# Patient Record
Sex: FEMALE | Race: WHITE | NOT HISPANIC OR LATINO | Employment: OTHER | URBAN - METROPOLITAN AREA
[De-identification: names, ages, dates, MRNs, and addresses within clinical notes are randomized per-mention and may not be internally consistent; named-entity substitution may affect disease eponyms.]

---

## 2017-01-03 ENCOUNTER — APPOINTMENT (OUTPATIENT)
Dept: ONCOLOGY | Facility: HOSPITAL | Age: 77
End: 2017-01-03

## 2017-01-17 ENCOUNTER — APPOINTMENT (OUTPATIENT)
Dept: ONCOLOGY | Facility: HOSPITAL | Age: 77
End: 2017-01-17

## 2017-01-20 ENCOUNTER — INFUSION (OUTPATIENT)
Dept: ONCOLOGY | Facility: HOSPITAL | Age: 77
End: 2017-01-20

## 2017-01-20 VITALS — BODY MASS INDEX: 21.69 KG/M2 | WEIGHT: 116.4 LBS | TEMPERATURE: 98.5 F

## 2017-01-20 DIAGNOSIS — D51.0 VITAMIN B12 DEFICIENCY ANEMIA DUE TO INTRINSIC FACTOR DEFICIENCY: Primary | ICD-10-CM

## 2017-01-20 PROCEDURE — 25010000002 CYANOCOBALAMIN PER 1000 MCG: Performed by: INTERNAL MEDICINE

## 2017-01-20 PROCEDURE — 96372 THER/PROPH/DIAG INJ SC/IM: CPT | Performed by: INTERNAL MEDICINE

## 2017-01-20 RX ORDER — CYANOCOBALAMIN 1000 UG/ML
1000 INJECTION, SOLUTION INTRAMUSCULAR; SUBCUTANEOUS ONCE
Status: COMPLETED | OUTPATIENT
Start: 2017-01-20 | End: 2017-01-20

## 2017-01-20 RX ORDER — CYANOCOBALAMIN 1000 UG/ML
1000 INJECTION, SOLUTION INTRAMUSCULAR; SUBCUTANEOUS ONCE
Status: CANCELLED | OUTPATIENT
Start: 2017-02-10

## 2017-01-20 RX ADMIN — CYANOCOBALAMIN 1000 MCG: 1000 INJECTION INTRAMUSCULAR; SUBCUTANEOUS at 11:23

## 2017-02-21 ENCOUNTER — INFUSION (OUTPATIENT)
Dept: ONCOLOGY | Facility: HOSPITAL | Age: 77
End: 2017-02-21

## 2017-02-21 VITALS
DIASTOLIC BLOOD PRESSURE: 100 MMHG | WEIGHT: 111.9 LBS | OXYGEN SATURATION: 98 % | HEART RATE: 62 BPM | TEMPERATURE: 98.2 F | SYSTOLIC BLOOD PRESSURE: 163 MMHG | BODY MASS INDEX: 20.86 KG/M2

## 2017-02-21 DIAGNOSIS — D51.0 VITAMIN B12 DEFICIENCY ANEMIA DUE TO INTRINSIC FACTOR DEFICIENCY: Primary | ICD-10-CM

## 2017-02-21 PROCEDURE — 25010000002 CYANOCOBALAMIN PER 1000 MCG: Performed by: INTERNAL MEDICINE

## 2017-02-21 PROCEDURE — 96372 THER/PROPH/DIAG INJ SC/IM: CPT | Performed by: INTERNAL MEDICINE

## 2017-02-21 RX ORDER — CYANOCOBALAMIN 1000 UG/ML
1000 INJECTION, SOLUTION INTRAMUSCULAR; SUBCUTANEOUS ONCE
Status: COMPLETED | OUTPATIENT
Start: 2017-02-21 | End: 2017-02-21

## 2017-02-21 RX ORDER — CYANOCOBALAMIN 1000 UG/ML
1000 INJECTION, SOLUTION INTRAMUSCULAR; SUBCUTANEOUS ONCE
Status: CANCELLED | OUTPATIENT
Start: 2017-03-17

## 2017-02-21 RX ADMIN — CYANOCOBALAMIN 1000 MCG: 1000 INJECTION, SOLUTION INTRAMUSCULAR; SUBCUTANEOUS at 11:48

## 2017-02-21 NOTE — PROGRESS NOTES
Pts BP elevated in office today  Pt states that Dr Forte follows her BP  Advised to try to record her BP's and report to PCP  Advised that her meds may need to be adjusted  Understanding noted  Pt denies any complaints

## 2017-02-24 ENCOUNTER — OFFICE VISIT (OUTPATIENT)
Dept: INTERNAL MEDICINE | Facility: CLINIC | Age: 77
End: 2017-02-24

## 2017-02-24 VITALS
BODY MASS INDEX: 21.26 KG/M2 | SYSTOLIC BLOOD PRESSURE: 112 MMHG | OXYGEN SATURATION: 97 % | HEIGHT: 61 IN | HEART RATE: 59 BPM | WEIGHT: 112.6 LBS | DIASTOLIC BLOOD PRESSURE: 78 MMHG

## 2017-02-24 DIAGNOSIS — R30.0 DYSURIA: Primary | ICD-10-CM

## 2017-02-24 DIAGNOSIS — K59.09 CHRONIC CONSTIPATION: ICD-10-CM

## 2017-02-24 DIAGNOSIS — R79.89 ABNORMAL THYROID BLOOD TEST: ICD-10-CM

## 2017-02-24 DIAGNOSIS — I71.60 THORACOABDOMINAL AORTIC ANEURYSM, WITHOUT RUPTURE (HCC): ICD-10-CM

## 2017-02-24 DIAGNOSIS — K55.1 MESENTERIC ARTERY STENOSIS (HCC): ICD-10-CM

## 2017-02-24 DIAGNOSIS — I10 ESSENTIAL HYPERTENSION: ICD-10-CM

## 2017-02-24 DIAGNOSIS — F17.200 TOBACCO DEPENDENCE SYNDROME: ICD-10-CM

## 2017-02-24 DIAGNOSIS — E78.00 HYPERCHOLESTEROLEMIA: ICD-10-CM

## 2017-02-24 DIAGNOSIS — G47.9 SLEEP DIFFICULTIES: ICD-10-CM

## 2017-02-24 DIAGNOSIS — R73.9 HYPERGLYCEMIA: ICD-10-CM

## 2017-02-24 DIAGNOSIS — R14.0 ABDOMINAL BLOATING: ICD-10-CM

## 2017-02-24 DIAGNOSIS — F41.9 ANXIETY: ICD-10-CM

## 2017-02-24 LAB
BILIRUB BLD-MCNC: NEGATIVE MG/DL
CLARITY, POC: CLEAR
COLOR UR: ABNORMAL
GLUCOSE UR STRIP-MCNC: NEGATIVE MG/DL
KETONES UR QL: NEGATIVE
LEUKOCYTE EST, POC: ABNORMAL
NITRITE UR-MCNC: NEGATIVE MG/ML
PH UR: 6.5 [PH] (ref 5–8)
PROT UR STRIP-MCNC: NEGATIVE MG/DL
RBC # UR STRIP: NEGATIVE /UL
SP GR UR: 1.01 (ref 1–1.03)
UROBILINOGEN UR QL: NORMAL

## 2017-02-24 PROCEDURE — 99214 OFFICE O/P EST MOD 30 MIN: CPT | Performed by: INTERNAL MEDICINE

## 2017-02-24 PROCEDURE — 81003 URINALYSIS AUTO W/O SCOPE: CPT | Performed by: INTERNAL MEDICINE

## 2017-02-24 PROCEDURE — 87086 URINE CULTURE/COLONY COUNT: CPT | Performed by: INTERNAL MEDICINE

## 2017-02-24 RX ORDER — QUETIAPINE FUMARATE 25 MG/1
50 TABLET, FILM COATED ORAL NIGHTLY
Qty: 30 TABLET | Refills: 6 | Status: SHIPPED | OUTPATIENT
Start: 2017-02-24 | End: 2017-06-19 | Stop reason: SDUPTHER

## 2017-02-24 RX ORDER — METHOCARBAMOL 750 MG/1
TABLET, FILM COATED ORAL AS NEEDED
Refills: 3 | COMMUNITY
Start: 2017-01-24 | End: 2018-03-12

## 2017-02-24 NOTE — PROGRESS NOTES
"Subjective     Kaylin Ojeda is a 77 y.o. female, who presents with a chief complaint of   Chief Complaint   Patient presents with   • Difficulty Urinating     Patient has only one kidney, she says that sometimes she has to urinate a lot, bloated, she doesnt think that her bladder is always empting when she goes.       HPI   The pt has been having issue with urination for a month or two.  She feels like her bladder doesn't empty.  She feels like her abd is bloated.   Sometimes she had a good stream and sometimes not.  She has a history of vascular issues and her left kidney is smaller than the right.  occ pain from the bloating but no sharp, stabbing pain.  Her urine looks \"strong\"  She is still smoking.  She doesn't have much energy.  She helps care for her great granddaughter on the weekends.   + constipation.  She takes ducolax every couple of days to have a BM.  She has miralax at home too but isnt using it. 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  She hasnt followed up with vascular and last imaging was august 2015.  She is on asa and plavix. She also has mesenteric artery stenosis.      Sleep problems - she is taking trazodone at night and low dose seroquel.    HTN - no ha/dizziness    HLD - on statin.  No cramps or myalgias.      The following portions of the patient's history were reviewed and updated as appropriate: allergies, current medications, past family history, past medical history, past social history, past surgical history and problem list.    Allergies: Aleve  [naproxen sodium]; Codeine; Ibuprofen; and Sulfa antibiotics    Review of Systems   Constitutional: Negative.    HENT: Negative.    Eyes: Negative.    Respiratory: Negative.    Cardiovascular: Negative.    Gastrointestinal: Positive for abdominal distention and constipation. Negative for abdominal pain.   Endocrine: Negative.    Genitourinary: Negative.    Musculoskeletal: Negative.    Skin: Negative.  "   Allergic/Immunologic: Negative.    Neurological: Negative.    Hematological: Negative.    Psychiatric/Behavioral: Negative.    All other systems reviewed and are negative.      Objective     Wt Readings from Last 3 Encounters:   02/24/17 112 lb 9.6 oz (51.1 kg)   02/21/17 111 lb 14.4 oz (50.8 kg)   01/20/17 116 lb 6.4 oz (52.8 kg)     Temp Readings from Last 3 Encounters:   02/21/17 98.2 °F (36.8 °C)   01/20/17 98.5 °F (36.9 °C) (Oral)   11/18/16 98.4 °F (36.9 °C) (Oral)     BP Readings from Last 3 Encounters:   02/24/17 112/78   02/21/17 163/100   11/23/16 117/70     Pulse Readings from Last 3 Encounters:   02/24/17 59   02/21/17 62   11/23/16 62     Body mass index is 21.28 kg/(m^2).  SpO2 Readings from Last 3 Encounters:   02/24/17 97%   02/21/17 98%   11/23/16 98%       Physical Exam   Constitutional: She is oriented to person, place, and time. She appears well-developed and well-nourished. No distress.   HENT:   Head: Normocephalic and atraumatic.   Right Ear: External ear normal.   Left Ear: External ear normal.   Nose: Nose normal.   Mouth/Throat: Oropharynx is clear and moist.   Eyes: Conjunctivae and EOM are normal. Pupils are equal, round, and reactive to light.   Neck: Normal range of motion. Neck supple.   Cardiovascular: Normal rate, regular rhythm, normal heart sounds and intact distal pulses.    Pulmonary/Chest: Effort normal and breath sounds normal. No respiratory distress. She has no wheezes.   Musculoskeletal: Normal range of motion.   Normal gait   Neurological: She is alert and oriented to person, place, and time.   Skin: Skin is warm and dry.   Psychiatric: She has a normal mood and affect. Her behavior is normal. Judgment and thought content normal.   Nursing note and vitals reviewed.      Results for orders placed or performed in visit on 11/23/16   Urine Culture   Result Value Ref Range    Urine Culture Final report     Result 1 Comment    Comprehensive Metabolic Panel   Result Value Ref  Range    Glucose 91 65 - 99 mg/dL    BUN 13 8 - 23 mg/dL    Creatinine 1.03 (H) 0.57 - 1.00 mg/dL    Sodium 136 136 - 145 mmol/L    Potassium 4.6 3.5 - 5.2 mmol/L    Chloride 98 98 - 107 mmol/L    CO2 26.7 22.0 - 29.0 mmol/L    Calcium 8.7 (L) 8.8 - 10.5 mg/dL    Total Protein 7.2 6.0 - 8.5 g/dL    Albumin 4.10 3.50 - 5.20 g/dL    ALT (SGPT) 11 5 - 33 U/L    AST (SGOT) 14 5 - 32 U/L    Alkaline Phosphatase 125 40 - 129 U/L    Total Bilirubin 0.4 0.2 - 1.2 mg/dL    eGFR Non African Amer 52 (L) >60 mL/min/1.73    Globulin 3.1 gm/dL    A/G Ratio 1.3 g/dL    BUN/Creatinine Ratio 12.6 7.0 - 25.0    Anion Gap 11.3 mmol/L   T4, Free   Result Value Ref Range    Free T4 0.93 0.93 - 1.70 ng/dL   TSH   Result Value Ref Range    TSH 5.540 (H) 0.270 - 4.200 mIU/mL   Hemoglobin A1c   Result Value Ref Range    Hemoglobin A1C 5.50 4.80 - 5.60 %   CBC Auto Differential   Result Value Ref Range    WBC 7.15 4.80 - 10.80 10*3/mm3    RBC 4.56 4.20 - 5.40 10*6/mm3    Hemoglobin 14.3 12.0 - 16.0 g/dL    Hematocrit 43.2 37.0 - 47.0 %    MCV 94.7 81.0 - 99.0 fL    MCH 31.4 (H) 27.0 - 31.0 pg    MCHC 33.1 31.0 - 37.0 g/dL    RDW 12.4 11.5 - 14.5 %    RDW-SD 43.7 37.0 - 54.0 fl    MPV 9.6 7.4 - 10.4 fL    Platelets 200 140 - 500 10*3/mm3    Neutrophil % 54.0 45.0 - 70.0 %    Lymphocyte % 35.8 20.0 - 45.0 %    Monocyte % 6.6 3.0 - 8.0 %    Eosinophil % 2.2 0.0 - 4.0 %    Basophil % 1.1 0.0 - 2.0 %    Immature Grans % 0.3 0.0 - 0.5 %    Neutrophils, Absolute 3.86 1.50 - 8.30 10*3/mm3    Lymphocytes, Absolute 2.56 0.60 - 4.80 10*3/mm3    Monocytes, Absolute 0.47 0.00 - 1.00 10*3/mm3    Eosinophils, Absolute 0.16 0.10 - 0.30 10*3/mm3    Basophils, Absolute 0.08 0.00 - 0.20 10*3/mm3    Immature Grans, Absolute 0.02 0.00 - 0.03 10*3/mm3    nRBC 0.0 0.0 - 0.0 /100 WBC   POC Urinalysis Dipstick, Automated   Result Value Ref Range    Color Yellow Yellow, Straw, Dark Yellow, Betty    Clarity, UA Clear Clear    Glucose, UA Negative Negative mg/dL     Bilirubin Negative Negative    Ketones, UA Negative Negative    Specific Gravity  1.020 1.005 - 1.030    Blood, UA Negative Negative    pH, Urine 5.5 5.0 - 8.0    Protein, POC Negative Negative mg/dL    Urobilinogen, UA Normal Normal    Leukocytes Trace (A) Negative    Nitrite, UA Negative Negative       Assessment/Plan   Kaylin was seen today for difficulty urinating.    Diagnoses and all orders for this visit:    Abdominal bloating  -     CT angiogram abdomen pelvis w wo contrast; Future    Essential hypertension  -     CT angiogram abdomen pelvis w wo contrast; Future  -     CT angiogram chest w wo contrast; Future  -     Comprehensive Metabolic Panel; Future  -     NMR LipoProfile; Future  -     CBC & Differential; Future    Hypercholesterolemia  -     CT angiogram abdomen pelvis w wo contrast; Future  -     CT angiogram chest w wo contrast; Future  -     Comprehensive Metabolic Panel; Future  -     NMR LipoProfile; Future  -     CBC & Differential; Future  -     Lipid Panel With LDL / HDL Ratio; Future    Mesenteric artery stenosis  -     CT angiogram abdomen pelvis w wo contrast; Future    Thoracoabdominal aortic aneurysm, without rupture  -     CT angiogram abdomen pelvis w wo contrast; Future  -     CT angiogram chest w wo contrast; Future    Anxiety  -     QUEtiapine (SEROquel) 25 MG tablet; Take 2 tablets by mouth Every Night.    Tobacco dependence syndrome  -     CT angiogram abdomen pelvis w wo contrast; Future  -     CT angiogram chest w wo contrast; Future    Abnormal thyroid blood test  -     T4, Free; Future  -     TSH; Future    Dysuria  -     Comprehensive Metabolic Panel; Future  -     POC Urinalysis Dipstick, Automated    Hyperglycemia  -     Comprehensive Metabolic Panel; Future  -     Hemoglobin A1c; Future    Sleep difficulties    Chronic constipation          Outpatient Medications Prior to Visit   Medication Sig Dispense Refill   • albuterol (PROVENTIL HFA;VENTOLIN HFA) 108 (90 BASE)  MCG/ACT inhaler Inhale 2 puffs every 4 (four) hours as needed for wheezing. 18 g 11   • atorvastatin (LIPITOR) 10 MG tablet Take 1 tablet by mouth daily. 90 tablet 3   • budesonide-formoterol (SYMBICORT) 160-4.5 MCG/ACT inhaler Inhale 2 puffs 2 (two) times a day. 1 inhaler 12   • clopidogrel (PLAVIX) 75 MG tablet TAKE ONE TABLET BY MOUTH ONCE DAILY 30 tablet 9   • clotrimazole-betamethasone (LOTRISONE) 1-0.05 % cream Apply  topically 2 (two) times a day. 45 g 0   • diazePAM (VALIUM) 5 MG tablet Take 1 tablet by mouth Every 12 (Twelve) Hours As Needed for anxiety. 30 tablet 1   • esomeprazole (NexIUM) 40 MG capsule Take  by mouth.     • gabapentin (NEURONTIN) 100 MG capsule TAKE ONE CAPSULE BY MOUTH THREE TIMES DAILY AS NEEDED 90 capsule 6   • hydrOXYzine (ATARAX) 25 MG tablet Take 1 tablet by mouth every 8 (eight) hours as needed for anxiety. 30 tablet 0   • metoprolol tartrate (LOPRESSOR) 100 MG tablet Take 1 tablet by mouth 2 (Two) Times a Day. 180 tablet 1   • mirtazapine (REMERON) 30 MG tablet Take 1 tablet by mouth Every Night. 30 tablet 6   • ondansetron (ZOFRAN) 8 MG tablet Take 1 tablet by mouth every 8 (eight) hours as needed for nausea or vomiting. 20 tablet 2   • oxyCODONE-acetaminophen (PERCOCET)  MG per tablet Take 1 tablet by mouth 4 (four) times a day.  0   • traZODone (DESYREL) 50 MG tablet Take 1 tablet by mouth Every Night. Take 1/2 to one whole tablet QHS. 30 tablet 6   • QUEtiapine (SEROquel) 25 MG tablet Take 1 tablet by mouth Every Night. 30 tablet 6   • clopidogrel (PLAVIX) 75 MG tablet Take  by mouth.       No facility-administered medications prior to visit.      New Medications Ordered This Visit   Medications   • QUEtiapine (SEROquel) 25 MG tablet     Sig: Take 2 tablets by mouth Every Night.     Dispense:  30 tablet     Refill:  6       Medications Discontinued During This Encounter   Medication Reason   • clopidogrel (PLAVIX) 75 MG tablet    • QUEtiapine (SEROquel) 25 MG tablet  Reorder         Return in about 3 months (around 5/24/2017) for Recheck, labs.

## 2017-02-26 LAB
ALBUMIN SERPL-MCNC: 4.2 G/DL (ref 3.5–5.2)
ALBUMIN/GLOB SERPL: 1.6 G/DL
ALP SERPL-CCNC: 118 U/L (ref 40–129)
ALT SERPL-CCNC: 6 U/L (ref 5–33)
AST SERPL-CCNC: 13 U/L (ref 5–32)
BACTERIA SPEC AEROBE CULT: NO GROWTH
BASOPHILS # BLD AUTO: 0.07 10*3/MM3 (ref 0–0.2)
BASOPHILS NFR BLD AUTO: 1 % (ref 0–2)
BILIRUB SERPL-MCNC: 0.4 MG/DL (ref 0.2–1.2)
BUN SERPL-MCNC: 10 MG/DL (ref 8–23)
BUN/CREAT SERPL: 10.1 (ref 7–25)
CALCIUM SERPL-MCNC: 9 MG/DL (ref 8.8–10.5)
CHLORIDE SERPL-SCNC: 98 MMOL/L (ref 98–107)
CHOLEST SERPL-MCNC: 140 MG/DL (ref 100–199)
CO2 SERPL-SCNC: 29.3 MMOL/L (ref 22–29)
CREAT SERPL-MCNC: 0.99 MG/DL (ref 0.57–1)
EOSINOPHIL # BLD AUTO: 0.09 10*3/MM3 (ref 0.1–0.3)
EOSINOPHIL NFR BLD AUTO: 1.3 % (ref 0–4)
ERYTHROCYTE [DISTWIDTH] IN BLOOD BY AUTOMATED COUNT: 13.3 % (ref 11.5–14.5)
GLOBULIN SER CALC-MCNC: 2.6 GM/DL
GLUCOSE SERPL-MCNC: 82 MG/DL (ref 65–99)
HBA1C MFR BLD: 5.6 % (ref 4.8–5.6)
HCT VFR BLD AUTO: 44.7 % (ref 37–47)
HDL SERPL-SCNC: 28 UMOL/L
HDLC SERPL-MCNC: 48 MG/DL
HGB BLD-MCNC: 14.6 G/DL (ref 12–16)
IMM GRANULOCYTES # BLD: 0.02 10*3/MM3 (ref 0–0.03)
IMM GRANULOCYTES NFR BLD: 0.3 % (ref 0–0.5)
LDL SERPL QN: 21 NM
LDL SERPL-SCNC: 849 NMOL/L
LDL SMALL SERPL-SCNC: 430 NMOL/L
LDLC SERPL CALC-MCNC: 67 MG/DL (ref 0–99)
LYMPHOCYTES # BLD AUTO: 2.68 10*3/MM3 (ref 0.6–4.8)
LYMPHOCYTES NFR BLD AUTO: 38.1 % (ref 20–45)
MCH RBC QN AUTO: 31.3 PG (ref 27–31)
MCHC RBC AUTO-ENTMCNC: 32.7 G/DL (ref 31–37)
MCV RBC AUTO: 95.7 FL (ref 81–99)
MONOCYTES # BLD AUTO: 0.38 10*3/MM3 (ref 0–1)
MONOCYTES NFR BLD AUTO: 5.4 % (ref 3–8)
NEUTROPHILS # BLD AUTO: 3.79 10*3/MM3 (ref 1.5–8.3)
NEUTROPHILS NFR BLD AUTO: 53.9 % (ref 45–70)
NRBC BLD AUTO-RTO: 0 /100 WBC (ref 0–0)
PLATELET # BLD AUTO: 214 10*3/MM3 (ref 140–500)
POTASSIUM SERPL-SCNC: 4.7 MMOL/L (ref 3.5–5.2)
PROT SERPL-MCNC: 6.8 G/DL (ref 6–8.5)
RBC # BLD AUTO: 4.67 10*6/MM3 (ref 4.2–5.4)
SODIUM SERPL-SCNC: 139 MMOL/L (ref 136–145)
T4 FREE SERPL-MCNC: 1.06 NG/DL (ref 0.93–1.7)
TRIGL SERPL-MCNC: 123 MG/DL (ref 0–149)
TSH SERPL DL<=0.005 MIU/L-ACNC: 3.69 MIU/ML (ref 0.27–4.2)
WBC # BLD AUTO: 7.03 10*3/MM3 (ref 4.8–10.8)

## 2017-03-03 ENCOUNTER — TELEPHONE (OUTPATIENT)
Dept: INTERNAL MEDICINE | Facility: CLINIC | Age: 77
End: 2017-03-03

## 2017-03-03 NOTE — TELEPHONE ENCOUNTER
Patient notified.   ----- Message from Donna Forte MD sent at 3/2/2017  8:20 AM EST -----  Call pt about labs. Labs ok  Cont same meds.

## 2017-03-09 ENCOUNTER — HOSPITAL ENCOUNTER (OUTPATIENT)
Dept: CT IMAGING | Facility: HOSPITAL | Age: 77
Discharge: HOME | End: 2017-03-09
Attending: INTERNAL MEDICINE

## 2017-03-16 ENCOUNTER — HOSPITAL ENCOUNTER (OUTPATIENT)
Dept: CT IMAGING | Facility: HOSPITAL | Age: 77
Discharge: HOME | End: 2017-03-16
Attending: INTERNAL MEDICINE | Admitting: INTERNAL MEDICINE

## 2017-03-16 DIAGNOSIS — E78.00 HYPERCHOLESTEROLEMIA: ICD-10-CM

## 2017-03-16 DIAGNOSIS — R14.0 ABDOMINAL BLOATING: ICD-10-CM

## 2017-03-16 DIAGNOSIS — K55.1 MESENTERIC ARTERY STENOSIS (HCC): ICD-10-CM

## 2017-03-16 DIAGNOSIS — F17.200 TOBACCO DEPENDENCE SYNDROME: ICD-10-CM

## 2017-03-16 DIAGNOSIS — I10 ESSENTIAL HYPERTENSION: ICD-10-CM

## 2017-03-16 DIAGNOSIS — I71.60 THORACOABDOMINAL AORTIC ANEURYSM, WITHOUT RUPTURE (HCC): ICD-10-CM

## 2017-03-16 PROCEDURE — 0 IOPAMIDOL PER 1 ML: Performed by: INTERNAL MEDICINE

## 2017-03-16 PROCEDURE — 71275 CT ANGIOGRAPHY CHEST: CPT

## 2017-03-16 PROCEDURE — 74174 CTA ABD&PLVS W/CONTRAST: CPT

## 2017-03-16 RX ADMIN — IOPAMIDOL 100 ML: 755 INJECTION, SOLUTION INTRAVENOUS at 13:45

## 2017-03-30 ENCOUNTER — TELEPHONE (OUTPATIENT)
Dept: INTERNAL MEDICINE | Facility: CLINIC | Age: 77
End: 2017-03-30

## 2017-03-30 NOTE — TELEPHONE ENCOUNTER
Left message with results.   ----- Message from Donna Forte MD sent at 3/25/2017  5:45 PM EDT -----  Call pt about imaging.  Chronic occlusions stable.  Small increase in AAA from 3.0-> 3.6cm.  Cont f/u with vascular surgery.  Encourage smoking cessation.

## 2017-04-04 ENCOUNTER — OFFICE VISIT (OUTPATIENT)
Dept: INTERNAL MEDICINE | Facility: CLINIC | Age: 77
End: 2017-04-04

## 2017-04-04 VITALS
TEMPERATURE: 98.1 F | DIASTOLIC BLOOD PRESSURE: 78 MMHG | OXYGEN SATURATION: 97 % | BODY MASS INDEX: 21.07 KG/M2 | HEART RATE: 76 BPM | WEIGHT: 111.6 LBS | SYSTOLIC BLOOD PRESSURE: 112 MMHG | HEIGHT: 61 IN

## 2017-04-04 DIAGNOSIS — R14.0 ABDOMINAL BLOATING: ICD-10-CM

## 2017-04-04 DIAGNOSIS — N89.8 VAGINAL IRRITATION: ICD-10-CM

## 2017-04-04 DIAGNOSIS — K59.03 DRUG-INDUCED CONSTIPATION: ICD-10-CM

## 2017-04-04 DIAGNOSIS — R30.0 DYSURIA: Primary | ICD-10-CM

## 2017-04-04 DIAGNOSIS — R10.9 LEFT SIDED ABDOMINAL PAIN OF UNKNOWN CAUSE: ICD-10-CM

## 2017-04-04 PROBLEM — I07.1 TRICUSPID VALVE INSUFFICIENCY: Status: ACTIVE | Noted: 2017-04-04

## 2017-04-04 PROBLEM — D51.0 PERNICIOUS ANEMIA: Status: ACTIVE | Noted: 2017-04-04

## 2017-04-04 PROBLEM — I34.0 MITRAL VALVE INSUFFICIENCY: Status: ACTIVE | Noted: 2017-04-04

## 2017-04-04 LAB
BILIRUB BLD-MCNC: NEGATIVE MG/DL
CLARITY, POC: CLEAR
COLOR UR: YELLOW
GLUCOSE UR STRIP-MCNC: NEGATIVE MG/DL
KETONES UR QL: NEGATIVE
LEUKOCYTE EST, POC: ABNORMAL
NITRITE UR-MCNC: NEGATIVE MG/ML
PH UR: 6 [PH] (ref 5–8)
PROT UR STRIP-MCNC: NEGATIVE MG/DL
RBC # UR STRIP: NEGATIVE /UL
SP GR UR: 1.02 (ref 1–1.03)
UROBILINOGEN UR QL: NORMAL

## 2017-04-04 PROCEDURE — 99214 OFFICE O/P EST MOD 30 MIN: CPT | Performed by: INTERNAL MEDICINE

## 2017-04-04 PROCEDURE — 81003 URINALYSIS AUTO W/O SCOPE: CPT | Performed by: INTERNAL MEDICINE

## 2017-04-04 NOTE — PROGRESS NOTES
Subjective     Kaylin Ojeda is a 77 y.o. female, who presents with a chief complaint of   Chief Complaint   Patient presents with   • Difficulty Urinating     x2 weeks, bloated, painful urinating, burning sensation        HPI Comments: Patient is here with 2 weeks of lower back pain (which is chronic and worsening), bloating and rectal pain per her. She feels that she may have irritable bowel. Over the weekend, she feels that it hurts when she urinates. She does not have regular BM's and it hurts when she goes to the bathroom. She has not noted blood in her stool or urine. No fever or chills.        The following portions of the patient's history were reviewed and updated as appropriate: allergies, current medications, past family history, past medical history, past social history, past surgical history and problem list.    Allergies: Aleve  [naproxen sodium]; Codeine; Ibuprofen; and Sulfa antibiotics    Current Outpatient Prescriptions:   •  albuterol (PROVENTIL HFA;VENTOLIN HFA) 108 (90 BASE) MCG/ACT inhaler, Inhale 2 puffs every 4 (four) hours as needed for wheezing., Disp: 18 g, Rfl: 11  •  atorvastatin (LIPITOR) 10 MG tablet, Take 1 tablet by mouth daily., Disp: 90 tablet, Rfl: 3  •  budesonide-formoterol (SYMBICORT) 160-4.5 MCG/ACT inhaler, Inhale 2 puffs 2 (two) times a day., Disp: 1 inhaler, Rfl: 12  •  clopidogrel (PLAVIX) 75 MG tablet, TAKE ONE TABLET BY MOUTH ONCE DAILY, Disp: 30 tablet, Rfl: 9  •  clotrimazole-betamethasone (LOTRISONE) 1-0.05 % cream, Apply  topically 2 (two) times a day., Disp: 45 g, Rfl: 0  •  diazePAM (VALIUM) 5 MG tablet, Take 1 tablet by mouth Every 12 (Twelve) Hours As Needed for anxiety., Disp: 30 tablet, Rfl: 1  •  esomeprazole (NexIUM) 40 MG capsule, Take  by mouth., Disp: , Rfl:   •  gabapentin (NEURONTIN) 100 MG capsule, TAKE ONE CAPSULE BY MOUTH THREE TIMES DAILY AS NEEDED, Disp: 90 capsule, Rfl: 6  •  hydrOXYzine (ATARAX) 25 MG tablet, Take 1 tablet by mouth every 8  "(eight) hours as needed for anxiety., Disp: 30 tablet, Rfl: 0  •  methocarbamol (ROBAXIN) 750 MG tablet, , Disp: , Rfl: 3  •  metoprolol tartrate (LOPRESSOR) 100 MG tablet, Take 1 tablet by mouth 2 (Two) Times a Day., Disp: 180 tablet, Rfl: 1  •  mirtazapine (REMERON) 30 MG tablet, Take 1 tablet by mouth Every Night., Disp: 30 tablet, Rfl: 6  •  ondansetron (ZOFRAN) 8 MG tablet, Take 1 tablet by mouth every 8 (eight) hours as needed for nausea or vomiting., Disp: 20 tablet, Rfl: 2  •  oxyCODONE-acetaminophen (PERCOCET)  MG per tablet, Take 1 tablet by mouth 4 (four) times a day., Disp: , Rfl: 0  •  QUEtiapine (SEROquel) 25 MG tablet, Take 2 tablets by mouth Every Night., Disp: 30 tablet, Rfl: 6  •  traZODone (DESYREL) 50 MG tablet, Take 1 tablet by mouth Every Night. Take 1/2 to one whole tablet QHS., Disp: 30 tablet, Rfl: 6  There are no discontinued medications.    Review of Systems   Constitutional: Negative for chills and fever.   Respiratory: Negative for cough and shortness of breath.    Cardiovascular: Negative for chest pain and palpitations.   Gastrointestinal: Positive for abdominal pain and constipation. Negative for diarrhea and nausea.   Genitourinary: Positive for difficulty urinating and dysuria.   Musculoskeletal: Positive for back pain.       Objective     /78 (BP Location: Left arm, Patient Position: Sitting, Cuff Size: Adult)  Pulse 76  Temp 98.1 °F (36.7 °C) (Oral)   Ht 61\" (154.9 cm)  Wt 111 lb 9.6 oz (50.6 kg)  SpO2 97%  BMI 21.09 kg/m2      Physical Exam   Constitutional: She is oriented to person, place, and time. She appears well-developed and well-nourished. No distress.   HENT:   Head: Normocephalic and atraumatic.   Right Ear: External ear normal.   Left Ear: External ear normal.   Mouth/Throat: Oropharynx is clear and moist. No oropharyngeal exudate.   Eyes: Conjunctivae are normal. Right eye exhibits no discharge. Left eye exhibits no discharge. No scleral icterus. "   Neck: Neck supple.   Cardiovascular: Normal rate, regular rhythm and normal heart sounds.  Exam reveals no gallop and no friction rub.    No murmur heard.  Pulmonary/Chest: Effort normal and breath sounds normal. No respiratory distress. She has no wheezes. She has no rales.   Abdominal: Soft. Bowel sounds are normal. She exhibits no distension and no mass. There is no hepatosplenomegaly, splenomegaly or hepatomegaly. There is tenderness in the left upper quadrant and left lower quadrant. There is no guarding.   Genitourinary: Rectum normal and vagina normal. Pelvic exam was performed with patient prone. There is no rash or tenderness on the right labia. There is no rash or tenderness on the left labia.   Lymphadenopathy:     She has no cervical adenopathy.   Neurological: She is alert and oriented to person, place, and time.   Skin: Skin is warm. No rash noted.   Psychiatric: She has a normal mood and affect. Her behavior is normal.   Nursing note and vitals reviewed.        Results for orders placed or performed in visit on 04/04/17   POCT urinalysis dipstick, automated   Result Value Ref Range    Color Yellow Yellow, Straw, Dark Yellow, Betty    Clarity, UA Clear Clear    Glucose, UA Negative Negative, 1000 mg/dL (3+) mg/dL    Bilirubin Negative Negative    Ketones, UA Negative Negative    Specific Gravity  1.025 1.005 - 1.030    Blood, UA Negative Negative    pH, Urine 6.0 5.0 - 8.0    Protein, POC Negative Negative mg/dL    Urobilinogen, UA Normal Normal    Leukocytes Trace (A) Negative    Nitrite, UA Negative Negative       Assessment/Plan   Kaylin was seen today for difficulty urinating.    Diagnoses and all orders for this visit:    Dysuria  -     POCT urinalysis dipstick, automated  -     Urine culture (clean catch)  -     Urine Culture    Abdominal bloating    Left sided abdominal pain of unknown cause  -     CBC & Differential  -     Comprehensive Metabolic Panel  -     Amylase  -     Lipase  -     XR  Abdomen KUB    Drug-induced constipation  -     XR Abdomen KUB    Vaginal irritation      This has been a chronic issue per Dr. Forte's notes. I rechecked her urine and still not abnormal with trace leucocyte esterase.  Will send for culture. Will also order repeat labs to rule out other causes as she is tender on exam. KUB to evaluate stool burden. She is having constipation and will tart Senna S 2 tabs at night due to her narcotic use. I reviewed her CT scan of her abdomen/angiogram and it is stable with an enlarging aneurysm. Will see Dr. Forte for follow up to discuss these results and plan going forward. I feel that this is related to her underlying vascular disease and constipation.     Rectal pain was actually vaginal pain and will follow up with Dr. Forte. No abnormalities on exam, but may benefit from topical estrogen to help with irritation and dryness. May need pelvic if continues.    Return in about 7 weeks (around 5/24/2017) for Recheck with Dr. Forte with labs before .    Remedios Vasquez MD  04/04/2017

## 2017-04-06 LAB
ALBUMIN SERPL-MCNC: 4.3 G/DL (ref 3.5–5.2)
ALBUMIN/GLOB SERPL: 1.5 G/DL
ALP SERPL-CCNC: 128 U/L (ref 40–129)
ALT SERPL-CCNC: 8 U/L (ref 5–33)
AMYLASE SERPL-CCNC: 60 U/L (ref 28–100)
AST SERPL-CCNC: 16 U/L (ref 5–32)
BACTERIA UR CULT: NORMAL
BACTERIA UR CULT: NORMAL
BASOPHILS # BLD AUTO: 0.08 10*3/MM3 (ref 0–0.2)
BASOPHILS NFR BLD AUTO: 1.2 % (ref 0–2)
BILIRUB SERPL-MCNC: 0.4 MG/DL (ref 0.2–1.2)
BUN SERPL-MCNC: 13 MG/DL (ref 8–23)
BUN/CREAT SERPL: 12.6 (ref 7–25)
CALCIUM SERPL-MCNC: 9.1 MG/DL (ref 8.8–10.5)
CHLORIDE SERPL-SCNC: 100 MMOL/L (ref 98–107)
CO2 SERPL-SCNC: 29.6 MMOL/L (ref 22–29)
CREAT SERPL-MCNC: 1.03 MG/DL (ref 0.57–1)
EOSINOPHIL # BLD AUTO: 0.13 10*3/MM3 (ref 0.1–0.3)
EOSINOPHIL NFR BLD AUTO: 2 % (ref 0–4)
ERYTHROCYTE [DISTWIDTH] IN BLOOD BY AUTOMATED COUNT: 13.2 % (ref 11.5–14.5)
GLOBULIN SER CALC-MCNC: 2.8 GM/DL
GLUCOSE SERPL-MCNC: 107 MG/DL (ref 65–99)
HCT VFR BLD AUTO: 44.6 % (ref 37–47)
HGB BLD-MCNC: 14.8 G/DL (ref 12–16)
IMM GRANULOCYTES # BLD: 0.02 10*3/MM3 (ref 0–0.03)
IMM GRANULOCYTES NFR BLD: 0.3 % (ref 0–0.5)
LIPASE SERPL-CCNC: 23 U/L (ref 13–60)
LYMPHOCYTES # BLD AUTO: 2.77 10*3/MM3 (ref 0.6–4.8)
LYMPHOCYTES NFR BLD AUTO: 42.4 % (ref 20–45)
MCH RBC QN AUTO: 31.5 PG (ref 27–31)
MCHC RBC AUTO-ENTMCNC: 33.2 G/DL (ref 31–37)
MCV RBC AUTO: 94.9 FL (ref 81–99)
MONOCYTES # BLD AUTO: 0.35 10*3/MM3 (ref 0–1)
MONOCYTES NFR BLD AUTO: 5.4 % (ref 3–8)
NEUTROPHILS # BLD AUTO: 3.18 10*3/MM3 (ref 1.5–8.3)
NEUTROPHILS NFR BLD AUTO: 48.7 % (ref 45–70)
NRBC BLD AUTO-RTO: 0 /100 WBC (ref 0–0)
PLATELET # BLD AUTO: 225 10*3/MM3 (ref 140–500)
POTASSIUM SERPL-SCNC: 4.8 MMOL/L (ref 3.5–5.2)
PROT SERPL-MCNC: 7.1 G/DL (ref 6–8.5)
RBC # BLD AUTO: 4.7 10*6/MM3 (ref 4.2–5.4)
SODIUM SERPL-SCNC: 141 MMOL/L (ref 136–145)
WBC # BLD AUTO: 6.53 10*3/MM3 (ref 4.8–10.8)

## 2017-04-06 NOTE — PROGRESS NOTES
Please call patient with these results and let her know that her labs are stable and urine culture was negative. Please ask her if she had the abdominal x-ray done as I asked her to do and if she has had a bowel movement? Is her pain and bloating any better?

## 2017-04-07 ENCOUNTER — TELEPHONE (OUTPATIENT)
Dept: INTERNAL MEDICINE | Facility: CLINIC | Age: 77
End: 2017-04-07

## 2017-04-07 ENCOUNTER — HOSPITAL ENCOUNTER (OUTPATIENT)
Dept: GENERAL RADIOLOGY | Facility: HOSPITAL | Age: 77
Discharge: HOME | End: 2017-04-07
Attending: INTERNAL MEDICINE | Admitting: INTERNAL MEDICINE

## 2017-04-07 PROCEDURE — 74000 HC ABDOMEN KUB: CPT

## 2017-04-07 NOTE — PROGRESS NOTES
Please call patient with these results and let her know that it is normal. I want her to continue to use the Senna S for daily normal BMs and follow up with Dr. Forte as scheduled.

## 2017-04-07 NOTE — TELEPHONE ENCOUNTER
Patient has been advised and voiced understanding. Patient states she had a BM this AM.     ----- Message from Remedios Vasquez MD sent at 4/7/2017  1:13 PM EDT -----  Please call patient with these results and let her know that it is normal. I want her to continue to use the Senna S for daily normal BMs and follow up with Dr. Forte as scheduled.

## 2017-04-11 ENCOUNTER — LAB (OUTPATIENT)
Dept: LAB | Facility: HOSPITAL | Age: 77
End: 2017-04-11

## 2017-04-11 ENCOUNTER — INFUSION (OUTPATIENT)
Dept: ONCOLOGY | Facility: HOSPITAL | Age: 77
End: 2017-04-11

## 2017-04-11 ENCOUNTER — OFFICE VISIT (OUTPATIENT)
Dept: ONCOLOGY | Facility: CLINIC | Age: 77
End: 2017-04-11

## 2017-04-11 VITALS
SYSTOLIC BLOOD PRESSURE: 172 MMHG | OXYGEN SATURATION: 98 % | DIASTOLIC BLOOD PRESSURE: 76 MMHG | HEART RATE: 58 BPM | WEIGHT: 112.9 LBS | HEIGHT: 61 IN | TEMPERATURE: 98.2 F | BODY MASS INDEX: 21.32 KG/M2 | RESPIRATION RATE: 16 BRPM

## 2017-04-11 DIAGNOSIS — D51.0 VITAMIN B12 DEFICIENCY ANEMIA DUE TO INTRINSIC FACTOR DEFICIENCY: Primary | ICD-10-CM

## 2017-04-11 DIAGNOSIS — D51.9 ANEMIA DUE TO VITAMIN B12 DEFICIENCY, UNSPECIFIED B12 DEFICIENCY TYPE: Primary | ICD-10-CM

## 2017-04-11 LAB
BASOPHILS # BLD AUTO: 0.07 10*3/MM3 (ref 0–0.2)
BASOPHILS NFR BLD AUTO: 1 % (ref 0–2)
DEPRECATED RDW RBC AUTO: 44.9 FL (ref 37–54)
EOSINOPHIL # BLD AUTO: 0.14 10*3/MM3 (ref 0.1–0.3)
EOSINOPHIL NFR BLD AUTO: 2 % (ref 0–4)
ERYTHROCYTE [DISTWIDTH] IN BLOOD BY AUTOMATED COUNT: 13.1 % (ref 11.5–14.5)
HCT VFR BLD AUTO: 44 % (ref 37–47)
HGB BLD-MCNC: 14.8 G/DL (ref 12–16)
IMM GRANULOCYTES # BLD: 0.04 10*3/MM3 (ref 0–0.03)
IMM GRANULOCYTES NFR BLD: 0.6 % (ref 0–0.5)
LYMPHOCYTES # BLD AUTO: 2.79 10*3/MM3 (ref 0.6–4.8)
LYMPHOCYTES NFR BLD AUTO: 40.4 % (ref 20–45)
MCH RBC QN AUTO: 31.4 PG (ref 27–31)
MCHC RBC AUTO-ENTMCNC: 33.6 G/DL (ref 31–37)
MCV RBC AUTO: 93.4 FL (ref 81–99)
MONOCYTES # BLD AUTO: 0.46 10*3/MM3 (ref 0–1)
MONOCYTES NFR BLD AUTO: 6.7 % (ref 3–8)
NEUTROPHILS # BLD AUTO: 3.41 10*3/MM3 (ref 1.5–8.3)
NEUTROPHILS NFR BLD AUTO: 49.3 % (ref 45–70)
NRBC BLD MANUAL-RTO: 0 /100 WBC (ref 0–0)
PLATELET # BLD AUTO: 191 10*3/MM3 (ref 140–500)
PMV BLD AUTO: 9.7 FL (ref 7.4–10.4)
RBC # BLD AUTO: 4.71 10*6/MM3 (ref 4.2–5.4)
WBC NRBC COR # BLD: 6.91 10*3/MM3 (ref 4.8–10.8)

## 2017-04-11 PROCEDURE — 36415 COLL VENOUS BLD VENIPUNCTURE: CPT | Performed by: INTERNAL MEDICINE

## 2017-04-11 PROCEDURE — 85025 COMPLETE CBC W/AUTO DIFF WBC: CPT

## 2017-04-11 PROCEDURE — 99213 OFFICE O/P EST LOW 20 MIN: CPT | Performed by: INTERNAL MEDICINE

## 2017-04-11 PROCEDURE — 25010000002 CYANOCOBALAMIN PER 1000 MCG: Performed by: INTERNAL MEDICINE

## 2017-04-11 PROCEDURE — 96372 THER/PROPH/DIAG INJ SC/IM: CPT | Performed by: INTERNAL MEDICINE

## 2017-04-11 RX ORDER — CYANOCOBALAMIN 1000 UG/ML
1000 INJECTION, SOLUTION INTRAMUSCULAR; SUBCUTANEOUS ONCE
Status: COMPLETED | OUTPATIENT
Start: 2017-04-11 | End: 2017-04-11

## 2017-04-11 RX ORDER — CYANOCOBALAMIN 1000 UG/ML
1000 INJECTION, SOLUTION INTRAMUSCULAR; SUBCUTANEOUS ONCE
Status: CANCELLED | OUTPATIENT
Start: 2017-04-18

## 2017-04-11 RX ADMIN — CYANOCOBALAMIN 1000 MCG: 1000 INJECTION, SOLUTION INTRAMUSCULAR; SUBCUTANEOUS at 13:45

## 2017-04-11 NOTE — PROGRESS NOTES
History:     Reason for follow up:   1. Pernicious anemia, on monthly B12 replacement  2. Iron deficiency anemia    HPI:   Kaylin Ojeda presents for follow-up of anemia.  She remains on B12 with monthly injections.  She is doing well.  She has gained some weight with Remeron.  She is fatigued and has arthralgias.  She also has chronic abdominal pain after eating.  She is continuing to follow with Dr. Forte.  She denies any fevers, chills, night sweats.  Past Medical  Past Medical History:   Diagnosis Date   • Abdominal pain, epigastric     Chronic, unclear cause, improved   • Anorexia    • Anxiety    • Arthritis    • CAD (coronary artery disease)     Cath 5/2015: nonobstructive LAD/RCA disease, 90% mid LCx s/p [id number] Xience   • Cellulitis of leg    • Cervical disc disease    • Chronic fatigue    • Colitis    • COPD (chronic obstructive pulmonary disease)    • Depression    • Erosive gastritis    • Fibromyalgia    • Gastritis    • Hypercholesterolemia 2/2/2016   • Hyperglycemia    • Hypertension     History of refractory hypertension which improved significantly   • Insomnia    • Leukocytes in urine    • Lower back pain    • Mediastinal lymphadenopathy    • Mesenteric artery stenosis    • Mononucleosis    • Occlusion and stenosis of carotid artery    • Onychomycosis    • Orbit fracture    • Peripheral arterial disease     Significant (carotid, subclavian, lower extremities), with claudication, medical therapy only   • Pernicious anemia    • Prediabetes    • Renal artery stenosis     unilateral, with renal atrophy (no intervention required)   • Renal calculi    • Sinus bradycardia     mild, asymptomatic   • TIA (transient ischemic attack)    • UTI (urinary tract infection)    • Valvular heart disease     5/2015: mild-mod AI, mod MR, mod-severe TR   • Vision problem    • Vitamin B 12 deficiency      Social History  Social History     Social History   • Marital status:      Spouse name: N/A   • Number of  "children: N/A   • Years of education: N/A     Occupational History   •  Retired     Social History Main Topics   • Smoking status: Current Every Day Smoker     Packs/day: 0.50     Types: Cigarettes   • Smokeless tobacco: Not on file   • Alcohol use Yes      Comment: OCCASIONAL   • Drug use: No   • Sexual activity: Not on file     Other Topics Concern   • Not on file     Social History Narrative     Family History   Family History   Problem Relation Age of Onset   • Asthma Mother    • Emphysema Mother    • Graves' disease Mother    • Heart disease Mother    • Hypertension Mother    • Emphysema Father    • Hypertension Father    • Heart disease Father    • Kidney disease Sister    • Lupus Daughter      Systemic erythematosus   • Arthritis Other    • Crohn's disease Other    Allergies   Allergies   Allergen Reactions   • Aleve  [Naproxen Sodium]    • Codeine    • Ibuprofen    • Sulfa Antibiotics        Medications: The current medication list was reviewed in the EMR.    Review of Systems  GENERAL: Gaining weight, still poor appetite; No fevers, chills, sweats.    SKIN: No nonhealing lesions. No rashes.  HEME/LYMPH: No easy bruising, bleeding. No swollen nodes.   RESPIRATORY: No cough, shortness of breath, hemoptysis or wheezing.   CVS: No chest pain, palpitations, orthopnea, dyspnea on exertion or PND.   GI: No melena or hematochezia. + abdominal pain. No nausea, vomiting, constipation, diarrhea  MUSCULOSKELETAL: + arthralgias  NEUROLOGICAL: No global weakness, loss of consciousness or seizures.   PSYCHIATRIC: No increased nervousness, mood changes or depression.        Objective:     Vitals:    04/11/17 1319   BP: 172/76   Pulse: 58   Resp: 16   Temp: 98.2 °F (36.8 °C)   SpO2: 98%   Weight: 112 lb 14.4 oz (51.2 kg)   Height: 61.02\" (155 cm)  Comment: new ht   PainSc: 0-No pain     Current Status 4/11/2017   ECOG score 0   GENERAL:  Well-developed, well-nourished female in no acute distress.   SKIN:  Warm, dry without " rashes, purpura or petechiae.  CHEST:  Lungs clear to percussion and auscultation. Good airflow.  CARDIAC:  Regular rate and rhythm without murmurs, rubs or gallops. Normal S1,S2. No edema  EXTREMITIES:  No clubbing, cyanosis or edema.  PSYCHIATRIC:  Normal affect and mood.      Labs and Imaging  Results for orders placed or performed in visit on 04/11/17   CBC Auto Differential   Result Value Ref Range    WBC 6.91 4.80 - 10.80 10*3/mm3    RBC 4.71 4.20 - 5.40 10*6/mm3    Hemoglobin 14.8 12.0 - 16.0 g/dL    Hematocrit 44.0 37.0 - 47.0 %    MCV 93.4 81.0 - 99.0 fL    MCH 31.4 (H) 27.0 - 31.0 pg    MCHC 33.6 31.0 - 37.0 g/dL    RDW 13.1 11.5 - 14.5 %    RDW-SD 44.9 37.0 - 54.0 fl    MPV 9.7 7.4 - 10.4 fL    Platelets 191 140 - 500 10*3/mm3    Neutrophil % 49.3 45.0 - 70.0 %    Lymphocyte % 40.4 20.0 - 45.0 %    Monocyte % 6.7 3.0 - 8.0 %    Eosinophil % 2.0 0.0 - 4.0 %    Basophil % 1.0 0.0 - 2.0 %    Immature Grans % 0.6 (H) 0.0 - 0.5 %    Neutrophils, Absolute 3.41 1.50 - 8.30 10*3/mm3    Lymphocytes, Absolute 2.79 0.60 - 4.80 10*3/mm3    Monocytes, Absolute 0.46 0.00 - 1.00 10*3/mm3    Eosinophils, Absolute 0.14 0.10 - 0.30 10*3/mm3    Basophils, Absolute 0.07 0.00 - 0.20 10*3/mm3    Immature Grans, Absolute 0.04 (H) 0.00 - 0.03 10*3/mm3    nRBC 0.0 0.0 - 0.0 /100 WBC         Assessment/Plan   Assessment/Plan:   1. Vitamin B12 deficiency anemia due to intrinsic factor deficiency   - B12 injections here monthly. Doing well.   2.  Other iron deficiency anemia   - Hgb much improved after Feraheme and continues to stay elevated.     Follow-up in 6 months for MD visit but monthly for B12 injections. I asked the patient to call for any questions, concerns, or new symptoms.

## 2017-05-04 ENCOUNTER — TELEPHONE (OUTPATIENT)
Dept: INTERNAL MEDICINE | Facility: CLINIC | Age: 77
End: 2017-05-04

## 2017-05-16 ENCOUNTER — APPOINTMENT (OUTPATIENT)
Dept: ONCOLOGY | Facility: HOSPITAL | Age: 77
End: 2017-05-16

## 2017-05-23 ENCOUNTER — APPOINTMENT (OUTPATIENT)
Dept: ONCOLOGY | Facility: HOSPITAL | Age: 77
End: 2017-05-23

## 2017-05-30 DIAGNOSIS — I10 ESSENTIAL HYPERTENSION: ICD-10-CM

## 2017-05-30 RX ORDER — METOPROLOL TARTRATE 100 MG/1
100 TABLET ORAL 2 TIMES DAILY
Qty: 180 TABLET | Refills: 1 | Status: SHIPPED | OUTPATIENT
Start: 2017-05-30 | End: 2017-11-29 | Stop reason: SDUPTHER

## 2017-05-31 ENCOUNTER — INFUSION (OUTPATIENT)
Dept: ONCOLOGY | Facility: HOSPITAL | Age: 77
End: 2017-05-31

## 2017-05-31 ENCOUNTER — OFFICE VISIT (OUTPATIENT)
Dept: INTERNAL MEDICINE | Facility: CLINIC | Age: 77
End: 2017-05-31

## 2017-05-31 VITALS — WEIGHT: 114.3 LBS | BODY MASS INDEX: 21.58 KG/M2 | TEMPERATURE: 98.7 F

## 2017-05-31 VITALS
SYSTOLIC BLOOD PRESSURE: 142 MMHG | OXYGEN SATURATION: 96 % | BODY MASS INDEX: 21.45 KG/M2 | WEIGHT: 113.6 LBS | RESPIRATION RATE: 16 BRPM | HEART RATE: 60 BPM | HEIGHT: 61 IN | DIASTOLIC BLOOD PRESSURE: 82 MMHG

## 2017-05-31 DIAGNOSIS — F41.8 MIXED ANXIETY DEPRESSIVE DISORDER: ICD-10-CM

## 2017-05-31 DIAGNOSIS — K55.1 MESENTERIC ARTERY STENOSIS (HCC): ICD-10-CM

## 2017-05-31 DIAGNOSIS — D51.0 VITAMIN B12 DEFICIENCY ANEMIA DUE TO INTRINSIC FACTOR DEFICIENCY: ICD-10-CM

## 2017-05-31 DIAGNOSIS — R79.89 ABNORMAL THYROID BLOOD TEST: ICD-10-CM

## 2017-05-31 DIAGNOSIS — J42 CHRONIC BRONCHITIS, UNSPECIFIED CHRONIC BRONCHITIS TYPE (HCC): ICD-10-CM

## 2017-05-31 DIAGNOSIS — R73.02 IMPAIRED GLUCOSE TOLERANCE: ICD-10-CM

## 2017-05-31 DIAGNOSIS — I25.10 CORONARY ARTERY DISEASE INVOLVING NATIVE CORONARY ARTERY OF NATIVE HEART WITHOUT ANGINA PECTORIS: ICD-10-CM

## 2017-05-31 DIAGNOSIS — D50.8 OTHER IRON DEFICIENCY ANEMIA: ICD-10-CM

## 2017-05-31 DIAGNOSIS — D51.9 ANEMIA DUE TO VITAMIN B12 DEFICIENCY, UNSPECIFIED B12 DEFICIENCY TYPE: ICD-10-CM

## 2017-05-31 DIAGNOSIS — D51.0 VITAMIN B12 DEFICIENCY ANEMIA DUE TO INTRINSIC FACTOR DEFICIENCY: Primary | ICD-10-CM

## 2017-05-31 DIAGNOSIS — I10 ESSENTIAL HYPERTENSION: ICD-10-CM

## 2017-05-31 DIAGNOSIS — E78.00 HYPERCHOLESTEROLEMIA: Primary | ICD-10-CM

## 2017-05-31 LAB
ALBUMIN SERPL-MCNC: 4.1 G/DL (ref 3.5–5.2)
ALBUMIN/GLOB SERPL: 1.5 G/DL
ALP SERPL-CCNC: 118 U/L (ref 40–129)
ALT SERPL-CCNC: 8 U/L (ref 5–33)
AST SERPL-CCNC: 19 U/L (ref 5–32)
BASOPHILS # BLD AUTO: 0.05 10*3/MM3 (ref 0–0.2)
BASOPHILS NFR BLD AUTO: 0.7 % (ref 0–2)
BILIRUB SERPL-MCNC: 0.5 MG/DL (ref 0.2–1.2)
BUN SERPL-MCNC: 12 MG/DL (ref 8–23)
BUN/CREAT SERPL: 13.3 (ref 7–25)
CALCIUM SERPL-MCNC: 8.6 MG/DL (ref 8.8–10.5)
CHLORIDE SERPL-SCNC: 97 MMOL/L (ref 98–107)
CHOLEST SERPL-MCNC: 129 MG/DL (ref 0–200)
CO2 SERPL-SCNC: 28.1 MMOL/L (ref 22–29)
CREAT SERPL-MCNC: 0.9 MG/DL (ref 0.57–1)
EOSINOPHIL # BLD AUTO: 0.12 10*3/MM3 (ref 0.1–0.3)
EOSINOPHIL NFR BLD AUTO: 1.8 % (ref 0–4)
ERYTHROCYTE [DISTWIDTH] IN BLOOD BY AUTOMATED COUNT: 12.9 % (ref 11.5–14.5)
GLOBULIN SER CALC-MCNC: 2.7 GM/DL
GLUCOSE SERPL-MCNC: 86 MG/DL (ref 65–99)
HBA1C MFR BLD: 5.6 % (ref 4.8–5.6)
HCT VFR BLD AUTO: 42.8 % (ref 37–47)
HDLC SERPL-MCNC: 47 MG/DL (ref 40–60)
HGB BLD-MCNC: 14.4 G/DL (ref 12–16)
IMM GRANULOCYTES # BLD: 0.03 10*3/MM3 (ref 0–0.03)
IMM GRANULOCYTES NFR BLD: 0.4 % (ref 0–0.5)
LDLC SERPL CALC-MCNC: 61 MG/DL (ref 0–100)
LDLC/HDLC SERPL: 1.3 {RATIO}
LYMPHOCYTES # BLD AUTO: 2.34 10*3/MM3 (ref 0.6–4.8)
LYMPHOCYTES NFR BLD AUTO: 34.4 % (ref 20–45)
MCH RBC QN AUTO: 30.9 PG (ref 27–31)
MCHC RBC AUTO-ENTMCNC: 33.6 G/DL (ref 31–37)
MCV RBC AUTO: 91.8 FL (ref 81–99)
MONOCYTES # BLD AUTO: 0.43 10*3/MM3 (ref 0–1)
MONOCYTES NFR BLD AUTO: 6.3 % (ref 3–8)
NEUTROPHILS # BLD AUTO: 3.83 10*3/MM3 (ref 1.5–8.3)
NEUTROPHILS NFR BLD AUTO: 56.4 % (ref 45–70)
NRBC BLD AUTO-RTO: 0 /100 WBC (ref 0–0)
PLATELET # BLD AUTO: 213 10*3/MM3 (ref 140–500)
POTASSIUM SERPL-SCNC: 4.8 MMOL/L (ref 3.5–5.2)
PROT SERPL-MCNC: 6.8 G/DL (ref 6–8.5)
RBC # BLD AUTO: 4.66 10*6/MM3 (ref 4.2–5.4)
SODIUM SERPL-SCNC: 137 MMOL/L (ref 136–145)
T4 FREE SERPL-MCNC: 0.93 NG/DL (ref 0.93–1.7)
TRIGL SERPL-MCNC: 104 MG/DL (ref 0–150)
TSH SERPL DL<=0.005 MIU/L-ACNC: 4.86 MIU/ML (ref 0.27–4.2)
VIT B12 SERPL-MCNC: >2000 PG/ML (ref 211–946)
VLDLC SERPL CALC-MCNC: 20.8 MG/DL (ref 7–27)
WBC # BLD AUTO: 6.8 10*3/MM3 (ref 4.8–10.8)

## 2017-05-31 PROCEDURE — 25010000002 CYANOCOBALAMIN PER 1000 MCG: Performed by: INTERNAL MEDICINE

## 2017-05-31 PROCEDURE — 99214 OFFICE O/P EST MOD 30 MIN: CPT | Performed by: INTERNAL MEDICINE

## 2017-05-31 PROCEDURE — 96372 THER/PROPH/DIAG INJ SC/IM: CPT | Performed by: NURSE PRACTITIONER

## 2017-05-31 RX ORDER — CYANOCOBALAMIN 1000 UG/ML
1000 INJECTION, SOLUTION INTRAMUSCULAR; SUBCUTANEOUS ONCE
Status: COMPLETED | OUTPATIENT
Start: 2017-05-31 | End: 2017-05-31

## 2017-05-31 RX ORDER — CYANOCOBALAMIN 1000 UG/ML
1000 INJECTION, SOLUTION INTRAMUSCULAR; SUBCUTANEOUS ONCE
Status: CANCELLED | OUTPATIENT
Start: 2017-05-31

## 2017-05-31 RX ORDER — OXYCODONE AND ACETAMINOPHEN 7.5; 325 MG/1; MG/1
1 TABLET ORAL
COMMUNITY
End: 2017-05-31 | Stop reason: DRUGHIGH

## 2017-05-31 RX ADMIN — CYANOCOBALAMIN 1000 MCG: 1000 INJECTION, SOLUTION INTRAMUSCULAR; SUBCUTANEOUS at 10:24

## 2017-06-05 ENCOUNTER — APPOINTMENT (OUTPATIENT)
Dept: GENERAL RADIOLOGY | Facility: HOSPITAL | Age: 77
End: 2017-06-05

## 2017-06-05 ENCOUNTER — HOSPITAL ENCOUNTER (OUTPATIENT)
Facility: HOSPITAL | Age: 77
Setting detail: OBSERVATION
Discharge: HOME | End: 2017-06-06
Attending: EMERGENCY MEDICINE | Admitting: INTERNAL MEDICINE

## 2017-06-05 DIAGNOSIS — I48.91 RAPID ATRIAL FIBRILLATION (HCC): Primary | ICD-10-CM

## 2017-06-05 LAB
ALBUMIN SERPL-MCNC: 4.3 G/DL (ref 3.5–5.2)
ALBUMIN/GLOB SERPL: 1.3 G/DL
ALP SERPL-CCNC: 136 U/L (ref 39–117)
ALT SERPL W P-5'-P-CCNC: 9 U/L (ref 1–33)
ANION GAP SERPL CALCULATED.3IONS-SCNC: 16.1 MMOL/L
AST SERPL-CCNC: 17 U/L (ref 1–32)
BASOPHILS # BLD AUTO: 0.03 10*3/MM3 (ref 0–0.2)
BASOPHILS NFR BLD AUTO: 0.4 % (ref 0–1.5)
BILIRUB SERPL-MCNC: 0.5 MG/DL (ref 0.1–1.2)
BUN BLD-MCNC: 10 MG/DL (ref 8–23)
BUN/CREAT SERPL: 11.4 (ref 7–25)
CALCIUM SPEC-SCNC: 9.3 MG/DL (ref 8.6–10.5)
CHLORIDE SERPL-SCNC: 96 MMOL/L (ref 98–107)
CK MB SERPL-CCNC: 2.84 NG/ML
CK SERPL-CCNC: 89 U/L (ref 20–180)
CO2 SERPL-SCNC: 22.9 MMOL/L (ref 22–29)
CREAT BLD-MCNC: 0.88 MG/DL (ref 0.57–1)
DEPRECATED RDW RBC AUTO: 45 FL (ref 37–54)
EOSINOPHIL # BLD AUTO: 0.05 10*3/MM3 (ref 0–0.7)
EOSINOPHIL NFR BLD AUTO: 0.6 % (ref 0.3–6.2)
ERYTHROCYTE [DISTWIDTH] IN BLOOD BY AUTOMATED COUNT: 13.2 % (ref 11.7–13)
GFR SERPL CREATININE-BSD FRML MDRD: 63 ML/MIN/1.73
GLOBULIN UR ELPH-MCNC: 3.4 GM/DL
GLUCOSE BLD-MCNC: 186 MG/DL (ref 65–99)
HCT VFR BLD AUTO: 44.9 % (ref 35.6–45.5)
HGB BLD-MCNC: 15.4 G/DL (ref 11.9–15.5)
IMM GRANULOCYTES # BLD: 0.02 10*3/MM3 (ref 0–0.03)
IMM GRANULOCYTES NFR BLD: 0.2 % (ref 0–0.5)
INR PPP: 0.93 (ref 0.9–1.1)
LYMPHOCYTES # BLD AUTO: 1.36 10*3/MM3 (ref 0.9–4.8)
LYMPHOCYTES NFR BLD AUTO: 16.3 % (ref 19.6–45.3)
MAGNESIUM SERPL-MCNC: 2.4 MG/DL (ref 1.6–2.4)
MCH RBC QN AUTO: 31.9 PG (ref 26.9–32)
MCHC RBC AUTO-ENTMCNC: 34.3 G/DL (ref 32.4–36.3)
MCV RBC AUTO: 93 FL (ref 80.5–98.2)
MONOCYTES # BLD AUTO: 0.54 10*3/MM3 (ref 0.2–1.2)
MONOCYTES NFR BLD AUTO: 6.5 % (ref 5–12)
NEUTROPHILS # BLD AUTO: 6.35 10*3/MM3 (ref 1.9–8.1)
NEUTROPHILS NFR BLD AUTO: 76 % (ref 42.7–76)
NT-PROBNP SERPL-MCNC: 899.8 PG/ML (ref 0–1800)
PLATELET # BLD AUTO: 184 10*3/MM3 (ref 140–500)
PMV BLD AUTO: 9.7 FL (ref 6–12)
POTASSIUM BLD-SCNC: 3.6 MMOL/L (ref 3.5–5.2)
PROT SERPL-MCNC: 7.7 G/DL (ref 6–8.5)
PROTHROMBIN TIME: 12.1 SECONDS (ref 11.7–14.2)
RBC # BLD AUTO: 4.83 10*6/MM3 (ref 3.9–5.2)
SODIUM BLD-SCNC: 135 MMOL/L (ref 136–145)
T4 FREE SERPL-MCNC: 1.08 NG/DL (ref 0.93–1.7)
TROPONIN T SERPL-MCNC: <0.01 NG/ML (ref 0–0.03)
TROPONIN T SERPL-MCNC: <0.01 NG/ML (ref 0–0.03)
TSH SERPL DL<=0.05 MIU/L-ACNC: 4.39 MIU/ML (ref 0.27–4.2)
WBC NRBC COR # BLD: 8.35 10*3/MM3 (ref 4.5–10.7)

## 2017-06-05 PROCEDURE — 82550 ASSAY OF CK (CPK): CPT | Performed by: EMERGENCY MEDICINE

## 2017-06-05 PROCEDURE — G0378 HOSPITAL OBSERVATION PER HR: HCPCS

## 2017-06-05 PROCEDURE — 71010 HC CHEST PA OR AP: CPT

## 2017-06-05 PROCEDURE — 93005 ELECTROCARDIOGRAM TRACING: CPT | Performed by: EMERGENCY MEDICINE

## 2017-06-05 PROCEDURE — 83735 ASSAY OF MAGNESIUM: CPT | Performed by: EMERGENCY MEDICINE

## 2017-06-05 PROCEDURE — 80053 COMPREHEN METABOLIC PANEL: CPT | Performed by: EMERGENCY MEDICINE

## 2017-06-05 PROCEDURE — 84439 ASSAY OF FREE THYROXINE: CPT | Performed by: EMERGENCY MEDICINE

## 2017-06-05 PROCEDURE — 25010000002 DIGOXIN PER 500 MCG: Performed by: EMERGENCY MEDICINE

## 2017-06-05 PROCEDURE — 99285 EMERGENCY DEPT VISIT HI MDM: CPT

## 2017-06-05 PROCEDURE — 84443 ASSAY THYROID STIM HORMONE: CPT | Performed by: EMERGENCY MEDICINE

## 2017-06-05 PROCEDURE — 93010 ELECTROCARDIOGRAM REPORT: CPT | Performed by: INTERNAL MEDICINE

## 2017-06-05 PROCEDURE — 84484 ASSAY OF TROPONIN QUANT: CPT | Performed by: INTERNAL MEDICINE

## 2017-06-05 PROCEDURE — 82553 CREATINE MB FRACTION: CPT | Performed by: EMERGENCY MEDICINE

## 2017-06-05 PROCEDURE — 83880 ASSAY OF NATRIURETIC PEPTIDE: CPT | Performed by: EMERGENCY MEDICINE

## 2017-06-05 PROCEDURE — 85025 COMPLETE CBC W/AUTO DIFF WBC: CPT | Performed by: EMERGENCY MEDICINE

## 2017-06-05 PROCEDURE — 85610 PROTHROMBIN TIME: CPT | Performed by: EMERGENCY MEDICINE

## 2017-06-05 PROCEDURE — 84484 ASSAY OF TROPONIN QUANT: CPT | Performed by: EMERGENCY MEDICINE

## 2017-06-05 PROCEDURE — 99219 PR INITIAL OBSERVATION CARE/DAY 50 MINUTES: CPT | Performed by: INTERNAL MEDICINE

## 2017-06-05 PROCEDURE — 25010000002 ENOXAPARIN PER 10 MG: Performed by: INTERNAL MEDICINE

## 2017-06-05 RX ORDER — POTASSIUM CHLORIDE 750 MG/1
40 CAPSULE, EXTENDED RELEASE ORAL ONCE
Status: COMPLETED | OUTPATIENT
Start: 2017-06-05 | End: 2017-06-05

## 2017-06-05 RX ORDER — ONDANSETRON HYDROCHLORIDE 8 MG/1
8 TABLET, FILM COATED ORAL EVERY 8 HOURS PRN
Status: DISCONTINUED | OUTPATIENT
Start: 2017-06-05 | End: 2017-06-06 | Stop reason: HOSPADM

## 2017-06-05 RX ORDER — METOPROLOL TARTRATE 100 MG/1
100 TABLET ORAL EVERY 12 HOURS SCHEDULED
Status: DISCONTINUED | OUTPATIENT
Start: 2017-06-05 | End: 2017-06-06 | Stop reason: HOSPADM

## 2017-06-05 RX ORDER — ATORVASTATIN CALCIUM 10 MG/1
10 TABLET, FILM COATED ORAL NIGHTLY
Status: DISCONTINUED | OUTPATIENT
Start: 2017-06-05 | End: 2017-06-06 | Stop reason: HOSPADM

## 2017-06-05 RX ORDER — PANTOPRAZOLE SODIUM 40 MG/1
40 TABLET, DELAYED RELEASE ORAL
Status: DISCONTINUED | OUTPATIENT
Start: 2017-06-06 | End: 2017-06-06 | Stop reason: HOSPADM

## 2017-06-05 RX ORDER — DIAZEPAM 5 MG/1
5 TABLET ORAL EVERY 12 HOURS PRN
Status: DISCONTINUED | OUTPATIENT
Start: 2017-06-05 | End: 2017-06-06 | Stop reason: HOSPADM

## 2017-06-05 RX ORDER — NITROGLYCERIN 0.4 MG/1
0.4 TABLET SUBLINGUAL
Status: DISCONTINUED | OUTPATIENT
Start: 2017-06-05 | End: 2017-06-06 | Stop reason: HOSPADM

## 2017-06-05 RX ORDER — DILTIAZEM HYDROCHLORIDE 5 MG/ML
10 INJECTION INTRAVENOUS ONCE
Status: COMPLETED | OUTPATIENT
Start: 2017-06-05 | End: 2017-06-05

## 2017-06-05 RX ORDER — QUETIAPINE FUMARATE 50 MG/1
50 TABLET, FILM COATED ORAL NIGHTLY
Status: DISCONTINUED | OUTPATIENT
Start: 2017-06-05 | End: 2017-06-06 | Stop reason: HOSPADM

## 2017-06-05 RX ORDER — OXYCODONE AND ACETAMINOPHEN 10; 325 MG/1; MG/1
1 TABLET ORAL 4 TIMES DAILY
Status: DISCONTINUED | OUTPATIENT
Start: 2017-06-05 | End: 2017-06-06 | Stop reason: HOSPADM

## 2017-06-05 RX ORDER — CLOPIDOGREL BISULFATE 75 MG/1
75 TABLET ORAL DAILY
Status: DISCONTINUED | OUTPATIENT
Start: 2017-06-05 | End: 2017-06-06

## 2017-06-05 RX ORDER — SODIUM CHLORIDE 0.9 % (FLUSH) 0.9 %
1-10 SYRINGE (ML) INJECTION AS NEEDED
Status: DISCONTINUED | OUTPATIENT
Start: 2017-06-05 | End: 2017-06-06 | Stop reason: HOSPADM

## 2017-06-05 RX ORDER — DIGOXIN 0.25 MG/ML
500 INJECTION INTRAMUSCULAR; INTRAVENOUS ONCE
Status: COMPLETED | OUTPATIENT
Start: 2017-06-05 | End: 2017-06-05

## 2017-06-05 RX ORDER — BISACODYL 5 MG/1
5 TABLET, DELAYED RELEASE ORAL DAILY PRN
Status: DISCONTINUED | OUTPATIENT
Start: 2017-06-05 | End: 2017-06-06 | Stop reason: HOSPADM

## 2017-06-05 RX ORDER — TRAZODONE HYDROCHLORIDE 50 MG/1
50 TABLET ORAL NIGHTLY
Status: DISCONTINUED | OUTPATIENT
Start: 2017-06-05 | End: 2017-06-06 | Stop reason: HOSPADM

## 2017-06-05 RX ADMIN — DIGOXIN 500 MCG: 0.25 INJECTION INTRAMUSCULAR; INTRAVENOUS at 14:56

## 2017-06-05 RX ADMIN — DILTIAZEM HYDROCHLORIDE 10 MG: 5 INJECTION, SOLUTION INTRAVENOUS at 14:46

## 2017-06-05 RX ADMIN — METOPROLOL TARTRATE 100 MG: 100 TABLET ORAL at 21:00

## 2017-06-05 RX ADMIN — DILTIAZEM HYDROCHLORIDE 10 MG/HR: 100 INJECTION, POWDER, LYOPHILIZED, FOR SOLUTION INTRAVENOUS at 14:03

## 2017-06-05 RX ADMIN — OXYCODONE HYDROCHLORIDE AND ACETAMINOPHEN 1 TABLET: 10; 325 TABLET ORAL at 21:00

## 2017-06-05 RX ADMIN — ENOXAPARIN SODIUM 50 MG: 60 INJECTION SUBCUTANEOUS at 15:49

## 2017-06-05 RX ADMIN — QUETIAPINE FUMARATE 50 MG: 50 TABLET, FILM COATED ORAL at 23:33

## 2017-06-05 RX ADMIN — TRAZODONE HYDROCHLORIDE 50 MG: 50 TABLET ORAL at 23:32

## 2017-06-05 RX ADMIN — POTASSIUM CHLORIDE 40 MEQ: 750 CAPSULE, EXTENDED RELEASE ORAL at 14:47

## 2017-06-05 RX ADMIN — ATORVASTATIN CALCIUM 10 MG: 10 TABLET, FILM COATED ORAL at 21:00

## 2017-06-05 RX ADMIN — CLOPIDOGREL 75 MG: 75 TABLET, FILM COATED ORAL at 21:02

## 2017-06-05 RX ADMIN — DIAZEPAM 5 MG: 5 TABLET ORAL at 23:36

## 2017-06-05 NOTE — PROGRESS NOTES
"Pharmacy Consult - [facility]    Kaylin Ojeda has been consulted for pharmacy to dose therapeutic enoxaparin for atrial fibrillation w/ RVR per Dr. Taylor's request.     Relevant clinical data and objective history reviewed:  77 y.o. female 62\" (157.5 cm) 110 lb 6.4 oz (50.1 kg)    Home Anticoagulation/Antiplatelet: None    Lab Results   Component Value Date    WBC 8.35 06/05/2017    HGB 15.4 06/05/2017    HCT 44.9 06/05/2017    MCV 93.0 06/05/2017     06/05/2017     Lab Results   Component Value Date    GLUCOSE 186 (H) 06/05/2017    CALCIUM 9.3 06/05/2017     (L) 06/05/2017    K 3.6 06/05/2017    CO2 22.9 06/05/2017    CL 96 (L) 06/05/2017    BUN 10 06/05/2017    CREATININE 0.88 06/05/2017    EGFRIFAFRI 74 05/31/2017    EGFRIFNONA 63 06/05/2017    BCR 11.4 06/05/2017    ANIONGAP 16.1 06/05/2017     Estimated Creatinine Clearance: 43.7 mL/min (by C-G formula based on Cr of 0.88).    Assessment/Plan    1. Physician started an enoxaparin dose of 50 mg (1 mg/kg) SC q12h. This dose is appropriate based on weight and renal function (CrCl >30 mL/min). Will continue current dose as ordered by physician.     2. Platelet count was 184 today. Platelets should be ordered at least every 3 days while on therapeutic enoxaparin per system policy.    3. Pharmacy will continue to follow the patient while on enoxaparin, and order any appropriate labs if necessary.    Thank you for allowing me to participate in your patient's care,  Donna Magana, Pharm.D., BCPS  "

## 2017-06-05 NOTE — H&P
Patient Name: Kaylin Ojeda  :1940  77 y.o.    Date of Admission: 2017  Encounter Provider: Jesusita Taylor MD  Date of Encounter Visit: 17  Place of Service: [facility]  Referring Provider: Mo Calero MD  Patient Care Team:  Donna Forte MD as PCP - General (Internal Medicine & Pediatrics)  Donna Forte MD as PCP - Family Medicine  Estelle Rendon MD as Consulting Physician (Hematology and Oncology)  Chris Jimenez MD as Referring Physician (Family Medicine)      Chief complaint: atrial fibrillation       History of Present Illness: Ms. Ojeda is a 77 year old female patient of Dr. Calero from the [facility] with a documented history of coronary artery disease s/p PCI to the LCx in 2015 (on Plavix), valvular heart disease, sinus bradycardia,  severe peripheral vascular disease, hypertension, hypercholesterolemia, TIA, gastritis and fibromyalgia. She was last seen in office in   2016 for follow up at which time she denied any worse shortness of breath from baseline and only reported one episode of chest pain.     This morning, she had the onset of palpitations.  She felt like her heart was racing.  She describes a pressure heaviness in her chest which was severe.  It was left-sided and did not radiate.  It was associated with shortness of breath.  Her symptoms would not go away and they called EMS.  There she was found to be in a rapid narrow complex tachycardia.  She was given adenosine twice and it was noted the patient was in atrial fibrillation she reports being shocked 2 times area when she presented to the emergency room she was still in rapid atrial fibrillation at 163 bpm.  In the ER she was given digoxin and placed on a diltiazem drip.  She eventually spontaneously converted.  She is asymptomatic at this time.     Her initial troponin is negative, ProBNP 899, TSH 4.39, K 3.6 (replaced) and CXR does not  show any acute pulmonary process.  Also has received Lovenox.         Echo in 2015:     Cardiac Cath in 2015:             Past Medical History:   Diagnosis Date   • Abdominal pain, epigastric     Chronic, unclear cause, improved   • Anorexia    • Anxiety    • Arthritis    • CAD (coronary artery disease)     Cath 5/2015: nonobstructive LAD/RCA disease, 90% mid LCx s/p [id number] Xience   • Cellulitis of leg    • Cervical disc disease    • Chronic fatigue    • Colitis    • COPD (chronic obstructive pulmonary disease)    • Depression    • Erosive gastritis    • Fibromyalgia    • Gastritis    • Hypercholesterolemia 2/2/2016   • Hyperglycemia    • Hypertension     History of refractory hypertension which improved significantly   • Insomnia    • Leukocytes in urine    • Lower back pain    • Mediastinal lymphadenopathy    • Mesenteric artery stenosis    • Mononucleosis    • Occlusion and stenosis of carotid artery    • Onychomycosis    • Orbit fracture    • Peripheral arterial disease     Significant (carotid, subclavian, lower extremities), with claudication, medical therapy only   • Pernicious anemia    • Prediabetes    • Renal artery stenosis     unilateral, with renal atrophy (no intervention required)   • Renal calculi    • Sinus bradycardia     mild, asymptomatic   • TIA (transient ischemic attack)    • UTI (urinary tract infection)    • Valvular heart disease     5/2015: mild-mod AI, mod MR, mod-severe TR   • Vision problem    • Vitamin B 12 deficiency        Past Surgical History:   Procedure Laterality Date   • APPENDECTOMY     • BREAST SURGERY     • CARDIAC CATHETERIZATION  05/2015    nonobs LAD/RCA disease, 90% mid LCx s/p [id number] Xience   • CERVICAL FUSION  1997   • CHOLECYSTECTOMY     • HYSTERECTOMY  1995   • KNEE SURGERY Right 2005   • KNEE SURGERY Left 1998         Prior to Admission medications    Medication Sig Start Date End Date Taking? Authorizing Provider   albuterol (PROVENTIL HFA;VENTOLIN HFA) 108  (90 BASE) MCG/ACT inhaler Inhale 2 puffs every 4 (four) hours as needed for wheezing. 5/20/16  Yes Donna Forte MD   atorvastatin (LIPITOR) 10 MG tablet Take 1 tablet by mouth daily. 8/1/16  Yes Donna Forte MD   clopidogrel (PLAVIX) 75 MG tablet TAKE ONE TABLET BY MOUTH ONCE DAILY 8/24/16  Yes Mo Calero MD   clotrimazole-betamethasone (LOTRISONE) 1-0.05 % cream Apply  topically 2 (two) times a day. 6/1/16  Yes Donna Forte MD   diazePAM (VALIUM) 5 MG tablet Take 1 tablet by mouth Every 12 (Twelve) Hours As Needed for anxiety. 12/5/16  Yes Donna Forte MD   esomeprazole (NexIUM) 40 MG capsule Take  by mouth. 6/10/14  Yes Historical Provider, MD   methocarbamol (ROBAXIN) 750 MG tablet  1/24/17  Yes Historical Provider, MD   metoprolol tartrate (LOPRESSOR) 100 MG tablet Take 1 tablet by mouth 2 (Two) Times a Day. 5/30/17  Yes Donna Forte MD   mirtazapine (REMERON) 30 MG tablet Take 1 tablet by mouth Every Night. 11/3/16  Yes Donna Forte MD   oxyCODONE-acetaminophen (PERCOCET)  MG per tablet Take 1 tablet by mouth 4 (four) times a day. 5/4/16  Yes Historical Provider, MD   QUEtiapine (SEROquel) 25 MG tablet Take 2 tablets by mouth Every Night. 2/24/17  Yes Donna Forte MD   traZODone (DESYREL) 50 MG tablet Take 1 tablet by mouth Every Night. Take 1/2 to one whole tablet QHS. 12/21/16  Yes Donna Forte MD   budesonide-formoterol (SYMBICORT) 160-4.5 MCG/ACT inhaler Inhale 2 puffs 2 (two) times a day. 6/1/16   Donna Forte MD   gabapentin (NEURONTIN) 100 MG capsule TAKE ONE CAPSULE BY MOUTH THREE TIMES DAILY AS NEEDED 8/4/16   Donna Forte MD   hydrOXYzine (ATARAX) 25 MG tablet Take 1 tablet by mouth every 8 (eight) hours as needed for anxiety. 5/2/16   Donna Forte MD   ondansetron (ZOFRAN) 8 MG tablet Take 1 tablet by mouth every 8 (eight) hours as needed for nausea or vomiting.  5/2/16   Donna Forte MD       Allergies   Allergen Reactions   • Aleve  [Naproxen Sodium]    • Codeine    • Ibuprofen    • Sulfa Antibiotics        Social History     Social History   • Marital status:      Spouse name: N/A   • Number of children: N/A   • Years of education: N/A     Occupational History   •  Retired     Social History Main Topics   • Smoking status: Current Every Day Smoker     Packs/day: 0.50     Types: Cigarettes   • Smokeless tobacco: None   • Alcohol use Yes      Comment: OCCASIONAL   • Drug use: No   • Sexual activity: Not Asked     Other Topics Concern   • None     Social History Narrative       Family History   Problem Relation Age of Onset   • Asthma Mother    • Emphysema Mother    • Graves' disease Mother    • Heart disease Mother    • Hypertension Mother    • Emphysema Father    • Hypertension Father    • Heart disease Father    • Kidney disease Sister    • Lupus Daughter      Systemic erythematosus   • Arthritis Other    • Crohn's disease Other        REVIEW OF SYSTEMS:   All other systems reviewed and negative.       Objective:     Vitals:    06/05/17 1623 06/05/17 1642 06/05/17 1702 06/05/17 1802   BP: 153/66 162/64 172/80 172/83   BP Location:       Patient Position:       Pulse:  57 67 72   Resp:       Temp:       TempSrc:       SpO2: 94% 92% 94% 94%   Weight:       Height:         Body mass index is 20.12 kg/(m^2).    Constitutional: She is oriented to person, place, and time. She appears well-developed. She does not appear ill.   HENT:   Head: Normocephalic and atraumatic. Head is without contusion.   Right Ear: Hearing normal. No drainage.   Left Ear: Hearing normal. No drainage.   Nose: No nasal deformity. No epistaxis.   Eyes: Lids are normal. Right eye exhibits no exudate. Left eye exhibits no exudate.  Neck: No JVD present. Carotid bruit is not present. No tracheal deviation present. No thyroid mass and no thyromegaly present.   Cardiovascular: Normal rate,  regular rhythm and normal heart sounds.    Pulses:       Posterior tibial pulses are 2+ on the right side, and 2+ on the left side.   Pulmonary/Chest: Effort normal and breath sounds normal.   Abdominal: Soft. Normal appearance and bowel sounds are normal. There is no tenderness.   Musculoskeletal: Normal range of motion.        Right shoulder: She exhibits no deformity.        Left shoulder: She exhibits no deformity.   Neurological: She is alert and oriented to person, place, and time. She has normal strength.   Skin: Skin is warm, dry and intact. No rash noted.   Psychiatric: She has a normal mood and affect. Her behavior is normal. Thought content normal.   Vitals reviewed      Lab Review:       Results from last 7 days  Lab Units 06/05/17  1353   SODIUM mmol/L 135*   POTASSIUM mmol/L 3.6   CHLORIDE mmol/L 96*   TOTAL CO2 mmol/L 22.9   BUN mg/dL 10   CREATININE mg/dL 0.88   CALCIUM mg/dL 9.3   BILIRUBIN mg/dL 0.5   ALK PHOS U/L 136*   ALT (SGPT) U/L 9   AST (SGOT) U/L 17   GLUCOSE mg/dL 186*       Results from last 7 days  Lab Units 06/05/17  1353   CK TOTAL U/L 89   TROPONIN T ng/mL <0.010       Results from last 7 days  Lab Units 06/05/17  1353   WBC 10*3/mm3 8.35   HEMOGLOBIN g/dL 15.4   HEMATOCRIT % 44.9   PLATELETS 10*3/mm3 184       Results from last 7 days  Lab Units 06/05/17  1353   INR  0.93       Results from last 7 days  Lab Units 06/05/17  1353   MAGNESIUM mg/dL 2.4       Results from last 7 days  Lab Units 05/31/17  1332   TRIGLYCERIDES mg/dL 104   HDL CHOL mg/dL 47   LDL CHOL mg/dL 61               Previous:     I personally viewed and interpreted the patient's EKG/Telemetry data.        Assessment and Plan:       1.  New onset of atrial fibrillation with rapid ventricular response.  She has now cardioverted.  She is not sure she took her metoprolol this morning.  I'm going to get her back on her usual metoprolol.  I am going to place her on Lovenox.  She does have an elevated CHADS-Vasc score of  5.  She does have a history of some valvular heart disease.  I'm going to recheck an echo and which anticoagulates chosen can be determined after the echo results.  2.  Coronary artery disease with a history of a stent about 2 years ago.  She is currently on Plavix and a statin.  3.  Chest pain.  I will repeat a troponin.  She may need a stress test.  4.  Valvular heart disease.  I will repeat an echo.  5.  Hypertension  6.  Peripheral arterial disease    Jesusita Taylor MD  06/05/17  6:29 PM

## 2017-06-05 NOTE — ED PROVIDER NOTES
CHIEF COMPLAINT  Chief Complaint: palpitations  History given by: patient, EMS  History limited by: N/A  Room Number: 28/28  PMD: Donna Forte MD  Cardiologist- Dr Calero    HPI:  HPI Comments: Pt has hx of CAD with coronary stent placement but denies hx of atrial fibrillation. She states that she has had generalized weakness, dyspnea, and intermittent left sided chest pain (described as tightness) that started about 2 days ago. Today, she developed rapid palpitations with worsening of her other sx. She denies dizziness, nausea, vomiting, sweating, cough, calf pain, abd pain, fevers, and chills. Per EMS, because they thought that pt's heart rhythm was SVT, they administered adenosine x2. Once her HR decreased after adenosine administration, they noted that pt's heart rhythm was atrial fibrillation. Consequently, they performed electrical cardioversion x2 without success. Currently, pt c/o minimal chest tightness. There are no other complaints at this time.     Patient is a 77 y.o. female presenting with palpitations.   History provided by:  Patient and EMS personnel  Palpitations   Palpitations quality:  Fast  Onset quality:  Gradual  Duration: started earlier today.  Timing:  Constant  Progression:  Worsening  Chronicity:  New  Relieved by:  Nothing  Worsened by:  Nothing  Ineffective treatments: adenosine, electrical cardioversion.  Associated symptoms: chest pain, shortness of breath and weakness (generalized weakness, no focal weakness)    Associated symptoms: no cough, no diaphoresis, no dizziness, no nausea, no numbness, no syncope and no vomiting          PAST MEDICAL HISTORY  Active Ambulatory Problems     Diagnosis Date Noted   • CAD (coronary artery disease)    • Valvular heart disease    • Essential hypertension    • Hypercholesterolemia 02/02/2016   • Epigastric pain 03/08/2016   • Anemia due to vitamin B12 deficiency 03/08/2016   • Loss of appetite 03/08/2016   • Anxiety 03/08/2016   • Chronic  obstructive pulmonary disease 03/08/2016   • 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   • Acute erosive gastritis 03/08/2016   • Fall in home 03/08/2016   • Insomnia 03/08/2016   • 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   • 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/08/2016   • Peripheral arterial occlusive disease 03/08/2016   • Impaired glucose tolerance 03/08/2016   • Difficulty sleeping 03/08/2016   • Tobacco dependence syndrome 03/08/2016   • Iron deficiency anemia 03/08/2016   • Vitamin B12 deficiency anemia due to intrinsic factor deficiency 05/03/2016   • Abnormal thyroid blood test 08/26/2016   • 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/24/2017   • Dysuria 02/24/2017   • Abdominal bloating 02/24/2017   • Hyperglycemia 02/24/2017   • Sleep difficulties 02/24/2017   • Chronic constipation 02/24/2017   • Mitral valve insufficiency 04/04/2017   • Pernicious anemia 04/04/2017   • Tricuspid valve insufficiency 04/04/2017     Resolved Ambulatory Problems     Diagnosis Date Noted   • No Resolved Ambulatory Problems     Past Medical History:   Diagnosis Date   • Abdominal pain, epigastric    • Anorexia    • Anxiety    • Arthritis    • CAD (coronary artery disease)    • Cellulitis of leg    • Cervical disc disease    • Chronic fatigue    • Colitis    • COPD (chronic obstructive pulmonary disease)    • Depression    • Erosive gastritis    • Fibromyalgia    • Gastritis    • Hypercholesterolemia 2/2/2016   • Hyperglycemia    • Hypertension    • Insomnia    • Leukocytes in urine    • Lower back pain    • Mediastinal lymphadenopathy    • Mesenteric artery stenosis    • Mononucleosis    • Occlusion and stenosis of carotid artery    • Onychomycosis    • Orbit fracture    • Peripheral arterial disease    • Pernicious anemia    • Prediabetes    • Renal artery stenosis    • Renal calculi    • Sinus bradycardia    • TIA (transient ischemic attack)    • UTI (urinary tract infection)    • Valvular heart disease    • Vision problem    •  Vitamin B 12 deficiency          PAST SURGICAL HISTORY  Past Surgical History:   Procedure Laterality Date   • APPENDECTOMY     • BREAST SURGERY     • CARDIAC CATHETERIZATION  05/2015    nonobs LAD/RCA disease, 90% mid LCx s/p [id number] Xience   • CERVICAL FUSION  1997   • CHOLECYSTECTOMY     • HYSTERECTOMY  1995   • KNEE SURGERY Right 2005   • KNEE SURGERY Left 1998         FAMILY HISTORY  Family History   Problem Relation Age of Onset   • Asthma Mother    • Emphysema Mother    • Graves' disease Mother    • Heart disease Mother    • Hypertension Mother    • Emphysema Father    • Hypertension Father    • Heart disease Father    • Kidney disease Sister    • Lupus Daughter      Systemic erythematosus   • Arthritis Other    • Crohn's disease Other          SOCIAL HISTORY  Social History     Social History   • Marital status:      Spouse name: N/A   • Number of children: N/A   • Years of education: N/A     Occupational History   •  Retired     Social History Main Topics   • Smoking status: Current Every Day Smoker     Packs/day: 0.50     Types: Cigarettes   • Smokeless tobacco: Not on file   • Alcohol use Yes      Comment: OCCASIONAL   • Drug use: No   • Sexual activity: Not on file     Other Topics Concern   • Not on file     Social History Narrative         ALLERGIES  Aleve  [naproxen sodium]; Codeine; Ibuprofen; and Sulfa antibiotics        REVIEW OF SYSTEMS  Review of Systems   Constitutional: Negative for chills, diaphoresis and fever.   HENT: Negative for congestion, rhinorrhea and sore throat.    Eyes: Negative for pain.   Respiratory: Positive for shortness of breath. Negative for cough.    Cardiovascular: Positive for chest pain and palpitations. Negative for syncope.   Gastrointestinal: Negative for abdominal pain, diarrhea, nausea and vomiting.   Endocrine: Negative.    Genitourinary: Negative for difficulty urinating.   Musculoskeletal: Negative for myalgias.   Skin: Negative.    Neurological:  Positive for weakness (generalized weakness, no focal weakness). Negative for dizziness, speech difficulty, numbness and headaches.   Psychiatric/Behavioral: Negative.    All other systems reviewed and are negative.           PHYSICAL EXAM  ED Triage Vitals   Temp Heart Rate Resp BP SpO2   06/05/17 1333 06/05/17 1333 06/05/17 1333 06/05/17 1333 06/05/17 1333   97.8 °F (36.6 °C) 163 19 139/76 99 % WNL      Temp src Heart Rate Source Patient Position BP Location FiO2 (%)   06/05/17 1333 06/05/17 1333 06/05/17 1333 06/05/17 1333 --   Tympanic Monitor Sitting Right arm        Physical Exam   Constitutional: She is oriented to person, place, and time. No distress.   HENT:   Head: Normocephalic and atraumatic.   Mouth/Throat: Mucous membranes are dry.   Eyes: EOM are normal. Pupils are equal, round, and reactive to light.   Neck: Normal range of motion. Neck supple.   Cardiovascular: Normal heart sounds and intact distal pulses.  An irregularly irregular rhythm present. Tachycardia present.  Exam reveals no gallop and no friction rub.    No murmur heard.  HR= 160s   Pulmonary/Chest: Effort normal and breath sounds normal. No respiratory distress. She has no decreased breath sounds. She has no wheezes. She has no rales.   Abdominal: Soft. Bowel sounds are normal. She exhibits no distension. There is no tenderness. There is no rebound and no guarding.   Musculoskeletal: She exhibits no edema (no pedal edema).   No calf tenderness   Neurological: She is alert and oriented to person, place, and time. She has normal motor skills and normal sensation.   Skin: Skin is warm and dry. No rash noted.   Psychiatric: Mood and affect normal.   Nursing note and vitals reviewed.          LAB RESULTS  Recent Results (from the past 24 hour(s))   Comprehensive Metabolic Panel    Collection Time: 06/05/17  1:53 PM   Result Value Ref Range    Glucose 186 (H) 65 - 99 mg/dL    BUN 10 8 - 23 mg/dL    Creatinine 0.88 0.57 - 1.00 mg/dL    Sodium  135 (L) 136 - 145 mmol/L    Potassium 3.6 3.5 - 5.2 mmol/L    Chloride 96 (L) 98 - 107 mmol/L    CO2 22.9 22.0 - 29.0 mmol/L    Calcium 9.3 8.6 - 10.5 mg/dL    Total Protein 7.7 6.0 - 8.5 g/dL    Albumin 4.30 3.50 - 5.20 g/dL    ALT (SGPT) 9 1 - 33 U/L    AST (SGOT) 17 1 - 32 U/L    Alkaline Phosphatase 136 (H) 39 - 117 U/L    Total Bilirubin 0.5 0.1 - 1.2 mg/dL    eGFR Non African Amer 63 >60 mL/min/1.73    Globulin 3.4 gm/dL    A/G Ratio 1.3 g/dL    BUN/Creatinine Ratio 11.4 7.0 - 25.0    Anion Gap 16.1 mmol/L   Protime-INR    Collection Time: 06/05/17  1:53 PM   Result Value Ref Range    Protime 12.1 11.7 - 14.2 Seconds    INR 0.93 0.90 - 1.10   CK    Collection Time: 06/05/17  1:53 PM   Result Value Ref Range    Creatine Kinase 89 20 - 180 U/L   CK-MB    Collection Time: 06/05/17  1:53 PM   Result Value Ref Range    CKMB 2.84 <=5.30 ng/mL   Troponin    Collection Time: 06/05/17  1:53 PM   Result Value Ref Range    Troponin T <0.010 0.000 - 0.030 ng/mL   BNP    Collection Time: 06/05/17  1:53 PM   Result Value Ref Range    proBNP 899.8 0.0 - 1800.0 pg/mL   Magnesium    Collection Time: 06/05/17  1:53 PM   Result Value Ref Range    Magnesium 2.4 1.6 - 2.4 mg/dL   T4, Free    Collection Time: 06/05/17  1:53 PM   Result Value Ref Range    Free T4 1.08 0.93 - 1.70 ng/dL   TSH    Collection Time: 06/05/17  1:53 PM   Result Value Ref Range    TSH 4.390 (H) 0.270 - 4.200 mIU/mL   CBC Auto Differential    Collection Time: 06/05/17  1:53 PM   Result Value Ref Range    WBC 8.35 4.50 - 10.70 10*3/mm3    RBC 4.83 3.90 - 5.20 10*6/mm3    Hemoglobin 15.4 11.9 - 15.5 g/dL    Hematocrit 44.9 35.6 - 45.5 %    MCV 93.0 80.5 - 98.2 fL    MCH 31.9 26.9 - 32.0 pg    MCHC 34.3 32.4 - 36.3 g/dL    RDW 13.2 (H) 11.7 - 13.0 %    RDW-SD 45.0 37.0 - 54.0 fl    MPV 9.7 6.0 - 12.0 fL    Platelets 184 140 - 500 10*3/mm3    Neutrophil % 76.0 42.7 - 76.0 %    Lymphocyte % 16.3 (L) 19.6 - 45.3 %    Monocyte % 6.5 5.0 - 12.0 %    Eosinophil % 0.6  0.3 - 6.2 %    Basophil % 0.4 0.0 - 1.5 %    Immature Grans % 0.2 0.0 - 0.5 %    Neutrophils, Absolute 6.35 1.90 - 8.10 10*3/mm3    Lymphocytes, Absolute 1.36 0.90 - 4.80 10*3/mm3    Monocytes, Absolute 0.54 0.20 - 1.20 10*3/mm3    Eosinophils, Absolute 0.05 0.00 - 0.70 10*3/mm3    Basophils, Absolute 0.03 0.00 - 0.20 10*3/mm3    Immature Grans, Absolute 0.02 0.00 - 0.03 10*3/mm3       Ordered the above labs and reviewed the results.        RADIOLOGY  XR Chest 1 View   Final Result   No evidence for acute pulmonary process. Follow-up as   clinical indications persist.       This report was finalized on 6/5/2017 2:16 PM by Dr. Tereso Sanchez MD.             CXR (independently viewed by me, interpreted by radiologist)- no acute process      Ordered the above noted radiological studies. Reviewed by me in PACS.       PROCEDURES  Critical Care  Performed by: ROD BRENNAN  Authorized by: ROD BRENNAN   Total critical care time: 35 minutes  Critical care time was exclusive of separately billable procedures and treating other patients.  Critical care was necessary to treat or prevent imminent or life-threatening deterioration of the following conditions: circulatory failure.  Critical care was time spent personally by me on the following activities: development of treatment plan with patient or surrogate, discussions with consultants, evaluation of patient's response to treatment, examination of patient, obtaining history from patient or surrogate, ordering and performing treatments and interventions, ordering and review of radiographic studies, ordering and review of laboratory studies, pulse oximetry, re-evaluation of patient's condition and review of old charts.          Pre hospital EMS EKG 1          Rhythm/Rate: atrial fibrillation, rate= 160s  ST and T waves: nonspecific ST-T changes    Interpreted Contemporaneously by me, independently viewed      Pre hospital EMS EKG 2 (after adenosine)          Rhythm/Rate: atrial fibrillation, rate= 60s  ST and T waves: nonspecific ST-T changes    Interpreted Contemporaneously by me, independently viewed      ED EKG    EKG time: 1400  Rhythm/Rate: atrial fibrillation, rate= 156  No Acute Ischemia  Non-Specific ST-T changes    changed compared to prior on 2015 (was in sinus rhythm at that time)    Interpreted Contemporaneously by me.  Independently viewed by me      PROGRESS AND CONSULTS  ED Course     1:51 PM- Ordered EKG, blood work, TSH, magnesium level, BNP, troponin, CK, PT with INR, blood work, and CXR for further evaluation. Ordered cardizem drip and bolus for rapid atrial fibrillation.     2:38 PM- Potassium level is 3.6. However, pt is in rapid atrial fibrillation. Ordered KCl.     2:42 PM- Family now at bedside. Rechecked pt. BP is 156/92. HR is in 140s, atrial fibrillation. Discussed with pt and family about all pertinent results including negative troponin and nothing acute indicated on CXR. Informed pt that she is in rapid atrial fibrillation. Discussed pt admission for further care and observation. Pt and family verbalize understanding and agree with plan. Pt is ready for admission.    2:43 PM- Pt denies hx of blood clots and GI bleeding.     2:44 PM- Even after 10mg cardizem bolus and cardizem drip (5mg/hr titrated up to 15mg/hr), pt remains in rapid atrial fibrillation. Ordered 0.5mg digoxin and repeat 10mg cardizem bolus. Sent call out to [facility]. Admission decision time= 1444    3:07 PM- Discussed case with Dr Calero (cardiologist)  Reviewed history, exam, results and treatments.  Discussed concerns and plan of care. Dr Calero accepts pt to be admitted to telemetry and would like for pt to be anticoagulated with lovenox (has been ordered).    3:17 PM- Rechecked pt. HR is in 110s. She remains in no acute distress.     5:00 PM- Per RN, pt converted out of rapid atrial fibrillation and in to sinus rhythm.       MEDICAL DECISION  MAKING  Results were reviewed/discussed with the patient and family.     MDM  Number of Diagnoses or Management Options  Rapid atrial fibrillation (new onset):      Amount and/or Complexity of Data Reviewed  Clinical lab tests: reviewed and ordered (Troponin <0.010, potassium= 3.6)  Tests in the radiology section of CPT®: reviewed and ordered (CXR- no acute process)  Tests in the medicine section of CPT®: ordered and reviewed (EKG)  Decide to obtain previous medical records or to obtain history from someone other than the patient: yes  Review and summarize past medical records: yes (Pt underwent stent placement in circumflex in 2015 by Dr Li. )  Discuss the patient with other providers: yes (Dr Calero (cardiologist) will admit. )  Independent visualization of images, tracings, or specimens: yes    Patient Progress  Patient progress: other (comment) (Condition= guarded)             DIAGNOSIS  Final diagnoses:   Rapid atrial fibrillation (new onset)         DISPOSITION  Admitted- telemetry    Discussed treatment plan and reason for admission with pt/family and admitting physician.  Pt/family voiced understanding of the plan for admission for further testing/treatment as needed.           Latest Documented Vital Signs:  As of 4:43 PM  BP- 157/91 HR- 120 Temp- 97.2 °F (36.2 °C) (Tympanic) O2 sat- 94%        --  Documentation assistance provided by katie Jules for Dr Hawkins.  Information recorded by the scribe was done at my direction and has been verified and validated by me.        Dinora Jules  06/05/17 2619       Justyn Hawkins MD  06/05/17 0951

## 2017-06-06 ENCOUNTER — APPOINTMENT (OUTPATIENT)
Dept: CARDIOLOGY | Facility: HOSPITAL | Age: 77
End: 2017-06-06
Attending: INTERNAL MEDICINE

## 2017-06-06 VITALS
WEIGHT: 111.7 LBS | DIASTOLIC BLOOD PRESSURE: 58 MMHG | OXYGEN SATURATION: 91 % | BODY MASS INDEX: 20.56 KG/M2 | SYSTOLIC BLOOD PRESSURE: 130 MMHG | RESPIRATION RATE: 16 BRPM | HEART RATE: 74 BPM | HEIGHT: 62 IN | TEMPERATURE: 98.4 F

## 2017-06-06 LAB
ANION GAP SERPL CALCULATED.3IONS-SCNC: 12.6 MMOL/L
BH CV ECHO MEAS - ACS: 1.5 CM
BH CV ECHO MEAS - AI DEC SLOPE: 264.5 CM/SEC^2
BH CV ECHO MEAS - AI MAX PG: 82.1 MMHG
BH CV ECHO MEAS - AI MAX VEL: 453 CM/SEC
BH CV ECHO MEAS - AI P1/2T: 501.6 MSEC
BH CV ECHO MEAS - AO MEAN PG (FULL): 2 MMHG
BH CV ECHO MEAS - AO MEAN PG: 5 MMHG
BH CV ECHO MEAS - AO ROOT AREA (BSA CORRECTED): 1.6
BH CV ECHO MEAS - AO ROOT AREA: 4.5 CM^2
BH CV ECHO MEAS - AO ROOT DIAM: 2.4 CM
BH CV ECHO MEAS - AO V2 MEAN: 101 CM/SEC
BH CV ECHO MEAS - AO V2 VTI: 34 CM
BH CV ECHO MEAS - AVA(I,A): 1.5 CM^2
BH CV ECHO MEAS - AVA(I,D): 1.5 CM^2
BH CV ECHO MEAS - BSA(HAYCOCK): 1.5 M^2
BH CV ECHO MEAS - BSA: 1.5 M^2
BH CV ECHO MEAS - BZI_BMI: 20.3 KILOGRAMS/M^2
BH CV ECHO MEAS - BZI_METRIC_HEIGHT: 157.5 CM
BH CV ECHO MEAS - BZI_METRIC_WEIGHT: 50.3 KG
BH CV ECHO MEAS - CONTRAST EF (2CH): 77.6 ML/M^2
BH CV ECHO MEAS - CONTRAST EF 4CH: 77.4 ML/M^2
BH CV ECHO MEAS - EDV(CUBED): 54.9 ML
BH CV ECHO MEAS - EDV(MOD-SP2): 85 ML
BH CV ECHO MEAS - EDV(MOD-SP4): 84 ML
BH CV ECHO MEAS - EDV(TEICH): 62 ML
BH CV ECHO MEAS - EF(CUBED): 85.4 %
BH CV ECHO MEAS - EF(MOD-SP2): 77.6 %
BH CV ECHO MEAS - EF(MOD-SP4): 77.4 %
BH CV ECHO MEAS - EF(TEICH): 79.5 %
BH CV ECHO MEAS - ESV(CUBED): 8 ML
BH CV ECHO MEAS - ESV(MOD-SP2): 19 ML
BH CV ECHO MEAS - ESV(MOD-SP4): 19 ML
BH CV ECHO MEAS - ESV(TEICH): 12.7 ML
BH CV ECHO MEAS - FS: 47.4 %
BH CV ECHO MEAS - IVS/LVPW: 1.2
BH CV ECHO MEAS - IVSD: 1.2 CM
BH CV ECHO MEAS - LAT PEAK E' VEL: 6 CM/SEC
BH CV ECHO MEAS - LV DIASTOLIC VOL/BSA (35-75): 56.4 ML/M^2
BH CV ECHO MEAS - LV MASS(C)D: 134.7 GRAMS
BH CV ECHO MEAS - LV MASS(C)DI: 90.5 GRAMS/M^2
BH CV ECHO MEAS - LV MEAN PG: 3 MMHG
BH CV ECHO MEAS - LV SYSTOLIC VOL/BSA (12-30): 12.8 ML/M^2
BH CV ECHO MEAS - LV V1 MEAN: 72 CM/SEC
BH CV ECHO MEAS - LV V1 VTI: 25.8 CM
BH CV ECHO MEAS - LVIDD: 3.8 CM
BH CV ECHO MEAS - LVIDS: 2 CM
BH CV ECHO MEAS - LVLD AP2: 7.3 CM
BH CV ECHO MEAS - LVLD AP4: 6.6 CM
BH CV ECHO MEAS - LVLS AP2: 4.9 CM
BH CV ECHO MEAS - LVLS AP4: 5 CM
BH CV ECHO MEAS - LVOT AREA (M): 2 CM^2
BH CV ECHO MEAS - LVOT AREA: 2 CM^2
BH CV ECHO MEAS - LVOT DIAM: 1.6 CM
BH CV ECHO MEAS - LVPWD: 1 CM
BH CV ECHO MEAS - MED PEAK E' VEL: 5 CM/SEC
BH CV ECHO MEAS - MR MAX PG: 97.6 MMHG
BH CV ECHO MEAS - MR MAX VEL: 494 CM/SEC
BH CV ECHO MEAS - MV A DUR: 0.07 SEC
BH CV ECHO MEAS - MV A MAX VEL: 103 CM/SEC
BH CV ECHO MEAS - MV DEC SLOPE: 419 CM/SEC^2
BH CV ECHO MEAS - MV DEC TIME: 0.25 SEC
BH CV ECHO MEAS - MV E MAX VEL: 94.3 CM/SEC
BH CV ECHO MEAS - MV E/A: 0.92
BH CV ECHO MEAS - MV MEAN PG: 2 MMHG
BH CV ECHO MEAS - MV P1/2T MAX VEL: 105 CM/SEC
BH CV ECHO MEAS - MV P1/2T: 73.4 MSEC
BH CV ECHO MEAS - MV V2 MEAN: 55.5 CM/SEC
BH CV ECHO MEAS - MV V2 VTI: 30.3 CM
BH CV ECHO MEAS - MVA P1/2T LCG: 2.1 CM^2
BH CV ECHO MEAS - MVA(P1/2T): 3 CM^2
BH CV ECHO MEAS - MVA(VTI): 1.7 CM^2
BH CV ECHO MEAS - PA ACC SLOPE: 18.4 CM/SEC^2
BH CV ECHO MEAS - PA ACC TIME: 0.07 SEC
BH CV ECHO MEAS - PA MAX PG (FULL): 1.1 MMHG
BH CV ECHO MEAS - PA MAX PG: 4.2 MMHG
BH CV ECHO MEAS - PA PR(ACCEL): 45.7 MMHG
BH CV ECHO MEAS - PA V2 MAX: 103 CM/SEC
BH CV ECHO MEAS - PULM DIAS VEL: 45.4 CM/SEC
BH CV ECHO MEAS - PULM S/D: 1.5
BH CV ECHO MEAS - PULM SYS VEL: 68.6 CM/SEC
BH CV ECHO MEAS - PVA(V,A): 0.97 CM^2
BH CV ECHO MEAS - PVA(V,D): 0.97 CM^2
BH CV ECHO MEAS - QP/QS: 0.35
BH CV ECHO MEAS - RAP SYSTOLE: 8 MMHG
BH CV ECHO MEAS - RV MAX PG: 3.1 MMHG
BH CV ECHO MEAS - RV MEAN PG: 1 MMHG
BH CV ECHO MEAS - RV V1 MAX: 88.7 CM/SEC
BH CV ECHO MEAS - RV V1 MEAN: 53.6 CM/SEC
BH CV ECHO MEAS - RV V1 VTI: 16 CM
BH CV ECHO MEAS - RVOT AREA: 1.1 CM^2
BH CV ECHO MEAS - RVOT DIAM: 1.2 CM
BH CV ECHO MEAS - RVSP: 49.2 MMHG
BH CV ECHO MEAS - SI(AO): 103.3 ML/M^2
BH CV ECHO MEAS - SI(CUBED): 31.5 ML/M^2
BH CV ECHO MEAS - SI(LVOT): 34.9 ML/M^2
BH CV ECHO MEAS - SI(MOD-SP2): 44.3 ML/M^2
BH CV ECHO MEAS - SI(MOD-SP4): 43.7 ML/M^2
BH CV ECHO MEAS - SI(TEICH): 33.1 ML/M^2
BH CV ECHO MEAS - SUP REN AO DIAM: 1.78 CM
BH CV ECHO MEAS - SV(AO): 153.8 ML
BH CV ECHO MEAS - SV(CUBED): 46.9 ML
BH CV ECHO MEAS - SV(LVOT): 51.9 ML
BH CV ECHO MEAS - SV(MOD-SP2): 66 ML
BH CV ECHO MEAS - SV(MOD-SP4): 65 ML
BH CV ECHO MEAS - SV(RVOT): 18.1 ML
BH CV ECHO MEAS - SV(TEICH): 49.2 ML
BH CV ECHO MEAS - TAPSE (>1.6): 1.9 CM2
BH CV ECHO MEAS - TR MAX VEL: 321 CM/SEC
BH CV XLRA - RV BASE: 3.1 CM
BH CV XLRA - TDI S': 14 CM/SEC
BUN BLD-MCNC: 13 MG/DL (ref 8–23)
BUN/CREAT SERPL: 13.3 (ref 7–25)
CALCIUM SPEC-SCNC: 8.8 MG/DL (ref 8.6–10.5)
CHLORIDE SERPL-SCNC: 100 MMOL/L (ref 98–107)
CHOLEST SERPL-MCNC: 116 MG/DL (ref 0–200)
CO2 SERPL-SCNC: 22.4 MMOL/L (ref 22–29)
CREAT BLD-MCNC: 0.98 MG/DL (ref 0.57–1)
DEPRECATED RDW RBC AUTO: 46.4 FL (ref 37–54)
E/E' RATIO: 18
ERYTHROCYTE [DISTWIDTH] IN BLOOD BY AUTOMATED COUNT: 13.4 % (ref 11.7–13)
GFR SERPL CREATININE-BSD FRML MDRD: 55 ML/MIN/1.73
GLUCOSE BLD-MCNC: 97 MG/DL (ref 65–99)
HCT VFR BLD AUTO: 40.1 % (ref 35.6–45.5)
HDLC SERPL-MCNC: 41 MG/DL (ref 40–60)
HGB BLD-MCNC: 13.3 G/DL (ref 11.9–15.5)
LDLC SERPL CALC-MCNC: 57 MG/DL (ref 0–100)
LDLC/HDLC SERPL: 1.38 {RATIO}
LEFT ATRIUM VOLUME INDEX: 26 ML/M2
LV EF 2D ECHO EST: 70 %
MCH RBC QN AUTO: 31.8 PG (ref 26.9–32)
MCHC RBC AUTO-ENTMCNC: 33.2 G/DL (ref 32.4–36.3)
MCV RBC AUTO: 95.9 FL (ref 80.5–98.2)
PLATELET # BLD AUTO: 188 10*3/MM3 (ref 140–500)
PMV BLD AUTO: 10.3 FL (ref 6–12)
POTASSIUM BLD-SCNC: 4.4 MMOL/L (ref 3.5–5.2)
RBC # BLD AUTO: 4.18 10*6/MM3 (ref 3.9–5.2)
SODIUM BLD-SCNC: 135 MMOL/L (ref 136–145)
TRIGL SERPL-MCNC: 92 MG/DL (ref 0–150)
VLDLC SERPL-MCNC: 18.4 MG/DL (ref 5–40)
WBC NRBC COR # BLD: 5.34 10*3/MM3 (ref 4.5–10.7)

## 2017-06-06 PROCEDURE — 99225 PR SBSQ OBSERVATION CARE/DAY 25 MINUTES: CPT | Performed by: INTERNAL MEDICINE

## 2017-06-06 PROCEDURE — G0378 HOSPITAL OBSERVATION PER HR: HCPCS

## 2017-06-06 PROCEDURE — 80048 BASIC METABOLIC PNL TOTAL CA: CPT | Performed by: INTERNAL MEDICINE

## 2017-06-06 PROCEDURE — 80061 LIPID PANEL: CPT | Performed by: INTERNAL MEDICINE

## 2017-06-06 PROCEDURE — 85027 COMPLETE CBC AUTOMATED: CPT | Performed by: INTERNAL MEDICINE

## 2017-06-06 PROCEDURE — 93306 TTE W/DOPPLER COMPLETE: CPT | Performed by: INTERNAL MEDICINE

## 2017-06-06 PROCEDURE — 93306 TTE W/DOPPLER COMPLETE: CPT

## 2017-06-06 RX ADMIN — APIXABAN 5 MG: 5 TABLET, FILM COATED ORAL at 13:48

## 2017-06-06 RX ADMIN — OXYCODONE HYDROCHLORIDE AND ACETAMINOPHEN 1 TABLET: 10; 325 TABLET ORAL at 11:52

## 2017-06-06 RX ADMIN — METOPROLOL TARTRATE 100 MG: 100 TABLET ORAL at 11:53

## 2017-06-06 RX ADMIN — CLOPIDOGREL 75 MG: 75 TABLET, FILM COATED ORAL at 11:53

## 2017-06-06 NOTE — PROGRESS NOTES
Discharge Planning Assessment  [facility]     Patient Name: Kaylin Ojeda  MRN: [id number]  Today's Date: 6/6/2017    Admit Date: 6/5/2017          Discharge Needs Assessment       06/06/17 1245    Living Environment    Lives With spouse    Living Arrangements house    Home Accessibility no concerns;stairs within home    Transportation Available family or friend will provide;car    Living Environment    Provides Primary Care For no one    Discharge Needs Assessment    Concerns To Be Addressed no discharge needs identified    Equipment Currently Used at Home walker, rolling    Discharge Disposition still a patient            Discharge Plan       06/06/17 1254    Case Management/Social Work Plan    Plan Home no needs identified    Additional Comments [facility] 6/05  Spoke with patient at bedside.  I introduced myself and explained CCP role.  Verified face sheet and confirmed that patient's pharmacy is [facility] on [address].  Pt is independent with ADL's.  She lives in an apartment on the second floor.  She uses a rolling walker when needed. She has no history of Home Health.  She has no history of rehab stays.  Plan is home with no needs.   CCP will follow for patient needs.        Discharge Placement     No information found        Expected Discharge Date and Time     Expected Discharge Date Expected Discharge Time    Jun 6, 2017               Demographic Summary     None            Functional Status       06/06/17 1245    Functional Status Current    Ambulation 0-->independent    Transferring 0-->independent    Toileting 0-->independent    Bathing 0-->independent    Dressing 0-->independent    Eating 0-->independent    Communication 0-->understands/communicates without difficulty    Swallowing (if score 2 or more for any item, consult Rehab Services) 0-->swallows foods/liquids without difficulty    Change in Functional Status Since Onset of Current Illness/Injury no    Functional Status Prior    Ambulation  0-->independent    Transferring 0-->independent    Toileting 0-->independent    Bathing 0-->independent    Dressing 0-->independent    Eating 0-->independent    Communication 0-->understands/communicates without difficulty    Swallowing 0-->swallows foods/liquids without difficulty            Psychosocial     None            Abuse/Neglect     None            Legal     None            Substance Abuse     None            Patient Forms     None          Mercedes Terrell, RN

## 2017-06-06 NOTE — PLAN OF CARE
Problem: Patient Care Overview (Adult)  Goal: Plan of Care Review  Outcome: Ongoing (interventions implemented as appropriate)    06/06/17 0433   Coping/Psychosocial Response Interventions   Plan Of Care Reviewed With patient   Patient Care Overview   Progress improving   Outcome Evaluation   Outcome Summary/Follow up Plan SR, no c/o's of chest pain or pressure, Safety maintained       Goal: Adult Individualization and Mutuality  Outcome: Ongoing (interventions implemented as appropriate)  Goal: Discharge Needs Assessment  Outcome: Ongoing (interventions implemented as appropriate)    Problem: Arrhythmia/Dysrhythmia (Symptomatic) (Adult)  Goal: Signs and Symptoms of Listed Potential Problems Will be Absent or Manageable (Arrhythmia/Dysrhythmia)  Outcome: Ongoing (interventions implemented as appropriate)    Problem: Fall Risk (Adult)  Goal: Identify Related Risk Factors and Signs and Symptoms  Outcome: Ongoing (interventions implemented as appropriate)  Goal: Absence of Falls  Outcome: Ongoing (interventions implemented as appropriate)

## 2017-06-06 NOTE — DISCHARGE SUMMARY
Patient Name: Kaylin Ojeda  :1940  77 y.o.    Date of Admit: 2017  Date of Discharge:  2017    Discharge Diagnosis:  Problem List Items Addressed This Visit        Cardiovascular and Mediastinum    Rapid atrial fibrillation - Primary          Hospital Course:     1. New onset of atrial fibrillation with rapid ventricular response. She spontaneously cardioverted. She thinks she may have missed her metoprolol yesterday. She has a CHADS2-Vasc score of 5. She has some mild valve disease but nothing to preclude the use of Eliquis.  I am going to start her on 5 mg twice a day.  She will have a free coupon for the first month to make sure that this will be an affordable medication for her  2. Coronary artery disease with a history of a stent about 2 years ago. She is currently on a statin.  3. Chest pain.  She ruled out for myocardial infarction. I do not think she needs a stress test at this time. This can be readdressed as an outpatient when she sees Dr. Calero if she has further episodes of chest pain.  4. Valvular heart disease.    5. Hypertension  6. Peripheral arterial disease    Echo 17  · Left ventricular wall thickness is consistent with mild eccentric hypertrophy.  · Left ventricular systolic function is normal. Estimated EF = 70%.  · Mild aortic valve regurgitation is present.  · Mild mitral valve regurgitation is present  · Moderate tricuspid valve regurgitation is present.  · Estimated right ventricular systolic pressure from tricuspid regurgitation is moderately elevated (45-55 mmHg).        Pertinent Test Results:     Results from last 7 days  Lab Units 17  0359 17  1353   SODIUM mmol/L 135* 135*   POTASSIUM mmol/L 4.4 3.6   CHLORIDE mmol/L 100 96*   TOTAL CO2 mmol/L 22.4 22.9   BUN mg/dL 13 10   CREATININE mg/dL 0.98 0.88   CALCIUM mg/dL 8.8 9.3   BILIRUBIN mg/dL  --  0.5   ALK PHOS U/L  --  136*   ALT (SGPT) U/L  --  9   AST (SGOT) U/L  --  17   GLUCOSE mg/dL 97 186*        Results from last 7 days  Lab Units 06/05/17  1850 06/05/17  1353   CK TOTAL U/L  --  89   TROPONIN T ng/mL <0.010 <0.010       Results from last 7 days  Lab Units 06/06/17  0359   WBC 10*3/mm3 5.34   HEMOGLOBIN g/dL 13.3   HEMATOCRIT % 40.1   PLATELETS 10*3/mm3 188       Results from last 7 days  Lab Units 06/05/17  1353   INR  0.93       Results from last 7 days  Lab Units 06/05/17  1353   MAGNESIUM mg/dL 2.4       Results from last 7 days  Lab Units 06/06/17  0359 05/31/17  1332   CHOLESTEROL mg/dL 116  --    TRIGLYCERIDES mg/dL 92 104   HDL CHOL mg/dL 41 47   LDL CHOL mg/dL  --  61       Condition on Discharge: stable    Discharge Medications   Kaylin Ojeda   Home Medication Instructions RITCHIE:[id number]    Printed on:06/06/17 1209   Medication Information                      albuterol (PROVENTIL HFA;VENTOLIN HFA) 108 (90 BASE) MCG/ACT inhaler  Inhale 2 puffs every 4 (four) hours as needed for wheezing.             apixaban (ELIQUIS) 5 MG tablet tablet  Take 1 tablet by mouth Every 12 (Twelve) Hours.             atorvastatin (LIPITOR) 10 MG tablet  Take 1 tablet by mouth daily.             budesonide-formoterol (SYMBICORT) 160-4.5 MCG/ACT inhaler  Inhale 2 puffs 2 (two) times a day.             clotrimazole-betamethasone (LOTRISONE) 1-0.05 % cream  Apply  topically 2 (two) times a day.             diazePAM (VALIUM) 5 MG tablet  Take 1 tablet by mouth Every 12 (Twelve) Hours As Needed for anxiety.             esomeprazole (NexIUM) 40 MG capsule  Take  by mouth.             gabapentin (NEURONTIN) 100 MG capsule  TAKE ONE CAPSULE BY MOUTH THREE TIMES DAILY AS NEEDED             hydrOXYzine (ATARAX) 25 MG tablet  Take 1 tablet by mouth every 8 (eight) hours as needed for anxiety.             methocarbamol (ROBAXIN) 750 MG tablet               metoprolol tartrate (LOPRESSOR) 100 MG tablet  Take 1 tablet by mouth 2 (Two) Times a Day.             mirtazapine (REMERON) 30 MG tablet  Take 1 tablet by mouth  Every Night.             ondansetron (ZOFRAN) 8 MG tablet  Take 1 tablet by mouth every 8 (eight) hours as needed for nausea or vomiting.             oxyCODONE-acetaminophen (PERCOCET)  MG per tablet  Take 1 tablet by mouth 4 (four) times a day.             QUEtiapine (SEROquel) 25 MG tablet  Take 2 tablets by mouth Every Night.             traZODone (DESYREL) 50 MG tablet  Take 1 tablet by mouth Every Night. Take 1/2 to one whole tablet QHS.                 Discharge Diet:   Diet Instructions     Diet: Cardiac; Thin Liquids, No Restrictions       Discharge Diet:  Cardiac   Fluid Consistency:  Thin Liquids, No Restrictions                 Activity at Discharge:   Activity Instructions     Activity as Tolerated                     Discharge disposition: home    Follow-up Appointments  Future Appointments  Date Time Provider Department Center   6/20/2017 1:20 PM CHAIR 4 [facility]   7/18/2017 1:20 PM CHAIR 4 [facility]   8/22/2017 1:20 PM CHAIR 4 [facility]   9/19/2017 1:20 PM CHAIR 4 [facility]   10/24/2017 12:30 PM LAB CHAIR 1 [facility]   10/24/2017 1:00 PM Estelle Rendon MD [facility]   10/24/2017 1:20 PM CHAIR 4 [facility]     Additional Instructions for the Follow-ups that You Need to Schedule     Discharge Follow-Up With Specified Provider    As directed    To:  Dr Calero in 1-2 weeks                  Jesusita Taylor MD, [facility]  06/06/17  12:06 PM

## 2017-06-06 NOTE — PROGRESS NOTES
"Patient Name: Kaylin Ojeda  :1940  77 y.o.      Patient Care Team:  Donna Forte MD as PCP - General (Internal Medicine & Pediatrics)  Donna Forte MD as PCP - Family Medicine  Estelle Rendon MD as Consulting Physician (Hematology and Oncology)  Chris Jimenez MD as Referring Physician (Family Medicine)    Interval History:   No further episodes of atrial fibrillation or chest pain    Subjective:  Following for atrial fibrillation and chest pain     Objective   Vital Signs  Temp:  [97.2 °F (36.2 °C)-98.4 °F (36.9 °C)] 98.4 °F (36.9 °C)  Heart Rate:  [] 57  Resp:  [16-19] 16  BP: (106-187)/() 130/58    Intake/Output Summary (Last 24 hours) at 17 0959  Last data filed at 17 0854   Gross per 24 hour   Intake                0 ml   Output              450 ml   Net             -450 ml     Flowsheet Rows         First Filed Value    Admission Height  62\" (157.5 cm) Documented at 2017 1333    Admission Weight  110 lb (49.9 kg) Documented at 2017 1333          Physical Exam:   General Appearance:    Alert, cooperative, in no acute distress   Lungs:     Clear to auscultation.  Normal respiratory effort and rate.      Heart:    Regular rhythm and normal rate, normal S1 and S2, no murmurs, gallops or rubs.     Chest Wall:    No abnormalities observed   Abdomen:     Soft, nontender, positive bowel sounds.     Extremities:   no cyanosis, clubbing or edema.  No marked joint deformities.  Adequate musculoskeletal strength.       Results Review:      Results from last 7 days  Lab Units 17  0359   SODIUM mmol/L 135*   POTASSIUM mmol/L 4.4   CHLORIDE mmol/L 100   TOTAL CO2 mmol/L 22.4   BUN mg/dL 13   CREATININE mg/dL 0.98   GLUCOSE mg/dL 97   CALCIUM mg/dL 8.8       Results from last 7 days  Lab Units 17  1850 17  1353   CK TOTAL U/L  --  89   TROPONIN T ng/mL <0.010 <0.010       Results from last 7 days  Lab Units 17  0359   WBC " 10*3/mm3 5.34   HEMOGLOBIN g/dL 13.3   HEMATOCRIT % 40.1   PLATELETS 10*3/mm3 188       Results from last 7 days  Lab Units 06/05/17  1353   INR  0.93       Results from last 7 days  Lab Units 06/06/17  0359   CHOLESTEROL mg/dL 116       Results from last 7 days  Lab Units 06/05/17  1353   MAGNESIUM mg/dL 2.4       Results from last 7 days  Lab Units 06/06/17  0359 05/31/17  1332   CHOLESTEROL mg/dL 116  --    TRIGLYCERIDES mg/dL 92 104   HDL CHOL mg/dL 41 47   LDL CHOL mg/dL  --  61         Medication Review:     atorvastatin 10 mg Oral Nightly   clopidogrel 75 mg Oral Daily   enoxaparin 1 mg/kg Subcutaneous Q12H   metoprolol tartrate 100 mg Oral Q12H   oxyCODONE-acetaminophen 1 tablet Oral 4x Daily   pantoprazole 40 mg Oral Q AM   QUEtiapine 50 mg Oral Nightly   traZODone 50 mg Oral Nightly          diltiaZEM 5-15 mg/hr Last Rate: Stopped (06/05/17 1635)   Pharmacy to Dose enoxaparin (LOVENOX)         Assessment/Plan     1. New onset of atrial fibrillation with rapid ventricular response. She spontaneously cardioverted.  She thinks she may have missed her metoprolol yesterday.  She has a CHADS2-Vasc score of 5.  I'm waiting for her echo results and we'll determine if she can be discharged on Eliquis or if I will need to use warfarin.  2. Coronary artery disease with a history of a stent about 2 years ago. She is currently on Plavix and a statin.  3. Chest pain.  She ruled out for myocardial infarction.  I do not think she needs a stress test at this time.  This can be readdressed as an outpatient when she sees Dr. Calero if she has further episodes of chest pain.  4. Valvular heart disease.  Waiting for a repeat echo  5. Hypertension  6. Peripheral arterial disease    Jesusita Taylor MD, [facility]  06/06/17  9:59 AM

## 2017-06-07 ENCOUNTER — TELEPHONE (OUTPATIENT)
Dept: INTERNAL MEDICINE | Facility: CLINIC | Age: 77
End: 2017-06-07

## 2017-06-07 NOTE — TELEPHONE ENCOUNTER
I contacted the patient around 48 hours after HOS d/c. Advised the patient of below and the patient confirmed/ scheduled a HOS f/u with Dr. Reyes this Friday 06/09/2017 at 8AM per Dr. Reyes.     ----- Message from Remedios Vasquez MD sent at 6/7/2017  9:46 AM EDT -----  Patient needs hospital follow up with someone in Dr. Forte's absence. Please schedule with me or Dr. Reyes Friday or early next week. Please make sure that we document that we contacted her so that we can bill for transitional visit.

## 2017-06-09 ENCOUNTER — OFFICE VISIT (OUTPATIENT)
Dept: INTERNAL MEDICINE | Facility: CLINIC | Age: 77
End: 2017-06-09

## 2017-06-09 VITALS
HEART RATE: 76 BPM | SYSTOLIC BLOOD PRESSURE: 118 MMHG | WEIGHT: 113.8 LBS | RESPIRATION RATE: 18 BRPM | DIASTOLIC BLOOD PRESSURE: 70 MMHG | OXYGEN SATURATION: 93 % | HEIGHT: 62 IN | BODY MASS INDEX: 20.94 KG/M2

## 2017-06-09 DIAGNOSIS — R35.0 FREQUENCY OF URINATION: ICD-10-CM

## 2017-06-09 DIAGNOSIS — F41.8 MIXED ANXIETY DEPRESSIVE DISORDER: ICD-10-CM

## 2017-06-09 DIAGNOSIS — F17.200 TOBACCO DEPENDENCE SYNDROME: Primary | ICD-10-CM

## 2017-06-09 DIAGNOSIS — K29.00 ACUTE EROSIVE GASTRITIS: ICD-10-CM

## 2017-06-09 DIAGNOSIS — G47.00 INSOMNIA, UNSPECIFIED TYPE: ICD-10-CM

## 2017-06-09 DIAGNOSIS — I48.91 RAPID ATRIAL FIBRILLATION (HCC): ICD-10-CM

## 2017-06-09 PROCEDURE — 99406 BEHAV CHNG SMOKING 3-10 MIN: CPT | Performed by: INTERNAL MEDICINE

## 2017-06-09 PROCEDURE — 99214 OFFICE O/P EST MOD 30 MIN: CPT | Performed by: INTERNAL MEDICINE

## 2017-06-09 PROCEDURE — 94010 BREATHING CAPACITY TEST: CPT | Performed by: INTERNAL MEDICINE

## 2017-06-09 PROCEDURE — 81003 URINALYSIS AUTO W/O SCOPE: CPT | Performed by: INTERNAL MEDICINE

## 2017-06-09 RX ORDER — ESOMEPRAZOLE MAGNESIUM 40 MG/1
40 CAPSULE, DELAYED RELEASE ORAL
Qty: 90 CAPSULE | Refills: 2 | Status: SHIPPED | OUTPATIENT
Start: 2017-06-09 | End: 2019-08-13

## 2017-06-09 NOTE — PROGRESS NOTES
"Subjective   Kaylin Ojeda is a 77 y.o. female.     History of Present Illness   76 yo female went to [facility] 6[facility] due to chest pain and dyspnea.  She was diagnosed with atrial fibrillation. Her symptoms were preceded by 2 weeks of fatigue.  She was defibrillated in an ambulance twice on way to [facility] but was only presyncopal.  She was treated by Hank Calero, now on eliquis.  Does think she missed some metoprolol prior to her symptoms.   She does have underlying CAD, HTN, HL with smoking history over 40 years.    She continues to smoke.  She is down to half a pack. She denies oral fixation, but gets bored and is not as busy so she smokes. She is a  and enjoys working in the yard. Not currently on symbicort or albuterol Does have them at home.      On seroquel for sleep and appetite which helped her get over 87 pounds  [place] pain monthly for her chronic narcotics. Getting facet injection.   The following portions of the patient's history were reviewed and updated as appropriate: allergies, current medications, past family history, past medical history, past social history, past surgical history and problem list.    Review of Systems   Constitutional: Positive for fatigue.   Respiratory: Positive for cough and shortness of breath.    Gastrointestinal: Negative for constipation, diarrhea and nausea.        ++heartburn   Genitourinary: Negative.    Musculoskeletal: Positive for back pain and gait problem.   Neurological: Positive for weakness.   Psychiatric/Behavioral: Positive for sleep disturbance.       Objective   Physical Exam   /70  Pulse 76  Resp 18  Ht 62\" (157.5 cm)  Wt 113 lb 12.8 oz (51.6 kg)  SpO2 93%  BMI 20.81 kg/m2    General Appearance:  Alert, cooperative, no distress, appears stated age, thin   Head:  Normocephalic, without obvious abnormality, atraumatic   Eyes:  PERRLA EOMI   Ears:  Normal TM's and external ear canals, both ears   Nose: Nares normal, septum midline,mucosa " normal, no drainage or sinus tenderness   Throat: Lips, mucosa, and tongue normal; teeth and gums normal   Neck: Supple, symmetrical, trachea midline, no adenopathy;  thyroid: not enlarged, symmetric, no tenderness/mass/nodules; no carotid bruit or JVD   Back:   Symmetric, no curvature, ROM normal, no CVA tenderness   Lungs:   Clear to auscultation bilaterally but distant and poor volumes   Breasts:  defer   Heart:  Regular rate and rhythm, S1 and S2 normal, no murmur, rub, or gallop   Abdomen:   Soft, non-tender, bowel sounds active all four quadrants,  no masses, no organomegaly   Pelvic: Deferred   Extremities: Extremities normal, atraumatic, no cyanosis or edema   Pulses: 2+ and symmetric   Skin: Skin color, texture, turgor normal, no rashes or lesions   Lymph nodes: Cervical, supraclavicular, and axillary nodes normal   Neurologic: Normal        Results from last 7 days  Lab Units 06/06/17  0359 05/31/17  1332   CHOLESTEROL mg/dL 116 --    TRIGLYCERIDES mg/dL 92 104   HDL CHOL mg/dL 41 47   LDL CHOL mg/dL --  61            Echo 6/6/17  · Left ventricular wall thickness is consistent with mild eccentric hypertrophy.  · Left ventricular systolic function is normal. Estimated EF = 70%.  · Mild aortic valve regurgitation is present.  · Mild mitral valve regurgitation is present  · Moderate tricuspid valve regurgitation is present.  · Estimated right ventricular systolic pressure from tricuspid regurgitation is moderately elevated (45-55 mmHg).          Results from last 7 days  Lab Units 06/06/17  0359 06/05/17  1353   SODIUM mmol/L 135* 135*   POTASSIUM mmol/L 4.4 3.6   CHLORIDE mmol/L 100 96*   TOTAL CO2 mmol/L 22.4 22.9   BUN mg/dL 13 10   CREATININE mg/dL 0.98 0.88   CALCIUM mg/dL 8.8 9.3   BILIRUBIN mg/dL --  0.5   ALK PHOS U/L --  136*   ALT (SGPT) U/L --  9   AST (SGOT) U/L --  17   GLUCOSE mg/dL 97 186*      Procedures  PFT FeV/FVC 34%  fEV1 - 34%  Significant reduced flow loop    Assessment/Plan   Kaylin  was seen today for atrial fibrillation.    Diagnoses and all orders for this visit:    Tobacco dependence syndrome    Mixed anxiety depressive disorder    Insomnia, unspecified type    Acute erosive gastritis  -     esomeprazole (nexIUM) 40 MG capsule; Take 1 capsule by mouth Every Morning Before Breakfast.    Rapid atrial fibrillation  -     Basic Metabolic Panel    Frequency of urination  -     POCT urinalysis dipstick, automated    Start symbicort 2 puff twice and rinse  Start 10 days before quit  Would overlap with her e cigarettes  Use albuterol for any cough

## 2017-06-09 NOTE — PATIENT INSTRUCTIONS
Start symbicort 2 puff twice and rinse  Start 10 days before quit  Would overlap with her e cigarettes  Use albuterol for any cough  nicoderm patches would be helpful      Assessment/Plan   Kaylin was seen today for atrial fibrillation.    Diagnoses and all orders for this visit:    Tobacco dependence syndrome    Mixed anxiety depressive disorder    Insomnia, unspecified type    Acute erosive gastritis  -     esomeprazole (nexIUM) 40 MG capsule; Take 1 capsule by mouth Every Morning Before Breakfast.    Rapid atrial fibrillation  -     Basic Metabolic Panel    Frequency of urination  -     POCT urinalysis dipstick, automated

## 2017-06-09 NOTE — PROGRESS NOTES
Continued Stay Note  [facility]     Patient Name: Kaylin Ojeda  MRN: [id number]  Today's Date: 6/9/2017    Admit Date: 6/5/2017          Discharge Plan       06/09/17 1618    Final Note    Final Note Home no needs              Discharge Codes       06/09/17 1618    Discharge Codes    Discharge Codes 01  Discharge to home        Expected Discharge Date and Time     Expected Discharge Date Expected Discharge Time    Jun 6, 2017             Mercedes Terrell RN

## 2017-06-13 ENCOUNTER — OFFICE VISIT (OUTPATIENT)
Dept: CARDIOLOGY | Facility: CLINIC | Age: 77
End: 2017-06-13

## 2017-06-13 VITALS
BODY MASS INDEX: 20.78 KG/M2 | DIASTOLIC BLOOD PRESSURE: 76 MMHG | HEART RATE: 54 BPM | SYSTOLIC BLOOD PRESSURE: 164 MMHG | HEIGHT: 62 IN | WEIGHT: 112.9 LBS

## 2017-06-13 DIAGNOSIS — I48.0 PAF (PAROXYSMAL ATRIAL FIBRILLATION) (HCC): Primary | ICD-10-CM

## 2017-06-13 DIAGNOSIS — I77.9 PERIPHERAL ARTERIAL OCCLUSIVE DISEASE (HCC): ICD-10-CM

## 2017-06-13 DIAGNOSIS — I38 VALVULAR HEART DISEASE: ICD-10-CM

## 2017-06-13 DIAGNOSIS — I10 ESSENTIAL HYPERTENSION: ICD-10-CM

## 2017-06-13 DIAGNOSIS — I25.119 CORONARY ARTERY DISEASE INVOLVING NATIVE CORONARY ARTERY OF NATIVE HEART WITH ANGINA PECTORIS (HCC): ICD-10-CM

## 2017-06-13 PROCEDURE — 93000 ELECTROCARDIOGRAM COMPLETE: CPT | Performed by: INTERNAL MEDICINE

## 2017-06-13 PROCEDURE — 99214 OFFICE O/P EST MOD 30 MIN: CPT | Performed by: INTERNAL MEDICINE

## 2017-06-13 RX ORDER — AMLODIPINE BESYLATE 5 MG/1
5 TABLET ORAL DAILY
Qty: 30 TABLET | Refills: 11 | Status: SHIPPED | OUTPATIENT
Start: 2017-06-13 | End: 2017-09-08

## 2017-06-13 RX ORDER — ASPIRIN 81 MG/1
81 TABLET ORAL DAILY
COMMUNITY
End: 2017-09-12

## 2017-06-13 NOTE — PROGRESS NOTES
Date of Office Visit: 2017  Encounter Provider: Mo Calero MD  Place of Service: [facility] CARDIOLOGY  Patient Name: Kaylin Ojeda  :1940    Chief Complaint   Patient presents with   • Atrial Fibrillation     HOSPITAL FOLLOW UP    :     HPI: Kaylin Ojeda is a 77 y.o. female who presents today to follow-up.     She is a chronically ill woman with history of refractory hypertension and severe peripheral vascular disease. In May 2015, she was found to have single-vessel coronary artery disease of the circumflex and had a stent placed. Over the next two years, I had to progressively cut back on her anti-hypertensives due to low blood pressure (presumably from weight loss).      In 2017 she developed anginal chest pressure and was found to have rapid atrial fibrillation.  She cardioverted on her own and her metoprolol dose was increased.  An echo showed normal LV systolic function.  She ruled out for ACS.  She was placed on apixaban.    She has not had palpitations or chest pain since discharge.  She has not had major bleeding.        Past Medical History:   Diagnosis Date   • Abdominal pain, epigastric     Chronic, unclear cause, improved   • Anorexia    • Anxiety    • Arthritis    • CAD (coronary artery disease)     Cath 2015: nonobstructive LAD/RCA disease, 90% mid LCx s/p [id number] Xience   • Cellulitis of leg    • Cervical disc disease    • Chronic fatigue    • Colitis    • COPD (chronic obstructive pulmonary disease)    • Depression    • Erosive gastritis    • Fibromyalgia    • Frequency of urination 2017   • Gastritis    • Hypercholesterolemia 2016   • Hyperglycemia    • Hypertension     History of refractory hypertension which improved significantly   • Insomnia    • Leukocytes in urine    • Lower back pain    • Mediastinal lymphadenopathy    • Mesenteric artery stenosis    • Mononucleosis    • Occlusion and stenosis of carotid artery    •  Onychomycosis    • Orbit fracture    • PAF (paroxysmal atrial fibrillation)    • Peripheral arterial disease     Significant (carotid, subclavian, lower extremities), with claudication, medical therapy only   • Pernicious anemia    • Prediabetes    • Renal artery stenosis     unilateral, with renal atrophy (no intervention required)   • Renal calculi    • Sinus bradycardia     mild, asymptomatic   • TIA (transient ischemic attack)    • UTI (urinary tract infection)    • Valvular heart disease     5/2015: mild-mod AI, mod MR, mod-severe TR.  6/2017: mild AI, mild MR, mod TR   • Vision problem    • Vitamin B 12 deficiency        Past Surgical History:   Procedure Laterality Date   • APPENDECTOMY     • BREAST SURGERY     • CARDIAC CATHETERIZATION  05/2015    nonobs LAD/RCA disease, 90% mid LCx s/p [id number] Xience   • CERVICAL FUSION  1997   • CHOLECYSTECTOMY     • HYSTERECTOMY  1995   • KNEE SURGERY Right 2005   • KNEE SURGERY Left 1998       Social History     Social History   • Marital status:      Spouse name: N/A   • Number of children: N/A   • Years of education: N/A     Occupational History   •  Retired     Social History Main Topics   • Smoking status: Current Every Day Smoker     Packs/day: 0.50     Types: Cigarettes, Electronic Cigarette   • Smokeless tobacco: Never Used   • Alcohol use No      Comment: OCCASIONAL/ TWICE A YEAR.    • Drug use: No   • Sexual activity: Not on file     Other Topics Concern   • Not on file     Social History Narrative       Family History   Problem Relation Age of Onset   • Asthma Mother    • Emphysema Mother    • Graves' disease Mother    • Heart disease Mother    • Hypertension Mother    • Emphysema Father    • Hypertension Father    • Heart disease Father    • Kidney disease Sister    • Lupus Daughter      Systemic erythematosus   • Arthritis Other    • Crohn's disease Other        Review of Systems   Constitution: Positive for malaise/fatigue.   Cardiovascular:  Positive for dyspnea on exertion.   All other systems reviewed and are negative.      Allergies   Allergen Reactions   • Aleve  [Naproxen Sodium]    • Codeine    • Ibuprofen    • Sulfa Antibiotics          Current Outpatient Prescriptions:   •  albuterol (PROVENTIL HFA;VENTOLIN HFA) 108 (90 BASE) MCG/ACT inhaler, Inhale 2 puffs every 4 (four) hours as needed for wheezing., Disp: 18 g, Rfl: 11  •  apixaban (ELIQUIS) 5 MG tablet tablet, Take 1 tablet by mouth Every 12 (Twelve) Hours., Disp: 60 tablet, Rfl: 11  •  aspirin 81 MG EC tablet, Take 81 mg by mouth Daily., Disp: , Rfl:   •  atorvastatin (LIPITOR) 10 MG tablet, Take 1 tablet by mouth daily., Disp: 90 tablet, Rfl: 3  •  budesonide-formoterol (SYMBICORT) 160-4.5 MCG/ACT inhaler, Inhale 2 puffs 2 (two) times a day., Disp: 1 inhaler, Rfl: 12  •  clotrimazole-betamethasone (LOTRISONE) 1-0.05 % cream, Apply  topically 2 (two) times a day., Disp: 45 g, Rfl: 0  •  diazePAM (VALIUM) 5 MG tablet, Take 1 tablet by mouth Every 12 (Twelve) Hours As Needed for anxiety., Disp: 30 tablet, Rfl: 1  •  esomeprazole (nexIUM) 40 MG capsule, Take 1 capsule by mouth Every Morning Before Breakfast., Disp: 90 capsule, Rfl: 2  •  gabapentin (NEURONTIN) 100 MG capsule, TAKE ONE CAPSULE BY MOUTH THREE TIMES DAILY AS NEEDED, Disp: 90 capsule, Rfl: 6  •  hydrOXYzine (ATARAX) 25 MG tablet, Take 1 tablet by mouth every 8 (eight) hours as needed for anxiety., Disp: 30 tablet, Rfl: 0  •  methocarbamol (ROBAXIN) 750 MG tablet, , Disp: , Rfl: 3  •  metoprolol tartrate (LOPRESSOR) 100 MG tablet, Take 1 tablet by mouth 2 (Two) Times a Day., Disp: 180 tablet, Rfl: 1  •  ondansetron (ZOFRAN) 8 MG tablet, Take 1 tablet by mouth every 8 (eight) hours as needed for nausea or vomiting., Disp: 20 tablet, Rfl: 2  •  oxyCODONE-acetaminophen (PERCOCET)  MG per tablet, Take 1 tablet by mouth 4 (four) times a day., Disp: , Rfl: 0  •  QUEtiapine (SEROquel) 25 MG tablet, Take 2 tablets by mouth Every  "Night., Disp: 30 tablet, Rfl: 6  •  traZODone (DESYREL) 50 MG tablet, Take 1 tablet by mouth Every Night. Take 1/2 to one whole tablet QHS., Disp: 30 tablet, Rfl: 6  •  amLODIPine (NORVASC) 5 MG tablet, Take 1 tablet by mouth Daily., Disp: 30 tablet, Rfl: 11     Objective:     Vitals:    06/13/17 1507   BP: 164/76   Pulse: 54   Weight: 112 lb 14.4 oz (51.2 kg)   Height: 62\" (157.5 cm)     Body mass index is 20.65 kg/(m^2).    Physical Exam   Constitutional: She is oriented to person, place, and time.   frail   HENT:   Head: Normocephalic.   Nose: Nose normal.   Mouth/Throat: Oropharynx is clear and moist.   Eyes: Conjunctivae and EOM are normal. Pupils are equal, round, and reactive to light.   Neck: Normal range of motion. No JVD present.   Cardiovascular: Normal rate, regular rhythm, normal heart sounds and intact distal pulses.    No murmur heard.  Pulmonary/Chest: Effort normal and breath sounds normal.   Abdominal: Soft. She exhibits no mass. There is no tenderness.   Musculoskeletal: Normal range of motion. She exhibits no edema.   Lymphadenopathy:     She has no cervical adenopathy.   Neurological: She is alert and oriented to person, place, and time. No cranial nerve deficit.   Skin: Skin is warm and dry. No rash noted.   Psychiatric: She has a normal mood and affect. Her behavior is normal. Judgment and thought content normal.   Vitals reviewed.        ECG 12 Lead  Date/Time: 6/13/2017 3:27 PM  Performed by: MONA FUNG  Authorized by: MONA FUNG   Comparison: compared with previous ECG   Similar to previous ECG  Rhythm: sinus rhythm  Conduction: conduction normal  ST Segments: ST segments normal  T Waves: T waves normal  QRS axis: normal  Other: no other findings  Clinical impression: normal ECG              Assessment:       Diagnosis Plan   1. PAF (paroxysmal atrial fibrillation)  ECG 12 Lead   2. Coronary artery disease involving native coronary artery of native heart with angina pectoris  ECG 12 " Lead   3. Essential hypertension  ECG 12 Lead   4. Peripheral arterial occlusive disease  ECG 12 Lead   5. Valvular heart disease  ECG 12 Lead          Plan:       1.  Atrial Fibrillation and Atrial Flutter  Assessment  • The patient has paroxysmal atrial fibrillation  • This is non-valvular in etiology  • The patient's CHADS2-VASc score is 5  • A [id number] score of 2 or more is considered a high risk for a thromboembolic event  • Apixaban prescribed    Plan  • Attempt to maintain sinus rhythm  • Continue apixaban for antithrombotic therapy, bleeding issues discussed  • Continue beta blocker for rhythm control  • Her metoprolol was increased and she's doing well with this.    2.  Coronary Artery Disease  Assessment  • The patient has no angina  • She had chest pain in the setting of very rapid atrial fibrillation but has not had any since.  I don't think she needs a stress at this time.  She was taken off clopidogrel as she's now anticoagulated.    Subjective - Objective  • There has been a previous stent procedure using THELMA 2015  • Current antiplatelet therapy includes aspirin 81 mg      3.  Her BP is now increasing again.  I am adding amlodipine 5 mg daily.  I am a little reticent to add an ACE or ARB given her history of NICK.    4.  She has mesenteric disease, renal artery stenosis, and PAD.  She's on aspirin and atorvastatin.  Her symptoms are stable.    5.  She has mild AI/mild MR/mod TR by echo from June 2017.  I will follow this.       Sincerely,       Mo Calero MD

## 2017-06-19 DIAGNOSIS — F41.9 ANXIETY: ICD-10-CM

## 2017-06-19 RX ORDER — QUETIAPINE FUMARATE 25 MG/1
TABLET, FILM COATED ORAL
Qty: 60 TABLET | Refills: 3 | Status: SHIPPED | OUTPATIENT
Start: 2017-06-19 | End: 2017-08-10

## 2017-06-20 ENCOUNTER — INFUSION (OUTPATIENT)
Dept: ONCOLOGY | Facility: HOSPITAL | Age: 77
End: 2017-06-20

## 2017-06-20 VITALS — WEIGHT: 112.6 LBS | TEMPERATURE: 98.1 F | BODY MASS INDEX: 20.59 KG/M2

## 2017-06-20 DIAGNOSIS — D50.8 OTHER IRON DEFICIENCY ANEMIA: ICD-10-CM

## 2017-06-20 DIAGNOSIS — D51.0 VITAMIN B12 DEFICIENCY ANEMIA DUE TO INTRINSIC FACTOR DEFICIENCY: Primary | ICD-10-CM

## 2017-06-20 PROCEDURE — 25010000002 CYANOCOBALAMIN PER 1000 MCG: Performed by: INTERNAL MEDICINE

## 2017-06-20 PROCEDURE — 96372 THER/PROPH/DIAG INJ SC/IM: CPT | Performed by: INTERNAL MEDICINE

## 2017-06-20 RX ORDER — CYANOCOBALAMIN 1000 UG/ML
1000 INJECTION, SOLUTION INTRAMUSCULAR; SUBCUTANEOUS ONCE
Status: CANCELLED | OUTPATIENT
Start: 2017-06-28

## 2017-06-20 RX ORDER — CYANOCOBALAMIN 1000 UG/ML
1000 INJECTION, SOLUTION INTRAMUSCULAR; SUBCUTANEOUS ONCE
Status: COMPLETED | OUTPATIENT
Start: 2017-06-20 | End: 2017-06-20

## 2017-06-20 RX ADMIN — CYANOCOBALAMIN 1000 MCG: 1000 INJECTION, SOLUTION INTRAMUSCULAR; SUBCUTANEOUS at 14:10

## 2017-07-18 ENCOUNTER — APPOINTMENT (OUTPATIENT)
Dept: ONCOLOGY | Facility: HOSPITAL | Age: 77
End: 2017-07-18

## 2017-07-26 DIAGNOSIS — E78.00 HYPERCHOLESTEROLEMIA: ICD-10-CM

## 2017-07-26 DIAGNOSIS — I65.23 OBSTRUCTION OF CAROTID ARTERY, BILATERAL: ICD-10-CM

## 2017-07-26 DIAGNOSIS — K55.1 MESENTERIC ARTERY STENOSIS (HCC): ICD-10-CM

## 2017-07-26 RX ORDER — ATORVASTATIN CALCIUM 10 MG/1
10 TABLET, FILM COATED ORAL DAILY
Qty: 90 TABLET | Refills: 3 | Status: SHIPPED | OUTPATIENT
Start: 2017-07-26 | End: 2018-07-30 | Stop reason: SDUPTHER

## 2017-07-27 DIAGNOSIS — K55.1 MESENTERIC ARTERY STENOSIS (HCC): ICD-10-CM

## 2017-07-27 DIAGNOSIS — R11.0 NAUSEA: ICD-10-CM

## 2017-07-27 RX ORDER — ONDANSETRON HYDROCHLORIDE 8 MG/1
8 TABLET, FILM COATED ORAL EVERY 8 HOURS PRN
Qty: 20 TABLET | Refills: 2 | Status: SHIPPED | OUTPATIENT
Start: 2017-07-27 | End: 2017-08-10 | Stop reason: SDUPTHER

## 2017-07-28 DIAGNOSIS — G89.29 CHRONIC BILATERAL LOW BACK PAIN WITH BILATERAL SCIATICA: ICD-10-CM

## 2017-07-28 DIAGNOSIS — G47.9 DIFFICULTY SLEEPING: ICD-10-CM

## 2017-07-28 DIAGNOSIS — M54.42 CHRONIC BILATERAL LOW BACK PAIN WITH BILATERAL SCIATICA: ICD-10-CM

## 2017-07-28 DIAGNOSIS — M54.41 CHRONIC BILATERAL LOW BACK PAIN WITH BILATERAL SCIATICA: ICD-10-CM

## 2017-07-28 DIAGNOSIS — M54.40 CHRONIC BILATERAL LOW BACK PAIN WITH SCIATICA, SCIATICA LATERALITY UNSPECIFIED: Primary | ICD-10-CM

## 2017-07-28 DIAGNOSIS — G89.29 CHRONIC BILATERAL LOW BACK PAIN WITH SCIATICA, SCIATICA LATERALITY UNSPECIFIED: Primary | ICD-10-CM

## 2017-07-28 RX ORDER — TRAZODONE HYDROCHLORIDE 50 MG/1
50 TABLET ORAL NIGHTLY
Qty: 30 TABLET | Refills: 6 | Status: SHIPPED | OUTPATIENT
Start: 2017-07-28 | End: 2017-07-28 | Stop reason: SDUPTHER

## 2017-07-28 RX ORDER — TRAZODONE HYDROCHLORIDE 50 MG/1
TABLET ORAL
Qty: 30 TABLET | Refills: 6 | Status: SHIPPED | OUTPATIENT
Start: 2017-07-28 | End: 2018-05-14 | Stop reason: SDUPTHER

## 2017-08-10 ENCOUNTER — OFFICE VISIT (OUTPATIENT)
Dept: INTERNAL MEDICINE | Facility: CLINIC | Age: 77
End: 2017-08-10

## 2017-08-10 VITALS
TEMPERATURE: 97.8 F | BODY MASS INDEX: 20.96 KG/M2 | HEART RATE: 81 BPM | DIASTOLIC BLOOD PRESSURE: 82 MMHG | RESPIRATION RATE: 16 BRPM | SYSTOLIC BLOOD PRESSURE: 120 MMHG | HEIGHT: 61 IN | OXYGEN SATURATION: 97 % | WEIGHT: 111 LBS

## 2017-08-10 DIAGNOSIS — F32.1 MODERATE SINGLE CURRENT EPISODE OF MAJOR DEPRESSIVE DISORDER (HCC): Primary | ICD-10-CM

## 2017-08-10 DIAGNOSIS — I48.0 PAF (PAROXYSMAL ATRIAL FIBRILLATION) (HCC): ICD-10-CM

## 2017-08-10 DIAGNOSIS — I25.119 CORONARY ARTERY DISEASE INVOLVING NATIVE CORONARY ARTERY OF NATIVE HEART WITH ANGINA PECTORIS (HCC): ICD-10-CM

## 2017-08-10 DIAGNOSIS — G89.29 CHRONIC LOW BACK PAIN WITHOUT SCIATICA, UNSPECIFIED BACK PAIN LATERALITY: ICD-10-CM

## 2017-08-10 DIAGNOSIS — K55.1 MESENTERIC ARTERY STENOSIS (HCC): ICD-10-CM

## 2017-08-10 DIAGNOSIS — R21 RASH: ICD-10-CM

## 2017-08-10 DIAGNOSIS — R11.0 NAUSEA: ICD-10-CM

## 2017-08-10 DIAGNOSIS — M54.50 CHRONIC LOW BACK PAIN WITHOUT SCIATICA, UNSPECIFIED BACK PAIN LATERALITY: ICD-10-CM

## 2017-08-10 PROCEDURE — 99214 OFFICE O/P EST MOD 30 MIN: CPT | Performed by: INTERNAL MEDICINE

## 2017-08-10 RX ORDER — TRAZODONE HYDROCHLORIDE 50 MG/1
50 TABLET ORAL NIGHTLY
COMMUNITY
End: 2017-09-08

## 2017-08-10 RX ORDER — ONDANSETRON HYDROCHLORIDE 8 MG/1
8 TABLET, FILM COATED ORAL EVERY 8 HOURS PRN
Qty: 20 TABLET | Refills: 2 | Status: SHIPPED | OUTPATIENT
Start: 2017-08-10 | End: 2019-01-31 | Stop reason: SDUPTHER

## 2017-08-10 RX ORDER — CLOTRIMAZOLE AND BETAMETHASONE DIPROPIONATE 10; .64 MG/G; MG/G
CREAM TOPICAL 2 TIMES DAILY
Qty: 45 G | Refills: 0 | Status: SHIPPED | OUTPATIENT
Start: 2017-08-10 | End: 2019-05-01

## 2017-08-10 RX ORDER — FLUOXETINE HYDROCHLORIDE 20 MG/1
20 CAPSULE ORAL DAILY
Qty: 30 CAPSULE | Refills: 0 | Status: SHIPPED | OUTPATIENT
Start: 2017-08-10 | End: 2017-09-08

## 2017-08-10 NOTE — PROGRESS NOTES
Subjective     Kaylin Ojeda is a 77 y.o. female, who presents with a chief complaint of   Chief Complaint   Patient presents with   • Atrial Fibrillation     s/p hospital d/c 6/6/17/on Eliqius   • Fatigue   • Fall   • Depression   • Panic Attack       HPI   The pt is here for follow up.  He was admitted to the hospital on 6/5 with new onset a-fib with RVR.  She got shocked in the ambulance.  She was started on eliquis and continued her metoprolol.  She says that since all of this has happened she feels badly.  Her bp went back up and she was restarted on amlodine.  She has severe PAD/mesenteric disease, and NICK.  She has mild AI/mild MR/mod TR by echo from June 2017 which is managed by dr. Calero.      The pt c/o fatigue and increased depression.  She doesn't even want to get dressed to go out during the day.  She has bee on wellbutrin, cymbalta, and prozac in the past.  She thinks she did the best on prozac.  She is taking low dose seroquel, trazodone, and valium at night to sleep.  Her family says within 15 minutes she is totally out.  She says sleep is her only relief from her depression.  She has had some falls at home.      The pt has severe back pain. She sees [facility] pain management.  She is on pecocet 3-4 times a day.  She had been having epidural injections but cant have the procedures now bc of her eliquis.     The following portions of the patient's history were reviewed and updated as appropriate: allergies, current medications, past family history, past medical history, past social history, past surgical history and problem list.    Allergies: Aleve  [naproxen sodium]; Codeine; Ibuprofen; and Sulfa antibiotics    Review of Systems   Constitutional: Positive for fatigue. Negative for unexpected weight change.   HENT: Negative.    Eyes: Negative.    Respiratory: Negative.    Cardiovascular: Negative.    Gastrointestinal: Negative.    Endocrine: Negative.    Genitourinary: Negative.     Musculoskeletal: Positive for back pain.   Skin: Negative.    Allergic/Immunologic: Negative.    Neurological: Negative.    Hematological: Negative.    Psychiatric/Behavioral: Positive for dysphoric mood. The patient is nervous/anxious.    All other systems reviewed and are negative.      Objective     Wt Readings from Last 3 Encounters:   08/10/17 111 lb (50.3 kg)   06/20/17 112 lb 9.6 oz (51.1 kg)   06/13/17 112 lb 14.4 oz (51.2 kg)     Temp Readings from Last 3 Encounters:   08/10/17 97.8 °F (36.6 °C) (Oral)   06/20/17 98.1 °F (36.7 °C)   06/06/17 98.4 °F (36.9 °C) (Oral)     BP Readings from Last 3 Encounters:   08/10/17 120/82   06/13/17 164/76   06/09/17 118/70     Pulse Readings from Last 3 Encounters:   08/10/17 81   06/13/17 54   06/09/17 76     Body mass index is 20.97 kg/(m^2).  SpO2 Readings from Last 3 Encounters:   08/10/17 97%   06/09/17 93%   06/06/17 91%       Physical Exam   Constitutional: She is oriented to person, place, and time. No distress.   Thin, frail   HENT:   Head: Normocephalic and atraumatic.   Right Ear: External ear normal.   Left Ear: External ear normal.   Nose: Nose normal.   Mouth/Throat: Oropharynx is clear and moist.   Eyes: Conjunctivae and EOM are normal. Pupils are equal, round, and reactive to light.   Neck: Normal range of motion. Neck supple.   Cardiovascular: Normal rate, regular rhythm, normal heart sounds and intact distal pulses.    Pulmonary/Chest: Effort normal and breath sounds normal. No respiratory distress. She has no wheezes.   Musculoskeletal: Normal range of motion.   Normal gait   Neurological: She is alert and oriented to person, place, and time.   Skin: Skin is warm and dry.   Psychiatric: She has a normal mood and affect. Her behavior is normal. Judgment and thought content normal.   Nursing note and vitals reviewed.    June labs reviewed.     Results for orders placed or performed in visit on 06/09/17   POCT urinalysis dipstick, automated   Result  Value Ref Range    Color Yellow Yellow, Straw, Dark Yellow, Betty    Clarity, UA Clear Clear    Glucose, UA Negative Negative, 1000 mg/dL (3+) mg/dL    Bilirubin Negative Negative    Ketones, UA Negative Negative    Specific Gravity  1.020 1.005 - 1.030    Blood, UA Negative Negative    pH, Urine 6.0 5.0 - 8.0    Protein, POC Negative Negative mg/dL    Urobilinogen, UA Normal Normal    Leukocytes Trace (A) Negative    Nitrite, UA Negative Negative       Assessment/Plan   Kaylin was seen today for atrial fibrillation, fatigue, fall, depression and panic attack.    Diagnoses and all orders for this visit:    Moderate single current episode of major depressive disorder  -     FLUoxetine (PROZAC) 20 MG capsule; Take 1 capsule by mouth Daily.    PAF (paroxysmal atrial fibrillation) - cont metoprolol and elequis    Coronary artery disease involving native coronary artery of native heart with angina pectoris    Chronic low back pain without sciatica, unspecified back pain laterality- avoid taking pain medication with valium.        Stop seroquel at night.  Then try to wean down on valium.      Outpatient Medications Prior to Visit   Medication Sig Dispense Refill   • albuterol (PROVENTIL HFA;VENTOLIN HFA) 108 (90 BASE) MCG/ACT inhaler Inhale 2 puffs every 4 (four) hours as needed for wheezing. 18 g 11   • amLODIPine (NORVASC) 5 MG tablet Take 1 tablet by mouth Daily. 30 tablet 11   • apixaban (ELIQUIS) 5 MG tablet tablet Take 1 tablet by mouth Every 12 (Twelve) Hours. 60 tablet 11   • atorvastatin (LIPITOR) 10 MG tablet Take 1 tablet by mouth Daily. 90 tablet 3   • diazePAM (VALIUM) 5 MG tablet Take 1 tablet by mouth Every 12 (Twelve) Hours As Needed for anxiety. 30 tablet 1   • esomeprazole (nexIUM) 40 MG capsule Take 1 capsule by mouth Every Morning Before Breakfast. 90 capsule 2   • gabapentin (NEURONTIN) 100 MG capsule TAKE ONE CAPSULE BY MOUTH THREE TIMES DAILY AS NEEDED 90 capsule 6   • hydrOXYzine (ATARAX) 25 MG  tablet Take 1 tablet by mouth every 8 (eight) hours as needed for anxiety. 30 tablet 0   • methocarbamol (ROBAXIN) 750 MG tablet   3   • metoprolol tartrate (LOPRESSOR) 100 MG tablet Take 1 tablet by mouth 2 (Two) Times a Day. 180 tablet 1   • oxyCODONE-acetaminophen (PERCOCET)  MG per tablet Take 1 tablet by mouth 4 (four) times a day.  0   • traZODone (DESYREL) 50 MG tablet TAKE   1/2 TO 1 TABLET PO Q HS 30 tablet 6   • clotrimazole-betamethasone (LOTRISONE) 1-0.05 % cream Apply  topically 2 (two) times a day. 45 g 0   • ondansetron (ZOFRAN) 8 MG tablet Take 1 tablet by mouth Every 8 (Eight) Hours As Needed for Nausea or Vomiting. 20 tablet 2   • QUEtiapine (SEROquel) 25 MG tablet TAKE 2 TABLETS BY MOUTH EVERY NIGHT 60 tablet 3   • aspirin 81 MG EC tablet Take 81 mg by mouth Daily.     • budesonide-formoterol (SYMBICORT) 160-4.5 MCG/ACT inhaler Inhale 2 puffs 2 (two) times a day. 1 inhaler 12     No facility-administered medications prior to visit.      New Medications Ordered This Visit   Medications   • FLUoxetine (PROZAC) 20 MG capsule     Sig: Take 1 capsule by mouth Daily.     Dispense:  30 capsule     Refill:  0   • clotrimazole-betamethasone (LOTRISONE) 1-0.05 % cream     Sig: Apply  topically 2 (Two) Times a Day.     Dispense:  45 g     Refill:  0   • ondansetron (ZOFRAN) 8 MG tablet     Sig: Take 1 tablet by mouth Every 8 (Eight) Hours As Needed for Nausea or Vomiting.     Dispense:  20 tablet     Refill:  2       Medications Discontinued During This Encounter   Medication Reason   • QUEtiapine (SEROquel) 25 MG tablet    • clotrimazole-betamethasone (LOTRISONE) 1-0.05 % cream Reorder   • ondansetron (ZOFRAN) 8 MG tablet Reorder         Return in about 4 weeks (around 9/7/2017) for Recheck.

## 2017-08-22 ENCOUNTER — INFUSION (OUTPATIENT)
Dept: ONCOLOGY | Facility: HOSPITAL | Age: 77
End: 2017-08-22

## 2017-08-22 VITALS — TEMPERATURE: 98.3 F

## 2017-08-22 DIAGNOSIS — D51.0 VITAMIN B12 DEFICIENCY ANEMIA DUE TO INTRINSIC FACTOR DEFICIENCY: Primary | ICD-10-CM

## 2017-08-22 DIAGNOSIS — D50.8 OTHER IRON DEFICIENCY ANEMIA: ICD-10-CM

## 2017-08-22 PROCEDURE — 96372 THER/PROPH/DIAG INJ SC/IM: CPT | Performed by: INTERNAL MEDICINE

## 2017-08-22 PROCEDURE — 25010000002 CYANOCOBALAMIN PER 1000 MCG: Performed by: INTERNAL MEDICINE

## 2017-08-22 RX ORDER — CYANOCOBALAMIN 1000 UG/ML
1000 INJECTION, SOLUTION INTRAMUSCULAR; SUBCUTANEOUS ONCE
Status: COMPLETED | OUTPATIENT
Start: 2017-08-22 | End: 2017-08-22

## 2017-08-22 RX ORDER — CYANOCOBALAMIN 1000 UG/ML
1000 INJECTION, SOLUTION INTRAMUSCULAR; SUBCUTANEOUS ONCE
Status: CANCELLED | OUTPATIENT
Start: 2017-08-22

## 2017-08-22 RX ADMIN — CYANOCOBALAMIN 1000 MCG: 1000 INJECTION, SOLUTION INTRAMUSCULAR; SUBCUTANEOUS at 13:43

## 2017-09-08 ENCOUNTER — OFFICE VISIT (OUTPATIENT)
Dept: INTERNAL MEDICINE | Facility: CLINIC | Age: 77
End: 2017-09-08

## 2017-09-08 VITALS
DIASTOLIC BLOOD PRESSURE: 78 MMHG | BODY MASS INDEX: 20.24 KG/M2 | WEIGHT: 107.2 LBS | OXYGEN SATURATION: 97 % | SYSTOLIC BLOOD PRESSURE: 108 MMHG | HEART RATE: 63 BPM | HEIGHT: 61 IN

## 2017-09-08 DIAGNOSIS — I25.119 CORONARY ARTERY DISEASE INVOLVING NATIVE CORONARY ARTERY OF NATIVE HEART WITH ANGINA PECTORIS (HCC): Primary | ICD-10-CM

## 2017-09-08 DIAGNOSIS — M85.88 OTHER SPECIFIED DISORDERS OF BONE DENSITY AND STRUCTURE, OTHER SITE: ICD-10-CM

## 2017-09-08 DIAGNOSIS — K55.1 MESENTERIC ARTERY STENOSIS (HCC): ICD-10-CM

## 2017-09-08 DIAGNOSIS — D51.0 VITAMIN B12 DEFICIENCY ANEMIA DUE TO INTRINSIC FACTOR DEFICIENCY: ICD-10-CM

## 2017-09-08 DIAGNOSIS — F41.8 MIXED ANXIETY DEPRESSIVE DISORDER: ICD-10-CM

## 2017-09-08 DIAGNOSIS — E56.9 VITAMIN DEFICIENCY: ICD-10-CM

## 2017-09-08 DIAGNOSIS — I48.0 PAF (PAROXYSMAL ATRIAL FIBRILLATION) (HCC): ICD-10-CM

## 2017-09-08 DIAGNOSIS — G47.9 SLEEP DIFFICULTIES: ICD-10-CM

## 2017-09-08 DIAGNOSIS — R79.89 ABNORMAL THYROID BLOOD TEST: ICD-10-CM

## 2017-09-08 DIAGNOSIS — E78.00 HYPERCHOLESTEROLEMIA: ICD-10-CM

## 2017-09-08 DIAGNOSIS — D50.8 OTHER IRON DEFICIENCY ANEMIA: ICD-10-CM

## 2017-09-08 DIAGNOSIS — R73.02 IMPAIRED GLUCOSE TOLERANCE: ICD-10-CM

## 2017-09-08 PROCEDURE — 99215 OFFICE O/P EST HI 40 MIN: CPT | Performed by: INTERNAL MEDICINE

## 2017-09-08 RX ORDER — QUETIAPINE FUMARATE 25 MG/1
TABLET, FILM COATED ORAL
Refills: 3 | COMMUNITY
Start: 2017-08-14 | End: 2017-09-12

## 2017-09-08 NOTE — PROGRESS NOTES
Subjective     Kaylin Ojeda is a 77 y.o. female, who presents with a chief complaint of   Chief Complaint   Patient presents with   • Medication Reaction     Was on Seraquil and Prozac at the same for almost a month.        HPIthe pt is here today  2 days ago the pt's  found her laying across the bed.  She told her  she wanted to die at home.  She had not overdosed but was confused.  She refused to go to the ER.  She thinks she got her meds mixed up again.      Last month we stopped her seroquel and started fluoxetine to help with depression.  She was actually taking both meds.  She says she is depressed bc she can't do what she wants to do.  She does take her valium bid.  She would actually like to go on her valium. She doesn't think  The trazodone helps her sleep any but the valium does help some.  She also takes melatonin for sleep. She does drink 2 starbucks mocha's daily.      She doesn't have much of an appetite.  She was on mirtazapine in the past.     Shingles pain- she used to be on gabapentin but is not on this now.    HTN/a-fib - she is on metoprolol bid.  She was on amlodipine but hasnt been taking it.  Her bp is on the low side now.    Chronic back pain- she follows with pain management and is on chronic percocet.    She is still smoking but says she has cut down some.     The following portions of the patient's history were reviewed and updated as appropriate: allergies, current medications, past family history, past medical history, past social history, past surgical history and problem list.    Allergies: Aleve  [naproxen sodium]; Codeine; Ibuprofen; and Sulfa antibiotics    Review of Systems   Constitutional: Negative.    HENT: Negative.    Eyes: Negative.    Respiratory: Negative.    Cardiovascular: Negative.    Gastrointestinal: Negative.    Endocrine: Negative.    Genitourinary: Negative.    Musculoskeletal: Negative.    Skin: Negative.    Allergic/Immunologic: Negative.     Neurological: Negative.    Hematological: Negative.    Psychiatric/Behavioral: Negative.    All other systems reviewed and are negative.      Objective     Wt Readings from Last 3 Encounters:   09/08/17 107 lb 3.2 oz (48.6 kg)   08/10/17 111 lb (50.3 kg)   06/20/17 112 lb 9.6 oz (51.1 kg)     Temp Readings from Last 3 Encounters:   08/22/17 98.3 °F (36.8 °C)   08/10/17 97.8 °F (36.6 °C) (Oral)   06/20/17 98.1 °F (36.7 °C)     BP Readings from Last 3 Encounters:   09/08/17 108/78   08/10/17 120/82   06/13/17 164/76     Pulse Readings from Last 3 Encounters:   09/08/17 63   08/10/17 81   06/13/17 54     Body mass index is 20.26 kg/(m^2).  SpO2 Readings from Last 3 Encounters:   09/08/17 97%   08/10/17 97%   06/09/17 93%       Physical Exam   Constitutional: She is oriented to person, place, and time. She appears well-developed and well-nourished. No distress.   HENT:   Head: Normocephalic and atraumatic.   Right Ear: External ear normal.   Left Ear: External ear normal.   Nose: Nose normal.   Mouth/Throat: Oropharynx is clear and moist.   Eyes: Conjunctivae and EOM are normal. Pupils are equal, round, and reactive to light.   Neck: Normal range of motion. Neck supple.   Cardiovascular: Normal rate, regular rhythm, normal heart sounds and intact distal pulses.    Pulmonary/Chest: Effort normal and breath sounds normal. No respiratory distress. She has no wheezes.   Musculoskeletal: Normal range of motion.   Normal gait   Neurological: She is alert and oriented to person, place, and time.   Skin: Skin is warm and dry.   Psychiatric: She has a normal mood and affect. Her behavior is normal. Judgment and thought content normal.   Nursing note and vitals reviewed.      Results for orders placed or performed in visit on 06/09/17   POCT urinalysis dipstick, automated   Result Value Ref Range    Color Yellow Yellow, Straw, Dark Yellow, Betty    Clarity, UA Clear Clear    Glucose, UA Negative Negative, 1000 mg/dL (3+) mg/dL     Bilirubin Negative Negative    Ketones, UA Negative Negative    Specific Gravity  1.020 1.005 - 1.030    Blood, UA Negative Negative    pH, Urine 6.0 5.0 - 8.0    Protein, POC Negative Negative mg/dL    Urobilinogen, UA Normal Normal    Leukocytes Trace (A) Negative    Nitrite, UA Negative Negative       Assessment/Plan   Kaylin was seen today for medication reaction.    Diagnoses and all orders for this visit:    Coronary artery disease involving native coronary artery of native heart with angina pectoris  -     NMR LipoProfile; Future  -     CBC & Differential; Future  -     Comprehensive Metabolic Panel; Future  -     T4, Free; Future  -     TSH; Future  -     Ambulatory Referral to Home Health    Hypercholesterolemia  -     NMR LipoProfile; Future  -     Ambulatory Referral to Home Health    Mesenteric artery stenosis  -     Ambulatory Referral to Home Health    PAF (paroxysmal atrial fibrillation)  -     NMR LipoProfile; Future  -     CBC & Differential; Future  -     Comprehensive Metabolic Panel; Future  -     T4, Free; Future  -     TSH; Future  -     Ambulatory Referral to Home Health    Impaired glucose tolerance  -     NMR LipoProfile; Future  -     CBC & Differential; Future  -     Comprehensive Metabolic Panel; Future  -     T4, Free; Future  -     TSH; Future  -     Hemoglobin A1c; Future  -     Ambulatory Referral to Home Health    Vitamin B12 deficiency anemia due to intrinsic factor deficiency  -     Ambulatory Referral to Home Health    Other iron deficiency anemia  -     Iron Profile; Future  -     Ambulatory Referral to Home Health    Sleep difficulties  -     Iron Profile; Future  -     Ambulatory Referral to Home Health    Mixed anxiety depressive disorder  -     Ambulatory Referral to Home Health    Vitamin deficiency  -     Vitamin D 25 Hydroxy; Future  -     Vitamin B12; Future  -     Ambulatory Referral to Home Health    Abnormal thyroid blood test  -     T4, Free; Future  -     TSH;  Future    Other specified disorders of bone density and structure, other site   -     Vitamin D 25 Hydroxy; Future      Greater than 40 min spent with patient and family with 50% spent in counseling on medications and issues above.      Outpatient Medications Prior to Visit   Medication Sig Dispense Refill   • apixaban (ELIQUIS) 5 MG tablet tablet Take 1 tablet by mouth Every 12 (Twelve) Hours. 60 tablet 11   • atorvastatin (LIPITOR) 10 MG tablet Take 1 tablet by mouth Daily. 90 tablet 3   • budesonide-formoterol (SYMBICORT) 160-4.5 MCG/ACT inhaler Inhale 2 puffs 2 (two) times a day. 1 inhaler 12   • diazePAM (VALIUM) 5 MG tablet Take 1 tablet by mouth Every 12 (Twelve) Hours As Needed for anxiety. 30 tablet 1   • esomeprazole (nexIUM) 40 MG capsule Take 1 capsule by mouth Every Morning Before Breakfast. 90 capsule 2   • metoprolol tartrate (LOPRESSOR) 100 MG tablet Take 1 tablet by mouth 2 (Two) Times a Day. 180 tablet 1   • oxyCODONE-acetaminophen (PERCOCET)  MG per tablet Take 1 tablet by mouth 4 (four) times a day.  0   • traZODone (DESYREL) 50 MG tablet TAKE   1/2 TO 1 TABLET PO Q HS 30 tablet 6   • albuterol (PROVENTIL HFA;VENTOLIN HFA) 108 (90 BASE) MCG/ACT inhaler Inhale 2 puffs every 4 (four) hours as needed for wheezing. 18 g 11   • aspirin 81 MG EC tablet Take 81 mg by mouth Daily.     • clotrimazole-betamethasone (LOTRISONE) 1-0.05 % cream Apply  topically 2 (Two) Times a Day. 45 g 0   • methocarbamol (ROBAXIN) 750 MG tablet   3   • ondansetron (ZOFRAN) 8 MG tablet Take 1 tablet by mouth Every 8 (Eight) Hours As Needed for Nausea or Vomiting. 20 tablet 2   • amLODIPine (NORVASC) 5 MG tablet Take 1 tablet by mouth Daily. 30 tablet 11   • FLUoxetine (PROZAC) 20 MG capsule Take 1 capsule by mouth Daily. 30 capsule 0   • gabapentin (NEURONTIN) 100 MG capsule TAKE ONE CAPSULE BY MOUTH THREE TIMES DAILY AS NEEDED 90 capsule 6   • hydrOXYzine (ATARAX) 25 MG tablet Take 1 tablet by mouth every 8 (eight)  hours as needed for anxiety. 30 tablet 0   • traZODone (DESYREL) 50 MG tablet Take 50 mg by mouth Every Night.       No facility-administered medications prior to visit.      No orders of the defined types were placed in this encounter.      Medications Discontinued During This Encounter   Medication Reason   • hydrOXYzine (ATARAX) 25 MG tablet Error   • traZODone (DESYREL) 50 MG tablet Error   • amLODIPine (NORVASC) 5 MG tablet    • gabapentin (NEURONTIN) 100 MG capsule    • FLUoxetine (PROZAC) 20 MG capsule          Return in about 2 weeks (around 9/22/2017) for Recheck.

## 2017-09-10 DIAGNOSIS — F32.1 MODERATE SINGLE CURRENT EPISODE OF MAJOR DEPRESSIVE DISORDER (HCC): ICD-10-CM

## 2017-09-11 RX ORDER — FLUOXETINE HYDROCHLORIDE 20 MG/1
CAPSULE ORAL
Qty: 30 CAPSULE | Refills: 0 | OUTPATIENT
Start: 2017-09-11

## 2017-09-12 ENCOUNTER — OFFICE VISIT (OUTPATIENT)
Dept: CARDIOLOGY | Facility: CLINIC | Age: 77
End: 2017-09-12

## 2017-09-12 VITALS
HEART RATE: 60 BPM | BODY MASS INDEX: 20.01 KG/M2 | WEIGHT: 106 LBS | HEIGHT: 61 IN | DIASTOLIC BLOOD PRESSURE: 76 MMHG | SYSTOLIC BLOOD PRESSURE: 120 MMHG

## 2017-09-12 DIAGNOSIS — I48.0 PAF (PAROXYSMAL ATRIAL FIBRILLATION) (HCC): Primary | ICD-10-CM

## 2017-09-12 DIAGNOSIS — I77.9 PERIPHERAL ARTERIAL OCCLUSIVE DISEASE (HCC): ICD-10-CM

## 2017-09-12 DIAGNOSIS — I25.10 CORONARY ARTERY DISEASE INVOLVING NATIVE CORONARY ARTERY OF NATIVE HEART WITHOUT ANGINA PECTORIS: ICD-10-CM

## 2017-09-12 DIAGNOSIS — I38 VALVULAR HEART DISEASE: ICD-10-CM

## 2017-09-12 DIAGNOSIS — I10 ESSENTIAL HYPERTENSION: ICD-10-CM

## 2017-09-12 PROCEDURE — 99213 OFFICE O/P EST LOW 20 MIN: CPT | Performed by: INTERNAL MEDICINE

## 2017-09-12 PROCEDURE — 93000 ELECTROCARDIOGRAM COMPLETE: CPT | Performed by: INTERNAL MEDICINE

## 2017-09-12 NOTE — PROGRESS NOTES
Date of Office Visit: 2017  Encounter Provider: Mo Calero MD  Place of Service: [facility] CARDIOLOGY  Patient Name: Kaylin Ojeda  :1940    Chief Complaint   Patient presents with   • Atrial Fibrillation     3 month follow up    :     HPI: Kaylin Ojeda is a 77 y.o. female who presents today to follow-up.     She is a chronically ill woman with history of refractory hypertension and severe peripheral vascular disease. In May 2015, she was found to have single-vessel coronary artery disease of the circumflex and had a stent placed. Over the next two years, I had to progressively cut back on her anti-hypertensives due to low blood pressure (presumably from weight loss).      In 2017 she developed anginal chest pressure and was found to have rapid atrial fibrillation.  She cardioverted on her own and her metoprolol dose was increased.  An echo showed normal LV systolic function.  She ruled out for ACS.  She was placed on apixaban.    She has not had palpitations or chest pain since discharge.  She has not had major bleeding.        Past Medical History:   Diagnosis Date   • Abdominal pain, epigastric     Chronic, unclear cause, improved   • Anorexia    • Anxiety    • Arthritis    • CAD (coronary artery disease)     Cath 2015: nonobstructive LAD/RCA disease, 90% mid LCx s/p [id number] Xience   • Cellulitis of leg    • Cervical disc disease    • Chronic fatigue    • Colitis    • COPD (chronic obstructive pulmonary disease)    • Depression    • Erosive gastritis    • Fibromyalgia    • Frequency of urination 2017   • Gastritis    • Hypercholesterolemia 2016   • Hyperglycemia    • Hypertension     History of refractory hypertension which improved significantly   • Insomnia    • Leukocytes in urine    • Lower back pain    • Mediastinal lymphadenopathy    • Mesenteric artery stenosis    • Mononucleosis    • Occlusion and stenosis of carotid artery    •  Onychomycosis    • Orbit fracture    • PAF (paroxysmal atrial fibrillation)    • Peripheral arterial disease     Significant (carotid, subclavian, lower extremities), with claudication, medical therapy only   • Pernicious anemia    • Prediabetes    • Renal artery stenosis     unilateral, with renal atrophy (no intervention required)   • Renal calculi    • Sinus bradycardia     mild, asymptomatic   • TIA (transient ischemic attack)    • UTI (urinary tract infection)    • Valvular heart disease     5/2015: mild-mod AI, mod MR, mod-severe TR.  6/2017: mild AI, mild MR, mod TR   • Vision problem    • Vitamin B 12 deficiency        Past Surgical History:   Procedure Laterality Date   • APPENDECTOMY     • BREAST SURGERY     • CARDIAC CATHETERIZATION  05/2015    nonobs LAD/RCA disease, 90% mid LCx s/p [id number] Xience   • CERVICAL FUSION  1997   • CHOLECYSTECTOMY     • HYSTERECTOMY  1995   • KNEE SURGERY Right 2005   • KNEE SURGERY Left 1998       Social History     Social History   • Marital status:      Spouse name: N/A   • Number of children: N/A   • Years of education: N/A     Occupational History   •  Retired     Social History Main Topics   • Smoking status: Current Every Day Smoker     Packs/day: 0.50     Types: Cigarettes, Electronic Cigarette   • Smokeless tobacco: Never Used      Comment: Pt inquired about nicotine patches.     • Alcohol use No      Comment: OCCASIONAL/ TWICE A YEAR.    • Drug use: No   • Sexual activity: Not on file     Other Topics Concern   • Not on file     Social History Narrative       Family History   Problem Relation Age of Onset   • Asthma Mother    • Emphysema Mother    • Graves' disease Mother    • Heart disease Mother    • Hypertension Mother    • Emphysema Father    • Hypertension Father    • Heart disease Father    • Kidney disease Sister    • Lupus Daughter      Systemic erythematosus   • Arthritis Other    • Crohn's disease Other        Review of Systems   Constitution:  "Positive for malaise/fatigue.   Psychiatric/Behavioral: Positive for depression.   All other systems reviewed and are negative.      Allergies   Allergen Reactions   • Aleve  [Naproxen Sodium]    • Codeine    • Ibuprofen    • Sulfa Antibiotics          Current Outpatient Prescriptions:   •  albuterol (PROVENTIL HFA;VENTOLIN HFA) 108 (90 BASE) MCG/ACT inhaler, Inhale 2 puffs every 4 (four) hours as needed for wheezing., Disp: 18 g, Rfl: 11  •  apixaban (ELIQUIS) 5 MG tablet tablet, Take 1 tablet by mouth Every 12 (Twelve) Hours., Disp: 60 tablet, Rfl: 11  •  atorvastatin (LIPITOR) 10 MG tablet, Take 1 tablet by mouth Daily., Disp: 90 tablet, Rfl: 3  •  budesonide-formoterol (SYMBICORT) 160-4.5 MCG/ACT inhaler, Inhale 2 puffs 2 (two) times a day., Disp: 1 inhaler, Rfl: 12  •  clotrimazole-betamethasone (LOTRISONE) 1-0.05 % cream, Apply  topically 2 (Two) Times a Day., Disp: 45 g, Rfl: 0  •  diazePAM (VALIUM) 5 MG tablet, Take 1 tablet by mouth Every 12 (Twelve) Hours As Needed for anxiety., Disp: 30 tablet, Rfl: 1  •  esomeprazole (nexIUM) 40 MG capsule, Take 1 capsule by mouth Every Morning Before Breakfast., Disp: 90 capsule, Rfl: 2  •  methocarbamol (ROBAXIN) 750 MG tablet, As Needed., Disp: , Rfl: 3  •  metoprolol tartrate (LOPRESSOR) 100 MG tablet, Take 1 tablet by mouth 2 (Two) Times a Day., Disp: 180 tablet, Rfl: 1  •  ondansetron (ZOFRAN) 8 MG tablet, Take 1 tablet by mouth Every 8 (Eight) Hours As Needed for Nausea or Vomiting., Disp: 20 tablet, Rfl: 2  •  oxyCODONE-acetaminophen (PERCOCET)  MG per tablet, Take 1 tablet by mouth 4 (four) times a day., Disp: , Rfl: 0  •  traZODone (DESYREL) 50 MG tablet, TAKE   1/2 TO 1 TABLET PO Q HS, Disp: 30 tablet, Rfl: 6     Objective:     Vitals:    09/12/17 1134   BP: 120/76   BP Location: Right arm   Pulse: 60   Weight: 106 lb (48.1 kg)   Height: 61\" (154.9 cm)     Body mass index is 20.03 kg/(m^2).    Physical Exam   Constitutional: She is oriented to person, " place, and time.   frail   HENT:   Head: Normocephalic.   Nose: Nose normal.   Mouth/Throat: Oropharynx is clear and moist.   Eyes: Conjunctivae and EOM are normal. Pupils are equal, round, and reactive to light.   Neck: Normal range of motion. No JVD present.   Cardiovascular: Normal rate, regular rhythm, normal heart sounds and intact distal pulses.    No murmur heard.  Pulses:       Dorsalis pedis pulses are 1+ on the right side, and 1+ on the left side.        Posterior tibial pulses are 1+ on the right side, and 1+ on the left side.   Pulmonary/Chest: Effort normal and breath sounds normal.   Abdominal: Soft. She exhibits no mass. There is no tenderness.   Musculoskeletal: Normal range of motion. She exhibits no edema.   Lymphadenopathy:     She has no cervical adenopathy.   Neurological: She is alert and oriented to person, place, and time. No cranial nerve deficit.   Skin: Skin is warm and dry. No rash noted.   Psychiatric: She has a normal mood and affect. Her behavior is normal. Judgment and thought content normal.   Vitals reviewed.        ECG 12 Lead  Date/Time: 9/12/2017 12:17 PM  Performed by: MONA FUNG  Authorized by: MONA FUNG   Comparison: compared with previous ECG   Similar to previous ECG  Rhythm: sinus rhythm  Conduction: conduction normal  ST Segments: ST segments normal  T Waves: T waves normal  QRS axis: normal  Other: no other findings  Clinical impression: normal ECG              Assessment:       Diagnosis Plan   1. PAF (paroxysmal atrial fibrillation)     2. Coronary artery disease involving native coronary artery of native heart without angina pectoris     3. Essential hypertension     4. Peripheral arterial occlusive disease     5. Valvular heart disease            Plan:       1.  Atrial Fibrillation and Atrial Flutter  Assessment  • The patient has paroxysmal atrial fibrillation  • This is non-valvular in etiology  • The patient's CHADS2-VASc score is 5  • A [id number] score  of 2 or more is considered a high risk for a thromboembolic event  • Apixaban prescribed    Plan  • Attempt to maintain sinus rhythm  • Continue apixaban for antithrombotic therapy, bleeding issues discussed  • Continue beta blocker for rhythm control  • Her metoprolol was increased and she's doing well with this.    2.  Coronary Artery Disease  Assessment  • The patient has no angina  • She had chest pain in the setting of very rapid atrial fibrillation but has not had any since.  I don't think she needs a stress at this time.  She was taken off clopidogrel as she's now anticoagulated.    Subjective - Objective  • There has been a previous stent procedure using THELMA 2015  • Current antiplatelet therapy includes aspirin 81 mg      3.  Her BP is stable.    4.  She has mesenteric disease, renal artery stenosis, carotid artery disease, and PAD.  She's on aspirin and atorvastatin.  Her symptoms are stable.    5.  She has mild AI/mild MR/mod TR by echo from June 2017.  I will follow this with a repeat echo in June 2018.      Sincerely,       Mo Calero MD

## 2017-09-13 ENCOUNTER — LAB REQUISITION (OUTPATIENT)
Dept: LAB | Facility: HOSPITAL | Age: 77
End: 2017-09-13

## 2017-09-13 ENCOUNTER — RESULTS ENCOUNTER (OUTPATIENT)
Dept: INTERNAL MEDICINE | Facility: CLINIC | Age: 77
End: 2017-09-13

## 2017-09-13 DIAGNOSIS — R73.02 IMPAIRED GLUCOSE TOLERANCE: ICD-10-CM

## 2017-09-13 DIAGNOSIS — E78.00 HYPERCHOLESTEROLEMIA: ICD-10-CM

## 2017-09-13 DIAGNOSIS — R79.89 ABNORMAL THYROID BLOOD TEST: ICD-10-CM

## 2017-09-13 DIAGNOSIS — E56.9 VITAMIN DEFICIENCY: ICD-10-CM

## 2017-09-13 DIAGNOSIS — G47.9 SLEEP DIFFICULTIES: ICD-10-CM

## 2017-09-13 DIAGNOSIS — D50.8 OTHER IRON DEFICIENCY ANEMIA: ICD-10-CM

## 2017-09-13 DIAGNOSIS — E07.9 DISORDER OF THYROID: ICD-10-CM

## 2017-09-13 DIAGNOSIS — M85.88 OTHER SPECIFIED DISORDERS OF BONE DENSITY AND STRUCTURE, OTHER SITE: ICD-10-CM

## 2017-09-13 DIAGNOSIS — I25.119 CORONARY ARTERY DISEASE INVOLVING NATIVE CORONARY ARTERY OF NATIVE HEART WITH ANGINA PECTORIS (HCC): ICD-10-CM

## 2017-09-13 DIAGNOSIS — I48.0 PAF (PAROXYSMAL ATRIAL FIBRILLATION) (HCC): ICD-10-CM

## 2017-09-13 LAB
25(OH)D3 SERPL-MCNC: 32.5 NG/ML
ALBUMIN SERPL-MCNC: 4 G/DL (ref 3.5–5.2)
ALBUMIN/GLOB SERPL: 1.2 G/DL
ALP SERPL-CCNC: 103 U/L (ref 40–129)
ALT SERPL W P-5'-P-CCNC: 7 U/L (ref 5–33)
ANION GAP SERPL CALCULATED.3IONS-SCNC: 14.4 MMOL/L
AST SERPL-CCNC: 15 U/L (ref 5–32)
BASOPHILS # BLD AUTO: 0.06 10*3/MM3 (ref 0–0.2)
BASOPHILS NFR BLD AUTO: 1 % (ref 0–2)
BILIRUB SERPL-MCNC: 0.4 MG/DL (ref 0.2–1.2)
BUN BLD-MCNC: 12 MG/DL (ref 8–23)
BUN/CREAT SERPL: 12.8 (ref 7–25)
CALCIUM SPEC-SCNC: 8.9 MG/DL (ref 8.8–10.5)
CHLORIDE SERPL-SCNC: 98 MMOL/L (ref 98–107)
CO2 SERPL-SCNC: 25.6 MMOL/L (ref 22–29)
CREAT BLD-MCNC: 0.94 MG/DL (ref 0.57–1)
DEPRECATED RDW RBC AUTO: 44.3 FL (ref 37–54)
EOSINOPHIL # BLD AUTO: 0.09 10*3/MM3 (ref 0.1–0.3)
EOSINOPHIL NFR BLD AUTO: 1.5 % (ref 0–4)
ERYTHROCYTE [DISTWIDTH] IN BLOOD BY AUTOMATED COUNT: 12.6 % (ref 11.5–14.5)
GFR SERPL CREATININE-BSD FRML MDRD: 58 ML/MIN/1.73
GLOBULIN UR ELPH-MCNC: 3.4 GM/DL
GLUCOSE BLD-MCNC: 113 MG/DL (ref 65–99)
HBA1C MFR BLD: 5.8 % (ref 4.8–5.6)
HCT VFR BLD AUTO: 45.2 % (ref 37–47)
HGB BLD-MCNC: 14.8 G/DL (ref 12–16)
IMM GRANULOCYTES # BLD: 0.02 10*3/MM3 (ref 0–0.03)
IMM GRANULOCYTES NFR BLD: 0.3 % (ref 0–0.5)
IRON 24H UR-MRATE: 88 MCG/DL (ref 37–145)
IRON SATN MFR SERPL: 31 %
LYMPHOCYTES # BLD AUTO: 2.31 10*3/MM3 (ref 0.6–4.8)
LYMPHOCYTES NFR BLD AUTO: 38.1 % (ref 20–45)
MCH RBC QN AUTO: 31.2 PG (ref 27–31)
MCHC RBC AUTO-ENTMCNC: 32.7 G/DL (ref 31–37)
MCV RBC AUTO: 95.2 FL (ref 81–99)
MONOCYTES # BLD AUTO: 0.28 10*3/MM3 (ref 0–1)
MONOCYTES NFR BLD AUTO: 4.6 % (ref 3–8)
NEUTROPHILS # BLD AUTO: 3.3 10*3/MM3 (ref 1.5–8.3)
NEUTROPHILS NFR BLD AUTO: 54.5 % (ref 45–70)
NRBC BLD MANUAL-RTO: 0 /100 WBC (ref 0–0)
PLATELET # BLD AUTO: 227 10*3/MM3 (ref 140–500)
PMV BLD AUTO: 10.7 FL (ref 7.4–10.4)
POTASSIUM BLD-SCNC: 4.6 MMOL/L (ref 3.5–5.2)
PROT SERPL-MCNC: 7.4 G/DL (ref 6–8.5)
RBC # BLD AUTO: 4.75 10*6/MM3 (ref 4.2–5.4)
SODIUM BLD-SCNC: 138 MMOL/L (ref 136–145)
T4 SERPL-MCNC: 7.54 MCG/DL (ref 4.5–11.7)
TIBC SERPL-MCNC: 282 MCG/DL (ref 261–478)
TSH SERPL DL<=0.05 MIU/L-ACNC: 3.21 MIU/ML (ref 0.27–4.2)
UIBC SERPL-MCNC: 194 MCG/DL (ref 112–346)
VIT B12 BLD-MCNC: 930 PG/ML (ref 211–946)
WBC NRBC COR # BLD: 6.06 10*3/MM3 (ref 4.8–10.8)

## 2017-09-13 PROCEDURE — 83036 HEMOGLOBIN GLYCOSYLATED A1C: CPT | Performed by: INTERNAL MEDICINE

## 2017-09-13 PROCEDURE — 83540 ASSAY OF IRON: CPT | Performed by: INTERNAL MEDICINE

## 2017-09-13 PROCEDURE — 85025 COMPLETE CBC W/AUTO DIFF WBC: CPT | Performed by: INTERNAL MEDICINE

## 2017-09-13 PROCEDURE — 82607 VITAMIN B-12: CPT | Performed by: INTERNAL MEDICINE

## 2017-09-13 PROCEDURE — 82306 VITAMIN D 25 HYDROXY: CPT | Performed by: INTERNAL MEDICINE

## 2017-09-13 PROCEDURE — 84443 ASSAY THYROID STIM HORMONE: CPT | Performed by: INTERNAL MEDICINE

## 2017-09-13 PROCEDURE — 83550 IRON BINDING TEST: CPT | Performed by: INTERNAL MEDICINE

## 2017-09-13 PROCEDURE — 80053 COMPREHEN METABOLIC PANEL: CPT | Performed by: INTERNAL MEDICINE

## 2017-09-13 PROCEDURE — 84436 ASSAY OF TOTAL THYROXINE: CPT | Performed by: INTERNAL MEDICINE

## 2017-09-14 ENCOUNTER — TELEPHONE (OUTPATIENT)
Dept: INTERNAL MEDICINE | Facility: CLINIC | Age: 77
End: 2017-09-14

## 2017-09-14 NOTE — TELEPHONE ENCOUNTER
Patient notified.   ----- Message from Donna Forte MD sent at 9/13/2017  4:41 PM EDT -----  Call pt about labs.  a1c high at 5.8 but otherwise labs ok.  Cont healthy diet.  Recheck 3 mo

## 2017-09-18 ENCOUNTER — TELEPHONE (OUTPATIENT)
Dept: INTERNAL MEDICINE | Facility: CLINIC | Age: 77
End: 2017-09-18

## 2017-09-18 NOTE — TELEPHONE ENCOUNTER
----- Message from Donna Forte MD sent at 9/15/2017  4:40 PM EDT -----  Call pt about labs.labs ok

## 2017-09-21 ENCOUNTER — TELEPHONE (OUTPATIENT)
Dept: INTERNAL MEDICINE | Facility: CLINIC | Age: 77
End: 2017-09-21

## 2017-09-21 NOTE — TELEPHONE ENCOUNTER
Patient called, both her and her  have been scheduled at same time. Patient understands that openings for both of them could not be scheduled until 10/30/17.   ----- Message from Shelby Mon sent at 2017 12:32 PM EDT -----  Regarding: appt set  jennifer    Pt came today several hrs early for her appt and stated she wasn't feeling well and wanted to reschedule.    After re-scheduling she said Dr. Forte mentioned wanting pt and her , Willie REY [id number] to be set up for an appointment where she could see them together.    Any suggestion as to when or how long a time she wanted and I'll make arrangements with patient.  She is currently scheduled by herself in October.

## 2017-10-18 DIAGNOSIS — F41.9 ANXIETY: ICD-10-CM

## 2017-10-18 RX ORDER — QUETIAPINE FUMARATE 25 MG/1
TABLET, FILM COATED ORAL
Qty: 60 TABLET | Refills: 0 | OUTPATIENT
Start: 2017-10-18

## 2017-10-24 ENCOUNTER — APPOINTMENT (OUTPATIENT)
Dept: ONCOLOGY | Facility: CLINIC | Age: 77
End: 2017-10-24

## 2017-10-24 ENCOUNTER — APPOINTMENT (OUTPATIENT)
Dept: ONCOLOGY | Facility: HOSPITAL | Age: 77
End: 2017-10-24

## 2017-10-24 ENCOUNTER — APPOINTMENT (OUTPATIENT)
Dept: LAB | Facility: HOSPITAL | Age: 77
End: 2017-10-24

## 2017-10-30 ENCOUNTER — OFFICE VISIT (OUTPATIENT)
Dept: INTERNAL MEDICINE | Facility: CLINIC | Age: 77
End: 2017-10-30

## 2017-10-30 VITALS
HEIGHT: 61 IN | DIASTOLIC BLOOD PRESSURE: 60 MMHG | OXYGEN SATURATION: 97 % | BODY MASS INDEX: 20.37 KG/M2 | WEIGHT: 107.9 LBS | HEART RATE: 75 BPM | SYSTOLIC BLOOD PRESSURE: 100 MMHG

## 2017-10-30 DIAGNOSIS — I48.0 PAF (PAROXYSMAL ATRIAL FIBRILLATION) (HCC): ICD-10-CM

## 2017-10-30 DIAGNOSIS — I25.10 CORONARY ARTERY DISEASE INVOLVING NATIVE CORONARY ARTERY OF NATIVE HEART WITHOUT ANGINA PECTORIS: ICD-10-CM

## 2017-10-30 DIAGNOSIS — E78.00 HYPERCHOLESTEROLEMIA: ICD-10-CM

## 2017-10-30 DIAGNOSIS — Z23 NEEDS FLU SHOT: Primary | ICD-10-CM

## 2017-10-30 DIAGNOSIS — F41.8 MIXED ANXIETY DEPRESSIVE DISORDER: ICD-10-CM

## 2017-10-30 DIAGNOSIS — I10 ESSENTIAL HYPERTENSION: ICD-10-CM

## 2017-10-30 DIAGNOSIS — D51.9 ANEMIA DUE TO VITAMIN B12 DEFICIENCY, UNSPECIFIED B12 DEFICIENCY TYPE: ICD-10-CM

## 2017-10-30 DIAGNOSIS — J42 CHRONIC BRONCHITIS, UNSPECIFIED CHRONIC BRONCHITIS TYPE (HCC): ICD-10-CM

## 2017-10-30 DIAGNOSIS — I77.9 PERIPHERAL ARTERIAL OCCLUSIVE DISEASE (HCC): ICD-10-CM

## 2017-10-30 PROCEDURE — 99214 OFFICE O/P EST MOD 30 MIN: CPT | Performed by: INTERNAL MEDICINE

## 2017-10-30 PROCEDURE — 90662 IIV NO PRSV INCREASED AG IM: CPT | Performed by: INTERNAL MEDICINE

## 2017-10-30 PROCEDURE — 90471 IMMUNIZATION ADMIN: CPT | Performed by: INTERNAL MEDICINE

## 2017-10-30 RX ORDER — AMLODIPINE BESYLATE 5 MG/1
5 TABLET ORAL DAILY
COMMUNITY
Start: 2017-09-15 | End: 2018-07-03

## 2017-11-14 ENCOUNTER — APPOINTMENT (OUTPATIENT)
Dept: LAB | Facility: HOSPITAL | Age: 77
End: 2017-11-14

## 2017-11-14 ENCOUNTER — APPOINTMENT (OUTPATIENT)
Dept: ONCOLOGY | Facility: HOSPITAL | Age: 77
End: 2017-11-14

## 2017-11-14 ENCOUNTER — APPOINTMENT (OUTPATIENT)
Dept: ONCOLOGY | Facility: CLINIC | Age: 77
End: 2017-11-14

## 2017-11-27 ENCOUNTER — OFFICE VISIT (OUTPATIENT)
Dept: INTERNAL MEDICINE | Facility: CLINIC | Age: 77
End: 2017-11-27

## 2017-11-27 VITALS
SYSTOLIC BLOOD PRESSURE: 114 MMHG | WEIGHT: 104.6 LBS | DIASTOLIC BLOOD PRESSURE: 70 MMHG | HEIGHT: 61 IN | BODY MASS INDEX: 19.75 KG/M2 | OXYGEN SATURATION: 98 % | HEART RATE: 65 BPM

## 2017-11-27 DIAGNOSIS — N39.0 URINARY TRACT INFECTION WITH HEMATURIA, SITE UNSPECIFIED: ICD-10-CM

## 2017-11-27 DIAGNOSIS — R31.9 URINARY TRACT INFECTION WITH HEMATURIA, SITE UNSPECIFIED: ICD-10-CM

## 2017-11-27 DIAGNOSIS — F43.21 GRIEF REACTION: ICD-10-CM

## 2017-11-27 DIAGNOSIS — R31.9 HEMATURIA, UNSPECIFIED TYPE: Primary | ICD-10-CM

## 2017-11-27 LAB
BILIRUB BLD-MCNC: ABNORMAL MG/DL
CLARITY, POC: CLEAR
COLOR UR: ABNORMAL
GLUCOSE UR STRIP-MCNC: NEGATIVE MG/DL
KETONES UR QL: ABNORMAL
LEUKOCYTE EST, POC: ABNORMAL
NITRITE UR-MCNC: POSITIVE MG/ML
PH UR: 5.5 [PH] (ref 5–8)
PROT UR STRIP-MCNC: ABNORMAL MG/DL
RBC # UR STRIP: ABNORMAL /UL
SP GR UR: 1.02 (ref 1–1.03)
UROBILINOGEN UR QL: ABNORMAL

## 2017-11-27 PROCEDURE — 99214 OFFICE O/P EST MOD 30 MIN: CPT | Performed by: INTERNAL MEDICINE

## 2017-11-27 PROCEDURE — 81003 URINALYSIS AUTO W/O SCOPE: CPT | Performed by: INTERNAL MEDICINE

## 2017-11-27 RX ORDER — CEPHALEXIN 500 MG/1
500 CAPSULE ORAL 2 TIMES DAILY
Qty: 14 CAPSULE | Refills: 0 | Status: SHIPPED | OUTPATIENT
Start: 2017-11-27 | End: 2018-01-03
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
## 2017-11-29 DIAGNOSIS — I10 ESSENTIAL HYPERTENSION: ICD-10-CM

## 2017-11-29 RX ORDER — METOPROLOL TARTRATE 100 MG/1
TABLET ORAL
Qty: 180 TABLET | Refills: 0 | Status: SHIPPED | OUTPATIENT
Start: 2017-11-29 | End: 2018-02-26 | Stop reason: SDUPTHER

## 2017-11-30 LAB
BACTERIA UR CULT: ABNORMAL
BACTERIA UR CULT: ABNORMAL
OTHER ANTIBIOTIC SUSC ISLT: ABNORMAL

## 2017-12-04 DIAGNOSIS — F41.8 MIXED ANXIETY DEPRESSIVE DISORDER: ICD-10-CM

## 2017-12-05 DIAGNOSIS — F41.8 MIXED ANXIETY DEPRESSIVE DISORDER: ICD-10-CM

## 2017-12-05 RX ORDER — DIAZEPAM 5 MG/1
TABLET ORAL
Qty: 60 TABLET | Refills: 2 | OUTPATIENT
Start: 2017-12-05 | End: 2018-04-16 | Stop reason: SDUPTHER

## 2017-12-05 NOTE — TELEPHONE ENCOUNTER
----- Message from Donna Forte MD sent at 12/4/2017  5:06 PM EST -----  Call pt about labs. Check on pt to see if she is improved from UTI    Pt given results.dg

## 2018-01-03 ENCOUNTER — OFFICE VISIT (OUTPATIENT)
Dept: INTERNAL MEDICINE | Facility: CLINIC | Age: 78
End: 2018-01-03

## 2018-01-03 VITALS
BODY MASS INDEX: 19.56 KG/M2 | HEIGHT: 61 IN | HEART RATE: 66 BPM | OXYGEN SATURATION: 94 % | DIASTOLIC BLOOD PRESSURE: 68 MMHG | SYSTOLIC BLOOD PRESSURE: 112 MMHG | WEIGHT: 103.6 LBS

## 2018-01-03 DIAGNOSIS — I38 VALVULAR HEART DISEASE: ICD-10-CM

## 2018-01-03 DIAGNOSIS — I77.9 PERIPHERAL ARTERIAL OCCLUSIVE DISEASE (HCC): ICD-10-CM

## 2018-01-03 DIAGNOSIS — D51.0 PERNICIOUS ANEMIA: ICD-10-CM

## 2018-01-03 DIAGNOSIS — D51.9 ANEMIA DUE TO VITAMIN B12 DEFICIENCY, UNSPECIFIED B12 DEFICIENCY TYPE: ICD-10-CM

## 2018-01-03 DIAGNOSIS — I25.10 CORONARY ARTERY DISEASE INVOLVING NATIVE CORONARY ARTERY OF NATIVE HEART WITHOUT ANGINA PECTORIS: Primary | ICD-10-CM

## 2018-01-03 DIAGNOSIS — F41.8 MIXED ANXIETY DEPRESSIVE DISORDER: ICD-10-CM

## 2018-01-03 DIAGNOSIS — I10 ESSENTIAL HYPERTENSION: ICD-10-CM

## 2018-01-03 DIAGNOSIS — D50.8 OTHER IRON DEFICIENCY ANEMIA: ICD-10-CM

## 2018-01-03 DIAGNOSIS — R73.02 IMPAIRED GLUCOSE TOLERANCE: ICD-10-CM

## 2018-01-03 PROCEDURE — 99214 OFFICE O/P EST MOD 30 MIN: CPT | Performed by: INTERNAL MEDICINE

## 2018-01-03 RX ORDER — CYANOCOBALAMIN 1000 UG/ML
1000 INJECTION, SOLUTION INTRAMUSCULAR; SUBCUTANEOUS
Status: DISCONTINUED | OUTPATIENT
Start: 2018-01-03 | End: 2019-08-13

## 2018-01-03 RX ORDER — SERTRALINE HYDROCHLORIDE 100 MG/1
100 TABLET, FILM COATED ORAL DAILY
Qty: 90 TABLET | Refills: 1 | Status: SHIPPED | OUTPATIENT
Start: 2018-01-03 | End: 2018-03-13 | Stop reason: ALTCHOICE

## 2018-01-03 NOTE — PROGRESS NOTES
Subjective     Kaylin Ojeda is a 77 y.o. female, who presents with a chief complaint of   Chief Complaint   Patient presents with   • Follow-up   • Hypertension       HPI   The pt is here for follow up.     Depression/grief - pt is still working to deal with the death of her .  She is tired all the time.  She doesn't sleep well.  She is on trazodone and takes melatonin.     She has pernicious anemia and is due for a b12 shot.  She required an iron infusion last year but hgb remained high after that.    HTN - well controlled.  No ha/dizziness    A-fib - on elequis and metoprolol.      The following portions of the patient's history were reviewed and updated as appropriate: allergies, current medications, past family history, past medical history, past social history, past surgical history and problem list.    Allergies: Aleve  [naproxen sodium]; Codeine; Ibuprofen; and Sulfa antibiotics    Review of Systems   Constitutional: Positive for fever.   HENT: Negative.    Eyes: Negative.    Respiratory: Negative.    Cardiovascular: Negative.    Gastrointestinal: Negative.    Endocrine: Negative.    Genitourinary: Negative.    Musculoskeletal: Negative.    Skin: Negative.    Allergic/Immunologic: Negative.    Neurological: Negative.    Hematological: Negative.    Psychiatric/Behavioral: Positive for dysphoric mood and sleep disturbance.   All other systems reviewed and are negative.      Objective     Wt Readings from Last 3 Encounters:   01/03/18 47 kg (103 lb 9.6 oz)   11/27/17 47.4 kg (104 lb 9.6 oz)   10/30/17 48.9 kg (107 lb 14.4 oz)     Temp Readings from Last 3 Encounters:   08/22/17 98.3 °F (36.8 °C)   08/10/17 97.8 °F (36.6 °C) (Oral)   06/20/17 98.1 °F (36.7 °C)     BP Readings from Last 3 Encounters:   01/03/18 112/68   11/27/17 114/70   10/30/17 100/60     Pulse Readings from Last 3 Encounters:   01/03/18 66   11/27/17 65   10/30/17 75     Body mass index is 19.59 kg/(m^2).  SpO2 Readings from Last 3  Encounters:   01/03/18 94%   11/27/17 98%   10/30/17 97%       Physical Exam   Constitutional: She is oriented to person, place, and time. She appears well-developed and well-nourished. No distress.   HENT:   Head: Normocephalic and atraumatic.   Right Ear: External ear normal.   Left Ear: External ear normal.   Nose: Nose normal.   Mouth/Throat: Oropharynx is clear and moist.   Eyes: Conjunctivae and EOM are normal. Pupils are equal, round, and reactive to light.   Neck: Normal range of motion. Neck supple.   Cardiovascular: Normal rate, regular rhythm and normal heart sounds.    Pulmonary/Chest: Effort normal and breath sounds normal. No respiratory distress. She has no wheezes.   Musculoskeletal: Normal range of motion.   Normal gait   Neurological: She is alert and oriented to person, place, and time.   Skin: Skin is warm and dry.   Psychiatric: She has a normal mood and affect. Her behavior is normal. Judgment and thought content normal.   Nursing note and vitals reviewed.      Results for orders placed or performed in visit on 11/27/17   Urine Culture - Urine, Urine, Clean Catch   Result Value Ref Range    Urine Culture Final report (A)     Result 1 Escherichia coli (A)     Susceptibility Testing Comment    POCT urinalysis dipstick, automated   Result Value Ref Range    Color Dark Yellow Yellow, Straw, Dark Yellow, Betty    Clarity, UA Clear Clear    Glucose, UA Negative Negative, 1000 mg/dL (3+) mg/dL    Bilirubin Moderate (2+) (A) Negative    Ketones, UA 15 mg/dL (A) Negative    Specific Gravity  1.025 1.005 - 1.030    Blood, UA Large (A) Negative    pH, Urine 5.5 5.0 - 8.0    Protein,  mg/dL (A) Negative mg/dL    Urobilinogen, UA 4 E.U./dL  (A) Normal    Leukocytes Small (1+) (A) Negative    Nitrite, UA Positive (A) Negative       Assessment/Plan   Kaylin was seen today for follow-up and hypertension.    Diagnoses and all orders for this visit:    Coronary artery disease involving native coronary  artery of native heart without angina pectoris    Valvular heart disease    Essential hypertension  -     Comprehensive Metabolic Panel; Future    Peripheral arterial occlusive disease    Anemia due to vitamin B12 deficiency, unspecified B12 deficiency type  -     CBC & Differential; Future  -     Vitamin B12; Future  -     cyanocobalamin injection 1,000 mcg; Inject 1 mL into the shoulder, thigh, or buttocks Every 28 (Twenty-Eight) Days.    Impaired glucose tolerance  -     Comprehensive Metabolic Panel; Future  -     Hemoglobin A1c; Future    Other iron deficiency anemia  -     CBC & Differential; Future  -     Iron Profile; Future    Pernicious anemia    Mixed anxiety depressive disorder  -     sertraline (ZOLOFT) 100 MG tablet; Take 1 tablet by mouth Daily.          Outpatient Medications Prior to Visit   Medication Sig Dispense Refill   • albuterol (PROVENTIL HFA;VENTOLIN HFA) 108 (90 BASE) MCG/ACT inhaler Inhale 2 puffs every 4 (four) hours as needed for wheezing. 18 g 11   • amLODIPine (NORVASC) 5 MG tablet      • apixaban (ELIQUIS) 5 MG tablet tablet Take 1 tablet by mouth Every 12 (Twelve) Hours. 60 tablet 11   • atorvastatin (LIPITOR) 10 MG tablet Take 1 tablet by mouth Daily. 90 tablet 3   • budesonide-formoterol (SYMBICORT) 160-4.5 MCG/ACT inhaler Inhale 2 puffs 2 (two) times a day. 1 inhaler 12   • clotrimazole-betamethasone (LOTRISONE) 1-0.05 % cream Apply  topically 2 (Two) Times a Day. 45 g 0   • diazePAM (VALIUM) 5 MG tablet TAKE 1 TABLET BY MOUTH EVERY 12 HOURS AS NEEDED 60 tablet 2   • esomeprazole (nexIUM) 40 MG capsule Take 1 capsule by mouth Every Morning Before Breakfast. 90 capsule 2   • methocarbamol (ROBAXIN) 750 MG tablet As Needed.  3   • metoprolol tartrate (LOPRESSOR) 100 MG tablet TAKE 1 TABLET BY MOUTH TWICE DAILY 180 tablet 0   • ondansetron (ZOFRAN) 8 MG tablet Take 1 tablet by mouth Every 8 (Eight) Hours As Needed for Nausea or Vomiting. 20 tablet 2   • oxyCODONE-acetaminophen  (PERCOCET)  MG per tablet Take 1 tablet by mouth 4 (four) times a day.  0   • traZODone (DESYREL) 50 MG tablet TAKE   1/2 TO 1 TABLET PO Q HS 30 tablet 6   • cephalexin (KEFLEX) 500 MG capsule Take 1 capsule by mouth 2 (Two) Times a Day. 14 capsule 0   • sertraline (ZOLOFT) 50 MG tablet Take 1 tablet by mouth Daily. 30 tablet 5     No facility-administered medications prior to visit.      New Medications Ordered This Visit   Medications   • cyanocobalamin injection 1,000 mcg   • sertraline (ZOLOFT) 100 MG tablet     Sig: Take 1 tablet by mouth Daily.     Dispense:  90 tablet     Refill:  1     [unfilled]  Medications Discontinued During This Encounter   Medication Reason   • cephalexin (KEFLEX) 500 MG capsule Therapy completed   • sertraline (ZOLOFT) 50 MG tablet Reorder         Return in about 3 months (around 4/3/2018) for Recheck, labs.

## 2018-01-08 ENCOUNTER — RESULTS ENCOUNTER (OUTPATIENT)
Dept: INTERNAL MEDICINE | Facility: CLINIC | Age: 78
End: 2018-01-08

## 2018-01-08 DIAGNOSIS — D50.8 OTHER IRON DEFICIENCY ANEMIA: ICD-10-CM

## 2018-01-08 DIAGNOSIS — D51.9 ANEMIA DUE TO VITAMIN B12 DEFICIENCY, UNSPECIFIED B12 DEFICIENCY TYPE: ICD-10-CM

## 2018-01-08 DIAGNOSIS — I10 ESSENTIAL HYPERTENSION: ICD-10-CM

## 2018-01-08 DIAGNOSIS — R73.02 IMPAIRED GLUCOSE TOLERANCE: ICD-10-CM

## 2018-02-13 DIAGNOSIS — G47.9 DIFFICULTY SLEEPING: ICD-10-CM

## 2018-02-13 RX ORDER — TRAZODONE HYDROCHLORIDE 50 MG/1
TABLET ORAL
Qty: 90 TABLET | Refills: 0 | Status: SHIPPED | OUTPATIENT
Start: 2018-02-13 | End: 2018-03-12

## 2018-02-23 ENCOUNTER — TRANSCRIBE ORDERS (OUTPATIENT)
Dept: ADMINISTRATIVE | Facility: HOSPITAL | Age: 78
End: 2018-02-23

## 2018-02-23 DIAGNOSIS — M51.36 DEGENERATION OF LUMBAR INTERVERTEBRAL DISC: Primary | ICD-10-CM

## 2018-02-23 DIAGNOSIS — M50.30 DEGENERATION OF CERVICAL INTERVERTEBRAL DISC: ICD-10-CM

## 2018-02-26 DIAGNOSIS — I10 ESSENTIAL HYPERTENSION: ICD-10-CM

## 2018-02-26 RX ORDER — METOPROLOL TARTRATE 100 MG/1
TABLET ORAL
Qty: 180 TABLET | Refills: 0 | Status: SHIPPED | OUTPATIENT
Start: 2018-02-26 | End: 2018-03-13 | Stop reason: SDUPTHER

## 2018-03-05 ENCOUNTER — APPOINTMENT (OUTPATIENT)
Dept: MRI IMAGING | Facility: HOSPITAL | Age: 78
End: 2018-03-05

## 2018-03-05 ENCOUNTER — HOSPITAL ENCOUNTER (OUTPATIENT)
Dept: MRI IMAGING | Facility: HOSPITAL | Age: 78
Discharge: HOME | End: 2018-03-05

## 2018-03-12 ENCOUNTER — HOSPITAL ENCOUNTER (EMERGENCY)
Facility: HOSPITAL | Age: 78
Discharge: HOME | End: 2018-03-12
Attending: EMERGENCY MEDICINE | Admitting: EMERGENCY MEDICINE

## 2018-03-12 VITALS
BODY MASS INDEX: 20.01 KG/M2 | HEIGHT: 61 IN | SYSTOLIC BLOOD PRESSURE: 164 MMHG | WEIGHT: 106 LBS | RESPIRATION RATE: 16 BRPM | OXYGEN SATURATION: 96 % | HEART RATE: 53 BPM | DIASTOLIC BLOOD PRESSURE: 73 MMHG | TEMPERATURE: 97.6 F

## 2018-03-12 DIAGNOSIS — R04.0 EPISTAXIS: Primary | ICD-10-CM

## 2018-03-12 PROCEDURE — 99283 EMERGENCY DEPT VISIT LOW MDM: CPT

## 2018-03-12 PROCEDURE — 99282 EMERGENCY DEPT VISIT SF MDM: CPT | Performed by: PHYSICIAN ASSISTANT

## 2018-03-12 RX ORDER — BACITRACIN ZINC 500 [USP'U]/G
OINTMENT TOPICAL ONCE
Status: DISCONTINUED | OUTPATIENT
Start: 2018-03-12 | End: 2018-03-12 | Stop reason: HOSPADM

## 2018-03-12 RX ORDER — OXYMETAZOLINE HYDROCHLORIDE 0.05 G/100ML
1 SPRAY NASAL ONCE
Status: COMPLETED | OUTPATIENT
Start: 2018-03-12 | End: 2018-03-12

## 2018-03-12 RX ADMIN — OXYMETAZOLINE HYDROCHLORIDE 1 SPRAY: 5 SPRAY NASAL at 15:53

## 2018-03-12 NOTE — ED PROVIDER NOTES
Subjective   History of Present Illness  History of Present Illness    Chief complaint: nose bleed    Location: L nare    Quality/Severity:  Bright red blood, severe    Timing/Duration: just pta    Modifying Factors: nothing makes worse or better    Associated Symptoms: denies dizziness.  Denies shortness of breath.  Denies chest pain.  Denies nausea or vomiting.  Denies headache.    Narrative: 79 y/o  female with a history of severe peripheral vascular disease, anemia and afib, on Eliquis, presents with nosebleed that started earlier today.  She is asymptomatic otherwise.  She had been taking one tablet daily, but the dose had been increased to twice a day.    Review of Systems  General: Denies fevers or chills.  Denies any weakness or fatigue.  Denies any weight loss or weight gain.  SKIN: Denies any rashes lesions or ulcers.  Denies color change.  ENT: Denies sore throat or rhinorrhea.  Denies ear pain.    EYES: Denies any blurred vision.  Denies any change in vision.  Denies any photophobia.  Denies any vision loss.  LUNGS: Denies any shortness of breath or wheezing.  Denies any cough.  Denies any hemoptysis.  CARDIAC: Denies any chest pain.  Denies palpitations.  Denies syncope.  Denies any edema  ABD: Denies any abdominal pain.  Denies any nausea or vomiting or diarrhea.  Denies any rectal bleeding.  Denies constipation  : Denies any dysuria, urgency, frequency or hematuria.  Denies discharge.  Denies flank pain.  NEURO: Denies any focal weakness.  Denies headache.  Denies seizures.  Denies changes in speech or difficulty walking.  ENDOCRINE: Denies polydipsia and polyuria  M/S: Denies arthralgias, back pain, myalgias or neck pain  HEME/LYMPH: Negative for adenopathy. Does not bruise/bleed easily.   PSYCH: Negative for suicidal ideas. Denies anxiety or depression  review was performed in addition to those in the above all other reviews are negative.      Past Medical History:   Diagnosis Date   • Abdominal  "pain, epigastric     Chronic, unclear cause, improved   • Anorexia    • Anxiety    • Arthritis    • CAD (coronary artery disease)     Cath 5/2015: nonobstructive LAD/RCA disease, 90% mid LCx s/p [id number] Xience   • Cellulitis of leg    • Cervical disc disease    • Chronic fatigue    • Colitis    • COPD (chronic obstructive pulmonary disease)    • Depression    • Erosive gastritis    • Fibromyalgia    • Frequency of urination 6/9/2017   • Gastritis    • Hypercholesterolemia 2/2/2016   • Hyperglycemia    • Hypertension     History of refractory hypertension which improved significantly   • Insomnia    • Leukocytes in urine    • Lower back pain    • Mediastinal lymphadenopathy    • Mesenteric artery stenosis    • Mononucleosis    • Occlusion and stenosis of carotid artery    • Onychomycosis    • Orbit fracture    • PAF (paroxysmal atrial fibrillation)    • Peripheral arterial disease     Significant (carotid, subclavian, lower extremities), with claudication, medical therapy only   • Pernicious anemia    • Prediabetes    • Renal artery stenosis     unilateral, with renal atrophy (no intervention required)   • Renal calculi    • Sinus bradycardia     mild, asymptomatic   • TIA (transient ischemic attack)    • UTI (urinary tract infection)    • Valvular heart disease     5/2015: mild-mod AI, mod MR, mod-severe TR.  6/2017: mild AI, mild MR, mod TR   • Vision problem    • Vitamin B 12 deficiency        Allergies   Allergen Reactions   • Aleve [Naproxen Sodium] Shortness Of Breath   • Codeine Other (See Comments)     hyperactivity   • Ibuprofen Unknown (See Comments)     unk   • Sulfa Antibiotics Unknown (See Comments)     \"I can't remember\"       Past Surgical History:   Procedure Laterality Date   • APPENDECTOMY     • BREAST SURGERY     • CARDIAC CATHETERIZATION  05/2015    nonobs LAD/RCA disease, 90% mid LCx s/p [id number] Xience   • CERVICAL FUSION  1997   • CHOLECYSTECTOMY     • CORONARY STENT PLACEMENT     • " HYSTERECTOMY  1995   • KNEE SURGERY Right 2005   • KNEE SURGERY Left 1998       Family History   Problem Relation Age of Onset   • Asthma Mother    • Emphysema Mother    • Graves' disease Mother    • Heart disease Mother    • Hypertension Mother    • Emphysema Father    • Hypertension Father    • Heart disease Father    • Kidney disease Sister    • Lupus Daughter      Systemic erythematosus   • Arthritis Other    • Crohn's disease Other        Social History     Social History   • Marital status:      Spouse name: Willie   • Number of children: 3   • Years of education: Some College     Occupational History   •  Retired     Social History Main Topics   • Smoking status: Current Every Day Smoker     Packs/day: 0.50     Types: Cigarettes, Electronic Cigarette   • Smokeless tobacco: Never Used      Comment: Pt inquired about nicotine patches.     • Alcohol use No      Comment: OCCASIONAL/ TWICE A YEAR.    • Drug use: No   • Sexual activity: Defer     Other Topics Concern   • Not on file     Current Facility-Administered Medications:   •  cyanocobalamin injection 1,000 mcg, 1,000 mcg, Intramuscular, Q28 Days, Donna Forte MD    Current Outpatient Prescriptions:   •  albuterol (PROVENTIL HFA;VENTOLIN HFA) 108 (90 BASE) MCG/ACT inhaler, Inhale 2 puffs every 4 (four) hours as needed for wheezing., Disp: 18 g, Rfl: 11  •  amLODIPine (NORVASC) 5 MG tablet, Take 5 mg by mouth Daily., Disp: , Rfl:   •  apixaban (ELIQUIS) 5 MG tablet tablet, Take 1 tablet by mouth Every 12 (Twelve) Hours., Disp: 60 tablet, Rfl: 11  •  atorvastatin (LIPITOR) 10 MG tablet, Take 1 tablet by mouth Daily., Disp: 90 tablet, Rfl: 3  •  diazePAM (VALIUM) 5 MG tablet, TAKE 1 TABLET BY MOUTH EVERY 12 HOURS AS NEEDED, Disp: 60 tablet, Rfl: 2  •  esomeprazole (nexIUM) 40 MG capsule, Take 1 capsule by mouth Every Morning Before Breakfast., Disp: 90 capsule, Rfl: 2  •  metoprolol tartrate (LOPRESSOR) 100 MG tablet, TAKE 1 TABLET BY MOUTH  TWICE DAILY, Disp: 180 tablet, Rfl: 0  •  oxyCODONE-acetaminophen (PERCOCET)  MG per tablet, Take 1 tablet by mouth Every 6 (Six) Hours As Needed for Moderate Pain ., Disp: , Rfl: 0  •  sertraline (ZOLOFT) 100 MG tablet, Take 1 tablet by mouth Daily., Disp: 90 tablet, Rfl: 1  •  traZODone (DESYREL) 50 MG tablet, TAKE   1/2 TO 1 TABLET PO Q HS, Disp: 30 tablet, Rfl: 6  •  budesonide-formoterol (SYMBICORT) 160-4.5 MCG/ACT inhaler, Inhale 2 puffs 2 (two) times a day., Disp: 1 inhaler, Rfl: 12  •  clotrimazole-betamethasone (LOTRISONE) 1-0.05 % cream, Apply  topically 2 (Two) Times a Day., Disp: 45 g, Rfl: 0  •  ondansetron (ZOFRAN) 8 MG tablet, Take 1 tablet by mouth Every 8 (Eight) Hours As Needed for Nausea or Vomiting., Disp: 20 tablet, Rfl: 2          Objective   Physical Exam  Vitals:    03/12/18 1543   BP: (!) 195/98   Pulse: 59   Resp: 16   Temp: 97.6 °F (36.4 °C)   SpO2: 97%     GENERAL: a/o x 4, NAD  SKIN: Warm pink and dry   HEENT:  PERRLA, EOM intact, conjunctiva normal, sclera clear, L nare with active bleeding  NECK: supple, no JVD  LUNGS: Clear to auscultation bilaterally without wheezes, rales or rhonchi.  No accessory muscle use and no nasal flaring.  CARDIAC:  Regular rate and rhythm, S1-S2.  No murmurs, rubs or gallops.  No peripheral edema.  Equal pulses bilaterally.  ABDOMEN: Soft, nontender, nondistended.  No guarding or rebound tenderness.  Normal bowel sounds.  MUSCULOSKELETAL: Moves all extremities well.  No deformity.  NEURO: Cranial nerves II through XII grossly intact.  No gross focal deficits.  Alert.  Normal speech and motor.  PSYCH: Normal mood and affect      Procedures         ED Course  ED Course    Pt has been counseled on elevated BP and instructed to f/u with PMD for repeat BP check.    Afrin given, nose clamp in place.     1625- no active bleeding.    Pt will not take Eliquis tonight.  She will f/u with PMD. If occurs again, she may need decreased dose. No SOA, no dizziness.   Pt feels ready to go.    Discussed pertinent labs and imaging findings with the patient/family.  Patient/Family voiced understanding of need to follow-up for recheck, further testing as needed.  Return to the emergency Department warnings were given.                  MDM  Number of Diagnoses or Management Options  Epistaxis: new and does not require workup     Amount and/or Complexity of Data Reviewed  Tests in the medicine section of CPT®: reviewed and ordered    Risk of Complications, Morbidity, and/or Mortality  Presenting problems: low  Diagnostic procedures: low  Management options: low    Patient Progress  Patient progress: improved      Final diagnoses:   Epistaxis     Dictated utilizing Dragon dictation         Manju Ambrose PA-C  03/12/18 0651

## 2018-03-13 ENCOUNTER — OFFICE VISIT (OUTPATIENT)
Dept: CARDIOLOGY | Facility: CLINIC | Age: 78
End: 2018-03-13

## 2018-03-13 VITALS
HEART RATE: 53 BPM | BODY MASS INDEX: 20.2 KG/M2 | WEIGHT: 107 LBS | DIASTOLIC BLOOD PRESSURE: 72 MMHG | HEIGHT: 61 IN | SYSTOLIC BLOOD PRESSURE: 132 MMHG

## 2018-03-13 DIAGNOSIS — I10 ESSENTIAL HYPERTENSION: ICD-10-CM

## 2018-03-13 DIAGNOSIS — I77.9 PERIPHERAL ARTERIAL OCCLUSIVE DISEASE (HCC): ICD-10-CM

## 2018-03-13 DIAGNOSIS — I38 VALVULAR HEART DISEASE: ICD-10-CM

## 2018-03-13 DIAGNOSIS — I25.10 CORONARY ARTERY DISEASE INVOLVING NATIVE CORONARY ARTERY OF NATIVE HEART WITHOUT ANGINA PECTORIS: ICD-10-CM

## 2018-03-13 DIAGNOSIS — I71.60 THORACOABDOMINAL AORTIC ANEURYSM (TAAA) WITHOUT RUPTURE (HCC): ICD-10-CM

## 2018-03-13 DIAGNOSIS — I48.0 PAF (PAROXYSMAL ATRIAL FIBRILLATION) (HCC): Primary | ICD-10-CM

## 2018-03-13 PROCEDURE — 99213 OFFICE O/P EST LOW 20 MIN: CPT | Performed by: INTERNAL MEDICINE

## 2018-03-13 PROCEDURE — 93000 ELECTROCARDIOGRAM COMPLETE: CPT | Performed by: INTERNAL MEDICINE

## 2018-03-13 RX ORDER — METOPROLOL TARTRATE 100 MG/1
100 TABLET ORAL 2 TIMES DAILY
Qty: 180 TABLET | Refills: 2 | Status: SHIPPED | OUTPATIENT
Start: 2018-03-13 | End: 2019-02-14 | Stop reason: SDUPTHER
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
## 2018-03-14 ENCOUNTER — HOSPITAL ENCOUNTER (OUTPATIENT)
Dept: MRI IMAGING | Facility: HOSPITAL | Age: 78
End: 2018-03-14

## 2018-03-19 ENCOUNTER — HOSPITAL ENCOUNTER (OUTPATIENT)
Dept: MRI IMAGING | Facility: HOSPITAL | Age: 78
Discharge: HOME | End: 2018-03-19
Admitting: PHYSICIAN ASSISTANT

## 2018-03-19 ENCOUNTER — HOSPITAL ENCOUNTER (OUTPATIENT)
Dept: MRI IMAGING | Facility: HOSPITAL | Age: 78
Discharge: HOME | End: 2018-03-19

## 2018-03-19 DIAGNOSIS — M51.36 DEGENERATION OF LUMBAR INTERVERTEBRAL DISC: ICD-10-CM

## 2018-03-19 DIAGNOSIS — M50.30 DEGENERATION OF CERVICAL INTERVERTEBRAL DISC: ICD-10-CM

## 2018-03-19 PROCEDURE — 72148 MRI LUMBAR SPINE W/O DYE: CPT

## 2018-03-19 PROCEDURE — 72141 MRI NECK SPINE W/O DYE: CPT

## 2018-04-16 DIAGNOSIS — F41.8 MIXED ANXIETY DEPRESSIVE DISORDER: ICD-10-CM

## 2018-04-17 DIAGNOSIS — F41.8 MIXED ANXIETY DEPRESSIVE DISORDER: ICD-10-CM

## 2018-04-17 RX ORDER — DIAZEPAM 5 MG/1
TABLET ORAL
Qty: 60 TABLET | Refills: 0 | OUTPATIENT
Start: 2018-04-17

## 2018-04-17 RX ORDER — DIAZEPAM 5 MG/1
TABLET ORAL
Qty: 60 TABLET | Refills: 2 | OUTPATIENT
Start: 2018-04-17 | End: 2018-07-03 | Stop reason: SDUPTHER

## 2018-05-01 ENCOUNTER — OFFICE VISIT (OUTPATIENT)
Dept: INTERNAL MEDICINE | Facility: CLINIC | Age: 78
End: 2018-05-01

## 2018-05-01 VITALS
DIASTOLIC BLOOD PRESSURE: 90 MMHG | HEIGHT: 61 IN | WEIGHT: 100.2 LBS | BODY MASS INDEX: 18.92 KG/M2 | OXYGEN SATURATION: 98 % | HEART RATE: 61 BPM | SYSTOLIC BLOOD PRESSURE: 144 MMHG

## 2018-05-01 DIAGNOSIS — I48.0 PAF (PAROXYSMAL ATRIAL FIBRILLATION) (HCC): ICD-10-CM

## 2018-05-01 DIAGNOSIS — F32.1 MODERATE SINGLE CURRENT EPISODE OF MAJOR DEPRESSIVE DISORDER (HCC): ICD-10-CM

## 2018-05-01 DIAGNOSIS — R79.89 LOW VITAMIN D LEVEL: ICD-10-CM

## 2018-05-01 DIAGNOSIS — R79.89 ABNORMAL THYROID BLOOD TEST: Primary | ICD-10-CM

## 2018-05-01 DIAGNOSIS — E78.00 HYPERCHOLESTEROLEMIA: ICD-10-CM

## 2018-05-01 DIAGNOSIS — I71.60 THORACOABDOMINAL AORTIC ANEURYSM (TAAA) WITHOUT RUPTURE (HCC): ICD-10-CM

## 2018-05-01 DIAGNOSIS — I77.9 PERIPHERAL ARTERIAL OCCLUSIVE DISEASE (HCC): ICD-10-CM

## 2018-05-01 DIAGNOSIS — D51.0 VITAMIN B12 DEFICIENCY ANEMIA DUE TO INTRINSIC FACTOR DEFICIENCY: ICD-10-CM

## 2018-05-01 DIAGNOSIS — I10 ESSENTIAL HYPERTENSION: ICD-10-CM

## 2018-05-01 DIAGNOSIS — D50.8 OTHER IRON DEFICIENCY ANEMIA: ICD-10-CM

## 2018-05-01 DIAGNOSIS — R73.03 PREDIABETES: ICD-10-CM

## 2018-05-01 PROCEDURE — 99215 OFFICE O/P EST HI 40 MIN: CPT | Performed by: INTERNAL MEDICINE

## 2018-05-01 RX ORDER — CYANOCOBALAMIN 1000 UG/ML
1000 INJECTION, SOLUTION INTRAMUSCULAR; SUBCUTANEOUS
Status: DISCONTINUED | OUTPATIENT
Start: 2018-05-01 | End: 2019-08-13

## 2018-05-01 RX ORDER — SERTRALINE HYDROCHLORIDE 100 MG/1
100 TABLET, FILM COATED ORAL DAILY
Refills: 1 | COMMUNITY
Start: 2018-04-11 | End: 2018-08-01 | Stop reason: SDUPTHER

## 2018-05-01 NOTE — PROGRESS NOTES
Kaylin Ojeda is a 78 y.o. female, who presents with a chief complaint of   Chief Complaint   Patient presents with   • Follow-up     Patient has not had labs recently.   • Hypertension       HPI   The pt is here for follow up.  She is worn out.  She has sold her house and it working on packing.  She has moved in with her son and daughter-in-law who live off [address].      She has lots of chronic pain and is on percocet per pain mgt.  She wants to know if cbd oil would help.  I have advised her to talk to her pain management dr before taking the med.     Depression - pt still struggling after the death of her .  He didn't leave a will so she is trying to work through all of the paperwork.  Her daughter lives in [place] and right after the  her granddaughter left [place] and took her great granddaughter October down there with her.  The pt misses October greatly.  She knows that she can't solve everyone's problems but struggles with this as well.  She is on zoloft.      she doesn't have much of an appetite.  she has lost 7 pounds.     HLD - on statin. No cramps or myalgias.     PAF - on eliquis, statin, and bb.  Rate controlled.    PAD - chronic vascular issues at baseline.    Pre-diabetes - last a1c 5.8.      AAA - last ct 1 year ago.  Due for repeat.    She has thick long toe nails that hurt her    Her vision has been worse.  She had glasses years ago. No recent exam.     Still smoking. Not ready to quit     The following portions of the patient's history were reviewed and updated as appropriate: allergies, current medications, past family history, past medical history, past social history, past surgical history and problem list.    Allergies: Aleve [naproxen sodium]; Codeine; Ibuprofen; and Sulfa antibiotics    Review of Systems   Constitutional: Negative.    HENT: Negative.    Eyes: Negative.    Respiratory: Negative.    Cardiovascular: Negative.    Gastrointestinal: Negative.     Endocrine: Negative.    Genitourinary: Negative.    Musculoskeletal: Negative.    Skin: Negative.    Allergic/Immunologic: Negative.    Neurological: Negative.    Hematological: Negative.    Psychiatric/Behavioral: Negative.    All other systems reviewed and are negative.            Wt Readings from Last 3 Encounters:   05/01/18 45.5 kg (100 lb 3.2 oz)   03/13/18 48.5 kg (107 lb)   03/12/18 48.1 kg (106 lb)     Temp Readings from Last 3 Encounters:   03/12/18 97.6 °F (36.4 °C) (Oral)   08/22/17 98.3 °F (36.8 °C)   08/10/17 97.8 °F (36.6 °C) (Oral)     BP Readings from Last 3 Encounters:   05/01/18 144/90   03/13/18 132/72   03/12/18 164/73     Pulse Readings from Last 3 Encounters:   05/01/18 61   03/13/18 53   03/12/18 53     Body mass index is 18.94 kg/m².      Physical Exam   Constitutional: She is oriented to person, place, and time. No distress.   Thin, frail, elderly female.   HENT:   Head: Normocephalic and atraumatic.   Right Ear: External ear normal.   Left Ear: External ear normal.   Nose: Nose normal.   Mouth/Throat: Oropharynx is clear and moist.   Eyes: Conjunctivae and EOM are normal. Pupils are equal, round, and reactive to light.   Neck: Normal range of motion. Neck supple.   Cardiovascular: Normal rate, regular rhythm and normal heart sounds.    Pulmonary/Chest: Effort normal and breath sounds normal. No respiratory distress. She has no wheezes.   Musculoskeletal: Normal range of motion.   Normal gait   Neurological: She is alert and oriented to person, place, and time.   Skin: Skin is warm and dry.   Psychiatric: She has a normal mood and affect. Her behavior is normal. Judgment and thought content normal.   Nursing note and vitals reviewed.      Results for orders placed or performed in visit on 11/27/17   Urine Culture - Urine, Urine, Clean Catch   Result Value Ref Range    Urine Culture Final report (A)     Result 1 Escherichia coli (A)     Susceptibility Testing Comment    POCT urinalysis  dipstick, automated   Result Value Ref Range    Color Dark Yellow Yellow, Straw, Dark Yellow, Betty    Clarity, UA Clear Clear    Glucose, UA Negative Negative, 1000 mg/dL (3+) mg/dL    Bilirubin Moderate (2+) (A) Negative    Ketones, UA 15 mg/dL (A) Negative    Specific Gravity  1.025 1.005 - 1.030    Blood, UA Large (A) Negative    pH, Urine 5.5 5.0 - 8.0    Protein,  mg/dL (A) Negative mg/dL    Urobilinogen, UA 4 E.U./dL  (A) Normal    Leukocytes Small (1+) (A) Negative    Nitrite, UA Positive (A) Negative           Kaylin was seen today for follow-up and hypertension.    Diagnoses and all orders for this visit:    Abnormal thyroid blood test  -     T4, Free  -     TSH    Prediabetes  -     Comprehensive Metabolic Panel  -     T4, Free  -     TSH  -     CBC & Differential  -     Hemoglobin A1c  -     NMR LipoProfile  -     Ambulatory Referral to Ophthalmology    Moderate single current episode of major depressive disorder    Essential hypertension  -     Comprehensive Metabolic Panel  -     T4, Free  -     TSH  -     CBC & Differential  -     Ambulatory Referral to Ophthalmology    Hypercholesterolemia  -     Comprehensive Metabolic Panel  -     NMR LipoProfile  -     Ambulatory Referral to Ophthalmology    Peripheral arterial occlusive disease  -     NMR LipoProfile  -     Ambulatory Referral to Ophthalmology    PAF (paroxysmal atrial fibrillation)  -     Comprehensive Metabolic Panel  -     T4, Free  -     TSH  -     CBC & Differential  -     Ambulatory Referral to Ophthalmology    Thoracoabdominal aortic aneurysm (TAAA) without rupture  -     CT angiogram chest w contrast; Future  -     CT angiogram abdomen w wo contrast; Future    Other iron deficiency anemia  -     CBC & Differential    Low vitamin D level  -     Vitamin D 25 Hydroxy    Vitamin B12 deficiency anemia due to intrinsic factor deficiency  -     Vitamin B12  -     cyanocobalamin injection 1,000 mcg; Inject 1 mL into the shoulder, thigh,  or buttocks Every 28 (Twenty-Eight) Days.      Encouraged tobacco cessation    42 min spent with patient with >50% spent in counseling about above issues.  Encouraged healthy diet and compliance with medical plan.  Encouraged counseling to help deal with loss of .     Outpatient Medications Prior to Visit   Medication Sig Dispense Refill   • albuterol (PROVENTIL HFA;VENTOLIN HFA) 108 (90 BASE) MCG/ACT inhaler Inhale 2 puffs every 4 (four) hours as needed for wheezing. 18 g 11   • amLODIPine (NORVASC) 5 MG tablet Take 5 mg by mouth Daily.     • apixaban (ELIQUIS) 5 MG tablet tablet Take 1 tablet by mouth Every 12 (Twelve) Hours. 60 tablet 11   • atorvastatin (LIPITOR) 10 MG tablet Take 1 tablet by mouth Daily. 90 tablet 3   • budesonide-formoterol (SYMBICORT) 160-4.5 MCG/ACT inhaler Inhale 2 puffs 2 (two) times a day. 1 inhaler 12   • clotrimazole-betamethasone (LOTRISONE) 1-0.05 % cream Apply  topically 2 (Two) Times a Day. 45 g 0   • diazePAM (VALIUM) 5 MG tablet TAKE 1 TABLET BY MOUTH TWICE DAILY AS NEEDED 60 tablet 2   • esomeprazole (nexIUM) 40 MG capsule Take 1 capsule by mouth Every Morning Before Breakfast. 90 capsule 2   • metoprolol tartrate (LOPRESSOR) 100 MG tablet Take 1 tablet by mouth 2 (Two) Times a Day. 180 tablet 2   • ondansetron (ZOFRAN) 8 MG tablet Take 1 tablet by mouth Every 8 (Eight) Hours As Needed for Nausea or Vomiting. 20 tablet 2   • oxyCODONE-acetaminophen (PERCOCET)  MG per tablet Take 1 tablet by mouth Every 6 (Six) Hours As Needed for Moderate Pain .  0   • traZODone (DESYREL) 50 MG tablet TAKE   1/2 TO 1 TABLET PO Q HS 30 tablet 6   • sertraline (ZOLOFT) 50 MG tablet Take 50 mg by mouth Daily.  5     Facility-Administered Medications Prior to Visit   Medication Dose Route Frequency Provider Last Rate Last Dose   • cyanocobalamin injection 1,000 mcg  1,000 mcg Intramuscular Q28 Days Donna Forte MD         New Medications Ordered This Visit   Medications   •  cyanocobalamin injection 1,000 mcg     [unfilled]  Medications Discontinued During This Encounter   Medication Reason   • sertraline (ZOLOFT) 50 MG tablet *Therapy completed         Return in about 3 months (around 8/1/2018) for Recheck, labs.

## 2018-05-03 LAB
25(OH)D3+25(OH)D2 SERPL-MCNC: 28 NG/ML
ALBUMIN SERPL-MCNC: 4.2 G/DL (ref 3.5–5.2)
ALBUMIN/GLOB SERPL: 1.5 G/DL
ALP SERPL-CCNC: 98 U/L (ref 40–129)
ALT SERPL-CCNC: 6 U/L (ref 5–33)
AST SERPL-CCNC: 13 U/L (ref 5–32)
BASOPHILS # BLD AUTO: 0.06 10*3/MM3 (ref 0–0.2)
BASOPHILS NFR BLD AUTO: 0.8 % (ref 0–2)
BILIRUB SERPL-MCNC: 0.3 MG/DL (ref 0.2–1.2)
BUN SERPL-MCNC: 14 MG/DL (ref 8–23)
BUN/CREAT SERPL: 16.1 (ref 7–25)
CALCIUM SERPL-MCNC: 9 MG/DL (ref 8.8–10.5)
CHLORIDE SERPL-SCNC: 101 MMOL/L (ref 98–107)
CHOLEST SERPL-MCNC: 122 MG/DL (ref 100–199)
CO2 SERPL-SCNC: 29.9 MMOL/L (ref 22–29)
CREAT SERPL-MCNC: 0.87 MG/DL (ref 0.57–1)
EOSINOPHIL # BLD AUTO: 0.08 10*3/MM3 (ref 0.1–0.3)
EOSINOPHIL NFR BLD AUTO: 1.1 % (ref 0–4)
ERYTHROCYTE [DISTWIDTH] IN BLOOD BY AUTOMATED COUNT: 13.2 % (ref 11.5–14.5)
GFR SERPLBLD CREATININE-BSD FMLA CKD-EPI: 63 ML/MIN/1.73
GFR SERPLBLD CREATININE-BSD FMLA CKD-EPI: 77 ML/MIN/1.73
GLOBULIN SER CALC-MCNC: 2.8 GM/DL
GLUCOSE SERPL-MCNC: 103 MG/DL (ref 65–99)
HBA1C MFR BLD: 5.3 % (ref 4.8–5.6)
HCT VFR BLD AUTO: 42.1 % (ref 37–47)
HDL SERPL-SCNC: 23.6 UMOL/L
HDLC SERPL-MCNC: 38 MG/DL
HGB BLD-MCNC: 13.8 G/DL (ref 12–16)
IMM GRANULOCYTES # BLD: 0.03 10*3/MM3 (ref 0–0.03)
IMM GRANULOCYTES NFR BLD: 0.4 % (ref 0–0.5)
LDL SERPL QN: 21 NM
LDL SERPL-SCNC: 894 NMOL/L
LDL SMALL SERPL-SCNC: 474 NMOL/L
LDLC SERPL CALC-MCNC: 54 MG/DL (ref 0–99)
LYMPHOCYTES # BLD AUTO: 2.3 10*3/MM3 (ref 0.6–4.8)
LYMPHOCYTES NFR BLD AUTO: 31.8 % (ref 20–45)
MCH RBC QN AUTO: 32.2 PG (ref 27–31)
MCHC RBC AUTO-ENTMCNC: 32.8 G/DL (ref 31–37)
MCV RBC AUTO: 98.1 FL (ref 81–99)
MONOCYTES # BLD AUTO: 0.51 10*3/MM3 (ref 0–1)
MONOCYTES NFR BLD AUTO: 7 % (ref 3–8)
NEUTROPHILS # BLD AUTO: 4.26 10*3/MM3 (ref 1.5–8.3)
NEUTROPHILS NFR BLD AUTO: 58.9 % (ref 45–70)
NRBC BLD AUTO-RTO: 0 /100 WBC (ref 0–0)
PLATELET # BLD AUTO: 232 10*3/MM3 (ref 140–500)
POTASSIUM SERPL-SCNC: 4.6 MMOL/L (ref 3.5–5.2)
PROT SERPL-MCNC: 7 G/DL (ref 6–8.5)
RBC # BLD AUTO: 4.29 10*6/MM3 (ref 4.2–5.4)
SODIUM SERPL-SCNC: 142 MMOL/L (ref 136–145)
T4 FREE SERPL-MCNC: 0.99 NG/DL (ref 0.93–1.7)
TRIGL SERPL-MCNC: 152 MG/DL (ref 0–149)
TSH SERPL DL<=0.005 MIU/L-ACNC: 5.49 MIU/ML (ref 0.27–4.2)
VIT B12 SERPL-MCNC: 406 PG/ML (ref 232–1245)
WBC # BLD AUTO: 7.24 10*3/MM3 (ref 4.8–10.8)

## 2018-05-07 ENCOUNTER — TELEPHONE (OUTPATIENT)
Dept: INTERNAL MEDICINE | Facility: CLINIC | Age: 78
End: 2018-05-07

## 2018-05-07 NOTE — TELEPHONE ENCOUNTER
NEITHER NUMBERS ARE VALID.    ----- Message from Donna Forte MD sent at 5/7/2018  1:25 PM EDT -----  Call pt about labs. Glucoses better.  Ov/labs in 6 mo

## 2018-05-14 DIAGNOSIS — G47.9 DIFFICULTY SLEEPING: ICD-10-CM

## 2018-05-14 RX ORDER — TRAZODONE HYDROCHLORIDE 50 MG/1
TABLET ORAL
Qty: 90 TABLET | Refills: 0 | Status: SHIPPED | OUTPATIENT
Start: 2018-05-14 | End: 2018-07-03 | Stop reason: SDUPTHER

## 2018-05-28 DIAGNOSIS — F41.8 MIXED ANXIETY DEPRESSIVE DISORDER: ICD-10-CM

## 2018-05-29 ENCOUNTER — HOSPITAL ENCOUNTER (OUTPATIENT)
Dept: CT IMAGING | Facility: HOSPITAL | Age: 78
Discharge: HOME | End: 2018-05-29
Attending: INTERNAL MEDICINE | Admitting: INTERNAL MEDICINE

## 2018-05-29 DIAGNOSIS — I71.60 THORACOABDOMINAL AORTIC ANEURYSM (TAAA) WITHOUT RUPTURE (HCC): ICD-10-CM

## 2018-05-29 PROCEDURE — 74175 CTA ABDOMEN W/CONTRAST: CPT

## 2018-05-29 PROCEDURE — 71275 CT ANGIOGRAPHY CHEST: CPT

## 2018-05-29 PROCEDURE — 0 IOPAMIDOL PER 1 ML: Performed by: INTERNAL MEDICINE

## 2018-05-29 RX ADMIN — IOPAMIDOL 100 ML: 755 INJECTION, SOLUTION INTRAVENOUS at 13:14

## 2018-05-30 ENCOUNTER — TELEPHONE (OUTPATIENT)
Dept: INTERNAL MEDICINE | Facility: CLINIC | Age: 78
End: 2018-05-30

## 2018-05-30 NOTE — TELEPHONE ENCOUNTER
Patient advised of results.   ----- Message from Donna Forte MD sent at 5/30/2018 11:20 AM EDT -----  Call pt about imaging.  Aneurysms and blockages all stable.

## 2018-06-13 RX ORDER — APIXABAN 5 MG/1
TABLET, FILM COATED ORAL
Qty: 60 TABLET | Refills: 0 | Status: SHIPPED | OUTPATIENT
Start: 2018-06-13 | End: 2018-07-03

## 2018-06-27 RX ORDER — APIXABAN 5 MG/1
TABLET, FILM COATED ORAL
Qty: 60 TABLET | Refills: 0 | Status: SHIPPED | OUTPATIENT
Start: 2018-06-27 | End: 2018-07-29 | Stop reason: SDUPTHER

## 2018-07-03 ENCOUNTER — OFFICE VISIT (OUTPATIENT)
Dept: INTERNAL MEDICINE | Facility: CLINIC | Age: 78
End: 2018-07-03

## 2018-07-03 VITALS
DIASTOLIC BLOOD PRESSURE: 60 MMHG | HEART RATE: 57 BPM | HEIGHT: 61 IN | BODY MASS INDEX: 18.92 KG/M2 | OXYGEN SATURATION: 98 % | SYSTOLIC BLOOD PRESSURE: 98 MMHG | WEIGHT: 100.2 LBS

## 2018-07-03 DIAGNOSIS — M54.50 CHRONIC LOW BACK PAIN WITHOUT SCIATICA, UNSPECIFIED BACK PAIN LATERALITY: Primary | ICD-10-CM

## 2018-07-03 DIAGNOSIS — G47.9 DIFFICULTY SLEEPING: ICD-10-CM

## 2018-07-03 DIAGNOSIS — D51.9 ANEMIA DUE TO VITAMIN B12 DEFICIENCY, UNSPECIFIED B12 DEFICIENCY TYPE: ICD-10-CM

## 2018-07-03 DIAGNOSIS — R63.0 LOSS OF APPETITE: ICD-10-CM

## 2018-07-03 DIAGNOSIS — G89.29 CHRONIC LOW BACK PAIN WITHOUT SCIATICA, UNSPECIFIED BACK PAIN LATERALITY: Primary | ICD-10-CM

## 2018-07-03 DIAGNOSIS — F41.8 MIXED ANXIETY DEPRESSIVE DISORDER: ICD-10-CM
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
## 2018-07-03 DIAGNOSIS — I10 ESSENTIAL HYPERTENSION: ICD-10-CM
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
## 2018-07-03 PROCEDURE — 99214 OFFICE O/P EST MOD 30 MIN: CPT | Performed by: INTERNAL MEDICINE

## 2018-07-03 PROCEDURE — 96372 THER/PROPH/DIAG INJ SC/IM: CPT | Performed by: INTERNAL MEDICINE

## 2018-07-03 RX ORDER — TRAZODONE HYDROCHLORIDE 50 MG/1
50 TABLET ORAL NIGHTLY
Qty: 90 TABLET | Refills: 0 | Status: SHIPPED | OUTPATIENT
Start: 2018-07-03 | End: 2018-09-19 | Stop reason: SDUPTHER

## 2018-07-03 RX ORDER — CYANOCOBALAMIN 1000 UG/ML
1000 INJECTION, SOLUTION INTRAMUSCULAR; SUBCUTANEOUS
Status: DISCONTINUED | OUTPATIENT
Start: 2018-07-03 | End: 2019-08-13

## 2018-07-03 RX ORDER — DIAZEPAM 5 MG/1
5 TABLET ORAL 2 TIMES DAILY PRN
Qty: 60 TABLET | Refills: 2 | Status: SHIPPED | OUTPATIENT
Start: 2018-07-03 | End: 2018-08-29 | Stop reason: SDUPTHER

## 2018-07-03 RX ADMIN — CYANOCOBALAMIN 1000 MCG: 1000 INJECTION, SOLUTION INTRAMUSCULAR; SUBCUTANEOUS at 12:52

## 2018-07-03 NOTE — PROGRESS NOTES
Kaylin Ojeda is a 78 y.o. female, who presents with a chief complaint of   Chief Complaint   Patient presents with   • Follow-up     Patient would like to discuss a medication.    • Med Refill       HPI   The pt is here for follow up..  The pt went to [place] to visit family and her pain management clinic was raided by the [facility] while she was gone.  She was going to [facility] pain management.  She ended up being able to get her prescription but was worried about what to do if the clinic closes for good.  She has multiple chronic medical issues and is not a good candidate for surgery.    Weight stable at 100lbs.  She is trying to keep her weight stable.      HTN - bp low today and pt says bp has been low at home too.  No ha/dizziness. She has fallen again at home and has a scrape on her right arm.    Stress/anxiety - she misses her  and granddaughter.  She has been trying to visit [place] monthly to see October.  Her zoloft is helping her anxiety.    The following portions of the patient's history were reviewed and updated as appropriate: allergies, current medications, past family history, past medical history, past social history, past surgical history and problem list.    Allergies: Aleve [naproxen sodium]; Codeine; Ibuprofen; and Sulfa antibiotics    Review of Systems   Constitutional: Negative.    HENT: Negative.    Eyes: Negative.    Respiratory: Negative.    Cardiovascular: Negative.    Gastrointestinal: Negative.    Endocrine: Negative.    Genitourinary: Negative.    Musculoskeletal: Positive for arthralgias and back pain.   Skin: Negative.    Allergic/Immunologic: Negative.    Neurological: Negative.    Hematological: Negative.    Psychiatric/Behavioral: The patient is nervous/anxious.    All other systems reviewed and are negative.            Wt Readings from Last 3 Encounters:   07/03/18 45.5 kg (100 lb 3.2 oz)   05/01/18 45.5 kg (100 lb 3.2 oz)   03/13/18 48.5 kg (107 lb)     Temp Readings  from Last 3 Encounters:   03/12/18 97.6 °F (36.4 °C) (Oral)   08/22/17 98.3 °F (36.8 °C)   08/10/17 97.8 °F (36.6 °C) (Oral)     BP Readings from Last 3 Encounters:   07/03/18 98/60   05/01/18 144/90   03/13/18 132/72     Pulse Readings from Last 3 Encounters:   07/03/18 57   05/01/18 61   03/13/18 53     Body mass index is 18.93 kg/m².  @LASTSAO2(3)@    Physical Exam   Constitutional: She is oriented to person, place, and time. No distress.   Frail, thin elderly female   HENT:   Head: Normocephalic and atraumatic.   Right Ear: External ear normal.   Left Ear: External ear normal.   Nose: Nose normal.   Mouth/Throat: Oropharynx is clear and moist.   Eyes: Conjunctivae and EOM are normal. Pupils are equal, round, and reactive to light.   Neck: Normal range of motion. Neck supple.   Cardiovascular: Normal rate, regular rhythm, normal heart sounds and intact distal pulses.    Pulmonary/Chest: Effort normal and breath sounds normal. No respiratory distress. She has no wheezes.   Musculoskeletal: Normal range of motion.   Normal gait   Neurological: She is alert and oriented to person, place, and time.   Skin: Skin is warm and dry.   Superficial scrapes/scabs on right arm   Psychiatric: She has a normal mood and affect. Her behavior is normal. Judgment and thought content normal.   Nursing note and vitals reviewed.      Results for orders placed or performed in visit on 05/01/18   Comprehensive Metabolic Panel   Result Value Ref Range    Glucose 103 (H) 65 - 99 mg/dL    BUN 14 8 - 23 mg/dL    Creatinine 0.87 0.57 - 1.00 mg/dL    eGFR Non African Am 63 >60 mL/min/1.73    eGFR African Am 77 >60 mL/min/1.73    BUN/Creatinine Ratio 16.1 7.0 - 25.0    Sodium 142 136 - 145 mmol/L    Potassium 4.6 3.5 - 5.2 mmol/L    Chloride 101 98 - 107 mmol/L    Total CO2 29.9 (H) 22.0 - 29.0 mmol/L    Calcium 9.0 8.8 - 10.5 mg/dL    Total Protein 7.0 6.0 - 8.5 g/dL    Albumin 4.20 3.50 - 5.20 g/dL    Globulin 2.8 gm/dL    A/G Ratio 1.5 g/dL     Total Bilirubin 0.3 0.2 - 1.2 mg/dL    Alkaline Phosphatase 98 40 - 129 U/L    AST (SGOT) 13 5 - 32 U/L    ALT (SGPT) 6 5 - 33 U/L   T4, Free   Result Value Ref Range    Free T4 0.99 0.93 - 1.70 ng/dL   TSH   Result Value Ref Range    TSH 5.490 (H) 0.270 - 4.200 mIU/mL   Hemoglobin A1c   Result Value Ref Range    Hemoglobin A1C 5.30 4.80 - 5.60 %   Vitamin B12   Result Value Ref Range    Vitamin B-12 406 232 - 1,245 pg/mL   Vitamin D 25 Hydroxy   Result Value Ref Range    25 Hydroxy, Vitamin D 28.0 ng/ml   NMR LipoProfile   Result Value Ref Range    LDL-P 894 <1,000 nmol/L    LDL-C 54 0 - 99 mg/dL    HDL-C 38 (L) >39 mg/dL    Triglycerides 152 (H) 0 - 149 mg/dL    Total Cholesterol 122 100 - 199 mg/dL    HDL-P (Total) 23.6 (L) >=30.5 umol/L    Small LDL-P 474 <=527 nmol/L    LDL Size 21.0 >20.5 nm   CBC & Differential   Result Value Ref Range    WBC 7.24 4.80 - 10.80 10*3/mm3    RBC 4.29 4.20 - 5.40 10*6/mm3    Hemoglobin 13.8 12.0 - 16.0 g/dL    Hematocrit 42.1 37.0 - 47.0 %    MCV 98.1 81.0 - 99.0 fL    MCH 32.2 (H) 27.0 - 31.0 pg    MCHC 32.8 31.0 - 37.0 g/dL    RDW 13.2 11.5 - 14.5 %    Platelets 232 140 - 500 10*3/mm3    Neutrophil Rel % 58.9 45.0 - 70.0 %    Lymphocyte Rel % 31.8 20.0 - 45.0 %    Monocyte Rel % 7.0 3.0 - 8.0 %    Eosinophil Rel % 1.1 0.0 - 4.0 %    Basophil Rel % 0.8 0.0 - 2.0 %    Neutrophils Absolute 4.26 1.50 - 8.30 10*3/mm3    Lymphocytes Absolute 2.30 0.60 - 4.80 10*3/mm3    Monocytes Absolute 0.51 0.00 - 1.00 10*3/mm3    Eosinophils Absolute 0.08 (L) 0.10 - 0.30 10*3/mm3    Basophils Absolute 0.06 0.00 - 0.20 10*3/mm3    Immature Granulocyte Rel % 0.4 0.0 - 0.5 %    Immature Grans Absolute 0.03 0.00 - 0.03 10*3/mm3    nRBC 0.0 0.0 - 0.0 /100 WBC           Kaylin was seen today for follow-up and med refill.    Diagnoses and all orders for this visit:    Chronic low back pain without sciatica, unspecified back pain laterality    Essential hypertension - bp low. Stop amlodipine.  Cont  metoprolol    Loss of appetite    Fall in home, sequela    Mixed anxiety depressive disorder  -     diazePAM (VALIUM) 5 MG tablet; Take 1 tablet by mouth 2 (Two) Times a Day As Needed for Anxiety.    Difficulty sleeping  -     traZODone (DESYREL) 50 MG tablet; Take 1 tablet by mouth Every Night.    stop amlodipine.  Cont other current meds. Pt to follow up with me if further issues with her pain management group.        Outpatient Medications Prior to Visit   Medication Sig Dispense Refill   • albuterol (PROVENTIL HFA;VENTOLIN HFA) 108 (90 BASE) MCG/ACT inhaler Inhale 2 puffs every 4 (four) hours as needed for wheezing. 18 g 11   • atorvastatin (LIPITOR) 10 MG tablet Take 1 tablet by mouth Daily. 90 tablet 3   • budesonide-formoterol (SYMBICORT) 160-4.5 MCG/ACT inhaler Inhale 2 puffs 2 (two) times a day. 1 inhaler 12   • clotrimazole-betamethasone (LOTRISONE) 1-0.05 % cream Apply  topically 2 (Two) Times a Day. 45 g 0   • ELIQUIS 5 MG tablet tablet TAKE 1 TABLET BY MOUTH EVERY 12 HOURS 60 tablet 0   • esomeprazole (nexIUM) 40 MG capsule Take 1 capsule by mouth Every Morning Before Breakfast. 90 capsule 2   • metoprolol tartrate (LOPRESSOR) 100 MG tablet Take 1 tablet by mouth 2 (Two) Times a Day. 180 tablet 2   • ondansetron (ZOFRAN) 8 MG tablet Take 1 tablet by mouth Every 8 (Eight) Hours As Needed for Nausea or Vomiting. 20 tablet 2   • oxyCODONE-acetaminophen (PERCOCET)  MG per tablet Take 1 tablet by mouth Every 6 (Six) Hours As Needed for Moderate Pain .  0   • sertraline (ZOLOFT) 100 MG tablet Take 100 mg by mouth Daily.  1   • amLODIPine (NORVASC) 5 MG tablet Take 5 mg by mouth Daily.     • diazePAM (VALIUM) 5 MG tablet TAKE 1 TABLET BY MOUTH TWICE DAILY AS NEEDED 60 tablet 2   • traZODone (DESYREL) 50 MG tablet TAKE 1/2 TO 1 TABLET BY MOUTH EVERY NIGHT AT BEDTIME 90 tablet 0   • ELIQUIS 5 MG tablet tablet TAKE 1 TABLET BY MOUTH EVERY 12 HOURS 60 tablet 0   • sertraline (ZOLOFT) 50 MG tablet TAKE 1  TABLET BY MOUTH DAILY 30 tablet 5     Facility-Administered Medications Prior to Visit   Medication Dose Route Frequency Provider Last Rate Last Dose   • cyanocobalamin injection 1,000 mcg  1,000 mcg Intramuscular Q28 Days Donna Forte MD       • cyanocobalamin injection 1,000 mcg  1,000 mcg Intramuscular Q28 Days Donna Forte MD         New Medications Ordered This Visit   Medications   • traZODone (DESYREL) 50 MG tablet     Sig: Take 1 tablet by mouth Every Night.     Dispense:  90 tablet     Refill:  0   • diazePAM (VALIUM) 5 MG tablet     Sig: Take 1 tablet by mouth 2 (Two) Times a Day As Needed for Anxiety.     Dispense:  60 tablet     Refill:  2     [unfilled]  Medications Discontinued During This Encounter   Medication Reason   • ELIQUIS 5 MG tablet tablet *Therapy completed   • sertraline (ZOLOFT) 50 MG tablet *Therapy completed   • traZODone (DESYREL) 50 MG tablet Reorder   • diazePAM (VALIUM) 5 MG tablet Reorder   • amLODIPine (NORVASC) 5 MG tablet          Return in about 4 weeks (around 7/31/2018) for Recheck.

## 2018-07-12 DIAGNOSIS — F41.8 MIXED ANXIETY DEPRESSIVE DISORDER: ICD-10-CM

## 2018-07-12 RX ORDER — SERTRALINE HYDROCHLORIDE 100 MG/1
100 TABLET, FILM COATED ORAL DAILY
Qty: 90 TABLET | Refills: 1 | Status: SHIPPED | OUTPATIENT
Start: 2018-07-12 | End: 2018-09-25

## 2018-07-30 DIAGNOSIS — K55.1 MESENTERIC ARTERY STENOSIS (HCC): ICD-10-CM

## 2018-07-30 DIAGNOSIS — E78.00 HYPERCHOLESTEROLEMIA: ICD-10-CM

## 2018-07-30 DIAGNOSIS — I65.23 OBSTRUCTION OF CAROTID ARTERY, BILATERAL: ICD-10-CM

## 2018-07-30 RX ORDER — ATORVASTATIN CALCIUM 10 MG/1
10 TABLET, FILM COATED ORAL DAILY
Qty: 90 TABLET | Refills: 3 | Status: SHIPPED | OUTPATIENT
Start: 2018-07-30 | End: 2019-08-25 | Stop reason: SDUPTHER

## 2018-07-30 RX ORDER — APIXABAN 5 MG/1
TABLET, FILM COATED ORAL
Qty: 180 TABLET | Refills: 0 | Status: SHIPPED | OUTPATIENT
Start: 2018-07-30 | End: 2018-08-29 | Stop reason: SDUPTHER

## 2018-08-01 ENCOUNTER — OFFICE VISIT (OUTPATIENT)
Dept: INTERNAL MEDICINE | Facility: CLINIC | Age: 78
End: 2018-08-01

## 2018-08-01 VITALS
HEART RATE: 55 BPM | OXYGEN SATURATION: 96 % | DIASTOLIC BLOOD PRESSURE: 52 MMHG | HEIGHT: 61 IN | SYSTOLIC BLOOD PRESSURE: 96 MMHG | WEIGHT: 102.2 LBS | BODY MASS INDEX: 19.3 KG/M2

## 2018-08-01 DIAGNOSIS — R73.02 IMPAIRED GLUCOSE TOLERANCE: ICD-10-CM

## 2018-08-01 DIAGNOSIS — E78.00 HYPERCHOLESTEROLEMIA: ICD-10-CM

## 2018-08-01 DIAGNOSIS — I25.10 CORONARY ARTERY DISEASE INVOLVING NATIVE CORONARY ARTERY OF NATIVE HEART WITHOUT ANGINA PECTORIS: Primary | ICD-10-CM

## 2018-08-01 DIAGNOSIS — G89.29 CHRONIC LOW BACK PAIN WITHOUT SCIATICA, UNSPECIFIED BACK PAIN LATERALITY: ICD-10-CM

## 2018-08-01 DIAGNOSIS — I10 ESSENTIAL HYPERTENSION: ICD-10-CM

## 2018-08-01 DIAGNOSIS — M54.50 CHRONIC LOW BACK PAIN WITHOUT SCIATICA, UNSPECIFIED BACK PAIN LATERALITY: ICD-10-CM

## 2018-08-01 DIAGNOSIS — J42 CHRONIC BRONCHITIS, UNSPECIFIED CHRONIC BRONCHITIS TYPE (HCC): ICD-10-CM

## 2018-08-01 PROCEDURE — 93000 ELECTROCARDIOGRAM COMPLETE: CPT | Performed by: INTERNAL MEDICINE

## 2018-08-01 PROCEDURE — 99214 OFFICE O/P EST MOD 30 MIN: CPT | Performed by: INTERNAL MEDICINE

## 2018-08-01 RX ORDER — OXYCODONE AND ACETAMINOPHEN 10; 325 MG/1; MG/1
1 TABLET ORAL EVERY 6 HOURS PRN
Qty: 60 TABLET | Refills: 0 | Status: SHIPPED | OUTPATIENT
Start: 2018-08-01 | End: 2018-08-29 | Stop reason: SDUPTHER

## 2018-08-01 RX ORDER — METOPROLOL TARTRATE 50 MG/1
50 TABLET, FILM COATED ORAL EVERY 12 HOURS SCHEDULED
Qty: 60 TABLET
Start: 2018-08-01 | End: 2019-05-01

## 2018-08-01 NOTE — PROGRESS NOTES
"      Kaylin Ojeda is a 78 y.o. female, who presents with a chief complaint of   Chief Complaint   Patient presents with   • Follow-up     She would like a referral to the opthalmologist.    • Hypertension     Had labs done recently   • Dizziness     X2wks, she gets really dizzy when she goes to stand up after sitting for a little while. She has some chest pain at times but she says it feels like she has heart burn. She has been doing a lot more than usual lately.       HPI   The pt is here for follow up.      She is worried about her pain medication.  She was going to [facility] pain management and has had trouble getting in.  She has an appt on the 14th.  She worried that she is going to go into withdrawal bc she will run out of meds before then.      The pt says she has been feeling bad.  She says sometimes it is hard to get out of bed.  She gets dizzy often.  She is only metoprolol at this time.  HR in 50's.    She denies chest pain but some \"twinges\" occasionally. bp recently has been low.  She hasnt checked bp at home bc she moved and cant find it.     Weight up a couple of pounds.  She is living with her son and daughter-in-law.  She is eating regularly.     The following portions of the patient's history were reviewed and updated as appropriate: allergies, current medications, past family history, past medical history, past social history, past surgical history and problem list.    Allergies: Aleve [naproxen sodium]; Codeine; Ibuprofen; and Sulfa antibiotics    Review of Systems   Constitutional: Negative.    HENT: Negative.    Eyes: Negative.    Respiratory: Negative.    Cardiovascular: Negative.    Gastrointestinal: Negative.    Endocrine: Negative.    Genitourinary: Negative.    Musculoskeletal: Positive for arthralgias and back pain.   Skin: Negative.    Allergic/Immunologic: Negative.    Neurological: Negative.    Hematological: Negative.    Psychiatric/Behavioral: Negative.    All other systems " reviewed and are negative.            Wt Readings from Last 3 Encounters:   08/01/18 46.4 kg (102 lb 3.2 oz)   07/03/18 45.5 kg (100 lb 3.2 oz)   05/01/18 45.5 kg (100 lb 3.2 oz)     Temp Readings from Last 3 Encounters:   03/12/18 97.6 °F (36.4 °C) (Oral)   08/22/17 98.3 °F (36.8 °C)   08/10/17 97.8 °F (36.6 °C) (Oral)     BP Readings from Last 3 Encounters:   08/01/18 96/52   07/03/18 98/60   05/01/18 144/90     Pulse Readings from Last 3 Encounters:   08/01/18 55   07/03/18 57   05/01/18 61     Body mass index is 19.31 kg/m².    @LASTSAO2(3)@    Physical Exam   Constitutional: She is oriented to person, place, and time.   Thin, frail elderly female   HENT:   Head: Normocephalic and atraumatic.   Right Ear: External ear normal.   Left Ear: External ear normal.   Nose: Nose normal.   Mouth/Throat: Oropharynx is clear and moist.   Eyes: Pupils are equal, round, and reactive to light. Conjunctivae and EOM are normal.   Neck: Normal range of motion. Neck supple.   Cardiovascular: Normal rate, regular rhythm, normal heart sounds and intact distal pulses.    Pulmonary/Chest: Effort normal and breath sounds normal. No respiratory distress. She has no wheezes.   Musculoskeletal: Normal range of motion.   Normal gait   Neurological: She is alert and oriented to person, place, and time.   Skin: Skin is warm and dry.   Psychiatric: She has a normal mood and affect. Her behavior is normal. Judgment and thought content normal.   Nursing note and vitals reviewed.      Results for orders placed or performed in visit on 05/01/18   Comprehensive Metabolic Panel   Result Value Ref Range    Glucose 103 (H) 65 - 99 mg/dL    BUN 14 8 - 23 mg/dL    Creatinine 0.87 0.57 - 1.00 mg/dL    eGFR Non African Am 63 >60 mL/min/1.73    eGFR African Am 77 >60 mL/min/1.73    BUN/Creatinine Ratio 16.1 7.0 - 25.0    Sodium 142 136 - 145 mmol/L    Potassium 4.6 3.5 - 5.2 mmol/L    Chloride 101 98 - 107 mmol/L    Total CO2 29.9 (H) 22.0 - 29.0 mmol/L     Calcium 9.0 8.8 - 10.5 mg/dL    Total Protein 7.0 6.0 - 8.5 g/dL    Albumin 4.20 3.50 - 5.20 g/dL    Globulin 2.8 gm/dL    A/G Ratio 1.5 g/dL    Total Bilirubin 0.3 0.2 - 1.2 mg/dL    Alkaline Phosphatase 98 40 - 129 U/L    AST (SGOT) 13 5 - 32 U/L    ALT (SGPT) 6 5 - 33 U/L   T4, Free   Result Value Ref Range    Free T4 0.99 0.93 - 1.70 ng/dL   TSH   Result Value Ref Range    TSH 5.490 (H) 0.270 - 4.200 mIU/mL   Hemoglobin A1c   Result Value Ref Range    Hemoglobin A1C 5.30 4.80 - 5.60 %   Vitamin B12   Result Value Ref Range    Vitamin B-12 406 232 - 1,245 pg/mL   Vitamin D 25 Hydroxy   Result Value Ref Range    25 Hydroxy, Vitamin D 28.0 ng/ml   NMR LipoProfile   Result Value Ref Range    LDL-P 894 <1,000 nmol/L    LDL-C 54 0 - 99 mg/dL    HDL-C 38 (L) >39 mg/dL    Triglycerides 152 (H) 0 - 149 mg/dL    Total Cholesterol 122 100 - 199 mg/dL    HDL-P (Total) 23.6 (L) >=30.5 umol/L    Small LDL-P 474 <=527 nmol/L    LDL Size 21.0 >20.5 nm   CBC & Differential   Result Value Ref Range    WBC 7.24 4.80 - 10.80 10*3/mm3    RBC 4.29 4.20 - 5.40 10*6/mm3    Hemoglobin 13.8 12.0 - 16.0 g/dL    Hematocrit 42.1 37.0 - 47.0 %    MCV 98.1 81.0 - 99.0 fL    MCH 32.2 (H) 27.0 - 31.0 pg    MCHC 32.8 31.0 - 37.0 g/dL    RDW 13.2 11.5 - 14.5 %    Platelets 232 140 - 500 10*3/mm3    Neutrophil Rel % 58.9 45.0 - 70.0 %    Lymphocyte Rel % 31.8 20.0 - 45.0 %    Monocyte Rel % 7.0 3.0 - 8.0 %    Eosinophil Rel % 1.1 0.0 - 4.0 %    Basophil Rel % 0.8 0.0 - 2.0 %    Neutrophils Absolute 4.26 1.50 - 8.30 10*3/mm3    Lymphocytes Absolute 2.30 0.60 - 4.80 10*3/mm3    Monocytes Absolute 0.51 0.00 - 1.00 10*3/mm3    Eosinophils Absolute 0.08 (L) 0.10 - 0.30 10*3/mm3    Basophils Absolute 0.06 0.00 - 0.20 10*3/mm3    Immature Granulocyte Rel % 0.4 0.0 - 0.5 %    Immature Grans Absolute 0.03 0.00 - 0.03 10*3/mm3    nRBC 0.0 0.0 - 0.0 /100 WBC       ECG 12 Lead  Date/Time: 8/1/2018 2:04 PM  Performed by: LARRY HINTON  by: LARRY HINTON   Comparison: compared with previous ECG   Similar to previous ECG  Rhythm: sinus rhythm  Rate: bradycardic  BPM: 53  Conduction: conduction normal  ST Segments: ST segments normal  T Waves: T waves normal  QRS axis: normal  Clinical impression: normal ECG              Kaylin was seen today for follow-up, hypertension and dizziness.    Diagnoses and all orders for this visit:    Coronary artery disease involving native coronary artery of native heart without angina pectoris    Essential hypertension - bp low and pt dizzy.  Hr in 50's.  Decrease metoprolol from 100mg bid to 50mg bid.   -     metoprolol tartrate (LOPRESSOR) 50 MG tablet; Take 1 tablet by mouth Every 12 (Twelve) Hours.    Hypercholesterolemia    Chronic bronchitis, unspecified chronic bronchitis type (CMS/HCC)    Impaired glucose tolerance    Chronic low back pain without sciatica, unspecified back pain laterality  -     Ambulatory Referral to Pain Management  -     oxyCODONE-acetaminophen (PERCOCET)  MG per tablet; Take 1 tablet by mouth Every 6 (Six) Hours As Needed for Moderate Pain .          Outpatient Medications Prior to Visit   Medication Sig Dispense Refill   • albuterol (PROVENTIL HFA;VENTOLIN HFA) 108 (90 BASE) MCG/ACT inhaler Inhale 2 puffs every 4 (four) hours as needed for wheezing. 18 g 11   • atorvastatin (LIPITOR) 10 MG tablet TAKE 1 TABLET BY MOUTH DAILY 90 tablet 3   • budesonide-formoterol (SYMBICORT) 160-4.5 MCG/ACT inhaler Inhale 2 puffs 2 (two) times a day. 1 inhaler 12   • clotrimazole-betamethasone (LOTRISONE) 1-0.05 % cream Apply  topically 2 (Two) Times a Day. 45 g 0   • diazePAM (VALIUM) 5 MG tablet Take 1 tablet by mouth 2 (Two) Times a Day As Needed for Anxiety. 60 tablet 2   • ELIQUIS 5 MG tablet tablet TAKE 1 TABLET BY MOUTH EVERY 12 HOURS 180 tablet 0   • esomeprazole (nexIUM) 40 MG capsule Take 1 capsule by mouth Every Morning Before Breakfast. 90 capsule 2   • metoprolol tartrate  (LOPRESSOR) 100 MG tablet Take 1 tablet by mouth 2 (Two) Times a Day. 180 tablet 2   • ondansetron (ZOFRAN) 8 MG tablet Take 1 tablet by mouth Every 8 (Eight) Hours As Needed for Nausea or Vomiting. 20 tablet 2   • sertraline (ZOLOFT) 100 MG tablet TAKE 1 TABLET BY MOUTH DAILY 90 tablet 1   • traZODone (DESYREL) 50 MG tablet Take 1 tablet by mouth Every Night. 90 tablet 0   • oxyCODONE-acetaminophen (PERCOCET)  MG per tablet Take 1 tablet by mouth Every 6 (Six) Hours As Needed for Moderate Pain .  0   • sertraline (ZOLOFT) 100 MG tablet Take 100 mg by mouth Daily.  1     Facility-Administered Medications Prior to Visit   Medication Dose Route Frequency Provider Last Rate Last Dose   • cyanocobalamin injection 1,000 mcg  1,000 mcg Intramuscular Q28 Days Donna Forte MD       • cyanocobalamin injection 1,000 mcg  1,000 mcg Intramuscular Q28 Days Donna Forte MD       • cyanocobalamin injection 1,000 mcg  1,000 mcg Intramuscular Q28 Days Donna Forte MD   1,000 mcg at 07/03/18 1252     New Medications Ordered This Visit   Medications   • oxyCODONE-acetaminophen (PERCOCET)  MG per tablet     Sig: Take 1 tablet by mouth Every 6 (Six) Hours As Needed for Moderate Pain .     Dispense:  60 tablet     Refill:  0     Pt was being seen at [facility].  Giving 2 week supply of meds until she has appt.  Not a chronic rx.   • metoprolol tartrate (LOPRESSOR) 50 MG tablet     Sig: Take 1 tablet by mouth Every 12 (Twelve) Hours.     Dispense:  60 tablet     [unfilled]  Medications Discontinued During This Encounter   Medication Reason   • sertraline (ZOLOFT) 100 MG tablet Duplicate order   • sertraline (ZOLOFT) 50 MG tablet Dose adjustment   • oxyCODONE-acetaminophen (PERCOCET)  MG per tablet Reorder         Return in about 4 weeks (around 8/29/2018) for Recheck.

## 2018-08-07 RX ORDER — APIXABAN 5 MG/1
TABLET, FILM COATED ORAL
Qty: 60 TABLET | Refills: 3 | Status: SHIPPED | OUTPATIENT
Start: 2018-08-07 | End: 2018-10-25 | Stop reason: SDUPTHER

## 2018-08-29 ENCOUNTER — OFFICE VISIT (OUTPATIENT)
Dept: INTERNAL MEDICINE | Facility: CLINIC | Age: 78
End: 2018-08-29

## 2018-08-29 VITALS
BODY MASS INDEX: 19.48 KG/M2 | HEIGHT: 61 IN | WEIGHT: 103.2 LBS | DIASTOLIC BLOOD PRESSURE: 84 MMHG | OXYGEN SATURATION: 98 % | HEART RATE: 54 BPM | SYSTOLIC BLOOD PRESSURE: 134 MMHG
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
## 2018-08-29 DIAGNOSIS — E03.9 HYPOTHYROIDISM, UNSPECIFIED TYPE: ICD-10-CM

## 2018-08-29 DIAGNOSIS — Z23 NEEDS FLU SHOT: Primary | ICD-10-CM

## 2018-08-29 DIAGNOSIS — G89.29 CHRONIC LOW BACK PAIN WITHOUT SCIATICA, UNSPECIFIED BACK PAIN LATERALITY: ICD-10-CM

## 2018-08-29 DIAGNOSIS — R73.02 IMPAIRED GLUCOSE TOLERANCE: ICD-10-CM

## 2018-08-29 DIAGNOSIS — I48.0 PAF (PAROXYSMAL ATRIAL FIBRILLATION) (HCC): ICD-10-CM

## 2018-08-29 DIAGNOSIS — D51.9 ANEMIA DUE TO VITAMIN B12 DEFICIENCY, UNSPECIFIED B12 DEFICIENCY TYPE: ICD-10-CM

## 2018-08-29 DIAGNOSIS — M54.50 CHRONIC LOW BACK PAIN WITHOUT SCIATICA, UNSPECIFIED BACK PAIN LATERALITY: ICD-10-CM

## 2018-08-29 DIAGNOSIS — I25.10 CORONARY ARTERY DISEASE INVOLVING NATIVE CORONARY ARTERY OF NATIVE HEART WITHOUT ANGINA PECTORIS: ICD-10-CM

## 2018-08-29 DIAGNOSIS — K55.1 MESENTERIC ARTERY STENOSIS (HCC): ICD-10-CM

## 2018-08-29 DIAGNOSIS — F41.8 MIXED ANXIETY DEPRESSIVE DISORDER: ICD-10-CM
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
## 2018-08-29 DIAGNOSIS — E78.00 HYPERCHOLESTEROLEMIA: ICD-10-CM

## 2018-08-29 PROCEDURE — G0008 ADMIN INFLUENZA VIRUS VAC: HCPCS | Performed by: INTERNAL MEDICINE

## 2018-08-29 PROCEDURE — 99214 OFFICE O/P EST MOD 30 MIN: CPT | Performed by: INTERNAL MEDICINE

## 2018-08-29 PROCEDURE — 90662 IIV NO PRSV INCREASED AG IM: CPT | Performed by: INTERNAL MEDICINE

## 2018-08-29 RX ORDER — OXYCODONE AND ACETAMINOPHEN 10; 325 MG/1; MG/1
1 TABLET ORAL EVERY 12 HOURS PRN
Qty: 60 TABLET | Refills: 0 | Status: SHIPPED | OUTPATIENT
Start: 2018-08-29 | End: 2018-09-25 | Stop reason: SDUPTHER

## 2018-08-29 RX ORDER — CYANOCOBALAMIN 1000 UG/ML
1000 INJECTION, SOLUTION INTRAMUSCULAR; SUBCUTANEOUS
Status: DISCONTINUED | OUTPATIENT
Start: 2018-08-29 | End: 2020-10-28 | Stop reason: HOSPADM

## 2018-08-29 RX ORDER — DIAZEPAM 2 MG/1
2 TABLET ORAL 2 TIMES DAILY PRN
Qty: 60 TABLET | Refills: 0 | Status: SHIPPED | OUTPATIENT
Start: 2018-08-29 | End: 2018-09-19 | Stop reason: SDUPTHER

## 2018-08-29 NOTE — PROGRESS NOTES
Kaylin Ojeda is a 78 y.o. female, who presents with a chief complaint of   Chief Complaint   Patient presents with   • Follow-up     4 wk follow up. Patient has had some falls recently, she says she has had some dizziness, she says she has had this since the last time she was here. She has fallen twice in the last two weeks. Once she fell in the driveway on to her R arm and yesterday she fell and hit her lower chest, she is having some pain, she has not been seen in the ED.        HPI   The pt is here for follow up.  Since her last appt she has had several falls.  She has scabs on her left arm and bruises on left chest.    Chronic pain - she was seeing [facility] pain management.  She has been on valium and percocet.  I am worried about pt taking so many pain pills in combination with benzos. She has been taking 3-4 percocet daily.  She says she can try to take 3 a day but will try to wean to 2 a day.      Anxiety - pt seems ok  With trying to wean dose of valium today.  She often takes the med at night.  She has been taking trazodone, valium and her pain meds at the same time.      Hypothyroidism - no hair/skin changes.  Last tsh elevated.     Hx hyperglycemia    A-fib - on eliquis.  Discussed fall risk with anticoagulation with patient today.      The following portions of the patient's history were reviewed and updated as appropriate: allergies, current medications, past family history, past medical history, past social history, past surgical history and problem list.    Allergies: Aleve [naproxen sodium]; Codeine; Ibuprofen; and Sulfa antibiotics    Review of Systems   Constitutional: Negative.    HENT: Negative.    Eyes: Negative.    Respiratory: Negative.    Cardiovascular: Negative.    Gastrointestinal: Negative.    Endocrine: Negative.    Genitourinary: Negative.    Musculoskeletal: Negative.    Skin: Negative.    Allergic/Immunologic: Negative.    Neurological: Negative.    Hematological:  Negative.    Psychiatric/Behavioral: Negative.    All other systems reviewed and are negative.            Wt Readings from Last 3 Encounters:   08/29/18 46.8 kg (103 lb 3.2 oz)   08/01/18 46.4 kg (102 lb 3.2 oz)   07/03/18 45.5 kg (100 lb 3.2 oz)     Temp Readings from Last 3 Encounters:   03/12/18 97.6 °F (36.4 °C) (Oral)   08/22/17 98.3 °F (36.8 °C)   08/10/17 97.8 °F (36.6 °C) (Oral)     BP Readings from Last 3 Encounters:   08/29/18 134/84   08/01/18 96/52   07/03/18 98/60     Pulse Readings from Last 3 Encounters:   08/29/18 54   08/01/18 55   07/03/18 57     Body mass index is 19.5 kg/m².  @LASTSAO2(3)@    Physical Exam   Constitutional: She is oriented to person, place, and time. She appears well-developed and well-nourished. No distress.   HENT:   Head: Normocephalic and atraumatic.   Right Ear: External ear normal.   Left Ear: External ear normal.   Nose: Nose normal.   Mouth/Throat: Oropharynx is clear and moist.   Eyes: Pupils are equal, round, and reactive to light. Conjunctivae and EOM are normal.   Neck: Normal range of motion. Neck supple.   Cardiovascular: Normal rate, regular rhythm, normal heart sounds and intact distal pulses.    Pulmonary/Chest: Effort normal and breath sounds normal. No respiratory distress. She has no wheezes.   Mild ttp of left chest/ribs.   Musculoskeletal: Normal range of motion.   Normal gait   Neurological: She is alert and oriented to person, place, and time.   Skin: Skin is warm and dry.   Scabs on right arm   Psychiatric: She has a normal mood and affect. Her behavior is normal. Judgment and thought content normal.   Nursing note and vitals reviewed.      Results for orders placed or performed in visit on 05/01/18   Comprehensive Metabolic Panel   Result Value Ref Range    Glucose 103 (H) 65 - 99 mg/dL    BUN 14 8 - 23 mg/dL    Creatinine 0.87 0.57 - 1.00 mg/dL    eGFR Non African Am 63 >60 mL/min/1.73    eGFR African Am 77 >60 mL/min/1.73    BUN/Creatinine Ratio 16.1  7.0 - 25.0    Sodium 142 136 - 145 mmol/L    Potassium 4.6 3.5 - 5.2 mmol/L    Chloride 101 98 - 107 mmol/L    Total CO2 29.9 (H) 22.0 - 29.0 mmol/L    Calcium 9.0 8.8 - 10.5 mg/dL    Total Protein 7.0 6.0 - 8.5 g/dL    Albumin 4.20 3.50 - 5.20 g/dL    Globulin 2.8 gm/dL    A/G Ratio 1.5 g/dL    Total Bilirubin 0.3 0.2 - 1.2 mg/dL    Alkaline Phosphatase 98 40 - 129 U/L    AST (SGOT) 13 5 - 32 U/L    ALT (SGPT) 6 5 - 33 U/L   T4, Free   Result Value Ref Range    Free T4 0.99 0.93 - 1.70 ng/dL   TSH   Result Value Ref Range    TSH 5.490 (H) 0.270 - 4.200 mIU/mL   Hemoglobin A1c   Result Value Ref Range    Hemoglobin A1C 5.30 4.80 - 5.60 %   Vitamin B12   Result Value Ref Range    Vitamin B-12 406 232 - 1,245 pg/mL   Vitamin D 25 Hydroxy   Result Value Ref Range    25 Hydroxy, Vitamin D 28.0 ng/ml   NMR LipoProfile   Result Value Ref Range    LDL-P 894 <1,000 nmol/L    LDL-C 54 0 - 99 mg/dL    HDL-C 38 (L) >39 mg/dL    Triglycerides 152 (H) 0 - 149 mg/dL    Total Cholesterol 122 100 - 199 mg/dL    HDL-P (Total) 23.6 (L) >=30.5 umol/L    Small LDL-P 474 <=527 nmol/L    LDL Size 21.0 >20.5 nm   CBC & Differential   Result Value Ref Range    WBC 7.24 4.80 - 10.80 10*3/mm3    RBC 4.29 4.20 - 5.40 10*6/mm3    Hemoglobin 13.8 12.0 - 16.0 g/dL    Hematocrit 42.1 37.0 - 47.0 %    MCV 98.1 81.0 - 99.0 fL    MCH 32.2 (H) 27.0 - 31.0 pg    MCHC 32.8 31.0 - 37.0 g/dL    RDW 13.2 11.5 - 14.5 %    Platelets 232 140 - 500 10*3/mm3    Neutrophil Rel % 58.9 45.0 - 70.0 %    Lymphocyte Rel % 31.8 20.0 - 45.0 %    Monocyte Rel % 7.0 3.0 - 8.0 %    Eosinophil Rel % 1.1 0.0 - 4.0 %    Basophil Rel % 0.8 0.0 - 2.0 %    Neutrophils Absolute 4.26 1.50 - 8.30 10*3/mm3    Lymphocytes Absolute 2.30 0.60 - 4.80 10*3/mm3    Monocytes Absolute 0.51 0.00 - 1.00 10*3/mm3    Eosinophils Absolute 0.08 (L) 0.10 - 0.30 10*3/mm3    Basophils Absolute 0.06 0.00 - 0.20 10*3/mm3    Immature Granulocyte Rel % 0.4 0.0 - 0.5 %    Immature Grans Absolute 0.03  0.00 - 0.03 10*3/mm3    nRBC 0.0 0.0 - 0.0 /100 WBC           Kaylin was seen today for follow-up.    Diagnoses and all orders for this visit:    Fall in home, initial encounter  -     diazePAM (VALIUM) 2 MG tablet; Take 1 tablet by mouth 2 (Two) Times a Day As Needed for Anxiety.    Mixed anxiety depressive disorder  -     diazePAM (VALIUM) 2 MG tablet; Take 1 tablet by mouth 2 (Two) Times a Day As Needed for Anxiety.    Chronic low back pain without sciatica, unspecified back pain laterality  -     Ambulatory Referral to Pain Management  -     oxyCODONE-acetaminophen (PERCOCET)  MG per tablet; Take 1 tablet by mouth Every 12 (Twelve) Hours As Needed for Severe Pain .    Impaired glucose tolerance  -     Hemoglobin A1c    Anemia due to vitamin B12 deficiency, unspecified B12 deficiency type  -     cyanocobalamin injection 1,000 mcg; Inject 1 mL into the appropriate muscle as directed by prescriber Every 28 (Twenty-Eight) Days.  -     Comprehensive Metabolic Panel  -     CBC & Differential    Coronary artery disease involving native coronary artery of native heart without angina pectoris    Hypercholesterolemia  -     Comprehensive Metabolic Panel  -     Lipid Panel With LDL / HDL Ratio    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HCC)    PAF (paroxysmal atrial fibrillation) (CMS/HCC)    Hypothyroidism, unspecified type  -     T4, Free  -     TSH      Cutting doses of percocet and valium.  Discussed importance of limiting doses of meds that make her more likely to fall.     Outpatient Medications Prior to Visit   Medication Sig Dispense Refill   • albuterol (PROVENTIL HFA;VENTOLIN HFA) 108 (90 BASE) MCG/ACT inhaler Inhale 2 puffs every 4 (four) hours as needed for wheezing. 18 g 11   • atorvastatin (LIPITOR) 10 MG tablet TAKE 1 TABLET BY MOUTH DAILY 90 tablet 3   • budesonide-formoterol (SYMBICORT) 160-4.5 MCG/ACT inhaler Inhale 2 puffs 2 (two) times a day. 1 inhaler 12   • ELIQUIS 5 MG tablet tablet TAKE 1 TABLET BY  MOUTH EVERY 12 HOURS 60 tablet 3   • esomeprazole (nexIUM) 40 MG capsule Take 1 capsule by mouth Every Morning Before Breakfast. 90 capsule 2   • traZODone (DESYREL) 50 MG tablet Take 1 tablet by mouth Every Night. 90 tablet 0   • diazePAM (VALIUM) 5 MG tablet Take 1 tablet by mouth 2 (Two) Times a Day As Needed for Anxiety. 60 tablet 2   • oxyCODONE-acetaminophen (PERCOCET)  MG per tablet Take 1 tablet by mouth Every 6 (Six) Hours As Needed for Moderate Pain . 60 tablet 0   • clotrimazole-betamethasone (LOTRISONE) 1-0.05 % cream Apply  topically 2 (Two) Times a Day. 45 g 0   • metoprolol tartrate (LOPRESSOR) 100 MG tablet Take 1 tablet by mouth 2 (Two) Times a Day. 180 tablet 2   • metoprolol tartrate (LOPRESSOR) 50 MG tablet Take 1 tablet by mouth Every 12 (Twelve) Hours. 60 tablet    • ondansetron (ZOFRAN) 8 MG tablet Take 1 tablet by mouth Every 8 (Eight) Hours As Needed for Nausea or Vomiting. 20 tablet 2   • sertraline (ZOLOFT) 100 MG tablet TAKE 1 TABLET BY MOUTH DAILY 90 tablet 1   • ELIQUIS 5 MG tablet tablet TAKE 1 TABLET BY MOUTH EVERY 12 HOURS 180 tablet 0     Facility-Administered Medications Prior to Visit   Medication Dose Route Frequency Provider Last Rate Last Dose   • cyanocobalamin injection 1,000 mcg  1,000 mcg Intramuscular Q28 Days Donna Forte MD       • cyanocobalamin injection 1,000 mcg  1,000 mcg Intramuscular Q28 Days Donna Forte MD       • cyanocobalamin injection 1,000 mcg  1,000 mcg Intramuscular Q28 Days Donna Forte MD   1,000 mcg at 07/03/18 1252     New Medications Ordered This Visit   Medications   • diazePAM (VALIUM) 2 MG tablet     Sig: Take 1 tablet by mouth 2 (Two) Times a Day As Needed for Anxiety.     Dispense:  60 tablet     Refill:  0   • oxyCODONE-acetaminophen (PERCOCET)  MG per tablet     Sig: Take 1 tablet by mouth Every 12 (Twelve) Hours As Needed for Severe Pain .     Dispense:  60 tablet     Refill:  0     .   •  cyanocobalamin injection 1,000 mcg     [unfilled]  Medications Discontinued During This Encounter   Medication Reason   • ELIQUIS 5 MG tablet tablet Duplicate order   • diazePAM (VALIUM) 5 MG tablet Reorder   • oxyCODONE-acetaminophen (PERCOCET)  MG per tablet Reorder         Return in about 1 month (around 9/29/2018) for Recheck.

## 2018-08-30 ENCOUNTER — APPOINTMENT (OUTPATIENT)
Dept: LAB | Facility: HOSPITAL | Age: 78
End: 2018-08-30
Attending: INTERNAL MEDICINE

## 2018-08-30 ENCOUNTER — HOSPITAL ENCOUNTER (OUTPATIENT)
Dept: GENERAL RADIOLOGY | Facility: HOSPITAL | Age: 78
Discharge: HOME | End: 2018-08-30
Attending: INTERNAL MEDICINE | Admitting: INTERNAL MEDICINE

## 2018-08-30 DIAGNOSIS — R07.81 RIB PAIN ON LEFT SIDE: Primary | ICD-10-CM
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
## 2018-08-30 LAB
ALBUMIN SERPL-MCNC: 3.9 G/DL (ref 3.5–5.2)
ALBUMIN/GLOB SERPL: 1.3 G/DL
ALP SERPL-CCNC: 101 U/L (ref 40–129)
ALT SERPL W P-5'-P-CCNC: 11 U/L (ref 5–33)
ANION GAP SERPL CALCULATED.3IONS-SCNC: 12.4 MMOL/L
AST SERPL-CCNC: 18 U/L (ref 5–32)
BASOPHILS # BLD AUTO: 0.04 10*3/MM3 (ref 0–0.2)
BASOPHILS NFR BLD AUTO: 0.8 % (ref 0–2)
BILIRUB SERPL-MCNC: 0.5 MG/DL (ref 0.2–1.2)
BUN BLD-MCNC: 11 MG/DL (ref 8–23)
BUN/CREAT SERPL: 13.9 (ref 7–25)
CALCIUM SPEC-SCNC: 8.7 MG/DL (ref 8.8–10.5)
CHLORIDE SERPL-SCNC: 96 MMOL/L (ref 98–107)
CHOLEST SERPL-MCNC: 130 MG/DL (ref 0–200)
CO2 SERPL-SCNC: 28.6 MMOL/L (ref 22–29)
CREAT BLD-MCNC: 0.79 MG/DL (ref 0.57–1)
DEPRECATED RDW RBC AUTO: 47.5 FL (ref 37–54)
EOSINOPHIL # BLD AUTO: 0.07 10*3/MM3 (ref 0.1–0.3)
EOSINOPHIL NFR BLD AUTO: 1.4 % (ref 0–4)
ERYTHROCYTE [DISTWIDTH] IN BLOOD BY AUTOMATED COUNT: 13.7 % (ref 11.5–14.5)
GFR SERPL CREATININE-BSD FRML MDRD: 71 ML/MIN/1.73
GLOBULIN UR ELPH-MCNC: 3.1 GM/DL
GLUCOSE BLD-MCNC: 97 MG/DL (ref 65–99)
HBA1C MFR BLD: 5.6 % (ref 4.8–5.6)
HCT VFR BLD AUTO: 38.8 % (ref 37–47)
HDLC SERPL-MCNC: 50 MG/DL (ref 40–60)
HGB BLD-MCNC: 12.8 G/DL (ref 12–16)
IMM GRANULOCYTES # BLD: 0.01 10*3/MM3 (ref 0–0.03)
IMM GRANULOCYTES NFR BLD: 0.2 % (ref 0–0.5)
LDLC SERPL CALC-MCNC: 60 MG/DL (ref 0–100)
LDLC/HDLC SERPL: 1.2 {RATIO}
LYMPHOCYTES # BLD AUTO: 1.48 10*3/MM3 (ref 0.6–4.8)
LYMPHOCYTES NFR BLD AUTO: 29.7 % (ref 20–45)
MCH RBC QN AUTO: 31.1 PG (ref 27–31)
MCHC RBC AUTO-ENTMCNC: 33 G/DL (ref 31–37)
MCV RBC AUTO: 94.4 FL (ref 81–99)
MONOCYTES # BLD AUTO: 0.38 10*3/MM3 (ref 0–1)
MONOCYTES NFR BLD AUTO: 7.6 % (ref 3–8)
NEUTROPHILS # BLD AUTO: 3 10*3/MM3 (ref 1.5–8.3)
NEUTROPHILS NFR BLD AUTO: 60.3 % (ref 45–70)
NRBC BLD MANUAL-RTO: 0 /100 WBC (ref 0–0)
PLATELET # BLD AUTO: 189 10*3/MM3 (ref 140–500)
PMV BLD AUTO: 9.4 FL (ref 7.4–10.4)
POTASSIUM BLD-SCNC: 4.4 MMOL/L (ref 3.5–5.2)
PROT SERPL-MCNC: 7 G/DL (ref 6–8.5)
RBC # BLD AUTO: 4.11 10*6/MM3 (ref 4.2–5.4)
SODIUM BLD-SCNC: 137 MMOL/L (ref 136–145)
T4 FREE SERPL-MCNC: 0.85 NG/DL (ref 0.93–1.7)
TRIGL SERPL-MCNC: 100 MG/DL (ref 0–150)
TSH SERPL DL<=0.05 MIU/L-ACNC: 6.1 MIU/ML (ref 0.27–4.2)
VLDLC SERPL-MCNC: 20 MG/DL (ref 7–27)
WBC NRBC COR # BLD: 4.98 10*3/MM3 (ref 4.8–10.8)

## 2018-08-30 PROCEDURE — 85025 COMPLETE CBC W/AUTO DIFF WBC: CPT | Performed by: INTERNAL MEDICINE

## 2018-08-30 PROCEDURE — 83036 HEMOGLOBIN GLYCOSYLATED A1C: CPT | Performed by: INTERNAL MEDICINE

## 2018-08-30 PROCEDURE — 84443 ASSAY THYROID STIM HORMONE: CPT | Performed by: INTERNAL MEDICINE

## 2018-08-30 PROCEDURE — 80053 COMPREHEN METABOLIC PANEL: CPT | Performed by: INTERNAL MEDICINE

## 2018-08-30 PROCEDURE — 80061 LIPID PANEL: CPT | Performed by: INTERNAL MEDICINE

## 2018-08-30 PROCEDURE — 71046 X-RAY EXAM CHEST 2 VIEWS: CPT

## 2018-08-30 PROCEDURE — 84439 ASSAY OF FREE THYROXINE: CPT | Performed by: INTERNAL MEDICINE

## 2018-08-30 PROCEDURE — 36415 COLL VENOUS BLD VENIPUNCTURE: CPT | Performed by: INTERNAL MEDICINE

## 2018-09-04 ENCOUNTER — TELEPHONE (OUTPATIENT)
Dept: INTERNAL MEDICINE | Facility: CLINIC | Age: 78
End: 2018-09-04

## 2018-09-04 RX ORDER — LEVOTHYROXINE SODIUM 0.05 MG/1
50 TABLET ORAL DAILY
Qty: 30 TABLET | Refills: 1 | Status: SHIPPED | OUTPATIENT
Start: 2018-09-04 | End: 2018-09-04 | Stop reason: SDUPTHER

## 2018-09-04 NOTE — TELEPHONE ENCOUNTER
Daughter in law notified.   ----- Message from Donna Forte MD sent at 8/31/2018  8:33 AM EDT -----  no fracture on xr

## 2018-09-04 NOTE — TELEPHONE ENCOUNTER
Patients daughter in law notified, meds sent to pharmacy, appt for repeat labs made.   ----- Message from Donna Forte MD sent at 8/31/2018  8:31 AM EDT -----  Call pt about labs. Thyroid levels low.  Add synthroid 50mcg po daily.  Recheck labs in 4 weeks.

## 2018-09-05 RX ORDER — LEVOTHYROXINE SODIUM 0.05 MG/1
50 TABLET ORAL DAILY
Qty: 90 TABLET | Refills: 1 | Status: SHIPPED | OUTPATIENT
Start: 2018-09-05 | End: 2019-04-17 | Stop reason: SDUPTHER
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
## 2018-09-19 DIAGNOSIS — G47.9 DIFFICULTY SLEEPING: ICD-10-CM

## 2018-09-19 DIAGNOSIS — F41.8 MIXED ANXIETY DEPRESSIVE DISORDER: ICD-10-CM

## 2018-09-19 RX ORDER — TRAZODONE HYDROCHLORIDE 50 MG/1
50 TABLET ORAL NIGHTLY
Qty: 90 TABLET | Refills: 0 | Status: SHIPPED | OUTPATIENT
Start: 2018-09-19 | End: 2018-12-11 | Stop reason: SDUPTHER

## 2018-09-19 RX ORDER — DIAZEPAM 2 MG/1
1 TABLET ORAL 2 TIMES DAILY PRN
Qty: 60 TABLET | Refills: 0 | Status: SHIPPED | OUTPATIENT
Start: 2018-09-19 | End: 2018-11-14 | Stop reason: SDUPTHER

## 2018-09-25 ENCOUNTER — OFFICE VISIT (OUTPATIENT)
Dept: INTERNAL MEDICINE | Facility: CLINIC | Age: 78
End: 2018-09-25

## 2018-09-25 VITALS
SYSTOLIC BLOOD PRESSURE: 100 MMHG | BODY MASS INDEX: 19.48 KG/M2 | WEIGHT: 103.2 LBS | HEART RATE: 56 BPM | DIASTOLIC BLOOD PRESSURE: 76 MMHG | HEIGHT: 61 IN

## 2018-09-25 DIAGNOSIS — I77.9 PERIPHERAL ARTERIAL OCCLUSIVE DISEASE (HCC): ICD-10-CM

## 2018-09-25 DIAGNOSIS — I48.0 PAF (PAROXYSMAL ATRIAL FIBRILLATION) (HCC): ICD-10-CM

## 2018-09-25 DIAGNOSIS — G89.29 CHRONIC LOW BACK PAIN WITHOUT SCIATICA, UNSPECIFIED BACK PAIN LATERALITY: ICD-10-CM

## 2018-09-25 DIAGNOSIS — F41.8 MIXED ANXIETY DEPRESSIVE DISORDER: ICD-10-CM

## 2018-09-25 DIAGNOSIS — F41.8 MIXED ANXIETY DEPRESSIVE DISORDER: Primary | ICD-10-CM

## 2018-09-25 DIAGNOSIS — I25.10 CORONARY ARTERY DISEASE INVOLVING NATIVE CORONARY ARTERY OF NATIVE HEART WITHOUT ANGINA PECTORIS: ICD-10-CM

## 2018-09-25 DIAGNOSIS — M54.50 CHRONIC LOW BACK PAIN WITHOUT SCIATICA, UNSPECIFIED BACK PAIN LATERALITY: ICD-10-CM

## 2018-09-25 PROCEDURE — 99214 OFFICE O/P EST MOD 30 MIN: CPT | Performed by: INTERNAL MEDICINE

## 2018-09-25 RX ORDER — OXYCODONE AND ACETAMINOPHEN 10; 325 MG/1; MG/1
1 TABLET ORAL EVERY 12 HOURS PRN
Qty: 30 TABLET | Refills: 0 | Status: SHIPPED | OUTPATIENT
Start: 2018-09-25 | End: 2019-03-19 | Stop reason: SDUPTHER

## 2018-09-25 RX ORDER — DULOXETIN HYDROCHLORIDE 30 MG/1
30 CAPSULE, DELAYED RELEASE ORAL DAILY
Qty: 90 CAPSULE | Refills: 0 | Status: SHIPPED | OUTPATIENT
Start: 2018-09-25 | End: 2018-10-17 | Stop reason: SDUPTHER

## 2018-09-25 RX ORDER — DULOXETIN HYDROCHLORIDE 30 MG/1
30 CAPSULE, DELAYED RELEASE ORAL DAILY
Qty: 30 CAPSULE | Refills: 0 | Status: SHIPPED | OUTPATIENT
Start: 2018-09-25 | End: 2018-09-25 | Stop reason: SDUPTHER

## 2018-09-25 NOTE — PROGRESS NOTES
Kaylin Ojeda is a 78 y.o. female, who presents with a chief complaint of   Chief Complaint   Patient presents with   • Med Management   • Depression     She would like to discuss her Zoloft, she does not feel like it is helping her. She states that she doesnt feel like it has really done much for her at all, but she also has had a lot of things going on in her life. The biggest thing being the loss of her .        HPI   Pt here bc of issues with pain meds.  She can't see pain management until next month.  She had epidural injections in the past but she didn't think they helped.  She is now on eliquis for a-fib.  She is anxious about her pain meds bc she doesn't want to run out of her percocet.  She was taking 4 percocet a day but is now taking 2 a day to try to stretch the pills out.      She is struggling with depression.  She will be mad one moment and then sad the next.  She was put on zoloft but didn't fee like it helped.   She is struggling with sleep.      She is going to go to [place] some.  She says she is going to avoid her granddaughter/daughter's drama in [place] but [place] She is waiting for her 's headstone to be placed.     The following portions of the patient's history were reviewed and updated as appropriate: allergies, current medications, past family history, past medical history, past social history, past surgical history and problem list.    Allergies: Aleve [naproxen sodium]; Codeine; Ibuprofen; and Sulfa antibiotics    Review of Systems   Constitutional: Negative.    HENT: Negative.    Eyes: Negative.    Respiratory: Negative.    Cardiovascular: Negative.    Gastrointestinal: Negative.    Endocrine: Negative.    Genitourinary: Negative.    Musculoskeletal: Positive for back pain.   Skin: Negative.    Allergic/Immunologic: Negative.    Neurological: Negative.    Hematological: Negative.    Psychiatric/Behavioral: Positive for dysphoric mood and  sleep disturbance. Negative for self-injury and suicidal ideas. The patient is nervous/anxious.    All other systems reviewed and are negative.            Wt Readings from Last 3 Encounters:   09/25/18 46.8 kg (103 lb 3.2 oz)   08/29/18 46.8 kg (103 lb 3.2 oz)   08/01/18 46.4 kg (102 lb 3.2 oz)     Temp Readings from Last 3 Encounters:   03/12/18 97.6 °F (36.4 °C) (Oral)   08/22/17 98.3 °F (36.8 °C)   08/10/17 97.8 °F (36.6 °C) (Oral)     BP Readings from Last 3 Encounters:   09/25/18 100/76   08/29/18 134/84   08/01/18 96/52     Pulse Readings from Last 3 Encounters:   09/25/18 56   08/29/18 54   08/01/18 55     Body mass index is 19.5 kg/m².  @LASTSAO2(3)@    Physical Exam   Constitutional: She is oriented to person, place, and time. She appears well-developed and well-nourished. No distress.   HENT:   Head: Normocephalic and atraumatic.   Right Ear: External ear normal.   Left Ear: External ear normal.   Nose: Nose normal.   Mouth/Throat: Oropharynx is clear and moist.   Eyes: Pupils are equal, round, and reactive to light. Conjunctivae and EOM are normal.   Neck: Normal range of motion. Neck supple.   Cardiovascular: Normal rate, regular rhythm, normal heart sounds and intact distal pulses.    Pulmonary/Chest: Effort normal and breath sounds normal. No respiratory distress. She has no wheezes.   Musculoskeletal: Normal range of motion.   Normal gait   Neurological: She is alert and oriented to person, place, and time.   Skin: Skin is warm and dry.   Psychiatric: She has a normal mood and affect. Her behavior is normal. Judgment and thought content normal.   Nursing note and vitals reviewed.      Results for orders placed or performed in visit on 08/29/18   Comprehensive Metabolic Panel   Result Value Ref Range    Glucose 97 65 - 99 mg/dL    BUN 11 8 - 23 mg/dL    Creatinine 0.79 0.57 - 1.00 mg/dL    Sodium 137 136 - 145 mmol/L    Potassium 4.4 3.5 - 5.2 mmol/L    Chloride 96 (L) 98 - 107 mmol/L    CO2 28.6 22.0  - 29.0 mmol/L    Calcium 8.7 (L) 8.8 - 10.5 mg/dL    Total Protein 7.0 6.0 - 8.5 g/dL    Albumin 3.90 3.50 - 5.20 g/dL    ALT (SGPT) 11 5 - 33 U/L    AST (SGOT) 18 5 - 32 U/L    Alkaline Phosphatase 101 40 - 129 U/L    Total Bilirubin 0.5 0.2 - 1.2 mg/dL    eGFR Non African Amer 71 >60 mL/min/1.73    Globulin 3.1 gm/dL    A/G Ratio 1.3 g/dL    BUN/Creatinine Ratio 13.9 7.0 - 25.0    Anion Gap 12.4 mmol/L   T4, Free   Result Value Ref Range    Free T4 0.85 (L) 0.93 - 1.70 ng/dL   TSH   Result Value Ref Range    TSH 6.100 (H) 0.270 - 4.200 mIU/mL   Lipid Panel With LDL / HDL Ratio   Result Value Ref Range    Total Cholesterol 130 0 - 200 mg/dL    Triglycerides 100 0 - 150 mg/dL    HDL Cholesterol 50 40 - 60 mg/dL    LDL Cholesterol  60 0 - 100 mg/dL    VLDL Cholesterol 20 7 - 27 mg/dL    LDL/HDL Ratio 1.20    Hemoglobin A1c   Result Value Ref Range    Hemoglobin A1C 5.60 4.80 - 5.60 %   CBC Auto Differential   Result Value Ref Range    WBC 4.98 4.80 - 10.80 10*3/mm3    RBC 4.11 (L) 4.20 - 5.40 10*6/mm3    Hemoglobin 12.8 12.0 - 16.0 g/dL    Hematocrit 38.8 37.0 - 47.0 %    MCV 94.4 81.0 - 99.0 fL    MCH 31.1 (H) 27.0 - 31.0 pg    MCHC 33.0 31.0 - 37.0 g/dL    RDW 13.7 11.5 - 14.5 %    RDW-SD 47.5 37.0 - 54.0 fl    MPV 9.4 7.4 - 10.4 fL    Platelets 189 140 - 500 10*3/mm3    Neutrophil % 60.3 45.0 - 70.0 %    Lymphocyte % 29.7 20.0 - 45.0 %    Monocyte % 7.6 3.0 - 8.0 %    Eosinophil % 1.4 0.0 - 4.0 %    Basophil % 0.8 0.0 - 2.0 %    Immature Grans % 0.2 0.0 - 0.5 %    Neutrophils, Absolute 3.00 1.50 - 8.30 10*3/mm3    Lymphocytes, Absolute 1.48 0.60 - 4.80 10*3/mm3    Monocytes, Absolute 0.38 0.00 - 1.00 10*3/mm3    Eosinophils, Absolute 0.07 (L) 0.10 - 0.30 10*3/mm3    Basophils, Absolute 0.04 0.00 - 0.20 10*3/mm3    Immature Grans, Absolute 0.01 0.00 - 0.03 10*3/mm3    nRBC 0.0 0.0 - 0.0 /100 WBC           Kaylin was seen today for med management and depression.    Diagnoses and all orders for this visit:    Mixed  anxiety depressive disorder  -     DULoxetine (CYMBALTA) 30 MG capsule; Take 1 capsule by mouth Daily.    PAF (paroxysmal atrial fibrillation) (CMS/HCC)    Coronary artery disease involving native coronary artery of native heart without angina pectoris    Peripheral arterial occlusive disease (CMS/HCC)    Chronic low back pain without sciatica, unspecified back pain laterality  -     DULoxetine (CYMBALTA) 30 MG capsule; Take 1 capsule by mouth Daily.  -     oxyCODONE-acetaminophen (PERCOCET)  MG per tablet; Take 1 tablet by mouth Every 12 (Twelve) Hours As Needed for Severe Pain .      Pt has already had her flu shot    Outpatient Medications Prior to Visit   Medication Sig Dispense Refill   • albuterol (PROVENTIL HFA;VENTOLIN HFA) 108 (90 BASE) MCG/ACT inhaler Inhale 2 puffs every 4 (four) hours as needed for wheezing. 18 g 11   • atorvastatin (LIPITOR) 10 MG tablet TAKE 1 TABLET BY MOUTH DAILY 90 tablet 3   • budesonide-formoterol (SYMBICORT) 160-4.5 MCG/ACT inhaler Inhale 2 puffs 2 (two) times a day. 1 inhaler 12   • clotrimazole-betamethasone (LOTRISONE) 1-0.05 % cream Apply  topically 2 (Two) Times a Day. 45 g 0   • diazePAM (VALIUM) 2 MG tablet Take 0.5 tablets by mouth 2 (Two) Times a Day As Needed for Anxiety. 60 tablet 0   • ELIQUIS 5 MG tablet tablet TAKE 1 TABLET BY MOUTH EVERY 12 HOURS 60 tablet 3   • esomeprazole (nexIUM) 40 MG capsule Take 1 capsule by mouth Every Morning Before Breakfast. 90 capsule 2   • levothyroxine (SYNTHROID, LEVOTHROID) 50 MCG tablet TAKE 1 TABLET BY MOUTH DAILY 90 tablet 1   • metoprolol tartrate (LOPRESSOR) 100 MG tablet Take 1 tablet by mouth 2 (Two) Times a Day. 180 tablet 2   • metoprolol tartrate (LOPRESSOR) 50 MG tablet Take 1 tablet by mouth Every 12 (Twelve) Hours. 60 tablet    • ondansetron (ZOFRAN) 8 MG tablet Take 1 tablet by mouth Every 8 (Eight) Hours As Needed for Nausea or Vomiting. 20 tablet 2   • traZODone (DESYREL) 50 MG tablet TAKE 1 TABLET BY MOUTH  EVERY NIGHT 90 tablet 0   • oxyCODONE-acetaminophen (PERCOCET)  MG per tablet Take 1 tablet by mouth Every 12 (Twelve) Hours As Needed for Severe Pain . 60 tablet 0   • sertraline (ZOLOFT) 100 MG tablet TAKE 1 TABLET BY MOUTH DAILY 90 tablet 1   • sertraline (ZOLOFT) 50 MG tablet Take 50 mg by mouth Daily.  5     Facility-Administered Medications Prior to Visit   Medication Dose Route Frequency Provider Last Rate Last Dose   • cyanocobalamin injection 1,000 mcg  1,000 mcg Intramuscular Q28 Days Donna Forte MD       • cyanocobalamin injection 1,000 mcg  1,000 mcg Intramuscular Q28 Days Donna Forte MD       • cyanocobalamin injection 1,000 mcg  1,000 mcg Intramuscular Q28 Days Donna Forte MD   1,000 mcg at 07/03/18 1252   • cyanocobalamin injection 1,000 mcg  1,000 mcg Intramuscular Q28 Days Donna Forte MD         New Medications Ordered This Visit   Medications   • DULoxetine (CYMBALTA) 30 MG capsule     Sig: Take 1 capsule by mouth Daily.     Dispense:  30 capsule     Refill:  0   • oxyCODONE-acetaminophen (PERCOCET)  MG per tablet     Sig: Take 1 tablet by mouth Every 12 (Twelve) Hours As Needed for Severe Pain .     Dispense:  30 tablet     Refill:  0     .     [unfilled]  Medications Discontinued During This Encounter   Medication Reason   • sertraline (ZOLOFT) 50 MG tablet Dose adjustment   • sertraline (ZOLOFT) 100 MG tablet    • oxyCODONE-acetaminophen (PERCOCET)  MG per tablet Reorder         Return in about 4 weeks (around 10/23/2018) for Recheck.

## 2018-09-28 DIAGNOSIS — R79.89 ABNORMAL THYROID BLOOD TEST: Primary | ICD-10-CM

## 2018-10-02 LAB
T4 FREE SERPL-MCNC: 1 NG/DL (ref 0.93–1.7)
TSH SERPL DL<=0.005 MIU/L-ACNC: 3.23 MIU/ML (ref 0.27–4.2)

## 2018-10-03 ENCOUNTER — RESULTS ENCOUNTER (OUTPATIENT)
Dept: INTERNAL MEDICINE | Facility: CLINIC | Age: 78
End: 2018-10-03

## 2018-10-03 DIAGNOSIS — R79.89 ABNORMAL THYROID BLOOD TEST: ICD-10-CM

## 2018-10-05 ENCOUNTER — TELEPHONE (OUTPATIENT)
Dept: INTERNAL MEDICINE | Facility: CLINIC | Age: 78
End: 2018-10-05

## 2018-10-05 NOTE — TELEPHONE ENCOUNTER
Patient has been advised and voiced understanding.     ----- Message from Donna Forte MD sent at 10/2/2018  5:08 PM EDT -----  Call pt about labs. Thyroid labs now normal

## 2018-10-17 ENCOUNTER — OFFICE VISIT (OUTPATIENT)
Dept: INTERNAL MEDICINE | Facility: CLINIC | Age: 78
End: 2018-10-17

## 2018-10-17 VITALS
OXYGEN SATURATION: 97 % | HEIGHT: 61 IN | DIASTOLIC BLOOD PRESSURE: 88 MMHG | WEIGHT: 102.2 LBS | HEART RATE: 66 BPM | BODY MASS INDEX: 19.3 KG/M2 | SYSTOLIC BLOOD PRESSURE: 126 MMHG

## 2018-10-17 DIAGNOSIS — R73.03 PREDIABETES: ICD-10-CM

## 2018-10-17 DIAGNOSIS — I25.10 CORONARY ARTERY DISEASE INVOLVING NATIVE CORONARY ARTERY OF NATIVE HEART WITHOUT ANGINA PECTORIS: ICD-10-CM

## 2018-10-17 DIAGNOSIS — R82.81 PYURIA: Primary | ICD-10-CM

## 2018-10-17 DIAGNOSIS — G89.29 CHRONIC LOW BACK PAIN WITHOUT SCIATICA, UNSPECIFIED BACK PAIN LATERALITY: ICD-10-CM

## 2018-10-17 DIAGNOSIS — E78.00 HYPERCHOLESTEROLEMIA: ICD-10-CM

## 2018-10-17 DIAGNOSIS — I10 ESSENTIAL HYPERTENSION: ICD-10-CM

## 2018-10-17 DIAGNOSIS — G47.9 DIFFICULTY SLEEPING: ICD-10-CM

## 2018-10-17 DIAGNOSIS — F41.8 MIXED ANXIETY DEPRESSIVE DISORDER: ICD-10-CM

## 2018-10-17 DIAGNOSIS — M54.50 CHRONIC LOW BACK PAIN WITHOUT SCIATICA, UNSPECIFIED BACK PAIN LATERALITY: ICD-10-CM

## 2018-10-17 DIAGNOSIS — H25.9 AGE-RELATED CATARACT OF BOTH EYES, UNSPECIFIED AGE-RELATED CATARACT TYPE: ICD-10-CM

## 2018-10-17 DIAGNOSIS — F17.200 TOBACCO DEPENDENCE SYNDROME: ICD-10-CM

## 2018-10-17 DIAGNOSIS — J30.1 SEASONAL ALLERGIC RHINITIS DUE TO POLLEN: ICD-10-CM

## 2018-10-17 LAB
BILIRUB BLD-MCNC: NEGATIVE MG/DL
CLARITY, POC: ABNORMAL
COLOR UR: YELLOW
GLUCOSE UR STRIP-MCNC: NEGATIVE MG/DL
KETONES UR QL: NEGATIVE
LEUKOCYTE EST, POC: ABNORMAL
NITRITE UR-MCNC: POSITIVE MG/ML
PH UR: 6 [PH] (ref 5–8)
PROT UR STRIP-MCNC: NEGATIVE MG/DL
RBC # UR STRIP: NEGATIVE /UL
SP GR UR: 1.01 (ref 1–1.03)
UROBILINOGEN UR QL: NORMAL

## 2018-10-17 PROCEDURE — 81003 URINALYSIS AUTO W/O SCOPE: CPT | Performed by: INTERNAL MEDICINE

## 2018-10-17 PROCEDURE — 99214 OFFICE O/P EST MOD 30 MIN: CPT | Performed by: INTERNAL MEDICINE

## 2018-10-17 RX ORDER — DULOXETIN HYDROCHLORIDE 30 MG/1
60 CAPSULE, DELAYED RELEASE ORAL DAILY
Qty: 90 CAPSULE | Refills: 0
Start: 2018-10-17 | End: 2018-11-19 | Stop reason: SDUPTHER

## 2018-10-17 RX ORDER — FLUTICASONE PROPIONATE 50 MCG
2 SPRAY, SUSPENSION (ML) NASAL DAILY
Qty: 1 BOTTLE | Refills: 2 | Status: SHIPPED | OUTPATIENT
Start: 2018-10-17 | End: 2019-01-12 | Stop reason: SDUPTHER

## 2018-10-17 RX ORDER — CEPHALEXIN 500 MG/1
500 CAPSULE ORAL 3 TIMES DAILY
Qty: 21 CAPSULE | Refills: 0 | Status: SHIPPED | OUTPATIENT
Start: 2018-10-17 | End: 2018-11-14

## 2018-10-17 NOTE — PATIENT INSTRUCTIONS
For more information:    Quit Now [place]  1-800-QUIT-NOW  https://[place].quitlogix.org/en-[place]/  Steps to Quit Smoking  Smoking tobacco can be harmful to your health and can affect almost every organ in your body. Smoking puts you, and those around you, at risk for developing many serious chronic diseases. Quitting smoking is difficult, but it is one of the best things that you can do for your health. It is never too late to quit.  What are the benefits of quitting smoking?  When you quit smoking, you lower your risk of developing serious diseases and conditions, such as:  · Lung cancer or lung disease, such as COPD.  · Heart disease.  · Stroke.  · Heart attack.  · Infertility.  · Osteoporosis and bone fractures.  Additionally, symptoms such as coughing, wheezing, and shortness of breath may get better when you quit. You may also find that you get sick less often because your body is stronger at fighting off colds and infections. If you are pregnant, quitting smoking can help to reduce your chances of having a baby of low birth weight.  How do I get ready to quit?  When you decide to quit smoking, create a plan to make sure that you are successful. Before you quit:  · Pick a date to quit. Set a date within the next two weeks to give you time to prepare.  · Write down the reasons why you are quitting. Keep this list in places where you will see it often, such as on your bathroom mirror or in your car or wallet.  · Identify the people, places, things, and activities that make you want to smoke (triggers) and avoid them. Make sure to take these actions:  ¨ Throw away all cigarettes at home, at work, and in your car.  ¨ Throw away smoking accessories, such as ashtrays and lighters.  ¨ Clean your car and make sure to empty the ashtray.  ¨ Clean your home, including curtains and carpets.  · Tell your family, friends, and coworkers that you are quitting. Support from your loved ones can make quitting easier.  · Talk with  your health care provider about your options for quitting smoking.  · Find out what treatment options are covered by your health insurance.  What strategies can I use to quit smoking?  Talk with your healthcare provider about different strategies to quit smoking. Some strategies include:  · Quitting smoking altogether instead of gradually lessening how much you smoke over a period of time. Research shows that quitting “cold turkey” is more successful than gradually quitting.  · Attending in-person counseling to help you build problem-solving skills. You are more likely to have success in quitting if you attend several counseling sessions. Even short sessions of 10 minutes can be effective.  · Finding resources and support systems that can help you to quit smoking and remain smoke-free after you quit. These resources are most helpful when you use them often. They can include:  ¨ Online chats with a counselor.  ¨ Telephone quitlines.  ¨ Printed self-help materials.  ¨ Support groups or group counseling.  ¨ Text messaging programs.  ¨ Mobile phone applications.  · Taking medicines to help you quit smoking. (If you are pregnant or breastfeeding, talk with your health care provider first.) Some medicines contain nicotine and some do not. Both types of medicines help with cravings, but the medicines that include nicotine help to relieve withdrawal symptoms. Your health care provider may recommend:  ¨ Nicotine patches, gum, or lozenges.  ¨ Nicotine inhalers or sprays.  ¨ Non-nicotine medicine that is taken by mouth.  Talk with your health care provider about combining strategies, such as taking medicines while you are also receiving in-person counseling. Using these two strategies together makes you more likely to succeed in quitting than if you used either strategy on its own.  If you are pregnant or breastfeeding, talk with your health care provider about finding counseling or other support strategies to quit smoking. Do  not take medicine to help you quit smoking unless told to do so by your health care provider.  What things can I do to make it easier to quit?  Quitting smoking might feel overwhelming at first, but there is a lot that you can do to make it easier. Take these important actions:  · Reach out to your family and friends and ask that they support and encourage you during this time. Call telephone quitlines, reach out to support groups, or work with a counselor for support.  · Ask people who smoke to avoid smoking around you.  · Avoid places that trigger you to smoke, such as bars, parties, or smoke-break areas at work.  · Spend time around people who do not smoke.  · Lessen stress in your life, because stress can be a smoking trigger for some people. To lessen stress, try:  ¨ Exercising regularly.  ¨ Deep-breathing exercises.  ¨ Yoga.  ¨ Meditating.  ¨ Performing a body scan. This involves closing your eyes, scanning your body from head to toe, and noticing which parts of your body are particularly tense. Purposefully relax the muscles in those areas.  · Download or purchase mobile phone or tablet apps (applications) that can help you stick to your quit plan by providing reminders, tips, and encouragement. There are many free apps, such as QuitGuide from the CDC (Centers for Disease Control and Prevention). You can find other support for quitting smoking (smoking cessation) through smokefree.gov and other websites.  How will I feel when I quit smoking?  Within the first 24 hours of quitting smoking, you may start to feel some withdrawal symptoms. These symptoms are usually most noticeable 2-3 days after quitting, but they usually do not last beyond 2-3 weeks. Changes or symptoms that you might experience include:  · Mood swings.  · Restlessness, anxiety, or irritation.  · Difficulty concentrating.  · Dizziness.  · Strong cravings for sugary foods in addition to nicotine.  · Mild weight  gain.  · Constipation.  · Nausea.  · Coughing or a sore throat.  · Changes in how your medicines work in your body.  · A depressed mood.  · Difficulty sleeping (insomnia).  After the first 2-3 weeks of quitting, you may start to notice more positive results, such as:  · Improved sense of smell and taste.  · Decreased coughing and sore throat.  · Slower heart rate.  · Lower blood pressure.  · Clearer skin.  · The ability to breathe more easily.  · Fewer sick days.  Quitting smoking is very challenging for most people. Do not get discouraged if you are not successful the first time. Some people need to make many attempts to quit before they achieve long-term success. Do your best to stick to your quit plan, and talk with your health care provider if you have any questions or concerns.  This information is not intended to replace advice given to you by your health care provider. Make sure you discuss any questions you have with your health care provider.  Document Released: 12/12/2002 Document Revised: 08/15/2017 Document Reviewed: 05/03/2016  [employer] Interactive Patient Education © 2017 [employer]  Steps to Quit Smoking  Smoking tobacco can be bad for your health. It can also affect almost every organ in your body. Smoking puts you and people around you at risk for many serious long-lasting (chronic) diseases. Quitting smoking is hard, but it is one of the best things that you can do for your health. It is never too late to quit.  What are the benefits of quitting smoking?  When you quit smoking, you lower your risk for getting serious diseases and conditions. They can include:  · Lung cancer or lung disease.  · Heart disease.  · Stroke.  · Heart attack.  · Not being able to have children (infertility).  · Weak bones (osteoporosis) and broken bones (fractures).    If you have coughing, wheezing, and shortness of breath, those symptoms may get better when you quit. You may also get sick less often. If you are  pregnant, quitting smoking can help to lower your chances of having a baby of low birth weight.  What can I do to help me quit smoking?  Talk with your doctor about what can help you quit smoking. Some things you can do (strategies) include:  · Quitting smoking totally, instead of slowly cutting back how much you smoke over a period of time.  · Going to in-person counseling. You are more likely to quit if you go to many counseling sessions.  · Using resources and support systems, such as:  ? Online chats with a counselor.  ? Phone quitlines.  ? Printed self-help materials.  ? Support groups or group counseling.  ? Text messaging programs.  ? Mobile phone apps or applications.  · Taking medicines. Some of these medicines may have nicotine in them. If you are pregnant or breastfeeding, do not take any medicines to quit smoking unless your doctor says it is okay. Talk with your doctor about counseling or other things that can help you.    Talk with your doctor about using more than one strategy at the same time, such as taking medicines while you are also going to in-person counseling. This can help make quitting easier.  What things can I do to make it easier to quit?  Quitting smoking might feel very hard at first, but there is a lot that you can do to make it easier. Take these steps:  · Talk to your family and friends. Ask them to support and encourage you.  · Call phone quitlines, reach out to support groups, or work with a counselor.  · Ask people who smoke to not smoke around you.  · Avoid places that make you want (trigger) to smoke, such as:  ? Bars.  ? Parties.  ? Smoke-break areas at work.  · Spend time with people who do not smoke.  · Lower the stress in your life. Stress can make you want to smoke. Try these things to help your stress:  ? Getting regular exercise.  ? Deep-breathing exercises.  ? Yoga.  ? Meditating.  ? Doing a body scan. To do this, close your eyes, focus on one area of your body at a time  from head to toe, and notice which parts of your body are tense. Try to relax the muscles in those areas.  · Download or buy apps on your mobile phone or tablet that can help you stick to your quit plan. There are many free apps, such as QuitGuide from the CDC (Centers for Disease Control and Prevention). You can find more support from smokefree.gov and other websites.    This information is not intended to replace advice given to you by your health care provider. Make sure you discuss any questions you have with your health care provider.  Document Released: 10/14/2010 Document Revised: 08/15/2017 Document Reviewed: 05/03/2016  [employer] Interactive Patient Education © 2018 [employer]

## 2018-10-17 NOTE — PROGRESS NOTES
Kaylin Ojeda is a 78 y.o. female, who presents with a chief complaint of   Chief Complaint   Patient presents with   • Follow-up     Had labs done recently, she is about to have cataract surgery done in both eyes.    • Nasal Congestion     congestion & cough, X3wks ago. Unsure if she has had a fever, no vomiting or diarrhea.    • Urinary Incontinence     off/on [id number]. She cannot hold her urine   • Nicotine Dependence     she would like to discuss getting a medication to help her stop smoking.        HPI   Pt here bc of issues with pain meds.  She can't see pain management until next month.  She had epidural injections in the past but she didn't think they helped.  She is now on eliquis for a-fib which makes injections much harder to coordinate.  She is anxious about her pain meds bc she doesn't want to run out of her percocet.  She was taking 4 percocet a day but is now taking 2 a day to try to stretch the pills out.  [facility] her as a patient and her next appt is Nov 15th.     She is struggling with depression.  She will be mad one moment and then sad the next.  She was put on zoloft but didn't feel like it helped.  At her last OV she was switched to cymbalta.   She can't tell that the med has helped much.  She is struggling with sleep even with trazodone and melatonin.       She is still smoking.  She has known vascular disease and we have talked extensively about smoking cessation in the past.  She has known for a long time she needed to quit but is finally ready to consider a med to help her quit.  She hasnt tried anything to quit smoking.      She is having issues with urinary incontinence x 1 month.  She has been up and down lots at night.  No fever.  No n/v/d.     She has been having more congestion lately.  She doesn't know if it is bc she is living in the basement or bc of weather changes.     She is going to go to [place] some.  She says she is going to  avoid her granddaughter/daughter's drama in [place] but wants to see [place]. She will leave on Thursday to go visit.       The following portions of the patient's history were reviewed and updated as appropriate: allergies, current medications, past family history, past medical history, past social history, past surgical history and problem list.    Allergies: Aleve [naproxen sodium]; Codeine; Ibuprofen; and Sulfa antibiotics    Review of Systems   Constitutional: Negative.    HENT: Negative.    Eyes: Negative.    Respiratory: Negative.    Cardiovascular: Negative.    Gastrointestinal: Negative.    Endocrine: Negative.    Genitourinary: Negative.    Musculoskeletal: Negative.    Skin: Negative.    Allergic/Immunologic: Negative.    Neurological: Negative.    Hematological: Negative.    Psychiatric/Behavioral: Negative.    All other systems reviewed and are negative.            Wt Readings from Last 3 Encounters:   10/17/18 46.4 kg (102 lb 3.2 oz)   09/25/18 46.8 kg (103 lb 3.2 oz)   08/29/18 46.8 kg (103 lb 3.2 oz)     Temp Readings from Last 3 Encounters:   03/12/18 97.6 °F (36.4 °C) (Oral)   08/22/17 98.3 °F (36.8 °C)   08/10/17 97.8 °F (36.6 °C) (Oral)     BP Readings from Last 3 Encounters:   10/17/18 126/88   09/25/18 100/76   08/29/18 134/84     Pulse Readings from Last 3 Encounters:   10/17/18 66   09/25/18 56   08/29/18 54     Body mass index is 19.31 kg/m².  @LASTSAO2(3)@    Physical Exam   Constitutional: She is oriented to person, place, and time. She appears well-developed and well-nourished. No distress.   HENT:   Head: Normocephalic and atraumatic.   Right Ear: External ear normal.   Left Ear: External ear normal.   Nose: Nose normal.   Mouth/Throat: Oropharynx is clear and moist.   Eyes: Pupils are equal, round, and reactive to light. Conjunctivae and EOM are normal.   Neck: Normal range of motion. Neck supple.   Cardiovascular: Normal rate, regular rhythm, normal heart sounds and intact  distal pulses.    Pulmonary/Chest: Effort normal and breath sounds normal. No respiratory distress. She has no wheezes.   Musculoskeletal: Normal range of motion.   Normal gait   Neurological: She is alert and oriented to person, place, and time.   Skin: Skin is warm and dry.   Psychiatric: She has a normal mood and affect. Her behavior is normal. Judgment and thought content normal.   Nursing note and vitals reviewed.      Results for orders placed or performed in visit on 10/03/18   TSH   Result Value Ref Range    TSH 3.230 0.270 - 4.200 mIU/mL   T4, free   Result Value Ref Range    Free T4 1.00 0.93 - 1.70 ng/dL           Kaylin was seen today for follow-up, nasal congestion, urinary incontinence and nicotine dependence.    Diagnoses and all orders for this visit:    Difficulty sleeping    Mixed anxiety depressive disorder  -     DULoxetine (CYMBALTA) 30 MG capsule; Take 2 capsules by mouth Daily.    Chronic low back pain without sciatica, unspecified back pain laterality  -     DULoxetine (CYMBALTA) 30 MG capsule; Take 2 capsules by mouth Daily.    Tobacco dependence syndrome  -     varenicline (CHANTIX STARTING MONTH PAK) 0.5 MG X 11 & 1 MG X 42 tablet; Take 0.5 mg one daily on days 1-3 and and 0.5 mg twice daily on days 4-7.Then 1 mg twice daily for a total of 12 weeks.    Prediabetes    Coronary artery disease involving native coronary artery of native heart without angina pectoris    Essential hypertension    Hypercholesterolemia    Seasonal allergic rhinitis due to pollen  -     fluticasone (FLONASE) 50 MCG/ACT nasal spray; 2 sprays into the nostril(s) as directed by provider Daily for 30 days. Administer 2 sprays in each nostril for each dose.    Age-related cataract of both eyes, unspecified age-related cataract type  -     Ambulatory Referral to Ophthalmology    Pyuria  -     cephalexin (KEFLEX) 500 MG capsule; Take 1 capsule by mouth 3 (Three) Times a Day.  -     Urine Culture - Urine, Urine, Clean  Catch      Quit now [place] and tobacco cessation handouts given to patient    Outpatient Medications Prior to Visit   Medication Sig Dispense Refill   • albuterol (PROVENTIL HFA;VENTOLIN HFA) 108 (90 BASE) MCG/ACT inhaler Inhale 2 puffs every 4 (four) hours as needed for wheezing. 18 g 11   • atorvastatin (LIPITOR) 10 MG tablet TAKE 1 TABLET BY MOUTH DAILY 90 tablet 3   • budesonide-formoterol (SYMBICORT) 160-4.5 MCG/ACT inhaler Inhale 2 puffs 2 (two) times a day. 1 inhaler 12   • clotrimazole-betamethasone (LOTRISONE) 1-0.05 % cream Apply  topically 2 (Two) Times a Day. 45 g 0   • diazePAM (VALIUM) 2 MG tablet Take 0.5 tablets by mouth 2 (Two) Times a Day As Needed for Anxiety. 60 tablet 0   • ELIQUIS 5 MG tablet tablet TAKE 1 TABLET BY MOUTH EVERY 12 HOURS 60 tablet 3   • esomeprazole (nexIUM) 40 MG capsule Take 1 capsule by mouth Every Morning Before Breakfast. 90 capsule 2   • levothyroxine (SYNTHROID, LEVOTHROID) 50 MCG tablet TAKE 1 TABLET BY MOUTH DAILY 90 tablet 1   • metoprolol tartrate (LOPRESSOR) 100 MG tablet Take 1 tablet by mouth 2 (Two) Times a Day. 180 tablet 2   • metoprolol tartrate (LOPRESSOR) 50 MG tablet Take 1 tablet by mouth Every 12 (Twelve) Hours. 60 tablet    • ondansetron (ZOFRAN) 8 MG tablet Take 1 tablet by mouth Every 8 (Eight) Hours As Needed for Nausea or Vomiting. 20 tablet 2   • oxyCODONE-acetaminophen (PERCOCET)  MG per tablet Take 1 tablet by mouth Every 12 (Twelve) Hours As Needed for Severe Pain . 30 tablet 0   • traZODone (DESYREL) 50 MG tablet TAKE 1 TABLET BY MOUTH EVERY NIGHT 90 tablet 0   • DULoxetine (CYMBALTA) 30 MG capsule TAKE 1 CAPSULE BY MOUTH DAILY 90 capsule 0     Facility-Administered Medications Prior to Visit   Medication Dose Route Frequency Provider Last Rate Last Dose   • cyanocobalamin injection 1,000 mcg  1,000 mcg Intramuscular Q28 Days Donna Forte MD       • cyanocobalamin injection 1,000 mcg  1,000 mcg Intramuscular Q28 Days Jann  Donna Fan MD       • cyanocobalamin injection 1,000 mcg  1,000 mcg Intramuscular Q28 Days Donna Forte MD   1,000 mcg at 18 1252   • cyanocobalamin injection 1,000 mcg  1,000 mcg Intramuscular Q28 Days Donna Forte MD         New Medications Ordered This Visit   Medications   • DULoxetine (CYMBALTA) 30 MG capsule     Sig: Take 2 capsules by mouth Daily.     Dispense:  90 capsule     Refill:  0     **Patient requests 90 days supply**   • varenicline (CHANTIX STARTING MONTH ) 0.5 MG X 11 & 1 MG X 42 tablet     Sig: Take 0.5 mg one daily on days 1-3 and and 0.5 mg twice daily on days 4-7.Then 1 mg twice daily for a total of 12 weeks.     Dispense:  53 tablet     Refill:  0   • fluticasone (FLONASE) 50 MCG/ACT nasal spray     Si sprays into the nostril(s) as directed by provider Daily for 30 days. Administer 2 sprays in each nostril for each dose.     Dispense:  1 bottle     Refill:  2   • cephalexin (KEFLEX) 500 MG capsule     Sig: Take 1 capsule by mouth 3 (Three) Times a Day.     Dispense:  21 capsule     Refill:  0     [unfilled]  Medications Discontinued During This Encounter   Medication Reason   • sertraline (ZOLOFT) 50 MG tablet    • DULoxetine (CYMBALTA) 30 MG capsule Reorder         Return in about 1 month (around 2018) for Recheck.

## 2018-10-22 LAB
BACTERIA UR CULT: ABNORMAL
BACTERIA UR CULT: ABNORMAL
OTHER ANTIBIOTIC SUSC ISLT: ABNORMAL

## 2018-10-25 ENCOUNTER — TELEPHONE (OUTPATIENT)
Dept: INTERNAL MEDICINE | Facility: CLINIC | Age: 78
End: 2018-10-25

## 2018-10-25 RX ORDER — APIXABAN 5 MG/1
TABLET, FILM COATED ORAL
Qty: 180 TABLET | Refills: 0 | Status: SHIPPED | OUTPATIENT
Start: 2018-10-25 | End: 2018-12-28 | Stop reason: SDUPTHER

## 2018-10-25 NOTE — TELEPHONE ENCOUNTER
Left voicemail for patient to call back for results.  ----- Message from Remedios Vasquez MD sent at 10/25/2018  1:00 PM EDT -----  Keflex will cover UTI. Call if not getting better or other concerns.

## 2018-11-14 ENCOUNTER — OFFICE VISIT (OUTPATIENT)
Dept: INTERNAL MEDICINE | Facility: CLINIC | Age: 78
End: 2018-11-14

## 2018-11-14 VITALS
BODY MASS INDEX: 19.07 KG/M2 | HEIGHT: 61 IN | OXYGEN SATURATION: 94 % | WEIGHT: 101 LBS | HEART RATE: 54 BPM | SYSTOLIC BLOOD PRESSURE: 124 MMHG | DIASTOLIC BLOOD PRESSURE: 82 MMHG

## 2018-11-14 DIAGNOSIS — G89.29 CHRONIC LOW BACK PAIN WITHOUT SCIATICA, UNSPECIFIED BACK PAIN LATERALITY: ICD-10-CM

## 2018-11-14 DIAGNOSIS — M54.50 CHRONIC LOW BACK PAIN WITHOUT SCIATICA, UNSPECIFIED BACK PAIN LATERALITY: ICD-10-CM

## 2018-11-14 DIAGNOSIS — F41.8 MIXED ANXIETY DEPRESSIVE DISORDER: ICD-10-CM

## 2018-11-14 DIAGNOSIS — Z23 NEED FOR VACCINATION: ICD-10-CM

## 2018-11-14 DIAGNOSIS — R82.998 LEUKOCYTES IN URINE: ICD-10-CM

## 2018-11-14 DIAGNOSIS — N39.41 URGE INCONTINENCE: Primary | ICD-10-CM

## 2018-11-14 DIAGNOSIS — G47.00 INSOMNIA, UNSPECIFIED TYPE: ICD-10-CM

## 2018-11-14 LAB
BILIRUB BLD-MCNC: ABNORMAL MG/DL
CLARITY, POC: CLEAR
COLOR UR: YELLOW
GLUCOSE UR STRIP-MCNC: ABNORMAL MG/DL
KETONES UR QL: ABNORMAL
LEUKOCYTE EST, POC: ABNORMAL
NITRITE UR-MCNC: NEGATIVE MG/ML
PH UR: 6 [PH] (ref 5–8)
PROT UR STRIP-MCNC: ABNORMAL MG/DL
RBC # UR STRIP: NEGATIVE /UL
SP GR UR: 1.01 (ref 1–1.03)
UROBILINOGEN UR QL: ABNORMAL

## 2018-11-14 PROCEDURE — 90670 PCV13 VACCINE IM: CPT | Performed by: INTERNAL MEDICINE

## 2018-11-14 PROCEDURE — 99214 OFFICE O/P EST MOD 30 MIN: CPT | Performed by: INTERNAL MEDICINE

## 2018-11-14 PROCEDURE — G0009 ADMIN PNEUMOCOCCAL VACCINE: HCPCS | Performed by: INTERNAL MEDICINE

## 2018-11-14 PROCEDURE — 81003 URINALYSIS AUTO W/O SCOPE: CPT | Performed by: INTERNAL MEDICINE

## 2018-11-14 PROCEDURE — 96372 THER/PROPH/DIAG INJ SC/IM: CPT | Performed by: INTERNAL MEDICINE

## 2018-11-14 RX ORDER — OXYBUTYNIN CHLORIDE 5 MG/1
5 TABLET ORAL 2 TIMES DAILY
Qty: 60 TABLET | Refills: 0 | Status: SHIPPED | OUTPATIENT
Start: 2018-11-14 | End: 2018-12-20 | Stop reason: SDUPTHER

## 2018-11-14 RX ORDER — DIAZEPAM 2 MG/1
1 TABLET ORAL 2 TIMES DAILY PRN
Qty: 60 TABLET | Refills: 2 | Status: SHIPPED | OUTPATIENT
Start: 2018-11-14 | End: 2018-12-11

## 2018-11-14 RX ORDER — OXYCODONE AND ACETAMINOPHEN 7.5; 325 MG/1; MG/1
1 TABLET ORAL
COMMUNITY
End: 2018-11-14

## 2018-11-14 RX ADMIN — CYANOCOBALAMIN 1000 MCG: 1000 INJECTION, SOLUTION INTRAMUSCULAR; SUBCUTANEOUS at 13:06

## 2018-11-14 NOTE — PROGRESS NOTES
Kaylin Ojeda is a 78 y.o. female, who presents with a chief complaint of   Chief Complaint   Patient presents with   • Follow-up     1 month follow up   • Hypertension       HPI   Pt here for follow up  She has been out of her pain meds since the 9th.  Her f/u appt with pain management is tomorrow.  I encouraged her to to try to stay off narcotics if possible.  She says she has difficulty moving around or sleeping without the narcotics.      Depression/anxiety -  she thinks she is doing some better with the higher dose of cymbalta.  She is struggling with sleep even with trazodone and melatonin.       She is still smoking.  she has a chantix rx but didn't try it yet.      She is having issues with urinary incontinence x 2 months.  She has been up and down lots at night.  No fever.  No n/v/d. She had a UTI treated last time and sx some better.  She still has urge incontinence issues.       She was recently in [place] x 3 weeks.  She says it went well.  She decided to rent an apartment so that she can visit more and be in a good place.        The following portions of the patient's history were reviewed and updated as appropriate: allergies, current medications, past family history, past medical history, past social history, past surgical history and problem list.    Allergies: Aleve [naproxen sodium]; Codeine; Ibuprofen; and Sulfa antibiotics    Review of Systems   Constitutional: Negative.    HENT: Negative.    Eyes: Negative.    Respiratory: Negative.    Cardiovascular: Negative.    Gastrointestinal: Negative.    Endocrine: Negative.    Genitourinary: Negative.    Musculoskeletal: Positive for back pain.   Skin: Negative.    Allergic/Immunologic: Negative.    Neurological: Negative.    Hematological: Negative.    Psychiatric/Behavioral: Negative.    All other systems reviewed and are negative.            Wt Readings from Last 3 Encounters:   11/14/18 45.8 kg (101 lb)   10/17/18 46.4 kg (102 lb 3.2 oz)    09/25/18 46.8 kg (103 lb 3.2 oz)     Temp Readings from Last 3 Encounters:   03/12/18 97.6 °F (36.4 °C) (Oral)   08/22/17 98.3 °F (36.8 °C)   08/10/17 97.8 °F (36.6 °C) (Oral)     BP Readings from Last 3 Encounters:   11/14/18 124/82   10/17/18 126/88   09/25/18 100/76     Pulse Readings from Last 3 Encounters:   11/14/18 54   10/17/18 66   09/25/18 56     Body mass index is 19.08 kg/m².  @LASTSAO2(3)@    Physical Exam   Constitutional: She is oriented to person, place, and time. She appears well-developed and well-nourished. No distress.   HENT:   Head: Normocephalic and atraumatic.   Right Ear: External ear normal.   Left Ear: External ear normal.   Nose: Nose normal.   Mouth/Throat: Oropharynx is clear and moist.   Eyes: Conjunctivae and EOM are normal. Pupils are equal, round, and reactive to light.   Neck: Normal range of motion. Neck supple.   Cardiovascular: Normal rate, regular rhythm, normal heart sounds and intact distal pulses.   Pulmonary/Chest: Effort normal and breath sounds normal. No respiratory distress. She has no wheezes.   Musculoskeletal: Normal range of motion.   Normal gait   Neurological: She is alert and oriented to person, place, and time.   Skin: Skin is warm and dry.   Psychiatric: She has a normal mood and affect. Her behavior is normal. Judgment and thought content normal.   Nursing note and vitals reviewed.      Results for orders placed or performed in visit on 10/17/18   Urine Culture - Urine, Urine, Clean Catch   Result Value Ref Range    Urine Culture Final report (A)     Result 1 Klebsiella pneumoniae (A)     Susceptibility Testing Comment    POCT urinalysis dipstick, automated   Result Value Ref Range    Color Yellow Yellow, Straw, Dark Yellow, Betty    Clarity, UA Cloudy (A) Clear    Specific Gravity  1.015 1.005 - 1.030    pH, Urine 6.0 5.0 - 8.0    Leukocytes Small (1+) (A) Negative    Nitrite, UA Positive (A) Negative    Protein, POC Negative Negative mg/dL    Glucose, UA  Negative Negative, 1000 mg/dL (3+) mg/dL    Ketones, UA Negative Negative    Urobilinogen, UA Normal Normal    Bilirubin Negative Negative    Blood, UA Negative Negative           Kaylin was seen today for follow-up and hypertension.    Diagnoses and all orders for this visit:    Urge incontinence  -     oxybutynin (DITROPAN) 5 MG tablet; Take 1 tablet by mouth 2 (Two) Times a Day.  -     POC Urinalysis Dipstick, Automated    Mixed anxiety depressive disorder  -     diazePAM (VALIUM) 2 MG tablet; Take 0.5 tablets by mouth 2 (Two) Times a Day As Needed for Anxiety.    Chronic low back pain without sciatica, unspecified back pain laterality    Insomnia, unspecified type    Need for vaccination  -     Pneumococcal Conjugate Vaccine 13-Valent All          Outpatient Medications Prior to Visit   Medication Sig Dispense Refill   • albuterol (PROVENTIL HFA;VENTOLIN HFA) 108 (90 BASE) MCG/ACT inhaler Inhale 2 puffs every 4 (four) hours as needed for wheezing. 18 g 11   • atorvastatin (LIPITOR) 10 MG tablet TAKE 1 TABLET BY MOUTH DAILY 90 tablet 3   • budesonide-formoterol (SYMBICORT) 160-4.5 MCG/ACT inhaler Inhale 2 puffs 2 (two) times a day. 1 inhaler 12   • clotrimazole-betamethasone (LOTRISONE) 1-0.05 % cream Apply  topically 2 (Two) Times a Day. 45 g 0   • DULoxetine (CYMBALTA) 30 MG capsule Take 2 capsules by mouth Daily. 90 capsule 0   • ELIQUIS 5 MG tablet tablet TAKE 1 TABLET BY MOUTH EVERY 12 HOURS 180 tablet 0   • esomeprazole (nexIUM) 40 MG capsule Take 1 capsule by mouth Every Morning Before Breakfast. 90 capsule 2   • fluticasone (FLONASE) 50 MCG/ACT nasal spray 2 sprays into the nostril(s) as directed by provider Daily for 30 days. Administer 2 sprays in each nostril for each dose. 1 bottle 2   • levothyroxine (SYNTHROID, LEVOTHROID) 50 MCG tablet TAKE 1 TABLET BY MOUTH DAILY 90 tablet 1   • metoprolol tartrate (LOPRESSOR) 100 MG tablet Take 1 tablet by mouth 2 (Two) Times a Day. 180 tablet 2   • metoprolol  tartrate (LOPRESSOR) 50 MG tablet Take 1 tablet by mouth Every 12 (Twelve) Hours. 60 tablet    • ondansetron (ZOFRAN) 8 MG tablet Take 1 tablet by mouth Every 8 (Eight) Hours As Needed for Nausea or Vomiting. 20 tablet 2   • oxyCODONE-acetaminophen (PERCOCET)  MG per tablet Take 1 tablet by mouth Every 12 (Twelve) Hours As Needed for Severe Pain . 30 tablet 0   • sertraline (ZOLOFT) 50 MG tablet      • traZODone (DESYREL) 50 MG tablet TAKE 1 TABLET BY MOUTH EVERY NIGHT 90 tablet 0   • varenicline (CHANTIX STARTING MONTH PAK) 0.5 MG X 11 & 1 MG X 42 tablet Take 0.5 mg one daily on days 1-3 and and 0.5 mg twice daily on days 4-7.Then 1 mg twice daily for a total of 12 weeks. 53 tablet 0   • cephalexin (KEFLEX) 500 MG capsule Take 1 capsule by mouth 3 (Three) Times a Day. 21 capsule 0   • diazePAM (VALIUM) 2 MG tablet Take 0.5 tablets by mouth 2 (Two) Times a Day As Needed for Anxiety. 60 tablet 0   • oxyCODONE-acetaminophen (PERCOCET) 7.5-325 MG per tablet Take 1 tablet by mouth.       Facility-Administered Medications Prior to Visit   Medication Dose Route Frequency Provider Last Rate Last Dose   • cyanocobalamin injection 1,000 mcg  1,000 mcg Intramuscular Q28 Days Donna Forte MD       • cyanocobalamin injection 1,000 mcg  1,000 mcg Intramuscular Q28 Days Donna Forte MD       • cyanocobalamin injection 1,000 mcg  1,000 mcg Intramuscular Q28 Days Donna Forte MD   1,000 mcg at 07/03/18 1252   • cyanocobalamin injection 1,000 mcg  1,000 mcg Intramuscular Q28 Days Donna Forte MD         New Medications Ordered This Visit   Medications   • oxybutynin (DITROPAN) 5 MG tablet     Sig: Take 1 tablet by mouth 2 (Two) Times a Day.     Dispense:  60 tablet     Refill:  0   • diazePAM (VALIUM) 2 MG tablet     Sig: Take 0.5 tablets by mouth 2 (Two) Times a Day As Needed for Anxiety.     Dispense:  60 tablet     Refill:  2     [unfilled]  Medications Discontinued  During This Encounter   Medication Reason   • cephalexin (KEFLEX) 500 MG capsule *Therapy completed   • oxyCODONE-acetaminophen (PERCOCET) 7.5-325 MG per tablet *Therapy completed   • diazePAM (VALIUM) 2 MG tablet Reorder         Return in about 1 month (around 12/14/2018) for Recheck.

## 2018-11-16 ENCOUNTER — TELEPHONE (OUTPATIENT)
Dept: CARDIOLOGY | Facility: CLINIC | Age: 78
End: 2018-11-16

## 2018-11-16 NOTE — TELEPHONE ENCOUNTER
Received a PA request for pt's Eliquis.  I called to discuss with pt she reports that she has enough pills to carry her until Mon. She will contact her ins company to see if she is in the donut hole, because the insurance just all of a sudden is not paying for the medication.   I informed her that she should also ask about cheaper alternatives.  Pt will call back on mon.  If she is in the donut hole we can give her samples to cover her until the beginning of the year.

## 2018-11-19 DIAGNOSIS — G89.29 CHRONIC LOW BACK PAIN WITHOUT SCIATICA, UNSPECIFIED BACK PAIN LATERALITY: ICD-10-CM

## 2018-11-19 DIAGNOSIS — M54.50 CHRONIC LOW BACK PAIN WITHOUT SCIATICA, UNSPECIFIED BACK PAIN LATERALITY: ICD-10-CM

## 2018-11-19 DIAGNOSIS — F41.8 MIXED ANXIETY DEPRESSIVE DISORDER: ICD-10-CM

## 2018-11-19 LAB
BACTERIA UR CULT: ABNORMAL
BACTERIA UR CULT: ABNORMAL
OTHER ANTIBIOTIC SUSC ISLT: ABNORMAL

## 2018-11-19 RX ORDER — DULOXETIN HYDROCHLORIDE 30 MG/1
30 CAPSULE, DELAYED RELEASE ORAL DAILY
Qty: 90 CAPSULE | Refills: 0 | Status: SHIPPED | OUTPATIENT
Start: 2018-11-19 | End: 2018-11-26 | Stop reason: SDUPTHER

## 2018-11-19 NOTE — TELEPHONE ENCOUNTER
Pt will be coming to  samples in [place] today.      Note: Pt did contact her ins and is in the donut hole.  Pt will be sampled until her appt with Dr. Calero on 12/11.  She will discuss about switching to Xarelto and if it will be cheaper.  She is also going to research her insurance options during open enrollment.

## 2018-11-21 ENCOUNTER — TELEPHONE (OUTPATIENT)
Dept: INTERNAL MEDICINE | Facility: CLINIC | Age: 78
End: 2018-11-21

## 2018-11-21 RX ORDER — CEPHALEXIN 500 MG/1
500 CAPSULE ORAL 2 TIMES DAILY
Qty: 14 CAPSULE | Refills: 0 | Status: SHIPPED | OUTPATIENT
Start: 2018-11-21 | End: 2018-12-11

## 2018-11-21 NOTE — TELEPHONE ENCOUNTER
----- Message from Donna Forte MD sent at 11/21/2018 12:09 PM EST -----  Call pt about labs.  Treat with keflex 500mg bid x 7 days.    Pt given results and meds sent to pharmacy dg

## 2018-11-26 DIAGNOSIS — G89.29 CHRONIC LOW BACK PAIN WITHOUT SCIATICA, UNSPECIFIED BACK PAIN LATERALITY: ICD-10-CM

## 2018-11-26 DIAGNOSIS — M54.50 CHRONIC LOW BACK PAIN WITHOUT SCIATICA, UNSPECIFIED BACK PAIN LATERALITY: ICD-10-CM

## 2018-11-26 DIAGNOSIS — F41.8 MIXED ANXIETY DEPRESSIVE DISORDER: ICD-10-CM

## 2018-11-26 RX ORDER — DULOXETIN HYDROCHLORIDE 30 MG/1
30 CAPSULE, DELAYED RELEASE ORAL DAILY
Qty: 90 CAPSULE | Refills: 0 | Status: SHIPPED | OUTPATIENT
Start: 2018-11-26 | End: 2019-02-11

## 2018-11-26 RX ORDER — DULOXETIN HYDROCHLORIDE 60 MG/1
60 CAPSULE, DELAYED RELEASE ORAL DAILY
Qty: 90 CAPSULE | Refills: 1 | Status: SHIPPED | OUTPATIENT
Start: 2018-11-26 | End: 2019-02-11 | Stop reason: SDUPTHER

## 2018-12-11 ENCOUNTER — OFFICE VISIT (OUTPATIENT)
Dept: CARDIOLOGY | Facility: CLINIC | Age: 78
End: 2018-12-11

## 2018-12-11 ENCOUNTER — OFFICE VISIT (OUTPATIENT)
Dept: INTERNAL MEDICINE | Facility: CLINIC | Age: 78
End: 2018-12-11

## 2018-12-11 ENCOUNTER — TRANSCRIBE ORDERS (OUTPATIENT)
Dept: ADMINISTRATIVE | Facility: HOSPITAL | Age: 78
End: 2018-12-11

## 2018-12-11 VITALS
BODY MASS INDEX: 19.26 KG/M2 | HEIGHT: 61 IN | HEART RATE: 70 BPM | RESPIRATION RATE: 16 BRPM | TEMPERATURE: 97.8 F | WEIGHT: 102 LBS | DIASTOLIC BLOOD PRESSURE: 78 MMHG | SYSTOLIC BLOOD PRESSURE: 128 MMHG | OXYGEN SATURATION: 98 %

## 2018-12-11 VITALS
BODY MASS INDEX: 19.63 KG/M2 | DIASTOLIC BLOOD PRESSURE: 80 MMHG | SYSTOLIC BLOOD PRESSURE: 128 MMHG | WEIGHT: 104 LBS | HEART RATE: 64 BPM | HEIGHT: 61 IN

## 2018-12-11 DIAGNOSIS — R82.998 LEUKOCYTES IN URINE: ICD-10-CM

## 2018-12-11 DIAGNOSIS — I25.10 CORONARY ARTERY DISEASE INVOLVING NATIVE CORONARY ARTERY OF NATIVE HEART WITHOUT ANGINA PECTORIS: ICD-10-CM

## 2018-12-11 DIAGNOSIS — I48.0 PAF (PAROXYSMAL ATRIAL FIBRILLATION) (HCC): Primary | ICD-10-CM

## 2018-12-11 DIAGNOSIS — N39.41 URGE INCONTINENCE: ICD-10-CM

## 2018-12-11 DIAGNOSIS — Z12.31 ENCOUNTER FOR SCREENING MAMMOGRAM FOR BREAST CANCER: ICD-10-CM

## 2018-12-11 DIAGNOSIS — Z00.00 MEDICARE ANNUAL WELLNESS VISIT, INITIAL: Primary | ICD-10-CM

## 2018-12-11 DIAGNOSIS — G47.9 DIFFICULTY SLEEPING: ICD-10-CM

## 2018-12-11 DIAGNOSIS — F17.200 TOBACCO DEPENDENCE SYNDROME: ICD-10-CM

## 2018-12-11 DIAGNOSIS — I10 ESSENTIAL HYPERTENSION: ICD-10-CM

## 2018-12-11 DIAGNOSIS — H25.9 AGE-RELATED CATARACT OF BOTH EYES, UNSPECIFIED AGE-RELATED CATARACT TYPE: ICD-10-CM

## 2018-12-11 DIAGNOSIS — I48.0 PAF (PAROXYSMAL ATRIAL FIBRILLATION) (HCC): ICD-10-CM

## 2018-12-11 DIAGNOSIS — E03.9 ACQUIRED HYPOTHYROIDISM: ICD-10-CM

## 2018-12-11 DIAGNOSIS — I71.60 THORACOABDOMINAL AORTIC ANEURYSM (TAAA) WITHOUT RUPTURE (HCC): ICD-10-CM

## 2018-12-11 DIAGNOSIS — G89.29 CHRONIC LOW BACK PAIN WITHOUT SCIATICA, UNSPECIFIED BACK PAIN LATERALITY: ICD-10-CM

## 2018-12-11 DIAGNOSIS — I38 VALVULAR HEART DISEASE: ICD-10-CM

## 2018-12-11 DIAGNOSIS — I77.9 PERIPHERAL ARTERIAL OCCLUSIVE DISEASE (HCC): ICD-10-CM

## 2018-12-11 DIAGNOSIS — E78.00 HYPERCHOLESTEROLEMIA: ICD-10-CM

## 2018-12-11 DIAGNOSIS — F41.8 MIXED ANXIETY DEPRESSIVE DISORDER: ICD-10-CM

## 2018-12-11 DIAGNOSIS — M54.50 CHRONIC LOW BACK PAIN WITHOUT SCIATICA, UNSPECIFIED BACK PAIN LATERALITY: ICD-10-CM

## 2018-12-11 DIAGNOSIS — R73.03 PREDIABETES: ICD-10-CM

## 2018-12-11 DIAGNOSIS — Z78.0 POST-MENOPAUSAL: ICD-10-CM

## 2018-12-11 LAB
ALBUMIN SERPL-MCNC: 4.5 G/DL (ref 3.5–5.2)
ALBUMIN/GLOB SERPL: 1.4 G/DL
ALP SERPL-CCNC: 113 U/L (ref 40–129)
ALT SERPL-CCNC: 9 U/L (ref 5–33)
AST SERPL-CCNC: 18 U/L (ref 5–32)
BASOPHILS # BLD AUTO: 0.08 10*3/MM3 (ref 0–0.2)
BASOPHILS NFR BLD AUTO: 1.1 % (ref 0–2)
BILIRUB BLD-MCNC: NEGATIVE MG/DL
BILIRUB SERPL-MCNC: 0.4 MG/DL (ref 0.2–1.2)
BUN SERPL-MCNC: 17 MG/DL (ref 8–23)
BUN/CREAT SERPL: 22.7 (ref 7–25)
CALCIUM SERPL-MCNC: 9 MG/DL (ref 8.8–10.5)
CHLORIDE SERPL-SCNC: 100 MMOL/L (ref 98–107)
CLARITY, POC: ABNORMAL
CO2 SERPL-SCNC: 30.3 MMOL/L (ref 22–29)
COLOR UR: ABNORMAL
CREAT SERPL-MCNC: 0.75 MG/DL (ref 0.57–1)
EOSINOPHIL # BLD AUTO: 0.11 10*3/MM3 (ref 0.1–0.3)
EOSINOPHIL NFR BLD AUTO: 1.5 % (ref 0–4)
ERYTHROCYTE [DISTWIDTH] IN BLOOD BY AUTOMATED COUNT: 13.3 % (ref 11.5–14.5)
GLOBULIN SER CALC-MCNC: 3.2 GM/DL
GLUCOSE SERPL-MCNC: 85 MG/DL (ref 65–99)
GLUCOSE UR STRIP-MCNC: NEGATIVE MG/DL
HBA1C MFR BLD: 5.9 % (ref 4.8–5.6)
HCT VFR BLD AUTO: 43.2 % (ref 37–47)
HGB BLD-MCNC: 13.8 G/DL (ref 12–16)
IMM GRANULOCYTES # BLD: 0.02 10*3/MM3 (ref 0–0.03)
IMM GRANULOCYTES NFR BLD: 0.3 % (ref 0–0.5)
KETONES UR QL: NEGATIVE
LEUKOCYTE EST, POC: ABNORMAL
LYMPHOCYTES # BLD AUTO: 2.39 10*3/MM3 (ref 0.6–4.8)
LYMPHOCYTES NFR BLD AUTO: 32.1 % (ref 20–45)
MCH RBC QN AUTO: 30.3 PG (ref 27–31)
MCHC RBC AUTO-ENTMCNC: 31.9 G/DL (ref 31–37)
MCV RBC AUTO: 94.9 FL (ref 81–99)
MONOCYTES # BLD AUTO: 0.47 10*3/MM3 (ref 0–1)
MONOCYTES NFR BLD AUTO: 6.3 % (ref 3–8)
NEUTROPHILS # BLD AUTO: 4.38 10*3/MM3 (ref 1.5–8.3)
NEUTROPHILS NFR BLD AUTO: 58.7 % (ref 45–70)
NITRITE UR-MCNC: NEGATIVE MG/ML
NRBC BLD AUTO-RTO: 0 /100 WBC (ref 0–0)
PH UR: 7 [PH] (ref 5–8)
PLATELET # BLD AUTO: 218 10*3/MM3 (ref 140–500)
POTASSIUM SERPL-SCNC: 5.3 MMOL/L (ref 3.5–5.2)
PROT SERPL-MCNC: 7.7 G/DL (ref 6–8.5)
PROT UR STRIP-MCNC: NEGATIVE MG/DL
RBC # BLD AUTO: 4.55 10*6/MM3 (ref 4.2–5.4)
RBC # UR STRIP: NEGATIVE /UL
SODIUM SERPL-SCNC: 142 MMOL/L (ref 136–145)
SP GR UR: 1.01 (ref 1–1.03)
T4 FREE SERPL-MCNC: 1.12 NG/DL (ref 0.93–1.7)
UROBILINOGEN UR QL: NORMAL
WBC # BLD AUTO: 7.45 10*3/MM3 (ref 4.8–10.8)

## 2018-12-11 PROCEDURE — 99213 OFFICE O/P EST LOW 20 MIN: CPT | Performed by: INTERNAL MEDICINE

## 2018-12-11 PROCEDURE — 93000 ELECTROCARDIOGRAM COMPLETE: CPT | Performed by: INTERNAL MEDICINE

## 2018-12-11 PROCEDURE — 96160 PT-FOCUSED HLTH RISK ASSMT: CPT | Performed by: INTERNAL MEDICINE

## 2018-12-11 PROCEDURE — 81003 URINALYSIS AUTO W/O SCOPE: CPT | Performed by: INTERNAL MEDICINE

## 2018-12-11 PROCEDURE — 99397 PER PM REEVAL EST PAT 65+ YR: CPT | Performed by: INTERNAL MEDICINE

## 2018-12-11 PROCEDURE — 99214 OFFICE O/P EST MOD 30 MIN: CPT | Performed by: INTERNAL MEDICINE

## 2018-12-11 PROCEDURE — G0439 PPPS, SUBSEQ VISIT: HCPCS | Performed by: INTERNAL MEDICINE

## 2018-12-11 RX ORDER — DIAZEPAM 2 MG/1
1 TABLET ORAL 2 TIMES DAILY PRN
Qty: 30 TABLET | Refills: 2
Start: 2018-12-11 | End: 2019-05-06

## 2018-12-11 RX ORDER — TRAZODONE HYDROCHLORIDE 50 MG/1
50 TABLET ORAL NIGHTLY
Qty: 90 TABLET | Refills: 1 | Status: SHIPPED | OUTPATIENT
Start: 2018-12-11 | End: 2019-03-13

## 2018-12-11 NOTE — PROGRESS NOTES
"Date of Office Visit: 2018  Encounter Provider: Mo Calero MD  Place of Service: [facility] CARDIOLOGY  Patient Name: Kaylin Ojeda  :1940    Chief Complaint   Patient presents with   • Atrial Fibrillation     9 MONTH FOLLOW UP    :     HPI: Kaylin Ojeda is a 78 y.o. female who presents today to follow-up.     She is a chronically ill woman with history of refractory hypertension and severe peripheral vascular disease. In May 2015, she was found to have single-vessel coronary artery disease of the circumflex and had a stent placed. Over the next two years, I had to progressively cut back on her anti-hypertensives due to low blood pressure (presumably from weight loss).      In 2017 she developed anginal chest pressure and was found to have rapid atrial fibrillation.  She cardioverted on her own and her metoprolol dose was increased.  An echo showed normal LV systolic function, moderate TR, and mild AI/MR.  She ruled out for ACS.  She was placed on apixaban.    She has not had palpitations or chest pain.  She had to stop aspirin; she has had nosebleeds.    She has known PAD, and a small AAA.  A CTA in 2017 showed that it was 3.6cm.  In May 2018, it was 3.8cm.  There was known celiac disease (\"moderate\"), known left renal artery occlusion, and known left subclavian artery occlusion.    She is generally doing okay.  She has fatigue but no new concerning cardiac symptoms.    Past Medical History:   Diagnosis Date   • Abdominal pain, epigastric     Chronic, unclear cause, improved   • Anorexia    • Anxiety    • Arthritis    • CAD (coronary artery disease)     Cath 2015: nonobstructive LAD/RCA disease, 90% mid LCx s/p [id number] Xience   • Cellulitis of leg    • Cervical disc disease    • Chronic fatigue    • Colitis    • COPD (chronic obstructive pulmonary disease) (CMS/[facility])    • Depression    • Erosive gastritis    • Fibromyalgia    • Frequency of " urination 6/9/2017   • Gastritis    • Hypercholesterolemia 2/2/2016   • Hyperglycemia    • Hypertension     History of refractory hypertension which improved significantly   • Insomnia    • Leukocytes in urine    • Lower back pain    • Mediastinal lymphadenopathy    • 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HCC)    • Mononucleosis    • Occlusion and stenosis of carotid artery    • Onychomycosis    • Orbit fracture (CMS/HCC)    • PAF (paroxysmal atrial fibrillation) (CMS/HCC)    • Peripheral arterial disease (CMS/HCC)     Significant (carotid, subclavian, lower extremities), with claudication, medical therapy only   • Pernicious anemia    • Prediabetes    • Renal artery stenosis (CMS/HCC)     unilateral, with renal atrophy (no intervention required)   • Renal calculi    • Sinus bradycardia     mild, asymptomatic   • TIA (transient ischemic attack)    • UTI (urinary tract infection)    • Valvular heart disease     5/2015: mild-mod AI, mod MR, mod-severe TR.  6/2017: mild AI, mild MR, mod TR   • Vision problem    • Vitamin B 12 deficiency        Past Surgical History:   Procedure Laterality Date   • APPENDECTOMY     • BREAST SURGERY     • CARDIAC CATHETERIZATION  05/2015    nonobs LAD/RCA disease, 90% mid LCx s/p [id number] Xience   • CERVICAL FUSION  1997   • CHOLECYSTECTOMY     • CORONARY STENT PLACEMENT     • HYSTERECTOMY  1995   • KNEE SURGERY Right 2005   • KNEE SURGERY Left 1998       Social History     Socioeconomic History   • Marital status:      Spouse name: Willie   • Number of children: 3   • Years of education: Some College   • Highest education level: Not on file   Social Needs   • Financial resource strain: Not on file   • Food insecurity - worry: Not on file   • Food insecurity - inability: Not on file   • Transportation needs - medical: Not on file   • Transportation needs - non-medical: Not on file   Occupational History     Employer: RETIRED   Tobacco Use   • Smoking status: Current Every Day Smoker     " Packs/day: 0.50     Types: Cigarettes, Electronic Cigarette   • Smokeless tobacco: Never Used   • Tobacco comment: Pt inquired about nicotine patches.  2 cups of coffee in the morning.    Substance and Sexual Activity   • Alcohol use: No     Comment: OCCASIONAL/ TWICE A YEAR.    • Drug use: No   • Sexual activity: Defer   Other Topics Concern   • Not on file   Social History Narrative   • Not on file       Family History   Problem Relation Age of Onset   • Asthma Mother    • Emphysema Mother    • Graves' disease Mother    • Heart disease Mother    • Hypertension Mother    • Emphysema Father    • Hypertension Father    • Heart disease Father    • Kidney disease Sister    • Lupus Daughter         Systemic erythematosus   • Arthritis Other    • Crohn's disease Other        Review of Systems   Constitution: Positive for malaise/fatigue.   Cardiovascular: Negative for chest pain, leg swelling and palpitations.   Respiratory: Negative for shortness of breath.    Neurological: Negative for dizziness and light-headedness.   Psychiatric/Behavioral: Positive for depression.   All other systems reviewed and are negative.      Allergies   Allergen Reactions   • Aleve [Naproxen Sodium] Shortness Of Breath   • Codeine Other (See Comments)     hyperactivity   • Ibuprofen Unknown (See Comments)     unk   • Sulfa Antibiotics Unknown (See Comments)     \"I can't remember\"         Current Outpatient Medications:   •  albuterol (PROVENTIL HFA;VENTOLIN HFA) 108 (90 BASE) MCG/ACT inhaler, Inhale 2 puffs every 4 (four) hours as needed for wheezing., Disp: 18 g, Rfl: 11  •  atorvastatin (LIPITOR) 10 MG tablet, TAKE 1 TABLET BY MOUTH DAILY, Disp: 90 tablet, Rfl: 3  •  budesonide-formoterol (SYMBICORT) 160-4.5 MCG/ACT inhaler, Inhale 2 puffs 2 (two) times a day., Disp: 1 inhaler, Rfl: 12  •  clotrimazole-betamethasone (LOTRISONE) 1-0.05 % cream, Apply  topically 2 (Two) Times a Day., Disp: 45 g, Rfl: 0  •  DULoxetine (CYMBALTA) 30 MG " capsule, Take 1 capsule by mouth Daily., Disp: 90 capsule, Rfl: 0  •  DULoxetine (CYMBALTA) 60 MG capsule, Take 1 capsule by mouth Daily., Disp: 90 capsule, Rfl: 1  •  ELIQUIS 5 MG tablet tablet, TAKE 1 TABLET BY MOUTH EVERY 12 HOURS, Disp: 180 tablet, Rfl: 0  •  esomeprazole (nexIUM) 40 MG capsule, Take 1 capsule by mouth Every Morning Before Breakfast., Disp: 90 capsule, Rfl: 2  •  levothyroxine (SYNTHROID, LEVOTHROID) 50 MCG tablet, TAKE 1 TABLET BY MOUTH DAILY, Disp: 90 tablet, Rfl: 1  •  metoprolol tartrate (LOPRESSOR) 100 MG tablet, Take 1 tablet by mouth 2 (Two) Times a Day., Disp: 180 tablet, Rfl: 2  •  metoprolol tartrate (LOPRESSOR) 50 MG tablet, Take 1 tablet by mouth Every 12 (Twelve) Hours., Disp: 60 tablet, Rfl:   •  ondansetron (ZOFRAN) 8 MG tablet, Take 1 tablet by mouth Every 8 (Eight) Hours As Needed for Nausea or Vomiting., Disp: 20 tablet, Rfl: 2  •  oxybutynin (DITROPAN) 5 MG tablet, Take 1 tablet by mouth 2 (Two) Times a Day., Disp: 60 tablet, Rfl: 0  •  oxyCODONE-acetaminophen (PERCOCET)  MG per tablet, Take 1 tablet by mouth Every 12 (Twelve) Hours As Needed for Severe Pain ., Disp: 30 tablet, Rfl: 0  •  diazePAM (VALIUM) 2 MG tablet, Take 0.5 tablets by mouth 2 (Two) Times a Day As Needed for Anxiety., Disp: 30 tablet, Rfl: 2  •  traZODone (DESYREL) 50 MG tablet, Take 1 tablet by mouth Every Night., Disp: 90 tablet, Rfl: 1  •  varenicline (CHANTIX STARTING MONTH PAK) 0.5 MG X 11 & 1 MG X 42 tablet, Take 0.5 mg one daily on days 1-3 and and 0.5 mg twice daily on days 4-7.Then 1 mg twice daily for a total of 12 weeks., Disp: 53 tablet, Rfl: 0    Current Facility-Administered Medications:   •  cyanocobalamin injection 1,000 mcg, 1,000 mcg, Intramuscular, Q28 Days, Donna Forte MD  •  cyanocobalamin injection 1,000 mcg, 1,000 mcg, Intramuscular, Q28 Days, Donna Forte MD  •  cyanocobalamin injection 1,000 mcg, 1,000 mcg, Intramuscular, Q28 Days, Donna Forte  "MD Meng, 1,000 mcg at 07/03/18 1252  •  cyanocobalamin injection 1,000 mcg, 1,000 mcg, Intramuscular, Q28 Days, Jann Donna MD Meng, 1,000 mcg at 11/14/18 1306     Objective:     Vitals:    12/11/18 1111 12/11/18 1130   BP: 146/84 128/80   BP Location: Left arm    Pulse: 64    Weight: 47.2 kg (104 lb)    Height: 154.9 cm (61\")      Body mass index is 19.65 kg/m².    Physical Exam   Constitutional: She is oriented to person, place, and time.   frail   HENT:   Head: Normocephalic.   Nose: Nose normal.   Mouth/Throat: Oropharynx is clear and moist.   Eyes: Conjunctivae and EOM are normal. Pupils are equal, round, and reactive to light.   Neck: Normal range of motion. No JVD present.   Cardiovascular: Normal rate, regular rhythm and intact distal pulses.  Occasional extrasystoles are present.   Murmur heard.   Systolic murmur is present with a grade of 2/6.   Diastolic murmur is present with a grade of 2/6 at the upper left sternal border.  Pulses:       Dorsalis pedis pulses are 1+ on the right side, and 1+ on the left side.        Posterior tibial pulses are 1+ on the right side, and 1+ on the left side.   Pulmonary/Chest: Effort normal and breath sounds normal.   Abdominal: Soft. She exhibits no mass. There is no tenderness.   Musculoskeletal: Normal range of motion. She exhibits no edema.   Lymphadenopathy:     She has no cervical adenopathy.   Neurological: She is alert and oriented to person, place, and time. No cranial nerve deficit.   Skin: Skin is warm and dry. No rash noted.   Psychiatric: She has a normal mood and affect. Her behavior is normal. Judgment and thought content normal.   Vitals reviewed.        ECG 12 Lead  Date/Time: 12/11/2018 12:34 PM  Performed by: Mo Calero MD  Authorized by: Mo Calero MD   Comparison: compared with previous ECG   Similar to previous ECG  Rhythm: sinus rhythm  Ectopy: atrial premature contractions  Conduction: conduction normal  ST Segments: ST " segments normal  T Waves: T waves normal  Other findings: LAE  Clinical impression: abnormal ECG              Assessment:       Diagnosis Plan   1. PAF (paroxysmal atrial fibrillation) (CMS/HCC)     2. Coronary artery disease involving native coronary artery of native heart without angina pectoris     3. Essential hypertension     4. Peripheral arterial occlusive disease (CMS/HCC)     5. Thoracoabdominal aortic aneurysm (TAAA) without rupture (CMS/HCC)     6. Valvular heart disease  Adult Transthoracic Echo Complete W/ Cont if Necessary Per Protocol          Plan:       1.  Atrial Fibrillation and Atrial Flutter  Assessment  • The patient has paroxysmal atrial fibrillation  • This is non-valvular in etiology  • The patient's CHADS2-VASc score is 5  • A [id number] score of 2 or more is considered a high risk for a thromboembolic event  • Apixaban prescribed    Plan  • Attempt to maintain sinus rhythm  • Continue apixaban for antithrombotic therapy, bleeding issues discussed  • Continue beta blocker for rhythm control    2.  Coronary Artery Disease  Assessment  • The patient has no angina  • She is no longer on aspirin or clopidogrel as she's anticoagulated and had bad epistaxis.      Subjective - Objective  • There has been a previous stent procedure using THELMA 2015        3.  Her BP is stable, especially for her frail size.    4/5.  She has mesenteric disease, renal artery stenosis, carotid artery disease, a small AAA (3.8cm in May 2018), and PAD.  She's on atorvastatin.  Her symptoms are stable.    6.  She had mild AI/mild MR/mod TR by echo from June 2017.  The aortic valve was calcified.  She has a diastolic murmur that is quite pronounced so I'm going to repeat the echo.     Sincerely,       Mo Calero MD

## 2018-12-11 NOTE — PATIENT INSTRUCTIONS
Medicare Wellness  Personal Prevention Plan of Service     Date of Office Visit:  2018  Encounter Provider:  Donna Forte MD  Place of Service:  [facility] INTERNAL MED AND PEDS  Patient Name: Kaylin Ojeda  :  1940    As part of the Medicare Wellness portion of your visit today, we are providing you with this personalized preventive plan of services (PPPS). This plan is based upon recommendations of the [place] Preventive Services Task Force (USPSTF) and the Advisory Committee on Immunization Practices (ACIP).    This lists the preventive care services that should be considered, and provides dates of when you are due. Items listed as completed are up-to-date and do not require any further intervention.    Health Maintenance   Topic Date Due   • HEPATITIS A VACCINE ADULT (1 of 2) 1960   • TDAP/TD VACCINES (1 - Tdap) 1961   • ZOSTER VACCINE (1 of 2) 1992   • COLOGUARD  2016   • MAMMOGRAM  2016   • MEDICARE ANNUAL WELLNESS  2018   • DXA SCAN  2018   • LIPID PANEL  2019   • PNEUMOCOCCAL VACCINES (65+ LOW/MEDIUM RISK) (2 of 2 - PPSV23) 2019   • INFLUENZA VACCINE  Completed       Orders Placed This Encounter   Procedures   • Urine Culture - Urine, Urine, Clean Catch   • Mammo Screening Bilateral With CAD     Standing Status:   Future     Standing Expiration Date:   2019     Order Specific Question:   Reason for Exam:     Answer:   screening   • DEXA Bone Density Axial     Standing Status:   Future     Standing Expiration Date:   2019     Order Specific Question:   Reason for Exam:     Answer:   post menopausal   • Comprehensive Metabolic Panel   • T4, Free   • Hemoglobin A1c   • POCT urinalysis dipstick, automated   • CBC & Differential     Order Specific Question:   Manual Differential     Answer:   No       Return in about 3 months (around 3/11/2019) for Recheck, labs.

## 2018-12-11 NOTE — PROGRESS NOTES
QUICK REFERENCE INFORMATION:  The ABCs of the Annual Wellness Visit    Initial Medicare Wellness Visit    HEALTH RISK ASSESSMENT    1940    Recent Hospitalizations:  No hospitalization(s) within the last year..        Current Medical Providers:  Patient Care Team:  Donna Forte MD as PCP - General (Internal Medicine & Pediatrics)  Donna Forte MD as PCP - Family Medicine  Estelle Rendon MD as Consulting Physician (Hematology and Oncology)  Chris Jimenez MD as Referring Physician (Family Medicine)        Smoking Status:  Social History     Tobacco Use   Smoking Status Current Every Day Smoker   • Packs/day: 0.50   • Types: Cigarettes, Electronic Cigarette   Smokeless Tobacco Never Used   Tobacco Comment    Pt inquired about nicotine patches.  2 cups of coffee in the morning.        Alcohol Consumption:  Social History     Substance and Sexual Activity   Alcohol Use No    Comment: OCCASIONAL/ TWICE A YEAR.        Depression Screen:   PHQ-2/PHQ-9 Depression Screening 12/11/2018   Little interest or pleasure in doing things 2   Feeling down, depressed, or hopeless 1   Trouble falling or staying asleep, or sleeping too much 1   Feeling tired or having little energy 3   Poor appetite or overeating 2   Feeling bad about yourself - or that you are a failure or have let yourself or your family down 0   Trouble concentrating on things, such as reading the newspaper or watching television 1   Moving or speaking so slowly that other people could have noticed. Or the opposite - being so fidgety or restless that you have been moving around a lot more than usual 0   Thoughts that you would be better off dead, or of hurting yourself in some way 0   Total Score 10   If you checked off any problems, how difficult have these problems made it for you to do your work, take care of things at home, or get along with other people? Somewhat difficult       Health Habits and Functional and Cognitive  Screening:  Functional & Cognitive Status 12/11/2018   Do you have difficulty preparing food and eating? Yes   Do you have difficulty bathing yourself, getting dressed or grooming yourself? No   Do you have difficulty using the toilet? No   Do you have difficulty moving around from place to place? No   Do you have trouble with steps or getting out of a bed or a chair? Yes   In the past year have you fallen or experienced a near fall? Yes   Current Diet Unhealthy Diet   Dental Exam Up to date   Eye Exam Up to date   Exercise (times per week) 0 times per week   Current Exercise Activities Include None   Do you need help using the phone?  No   Are you deaf or do you have serious difficulty hearing?  No   Do you need help with transportation? No   Do you need help shopping? No   Do you need help preparing meals?  Yes   Do you need help with housework?  Yes   Do you need help with laundry? No   Do you need help taking your medications? Yes   Do you need help managing money? No   Do you ever drive or ride in a car without wearing a seat belt? No   Have you felt unusual stress, anger or loneliness in the last month? Yes   Who do you live with? Child   If you need help, do you have trouble finding someone available to you? No   Have you been bothered in the last four weeks by sexual problems? No   Do you have difficulty concentrating, remembering or making decisions? No           Does the patient have evidence of cognitive impairment? No    Asiprin use counseling: Does not need ASA (and currently is not on it)      Recent Lab Results:    Visual Acuity:  No exam data present    Age-appropriate Screening Schedule:  Refer to the list below for future screening recommendations based on patient's age, sex and/or medical conditions. Orders for these recommended tests are listed in the plan section. The patient has been provided with a written plan.    Health Maintenance   Topic Date Due   • TDAP/TD VACCINES (1 - Tdap) 02/16/1961    • ZOSTER VACCINE (1 of 2) 02/16/1992   • MAMMOGRAM  08/26/2016   • DXA SCAN  12/11/2018   • LIPID PANEL  08/30/2019   • PNEUMOCOCCAL VACCINES (65+ LOW/MEDIUM RISK) (2 of 2 - PPSV23) 11/14/2019   • INFLUENZA VACCINE  Completed        Subjective   History of Present Illness    Kaylin Ojeda is a 78 y.o. female who presents for an Annual Wellness Visit.    The following portions of the patient's history were reviewed and updated as appropriate: allergies, current medications, past family history, past medical history, past social history, past surgical history and problem list.    Outpatient Medications Prior to Visit   Medication Sig Dispense Refill   • albuterol (PROVENTIL HFA;VENTOLIN HFA) 108 (90 BASE) MCG/ACT inhaler Inhale 2 puffs every 4 (four) hours as needed for wheezing. 18 g 11   • atorvastatin (LIPITOR) 10 MG tablet TAKE 1 TABLET BY MOUTH DAILY 90 tablet 3   • budesonide-formoterol (SYMBICORT) 160-4.5 MCG/ACT inhaler Inhale 2 puffs 2 (two) times a day. 1 inhaler 12   • clotrimazole-betamethasone (LOTRISONE) 1-0.05 % cream Apply  topically 2 (Two) Times a Day. 45 g 0   • DULoxetine (CYMBALTA) 30 MG capsule Take 1 capsule by mouth Daily. 90 capsule 0   • DULoxetine (CYMBALTA) 60 MG capsule Take 1 capsule by mouth Daily. 90 capsule 1   • ELIQUIS 5 MG tablet tablet TAKE 1 TABLET BY MOUTH EVERY 12 HOURS 180 tablet 0   • esomeprazole (nexIUM) 40 MG capsule Take 1 capsule by mouth Every Morning Before Breakfast. 90 capsule 2   • levothyroxine (SYNTHROID, LEVOTHROID) 50 MCG tablet TAKE 1 TABLET BY MOUTH DAILY 90 tablet 1   • metoprolol tartrate (LOPRESSOR) 100 MG tablet Take 1 tablet by mouth 2 (Two) Times a Day. 180 tablet 2   • metoprolol tartrate (LOPRESSOR) 50 MG tablet Take 1 tablet by mouth Every 12 (Twelve) Hours. 60 tablet    • ondansetron (ZOFRAN) 8 MG tablet Take 1 tablet by mouth Every 8 (Eight) Hours As Needed for Nausea or Vomiting. 20 tablet 2   • oxybutynin (DITROPAN) 5 MG tablet Take 1 tablet  by mouth 2 (Two) Times a Day. 60 tablet 0   • oxyCODONE-acetaminophen (PERCOCET)  MG per tablet Take 1 tablet by mouth Every 12 (Twelve) Hours As Needed for Severe Pain . 30 tablet 0   • cephalexin (KEFLEX) 500 MG capsule Take 1 capsule by mouth 2 (Two) Times a Day. 14 capsule 0   • diazePAM (VALIUM) 2 MG tablet Take 0.5 tablets by mouth 2 (Two) Times a Day As Needed for Anxiety. 60 tablet 2   • sertraline (ZOLOFT) 50 MG tablet      • traZODone (DESYREL) 50 MG tablet TAKE 1 TABLET BY MOUTH EVERY NIGHT 90 tablet 0   • varenicline (CHANTIX STARTING MONTH PAK) 0.5 MG X 11 & 1 MG X 42 tablet Take 0.5 mg one daily on days 1-3 and and 0.5 mg twice daily on days 4-7.Then 1 mg twice daily for a total of 12 weeks. 53 tablet 0     Facility-Administered Medications Prior to Visit   Medication Dose Route Frequency Provider Last Rate Last Dose   • cyanocobalamin injection 1,000 mcg  1,000 mcg Intramuscular Q28 Days Donna Forte MD       • cyanocobalamin injection 1,000 mcg  1,000 mcg Intramuscular Q28 Days Donna Forte MD       • cyanocobalamin injection 1,000 mcg  1,000 mcg Intramuscular Q28 Days Donna Forte MD   1,000 mcg at 07/03/18 1252   • cyanocobalamin injection 1,000 mcg  1,000 mcg Intramuscular Q28 Days Donna Forte MD   1,000 mcg at 11/14/18 1306       Patient Active Problem List   Diagnosis   • CAD (coronary artery disease)   • Valvular heart disease   • Essential hypertension   • Hypercholesterolemia   • Epigastric pain   • Anemia due to vitamin B12 deficiency   • Loss of appetite   • Anxiety   • Chronic obstructive pulmonary disease (CMS/HCC)   • Mixed anxiety depressive disorder   • Acute erosive gastritis   • Fall in home   • Insomnia   • 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HCC)   • Obstruction of carotid artery   • Peripheral arterial occlusive disease (CMS/HCC)   • Impaired glucose tolerance   • Difficulty sleeping   • Tobacco dependence syndrome   • Iron  deficiency anemia   • Vitamin B12 deficiency anemia due to intrinsic factor deficiency   • Acquired hypothyroidism   • Thoracoabdominal aortic aneurysm (CMS/HCC)   • Dysuria   • Abdominal bloating   • Prediabetes   • Sleep difficulties   • Chronic constipation   • Pernicious anemia   • Frequency of urination   • PAF (paroxysmal atrial fibrillation) (CMS/HCC)   • Moderate single current episode of major depressive disorder (CMS/[facility])   • Chronic low back pain without sciatica   • Urge incontinence       Advance Care Planning:  has an advance directive - a copy HAS NOT been provided. Have asked the patient to send this to us to add to record.    Identification of Risk Factors:  Risk factors include: increased fall risk, chronic pain, inadequate social support, incontinence and polypharmacy.    Review of Systems   Constitutional: Positive for fatigue.   HENT: Negative.    Eyes: Negative.    Respiratory: Negative.    Cardiovascular: Negative.    Gastrointestinal: Negative.    Endocrine: Negative.    Genitourinary: Negative.    Musculoskeletal: Positive for back pain.   Skin: Negative.    Allergic/Immunologic: Negative.    Neurological: Negative.    Hematological: Negative.    Psychiatric/Behavioral: Negative.    All other systems reviewed and are negative.      Compared to one year ago, the patient feels her physical health is the same.  Compared to one year ago, the patient feels her mental health is the same.    Objective     Physical Exam   Constitutional: She is oriented to person, place, and time. No distress.   Thin elderly female, slightly frail appearing   HENT:   Head: Normocephalic and atraumatic.   Right Ear: External ear normal.   Left Ear: External ear normal.   Nose: Nose normal.   Mouth/Throat: Oropharynx is clear and moist.   Eyes: Conjunctivae and EOM are normal. Pupils are equal, round, and reactive to light. Right eye exhibits no discharge. Left eye exhibits no discharge. No scleral icterus.   Neck:  "Normal range of motion. Neck supple. No JVD present. No tracheal deviation present. No thyromegaly present.   Cardiovascular: Normal rate and regular rhythm.   Murmur heard.  Pulmonary/Chest: Effort normal and breath sounds normal. No respiratory distress. She has no wheezes.   Abdominal: Soft. She exhibits no distension and no mass. There is no tenderness. There is no rebound and no guarding.   Musculoskeletal: Normal range of motion. She exhibits no edema or tenderness.   Lymphadenopathy:     She has no cervical adenopathy.   Neurological: She is alert and oriented to person, place, and time. She has normal reflexes. No cranial nerve deficit. She exhibits normal muscle tone.   Skin: Skin is warm and dry. No rash noted. No pallor.   Psychiatric: She has a normal mood and affect. Her behavior is normal. Judgment and thought content normal.   Nursing note and vitals reviewed.      Vitals:    12/11/18 1149   BP: 128/78   BP Location: Right arm   Patient Position: Sitting   Cuff Size: Adult   Pulse: 70   Resp: 16   Temp: 97.8 °F (36.6 °C)   TempSrc: Oral   SpO2: 98%   Weight: 46.3 kg (102 lb)   Height: 154.9 cm (61\")       Patient's Body mass index is 19.27 kg/m². BMI is below normal parameters. Recommendations include: treating the underlying disease process.      Assessment/Plan   Patient Self-Management and Personalized Health Advice  The patient has been provided with information about: diet, exercise, tobacco cessation and fall prevention and preventive services including:   · Bone densitometry screening, Exercise counseling provided, Nutrition counseling provided, Screening mammography, referral placed.    Visit Diagnoses:    ICD-10-CM ICD-9-CM   1. Medicare annual wellness visit, initial Z00.00 V70.0   2. Urge incontinence N39.41 788.31   3. Leukocytes in urine R82.998 791.7   4. Difficulty sleeping G47.9 780.50   5. Mixed anxiety depressive disorder F41.8 300.4   6. Chronic low back pain without sciatica, " unspecified back pain laterality M54.5 724.2    G89.29 338.29   7. Prediabetes R73.03 790.29   8. Tobacco dependence syndrome F17.200 305.1   9. Age-related cataract of both eyes, unspecified age-related cataract type H25.9 366.10   10. PAF (paroxysmal atrial fibrillation) (CMS/HCC) I48.0 427.31   11. Acquired hypothyroidism E03.9 244.9   12. Hypercholesterolemia E78.00 272.0   13. Essential hypertension I10 401.9   14. Valvular heart disease I38 424.90   15. Coronary artery disease involving native coronary artery of native heart without angina pectoris I25.10 414.01   16. Encounter for screening mammogram for breast cancer Z12.31 V76.12   17. Post-menopausal Z78.0 V49.81       Orders Placed This Encounter   Procedures   • Urine Culture - Urine, Urine, Clean Catch   • Mammo Screening Bilateral With CAD     Standing Status:   Future     Standing Expiration Date:   12/12/2019     Order Specific Question:   Reason for Exam:     Answer:   screening   • DEXA Bone Density Axial     Standing Status:   Future     Standing Expiration Date:   12/12/2019     Order Specific Question:   Reason for Exam:     Answer:   post menopausal   • Comprehensive Metabolic Panel   • T4, Free   • Hemoglobin A1c   • POCT urinalysis dipstick, automated   • CBC & Differential     Order Specific Question:   Manual Differential     Answer:   No       Outpatient Encounter Medications as of 12/11/2018   Medication Sig Dispense Refill   • albuterol (PROVENTIL HFA;VENTOLIN HFA) 108 (90 BASE) MCG/ACT inhaler Inhale 2 puffs every 4 (four) hours as needed for wheezing. 18 g 11   • atorvastatin (LIPITOR) 10 MG tablet TAKE 1 TABLET BY MOUTH DAILY 90 tablet 3   • budesonide-formoterol (SYMBICORT) 160-4.5 MCG/ACT inhaler Inhale 2 puffs 2 (two) times a day. 1 inhaler 12   • clotrimazole-betamethasone (LOTRISONE) 1-0.05 % cream Apply  topically 2 (Two) Times a Day. 45 g 0   • diazePAM (VALIUM) 2 MG tablet Take 0.5 tablets by mouth 2 (Two) Times a Day As  Needed for Anxiety. 30 tablet 2   • DULoxetine (CYMBALTA) 30 MG capsule Take 1 capsule by mouth Daily. 90 capsule 0   • DULoxetine (CYMBALTA) 60 MG capsule Take 1 capsule by mouth Daily. 90 capsule 1   • ELIQUIS 5 MG tablet tablet TAKE 1 TABLET BY MOUTH EVERY 12 HOURS 180 tablet 0   • esomeprazole (nexIUM) 40 MG capsule Take 1 capsule by mouth Every Morning Before Breakfast. 90 capsule 2   • levothyroxine (SYNTHROID, LEVOTHROID) 50 MCG tablet TAKE 1 TABLET BY MOUTH DAILY 90 tablet 1   • metoprolol tartrate (LOPRESSOR) 100 MG tablet Take 1 tablet by mouth 2 (Two) Times a Day. 180 tablet 2   • metoprolol tartrate (LOPRESSOR) 50 MG tablet Take 1 tablet by mouth Every 12 (Twelve) Hours. 60 tablet    • ondansetron (ZOFRAN) 8 MG tablet Take 1 tablet by mouth Every 8 (Eight) Hours As Needed for Nausea or Vomiting. 20 tablet 2   • oxybutynin (DITROPAN) 5 MG tablet Take 1 tablet by mouth 2 (Two) Times a Day. 60 tablet 0   • oxyCODONE-acetaminophen (PERCOCET)  MG per tablet Take 1 tablet by mouth Every 12 (Twelve) Hours As Needed for Severe Pain . 30 tablet 0   • traZODone (DESYREL) 50 MG tablet Take 1 tablet by mouth Every Night. 90 tablet 1   • [DISCONTINUED] cephalexin (KEFLEX) 500 MG capsule Take 1 capsule by mouth 2 (Two) Times a Day. 14 capsule 0   • [DISCONTINUED] diazePAM (VALIUM) 2 MG tablet Take 0.5 tablets by mouth 2 (Two) Times a Day As Needed for Anxiety. 60 tablet 2   • [DISCONTINUED] sertraline (ZOLOFT) 50 MG tablet      • [DISCONTINUED] traZODone (DESYREL) 50 MG tablet TAKE 1 TABLET BY MOUTH EVERY NIGHT 90 tablet 0   • varenicline (CHANTIX STARTING MONTH PAK) 0.5 MG X 11 & 1 MG X 42 tablet Take 0.5 mg one daily on days 1-3 and and 0.5 mg twice daily on days 4-7.Then 1 mg twice daily for a total of 12 weeks. 53 tablet 0     Facility-Administered Encounter Medications as of 12/11/2018   Medication Dose Route Frequency Provider Last Rate Last Dose   • cyanocobalamin injection 1,000 mcg  1,000 mcg  Intramuscular Q28 Days Donna Forte MD       • cyanocobalamin injection 1,000 mcg  1,000 mcg Intramuscular Q28 Days Donna Forte MD       • cyanocobalamin injection 1,000 mcg  1,000 mcg Intramuscular Q28 Days Donna Forte MD   1,000 mcg at 07/03/18 1252   • cyanocobalamin injection 1,000 mcg  1,000 mcg Intramuscular Q28 Days Donna Forte MD   1,000 mcg at 11/14/18 1306       Reviewed use of high risk medication in the elderly: yes  Reviewed for potential of harmful drug interactions in the elderly: yes    Follow Up:  Return in about 3 months (around 3/11/2019) for Recheck, labs.     An After Visit Summary and PPPS with all of these plans were given to the patient.

## 2018-12-11 NOTE — PROGRESS NOTES
Kaylin Ojeda is a 78 y.o. female, who presents with a chief complaint of   Chief Complaint   Patient presents with   • Follow-up     1 month f/u    • Urinary Incontinence       HPI   The pt is here for follow up.     She is getting cataract surgery for both eyes coming up and she has more cardiac testing coming up.  She says once all of this is done she is going to go back to [place] to visit.     Depression/anxiety -  she thinks she is doing some better with the higher dose of cymbalta.  this will be her first rocio without her .  She is sad she sold her home.  Her son is trying to help care for her. She is struggling with sleep even with trazodone and melatonin.  she takes a low dose valium nightly to help with sleep as well.  The patient asked about adding ambien and told her we could not add this medication because it was not safe with her already on norco and valium.  We discussed weaning her valium dose     She is still smoking.  she has a chantix rx but didn't try it yet.      She is having issues with urinary incontinence - she started oxybutinin and she thinks it helps.  She says her urine is always dark.  No recent dysuria.      The pt has chronic back pain and sees [facility]. She is on pecocet 10/325.  She says she takes 2-3 per day but her prescription is for 4 a day.  I have talked with her multiple times about trying to cut back on narcotic use.  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  There is a family history of substance abuse.     She saw dr. Calero this am.  Pt has a-fib and is scheduled for echo next week.      The following portions of the patient's history were reviewed and updated as appropriate: allergies, current medications, past family history, past medical history, past social history, past surgical history and problem list.    Allergies: Aleve [naproxen sodium]; Codeine;  Ibuprofen; and Sulfa antibiotics    Review of Systems   Constitutional: Negative.    HENT: Negative.    Eyes: Negative.    Respiratory: Negative.    Cardiovascular: Negative.    Gastrointestinal: Negative.    Endocrine: Negative.    Genitourinary: Negative.    Musculoskeletal: Negative.    Skin: Negative.    Allergic/Immunologic: Negative.    Neurological: Negative.    Hematological: Negative.    Psychiatric/Behavioral: Positive for sleep disturbance.   All other systems reviewed and are negative.            Wt Readings from Last 3 Encounters:   12/11/18 46.3 kg (102 lb)   12/11/18 47.2 kg (104 lb)   11/14/18 45.8 kg (101 lb)     Temp Readings from Last 3 Encounters:   12/11/18 97.8 °F (36.6 °C) (Oral)   03/12/18 97.6 °F (36.4 °C) (Oral)   08/22/17 98.3 °F (36.8 °C)     BP Readings from Last 3 Encounters:   12/11/18 128/78   12/11/18 128/80   11/14/18 124/82     Pulse Readings from Last 3 Encounters:   12/11/18 70   12/11/18 64   11/14/18 54     Body mass index is 19.27 kg/m².  @LASTSAO2(3)@    Physical Exam   Constitutional: She is oriented to person, place, and time. She appears well-developed and well-nourished. No distress.   HENT:   Head: Normocephalic and atraumatic.   Right Ear: External ear normal.   Left Ear: External ear normal.   Nose: Nose normal.   Mouth/Throat: Oropharynx is clear and moist.   Eyes: Conjunctivae and EOM are normal. Pupils are equal, round, and reactive to light.   Neck: Normal range of motion. Neck supple.   Cardiovascular: Normal rate, regular rhythm, normal heart sounds and intact distal pulses.   Pulmonary/Chest: Effort normal and breath sounds normal. No respiratory distress. She has no wheezes.   Musculoskeletal: Normal range of motion.   Normal gait   Neurological: She is alert and oriented to person, place, and time.   Skin: Skin is warm and dry.   Psychiatric: She has a normal mood and affect. Her behavior is normal. Judgment and thought content normal.   Nursing note and  vitals reviewed.      Results for orders placed or performed in visit on 12/11/18   POCT urinalysis dipstick, automated   Result Value Ref Range    Color Dark Yellow Yellow, Straw, Dark Yellow, Betty    Clarity, UA Cloudy (A) Clear    Specific Gravity  1.015 1.005 - 1.030    pH, Urine 7.0 5.0 - 8.0    Leukocytes Small (1+) (A) Negative    Nitrite, UA Negative Negative    Protein, POC Negative Negative mg/dL    Glucose, UA Negative Negative, 1000 mg/dL (3+) mg/dL    Ketones, UA Negative Negative    Urobilinogen, UA Normal Normal    Bilirubin Negative Negative    Blood, UA Negative Negative           Kaylin was seen today for follow-up and urinary incontinence.    Diagnoses and all orders for this visit:    Medicare annual wellness visit, initial    Urge incontinence  -     POCT urinalysis dipstick, automated  -     Urine Culture - Urine, Urine, Clean Catch    Leukocytes in urine  -     Urine Culture - Urine, Urine, Clean Catch    Difficulty sleeping  -     traZODone (DESYREL) 50 MG tablet; Take 1 tablet by mouth Every Night.    Mixed anxiety depressive disorder  -     diazePAM (VALIUM) 2 MG tablet; Take 0.5 tablets by mouth 2 (Two) Times a Day As Needed for Anxiety.    Chronic low back pain without sciatica, unspecified back pain laterality    Prediabetes  -     Comprehensive Metabolic Panel  -     CBC & Differential  -     T4, Free  -     Hemoglobin A1c    Tobacco dependence syndrome    Age-related cataract of both eyes, unspecified age-related cataract type    PAF (paroxysmal atrial fibrillation) (CMS/[facility])  -     Comprehensive Metabolic Panel  -     CBC & Differential  -     T4, Free    Acquired hypothyroidism  -     CBC & Differential  -     T4, Free    Hypercholesterolemia    Essential hypertension  -     Comprehensive Metabolic Panel    Valvular heart disease    Coronary artery disease involving native coronary artery of native heart without angina pectoris    Encounter for screening mammogram for breast  cancer  -     Mammo Screening Bilateral With CAD; Future    Post-menopausal  -     DEXA Bone Density Axial; Future      Work on slowly weaning valium.  Encouraged lower doses of narcotics but this is prescribed by pain management and not me.  No ambien.  Cont other chronic meds. Ov/labs in 3 mo    Outpatient Medications Prior to Visit   Medication Sig Dispense Refill   • albuterol (PROVENTIL HFA;VENTOLIN HFA) 108 (90 BASE) MCG/ACT inhaler Inhale 2 puffs every 4 (four) hours as needed for wheezing. 18 g 11   • atorvastatin (LIPITOR) 10 MG tablet TAKE 1 TABLET BY MOUTH DAILY 90 tablet 3   • budesonide-formoterol (SYMBICORT) 160-4.5 MCG/ACT inhaler Inhale 2 puffs 2 (two) times a day. 1 inhaler 12   • clotrimazole-betamethasone (LOTRISONE) 1-0.05 % cream Apply  topically 2 (Two) Times a Day. 45 g 0   • DULoxetine (CYMBALTA) 30 MG capsule Take 1 capsule by mouth Daily. 90 capsule 0   • DULoxetine (CYMBALTA) 60 MG capsule Take 1 capsule by mouth Daily. 90 capsule 1   • ELIQUIS 5 MG tablet tablet TAKE 1 TABLET BY MOUTH EVERY 12 HOURS 180 tablet 0   • esomeprazole (nexIUM) 40 MG capsule Take 1 capsule by mouth Every Morning Before Breakfast. 90 capsule 2   • levothyroxine (SYNTHROID, LEVOTHROID) 50 MCG tablet TAKE 1 TABLET BY MOUTH DAILY 90 tablet 1   • metoprolol tartrate (LOPRESSOR) 100 MG tablet Take 1 tablet by mouth 2 (Two) Times a Day. 180 tablet 2   • metoprolol tartrate (LOPRESSOR) 50 MG tablet Take 1 tablet by mouth Every 12 (Twelve) Hours. 60 tablet    • ondansetron (ZOFRAN) 8 MG tablet Take 1 tablet by mouth Every 8 (Eight) Hours As Needed for Nausea or Vomiting. 20 tablet 2   • oxybutynin (DITROPAN) 5 MG tablet Take 1 tablet by mouth 2 (Two) Times a Day. 60 tablet 0   • oxyCODONE-acetaminophen (PERCOCET)  MG per tablet Take 1 tablet by mouth Every 12 (Twelve) Hours As Needed for Severe Pain . 30 tablet 0   • cephalexin (KEFLEX) 500 MG capsule Take 1 capsule by mouth 2 (Two) Times a Day. 14 capsule 0   •  diazePAM (VALIUM) 2 MG tablet Take 0.5 tablets by mouth 2 (Two) Times a Day As Needed for Anxiety. 60 tablet 2   • sertraline (ZOLOFT) 50 MG tablet      • traZODone (DESYREL) 50 MG tablet TAKE 1 TABLET BY MOUTH EVERY NIGHT 90 tablet 0   • varenicline (CHANTIX STARTING MONTH PAK) 0.5 MG X 11 & 1 MG X 42 tablet Take 0.5 mg one daily on days 1-3 and and 0.5 mg twice daily on days 4-7.Then 1 mg twice daily for a total of 12 weeks. 53 tablet 0     Facility-Administered Medications Prior to Visit   Medication Dose Route Frequency Provider Last Rate Last Dose   • cyanocobalamin injection 1,000 mcg  1,000 mcg Intramuscular Q28 Days Donna Forte MD       • cyanocobalamin injection 1,000 mcg  1,000 mcg Intramuscular Q28 Days Donna Forte MD       • cyanocobalamin injection 1,000 mcg  1,000 mcg Intramuscular Q28 Days Donna Forte MD   1,000 mcg at 07/03/18 1252   • cyanocobalamin injection 1,000 mcg  1,000 mcg Intramuscular Q28 Days Donna Forte MD   1,000 mcg at 11/14/18 1306     New Medications Ordered This Visit   Medications   • traZODone (DESYREL) 50 MG tablet     Sig: Take 1 tablet by mouth Every Night.     Dispense:  90 tablet     Refill:  1   • diazePAM (VALIUM) 2 MG tablet     Sig: Take 0.5 tablets by mouth 2 (Two) Times a Day As Needed for Anxiety.     Dispense:  30 tablet     Refill:  2     [unfilled]  Medications Discontinued During This Encounter   Medication Reason   • cephalexin (KEFLEX) 500 MG capsule *Therapy completed   • sertraline (ZOLOFT) 50 MG tablet    • traZODone (DESYREL) 50 MG tablet Reorder   • diazePAM (VALIUM) 2 MG tablet          Return in about 3 months (around 3/11/2019) for Recheck, labs.

## 2018-12-13 LAB
BACTERIA UR CULT: NORMAL
BACTERIA UR CULT: NORMAL

## 2018-12-18 ENCOUNTER — TELEPHONE (OUTPATIENT)
Dept: INTERNAL MEDICINE | Facility: CLINIC | Age: 78
End: 2018-12-18

## 2018-12-18 NOTE — TELEPHONE ENCOUNTER
Patient advised of results.     ----- Message from Donna Forte MD sent at 12/14/2018 12:28 PM EST -----  Call pt about labs.  Urine cx neg.  a1c up.  Watch carbs/sugars in diet.  Recheck in 3 mo.

## 2018-12-20 DIAGNOSIS — N39.41 URGE INCONTINENCE: ICD-10-CM

## 2018-12-20 RX ORDER — OXYBUTYNIN CHLORIDE 5 MG/1
TABLET ORAL
Qty: 60 TABLET | Refills: 0 | Status: SHIPPED | OUTPATIENT
Start: 2018-12-20 | End: 2019-01-24 | Stop reason: SDUPTHER

## 2018-12-22 DIAGNOSIS — F41.8 MIXED ANXIETY DEPRESSIVE DISORDER: ICD-10-CM

## 2018-12-23 DIAGNOSIS — G47.9 DIFFICULTY SLEEPING: ICD-10-CM

## 2018-12-26 RX ORDER — TRAZODONE HYDROCHLORIDE 50 MG/1
50 TABLET ORAL NIGHTLY
Qty: 90 TABLET | Refills: 0 | Status: SHIPPED | OUTPATIENT
Start: 2018-12-26 | End: 2019-08-13

## 2019-01-02 ENCOUNTER — APPOINTMENT (OUTPATIENT)
Dept: MAMMOGRAPHY | Facility: HOSPITAL | Age: 79
End: 2019-01-02
Attending: INTERNAL MEDICINE

## 2019-01-11 ENCOUNTER — APPOINTMENT (OUTPATIENT)
Dept: BONE DENSITY | Facility: HOSPITAL | Age: 79
End: 2019-01-11
Attending: INTERNAL MEDICINE

## 2019-01-11 ENCOUNTER — HOSPITAL ENCOUNTER (OUTPATIENT)
Dept: MAMMOGRAPHY | Facility: HOSPITAL | Age: 79
Discharge: HOME | End: 2019-01-11
Attending: INTERNAL MEDICINE | Admitting: INTERNAL MEDICINE

## 2019-01-11 DIAGNOSIS — Z78.0 POST-MENOPAUSAL: ICD-10-CM

## 2019-01-11 DIAGNOSIS — Z12.31 ENCOUNTER FOR SCREENING MAMMOGRAM FOR BREAST CANCER: ICD-10-CM

## 2019-01-11 PROCEDURE — 77067 SCR MAMMO BI INCL CAD: CPT

## 2019-01-11 PROCEDURE — 77063 BREAST TOMOSYNTHESIS BI: CPT

## 2019-01-11 PROCEDURE — 77080 DXA BONE DENSITY AXIAL: CPT

## 2019-01-12 DIAGNOSIS — J30.1 SEASONAL ALLERGIC RHINITIS DUE TO POLLEN: ICD-10-CM

## 2019-01-14 ENCOUNTER — TELEPHONE (OUTPATIENT)
Dept: INTERNAL MEDICINE | Facility: CLINIC | Age: 79
End: 2019-01-14

## 2019-01-14 RX ORDER — FLUTICASONE PROPIONATE 50 MCG
SPRAY, SUSPENSION (ML) NASAL
Qty: 16 ML | Refills: 6 | Status: SHIPPED | OUTPATIENT
Start: 2019-01-14

## 2019-01-14 RX ORDER — ALENDRONATE SODIUM 70 MG/1
70 TABLET ORAL
Qty: 48 TABLET | Refills: 0 | Status: SHIPPED | OUTPATIENT
Start: 2019-01-14 | End: 2020-11-09

## 2019-01-14 NOTE — TELEPHONE ENCOUNTER
Patient advised, agreed to take fosamax.     ----- Message from Donna Forte MD sent at 1/14/2019 11:31 AM EST -----  dexa shows osteoporosis.  rec fosamax weekly if pt hasnt had recent dental work.  If any recent dental work hold off on meds and take ca/vit d daily

## 2019-01-18 DIAGNOSIS — F41.8 MIXED ANXIETY DEPRESSIVE DISORDER: ICD-10-CM

## 2019-01-21 RX ORDER — DIAZEPAM 5 MG/1
TABLET ORAL
Qty: 60 TABLET | Refills: 0 | OUTPATIENT
Start: 2019-01-21

## 2019-01-24 DIAGNOSIS — N39.41 URGE INCONTINENCE: ICD-10-CM

## 2019-01-24 RX ORDER — OXYBUTYNIN CHLORIDE 5 MG/1
TABLET ORAL
Qty: 180 TABLET | Refills: 1 | Status: SHIPPED | OUTPATIENT
Start: 2019-01-24 | End: 2019-02-11 | Stop reason: SDUPTHER

## 2019-01-28 NOTE — DISCHARGE INSTRUCTIONS
Return to the emergency department with worsening symptoms, uncontrolled pain, inability to tolerate oral liquids, fever greater than 101°F not controlled by Tylenol or as needed with emergent concerns.     Subjective   Patient ID: Jacky is a 38 year old male.    Chief Complaint   Patient presents with   • Follow-up     [id number] passed kidney stone.    • Diabetes     follow up      Patient comes in for evaluation of diabetes mellitus type 2, bronchial asthma, hyperlipidemia, allergic rhinitis etc. he has had A1c drawn few days ago which is 7%.  He has gained 3 pounds of weight since his last visit.  He states that he had low back pain on the right side 2 weeks ago for which she used topical heat he also had increased frequency of urination and burning but he did not have discharge or hematuria or fever.  Following week also he had similar pain right low back and associated with urinary symptoms and then he saw a stone that he passed but he was unable to collect it.  He denies hematuria, fever, chills, discharge from urethra.  He was advised to strain the urine in the future so that as and when he can catch his stone we will be able to analyze it and hopefully prevent stone formation.  Eating lots of fluid i.e. water would be a prudent first step.        No past medical history on file.  There is no problem list on file for this patient.    No past surgical history on file.  No family history on file.  Social History     Tobacco Use   • Smoking status: Former Smoker   • Smokeless tobacco: Never Used   Substance Use Topics   • Alcohol use: Yes     Frequency: Never     Comment: occasional    • Drug use: No     Current Outpatient Medications   Medication Sig Dispense Refill   • fluticasone (FLONASE) 50 MCG/ACT nasal spray Spray 2 sprays in each nostril daily. 16 g 6   • pantoprazole (PROTONIX) 40 MG tablet TAKE 1 TABLET BY MOUTH DAILY 30 tablet 0   • insulin glargine (BASAGLAR KWIKPEN) 100 UNIT/ML pen-injector INJECT 40 UNIT DAILY     • fluticasone-vilanterol (BREO ELLIPTA) 200-25 MCG/INH inhaler INHALE 1 PUFF BY MOUTH DAILY     • levalbuterol (XOPENEX HFA) 45 MCG/ACT inhaler INHALE 1 TO 2 PUFFS EVERY 4 TO 6 HOURS AS  NEEDED AND AS DIRECTED.     • metformin (GLUCOPHAGE) 1000 MG tablet TAKE 1 TABLET TWICE DAILY.     • montelukast (SINGULAIR) 10 MG tablet TAKE 1 TABLET DAILY AS DIRECTED.     • HUMALOG KWIKPEN 100 UNIT/ML pen-injector ADMINISTER 10 UNITS UNDER THE SKIN BEFORE MEALS 15 mL 0   • minocycline (DYNACIN) 100 MG tablet Take 1 tablet by mouth daily. 90 tablet 1   • ONETOUCH DELICA LANCETS FINE Misc Test 3 times daily. 100 each 11   • losartan (COZAAR) 50 MG tablet Take 1 tablet by mouth daily. 90 tablet 1   • BD PEN NEEDLE FIORDALIZA U/F 32G X 4 MM Misc USE FOUR TIMES DAILY AS DIRECTED 200 each 2   • blood glucose (IGLUCOSE TEST STRIPS) test strip Test blood sugar *3** times daily as directed. Diagnosis: *e11.9**. Meter: *One touch verio** 100 each 11     No current facility-administered medications for this visit.      ALLERGIES:  Allergies no known allergies  No results found for this visit on 01/28/19.        Review of Systems   Constitutional: Negative.    HENT: Negative.    Eyes: Negative.    Respiratory: Negative.    Cardiovascular: Negative.    Gastrointestinal: Negative.    Endocrine: Negative.    Genitourinary: Negative.    Musculoskeletal: Negative.    Skin: Negative.    Allergic/Immunologic: Negative.    Neurological: Negative.    Hematological: Negative.    Psychiatric/Behavioral: Negative.        Objective   Physical Exam   Constitutional: He is oriented to person, place, and time. He appears well-developed and well-nourished.   HENT:   Head: Normocephalic and atraumatic.   Right Ear: External ear normal.   Left Ear: External ear normal.   Eyes: Conjunctivae and EOM are normal. Pupils are equal, round, and reactive to light.   Neck: Normal range of motion. Neck supple.   Cardiovascular: Normal rate, regular rhythm, normal heart sounds and intact distal pulses.   Pulmonary/Chest: Effort normal and breath sounds normal.   Abdominal: Soft. Bowel sounds are normal.   Genitourinary: Rectum normal, prostate normal and  penis normal.   Musculoskeletal: Normal range of motion.   Neurological: He is alert and oriented to person, place, and time. He has normal reflexes.   Skin: Skin is warm and dry.   Psychiatric: He has a normal mood and affect. His behavior is normal. Judgment and thought content normal.   Nursing note and vitals reviewed.        Assessment   Problem List Items Addressed This Visit     None      Visit Diagnoses     Non-seasonal allergic rhinitis due to other allergic trigger    -  Primary    Relevant Medications    fluticasone (FLONASE) 50 MCG/ACT nasal spray    Essential hypertension        Relevant Medications    losartan (COZAAR) 50 MG tablet            Return in about 12 weeks (around 4/22/2019).

## 2019-01-31 DIAGNOSIS — R11.0 NAUSEA: ICD-10-CM

## 2019-01-31 DIAGNOSIS — K55.1 MESENTERIC ARTERY STENOSIS (HCC): ICD-10-CM

## 2019-01-31 RX ORDER — ONDANSETRON HYDROCHLORIDE 8 MG/1
TABLET, FILM COATED ORAL
Qty: 20 TABLET | Refills: 0 | Status: SHIPPED | OUTPATIENT
Start: 2019-01-31 | End: 2019-11-06 | Stop reason: SDUPTHER

## 2019-02-11 ENCOUNTER — OFFICE VISIT (OUTPATIENT)
Dept: INTERNAL MEDICINE | Facility: CLINIC | Age: 79
End: 2019-02-11

## 2019-02-11 VITALS
BODY MASS INDEX: 19.07 KG/M2 | SYSTOLIC BLOOD PRESSURE: 160 MMHG | OXYGEN SATURATION: 95 % | DIASTOLIC BLOOD PRESSURE: 80 MMHG | TEMPERATURE: 97.8 F | RESPIRATION RATE: 16 BRPM | HEIGHT: 61 IN | WEIGHT: 101 LBS | HEART RATE: 65 BPM

## 2019-02-11 DIAGNOSIS — I48.0 PAF (PAROXYSMAL ATRIAL FIBRILLATION) (HCC): ICD-10-CM

## 2019-02-11 DIAGNOSIS — R73.03 PREDIABETES: Primary | ICD-10-CM

## 2019-02-11 DIAGNOSIS — R32 URINARY INCONTINENCE, UNSPECIFIED TYPE: ICD-10-CM

## 2019-02-11 DIAGNOSIS — N39.41 URGE INCONTINENCE: ICD-10-CM

## 2019-02-11 DIAGNOSIS — F41.8 MIXED ANXIETY DEPRESSIVE DISORDER: ICD-10-CM

## 2019-02-11 DIAGNOSIS — I25.10 CORONARY ARTERY DISEASE INVOLVING NATIVE CORONARY ARTERY OF NATIVE HEART WITHOUT ANGINA PECTORIS: ICD-10-CM

## 2019-02-11 DIAGNOSIS — F17.200 TOBACCO DEPENDENCE SYNDROME: ICD-10-CM

## 2019-02-11 DIAGNOSIS — R82.998 LEUKOCYTES IN URINE: ICD-10-CM

## 2019-02-11 DIAGNOSIS — G89.4 CHRONIC PAIN SYNDROME: ICD-10-CM

## 2019-02-11 DIAGNOSIS — E78.00 HYPERCHOLESTEROLEMIA: ICD-10-CM

## 2019-02-11 DIAGNOSIS — I10 ESSENTIAL HYPERTENSION: ICD-10-CM

## 2019-02-11 DIAGNOSIS — I38 VALVULAR HEART DISEASE: ICD-10-CM

## 2019-02-11 DIAGNOSIS — F41.9 ANXIETY: ICD-10-CM

## 2019-02-11 LAB
BILIRUB BLD-MCNC: NEGATIVE MG/DL
CLARITY, POC: ABNORMAL
COLOR UR: YELLOW
GLUCOSE UR STRIP-MCNC: NEGATIVE MG/DL
HBA1C MFR BLD: 5.9 %
KETONES UR QL: ABNORMAL
LEUKOCYTE EST, POC: ABNORMAL
NITRITE UR-MCNC: NEGATIVE MG/ML
PH UR: 7 [PH] (ref 5–8)
PROT UR STRIP-MCNC: NEGATIVE MG/DL
RBC # UR STRIP: NEGATIVE /UL
SP GR UR: 1.01 (ref 1–1.03)
UROBILINOGEN UR QL: ABNORMAL

## 2019-02-11 PROCEDURE — 83036 HEMOGLOBIN GLYCOSYLATED A1C: CPT | Performed by: INTERNAL MEDICINE

## 2019-02-11 PROCEDURE — 99214 OFFICE O/P EST MOD 30 MIN: CPT | Performed by: INTERNAL MEDICINE

## 2019-02-11 PROCEDURE — 81003 URINALYSIS AUTO W/O SCOPE: CPT | Performed by: INTERNAL MEDICINE

## 2019-02-11 RX ORDER — DULOXETIN HYDROCHLORIDE 60 MG/1
60 CAPSULE, DELAYED RELEASE ORAL DAILY
Qty: 90 CAPSULE | Refills: 1 | Status: SHIPPED | OUTPATIENT
Start: 2019-02-11 | End: 2019-08-13 | Stop reason: SDUPTHER

## 2019-02-11 RX ORDER — OXYBUTYNIN CHLORIDE 5 MG/1
5 TABLET ORAL 2 TIMES DAILY
Qty: 180 TABLET | Refills: 1 | Status: SHIPPED | OUTPATIENT
Start: 2019-02-11 | End: 2019-08-13 | Stop reason: SDUPTHER

## 2019-02-11 NOTE — PROGRESS NOTES
Kaylin Ojeda is a 78 y.o. female, who presents with a chief complaint of   Chief Complaint   Patient presents with   • Prediabetes     3 mos f/u   • Urinary Incontinence     better on med   • Anxiety     meds not controlling       HPI   The pt is here for follow up.      She has had cataract surgery for both eyes and says that she still isnt seeing very well.      She heads back to [place] this weekend.  She is helping take care of her daughter who is 52 and on disability for autoimmune problems, her granddaughter, and great granddaughter.  She is trying to find another place for her daughter to live.  I discussed with her that she doesn't have to take care of everyone.        Depression/anxiety -  she thinks she is doing some better with the higher dose of cymbalta.  Her son is trying to help care for her. Night time and early in the morning are the worst times for her as she deals with grief from her 's death.  She is struggling with sleep even with trazodone and melatonin.  she takes a low dose valium nightly to help with sleep as well.  We discussed weaning her valium dose     She is still smoking.  she has a chantix rx but didn't try it yet.      She is having issues with urinary incontinence - she started oxybutinin and she thinks it helps.  She says her urine is always dark.  No recent dysuria.       The pt has chronic back pain and sees [facility].  She says [facility] is closing soon. She is on pecocet 10/325.  She says she takes 2-3 per day but her prescription is for 4 a day.  She tried to cut down more but says the rainy weather has made her back hurt more. I have talked with her multiple times about trying to cut back on narcotic use.  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  There is a family history of substance abuse.       Pt follows with with dr. Calero for a-fib, CAD s/p stent and refractory  hypertension.  She also has severe PVD. She is on BB, statin, and eliquis.    Pre-diabetes - a1c today 5.9.      The following portions of the patient's history were reviewed and updated as appropriate: allergies, current medications, past family history, past medical history, past social history, past surgical history and problem list.    Allergies: Aleve [naproxen sodium]; Codeine; Ibuprofen; and Sulfa antibiotics    Review of Systems   Constitutional: Negative.    HENT: Negative.    Eyes: Negative.    Respiratory: Negative.    Cardiovascular: Negative.    Gastrointestinal: Negative.    Endocrine: Negative.    Genitourinary: Negative.    Musculoskeletal: Positive for arthralgias and back pain.   Skin: Negative.    Allergic/Immunologic: Negative.    Neurological: Negative.    Hematological: Negative.    Psychiatric/Behavioral: Negative.    All other systems reviewed and are negative.            Wt Readings from Last 3 Encounters:   02/11/19 45.8 kg (101 lb)   12/11/18 46.3 kg (102 lb)   12/11/18 47.2 kg (104 lb)     Temp Readings from Last 3 Encounters:   02/11/19 97.8 °F (36.6 °C) (Oral)   12/11/18 97.8 °F (36.6 °C) (Oral)   03/12/18 97.6 °F (36.4 °C) (Oral)     BP Readings from Last 3 Encounters:   02/11/19 160/80   12/11/18 128/78   12/11/18 128/80     Pulse Readings from Last 3 Encounters:   02/11/19 65   12/11/18 70   12/11/18 64     Body mass index is 19.08 kg/m².  @LASTSAO2(3)@    Physical Exam   Constitutional: She is oriented to person, place, and time. She appears well-developed and well-nourished. No distress.   HENT:   Head: Normocephalic and atraumatic.   Right Ear: External ear normal.   Left Ear: External ear normal.   Nose: Nose normal.   Mouth/Throat: Oropharynx is clear and moist.   Eyes: Conjunctivae and EOM are normal. Pupils are equal, round, and reactive to light.   Neck: Normal range of motion. Neck supple.   Cardiovascular: Normal rate, regular rhythm, normal heart sounds and intact distal  pulses.   Pulmonary/Chest: Effort normal and breath sounds normal. No respiratory distress. She has no wheezes.   Musculoskeletal: Normal range of motion.   Normal gait   Neurological: She is alert and oriented to person, place, and time.   Skin: Skin is warm and dry.   Psychiatric: She has a normal mood and affect. Her behavior is normal. Judgment and thought content normal.   Nursing note and vitals reviewed.      Results for orders placed or performed in visit on 02/11/19   POCT urinalysis dipstick, automated   Result Value Ref Range    Color Yellow Yellow, Straw, Dark Yellow, Betty    Clarity, UA Cloudy (A) Clear    Specific Gravity  1.015 1.005 - 1.030    pH, Urine 7.0 5.0 - 8.0    Leukocytes Trace (A) Negative    Nitrite, UA Negative Negative    Protein, POC Negative Negative mg/dL    Glucose, UA Negative Negative, 1000 mg/dL (3+) mg/dL    Ketones, UA Trace (A) Negative    Urobilinogen, UA 1 E.U./dL  (A) Normal    Bilirubin Negative Negative    Blood, UA Negative Negative   POC Glycosylated Hemoglobin (Hb A1C)   Result Value Ref Range    Hemoglobin A1C 5.9 %           Kaylin was seen today for prediabetes, urinary incontinence and anxiety.    Diagnoses and all orders for this visit:    Prediabetes  -     POCT urinalysis dipstick, automated  -     POC Glycosylated Hemoglobin (Hb A1C)    Urinary incontinence, unspecified type  -     POCT urinalysis dipstick, automated    Leukocytes in urine  -     Urine Culture - Urine, Urine, Clean Catch; Future  -     Urine Culture - Urine, Urine, Clean Catch    Mixed anxiety depressive disorder    Urge incontinence  -     oxybutynin (DITROPAN) 5 MG tablet; Take 1 tablet by mouth 2 (Two) Times a Day.    Tobacco dependence syndrome    PAF (paroxysmal atrial fibrillation) (CMS/formerly [facility])    Hypercholesterolemia    Essential hypertension    Valvular heart disease    Coronary artery disease involving native coronary artery of native heart without angina pectoris    Anxiety  -      DULoxetine (CYMBALTA) 60 MG capsule; Take 1 capsule by mouth Daily.    Chronic pain syndrome  -     DULoxetine (CYMBALTA) 60 MG capsule; Take 1 capsule by mouth Daily.      Cont current meds.  Will treat urine if cx positive.  meds refilled today.     Discussed weaning pain meds.  Pt sees [facility].  She has an appt on 2/13.  She says they are closing in march.  I will help her with weaning meds but will not continue to fill high dose pain meds.     Outpatient Medications Prior to Visit   Medication Sig Dispense Refill   • albuterol (PROVENTIL HFA;VENTOLIN HFA) 108 (90 BASE) MCG/ACT inhaler Inhale 2 puffs every 4 (four) hours as needed for wheezing. 18 g 11   • alendronate (FOSAMAX) 70 MG tablet Take 1 tablet by mouth Every 7 (Seven) Days. 48 tablet 0   • apixaban (ELIQUIS) 5 MG tablet tablet Take 1 tablet by mouth Every 12 (Twelve) Hours. 180 tablet 1   • atorvastatin (LIPITOR) 10 MG tablet TAKE 1 TABLET BY MOUTH DAILY 90 tablet 3   • budesonide-formoterol (SYMBICORT) 160-4.5 MCG/ACT inhaler Inhale 2 puffs 2 (two) times a day. 1 inhaler 12   • clotrimazole-betamethasone (LOTRISONE) 1-0.05 % cream Apply  topically 2 (Two) Times a Day. 45 g 0   • diazePAM (VALIUM) 2 MG tablet Take 0.5 tablets by mouth 2 (Two) Times a Day As Needed for Anxiety. 30 tablet 2   • esomeprazole (nexIUM) 40 MG capsule Take 1 capsule by mouth Every Morning Before Breakfast. 90 capsule 2   • fluticasone (FLONASE) 50 MCG/ACT nasal spray SPRAY TWICE IN EACH NOSTRIL EVERY DAY FOR 30 DAYS. 16 mL 6   • levothyroxine (SYNTHROID, LEVOTHROID) 50 MCG tablet TAKE 1 TABLET BY MOUTH DAILY 90 tablet 1   • metoprolol tartrate (LOPRESSOR) 100 MG tablet Take 1 tablet by mouth 2 (Two) Times a Day. 180 tablet 2   • metoprolol tartrate (LOPRESSOR) 50 MG tablet Take 1 tablet by mouth Every 12 (Twelve) Hours. 60 tablet    • ondansetron (ZOFRAN) 8 MG tablet TAKE 1 TABLET BY MOUTH EVERY 8 HOURS AS NEEDED FOR NAUSEA OR VOMITING 20 tablet 0   •  oxyCODONE-acetaminophen (PERCOCET)  MG per tablet Take 1 tablet by mouth Every 12 (Twelve) Hours As Needed for Severe Pain . 30 tablet 0   • traZODone (DESYREL) 50 MG tablet Take 1 tablet by mouth Every Night. 90 tablet 1   • traZODone (DESYREL) 50 MG tablet TAKE 1 TABLET BY MOUTH EVERY NIGHT 90 tablet 0   • varenicline (CHANTIX STARTING MONTH AUSTIN) 0.5 MG X 11 & 1 MG X 42 tablet Take 0.5 mg one daily on days 1-3 and and 0.5 mg twice daily on days 4-7.Then 1 mg twice daily for a total of 12 weeks. 53 tablet 0   • DULoxetine (CYMBALTA) 30 MG capsule Take 1 capsule by mouth Daily. 90 capsule 0   • DULoxetine (CYMBALTA) 60 MG capsule Take 1 capsule by mouth Daily. 90 capsule 1   • oxybutynin (DITROPAN) 5 MG tablet TAKE 1 TABLET BY MOUTH TWICE DAILY 180 tablet 1     Facility-Administered Medications Prior to Visit   Medication Dose Route Frequency Provider Last Rate Last Dose   • cyanocobalamin injection 1,000 mcg  1,000 mcg Intramuscular Q28 Days Donna Forte MD       • cyanocobalamin injection 1,000 mcg  1,000 mcg Intramuscular Q28 Days Donna Forte MD       • cyanocobalamin injection 1,000 mcg  1,000 mcg Intramuscular Q28 Days Donna Forte MD   1,000 mcg at 07/03/18 1252   • cyanocobalamin injection 1,000 mcg  1,000 mcg Intramuscular Q28 Days Donna Forte MD   1,000 mcg at 11/14/18 1306     New Medications Ordered This Visit   Medications   • oxybutynin (DITROPAN) 5 MG tablet     Sig: Take 1 tablet by mouth 2 (Two) Times a Day.     Dispense:  180 tablet     Refill:  1   • DULoxetine (CYMBALTA) 60 MG capsule     Sig: Take 1 capsule by mouth Daily.     Dispense:  90 capsule     Refill:  1     [unfilled]  Medications Discontinued During This Encounter   Medication Reason   • DULoxetine (CYMBALTA) 30 MG capsule    • oxybutynin (DITROPAN) 5 MG tablet Reorder   • DULoxetine (CYMBALTA) 60 MG capsule Reorder         Return in about 1 month (around 3/11/2019) for  Recheck, labs.

## 2019-02-13 LAB
BACTERIA UR CULT: NO GROWTH
BACTERIA UR CULT: NORMAL

## 2019-02-14 ENCOUNTER — TELEPHONE (OUTPATIENT)
Dept: INTERNAL MEDICINE | Facility: CLINIC | Age: 79
End: 2019-02-14

## 2019-02-14 RX ORDER — METOPROLOL TARTRATE 100 MG/1
100 TABLET ORAL 2 TIMES DAILY
Qty: 180 TABLET | Refills: 1 | Status: SHIPPED | OUTPATIENT
Start: 2019-02-14 | End: 2020-03-17 | Stop reason: SDUPTHER

## 2019-02-14 NOTE — TELEPHONE ENCOUNTER
Patient advised.     ----- Message from Donna Forte MD sent at 2/13/2019  5:08 PM EST -----  Call pt about labs. - urine cx neg

## 2019-03-13 ENCOUNTER — OFFICE VISIT (OUTPATIENT)
Dept: INTERNAL MEDICINE | Facility: CLINIC | Age: 79
End: 2019-03-13

## 2019-03-13 VITALS
WEIGHT: 97.4 LBS | RESPIRATION RATE: 16 BRPM | OXYGEN SATURATION: 96 % | BODY MASS INDEX: 18.39 KG/M2 | HEIGHT: 61 IN | HEART RATE: 69 BPM | SYSTOLIC BLOOD PRESSURE: 120 MMHG | DIASTOLIC BLOOD PRESSURE: 80 MMHG

## 2019-03-13 DIAGNOSIS — M54.50 LUMBAR BACK PAIN: Primary | ICD-10-CM
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
## 2019-03-13 DIAGNOSIS — F11.20 CHRONIC NARCOTIC DEPENDENCE (HCC): ICD-10-CM

## 2019-03-13 PROCEDURE — 99214 OFFICE O/P EST MOD 30 MIN: CPT | Performed by: INTERNAL MEDICINE
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
## 2019-03-15 ENCOUNTER — HOSPITAL ENCOUNTER (OUTPATIENT)
Dept: GENERAL RADIOLOGY | Facility: HOSPITAL | Age: 79
Discharge: HOME | End: 2019-03-15
Admitting: INTERNAL MEDICINE
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
## 2019-03-15 DIAGNOSIS — M54.50 LUMBAR BACK PAIN: ICD-10-CM

## 2019-03-15 PROCEDURE — 72100 X-RAY EXAM L-S SPINE 2/3 VWS: CPT

## 2019-03-15 RX ORDER — DIAZEPAM 5 MG/1
TABLET ORAL
Qty: 60 TABLET | Refills: 0 | Status: SHIPPED | OUTPATIENT
Start: 2019-03-15 | End: 2019-04-30 | Stop reason: SDUPTHER

## 2019-03-19 DIAGNOSIS — M54.50 CHRONIC LOW BACK PAIN WITHOUT SCIATICA, UNSPECIFIED BACK PAIN LATERALITY: ICD-10-CM

## 2019-03-19 DIAGNOSIS — G89.29 CHRONIC LOW BACK PAIN WITHOUT SCIATICA, UNSPECIFIED BACK PAIN LATERALITY: ICD-10-CM

## 2019-03-19 RX ORDER — OXYCODONE AND ACETAMINOPHEN 10; 325 MG/1; MG/1
1 TABLET ORAL EVERY 12 HOURS PRN
Qty: 30 TABLET | Refills: 0 | Status: SHIPPED | OUTPATIENT
Start: 2019-03-19 | End: 2019-04-05 | Stop reason: SDUPTHER

## 2019-03-20 ENCOUNTER — TELEPHONE (OUTPATIENT)
Dept: INTERNAL MEDICINE | Facility: CLINIC | Age: 79
End: 2019-03-20

## 2019-03-20 NOTE — TELEPHONE ENCOUNTER
Sent yesterday  Pt  Aware only   Bid  dosing       ----- Message from Alison Donaldson MA sent at 3/19/2019  1:09 PM EDT -----  Pt  Called  Needs refill on oxycodone    10/325mg  Take one po qid.   Stated she made appt w/pain management.   Needs filled until appt w/pain management      [phone].

## 2019-03-29 ENCOUNTER — TELEPHONE (OUTPATIENT)
Dept: INTERNAL MEDICINE | Facility: CLINIC | Age: 79
End: 2019-03-29

## 2019-03-29 DIAGNOSIS — M54.50 CHRONIC LOW BACK PAIN WITHOUT SCIATICA, UNSPECIFIED BACK PAIN LATERALITY: Primary | ICD-10-CM

## 2019-03-29 DIAGNOSIS — G89.29 CHRONIC LOW BACK PAIN WITHOUT SCIATICA, UNSPECIFIED BACK PAIN LATERALITY: Primary | ICD-10-CM

## 2019-03-29 DIAGNOSIS — M41.9 SCOLIOSIS OF LUMBAR SPINE, UNSPECIFIED SCOLIOSIS TYPE: ICD-10-CM
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
## 2019-04-04 ENCOUNTER — APPOINTMENT (OUTPATIENT)
Dept: PHYSICAL THERAPY | Facility: HOSPITAL | Age: 79
End: 2019-04-04

## 2019-04-05 ENCOUNTER — TELEPHONE (OUTPATIENT)
Dept: INTERNAL MEDICINE | Facility: CLINIC | Age: 79
End: 2019-04-05

## 2019-04-05 DIAGNOSIS — G89.29 CHRONIC LOW BACK PAIN WITHOUT SCIATICA, UNSPECIFIED BACK PAIN LATERALITY: ICD-10-CM

## 2019-04-05 DIAGNOSIS — M54.50 CHRONIC LOW BACK PAIN WITHOUT SCIATICA, UNSPECIFIED BACK PAIN LATERALITY: ICD-10-CM

## 2019-04-05 RX ORDER — OXYCODONE AND ACETAMINOPHEN 10; 325 MG/1; MG/1
1 TABLET ORAL EVERY 12 HOURS PRN
Qty: 30 TABLET | Refills: 0 | Status: SHIPPED | OUTPATIENT
Start: 2019-04-05 | End: 2019-04-09

## 2019-04-05 NOTE — TELEPHONE ENCOUNTER
SENT TO PHARMACY  LEFT MESSAGE FOR  PT        ----- Message from Alison Donaldson MA sent at 4/4/2019  3:47 PM EDT -----  Regarding: FW: PERCOCET REQUEST  LEFT MESSAGE TO COME IN TO DRUG SCREENING AND NARC AGREEMENT  ----- Message -----  From: Remedios Vasquez MD  Sent: 4/4/2019   2:46 PM  To: Alison Donaldson MA  Subject: RE: PERCOCET REQUEST                             I will fill after she has UDS   ----- Message -----  From: Alison Donaldson MA  Sent: 4/4/2019   2:44 PM  To: Reemdios Vasquez MD  Subject: FW: PERCOCET REQUEST                              DR HINTON'S PT.    LOV   3/13/19   NEXT   4/24/19   [facility]  NEEDS NARC    [phone]  ----- Message -----  From: Shelby Mon  Sent: 4/4/2019   1:51 PM  To: Alison Donaldson MA  Subject: PERCOCET REQUEST                                 jennifer Ricks from [facility] pharmacy.    Patient contacted them requesting refill on percocet.  They cannot send electronic requests on C2(s)  She told pt she would contact our office.

## 2019-04-08 ENCOUNTER — HOSPITAL ENCOUNTER (EMERGENCY)
Facility: HOSPITAL | Age: 79
Discharge: HOME | End: 2019-04-08
Attending: EMERGENCY MEDICINE | Admitting: EMERGENCY MEDICINE

## 2019-04-08 VITALS
BODY MASS INDEX: 21.94 KG/M2 | DIASTOLIC BLOOD PRESSURE: 88 MMHG | RESPIRATION RATE: 16 BRPM | SYSTOLIC BLOOD PRESSURE: 177 MMHG | TEMPERATURE: 98.2 F | HEART RATE: 67 BPM | OXYGEN SATURATION: 97 % | HEIGHT: 61 IN | WEIGHT: 116.2 LBS

## 2019-04-08 DIAGNOSIS — M54.50 CHRONIC BILATERAL LOW BACK PAIN WITHOUT SCIATICA: Primary | ICD-10-CM

## 2019-04-08 DIAGNOSIS — G89.29 CHRONIC BILATERAL LOW BACK PAIN WITHOUT SCIATICA: Primary | ICD-10-CM

## 2019-04-08 PROCEDURE — 99283 EMERGENCY DEPT VISIT LOW MDM: CPT

## 2019-04-08 PROCEDURE — 99282 EMERGENCY DEPT VISIT SF MDM: CPT | Performed by: EMERGENCY MEDICINE
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
## 2019-04-08 NOTE — DISCHARGE INSTRUCTIONS
May continue your usual medications as directed.  Apply heating pad to the affected area as needed.  Follow-up with your primary care doctor for further evaluation and testing as needed.  Please return to the emergency room for any worsening pain, fevers, weakness, numbness, bowel or bladder incontinence or any other concerns.

## 2019-04-09 ENCOUNTER — OFFICE VISIT (OUTPATIENT)
Dept: INTERNAL MEDICINE | Facility: CLINIC | Age: 79
End: 2019-04-09

## 2019-04-09 VITALS
OXYGEN SATURATION: 98 % | BODY MASS INDEX: 19.07 KG/M2 | DIASTOLIC BLOOD PRESSURE: 78 MMHG | RESPIRATION RATE: 16 BRPM | HEIGHT: 61 IN | HEART RATE: 71 BPM | SYSTOLIC BLOOD PRESSURE: 164 MMHG | WEIGHT: 101 LBS

## 2019-04-09 DIAGNOSIS — M54.50 CHRONIC LOW BACK PAIN WITHOUT SCIATICA, UNSPECIFIED BACK PAIN LATERALITY: Primary | ICD-10-CM

## 2019-04-09 DIAGNOSIS — M54.50 LUMBAR BACK PAIN: ICD-10-CM

## 2019-04-09 DIAGNOSIS — I25.119 CORONARY ARTERY DISEASE INVOLVING NATIVE CORONARY ARTERY OF NATIVE HEART WITH ANGINA PECTORIS (HCC): ICD-10-CM

## 2019-04-09 DIAGNOSIS — G89.29 CHRONIC LOW BACK PAIN WITHOUT SCIATICA, UNSPECIFIED BACK PAIN LATERALITY: Primary | ICD-10-CM

## 2019-04-09 PROCEDURE — 99214 OFFICE O/P EST MOD 30 MIN: CPT | Performed by: INTERNAL MEDICINE

## 2019-04-09 RX ORDER — OXYCODONE AND ACETAMINOPHEN 7.5; 325 MG/1; MG/1
1 TABLET ORAL EVERY 6 HOURS PRN
Qty: 90 TABLET | Refills: 0 | Status: SHIPPED | OUTPATIENT
Start: 2019-04-09 | End: 2019-05-06 | Stop reason: SDUPTHER

## 2019-04-09 NOTE — PROGRESS NOTES
Kaylin Ojeda is a 79 y.o. female, who presents with a chief complaint of   Chief Complaint   Patient presents with   • Back Pain     chronic; ER follow up       HPI   Pt here bc of worsening back pain.  She has known osteoporosis and chronic back pain.  She denies any new injury but then says she has been stumbling around a lot more.  Her legs are weak when she first gets up in the morning and then she does a little better.  Last MRI ws 3/19/18 and last x-rays were done last month.  She has been trying to cut down on her pain pills (percocet 10/325) but 2 pills a day is not working.  She has moved her pain management and physical therapy appointments up to may 1st.      She has had 2 chest pain episodes over the past 5-6 months.  She says this isnt getting more frequent or more severe.  She says she used to have nitro and requests a refill.  She hasn't had this med is over 2 years.  I advised her that she needs to check in with dr. Calero is she is needing nitro.  Doing lots of housework or getting really upset makes her sx worse.      Today is her 's birthday and she has had a particularly bad week.  She has to go to [place] this week bc her daughter is in the hospital with more seizures.  Her granddaughter moved back to [place] after getting into trouble in [place].  The pt is happy that her great-granddaughter October is back in town where she can check on her.      The following portions of the patient's history were reviewed and updated as appropriate: allergies, current medications, past family history, past medical history, past social history, past surgical history and problem list.    Allergies: Aleve [naproxen sodium]; Codeine; Ibuprofen; and Sulfa antibiotics    Review of Systems   Constitutional: Negative.    HENT: Negative.    Eyes: Negative.    Respiratory: Negative.    Cardiovascular: Negative.    Gastrointestinal: Negative.    Endocrine: Negative.    Genitourinary: Negative.     Musculoskeletal: Positive for back pain.   Skin: Negative.    Allergic/Immunologic: Negative.    Neurological: Negative.    Hematological: Negative.    Psychiatric/Behavioral: Negative.    All other systems reviewed and are negative.            Wt Readings from Last 3 Encounters:   04/09/19 45.8 kg (101 lb)   04/08/19 52.7 kg (116 lb 3.2 oz)   03/13/19 44.2 kg (97 lb 6.4 oz)     Temp Readings from Last 3 Encounters:   04/08/19 98.2 °F (36.8 °C)   02/11/19 97.8 °F (36.6 °C) (Oral)   12/11/18 97.8 °F (36.6 °C) (Oral)     BP Readings from Last 3 Encounters:   04/09/19 164/78   04/08/19 177/88   03/13/19 120/80     Pulse Readings from Last 3 Encounters:   04/09/19 71   04/08/19 67   03/13/19 69     Body mass index is 19.08 kg/m².  @LASTSAO2(3)@    Physical Exam   Constitutional: She is oriented to person, place, and time. She appears well-developed and well-nourished. No distress.   HENT:   Head: Normocephalic and atraumatic.   Right Ear: External ear normal.   Left Ear: External ear normal.   Nose: Nose normal.   Mouth/Throat: Oropharynx is clear and moist.   Eyes: Conjunctivae and EOM are normal. Pupils are equal, round, and reactive to light.   Neck: Normal range of motion. Neck supple.   Cardiovascular: Normal rate, regular rhythm, normal heart sounds and intact distal pulses.   Pulmonary/Chest: Effort normal and breath sounds normal. No respiratory distress. She has no wheezes.   Musculoskeletal:   Slow gait  ttp across low back on both sides and tender on lumbar spine.    Neurological: She is alert and oriented to person, place, and time.   Skin: Skin is warm and dry.   Psychiatric: She has a normal mood and affect. Her behavior is normal. Judgment and thought content normal.   Nursing note and vitals reviewed.      Results for orders placed or performed in visit on 02/11/19   Urine Culture - Urine, Urine, Clean Catch   Result Value Ref Range    Urine Culture Final report     Result 1 No growth    POCT  urinalysis dipstick, automated   Result Value Ref Range    Color Yellow Yellow, Straw, Dark Yellow, Betty    Clarity, UA Cloudy (A) Clear    Specific Gravity  1.015 1.005 - 1.030    pH, Urine 7.0 5.0 - 8.0    Leukocytes Trace (A) Negative    Nitrite, UA Negative Negative    Protein, POC Negative Negative mg/dL    Glucose, UA Negative Negative, 1000 mg/dL (3+) mg/dL    Ketones, UA Trace (A) Negative    Urobilinogen, UA 1 E.U./dL  (A) Normal    Bilirubin Negative Negative    Blood, UA Negative Negative   POC Glycosylated Hemoglobin (Hb A1C)   Result Value Ref Range    Hemoglobin A1C 5.9 %           Kaylin was seen today for back pain.    Diagnoses and all orders for this visit:    Chronic low back pain without sciatica, unspecified back pain laterality  -     oxyCODONE-acetaminophen (PERCOCET) 7.5-325 MG per tablet; Take 1 tablet by mouth Every 6 (Six) Hours As Needed for Severe Pain .  -     XR Spine Lumbar 2 or 3 View; Future    Lumbar back pain  -     oxyCODONE-acetaminophen (PERCOCET) 7.5-325 MG per tablet; Take 1 tablet by mouth Every 6 (Six) Hours As Needed for Severe Pain .  -     XR Spine Lumbar 2 or 3 View; Future    Coronary artery disease involving native coronary artery of native heart with angina pectoris (CMS/[facility])      Needs f/u with Dr. Calero.     Outpatient Medications Prior to Visit   Medication Sig Dispense Refill   • albuterol (PROVENTIL HFA;VENTOLIN HFA) 108 (90 BASE) MCG/ACT inhaler Inhale 2 puffs every 4 (four) hours as needed for wheezing. 18 g 11   • alendronate (FOSAMAX) 70 MG tablet Take 1 tablet by mouth Every 7 (Seven) Days. 48 tablet 0   • apixaban (ELIQUIS) 5 MG tablet tablet Take 1 tablet by mouth Every 12 (Twelve) Hours. 180 tablet 1   • atorvastatin (LIPITOR) 10 MG tablet TAKE 1 TABLET BY MOUTH DAILY 90 tablet 3   • budesonide-formoterol (SYMBICORT) 160-4.5 MCG/ACT inhaler Inhale 2 puffs 2 (two) times a day. 1 inhaler 12   • clotrimazole-betamethasone (LOTRISONE) 1-0.05 % cream Apply   topically 2 (Two) Times a Day. 45 g 0   • diazePAM (VALIUM) 2 MG tablet Take 0.5 tablets by mouth 2 (Two) Times a Day As Needed for Anxiety. 30 tablet 2   • diazePAM (VALIUM) 5 MG tablet TAKE 1 TABLET BY MOUTH TWICE DAILY AS NEEDED 60 tablet 0   • DULoxetine (CYMBALTA) 60 MG capsule Take 1 capsule by mouth Daily. 90 capsule 1   • esomeprazole (nexIUM) 40 MG capsule Take 1 capsule by mouth Every Morning Before Breakfast. 90 capsule 2   • fluticasone (FLONASE) 50 MCG/ACT nasal spray SPRAY TWICE IN EACH NOSTRIL EVERY DAY FOR 30 DAYS. 16 mL 6   • levothyroxine (SYNTHROID, LEVOTHROID) 50 MCG tablet TAKE 1 TABLET BY MOUTH DAILY 90 tablet 1   • metoprolol tartrate (LOPRESSOR) 100 MG tablet Take 1 tablet by mouth 2 (Two) Times a Day. 180 tablet 1   • metoprolol tartrate (LOPRESSOR) 50 MG tablet Take 1 tablet by mouth Every 12 (Twelve) Hours. 60 tablet    • ondansetron (ZOFRAN) 8 MG tablet TAKE 1 TABLET BY MOUTH EVERY 8 HOURS AS NEEDED FOR NAUSEA OR VOMITING 20 tablet 0   • oxybutynin (DITROPAN) 5 MG tablet Take 1 tablet by mouth 2 (Two) Times a Day. 180 tablet 1   • traZODone (DESYREL) 50 MG tablet TAKE 1 TABLET BY MOUTH EVERY NIGHT 90 tablet 0   • varenicline (CHANTIX STARTING MONTH PAK) 0.5 MG X 11 & 1 MG X 42 tablet Take 0.5 mg one daily on days 1-3 and and 0.5 mg twice daily on days 4-7.Then 1 mg twice daily for a total of 12 weeks. 53 tablet 0   • oxyCODONE-acetaminophen (PERCOCET)  MG per tablet Take 1 tablet by mouth Every 12 (Twelve) Hours As Needed for Severe Pain . 30 tablet 0     Facility-Administered Medications Prior to Visit   Medication Dose Route Frequency Provider Last Rate Last Dose   • cyanocobalamin injection 1,000 mcg  1,000 mcg Intramuscular Q28 Days Donna Forte MD       • cyanocobalamin injection 1,000 mcg  1,000 mcg Intramuscular Q28 Days Donna Forte MD       • cyanocobalamin injection 1,000 mcg  1,000 mcg Intramuscular Q28 Days Donna Forte MD   1,000  mcg at 07/03/18 1252   • cyanocobalamin injection 1,000 mcg  1,000 mcg Intramuscular Q28 Days Donna Forte MD   1,000 mcg at 11/14/18 1306     New Medications Ordered This Visit   Medications   • oxyCODONE-acetaminophen (PERCOCET) 7.5-325 MG per tablet     Sig: Take 1 tablet by mouth Every 6 (Six) Hours As Needed for Severe Pain .     Dispense:  90 tablet     Refill:  0     [unfilled]  Medications Discontinued During This Encounter   Medication Reason   • oxyCODONE-acetaminophen (PERCOCET)  MG per tablet          Return in about 4 weeks (around 5/7/2019) for Recheck.

## 2019-04-17 RX ORDER — LEVOTHYROXINE SODIUM 0.05 MG/1
50 TABLET ORAL DAILY
Qty: 90 TABLET | Refills: 1 | Status: SHIPPED | OUTPATIENT
Start: 2019-04-17 | End: 2019-08-13 | Stop reason: SDUPTHER

## 2019-04-29 ENCOUNTER — HOSPITAL ENCOUNTER (OUTPATIENT)
Dept: GENERAL RADIOLOGY | Facility: HOSPITAL | Age: 79
Discharge: HOME | End: 2019-04-29
Admitting: INTERNAL MEDICINE

## 2019-04-29 DIAGNOSIS — M54.50 CHRONIC LOW BACK PAIN WITHOUT SCIATICA, UNSPECIFIED BACK PAIN LATERALITY: ICD-10-CM

## 2019-04-29 DIAGNOSIS — G89.29 CHRONIC LOW BACK PAIN WITHOUT SCIATICA, UNSPECIFIED BACK PAIN LATERALITY: ICD-10-CM

## 2019-04-29 DIAGNOSIS — M54.50 LUMBAR BACK PAIN: ICD-10-CM

## 2019-04-29 PROCEDURE — 72100 X-RAY EXAM L-S SPINE 2/3 VWS: CPT

## 2019-04-30 ENCOUNTER — APPOINTMENT (OUTPATIENT)
Dept: GENERAL RADIOLOGY | Facility: HOSPITAL | Age: 79
End: 2019-04-30

## 2019-04-30 RX ORDER — DIAZEPAM 5 MG/1
TABLET ORAL
Qty: 60 TABLET | Refills: 2 | Status: SHIPPED | OUTPATIENT
Start: 2019-04-30 | End: 2019-05-01

## 2019-05-01 ENCOUNTER — OFFICE VISIT (OUTPATIENT)
Dept: PAIN MEDICINE | Facility: CLINIC | Age: 79
End: 2019-05-01

## 2019-05-01 VITALS
SYSTOLIC BLOOD PRESSURE: 120 MMHG | BODY MASS INDEX: 19.02 KG/M2 | HEART RATE: 63 BPM | HEIGHT: 61 IN | OXYGEN SATURATION: 98 % | WEIGHT: 100.75 LBS | TEMPERATURE: 98.2 F | DIASTOLIC BLOOD PRESSURE: 63 MMHG | RESPIRATION RATE: 18 BRPM

## 2019-05-01 DIAGNOSIS — G89.29 CHRONIC LOW BACK PAIN WITHOUT SCIATICA, UNSPECIFIED BACK PAIN LATERALITY: ICD-10-CM

## 2019-05-01 DIAGNOSIS — G89.29 OTHER CHRONIC PAIN: Primary | ICD-10-CM

## 2019-05-01 DIAGNOSIS — M54.50 CHRONIC LOW BACK PAIN WITHOUT SCIATICA, UNSPECIFIED BACK PAIN LATERALITY: ICD-10-CM

## 2019-05-01 LAB
POC AMPHETAMINES: NEGATIVE
POC BARBITURATES: NEGATIVE
POC BENZODIAZEPHINES: POSITIVE
POC COCAINE: NEGATIVE
POC METHADONE: NEGATIVE
POC METHAMPHETAMINE SCREEN URINE: NEGATIVE
POC OPIATES: NEGATIVE
POC OXYCODONE: POSITIVE
POC PHENCYCLIDINE: NEGATIVE
POC PROPOXYPHENE: NEGATIVE
POC THC: NEGATIVE
POC TRICYCLIC ANTIDEPRESSANTS: NEGATIVE

## 2019-05-01 PROCEDURE — 99202 OFFICE O/P NEW SF 15 MIN: CPT | Performed by: NURSE PRACTITIONER

## 2019-05-01 PROCEDURE — 80305 DRUG TEST PRSMV DIR OPT OBS: CPT | Performed by: NURSE PRACTITIONER
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
## 2019-05-06 ENCOUNTER — OFFICE VISIT (OUTPATIENT)
Dept: INTERNAL MEDICINE | Facility: CLINIC | Age: 79
End: 2019-05-06

## 2019-05-06 ENCOUNTER — RESULTS ENCOUNTER (OUTPATIENT)
Dept: PAIN MEDICINE | Facility: CLINIC | Age: 79
End: 2019-05-06

## 2019-05-06 VITALS
OXYGEN SATURATION: 97 % | DIASTOLIC BLOOD PRESSURE: 88 MMHG | RESPIRATION RATE: 16 BRPM | BODY MASS INDEX: 18.69 KG/M2 | HEART RATE: 62 BPM | SYSTOLIC BLOOD PRESSURE: 160 MMHG | WEIGHT: 99 LBS | HEIGHT: 61 IN

## 2019-05-06 DIAGNOSIS — M54.50 CHRONIC LOW BACK PAIN WITHOUT SCIATICA, UNSPECIFIED BACK PAIN LATERALITY: Primary | ICD-10-CM

## 2019-05-06 DIAGNOSIS — M54.50 CHRONIC LOW BACK PAIN WITHOUT SCIATICA, UNSPECIFIED BACK PAIN LATERALITY: ICD-10-CM

## 2019-05-06 DIAGNOSIS — F32.A ANXIETY AND DEPRESSION: ICD-10-CM

## 2019-05-06 DIAGNOSIS — G89.29 CHRONIC LOW BACK PAIN WITHOUT SCIATICA, UNSPECIFIED BACK PAIN LATERALITY: Primary | ICD-10-CM

## 2019-05-06 DIAGNOSIS — F41.9 ANXIETY AND DEPRESSION: ICD-10-CM

## 2019-05-06 DIAGNOSIS — G89.29 OTHER CHRONIC PAIN: ICD-10-CM

## 2019-05-06 DIAGNOSIS — M54.50 LUMBAR BACK PAIN: ICD-10-CM

## 2019-05-06 DIAGNOSIS — G89.29 CHRONIC LOW BACK PAIN WITHOUT SCIATICA, UNSPECIFIED BACK PAIN LATERALITY: ICD-10-CM

## 2019-05-06 PROCEDURE — 99214 OFFICE O/P EST MOD 30 MIN: CPT | Performed by: INTERNAL MEDICINE

## 2019-05-06 RX ORDER — OXYCODONE AND ACETAMINOPHEN 7.5; 325 MG/1; MG/1
1 TABLET ORAL EVERY 6 HOURS PRN
Qty: 80 TABLET | Refills: 0 | Status: SHIPPED | OUTPATIENT
Start: 2019-05-06 | End: 2019-06-03 | Stop reason: SDUPTHER

## 2019-05-06 RX ORDER — CLONAZEPAM 0.5 MG/1
0.5 TABLET ORAL DAILY PRN
Qty: 30 TABLET | Refills: 0 | Status: SHIPPED | OUTPATIENT
Start: 2019-05-06 | End: 2019-08-13 | Stop reason: SDDI
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
## 2019-05-07 ENCOUNTER — OFFICE VISIT (OUTPATIENT)
Dept: CARDIOLOGY | Facility: CLINIC | Age: 79
End: 2019-05-07

## 2019-05-07 VITALS
WEIGHT: 101.3 LBS | BODY MASS INDEX: 19.13 KG/M2 | SYSTOLIC BLOOD PRESSURE: 134 MMHG | HEIGHT: 61 IN | DIASTOLIC BLOOD PRESSURE: 72 MMHG | HEART RATE: 56 BPM

## 2019-05-07 DIAGNOSIS — I65.23 OBSTRUCTION OF CAROTID ARTERY ON BOTH SIDES: ICD-10-CM

## 2019-05-07 DIAGNOSIS — I71.60 THORACOABDOMINAL AORTIC ANEURYSM (TAAA) WITHOUT RUPTURE (HCC): ICD-10-CM

## 2019-05-07 DIAGNOSIS — K55.1 MESENTERIC ARTERY STENOSIS (HCC): ICD-10-CM

## 2019-05-07 DIAGNOSIS — I77.9 PERIPHERAL ARTERIAL OCCLUSIVE DISEASE (HCC): ICD-10-CM

## 2019-05-07 DIAGNOSIS — F32.A ANXIETY AND DEPRESSION: ICD-10-CM

## 2019-05-07 DIAGNOSIS — I38 VALVULAR HEART DISEASE: ICD-10-CM

## 2019-05-07 DIAGNOSIS — I10 ESSENTIAL HYPERTENSION: ICD-10-CM

## 2019-05-07 DIAGNOSIS — F41.9 ANXIETY AND DEPRESSION: ICD-10-CM

## 2019-05-07 DIAGNOSIS — I25.119 CORONARY ARTERY DISEASE INVOLVING NATIVE CORONARY ARTERY OF NATIVE HEART WITH ANGINA PECTORIS (HCC): ICD-10-CM

## 2019-05-07 DIAGNOSIS — I48.0 PAF (PAROXYSMAL ATRIAL FIBRILLATION) (HCC): Primary | ICD-10-CM

## 2019-05-07 PROCEDURE — 99214 OFFICE O/P EST MOD 30 MIN: CPT | Performed by: INTERNAL MEDICINE

## 2019-05-07 PROCEDURE — 93000 ELECTROCARDIOGRAM COMPLETE: CPT | Performed by: INTERNAL MEDICINE

## 2019-05-07 RX ORDER — CLONAZEPAM 0.5 MG/1
0.5 TABLET ORAL DAILY PRN
Qty: 30 TABLET | Refills: 2 | OUTPATIENT
Start: 2019-05-07
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
## 2019-05-10 DIAGNOSIS — F41.8 MIXED ANXIETY DEPRESSIVE DISORDER: ICD-10-CM

## 2019-05-14 ENCOUNTER — APPOINTMENT (OUTPATIENT)
Dept: PHYSICAL THERAPY | Facility: HOSPITAL | Age: 79
End: 2019-05-14

## 2019-05-16 ENCOUNTER — HOSPITAL ENCOUNTER (OUTPATIENT)
Dept: CARDIOLOGY | Facility: HOSPITAL | Age: 79
Discharge: HOME | End: 2019-05-16
Admitting: INTERNAL MEDICINE

## 2019-05-16 VITALS — WEIGHT: 101 LBS | HEIGHT: 61 IN | BODY MASS INDEX: 19.07 KG/M2

## 2019-05-16 DIAGNOSIS — I38 VALVULAR HEART DISEASE: ICD-10-CM

## 2019-05-16 LAB
BH CV ECHO MEAS - ACS: 1.7 CM
BH CV ECHO MEAS - AI DEC SLOPE: 332 CM/SEC^2
BH CV ECHO MEAS - AI MAX PG: 91.4 MMHG
BH CV ECHO MEAS - AI MAX VEL: 478 CM/SEC
BH CV ECHO MEAS - AI P1/2T: 421.7 MSEC
BH CV ECHO MEAS - AO MAX PG (FULL): 3.7 MMHG
BH CV ECHO MEAS - AO MAX PG: 9.4 MMHG
BH CV ECHO MEAS - AO MEAN PG (FULL): 1 MMHG
BH CV ECHO MEAS - AO MEAN PG: 4 MMHG
BH CV ECHO MEAS - AO ROOT AREA (BSA CORRECTED): 1.8
BH CV ECHO MEAS - AO ROOT AREA: 5.3 CM^2
BH CV ECHO MEAS - AO ROOT DIAM: 2.6 CM
BH CV ECHO MEAS - AO V2 MAX: 153 CM/SEC
BH CV ECHO MEAS - AO V2 MEAN: 95.7 CM/SEC
BH CV ECHO MEAS - AO V2 VTI: 36.3 CM
BH CV ECHO MEAS - AVA(I,A): 2.3 CM^2
BH CV ECHO MEAS - AVA(I,D): 2.3 CM^2
BH CV ECHO MEAS - AVA(V,A): 2.2 CM^2
BH CV ECHO MEAS - AVA(V,D): 2.2 CM^2
BH CV ECHO MEAS - BSA(HAYCOCK): 1.4 M^2
BH CV ECHO MEAS - BSA: 1.4 M^2
BH CV ECHO MEAS - BZI_BMI: 19.1 KILOGRAMS/M^2
BH CV ECHO MEAS - BZI_METRIC_HEIGHT: 154.9 CM
BH CV ECHO MEAS - BZI_METRIC_WEIGHT: 45.8 KG
BH CV ECHO MEAS - EDV(CUBED): 40 ML
BH CV ECHO MEAS - EDV(MOD-SP2): 34 ML
BH CV ECHO MEAS - EDV(MOD-SP4): 29.1 ML
BH CV ECHO MEAS - EDV(TEICH): 48.1 ML
BH CV ECHO MEAS - EF(CUBED): 76.2 %
BH CV ECHO MEAS - EF(MOD-BP): 66 %
BH CV ECHO MEAS - EF(MOD-SP2): 52.9 %
BH CV ECHO MEAS - EF(MOD-SP4): 75.7 %
BH CV ECHO MEAS - EF(TEICH): 69.3 %
BH CV ECHO MEAS - ESV(CUBED): 9.5 ML
BH CV ECHO MEAS - ESV(MOD-SP2): 16 ML
BH CV ECHO MEAS - ESV(MOD-SP4): 7.1 ML
BH CV ECHO MEAS - ESV(TEICH): 14.8 ML
BH CV ECHO MEAS - FS: 38 %
BH CV ECHO MEAS - IVS/LVPW: 0.92
BH CV ECHO MEAS - IVSD: 1 CM
BH CV ECHO MEAS - LA DIMENSION: 3.3 CM
BH CV ECHO MEAS - LA/AO: 1.3
BH CV ECHO MEAS - LAT PEAK E' VEL: 5 CM/SEC
BH CV ECHO MEAS - LV DIASTOLIC VOL/BSA (35-75): 20.6 ML/M^2
BH CV ECHO MEAS - LV MASS(C)D: 109.6 GRAMS
BH CV ECHO MEAS - LV MASS(C)DI: 77.5 GRAMS/M^2
BH CV ECHO MEAS - LV MAX PG: 5.7 MMHG
BH CV ECHO MEAS - LV MEAN PG: 3 MMHG
BH CV ECHO MEAS - LV SYSTOLIC VOL/BSA (12-30): 5 ML/M^2
BH CV ECHO MEAS - LV V1 MAX: 119 CM/SEC
BH CV ECHO MEAS - LV V1 MEAN: 77.8 CM/SEC
BH CV ECHO MEAS - LV V1 VTI: 29.6 CM
BH CV ECHO MEAS - LVIDD: 3.4 CM
BH CV ECHO MEAS - LVIDS: 2.1 CM
BH CV ECHO MEAS - LVLD AP2: 6 CM
BH CV ECHO MEAS - LVLD AP4: 5.2 CM
BH CV ECHO MEAS - LVLS AP2: 4.8 CM
BH CV ECHO MEAS - LVLS AP4: 4.2 CM
BH CV ECHO MEAS - LVOT AREA (M): 2.8 CM^2
BH CV ECHO MEAS - LVOT AREA: 2.8 CM^2
BH CV ECHO MEAS - LVOT DIAM: 1.9 CM
BH CV ECHO MEAS - LVPWD: 1.1 CM
BH CV ECHO MEAS - MED PEAK E' VEL: 5 CM/SEC
BH CV ECHO MEAS - MR MAX PG: 94.1 MMHG
BH CV ECHO MEAS - MR MAX VEL: 485 CM/SEC
BH CV ECHO MEAS - MR MEAN PG: 57 MMHG
BH CV ECHO MEAS - MR MEAN VEL: 349 CM/SEC
BH CV ECHO MEAS - MR VTI: 141 CM
BH CV ECHO MEAS - MV A DUR: 0.11 SEC
BH CV ECHO MEAS - MV A MAX VEL: 72.8 CM/SEC
BH CV ECHO MEAS - MV DEC SLOPE: 1055 CM/SEC^2
BH CV ECHO MEAS - MV DEC TIME: 0.19 SEC
BH CV ECHO MEAS - MV E MAX VEL: 80.5 CM/SEC
BH CV ECHO MEAS - MV E/A: 1.1
BH CV ECHO MEAS - MV MAX PG: 11 MMHG
BH CV ECHO MEAS - MV MEAN PG: 3 MMHG
BH CV ECHO MEAS - MV P1/2T MAX VEL: 169 CM/SEC
BH CV ECHO MEAS - MV P1/2T: 46.9 MSEC
BH CV ECHO MEAS - MV V2 MAX: 166 CM/SEC
BH CV ECHO MEAS - MV V2 MEAN: 83.8 CM/SEC
BH CV ECHO MEAS - MV V2 VTI: 49.4 CM
BH CV ECHO MEAS - MVA P1/2T LCG: 1.3 CM^2
BH CV ECHO MEAS - MVA(P1/2T): 4.7 CM^2
BH CV ECHO MEAS - MVA(VTI): 1.7 CM^2
BH CV ECHO MEAS - PA ACC TIME: 0.09 SEC
BH CV ECHO MEAS - PA MAX PG (FULL): 1.7 MMHG
BH CV ECHO MEAS - PA MAX PG: 2.7 MMHG
BH CV ECHO MEAS - PA PR(ACCEL): 37.6 MMHG
BH CV ECHO MEAS - PA V2 MAX: 81.4 CM/SEC
BH CV ECHO MEAS - PULM A REVS DUR: 0.14 SEC
BH CV ECHO MEAS - PULM A REVS VEL: 25.7 CM/SEC
BH CV ECHO MEAS - PULM DIAS VEL: 73.7 CM/SEC
BH CV ECHO MEAS - PULM S/D: 1
BH CV ECHO MEAS - PULM SYS VEL: 74.7 CM/SEC
BH CV ECHO MEAS - PVA(V,A): 1.5 CM^2
BH CV ECHO MEAS - PVA(V,D): 1.5 CM^2
BH CV ECHO MEAS - QP/QS: 0.35
BH CV ECHO MEAS - RAP SYSTOLE: 3 MMHG
BH CV ECHO MEAS - RV MAX PG: 0.92 MMHG
BH CV ECHO MEAS - RV MEAN PG: 1 MMHG
BH CV ECHO MEAS - RV V1 MAX: 48 CM/SEC
BH CV ECHO MEAS - RV V1 MEAN: 32.7 CM/SEC
BH CV ECHO MEAS - RV V1 VTI: 11.6 CM
BH CV ECHO MEAS - RVOT AREA: 2.5 CM^2
BH CV ECHO MEAS - RVOT DIAM: 1.8 CM
BH CV ECHO MEAS - RVSP: 34 MMHG
BH CV ECHO MEAS - SI(AO): 136.4 ML/M^2
BH CV ECHO MEAS - SI(CUBED): 21.6 ML/M^2
BH CV ECHO MEAS - SI(LVOT): 59.4 ML/M^2
BH CV ECHO MEAS - SI(MOD-SP2): 12.7 ML/M^2
BH CV ECHO MEAS - SI(MOD-SP4): 15.6 ML/M^2
BH CV ECHO MEAS - SI(TEICH): 23.6 ML/M^2
BH CV ECHO MEAS - SV(AO): 192.7 ML
BH CV ECHO MEAS - SV(CUBED): 30.5 ML
BH CV ECHO MEAS - SV(LVOT): 83.9 ML
BH CV ECHO MEAS - SV(MOD-SP2): 18 ML
BH CV ECHO MEAS - SV(MOD-SP4): 22 ML
BH CV ECHO MEAS - SV(RVOT): 29.5 ML
BH CV ECHO MEAS - SV(TEICH): 33.4 ML
BH CV ECHO MEAS - TAPSE (>1.6): 2.9 CM2
BH CV ECHO MEAS - TR MAX VEL: 284 CM/SEC
BH CV ECHO MEASUREMENTS AVERAGE E/E' RATIO: 16.1
BH CV VAS BP RIGHT ARM: NORMAL MMHG
BH CV XLRA - TDI S': 13 CM/SEC
LEFT ATRIUM VOLUME INDEX: 36 ML/M2
MAXIMAL PREDICTED HEART RATE: 143 BPM
STRESS TARGET HR: 122 BPM

## 2019-05-16 PROCEDURE — 93306 TTE W/DOPPLER COMPLETE: CPT | Performed by: INTERNAL MEDICINE

## 2019-05-16 PROCEDURE — 93306 TTE W/DOPPLER COMPLETE: CPT

## 2019-05-24 ENCOUNTER — TELEPHONE (OUTPATIENT)
Dept: CARDIOLOGY | Facility: CLINIC | Age: 79
End: 2019-05-24

## 2019-05-24 NOTE — TELEPHONE ENCOUNTER
----- Message from Mo Calero MD sent at 5/23/2019  3:45 PM EDT -----  I have tried calling every day this week.  The phone either goes straight to  or rings and goes to .  I've left messages.  She hasn't called back.    I won't be back in office until 5/28.  My preference is that she's set up with an appt with  in the next two weeks.    JDK

## 2019-05-29 ENCOUNTER — HOSPITAL ENCOUNTER (OUTPATIENT)
Dept: PHYSICAL THERAPY | Facility: HOSPITAL | Age: 79
Setting detail: THERAPIES SERIES
Discharge: HOME | End: 2019-05-29

## 2019-05-29 DIAGNOSIS — G89.29 CHRONIC LOW BACK PAIN WITHOUT SCIATICA, UNSPECIFIED BACK PAIN LATERALITY: Primary | ICD-10-CM

## 2019-05-29 DIAGNOSIS — M54.50 CHRONIC LOW BACK PAIN WITHOUT SCIATICA, UNSPECIFIED BACK PAIN LATERALITY: Primary | ICD-10-CM

## 2019-05-29 PROCEDURE — 97161 PT EVAL LOW COMPLEX 20 MIN: CPT
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
## 2019-06-03 ENCOUNTER — OFFICE VISIT (OUTPATIENT)
Dept: INTERNAL MEDICINE | Facility: CLINIC | Age: 79
End: 2019-06-03

## 2019-06-03 VITALS
HEART RATE: 81 BPM | WEIGHT: 102.8 LBS | DIASTOLIC BLOOD PRESSURE: 48 MMHG | BODY MASS INDEX: 19.41 KG/M2 | HEIGHT: 61 IN | SYSTOLIC BLOOD PRESSURE: 118 MMHG | RESPIRATION RATE: 16 BRPM | TEMPERATURE: 97.8 F | OXYGEN SATURATION: 96 %

## 2019-06-03 DIAGNOSIS — I10 ESSENTIAL HYPERTENSION: ICD-10-CM

## 2019-06-03 DIAGNOSIS — M54.50 LUMBAR BACK PAIN: ICD-10-CM

## 2019-06-03 DIAGNOSIS — I25.119 CORONARY ARTERY DISEASE INVOLVING NATIVE CORONARY ARTERY OF NATIVE HEART WITH ANGINA PECTORIS (HCC): ICD-10-CM

## 2019-06-03 DIAGNOSIS — F41.9 ANXIETY AND DEPRESSION: ICD-10-CM

## 2019-06-03 DIAGNOSIS — I38 VALVULAR HEART DISEASE: ICD-10-CM

## 2019-06-03 DIAGNOSIS — F32.A ANXIETY AND DEPRESSION: ICD-10-CM

## 2019-06-03 DIAGNOSIS — R73.02 IMPAIRED GLUCOSE TOLERANCE: Primary | ICD-10-CM

## 2019-06-03 DIAGNOSIS — I77.9 PERIPHERAL ARTERIAL OCCLUSIVE DISEASE (HCC): ICD-10-CM

## 2019-06-03 DIAGNOSIS — E03.9 ACQUIRED HYPOTHYROIDISM: ICD-10-CM

## 2019-06-03 DIAGNOSIS — G89.29 CHRONIC LOW BACK PAIN WITHOUT SCIATICA, UNSPECIFIED BACK PAIN LATERALITY: ICD-10-CM

## 2019-06-03 DIAGNOSIS — E78.00 HYPERCHOLESTEROLEMIA: ICD-10-CM

## 2019-06-03 DIAGNOSIS — M54.50 CHRONIC LOW BACK PAIN WITHOUT SCIATICA, UNSPECIFIED BACK PAIN LATERALITY: ICD-10-CM

## 2019-06-03 PROCEDURE — 99214 OFFICE O/P EST MOD 30 MIN: CPT | Performed by: INTERNAL MEDICINE

## 2019-06-03 RX ORDER — OXYCODONE AND ACETAMINOPHEN 7.5; 325 MG/1; MG/1
1 TABLET ORAL EVERY 6 HOURS PRN
Qty: 75 TABLET | Refills: 0 | Status: SHIPPED | OUTPATIENT
Start: 2019-06-03 | End: 2019-06-20 | Stop reason: SDUPTHER
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
## 2019-06-06 ENCOUNTER — OFFICE VISIT (OUTPATIENT)
Dept: CARDIOLOGY | Facility: CLINIC | Age: 79
End: 2019-06-06

## 2019-06-06 VITALS
HEIGHT: 61 IN | WEIGHT: 103.3 LBS | DIASTOLIC BLOOD PRESSURE: 84 MMHG | BODY MASS INDEX: 19.5 KG/M2 | HEART RATE: 66 BPM | SYSTOLIC BLOOD PRESSURE: 132 MMHG

## 2019-06-06 DIAGNOSIS — I65.23 OBSTRUCTION OF CAROTID ARTERY ON BOTH SIDES: ICD-10-CM

## 2019-06-06 DIAGNOSIS — I38 VALVULAR HEART DISEASE: ICD-10-CM

## 2019-06-06 DIAGNOSIS — I77.9 PERIPHERAL ARTERIAL OCCLUSIVE DISEASE (HCC): ICD-10-CM

## 2019-06-06 DIAGNOSIS — K55.1 MESENTERIC ARTERY STENOSIS (HCC): ICD-10-CM

## 2019-06-06 DIAGNOSIS — I48.0 PAF (PAROXYSMAL ATRIAL FIBRILLATION) (HCC): ICD-10-CM

## 2019-06-06 DIAGNOSIS — I71.60 THORACOABDOMINAL AORTIC ANEURYSM (TAAA) WITHOUT RUPTURE (HCC): ICD-10-CM

## 2019-06-06 DIAGNOSIS — I25.119 CORONARY ARTERY DISEASE INVOLVING NATIVE CORONARY ARTERY OF NATIVE HEART WITH ANGINA PECTORIS (HCC): Primary | ICD-10-CM

## 2019-06-06 DIAGNOSIS — I10 ESSENTIAL HYPERTENSION: ICD-10-CM

## 2019-06-06 DIAGNOSIS — E78.00 HYPERCHOLESTEROLEMIA: ICD-10-CM

## 2019-06-06 PROCEDURE — 99214 OFFICE O/P EST MOD 30 MIN: CPT | Performed by: NURSE PRACTITIONER

## 2019-06-06 RX ORDER — DIAZEPAM 5 MG/1
5 TABLET ORAL 2 TIMES DAILY PRN
COMMUNITY
End: 2019-08-13
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
## 2019-06-10 ENCOUNTER — TELEPHONE (OUTPATIENT)
Dept: INTERNAL MEDICINE | Facility: CLINIC | Age: 79
End: 2019-06-10

## 2019-06-10 ENCOUNTER — APPOINTMENT (OUTPATIENT)
Dept: LAB | Facility: HOSPITAL | Age: 79
End: 2019-06-10

## 2019-06-10 ENCOUNTER — APPOINTMENT (OUTPATIENT)
Dept: GENERAL RADIOLOGY | Facility: HOSPITAL | Age: 79
End: 2019-06-10

## 2019-06-10 ENCOUNTER — HOSPITAL ENCOUNTER (EMERGENCY)
Facility: HOSPITAL | Age: 79
Discharge: HOME | End: 2019-06-10
Attending: EMERGENCY MEDICINE | Admitting: EMERGENCY MEDICINE

## 2019-06-10 VITALS
RESPIRATION RATE: 16 BRPM | HEIGHT: 60 IN | WEIGHT: 100 LBS | DIASTOLIC BLOOD PRESSURE: 60 MMHG | BODY MASS INDEX: 19.63 KG/M2 | OXYGEN SATURATION: 97 % | TEMPERATURE: 98.5 F | SYSTOLIC BLOOD PRESSURE: 179 MMHG | HEART RATE: 78 BPM

## 2019-06-10 DIAGNOSIS — G89.4 CHRONIC PAIN SYNDROME: ICD-10-CM

## 2019-06-10 DIAGNOSIS — I48.0 PAF (PAROXYSMAL ATRIAL FIBRILLATION) (HCC): ICD-10-CM

## 2019-06-10 DIAGNOSIS — S22.31XA CLOSED FRACTURE OF ONE RIB OF RIGHT SIDE, INITIAL ENCOUNTER: Primary | ICD-10-CM

## 2019-06-10 LAB
ALBUMIN SERPL-MCNC: 4.2 G/DL (ref 3.5–5.2)
ALBUMIN/GLOB SERPL: 1.4 G/DL
ALP SERPL-CCNC: 111 U/L (ref 39–117)
ALT SERPL W P-5'-P-CCNC: 6 U/L (ref 1–33)
ANION GAP SERPL CALCULATED.3IONS-SCNC: 12.1 MMOL/L
AST SERPL-CCNC: 13 U/L (ref 1–32)
BASOPHILS # BLD AUTO: 0.06 10*3/MM3 (ref 0–0.2)
BASOPHILS NFR BLD AUTO: 0.8 % (ref 0–1.5)
BILIRUB SERPL-MCNC: 0.4 MG/DL (ref 0.2–1.2)
BUN BLD-MCNC: 13 MG/DL (ref 8–23)
BUN/CREAT SERPL: 14.8 (ref 7–25)
CALCIUM SPEC-SCNC: 8.6 MG/DL (ref 8.6–10.5)
CHLORIDE SERPL-SCNC: 96 MMOL/L (ref 98–107)
CHOLEST SERPL-MCNC: 133 MG/DL (ref 0–200)
CO2 SERPL-SCNC: 26.9 MMOL/L (ref 22–29)
CREAT BLD-MCNC: 0.88 MG/DL (ref 0.57–1)
DEPRECATED RDW RBC AUTO: 50.5 FL (ref 37–54)
EOSINOPHIL # BLD AUTO: 0.09 10*3/MM3 (ref 0–0.4)
EOSINOPHIL NFR BLD AUTO: 1.2 % (ref 0.3–6.2)
ERYTHROCYTE [DISTWIDTH] IN BLOOD BY AUTOMATED COUNT: 14.8 % (ref 12.3–15.4)
GFR SERPL CREATININE-BSD FRML MDRD: 62 ML/MIN/1.73
GLOBULIN UR ELPH-MCNC: 3.1 GM/DL
GLUCOSE BLD-MCNC: 93 MG/DL (ref 65–99)
HBA1C MFR BLD: 5.9 % (ref 4.8–5.6)
HCT VFR BLD AUTO: 41.2 % (ref 34–46.6)
HDLC SERPL-MCNC: 49 MG/DL (ref 40–60)
HGB BLD-MCNC: 12.6 G/DL (ref 12–15.9)
IMM GRANULOCYTES # BLD AUTO: 0.03 10*3/MM3 (ref 0–0.05)
IMM GRANULOCYTES NFR BLD AUTO: 0.4 % (ref 0–0.5)
LDLC SERPL CALC-MCNC: 69 MG/DL (ref 0–100)
LDLC/HDLC SERPL: 1.4 {RATIO}
LYMPHOCYTES # BLD AUTO: 2.09 10*3/MM3 (ref 0.7–3.1)
LYMPHOCYTES NFR BLD AUTO: 27.5 % (ref 19.6–45.3)
MCH RBC QN AUTO: 28.2 PG (ref 26.6–33)
MCHC RBC AUTO-ENTMCNC: 30.6 G/DL (ref 31.5–35.7)
MCV RBC AUTO: 92.2 FL (ref 79–97)
MONOCYTES # BLD AUTO: 0.64 10*3/MM3 (ref 0.1–0.9)
MONOCYTES NFR BLD AUTO: 8.4 % (ref 5–12)
NEUTROPHILS # BLD AUTO: 4.68 10*3/MM3 (ref 1.7–7)
NEUTROPHILS NFR BLD AUTO: 61.7 % (ref 42.7–76)
NRBC BLD AUTO-RTO: 0 /100 WBC (ref 0–0.2)
PLATELET # BLD AUTO: 242 10*3/MM3 (ref 140–450)
PMV BLD AUTO: 10.6 FL (ref 6–12)
POTASSIUM BLD-SCNC: 4.6 MMOL/L (ref 3.5–5.2)
PROT SERPL-MCNC: 7.3 G/DL (ref 6–8.5)
RBC # BLD AUTO: 4.47 10*6/MM3 (ref 3.77–5.28)
SODIUM BLD-SCNC: 135 MMOL/L (ref 136–145)
T4 FREE SERPL-MCNC: 0.94 NG/DL (ref 0.93–1.7)
TRIGL SERPL-MCNC: 76 MG/DL (ref 0–150)
TSH SERPL DL<=0.05 MIU/L-ACNC: 4.33 MIU/ML (ref 0.27–4.2)
VLDLC SERPL-MCNC: 15.2 MG/DL (ref 5–40)
WBC NRBC COR # BLD: 7.59 10*3/MM3 (ref 3.4–10.8)

## 2019-06-10 PROCEDURE — 36415 COLL VENOUS BLD VENIPUNCTURE: CPT | Performed by: INTERNAL MEDICINE

## 2019-06-10 PROCEDURE — 94799 UNLISTED PULMONARY SVC/PX: CPT

## 2019-06-10 PROCEDURE — 85025 COMPLETE CBC W/AUTO DIFF WBC: CPT | Performed by: INTERNAL MEDICINE

## 2019-06-10 PROCEDURE — 83036 HEMOGLOBIN GLYCOSYLATED A1C: CPT | Performed by: INTERNAL MEDICINE

## 2019-06-10 PROCEDURE — 80053 COMPREHEN METABOLIC PANEL: CPT | Performed by: INTERNAL MEDICINE

## 2019-06-10 PROCEDURE — 71101 X-RAY EXAM UNILAT RIBS/CHEST: CPT

## 2019-06-10 PROCEDURE — 99282 EMERGENCY DEPT VISIT SF MDM: CPT | Performed by: EMERGENCY MEDICINE

## 2019-06-10 PROCEDURE — 84443 ASSAY THYROID STIM HORMONE: CPT | Performed by: INTERNAL MEDICINE

## 2019-06-10 PROCEDURE — 84439 ASSAY OF FREE THYROXINE: CPT | Performed by: INTERNAL MEDICINE

## 2019-06-10 PROCEDURE — 80061 LIPID PANEL: CPT | Performed by: INTERNAL MEDICINE

## 2019-06-10 PROCEDURE — 99283 EMERGENCY DEPT VISIT LOW MDM: CPT
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
## 2019-06-11 ENCOUNTER — TRANSCRIBE ORDERS (OUTPATIENT)
Dept: ADMINISTRATIVE | Facility: HOSPITAL | Age: 79
End: 2019-06-11

## 2019-06-11 DIAGNOSIS — I71.20 THORACIC AORTIC ANEURYSM WITHOUT RUPTURE (HCC): Primary | ICD-10-CM

## 2019-06-20 ENCOUNTER — TELEPHONE (OUTPATIENT)
Dept: INTERNAL MEDICINE | Facility: CLINIC | Age: 79
End: 2019-06-20

## 2019-06-20 DIAGNOSIS — G89.29 CHRONIC LOW BACK PAIN WITHOUT SCIATICA, UNSPECIFIED BACK PAIN LATERALITY: ICD-10-CM

## 2019-06-20 DIAGNOSIS — M54.50 CHRONIC LOW BACK PAIN WITHOUT SCIATICA, UNSPECIFIED BACK PAIN LATERALITY: ICD-10-CM

## 2019-06-20 DIAGNOSIS — M54.50 LUMBAR BACK PAIN: ICD-10-CM

## 2019-06-20 RX ORDER — OXYCODONE AND ACETAMINOPHEN 7.5; 325 MG/1; MG/1
1 TABLET ORAL EVERY 6 HOURS PRN
Qty: 75 TABLET | Refills: 0 | Status: SHIPPED | OUTPATIENT
Start: 2019-06-20 | End: 2019-07-22 | Stop reason: SDUPTHER

## 2019-06-20 NOTE — TELEPHONE ENCOUNTER
Left message for pt  To  at pharmacy       ----- Message from Alison Donaldson MA sent at 6/20/2019 12:22 PM EDT -----  Pt  Called needs refill on oxycodone   7.5/325#75  Take one po eery 6 hours prn.   lov   6/3/19  Needs narc and ryann

## 2019-07-08 ENCOUNTER — TELEPHONE (OUTPATIENT)
Dept: CARDIOLOGY | Facility: CLINIC | Age: 79
End: 2019-07-08

## 2019-07-15 ENCOUNTER — OFFICE VISIT (OUTPATIENT)
Dept: INTERNAL MEDICINE | Facility: CLINIC | Age: 79
End: 2019-07-15

## 2019-07-15 VITALS
DIASTOLIC BLOOD PRESSURE: 64 MMHG | BODY MASS INDEX: 20.03 KG/M2 | HEART RATE: 64 BPM | WEIGHT: 102 LBS | OXYGEN SATURATION: 95 % | HEIGHT: 60 IN | RESPIRATION RATE: 14 BRPM | SYSTOLIC BLOOD PRESSURE: 158 MMHG

## 2019-07-15 DIAGNOSIS — F41.8 MIXED ANXIETY DEPRESSIVE DISORDER: ICD-10-CM

## 2019-07-15 DIAGNOSIS — I10 ESSENTIAL HYPERTENSION: ICD-10-CM

## 2019-07-15 DIAGNOSIS — I48.0 PAF (PAROXYSMAL ATRIAL FIBRILLATION) (HCC): ICD-10-CM

## 2019-07-15 DIAGNOSIS — E78.00 HYPERCHOLESTEROLEMIA: ICD-10-CM

## 2019-07-15 DIAGNOSIS — I77.9 PERIPHERAL ARTERIAL OCCLUSIVE DISEASE (HCC): ICD-10-CM

## 2019-07-15 DIAGNOSIS — F17.200 TOBACCO DEPENDENCE SYNDROME: ICD-10-CM

## 2019-07-15 DIAGNOSIS — J42 CHRONIC BRONCHITIS, UNSPECIFIED CHRONIC BRONCHITIS TYPE (HCC): ICD-10-CM

## 2019-07-15 DIAGNOSIS — K55.1 MESENTERIC ARTERY STENOSIS (HCC): ICD-10-CM

## 2019-07-15 DIAGNOSIS — I25.119 CORONARY ARTERY DISEASE INVOLVING NATIVE CORONARY ARTERY OF NATIVE HEART WITH ANGINA PECTORIS (HCC): Primary | ICD-10-CM

## 2019-07-15 DIAGNOSIS — E03.9 ACQUIRED HYPOTHYROIDISM: ICD-10-CM

## 2019-07-15 PROCEDURE — 99214 OFFICE O/P EST MOD 30 MIN: CPT | Performed by: INTERNAL MEDICINE

## 2019-07-15 RX ORDER — AMLODIPINE BESYLATE 5 MG/1
5 TABLET ORAL DAILY
Qty: 30 TABLET | Refills: 0 | Status: SHIPPED | OUTPATIENT
Start: 2019-07-15 | End: 2019-07-15 | Stop reason: SDUPTHER

## 2019-07-15 RX ORDER — AMLODIPINE BESYLATE 5 MG/1
5 TABLET ORAL DAILY
Qty: 90 TABLET | Refills: 1 | Status: SHIPPED | OUTPATIENT
Start: 2019-07-15 | End: 2019-08-13
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
## 2019-07-22 DIAGNOSIS — M54.50 LUMBAR BACK PAIN: ICD-10-CM

## 2019-07-22 DIAGNOSIS — G89.29 CHRONIC LOW BACK PAIN WITHOUT SCIATICA, UNSPECIFIED BACK PAIN LATERALITY: ICD-10-CM

## 2019-07-22 DIAGNOSIS — M54.50 CHRONIC LOW BACK PAIN WITHOUT SCIATICA, UNSPECIFIED BACK PAIN LATERALITY: ICD-10-CM

## 2019-07-22 RX ORDER — OXYCODONE AND ACETAMINOPHEN 7.5; 325 MG/1; MG/1
1 TABLET ORAL EVERY 6 HOURS PRN
Qty: 75 TABLET | Refills: 0 | Status: SHIPPED | OUTPATIENT
Start: 2019-07-22 | End: 2019-08-16 | Stop reason: SDUPTHER

## 2019-08-13 ENCOUNTER — OFFICE VISIT (OUTPATIENT)
Dept: INTERNAL MEDICINE | Facility: CLINIC | Age: 79
End: 2019-08-13

## 2019-08-13 VITALS
SYSTOLIC BLOOD PRESSURE: 138 MMHG | OXYGEN SATURATION: 98 % | BODY MASS INDEX: 20.42 KG/M2 | WEIGHT: 104 LBS | HEIGHT: 60 IN | DIASTOLIC BLOOD PRESSURE: 88 MMHG | HEART RATE: 59 BPM | RESPIRATION RATE: 18 BRPM

## 2019-08-13 DIAGNOSIS — F41.9 ANXIETY: ICD-10-CM

## 2019-08-13 DIAGNOSIS — F32.A ANXIETY AND DEPRESSION: Primary | ICD-10-CM

## 2019-08-13 DIAGNOSIS — N39.41 URGE INCONTINENCE: ICD-10-CM

## 2019-08-13 DIAGNOSIS — J42 CHRONIC BRONCHITIS, UNSPECIFIED CHRONIC BRONCHITIS TYPE (HCC): ICD-10-CM

## 2019-08-13 DIAGNOSIS — G89.4 CHRONIC PAIN SYNDROME: ICD-10-CM

## 2019-08-13 DIAGNOSIS — F41.9 ANXIETY AND DEPRESSION: Primary | ICD-10-CM

## 2019-08-13 PROCEDURE — 99215 OFFICE O/P EST HI 40 MIN: CPT | Performed by: INTERNAL MEDICINE

## 2019-08-13 RX ORDER — OMEPRAZOLE 20 MG/1
20 CAPSULE, DELAYED RELEASE ORAL DAILY
Qty: 90 CAPSULE | Refills: 1 | Status: SHIPPED | OUTPATIENT
Start: 2019-08-13 | End: 2020-02-25

## 2019-08-13 RX ORDER — CLONAZEPAM 0.5 MG/1
0.5 TABLET ORAL NIGHTLY PRN
Qty: 30 TABLET | Refills: 0 | Status: SHIPPED | OUTPATIENT
Start: 2019-08-13 | End: 2019-09-18 | Stop reason: SDUPTHER

## 2019-08-13 RX ORDER — BUDESONIDE AND FORMOTEROL FUMARATE DIHYDRATE 160; 4.5 UG/1; UG/1
2 AEROSOL RESPIRATORY (INHALATION)
Qty: 1 INHALER | Refills: 12 | Status: SHIPPED | OUTPATIENT
Start: 2019-08-13 | End: 2022-01-01 | Stop reason: HOSPADM

## 2019-08-13 RX ORDER — OXYBUTYNIN CHLORIDE 5 MG/1
5 TABLET ORAL 2 TIMES DAILY
Qty: 180 TABLET | Refills: 1 | Status: SHIPPED | OUTPATIENT
Start: 2019-08-13 | End: 2020-02-25

## 2019-08-13 RX ORDER — LEVOTHYROXINE SODIUM 0.05 MG/1
50 TABLET ORAL DAILY
Qty: 90 TABLET | Refills: 1 | Status: SHIPPED | OUTPATIENT
Start: 2019-08-13 | End: 2020-01-23

## 2019-08-13 RX ORDER — DULOXETIN HYDROCHLORIDE 60 MG/1
60 CAPSULE, DELAYED RELEASE ORAL DAILY
Qty: 90 CAPSULE | Refills: 1 | Status: SHIPPED | OUTPATIENT
Start: 2019-08-13 | End: 2020-04-01

## 2019-08-13 NOTE — PROGRESS NOTES
Kaylin Ojeda is a 79 y.o. female, who presents with a chief complaint of   Chief Complaint   Patient presents with   • Weight Loss   • Back Pain       HPI   Pt here for follow up with her daughter-in-law.       She has been going to see a therapist and she does feel like this has been helping some.  she has an appointment later in the month.  The pt is struggling with her memory and keeping things organized.     Her daughter had severe jaundice and just got out of the hospital again.  She doesn't know if her daughter is sick from her lupus, hep c, or other illegal meds she has taken.  She is trying to set some limits by not sending money but she still does at times.  They have been discussing how the patient ends up taking on the problems of everyone around her.       Pt can go to sleep but is up multiple times during the night.       Insulin resistance - a1c 5.9. She is actually under weight.  She does not eat a consistently healthy diet     Hypothyroidism - tsh up a little but ft4 ok.  She does feel tired often.  No hair/skin changes.      Cad - she denies chest pain.  No le edema     Copd - breathing ok.  No recent wheezing.  She is still smoking and not ready to quit.  she has been prescribed symbicort but isnt using it.     Sleep difficulty - takes diazepam with melatonin and still up often.       HLD - on statin.  No cramps or myalgias.       PAF - on eliquis     HTN - pt currently taking 1/2 of metoprolol dose. She isnt sure if amlodipine causing itching or not.  No ha/dizziness.         The following portions of the patient's history were reviewed and updated as appropriate: allergies, current medications, past family history, past medical history, past social history, past surgical history and problem list.    Allergies: Aleve [naproxen sodium]; Codeine; Ibuprofen; and Sulfa antibiotics    Review of Systems          Wt Readings from Last 3 Encounters:   08/13/19 47.2 kg (104 lb)   07/15/19 46.3  kg (102 lb)   06/10/19 45.4 kg (100 lb)     Temp Readings from Last 3 Encounters:   06/10/19 98.5 °F (36.9 °C) (Oral)   06/03/19 97.8 °F (36.6 °C)   05/01/19 98.2 °F (36.8 °C)     BP Readings from Last 3 Encounters:   08/13/19 138/88   07/15/19 158/64   06/10/19 179/60     Pulse Readings from Last 3 Encounters:   08/13/19 59   07/15/19 64   06/10/19 78     Body mass index is 20.31 kg/m².  @LASTSAO2(3)@    Physical Exam   Constitutional: She is oriented to person, place, and time. She appears well-developed and well-nourished. No distress.   HENT:   Head: Normocephalic and atraumatic.   Right Ear: External ear normal.   Left Ear: External ear normal.   Nose: Nose normal.   Mouth/Throat: Oropharynx is clear and moist.   Eyes: Conjunctivae and EOM are normal. Pupils are equal, round, and reactive to light.   Neck: Normal range of motion. Neck supple.   Cardiovascular: Normal rate, regular rhythm, normal heart sounds and intact distal pulses.   Pulmonary/Chest: Effort normal and breath sounds normal. No respiratory distress. She has no wheezes.   Musculoskeletal: Normal range of motion.   Normal gait   Neurological: She is alert and oriented to person, place, and time.   Skin: Skin is warm and dry.   Psychiatric: She has a normal mood and affect. Her behavior is normal. Judgment and thought content normal.   Nursing note and vitals reviewed.      Results for orders placed or performed in visit on 06/03/19   Comprehensive Metabolic Panel   Result Value Ref Range    Glucose 93 65 - 99 mg/dL    BUN 13 8 - 23 mg/dL    Creatinine 0.88 0.57 - 1.00 mg/dL    Sodium 135 (L) 136 - 145 mmol/L    Potassium 4.6 3.5 - 5.2 mmol/L    Chloride 96 (L) 98 - 107 mmol/L    CO2 26.9 22.0 - 29.0 mmol/L    Calcium 8.6 8.6 - 10.5 mg/dL    Total Protein 7.3 6.0 - 8.5 g/dL    Albumin 4.20 3.50 - 5.20 g/dL    ALT (SGPT) 6 1 - 33 U/L    AST (SGOT) 13 1 - 32 U/L    Alkaline Phosphatase 111 39 - 117 U/L    Total Bilirubin 0.4 0.2 - 1.2 mg/dL    eGFR  Non  Amer 62 >60 mL/min/1.73    Globulin 3.1 gm/dL    A/G Ratio 1.4 g/dL    BUN/Creatinine Ratio 14.8 7.0 - 25.0    Anion Gap 12.1 mmol/L   T4, Free   Result Value Ref Range    Free T4 0.94 0.93 - 1.70 ng/dL   TSH   Result Value Ref Range    TSH 4.330 (H) 0.270 - 4.200 mIU/mL   Lipid Panel With LDL / HDL Ratio   Result Value Ref Range    Total Cholesterol 133 0 - 200 mg/dL    Triglycerides 76 0 - 150 mg/dL    HDL Cholesterol 49 40 - 60 mg/dL    LDL Cholesterol  69 0 - 100 mg/dL    VLDL Cholesterol 15.2 5 - 40 mg/dL    LDL/HDL Ratio 1.40    Hemoglobin A1c   Result Value Ref Range    Hemoglobin A1C 5.90 (H) 4.80 - 5.60 %   CBC Auto Differential   Result Value Ref Range    WBC 7.59 3.40 - 10.80 10*3/mm3    RBC 4.47 3.77 - 5.28 10*6/mm3    Hemoglobin 12.6 12.0 - 15.9 g/dL    Hematocrit 41.2 34.0 - 46.6 %    MCV 92.2 79.0 - 97.0 fL    MCH 28.2 26.6 - 33.0 pg    MCHC 30.6 (L) 31.5 - 35.7 g/dL    RDW 14.8 12.3 - 15.4 %    RDW-SD 50.5 37.0 - 54.0 fl    MPV 10.6 6.0 - 12.0 fL    Platelets 242 140 - 450 10*3/mm3    Neutrophil % 61.7 42.7 - 76.0 %    Lymphocyte % 27.5 19.6 - 45.3 %    Monocyte % 8.4 5.0 - 12.0 %    Eosinophil % 1.2 0.3 - 6.2 %    Basophil % 0.8 0.0 - 1.5 %    Immature Grans % 0.4 0.0 - 0.5 %    Neutrophils, Absolute 4.68 1.70 - 7.00 10*3/mm3    Lymphocytes, Absolute 2.09 0.70 - 3.10 10*3/mm3    Monocytes, Absolute 0.64 0.10 - 0.90 10*3/mm3    Eosinophils, Absolute 0.09 0.00 - 0.40 10*3/mm3    Basophils, Absolute 0.06 0.00 - 0.20 10*3/mm3    Immature Grans, Absolute 0.03 0.00 - 0.05 10*3/mm3    nRBC 0.0 0.0 - 0.2 /100 WBC           Kaylin was seen today for weight loss and back pain.    Diagnoses and all orders for this visit:    Anxiety and depression  -     clonazePAM (KlonoPIN) 0.5 MG tablet; Take 1 tablet by mouth At Night As Needed for Anxiety.    Urge incontinence  -     oxybutynin (DITROPAN) 5 MG tablet; Take 1 tablet by mouth 2 (Two) Times a Day.    Chronic bronchitis, unspecified chronic  bronchitis type (CMS/HCC)  -     budesonide-formoterol (SYMBICORT) 160-4.5 MCG/ACT inhaler; Inhale 2 puffs 2 (Two) Times a Day.    Anxiety  -     DULoxetine (CYMBALTA) 60 MG capsule; Take 1 capsule by mouth Daily.    Chronic pain syndrome  -     DULoxetine (CYMBALTA) 60 MG capsule; Take 1 capsule by mouth Daily.    Other orders  -     omeprazole (PRILOSEC) 20 MG capsule; Take 1 capsule by mouth Daily.  -     levothyroxine (SYNTHROID, LEVOTHROID) 50 MCG tablet; Take 1 tablet by mouth Daily.      Med list reviewed with pt's daughter in law.      Will hold amlodipine and go up to metoprolol 100mg in am and 50mg in pm.    45 min spent in patient care with >50% spent in counseling about depression, anxiety, self care and med compliance.     Outpatient Medications Prior to Visit   Medication Sig Dispense Refill   • albuterol (PROVENTIL HFA;VENTOLIN HFA) 108 (90 BASE) MCG/ACT inhaler Inhale 2 puffs every 4 (four) hours as needed for wheezing. 18 g 11   • alendronate (FOSAMAX) 70 MG tablet Take 1 tablet by mouth Every 7 (Seven) Days. 48 tablet 0   • apixaban (ELIQUIS) 5 MG tablet tablet Take 1 tablet by mouth Every 12 (Twelve) Hours. 180 tablet 1   • atorvastatin (LIPITOR) 10 MG tablet TAKE 1 TABLET BY MOUTH DAILY 90 tablet 3   • fluticasone (FLONASE) 50 MCG/ACT nasal spray SPRAY TWICE IN EACH NOSTRIL EVERY DAY FOR 30 DAYS. 16 mL 6   • metoprolol tartrate (LOPRESSOR) 100 MG tablet Take 1 tablet by mouth 2 (Two) Times a Day. 180 tablet 1   • ondansetron (ZOFRAN) 8 MG tablet TAKE 1 TABLET BY MOUTH EVERY 8 HOURS AS NEEDED FOR NAUSEA OR VOMITING 20 tablet 0   • oxyCODONE-acetaminophen (PERCOCET) 7.5-325 MG per tablet Take 1 tablet by mouth Every 6 (Six) Hours As Needed for Severe Pain . 75 tablet 0   • budesonide-formoterol (SYMBICORT) 160-4.5 MCG/ACT inhaler Inhale 2 puffs 2 (two) times a day. 1 inhaler 12   • diazePAM (VALIUM) 5 MG tablet Take 5 mg by mouth 2 (Two) Times a Day As Needed for Anxiety.     • DULoxetine  (CYMBALTA) 60 MG capsule Take 1 capsule by mouth Daily. 90 capsule 1   • levothyroxine (SYNTHROID, LEVOTHROID) 50 MCG tablet TAKE 1 TABLET BY MOUTH DAILY 90 tablet 1   • oxybutynin (DITROPAN) 5 MG tablet Take 1 tablet by mouth 2 (Two) Times a Day. 180 tablet 1   • traZODone (DESYREL) 50 MG tablet TAKE 1 TABLET BY MOUTH EVERY NIGHT 90 tablet 0   • amLODIPine (NORVASC) 5 MG tablet TAKE 1 TABLET BY MOUTH DAILY 90 tablet 1   • clonazePAM (KlonoPIN) 0.5 MG tablet Take 1 tablet by mouth Daily As Needed for Anxiety or Seizures. 30 tablet 0   • esomeprazole (nexIUM) 40 MG capsule Take 1 capsule by mouth Every Morning Before Breakfast. 90 capsule 2   • sertraline (ZOLOFT) 50 MG tablet TAKE 1 TABLET BY MOUTH DAILY 90 tablet 1     Facility-Administered Medications Prior to Visit   Medication Dose Route Frequency Provider Last Rate Last Dose   • cyanocobalamin injection 1,000 mcg  1,000 mcg Intramuscular Q28 Days Donna Forte MD   1,000 mcg at 11/14/18 1306   • cyanocobalamin injection 1,000 mcg  1,000 mcg Intramuscular Q28 Days Donna Forte MD       • cyanocobalamin injection 1,000 mcg  1,000 mcg Intramuscular Q28 Days Donna Forte MD       • cyanocobalamin injection 1,000 mcg  1,000 mcg Intramuscular Q28 Days Donna Forte MD   1,000 mcg at 07/03/18 1252     New Medications Ordered This Visit   Medications   • oxybutynin (DITROPAN) 5 MG tablet     Sig: Take 1 tablet by mouth 2 (Two) Times a Day.     Dispense:  180 tablet     Refill:  1   • clonazePAM (KlonoPIN) 0.5 MG tablet     Sig: Take 1 tablet by mouth At Night As Needed for Anxiety.     Dispense:  30 tablet     Refill:  0   • budesonide-formoterol (SYMBICORT) 160-4.5 MCG/ACT inhaler     Sig: Inhale 2 puffs 2 (Two) Times a Day.     Dispense:  1 inhaler     Refill:  12   • omeprazole (PRILOSEC) 20 MG capsule     Sig: Take 1 capsule by mouth Daily.     Dispense:  90 capsule     Refill:  1   • DULoxetine (CYMBALTA) 60 MG  capsule     Sig: Take 1 capsule by mouth Daily.     Dispense:  90 capsule     Refill:  1   • levothyroxine (SYNTHROID, LEVOTHROID) 50 MCG tablet     Sig: Take 1 tablet by mouth Daily.     Dispense:  90 tablet     Refill:  1     [unfilled]  Medications Discontinued During This Encounter   Medication Reason   • cyanocobalamin injection 1,000 mcg *Therapy completed   • cyanocobalamin injection 1,000 mcg *Therapy completed   • cyanocobalamin injection 1,000 mcg *Therapy completed   • esomeprazole (nexIUM) 40 MG capsule *Therapy completed   • sertraline (ZOLOFT) 50 MG tablet *Therapy completed   • amLODIPine (NORVASC) 5 MG tablet    • diazePAM (VALIUM) 5 MG tablet    • clonazePAM (KlonoPIN) 0.5 MG tablet Non-compliance   • traZODone (DESYREL) 50 MG tablet    • oxybutynin (DITROPAN) 5 MG tablet Reorder   • budesonide-formoterol (SYMBICORT) 160-4.5 MCG/ACT inhaler Reorder   • DULoxetine (CYMBALTA) 60 MG capsule Reorder   • levothyroxine (SYNTHROID, LEVOTHROID) 50 MCG tablet Reorder         Return in about 4 weeks (around 9/10/2019) for Recheck.

## 2019-08-15 LAB
APPEARANCE UR: CLEAR
BACTERIA #/AREA URNS HPF: NORMAL /HPF
BACTERIA UR CULT: NORMAL
BACTERIA UR CULT: NORMAL
BILIRUB UR QL STRIP: NEGATIVE
COLOR UR: YELLOW
EPI CELLS #/AREA URNS HPF: NORMAL /HPF
GLUCOSE UR QL: NEGATIVE
HGB UR QL STRIP: NEGATIVE
KETONES UR QL STRIP: NEGATIVE
LEUKOCYTE ESTERASE UR QL STRIP: ABNORMAL
MICRO URNS: ABNORMAL
NITRITE UR QL STRIP: NEGATIVE
PH UR STRIP: 7 [PH] (ref 5–7.5)
PROT UR QL STRIP: NEGATIVE
RBC #/AREA URNS HPF: NORMAL /HPF
SP GR UR: 1.01 (ref 1–1.03)
URINALYSIS REFLEX: ABNORMAL
UROBILINOGEN UR STRIP-MCNC: 0.2 MG/DL (ref 0.2–1)
WBC #/AREA URNS HPF: NORMAL /HPF

## 2019-08-16 DIAGNOSIS — M54.50 CHRONIC LOW BACK PAIN WITHOUT SCIATICA, UNSPECIFIED BACK PAIN LATERALITY: ICD-10-CM

## 2019-08-16 DIAGNOSIS — M54.50 LUMBAR BACK PAIN: ICD-10-CM

## 2019-08-16 DIAGNOSIS — G89.29 CHRONIC LOW BACK PAIN WITHOUT SCIATICA, UNSPECIFIED BACK PAIN LATERALITY: ICD-10-CM

## 2019-08-16 RX ORDER — OXYCODONE AND ACETAMINOPHEN 7.5; 325 MG/1; MG/1
1 TABLET ORAL EVERY 8 HOURS PRN
Qty: 75 TABLET | Refills: 0 | Status: SHIPPED | OUTPATIENT
Start: 2019-08-16 | End: 2019-09-23 | Stop reason: SDUPTHER

## 2019-08-16 NOTE — TELEPHONE ENCOUNTER
Patient requesting rf of percocet, per dr. Forte I am changing the sig to tid #75. Patient is to wean off.

## 2019-08-25 DIAGNOSIS — I65.23 OBSTRUCTION OF CAROTID ARTERY, BILATERAL: ICD-10-CM

## 2019-08-25 DIAGNOSIS — E78.00 HYPERCHOLESTEROLEMIA: ICD-10-CM

## 2019-08-25 DIAGNOSIS — K55.1 MESENTERIC ARTERY STENOSIS (HCC): ICD-10-CM

## 2019-08-26 RX ORDER — ATORVASTATIN CALCIUM 10 MG/1
10 TABLET, FILM COATED ORAL DAILY
Qty: 90 TABLET | Refills: 0 | Status: SHIPPED | OUTPATIENT
Start: 2019-08-26 | End: 2020-04-21

## 2019-08-30 DIAGNOSIS — G47.9 DIFFICULTY SLEEPING: ICD-10-CM

## 2019-09-03 RX ORDER — TRAZODONE HYDROCHLORIDE 50 MG/1
50 TABLET ORAL NIGHTLY
Qty: 90 TABLET | Refills: 0 | OUTPATIENT
Start: 2019-09-03

## 2019-09-10 ENCOUNTER — OFFICE VISIT (OUTPATIENT)
Dept: INTERNAL MEDICINE | Facility: CLINIC | Age: 79
End: 2019-09-10

## 2019-09-10 VITALS
BODY MASS INDEX: 19.91 KG/M2 | RESPIRATION RATE: 18 BRPM | HEIGHT: 60 IN | WEIGHT: 101.4 LBS | DIASTOLIC BLOOD PRESSURE: 58 MMHG | SYSTOLIC BLOOD PRESSURE: 164 MMHG | OXYGEN SATURATION: 96 % | HEART RATE: 61 BPM

## 2019-09-10 DIAGNOSIS — F41.8 MIXED ANXIETY DEPRESSIVE DISORDER: ICD-10-CM

## 2019-09-10 DIAGNOSIS — J42 CHRONIC BRONCHITIS, UNSPECIFIED CHRONIC BRONCHITIS TYPE (HCC): ICD-10-CM

## 2019-09-10 DIAGNOSIS — R73.03 PREDIABETES: ICD-10-CM

## 2019-09-10 DIAGNOSIS — I48.0 PAF (PAROXYSMAL ATRIAL FIBRILLATION) (HCC): ICD-10-CM

## 2019-09-10 DIAGNOSIS — I38 VALVULAR HEART DISEASE: ICD-10-CM

## 2019-09-10 DIAGNOSIS — G47.9 SLEEP DIFFICULTIES: ICD-10-CM

## 2019-09-10 DIAGNOSIS — I10 ESSENTIAL HYPERTENSION: ICD-10-CM

## 2019-09-10 DIAGNOSIS — M54.50 CHRONIC LOW BACK PAIN WITHOUT SCIATICA, UNSPECIFIED BACK PAIN LATERALITY: ICD-10-CM

## 2019-09-10 DIAGNOSIS — I77.9 PERIPHERAL ARTERIAL OCCLUSIVE DISEASE (HCC): ICD-10-CM

## 2019-09-10 DIAGNOSIS — G89.29 CHRONIC LOW BACK PAIN WITHOUT SCIATICA, UNSPECIFIED BACK PAIN LATERALITY: ICD-10-CM

## 2019-09-10 DIAGNOSIS — E78.00 HYPERCHOLESTEROLEMIA: ICD-10-CM

## 2019-09-10 DIAGNOSIS — I25.119 CORONARY ARTERY DISEASE INVOLVING NATIVE CORONARY ARTERY OF NATIVE HEART WITH ANGINA PECTORIS (HCC): Primary | ICD-10-CM

## 2019-09-10 LAB
ALBUMIN SERPL-MCNC: 4.4 G/DL (ref 3.5–5.2)
ALBUMIN/GLOB SERPL: 1.6 G/DL
ALP SERPL-CCNC: 102 U/L (ref 39–117)
ALT SERPL-CCNC: 8 U/L (ref 1–33)
AST SERPL-CCNC: 16 U/L (ref 1–32)
BASOPHILS # BLD AUTO: 0.06 10*3/MM3 (ref 0–0.2)
BASOPHILS NFR BLD AUTO: 0.9 % (ref 0–1.5)
BILIRUB SERPL-MCNC: 0.4 MG/DL (ref 0.2–1.2)
BUN SERPL-MCNC: 16 MG/DL (ref 8–23)
BUN/CREAT SERPL: 16.3 (ref 7–25)
CALCIUM SERPL-MCNC: 9.1 MG/DL (ref 8.6–10.5)
CHLORIDE SERPL-SCNC: 98 MMOL/L (ref 98–107)
CHOLEST SERPL-MCNC: 108 MG/DL (ref 0–200)
CO2 SERPL-SCNC: 28.3 MMOL/L (ref 22–29)
CREAT SERPL-MCNC: 0.98 MG/DL (ref 0.57–1)
EOSINOPHIL # BLD AUTO: 0.16 10*3/MM3 (ref 0–0.4)
EOSINOPHIL NFR BLD AUTO: 2.3 % (ref 0.3–6.2)
ERYTHROCYTE [DISTWIDTH] IN BLOOD BY AUTOMATED COUNT: 16.3 % (ref 12.3–15.4)
GLOBULIN SER CALC-MCNC: 2.7 GM/DL
GLUCOSE SERPL-MCNC: 90 MG/DL (ref 65–99)
HBA1C MFR BLD: 5.8 % (ref 4.8–5.6)
HCT VFR BLD AUTO: 40.1 % (ref 34–46.6)
HDLC SERPL-MCNC: 37 MG/DL (ref 40–60)
HGB BLD-MCNC: 11.9 G/DL (ref 12–15.9)
IMM GRANULOCYTES # BLD AUTO: 0.03 10*3/MM3 (ref 0–0.05)
IMM GRANULOCYTES NFR BLD AUTO: 0.4 % (ref 0–0.5)
LDLC SERPL CALC-MCNC: 51 MG/DL (ref 0–100)
LDLC/HDLC SERPL: 1.37 {RATIO}
LYMPHOCYTES # BLD AUTO: 2.26 10*3/MM3 (ref 0.7–3.1)
LYMPHOCYTES NFR BLD AUTO: 32.7 % (ref 19.6–45.3)
MCH RBC QN AUTO: 26.6 PG (ref 26.6–33)
MCHC RBC AUTO-ENTMCNC: 29.7 G/DL (ref 31.5–35.7)
MCV RBC AUTO: 89.7 FL (ref 79–97)
MONOCYTES # BLD AUTO: 0.51 10*3/MM3 (ref 0.1–0.9)
MONOCYTES NFR BLD AUTO: 7.4 % (ref 5–12)
NEUTROPHILS # BLD AUTO: 3.9 10*3/MM3 (ref 1.7–7)
NEUTROPHILS NFR BLD AUTO: 56.3 % (ref 42.7–76)
NRBC BLD AUTO-RTO: 0 /100 WBC (ref 0–0.2)
PLATELET # BLD AUTO: 257 10*3/MM3 (ref 140–450)
POTASSIUM SERPL-SCNC: 5.3 MMOL/L (ref 3.5–5.2)
PROT SERPL-MCNC: 7.1 G/DL (ref 6–8.5)
RBC # BLD AUTO: 4.47 10*6/MM3 (ref 3.77–5.28)
SODIUM SERPL-SCNC: 140 MMOL/L (ref 136–145)
T4 FREE SERPL-MCNC: 1.28 NG/DL (ref 0.93–1.7)
TRIGL SERPL-MCNC: 102 MG/DL (ref 0–150)
TSH SERPL DL<=0.005 MIU/L-ACNC: 2.76 UIU/ML (ref 0.27–4.2)
VLDLC SERPL CALC-MCNC: 20.4 MG/DL
WBC # BLD AUTO: 6.92 10*3/MM3 (ref 3.4–10.8)

## 2019-09-10 PROCEDURE — 90471 IMMUNIZATION ADMIN: CPT | Performed by: INTERNAL MEDICINE

## 2019-09-10 PROCEDURE — 99214 OFFICE O/P EST MOD 30 MIN: CPT | Performed by: INTERNAL MEDICINE

## 2019-09-10 PROCEDURE — 90662 IIV NO PRSV INCREASED AG IM: CPT | Performed by: INTERNAL MEDICINE

## 2019-09-10 RX ORDER — LISINOPRIL 20 MG/1
20 TABLET ORAL DAILY
Qty: 90 TABLET | Refills: 0 | Status: SHIPPED | OUTPATIENT
Start: 2019-09-10 | End: 2019-10-16 | Stop reason: SINTOL

## 2019-09-10 RX ORDER — ALBUTEROL SULFATE 90 UG/1
2 AEROSOL, METERED RESPIRATORY (INHALATION) EVERY 4 HOURS PRN
Qty: 18 G | Refills: 11 | Status: SHIPPED | OUTPATIENT
Start: 2019-09-10 | End: 2022-01-01 | Stop reason: SDUPTHER

## 2019-09-10 RX ORDER — TRAZODONE HYDROCHLORIDE 50 MG/1
50 TABLET ORAL NIGHTLY
Status: ON HOLD | COMMUNITY
End: 2020-10-28

## 2019-09-10 RX ORDER — LISINOPRIL 20 MG/1
20 TABLET ORAL DAILY
Qty: 30 TABLET | Refills: 0 | Status: SHIPPED | OUTPATIENT
Start: 2019-09-10 | End: 2019-09-10 | Stop reason: SDUPTHER

## 2019-09-10 NOTE — PROGRESS NOTES
Kaylin Ojeda is a 79 y.o. female, who presents with a chief complaint of   Chief Complaint   Patient presents with   • Atrial Fibrillation   • Nicotine Dependence       HPI   Pt here for follow up.  She says she hasn't felt well the past few weeks    Chronic back pain - pt says her back pain is getting worse.  She is running out of pills before the end of the month.   She would like more pain pills but I have advised her that she needs to see pain management if she needs more narcotics.  I have also encouraged her to consider non-narcotic treatments.  She is also on cymbalta. She has had some constipation recently.  She hasn't taken any meds for this but is willing to get some miralax.     PAF - on eliquis.  Rate controlled with BB.      GERD - on omeprazole    HTN - on metoprolol 100mg bid and she says she is taking both doses every day.  bp sometimes up in am but once she takes her med sbp's 120-140 range.      PAD - on statin, bb, eliquis    OAB - oxybutynin helping with sx.     Copd - still smoking.  She is supposed to be on symbicort daily but hasnt been taking med.    Hx elevated tsh    Sleep issues - taking trazodone at night    Depression / anxiety - takes clonazepam at night and cymbalta daily. Sees counselor monthly and more frequent visits have been recommended. She is still dealing with lots of stress with her family.  She just stopped paying her daughter's rent of $1500/mo.  She is still visiting her great-granddaughter on weekends.      The following portions of the patient's history were reviewed and updated as appropriate: allergies, current medications, past family history, past medical history, past social history, past surgical history and problem list.    Allergies: Aleve [naproxen sodium]; Codeine; Ibuprofen; and Sulfa antibiotics    Review of Systems   Constitutional: Positive for fatigue.   HENT: Negative.    Eyes: Negative.    Respiratory: Negative.    Cardiovascular: Negative.     Gastrointestinal: Negative.    Endocrine: Negative.    Genitourinary: Negative.    Musculoskeletal: Positive for back pain.   Skin: Negative.    Allergic/Immunologic: Negative.    Neurological: Negative.    Hematological: Negative.    Psychiatric/Behavioral: Negative.    All other systems reviewed and are negative.            Wt Readings from Last 3 Encounters:   09/10/19 46 kg (101 lb 6.4 oz)   08/13/19 47.2 kg (104 lb)   07/15/19 46.3 kg (102 lb)     Temp Readings from Last 3 Encounters:   06/10/19 98.5 °F (36.9 °C) (Oral)   06/03/19 97.8 °F (36.6 °C)   05/01/19 98.2 °F (36.8 °C)     BP Readings from Last 3 Encounters:   09/10/19 164/58   08/13/19 138/88   07/15/19 158/64     Pulse Readings from Last 3 Encounters:   09/10/19 61   08/13/19 59   07/15/19 64     Body mass index is 19.8 kg/m².  @LASTSAO2(3)@    Physical Exam   Constitutional: She is oriented to person, place, and time. No distress.   Frail, thin, chronically ill appearing elderly female   HENT:   Head: Normocephalic and atraumatic.   Right Ear: External ear normal.   Left Ear: External ear normal.   Nose: Nose normal.   Mouth/Throat: Oropharynx is clear and moist.   Eyes: Conjunctivae and EOM are normal. Pupils are equal, round, and reactive to light.   Neck: Normal range of motion. Neck supple.   Cardiovascular: Normal rate, regular rhythm and intact distal pulses.   Murmur heard.  3/6 murmur at rusb   Pulmonary/Chest: Effort normal and breath sounds normal. No respiratory distress. She has no wheezes.   Musculoskeletal: Normal range of motion.   Normal gait   Neurological: She is alert and oriented to person, place, and time.   Skin: Skin is warm and dry.   Psychiatric: She has a normal mood and affect. Her behavior is normal. Judgment and thought content normal.   Nursing note and vitals reviewed.      Results for orders placed or performed in visit on 08/13/19   Urine culture, Comprehensive - ,   Result Value Ref Range    Urine Culture Final  report     Result 1 Comment    Urinalysis With Culture If Indicated - Urine, Clean Catch   Result Value Ref Range    Specific Gravity, UA 1.009 1.005 - 1.030    pH, UA 7.0 5.0 - 7.5    Color, UA Yellow Yellow    Appearance, UA Clear Clear    Leukocytes, UA Trace (A) Negative    Protein Negative Negative/Trace    Glucose, UA Negative Negative    Ketones Negative Negative    Blood, UA Negative Negative    Bilirubin, UA Negative Negative    Urobilinogen, UA 0.2 0.2 - 1.0 mg/dL    Nitrite, UA Negative Negative    Microscopic Examination See below:     Urinalysis Reflex Comment    Microscopic Examination -   Result Value Ref Range    WBC, UA 0-5 0 - 5 /hpf    RBC, UA None seen 0 - 2 /hpf    Epithelial Cells (non renal) None seen 0 - 10 /hpf    Bacteria, UA None seen None seen/Few /hpf           Kaylin was seen today for atrial fibrillation and nicotine dependence.    Diagnoses and all orders for this visit:    Coronary artery disease involving native coronary artery of native heart with angina pectoris (CMS/[facility])  -     Lipid Panel With LDL / HDL Ratio    Chronic bronchitis, unspecified chronic bronchitis type (CMS/[facility])  -     albuterol sulfate  (90 Base) MCG/ACT inhaler; Inhale 2 puffs Every 4 (Four) Hours As Needed for Wheezing.    Essential hypertension  -     Comprehensive Metabolic Panel  -     CBC & Differential  -     T4, Free  -     TSH  -     Lipid Panel With LDL / HDL Ratio  -     lisinopril (PRINIVIL,ZESTRIL) 20 MG tablet; Take 1 tablet by mouth Daily.    Peripheral arterial occlusive disease (CMS/[facility])  -     Comprehensive Metabolic Panel  -     Lipid Panel With LDL / HDL Ratio    PAF (paroxysmal atrial fibrillation) (CMS/[facility])  -     Comprehensive Metabolic Panel  -     CBC & Differential  -     T4, Free  -     TSH  -     Lipid Panel With LDL / HDL Ratio    Prediabetes  -     Comprehensive Metabolic Panel  -     CBC & Differential  -     T4, Free  -     TSH  -     Lipid Panel With LDL / HDL Ratio  -      Hemoglobin A1c    Sleep difficulties    Mixed anxiety depressive disorder    Hypercholesterolemia  -     Comprehensive Metabolic Panel  -     Lipid Panel With LDL / HDL Ratio    Chronic low back pain without sciatica, unspecified back pain laterality  -     Ambulatory Referral to Pain Management    Valvular heart disease      Cont current meds.  Flu shot today.  Pneumovax due after 11/15/19    Outpatient Medications Prior to Visit   Medication Sig Dispense Refill   • alendronate (FOSAMAX) 70 MG tablet Take 1 tablet by mouth Every 7 (Seven) Days. 48 tablet 0   • apixaban (ELIQUIS) 5 MG tablet tablet Take 1 tablet by mouth Every 12 (Twelve) Hours. 180 tablet 1   • atorvastatin (LIPITOR) 10 MG tablet TAKE 1 TABLET BY MOUTH DAILY 90 tablet 0   • budesonide-formoterol (SYMBICORT) 160-4.5 MCG/ACT inhaler Inhale 2 puffs 2 (Two) Times a Day. 1 inhaler 12   • clonazePAM (KlonoPIN) 0.5 MG tablet Take 1 tablet by mouth At Night As Needed for Anxiety. 30 tablet 0   • DULoxetine (CYMBALTA) 60 MG capsule Take 1 capsule by mouth Daily. 90 capsule 1   • fluticasone (FLONASE) 50 MCG/ACT nasal spray SPRAY TWICE IN EACH NOSTRIL EVERY DAY FOR 30 DAYS. 16 mL 6   • levothyroxine (SYNTHROID, LEVOTHROID) 50 MCG tablet Take 1 tablet by mouth Daily. 90 tablet 1   • metoprolol tartrate (LOPRESSOR) 100 MG tablet Take 1 tablet by mouth 2 (Two) Times a Day. 180 tablet 1   • omeprazole (PRILOSEC) 20 MG capsule Take 1 capsule by mouth Daily. 90 capsule 1   • ondansetron (ZOFRAN) 8 MG tablet TAKE 1 TABLET BY MOUTH EVERY 8 HOURS AS NEEDED FOR NAUSEA OR VOMITING 20 tablet 0   • oxybutynin (DITROPAN) 5 MG tablet Take 1 tablet by mouth 2 (Two) Times a Day. 180 tablet 1   • oxyCODONE-acetaminophen (PERCOCET) 7.5-325 MG per tablet Take 1 tablet by mouth Every 8 (Eight) Hours As Needed for Severe Pain . 75 tablet 0   • traZODone (DESYREL) 50 MG tablet Take 50 mg by mouth Every Night.     • albuterol (PROVENTIL HFA;VENTOLIN HFA) 108 (90 BASE) MCG/ACT  inhaler Inhale 2 puffs every 4 (four) hours as needed for wheezing. 18 g 11     Facility-Administered Medications Prior to Visit   Medication Dose Route Frequency Provider Last Rate Last Dose   • cyanocobalamin injection 1,000 mcg  1,000 mcg Intramuscular Q28 Days Donna Forte MD   1,000 mcg at 11/14/18 1306     New Medications Ordered This Visit   Medications   • albuterol sulfate  (90 Base) MCG/ACT inhaler     Sig: Inhale 2 puffs Every 4 (Four) Hours As Needed for Wheezing.     Dispense:  18 g     Refill:  11   • lisinopril (PRINIVIL,ZESTRIL) 20 MG tablet     Sig: Take 1 tablet by mouth Daily.     Dispense:  30 tablet     Refill:  0     [unfilled]  Medications Discontinued During This Encounter   Medication Reason   • albuterol (PROVENTIL HFA;VENTOLIN HFA) 108 (90 BASE) MCG/ACT inhaler Reorder         Return in about 1 month (around 10/10/2019) for Recheck.

## 2019-09-18 DIAGNOSIS — F32.A ANXIETY AND DEPRESSION: ICD-10-CM

## 2019-09-18 DIAGNOSIS — F41.9 ANXIETY AND DEPRESSION: ICD-10-CM

## 2019-09-18 RX ORDER — CLONAZEPAM 0.5 MG/1
0.5 TABLET ORAL NIGHTLY PRN
Qty: 30 TABLET | Refills: 0 | Status: SHIPPED | OUTPATIENT
Start: 2019-09-18 | End: 2019-10-23 | Stop reason: SDUPTHER

## 2019-09-23 DIAGNOSIS — M54.50 LUMBAR BACK PAIN: ICD-10-CM

## 2019-09-23 DIAGNOSIS — M54.50 CHRONIC LOW BACK PAIN WITHOUT SCIATICA, UNSPECIFIED BACK PAIN LATERALITY: ICD-10-CM

## 2019-09-23 DIAGNOSIS — G89.29 CHRONIC LOW BACK PAIN WITHOUT SCIATICA, UNSPECIFIED BACK PAIN LATERALITY: ICD-10-CM

## 2019-09-23 RX ORDER — OXYCODONE AND ACETAMINOPHEN 7.5; 325 MG/1; MG/1
1 TABLET ORAL EVERY 8 HOURS PRN
Qty: 75 TABLET | Refills: 0 | Status: SHIPPED | OUTPATIENT
Start: 2019-09-23 | End: 2019-10-25 | Stop reason: SDUPTHER

## 2019-10-08 ENCOUNTER — OFFICE VISIT (OUTPATIENT)
Dept: INTERNAL MEDICINE | Facility: CLINIC | Age: 79
End: 2019-10-08

## 2019-10-08 VITALS
SYSTOLIC BLOOD PRESSURE: 134 MMHG | OXYGEN SATURATION: 97 % | BODY MASS INDEX: 19.24 KG/M2 | HEIGHT: 60 IN | WEIGHT: 98 LBS | DIASTOLIC BLOOD PRESSURE: 72 MMHG | HEART RATE: 58 BPM | RESPIRATION RATE: 14 BRPM

## 2019-10-08 DIAGNOSIS — F41.8 MIXED ANXIETY DEPRESSIVE DISORDER: ICD-10-CM

## 2019-10-08 DIAGNOSIS — G89.29 CHRONIC LOW BACK PAIN WITHOUT SCIATICA, UNSPECIFIED BACK PAIN LATERALITY: ICD-10-CM

## 2019-10-08 DIAGNOSIS — I10 ESSENTIAL HYPERTENSION: ICD-10-CM

## 2019-10-08 DIAGNOSIS — J42 CHRONIC BRONCHITIS, UNSPECIFIED CHRONIC BRONCHITIS TYPE (HCC): ICD-10-CM

## 2019-10-08 DIAGNOSIS — I48.0 PAF (PAROXYSMAL ATRIAL FIBRILLATION) (HCC): ICD-10-CM

## 2019-10-08 DIAGNOSIS — I77.9 PERIPHERAL ARTERIAL OCCLUSIVE DISEASE (HCC): ICD-10-CM

## 2019-10-08 DIAGNOSIS — M54.50 CHRONIC LOW BACK PAIN WITHOUT SCIATICA, UNSPECIFIED BACK PAIN LATERALITY: ICD-10-CM

## 2019-10-08 DIAGNOSIS — F32.A ANXIETY AND DEPRESSION: ICD-10-CM

## 2019-10-08 DIAGNOSIS — F41.9 ANXIETY AND DEPRESSION: ICD-10-CM

## 2019-10-08 DIAGNOSIS — I25.119 CORONARY ARTERY DISEASE INVOLVING NATIVE CORONARY ARTERY OF NATIVE HEART WITH ANGINA PECTORIS (HCC): ICD-10-CM

## 2019-10-08 DIAGNOSIS — M54.50 LUMBAR BACK PAIN: Primary | ICD-10-CM

## 2019-10-08 PROCEDURE — 99214 OFFICE O/P EST MOD 30 MIN: CPT | Performed by: INTERNAL MEDICINE

## 2019-10-08 NOTE — PROGRESS NOTES
Kaylin Ojeda is a 79 y.o. female, who presents with a chief complaint of   Chief Complaint   Patient presents with   • Hypertension       HPI   Pt here for follow up    Pt has chronic back pain.  She is on norco and we have slowly been weaning the dose.  Today she is open to going back to physical therapy.      She has been going to see a counselr and she does feel like this has been helping some.  she hasnt been lately bc each appt is $40     Cachexia - pt has lost more weight since last OV  101 -> 98lbs.  She says she she just gets full easily. She knows she needs to eat more small frequent meals but doesn't usually do this.     Her daughter had severe jaundice and just got out of the hospital again.  She doesn't know if her daughter is sick from her lupus, hep c, or other illegal meds she has taken.  the pt says that she is going to have to go back to [place] and stay a month or so to get her situated.  We discussed that with the pt's health issues it is not good for her to be traveling like this.       Insulin resistance - a1c 5.h8. She is actually under weight.  She does not eat a consistently healthy diet     Hypothyroidism - tsh up a little but ft4 ok.  She does feel tired often.  No hair/skin changes.      Cad - she denies chest pain.  No le edema     Copd - breathing ok.  No recent wheezing.  She is still smoking and not ready to quit.  she has been prescribed symbicort but isnt using it.     Sleep difficulty - takes diazepam with melatonin and still up often.       HLD - on statin.  No cramps or myalgias.       PAF - on eliquis. Rate controlled.      HTN - pt was taking 50mg metoprolol bid and dose was increased to 100mg bid.  She had a low bp and she stopped the med all together.  The pt brought her pill box but not pill bottles.      No ha/dizziness.   The following portions of the patient's history were reviewed and updated as appropriate: allergies, current medications, past family history,  past medical history, past social history, past surgical history and problem list.    Allergies: Aleve [naproxen sodium]; Codeine; Ibuprofen; and Sulfa antibiotics    Review of Systems   Constitutional: Negative.    HENT: Negative.    Eyes: Negative.    Respiratory: Negative.    Cardiovascular: Negative.    Gastrointestinal: Negative.    Endocrine: Negative.    Genitourinary: Negative.    Musculoskeletal: Negative.    Skin: Negative.    Allergic/Immunologic: Negative.    Neurological: Negative.    Hematological: Negative.    Psychiatric/Behavioral: Negative.    All other systems reviewed and are negative.            Wt Readings from Last 3 Encounters:   10/08/19 44.5 kg (98 lb)   09/10/19 46 kg (101 lb 6.4 oz)   08/13/19 47.2 kg (104 lb)     Temp Readings from Last 3 Encounters:   06/10/19 98.5 °F (36.9 °C) (Oral)   06/03/19 97.8 °F (36.6 °C)   05/01/19 98.2 °F (36.8 °C)     BP Readings from Last 3 Encounters:   10/08/19 134/72   09/10/19 164/58   08/13/19 138/88     Pulse Readings from Last 3 Encounters:   10/08/19 58   09/10/19 61   08/13/19 59     Body mass index is 19.14 kg/m².  @LASTSAO2(3)@    Physical Exam   Constitutional: She is oriented to person, place, and time. No distress.   Thin elderly female   HENT:   Head: Normocephalic and atraumatic.   Right Ear: External ear normal.   Left Ear: External ear normal.   Nose: Nose normal.   Mouth/Throat: Oropharynx is clear and moist.   Eyes: Conjunctivae and EOM are normal. Pupils are equal, round, and reactive to light.   Neck: Normal range of motion. Neck supple.   Cardiovascular: Normal rate, regular rhythm, normal heart sounds and intact distal pulses.   Pulmonary/Chest: Effort normal and breath sounds normal. No respiratory distress. She has no wheezes.   Musculoskeletal: Normal range of motion.   Normal gait   Neurological: She is alert and oriented to person, place, and time.   Skin: Skin is warm and dry.   Psychiatric: She has a normal mood and affect. Her  behavior is normal. Judgment and thought content normal.   Nursing note and vitals reviewed.      Results for orders placed or performed in visit on 09/10/19   Comprehensive Metabolic Panel   Result Value Ref Range    Glucose 90 65 - 99 mg/dL    BUN 16 8 - 23 mg/dL    Creatinine 0.98 0.57 - 1.00 mg/dL    eGFR Non African Am 55 (L) >60 mL/min/1.73    eGFR African Am 67 >60 mL/min/1.73    BUN/Creatinine Ratio 16.3 7.0 - 25.0    Sodium 140 136 - 145 mmol/L    Potassium 5.3 (H) 3.5 - 5.2 mmol/L    Chloride 98 98 - 107 mmol/L    Total CO2 28.3 22.0 - 29.0 mmol/L    Calcium 9.1 8.6 - 10.5 mg/dL    Total Protein 7.1 6.0 - 8.5 g/dL    Albumin 4.40 3.50 - 5.20 g/dL    Globulin 2.7 gm/dL    A/G Ratio 1.6 g/dL    Total Bilirubin 0.4 0.2 - 1.2 mg/dL    Alkaline Phosphatase 102 39 - 117 U/L    AST (SGOT) 16 1 - 32 U/L    ALT (SGPT) 8 1 - 33 U/L   T4, Free   Result Value Ref Range    Free T4 1.28 0.93 - 1.70 ng/dL   TSH   Result Value Ref Range    TSH 2.760 0.270 - 4.200 uIU/mL   Lipid Panel With LDL / HDL Ratio   Result Value Ref Range    Total Cholesterol 108 0 - 200 mg/dL    Triglycerides 102 0 - 150 mg/dL    HDL Cholesterol 37 (L) 40 - 60 mg/dL    VLDL Cholesterol 20.4 mg/dL    LDL Cholesterol  51 0 - 100 mg/dL    LDL/HDL Ratio 1.37    Hemoglobin A1c   Result Value Ref Range    Hemoglobin A1C 5.80 (H) 4.80 - 5.60 %   CBC & Differential   Result Value Ref Range    WBC 6.92 3.40 - 10.80 10*3/mm3    RBC 4.47 3.77 - 5.28 10*6/mm3    Hemoglobin 11.9 (L) 12.0 - 15.9 g/dL    Hematocrit 40.1 34.0 - 46.6 %    MCV 89.7 79.0 - 97.0 fL    MCH 26.6 26.6 - 33.0 pg    MCHC 29.7 (L) 31.5 - 35.7 g/dL    RDW 16.3 (H) 12.3 - 15.4 %    Platelets 257 140 - 450 10*3/mm3    Neutrophil Rel % 56.3 42.7 - 76.0 %    Lymphocyte Rel % 32.7 19.6 - 45.3 %    Monocyte Rel % 7.4 5.0 - 12.0 %    Eosinophil Rel % 2.3 0.3 - 6.2 %    Basophil Rel % 0.9 0.0 - 1.5 %    Neutrophils Absolute 3.90 1.70 - 7.00 10*3/mm3    Lymphocytes Absolute 2.26 0.70 - 3.10 10*3/mm3     Monocytes Absolute 0.51 0.10 - 0.90 10*3/mm3    Eosinophils Absolute 0.16 0.00 - 0.40 10*3/mm3    Basophils Absolute 0.06 0.00 - 0.20 10*3/mm3    Immature Granulocyte Rel % 0.4 0.0 - 0.5 %    Immature Grans Absolute 0.03 0.00 - 0.05 10*3/mm3    nRBC 0.0 0.0 - 0.2 /100 WBC           Kaylin was seen today for hypertension.    Diagnoses and all orders for this visit:    Lumbar back pain  -     Ambulatory Referral to Physical Therapy Evaluate and treat    Chronic low back pain without sciatica, unspecified back pain laterality  -     Ambulatory Referral to Physical Therapy Evaluate and treat    Essential hypertension - restart metoprolol 50mg bid.      Anxiety and depression    Mixed anxiety depressive disorder    PAF (paroxysmal atrial fibrillation) (CMS/HCC)    Peripheral arterial occlusive disease (CMS/HCC)    Chronic bronchitis, unspecified chronic bronchitis type (CMS/HCC)    Coronary artery disease involving native coronary artery of native heart with angina pectoris (CMS/HCC)    Encouraged tobacco cessation    Encouraged small, frequent meals.      Med adjustments as above.  Cont other chronic meds.  Ov in 1 mo for recheck.     Outpatient Medications Prior to Visit   Medication Sig Dispense Refill   • albuterol sulfate  (90 Base) MCG/ACT inhaler Inhale 2 puffs Every 4 (Four) Hours As Needed for Wheezing. 18 g 11   • alendronate (FOSAMAX) 70 MG tablet Take 1 tablet by mouth Every 7 (Seven) Days. 48 tablet 0   • apixaban (ELIQUIS) 5 MG tablet tablet Take 1 tablet by mouth Every 12 (Twelve) Hours. 180 tablet 1   • atorvastatin (LIPITOR) 10 MG tablet TAKE 1 TABLET BY MOUTH DAILY 90 tablet 0   • budesonide-formoterol (SYMBICORT) 160-4.5 MCG/ACT inhaler Inhale 2 puffs 2 (Two) Times a Day. 1 inhaler 12   • clonazePAM (KlonoPIN) 0.5 MG tablet TAKE 1 TABLET BY MOUTH AT NIGHT AS NEEDED FOR ANXIETY 30 tablet 0   • DULoxetine (CYMBALTA) 60 MG capsule Take 1 capsule by mouth Daily. 90 capsule 1   • fluticasone  (FLONASE) 50 MCG/ACT nasal spray SPRAY TWICE IN EACH NOSTRIL EVERY DAY FOR 30 DAYS. 16 mL 6   • levothyroxine (SYNTHROID, LEVOTHROID) 50 MCG tablet Take 1 tablet by mouth Daily. 90 tablet 1   • lisinopril (PRINIVIL,ZESTRIL) 20 MG tablet TAKE 1 TABLET BY MOUTH DAILY 90 tablet 0   • metoprolol tartrate (LOPRESSOR) 100 MG tablet Take 1 tablet by mouth 2 (Two) Times a Day. 180 tablet 1   • omeprazole (PRILOSEC) 20 MG capsule Take 1 capsule by mouth Daily. 90 capsule 1   • ondansetron (ZOFRAN) 8 MG tablet TAKE 1 TABLET BY MOUTH EVERY 8 HOURS AS NEEDED FOR NAUSEA OR VOMITING 20 tablet 0   • oxybutynin (DITROPAN) 5 MG tablet Take 1 tablet by mouth 2 (Two) Times a Day. 180 tablet 1   • oxyCODONE-acetaminophen (PERCOCET) 7.5-325 MG per tablet Take 1 tablet by mouth Every 8 (Eight) Hours As Needed for Severe Pain . 75 tablet 0   • traZODone (DESYREL) 50 MG tablet Take 50 mg by mouth Every Night.       Facility-Administered Medications Prior to Visit   Medication Dose Route Frequency Provider Last Rate Last Dose   • cyanocobalamin injection 1,000 mcg  1,000 mcg Intramuscular Q28 Days Donna Forte MD   1,000 mcg at 11/14/18 1306     No orders of the defined types were placed in this encounter.    [unfilled]  There are no discontinued medications.      Return in about 1 month (around 11/8/2019) for Recheck.

## 2019-10-15 ENCOUNTER — TELEPHONE (OUTPATIENT)
Dept: INTERNAL MEDICINE | Facility: CLINIC | Age: 79
End: 2019-10-15

## 2019-10-15 ENCOUNTER — APPOINTMENT (OUTPATIENT)
Dept: PHYSICAL THERAPY | Facility: HOSPITAL | Age: 79
End: 2019-10-15

## 2019-10-15 NOTE — TELEPHONE ENCOUNTER
----- Message from Lucille Fish sent at 10/14/2019 10:13 AM EDT -----  Contact: patient  jennifer pt    Patient called to say that the lisinopril is causing her to cough, (dry hacky). She is asking if dr rodriguez might want to change her back to amlodipine? Or something else?    george xie    Please call her

## 2019-10-16 RX ORDER — AMLODIPINE BESYLATE 10 MG/1
10 TABLET ORAL DAILY
Qty: 30 TABLET | Refills: 2 | Status: SHIPPED | OUTPATIENT
Start: 2019-10-16 | End: 2019-12-04 | Stop reason: SINTOL

## 2019-10-23 ENCOUNTER — TELEPHONE (OUTPATIENT)
Dept: CASE MANAGEMENT | Facility: OTHER | Age: 79
End: 2019-10-23

## 2019-10-23 DIAGNOSIS — F41.9 ANXIETY AND DEPRESSION: ICD-10-CM

## 2019-10-23 DIAGNOSIS — F32.A ANXIETY AND DEPRESSION: ICD-10-CM

## 2019-10-23 RX ORDER — CLONAZEPAM 0.5 MG/1
0.5 TABLET ORAL NIGHTLY PRN
Qty: 30 TABLET | Refills: 0 | Status: SHIPPED | OUTPATIENT
Start: 2019-10-23 | End: 2019-11-17 | Stop reason: SDUPTHER

## 2019-10-23 NOTE — TELEPHONE ENCOUNTER
Called pt to schedule subsequent Medicare annual wellness visit. No answer, left voicemail message requesting call back to RN-CC at [phone].

## 2019-10-25 DIAGNOSIS — M54.50 LUMBAR BACK PAIN: ICD-10-CM

## 2019-10-25 DIAGNOSIS — G89.29 CHRONIC LOW BACK PAIN WITHOUT SCIATICA, UNSPECIFIED BACK PAIN LATERALITY: ICD-10-CM

## 2019-10-25 DIAGNOSIS — M54.50 CHRONIC LOW BACK PAIN WITHOUT SCIATICA, UNSPECIFIED BACK PAIN LATERALITY: ICD-10-CM

## 2019-10-25 RX ORDER — OXYCODONE AND ACETAMINOPHEN 7.5; 325 MG/1; MG/1
1 TABLET ORAL EVERY 8 HOURS PRN
Qty: 75 TABLET | Refills: 0 | Status: SHIPPED | OUTPATIENT
Start: 2019-10-25 | End: 2019-11-25 | Stop reason: SDUPTHER

## 2019-10-28 ENCOUNTER — HOSPITAL ENCOUNTER (OUTPATIENT)
Dept: PHYSICAL THERAPY | Facility: HOSPITAL | Age: 79
Setting detail: THERAPIES SERIES
Discharge: HOME | End: 2019-10-28

## 2019-10-28 DIAGNOSIS — M54.50 CHRONIC LOW BACK PAIN WITHOUT SCIATICA, UNSPECIFIED BACK PAIN LATERALITY: Primary | ICD-10-CM

## 2019-10-28 DIAGNOSIS — G89.29 CHRONIC LOW BACK PAIN WITHOUT SCIATICA, UNSPECIFIED BACK PAIN LATERALITY: Primary | ICD-10-CM

## 2019-10-28 PROCEDURE — 97161 PT EVAL LOW COMPLEX 20 MIN: CPT

## 2019-10-28 PROCEDURE — 97110 THERAPEUTIC EXERCISES: CPT
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
## 2019-11-05 ENCOUNTER — APPOINTMENT (OUTPATIENT)
Dept: PHYSICAL THERAPY | Facility: HOSPITAL | Age: 79
End: 2019-11-05

## 2019-11-06 ENCOUNTER — APPOINTMENT (OUTPATIENT)
Dept: PHYSICAL THERAPY | Facility: HOSPITAL | Age: 79
End: 2019-11-06

## 2019-11-06 ENCOUNTER — OFFICE VISIT (OUTPATIENT)
Dept: INTERNAL MEDICINE | Facility: CLINIC | Age: 79
End: 2019-11-06

## 2019-11-06 VITALS
HEIGHT: 60 IN | WEIGHT: 97 LBS | HEART RATE: 54 BPM | BODY MASS INDEX: 19.04 KG/M2 | OXYGEN SATURATION: 96 % | SYSTOLIC BLOOD PRESSURE: 122 MMHG | RESPIRATION RATE: 18 BRPM | DIASTOLIC BLOOD PRESSURE: 66 MMHG

## 2019-11-06 DIAGNOSIS — K55.1 MESENTERIC ARTERY STENOSIS (HCC): ICD-10-CM

## 2019-11-06 DIAGNOSIS — F41.8 MIXED ANXIETY DEPRESSIVE DISORDER: ICD-10-CM

## 2019-11-06 DIAGNOSIS — R11.0 NAUSEA: Primary | ICD-10-CM

## 2019-11-06 DIAGNOSIS — M54.50 CHRONIC LOW BACK PAIN WITHOUT SCIATICA, UNSPECIFIED BACK PAIN LATERALITY: ICD-10-CM

## 2019-11-06 DIAGNOSIS — G89.29 CHRONIC LOW BACK PAIN WITHOUT SCIATICA, UNSPECIFIED BACK PAIN LATERALITY: ICD-10-CM

## 2019-11-06 PROCEDURE — 99214 OFFICE O/P EST MOD 30 MIN: CPT | Performed by: INTERNAL MEDICINE

## 2019-11-06 RX ORDER — ONDANSETRON HYDROCHLORIDE 8 MG/1
8 TABLET, FILM COATED ORAL EVERY 8 HOURS PRN
Qty: 20 TABLET | Refills: 1 | Status: SHIPPED | OUTPATIENT
Start: 2019-11-06 | End: 2020-11-16 | Stop reason: SDUPTHER

## 2019-11-06 NOTE — PROGRESS NOTES
Kaylin Ojeda is a 79 y.o. female, who presents with a chief complaint of   Chief Complaint   Patient presents with   • Pain       HPI   Pt here for follow up    She was throwing up yesterday.  She hasnt had issues with n/v in a long time.  She denies fever or diarrhea.  She has known mesenteric artery stenosis that vascular did not feel like intervention would help.  She says she feels better today.  She thinks she may have just gotten a little stomach bug.  Weight slowly going down.  She denies abd pain.    Pt has chronic back pain.  She is on norco and we have slowly been weaning the dose.  She was doing PT and she thinks this is helping some.         Anxiety/depression.  She has been going to see a counselor and she does feel like this has been helping.  Cost is her limiting factor.  She spends all of her money on family and doesn't always take care of herself.  Pt was helping her great granddaughter October yesterday.  She was doing laundry for her bc she has stool accidents on a regular basis.  She continues to worry about the child and her well-being.  CPS has been involved.      The following portions of the patient's history were reviewed and updated as appropriate: allergies, current medications, past family history, past medical history, past social history, past surgical history and problem list.    Allergies: Aleve [naproxen sodium]; Codeine; Ibuprofen; and Sulfa antibiotics    Review of Systems   Constitutional: Negative.    HENT: Negative.    Eyes: Negative.    Respiratory: Negative.    Cardiovascular: Negative.    Gastrointestinal: Positive for nausea.   Endocrine: Negative.    Genitourinary: Negative.    Musculoskeletal: Positive for back pain.   Skin: Negative.    Allergic/Immunologic: Negative.    Neurological: Negative.    Hematological: Negative.    Psychiatric/Behavioral: Negative.    All other systems reviewed and are negative.            Wt Readings from Last 3 Encounters:    11/06/19 44 kg (97 lb)   10/08/19 44.5 kg (98 lb)   09/10/19 46 kg (101 lb 6.4 oz)     Temp Readings from Last 3 Encounters:   06/10/19 98.5 °F (36.9 °C) (Oral)   06/03/19 97.8 °F (36.6 °C)   05/01/19 98.2 °F (36.8 °C)     BP Readings from Last 3 Encounters:   11/06/19 122/66   10/08/19 134/72   09/10/19 164/58     Pulse Readings from Last 3 Encounters:   11/06/19 54   10/08/19 58   09/10/19 61     Body mass index is 18.94 kg/m².  @LASTSAO2(3)@    Physical Exam   Constitutional: She is oriented to person, place, and time. No distress.   Thin, chronically ill appearing.    HENT:   Head: Normocephalic and atraumatic.   Right Ear: External ear normal.   Left Ear: External ear normal.   Nose: Nose normal.   Mouth/Throat: Oropharynx is clear and moist.   Eyes: Conjunctivae and EOM are normal. Pupils are equal, round, and reactive to light.   Neck: Normal range of motion. Neck supple.   Cardiovascular: Normal rate, regular rhythm, normal heart sounds and intact distal pulses.   Pulmonary/Chest: Effort normal and breath sounds normal. No respiratory distress. She has no wheezes.   Musculoskeletal: Normal range of motion.   Normal gait   Neurological: She is alert and oriented to person, place, and time.   Skin: Skin is warm and dry.   Psychiatric: She has a normal mood and affect. Her behavior is normal. Judgment and thought content normal.   Nursing note and vitals reviewed.      Results for orders placed or performed in visit on 09/10/19   Comprehensive Metabolic Panel   Result Value Ref Range    Glucose 90 65 - 99 mg/dL    BUN 16 8 - 23 mg/dL    Creatinine 0.98 0.57 - 1.00 mg/dL    eGFR Non African Am 55 (L) >60 mL/min/1.73    eGFR African Am 67 >60 mL/min/1.73    BUN/Creatinine Ratio 16.3 7.0 - 25.0    Sodium 140 136 - 145 mmol/L    Potassium 5.3 (H) 3.5 - 5.2 mmol/L    Chloride 98 98 - 107 mmol/L    Total CO2 28.3 22.0 - 29.0 mmol/L    Calcium 9.1 8.6 - 10.5 mg/dL    Total Protein 7.1 6.0 - 8.5 g/dL    Albumin 4.40  3.50 - 5.20 g/dL    Globulin 2.7 gm/dL    A/G Ratio 1.6 g/dL    Total Bilirubin 0.4 0.2 - 1.2 mg/dL    Alkaline Phosphatase 102 39 - 117 U/L    AST (SGOT) 16 1 - 32 U/L    ALT (SGPT) 8 1 - 33 U/L   T4, Free   Result Value Ref Range    Free T4 1.28 0.93 - 1.70 ng/dL   TSH   Result Value Ref Range    TSH 2.760 0.270 - 4.200 uIU/mL   Lipid Panel With LDL / HDL Ratio   Result Value Ref Range    Total Cholesterol 108 0 - 200 mg/dL    Triglycerides 102 0 - 150 mg/dL    HDL Cholesterol 37 (L) 40 - 60 mg/dL    VLDL Cholesterol 20.4 mg/dL    LDL Cholesterol  51 0 - 100 mg/dL    LDL/HDL Ratio 1.37    Hemoglobin A1c   Result Value Ref Range    Hemoglobin A1C 5.80 (H) 4.80 - 5.60 %   CBC & Differential   Result Value Ref Range    WBC 6.92 3.40 - 10.80 10*3/mm3    RBC 4.47 3.77 - 5.28 10*6/mm3    Hemoglobin 11.9 (L) 12.0 - 15.9 g/dL    Hematocrit 40.1 34.0 - 46.6 %    MCV 89.7 79.0 - 97.0 fL    MCH 26.6 26.6 - 33.0 pg    MCHC 29.7 (L) 31.5 - 35.7 g/dL    RDW 16.3 (H) 12.3 - 15.4 %    Platelets 257 140 - 450 10*3/mm3    Neutrophil Rel % 56.3 42.7 - 76.0 %    Lymphocyte Rel % 32.7 19.6 - 45.3 %    Monocyte Rel % 7.4 5.0 - 12.0 %    Eosinophil Rel % 2.3 0.3 - 6.2 %    Basophil Rel % 0.9 0.0 - 1.5 %    Neutrophils Absolute 3.90 1.70 - 7.00 10*3/mm3    Lymphocytes Absolute 2.26 0.70 - 3.10 10*3/mm3    Monocytes Absolute 0.51 0.10 - 0.90 10*3/mm3    Eosinophils Absolute 0.16 0.00 - 0.40 10*3/mm3    Basophils Absolute 0.06 0.00 - 0.20 10*3/mm3    Immature Granulocyte Rel % 0.4 0.0 - 0.5 %    Immature Grans Absolute 0.03 0.00 - 0.05 10*3/mm3    nRBC 0.0 0.0 - 0.2 /100 WBC           Kaylin was seen today for pain.    Diagnoses and all orders for this visit:    Nausea  -     ondansetron (ZOFRAN) 8 MG tablet; Take 1 tablet by mouth Every 8 (Eight) Hours As Needed for Nausea or Vomiting.    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HCC) - Encouraged pt to eat a healthy diet.    -     ondansetron (ZOFRAN) 8 MG tablet; Take 1 tablet by mouth Every 8  (Eight) Hours As Needed for Nausea or Vomiting.    Chronic low back pain without sciatica, unspecified back pain laterality - Cont physical therapy.  Will try to wean narcotics as tolerated.    Mixed anxiety depressive disorder - Encouraged her to go back to see counselor.    Outpatient Medications Prior to Visit   Medication Sig Dispense Refill   • albuterol sulfate  (90 Base) MCG/ACT inhaler Inhale 2 puffs Every 4 (Four) Hours As Needed for Wheezing. 18 g 11   • alendronate (FOSAMAX) 70 MG tablet Take 1 tablet by mouth Every 7 (Seven) Days. 48 tablet 0   • amLODIPine (NORVASC) 10 MG tablet Take 1 tablet by mouth Daily. 30 tablet 2   • apixaban (ELIQUIS) 5 MG tablet tablet Take 1 tablet by mouth Every 12 (Twelve) Hours. 180 tablet 1   • atorvastatin (LIPITOR) 10 MG tablet TAKE 1 TABLET BY MOUTH DAILY 90 tablet 0   • budesonide-formoterol (SYMBICORT) 160-4.5 MCG/ACT inhaler Inhale 2 puffs 2 (Two) Times a Day. 1 inhaler 12   • clonazePAM (KlonoPIN) 0.5 MG tablet TAKE 1 TABLET BY MOUTH AT NIGHT AS NEEDED FOR ANXIETY 30 tablet 0   • DULoxetine (CYMBALTA) 60 MG capsule Take 1 capsule by mouth Daily. 90 capsule 1   • fluticasone (FLONASE) 50 MCG/ACT nasal spray SPRAY TWICE IN EACH NOSTRIL EVERY DAY FOR 30 DAYS. 16 mL 6   • levothyroxine (SYNTHROID, LEVOTHROID) 50 MCG tablet Take 1 tablet by mouth Daily. 90 tablet 1   • metoprolol tartrate (LOPRESSOR) 100 MG tablet Take 1 tablet by mouth 2 (Two) Times a Day. 180 tablet 1   • omeprazole (PRILOSEC) 20 MG capsule Take 1 capsule by mouth Daily. 90 capsule 1   • oxybutynin (DITROPAN) 5 MG tablet Take 1 tablet by mouth 2 (Two) Times a Day. 180 tablet 1   • oxyCODONE-acetaminophen (PERCOCET) 7.5-325 MG per tablet Take 1 tablet by mouth Every 8 (Eight) Hours As Needed for Severe Pain . 75 tablet 0   • traZODone (DESYREL) 50 MG tablet Take 50 mg by mouth Every Night.     • ondansetron (ZOFRAN) 8 MG tablet TAKE 1 TABLET BY MOUTH EVERY 8 HOURS AS NEEDED FOR NAUSEA OR VOMITING  20 tablet 0     Facility-Administered Medications Prior to Visit   Medication Dose Route Frequency Provider Last Rate Last Dose   • cyanocobalamin injection 1,000 mcg  1,000 mcg Intramuscular Q28 Days Donna Forte MD   1,000 mcg at 11/14/18 1306     New Medications Ordered This Visit   Medications   • ondansetron (ZOFRAN) 8 MG tablet     Sig: Take 1 tablet by mouth Every 8 (Eight) Hours As Needed for Nausea or Vomiting.     Dispense:  20 tablet     Refill:  1     [unfilled]  Medications Discontinued During This Encounter   Medication Reason   • ondansetron (ZOFRAN) 8 MG tablet Reorder         Return in about 1 month (around 12/6/2019) for Recheck.

## 2019-11-15 ENCOUNTER — HOSPITAL ENCOUNTER (OUTPATIENT)
Dept: PHYSICAL THERAPY | Facility: HOSPITAL | Age: 79
Setting detail: THERAPIES SERIES
Discharge: HOME | End: 2019-11-15

## 2019-11-15 DIAGNOSIS — M54.50 CHRONIC LOW BACK PAIN WITHOUT SCIATICA, UNSPECIFIED BACK PAIN LATERALITY: Primary | ICD-10-CM

## 2019-11-15 DIAGNOSIS — G89.29 CHRONIC LOW BACK PAIN WITHOUT SCIATICA, UNSPECIFIED BACK PAIN LATERALITY: Primary | ICD-10-CM

## 2019-11-15 PROCEDURE — 97110 THERAPEUTIC EXERCISES: CPT

## 2019-11-15 NOTE — THERAPY TREATMENT NOTE
Outpatient Physical Therapy Ortho Treatment Note  [facility]     Patient Name: Kaylin Ojeda  : 1940  MRN: [id number]  Today's Date: 11/15/2019      Visit Date: 11/15/2019    Visit Dx:    ICD-10-CM ICD-9-CM   1. Chronic low back pain without sciatica, unspecified back pain laterality M54.5 724.2    G89.29 338.29       Patient Active Problem List   Diagnosis   • CAD (coronary artery disease)   • Valvular heart disease   • Essential hypertension   • Hypercholesterolemia   • Epigastric pain   • Anemia due to vitamin B12 deficiency   • Loss of appetite   • Anxiety   • Chronic obstructive pulmonary disease (CMS/HCC)   • Mixed anxiety depressive disorder   • Acute erosive gastritis   • Fall in home   • Insomnia   • 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[facility])   • Obstruction of carotid artery   • Peripheral arterial occlusive disease (CMS/[facility])   • Impaired glucose tolerance   • Difficulty sleeping   • Tobacco dependence syndrome   • Iron deficiency anemia   • Vitamin B12 deficiency anemia due to intrinsic factor deficiency   • Acquired hypothyroidism   • Thoracoabdominal aortic aneurysm (CMS/[facility])   • Dysuria   • Abdominal bloating   • Prediabetes   • Sleep difficulties   • Chronic constipation   • Pernicious anemia   • Frequency of urination   • PAF (paroxysmal atrial fibrillation) (CMS/[facility])   • Moderate single current episode of major depressive disorder (CMS/[facility])   • Chronic low back pain without sciatica   • Urge incontinence        Past Medical History:   Diagnosis Date   • Abdominal pain, epigastric     Chronic, unclear cause, improved   • Anorexia    • Anxiety    • Arthritis    • CAD (coronary artery disease)     Cath 2015: nonobstructive LAD/RCA disease, 90% mid LCx s/p [id number] Xience   • Cellulitis of leg    • Cervical disc disease    • Chronic fatigue    • Colitis    • COPD (chronic obstructive pulmonary disease) (CMS/[facility])    • Depression    • Erosive gastritis    • Fibromyalgia    • Frequency of  urination 6/9/2017   • Gastritis    • Hypercholesterolemia 2/2/2016   • Hyperglycemia    • Hypertension     History of refractory hypertension which improved significantly   • Insomnia    • Leukocytes in urine    • Lower back pain    • Mediastinal lymphadenopathy    • 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HCC)    • Mononucleosis    • Occlusion and stenosis of carotid artery    • Onychomycosis    • Orbit fracture    • PAF (paroxysmal atrial fibrillation) (CMS/HCC)    • Peripheral arterial disease (CMS/HCC)     Significant (carotid, subclavian, lower extremities), with claudication, medical therapy only   • Pernicious anemia    • Prediabetes    • Renal artery stenosis (CMS/HCC)     unilateral, with renal atrophy (no intervention required)   • Renal calculi    • Sinus bradycardia     mild, asymptomatic   • TIA (transient ischemic attack)    • UTI (urinary tract infection)    • Valvular heart disease     5/2015: mild-mod AI, mod MR, mod-severe TR.  6/2017: mild AI, mild MR, mod TR   • Vision problem    • Vitamin B 12 deficiency         Past Surgical History:   Procedure Laterality Date   • APPENDECTOMY     • BREAST BIOPSY Left     20+ yrs ago   • BREAST SURGERY     • CARDIAC CATHETERIZATION  05/2015    nonobs LAD/RCA disease, 90% mid LCx s/p [id number] Xience   • CERVICAL FUSION  1997   • CHOLECYSTECTOMY     • CORONARY STENT PLACEMENT     • HYSTERECTOMY  1995   • KNEE SURGERY Right 2005   • KNEE SURGERY Left 1998                       PT Assessment/Plan     Row Name 11/15/19 1000          PT Assessment    Assessment Comments  Encouraged patient to seek MD about her recent memory issues. She tolerated her treatment exercises well today and without complainst of increased low back pain or discomfort.  -AS        PT Plan    PT Plan Comments  Continue with current treatment plan.  -AS       User Key  (r) = Recorded By, (t) = Taken By, (c) = Cosigned By    Initials Name Provider Type    AS Josh Donaldson, PT Physical  Therapist          Modalities     Row Name 11/15/19 1000             Moist Heat    MH Applied  Yes  -AS      Location  Lumbar - Sitting  -AS      Rx Minutes  10 mins  -AS      MH Prior to Rx  Yes  -AS        User Key  (r) = Recorded By, (t) = Taken By, (c) = Cosigned By    Initials Name Provider Type    AS Josh Donaldson, PT Physical Therapist        OP Exercises     Row Name 11/15/19 1000             Subjective Comments    Subjective Comments  Patient states she feels her back is feeling better. She reports she is having some trouble with her memory recently and states she is going to her MD about it soon.  -AS         Exercise 1    Exercise Name 1  Mega. HS Stretch  -AS      Reps 1  10  -AS      Time 1  10 sec hold each  -AS         Exercise 2    Exercise Name 2  Mega. Piriformis Stretch  -AS      Reps 2  10  -AS      Time 2  10 sec hold each  -AS         Exercise 3    Exercise Name 3  DKTC  -AS      Reps 3  10  -AS      Time 3  10 sec hold each  -AS         Exercise 4    Exercise Name 4  PPT  -AS      Reps 4  25  -AS      Time 4  5 sec hold each  -AS         Exercise 5    Exercise Name 5  Supine Clams  -AS      Reps 5  25  -AS      Additional Comments  Black  -AS         Exercise 6    Exercise Name 6  Bridge vs Band  -AS      Reps 6  25  -AS      Additional Comments  Black  -AS         Exercise 7    Exercise Name 7  Lumbar Rotations  -AS      Reps 7  25 each  -AS        User Key  (r) = Recorded By, (t) = Taken By, (c) = Cosigned By    Initials Name Provider Type    AS Josh Donaldson, PT Physical Therapist                                          Time Calculation:   Start Time: 1029  Stop Time: 1132  Time Calculation (min): 63 min  Therapy Charges for Today     Code Description Service Date Service Provider Modifiers Qty    [id number]  PT THER PROC EA 15 MIN 11/15/2019 Josh Donaldson, PT GP 2                    Jsoh Donaldson, PT  11/15/2019

## 2019-11-17 DIAGNOSIS — F32.A ANXIETY AND DEPRESSION: ICD-10-CM

## 2019-11-17 DIAGNOSIS — F41.9 ANXIETY AND DEPRESSION: ICD-10-CM

## 2019-11-18 RX ORDER — CLONAZEPAM 0.5 MG/1
0.5 TABLET ORAL NIGHTLY PRN
Qty: 30 TABLET | Refills: 0 | Status: SHIPPED | OUTPATIENT
Start: 2019-11-18 | End: 2019-12-26

## 2019-11-25 DIAGNOSIS — M54.50 LUMBAR BACK PAIN: ICD-10-CM

## 2019-11-25 DIAGNOSIS — M54.50 CHRONIC LOW BACK PAIN WITHOUT SCIATICA, UNSPECIFIED BACK PAIN LATERALITY: ICD-10-CM

## 2019-11-25 DIAGNOSIS — G89.29 CHRONIC LOW BACK PAIN WITHOUT SCIATICA, UNSPECIFIED BACK PAIN LATERALITY: ICD-10-CM

## 2019-11-25 RX ORDER — OXYCODONE AND ACETAMINOPHEN 7.5; 325 MG/1; MG/1
1 TABLET ORAL EVERY 8 HOURS PRN
Qty: 75 TABLET | Refills: 0 | Status: SHIPPED | OUTPATIENT
Start: 2019-11-25 | End: 2019-12-23 | Stop reason: SDUPTHER

## 2019-12-04 ENCOUNTER — RESULTS ENCOUNTER (OUTPATIENT)
Dept: INTERNAL MEDICINE | Facility: CLINIC | Age: 79
End: 2019-12-04

## 2019-12-04 ENCOUNTER — OFFICE VISIT (OUTPATIENT)
Dept: INTERNAL MEDICINE | Facility: CLINIC | Age: 79
End: 2019-12-04

## 2019-12-04 VITALS
HEART RATE: 64 BPM | BODY MASS INDEX: 19.83 KG/M2 | OXYGEN SATURATION: 93 % | SYSTOLIC BLOOD PRESSURE: 148 MMHG | HEIGHT: 60 IN | RESPIRATION RATE: 16 BRPM | WEIGHT: 101 LBS | DIASTOLIC BLOOD PRESSURE: 60 MMHG

## 2019-12-04 DIAGNOSIS — Z12.31 BREAST CANCER SCREENING BY MAMMOGRAM: ICD-10-CM

## 2019-12-04 DIAGNOSIS — F41.8 MIXED ANXIETY DEPRESSIVE DISORDER: ICD-10-CM

## 2019-12-04 DIAGNOSIS — Z00.00 MEDICARE ANNUAL WELLNESS VISIT, SUBSEQUENT: ICD-10-CM

## 2019-12-04 DIAGNOSIS — Z23 NEED FOR VACCINATION: ICD-10-CM

## 2019-12-04 DIAGNOSIS — Z00.00 MEDICARE ANNUAL WELLNESS VISIT, SUBSEQUENT: Primary | ICD-10-CM

## 2019-12-04 DIAGNOSIS — M54.50 CHRONIC LOW BACK PAIN WITHOUT SCIATICA, UNSPECIFIED BACK PAIN LATERALITY: ICD-10-CM

## 2019-12-04 DIAGNOSIS — R73.03 PREDIABETES: ICD-10-CM

## 2019-12-04 DIAGNOSIS — I10 ESSENTIAL HYPERTENSION: ICD-10-CM

## 2019-12-04 DIAGNOSIS — I77.9 PERIPHERAL ARTERIAL OCCLUSIVE DISEASE (HCC): ICD-10-CM

## 2019-12-04 DIAGNOSIS — I25.119 CORONARY ARTERY DISEASE INVOLVING NATIVE CORONARY ARTERY OF NATIVE HEART WITH ANGINA PECTORIS (HCC): ICD-10-CM

## 2019-12-04 DIAGNOSIS — F32.A ANXIETY AND DEPRESSION: ICD-10-CM

## 2019-12-04 DIAGNOSIS — F41.9 ANXIETY AND DEPRESSION: ICD-10-CM

## 2019-12-04 DIAGNOSIS — G89.29 CHRONIC LOW BACK PAIN WITHOUT SCIATICA, UNSPECIFIED BACK PAIN LATERALITY: ICD-10-CM

## 2019-12-04 DIAGNOSIS — Z12.11 COLON CANCER SCREENING: ICD-10-CM

## 2019-12-04 DIAGNOSIS — J42 CHRONIC BRONCHITIS, UNSPECIFIED CHRONIC BRONCHITIS TYPE (HCC): ICD-10-CM

## 2019-12-04 DIAGNOSIS — I48.0 PAF (PAROXYSMAL ATRIAL FIBRILLATION) (HCC): ICD-10-CM

## 2019-12-04 PROCEDURE — G0009 ADMIN PNEUMOCOCCAL VACCINE: HCPCS | Performed by: INTERNAL MEDICINE

## 2019-12-04 PROCEDURE — 99214 OFFICE O/P EST MOD 30 MIN: CPT | Performed by: INTERNAL MEDICINE

## 2019-12-04 PROCEDURE — 96160 PT-FOCUSED HLTH RISK ASSMT: CPT | Performed by: INTERNAL MEDICINE

## 2019-12-04 PROCEDURE — G0439 PPPS, SUBSEQ VISIT: HCPCS | Performed by: INTERNAL MEDICINE

## 2019-12-04 PROCEDURE — 90732 PPSV23 VACC 2 YRS+ SUBQ/IM: CPT | Performed by: INTERNAL MEDICINE

## 2019-12-04 PROCEDURE — 99397 PER PM REEVAL EST PAT 65+ YR: CPT | Performed by: INTERNAL MEDICINE

## 2019-12-04 NOTE — PROGRESS NOTES
The ABCs of the Annual Wellness Visit  Subsequent Medicare Wellness Visit    Chief Complaint   Patient presents with   • Pain   • Hypothyroidism       Subjective   History of Present Illness:  Kaylin Ojeda is a 79 y.o. female who presents for a Subsequent Medicare Wellness Visit.    HEALTH RISK ASSESSMENT    Recent Hospitalizations:  No hospitalization(s) within the last year.    Current Medical Providers:  Patient Care Team:  Donna Forte MD as PCP - General (Internal Medicine & Pediatrics)  Donna Forte MD as PCP - Family Medicine  Estelle Rendon MD as Consulting Physician (Hematology and Oncology)  Chris Jimenez MD as Referring Physician (Family Medicine)    Smoking Status:  Social History     Tobacco Use   Smoking Status Current Every Day Smoker   • Packs/day: 0.50   • Types: Cigarettes, Electronic Cigarette   Smokeless Tobacco Never Used       Alcohol Consumption:  Social History     Substance and Sexual Activity   Alcohol Use Yes   • Frequency: Monthly or less    Comment: OCCASIONAL/ TWICE A YEAR.        Depression Screen:   PHQ-2/PHQ-9 Depression Screening 12/4/2019   Little interest or pleasure in doing things 1   Feeling down, depressed, or hopeless 1   Trouble falling or staying asleep, or sleeping too much 1   Feeling tired or having little energy 1   Poor appetite or overeating 1   Feeling bad about yourself - or that you are a failure or have let yourself or your family down 0   Trouble concentrating on things, such as reading the newspaper or watching television 1   Moving or speaking so slowly that other people could have noticed. Or the opposite - being so fidgety or restless that you have been moving around a lot more than usual 0   Thoughts that you would be better off dead, or of hurting yourself in some way 0   Total Score 6   If you checked off any problems, how difficult have these problems made it for you to do your work, take care of things at home, or get  along with other people? Somewhat difficult       Fall Risk Screen:  CANDICE Fall Risk Assessment was completed, and patient is at LOW risk for falls.Assessment completed on:12/4/2019    Health Habits and Functional and Cognitive Screening:  Functional & Cognitive Status 12/4/2019   Do you have difficulty preparing food and eating? No   Do you have difficulty bathing yourself, getting dressed or grooming yourself? No   Do you have difficulty using the toilet? No   Do you have difficulty moving around from place to place? No   Do you have trouble with steps or getting out of a bed or a chair? No   Current Diet Unhealthy Diet   Dental Exam Up to date   Eye Exam Up to date   Exercise (times per week) 0 times per week   Current Exercise Activities Include No Regular Exercise   Do you need help using the phone?  No   Are you deaf or do you have serious difficulty hearing?  No   Do you need help with transportation? No   Do you need help shopping? No   Do you need help preparing meals?  No   Do you need help with housework?  No   Do you need help with laundry? No   Do you need help taking your medications? No   Do you need help managing money? No   Do you ever drive or ride in a car without wearing a seat belt? No   Have you felt unusual stress, anger or loneliness in the last month? Yes   Who do you live with? Child   If you need help, do you have trouble finding someone available to you? No   Have you been bothered in the last four weeks by sexual problems? No   Do you have difficulty concentrating, remembering or making decisions? No         Does the patient have evidence of cognitive impairment? No    Asprin use counseling:on eliquis    Age-appropriate Screening Schedule:  Refer to the list below for future screening recommendations based on patient's age, sex and/or medical conditions. Orders for these recommended tests are listed in the plan section. The patient has been provided with a written plan.    Health  Maintenance   Topic Date Due   • PNEUMOCOCCAL VACCINES (65+ LOW/MEDIUM RISK) (2 of 2 - PPSV23) 11/14/2019   • ZOSTER VACCINE (1 of 2) 08/21/2020 (Originally [birth year]16/1990)   • TDAP/TD VACCINES (1 - Tdap) 08/19/2022 (Originally 2/16/1959)   • LIPID PANEL  09/10/2020   • MAMMOGRAM  01/11/2021   • DXA SCAN  01/11/2021   • INFLUENZA VACCINE  Completed          The following portions of the patient's history were reviewed and updated as appropriate: allergies, current medications, past family history, past medical history, past social history, past surgical history and problem list.    Outpatient Medications Prior to Visit   Medication Sig Dispense Refill   • albuterol sulfate  (90 Base) MCG/ACT inhaler Inhale 2 puffs Every 4 (Four) Hours As Needed for Wheezing. 18 g 11   • alendronate (FOSAMAX) 70 MG tablet Take 1 tablet by mouth Every 7 (Seven) Days. 48 tablet 0   • apixaban (ELIQUIS) 5 MG tablet tablet Take 1 tablet by mouth Every 12 (Twelve) Hours. 180 tablet 1   • atorvastatin (LIPITOR) 10 MG tablet TAKE 1 TABLET BY MOUTH DAILY 90 tablet 0   • budesonide-formoterol (SYMBICORT) 160-4.5 MCG/ACT inhaler Inhale 2 puffs 2 (Two) Times a Day. 1 inhaler 12   • clonazePAM (KlonoPIN) 0.5 MG tablet TAKE 1 TABLET BY MOUTH AT NIGHT AS NEEDED FOR ANXIETY 30 tablet 0   • DULoxetine (CYMBALTA) 60 MG capsule Take 1 capsule by mouth Daily. 90 capsule 1   • fluticasone (FLONASE) 50 MCG/ACT nasal spray SPRAY TWICE IN EACH NOSTRIL EVERY DAY FOR 30 DAYS. 16 mL 6   • levothyroxine (SYNTHROID, LEVOTHROID) 50 MCG tablet Take 1 tablet by mouth Daily. 90 tablet 1   • metoprolol tartrate (LOPRESSOR) 100 MG tablet Take 1 tablet by mouth 2 (Two) Times a Day. 180 tablet 1   • omeprazole (PRILOSEC) 20 MG capsule Take 1 capsule by mouth Daily. 90 capsule 1   • ondansetron (ZOFRAN) 8 MG tablet Take 1 tablet by mouth Every 8 (Eight) Hours As Needed for Nausea or Vomiting. 20 tablet 1   • oxybutynin (DITROPAN) 5 MG tablet Take 1 tablet by mouth 2  (Two) Times a Day. 180 tablet 1   • oxyCODONE-acetaminophen (PERCOCET) 7.5-325 MG per tablet Take 1 tablet by mouth Every 8 (Eight) Hours As Needed for Severe Pain . 75 tablet 0   • traZODone (DESYREL) 50 MG tablet Take 50 mg by mouth Every Night.     • amLODIPine (NORVASC) 10 MG tablet Take 1 tablet by mouth Daily. 30 tablet 2     Facility-Administered Medications Prior to Visit   Medication Dose Route Frequency Provider Last Rate Last Dose   • cyanocobalamin injection 1,000 mcg  1,000 mcg Intramuscular Q28 Days Donna Forte MD   1,000 mcg at 11/14/18 1306       Patient Active Problem List   Diagnosis   • CAD (coronary artery disease)   • Valvular heart disease   • Essential hypertension   • Hypercholesterolemia   • Epigastric pain   • Anemia due to vitamin B12 deficiency   • Loss of appetite   • Anxiety   • Chronic obstructive pulmonary disease (CMS/HCC)   • Mixed anxiety depressive disorder   • Acute erosive gastritis   • Fall in home   • Insomnia   • 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HCC)   • Obstruction of carotid artery   • Peripheral arterial occlusive disease (CMS/HCC)   • Impaired glucose tolerance   • Difficulty sleeping   • Tobacco dependence syndrome   • Iron deficiency anemia   • Vitamin B12 deficiency anemia due to intrinsic factor deficiency   • Acquired hypothyroidism   • Thoracoabdominal aortic aneurysm (CMS/HCC)   • Dysuria   • Abdominal bloating   • Prediabetes   • Sleep difficulties   • Chronic constipation   • Pernicious anemia   • Frequency of urination   • PAF (paroxysmal atrial fibrillation) (CMS/HCC)   • Moderate single current episode of major depressive disorder (CMS/HCC)   • Chronic low back pain without sciatica   • Urge incontinence       Advanced Care Planning:  Patient has an advance directive - a copy has not been provided. Have asked the patient to send this to us to add to record    Review of Systems   Constitutional: Negative.    HENT: Negative.    Eyes: Negative.   "  Respiratory: Negative.    Cardiovascular: Negative.    Gastrointestinal: Negative.    Endocrine: Negative.    Genitourinary: Negative.    Musculoskeletal: Negative.    Skin: Negative.    Allergic/Immunologic: Negative.    Neurological: Negative.    Hematological: Negative.    Psychiatric/Behavioral: Negative.    All other systems reviewed and are negative.      Compared to one year ago, the patient feels her physical health is the same.  Compared to one year ago, the patient feels her mental health is the same.    Reviewed chart for potential of high risk medication in the elderly: yes  Reviewed chart for potential of harmful drug interactions in the elderly:yes    Objective         Vitals:    12/04/19 1109   BP: 148/60   Pulse: 64   Resp: 16   SpO2: 93%   Weight: 45.8 kg (101 lb)   Height: 152.4 cm (60\")       Body mass index is 19.73 kg/m².  Discussed the patient's BMI with her. The BMI is below average; no BMI management plan is appropriate.    Physical Exam   Constitutional: She is oriented to person, place, and time. No distress.   Thin, frail elderly female   HENT:   Head: Normocephalic and atraumatic.   Right Ear: External ear normal.   Left Ear: External ear normal.   Nose: Nose normal.   Mouth/Throat: Oropharynx is clear and moist.   Eyes: Conjunctivae and EOM are normal. Pupils are equal, round, and reactive to light.   Neck: Normal range of motion. Neck supple.   Cardiovascular: Normal rate, regular rhythm, normal heart sounds and intact distal pulses.   Pulmonary/Chest: Effort normal and breath sounds normal. No respiratory distress. She has no wheezes.   Musculoskeletal: Normal range of motion.   Normal gait   Neurological: She is alert and oriented to person, place, and time.   Skin: Skin is warm and dry.   Psychiatric: She has a normal mood and affect. Her behavior is normal. Judgment and thought content normal.   Nursing note and vitals reviewed.      Lab Results   Component Value Date    GLU 90 " 09/10/2019    CHLPL 108 09/10/2019    TRIG 102 09/10/2019    HDL 37 (L) 09/10/2019    LDL 51 09/10/2019    VLDL 20.4 09/10/2019    HGBA1C 5.80 (H) 09/10/2019        Assessment/Plan   Medicare Risks and Personalized Health Plan  CMS Preventative Services Quick Reference  Advance Directive Discussion  Breast Cancer/Mammogram Screening  Cardiovascular risk  Chronic Pain   Colon Cancer Screening  Immunizations Discussed/Encouraged (specific immunizations; Pneumococcal 23 )  Inadequate Social Support, Isolation, Loneliness, Lack of Transportation, Financial Difficulties, or Caregiver Stress   Osteoprorosis Risk  Polypharmacy    The above risks/problems have been discussed with the patient.  Pertinent information has been shared with the patient in the After Visit Summary.  Follow up plans and orders are seen below in the Assessment/Plan Section.    Diagnoses and all orders for this visit:    1. Medicare annual wellness visit, subsequent (Primary)  -     Pneumococcal Polysaccharide Vaccine 23-Valent Greater Than or Equal To 1yo Subcutaneous / IM  -     Cologuard - Stool, Per Rectum; Future  -     Mammo Screening Bilateral With CAD; Future    2. Chronic low back pain without sciatica, unspecified back pain laterality  -     Comprehensive Metabolic Panel    3. Anxiety and depression  -     Comprehensive Metabolic Panel  -     CBC & Differential  -     T4, Free  -     TSH    4. Mixed anxiety depressive disorder    5. Essential hypertension    6. PAF (paroxysmal atrial fibrillation) (CMS/[facility])  -     Comprehensive Metabolic Panel  -     CBC & Differential  -     T4, Free  -     TSH  -     Lipid Panel With LDL / HDL Ratio    7. Peripheral arterial occlusive disease (CMS/[facility])    8. Chronic bronchitis, unspecified chronic bronchitis type (CMS/[facility])    9. Coronary artery disease involving native coronary artery of native heart with angina pectoris (CMS/[facility])  -     Comprehensive Metabolic Panel  -     CBC & Differential  -     T4,  Free  -     TSH  -     Lipid Panel With LDL / HDL Ratio    10. Prediabetes  -     Comprehensive Metabolic Panel  -     CBC & Differential  -     T4, Free  -     TSH  -     Lipid Panel With LDL / HDL Ratio  -     Hemoglobin A1c    11. Need for vaccination  -     Pneumococcal Polysaccharide Vaccine 23-Valent Greater Than or Equal To 3yo Subcutaneous / IM    12. Breast cancer screening by mammogram  -     Mammo Screening Bilateral With CAD; Future    13. Colon cancer screening  -     Cologuard - Stool, Per Rectum; Future      Follow Up:  Return in about 6 weeks (around 1/15/2020) for Recheck.     An After Visit Summary and PPPS were given to the patient.

## 2019-12-04 NOTE — PATIENT INSTRUCTIONS
Medicare Wellness  Personal Prevention Plan of Service     Date of Office Visit:  2019  Encounter Provider:  Donna Forte MD  Place of Service:  [facility]  Patient Name: Kaylin Ojeda  :  1940    As part of the Medicare Wellness portion of your visit today, we are providing you with this personalized preventive plan of services (PPPS). This plan is based upon recommendations of the [place] Preventive Services Task Force (USPSTF) and the Advisory Committee on Immunization Practices (ACIP).    This lists the preventive care services that should be considered, and provides dates of when you are due. Items listed as completed are up-to-date and do not require any further intervention.    Health Maintenance   Topic Date Due   • COLOGUARD  2016   • PNEUMOCOCCAL VACCINES (65+ LOW/MEDIUM RISK) (2 of 2 - PPSV23) 2019   • ZOSTER VACCINE (1 of 2) 2020 (Originally 1992)   • TDAP/TD VACCINES (1 - Tdap) 2022 (Originally 1961)   • MEDICARE ANNUAL WELLNESS  2019   • LIPID PANEL  09/10/2020   • MAMMOGRAM  2021   • DXA SCAN  2021   • INFLUENZA VACCINE  Completed       Orders Placed This Encounter   Procedures   • Mammo Screening Bilateral With CAD     Standing Status:   Future     Standing Expiration Date:   2020     Order Specific Question:   Reason for Exam:     Answer:   screening   • Pneumococcal Polysaccharide Vaccine 23-Valent Greater Than or Equal To 3yo Subcutaneous / IM   • Comprehensive Metabolic Panel   • T4, Free   • TSH   • Lipid Panel With LDL / HDL Ratio   • Hemoglobin A1c   • Cologuard - Stool, Per Rectum     Standing Status:   Future     Standing Expiration Date:   2020   • CBC & Differential     Order Specific Question:   Manual Differential     Answer:   No       Return in about 6 weeks (around 1/15/2020) for Recheck.

## 2019-12-05 LAB
ALBUMIN SERPL-MCNC: 4.3 G/DL (ref 3.5–5.2)
ALBUMIN/GLOB SERPL: 1.7 G/DL
ALP SERPL-CCNC: 86 U/L (ref 39–117)
ALT SERPL-CCNC: 10 U/L (ref 1–33)
AST SERPL-CCNC: 18 U/L (ref 1–32)
BASOPHILS # BLD AUTO: 0.06 10*3/MM3 (ref 0–0.2)
BASOPHILS NFR BLD AUTO: 0.8 % (ref 0–1.5)
BILIRUB SERPL-MCNC: 0.4 MG/DL (ref 0.2–1.2)
BUN SERPL-MCNC: 18 MG/DL (ref 8–23)
BUN/CREAT SERPL: 21.7 (ref 7–25)
CALCIUM SERPL-MCNC: 8.8 MG/DL (ref 8.6–10.5)
CHLORIDE SERPL-SCNC: 101 MMOL/L (ref 98–107)
CHOLEST SERPL-MCNC: 123 MG/DL (ref 0–200)
CO2 SERPL-SCNC: 28.4 MMOL/L (ref 22–29)
CREAT SERPL-MCNC: 0.83 MG/DL (ref 0.57–1)
EOSINOPHIL # BLD AUTO: 0.11 10*3/MM3 (ref 0–0.4)
EOSINOPHIL NFR BLD AUTO: 1.4 % (ref 0.3–6.2)
ERYTHROCYTE [DISTWIDTH] IN BLOOD BY AUTOMATED COUNT: 15.9 % (ref 12.3–15.4)
GLOBULIN SER CALC-MCNC: 2.5 GM/DL
GLUCOSE SERPL-MCNC: 75 MG/DL (ref 65–99)
HBA1C MFR BLD: 6.3 % (ref 4.8–5.6)
HCT VFR BLD AUTO: 36.4 % (ref 34–46.6)
HDLC SERPL-MCNC: 47 MG/DL (ref 40–60)
HGB BLD-MCNC: 12.3 G/DL (ref 12–15.9)
IMM GRANULOCYTES # BLD AUTO: 0.04 10*3/MM3 (ref 0–0.05)
IMM GRANULOCYTES NFR BLD AUTO: 0.5 % (ref 0–0.5)
LDLC SERPL CALC-MCNC: 41 MG/DL (ref 0–100)
LDLC/HDLC SERPL: 0.88 {RATIO}
LYMPHOCYTES # BLD AUTO: 2.4 10*3/MM3 (ref 0.7–3.1)
LYMPHOCYTES NFR BLD AUTO: 30.4 % (ref 19.6–45.3)
MCH RBC QN AUTO: 30.1 PG (ref 26.6–33)
MCHC RBC AUTO-ENTMCNC: 33.8 G/DL (ref 31.5–35.7)
MCV RBC AUTO: 89.2 FL (ref 79–97)
MONOCYTES # BLD AUTO: 0.52 10*3/MM3 (ref 0.1–0.9)
MONOCYTES NFR BLD AUTO: 6.6 % (ref 5–12)
NEUTROPHILS # BLD AUTO: 4.77 10*3/MM3 (ref 1.7–7)
NEUTROPHILS NFR BLD AUTO: 60.3 % (ref 42.7–76)
NRBC BLD AUTO-RTO: 0 /100 WBC (ref 0–0.2)
PLATELET # BLD AUTO: 248 10*3/MM3 (ref 140–450)
POTASSIUM SERPL-SCNC: 4.5 MMOL/L (ref 3.5–5.2)
PROT SERPL-MCNC: 6.8 G/DL (ref 6–8.5)
RBC # BLD AUTO: 4.08 10*6/MM3 (ref 3.77–5.28)
SODIUM SERPL-SCNC: 141 MMOL/L (ref 136–145)
T4 FREE SERPL-MCNC: 1.06 NG/DL (ref 0.93–1.7)
TRIGL SERPL-MCNC: 174 MG/DL (ref 0–150)
TSH SERPL DL<=0.005 MIU/L-ACNC: 1.58 UIU/ML (ref 0.27–4.2)
VLDLC SERPL CALC-MCNC: 34.8 MG/DL
WBC # BLD AUTO: 7.9 10*3/MM3 (ref 3.4–10.8)

## 2019-12-12 ENCOUNTER — TELEPHONE (OUTPATIENT)
Dept: INTERNAL MEDICINE | Facility: CLINIC | Age: 79
End: 2019-12-12

## 2019-12-12 NOTE — TELEPHONE ENCOUNTER
----- Message from Donna Forte MD sent at 12/11/2019  4:14 PM EST -----  Call pt about labs.  a1c 6.3.  Cont to work on healthy diet.  Recheck labs in 3 mo

## 2019-12-21 ENCOUNTER — HOSPITAL ENCOUNTER (EMERGENCY)
Facility: HOSPITAL | Age: 79
Discharge: HOME | End: 2019-12-21
Attending: EMERGENCY MEDICINE | Admitting: EMERGENCY MEDICINE

## 2019-12-21 ENCOUNTER — APPOINTMENT (OUTPATIENT)
Dept: GENERAL RADIOLOGY | Facility: HOSPITAL | Age: 79
End: 2019-12-21

## 2019-12-21 VITALS
DIASTOLIC BLOOD PRESSURE: 61 MMHG | WEIGHT: 99.21 LBS | BODY MASS INDEX: 19.48 KG/M2 | HEIGHT: 60 IN | SYSTOLIC BLOOD PRESSURE: 140 MMHG | TEMPERATURE: 98 F | OXYGEN SATURATION: 99 % | HEART RATE: 60 BPM | RESPIRATION RATE: 18 BRPM

## 2019-12-21 DIAGNOSIS — S70.12XA CONTUSION OF LEFT THIGH, INITIAL ENCOUNTER: Primary | ICD-10-CM

## 2019-12-21 PROCEDURE — 99282 EMERGENCY DEPT VISIT SF MDM: CPT | Performed by: EMERGENCY MEDICINE

## 2019-12-21 PROCEDURE — 99282 EMERGENCY DEPT VISIT SF MDM: CPT

## 2019-12-21 PROCEDURE — 73552 X-RAY EXAM OF FEMUR 2/>: CPT
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
## 2019-12-21 NOTE — DISCHARGE INSTRUCTIONS
Take your oxycodone or Tylenol as needed as directed for pain.  Take the oxycodone and Tylenol together.  Follow-up with Dr. Forte in 1 week.  Return to the emergency department there is increased pain, numbness, tingling, weakness, change in color or temperature, worse in any way at all.

## 2019-12-23 ENCOUNTER — TELEPHONE (OUTPATIENT)
Dept: INTERNAL MEDICINE | Facility: CLINIC | Age: 79
End: 2019-12-23

## 2019-12-23 DIAGNOSIS — G89.29 CHRONIC LOW BACK PAIN WITHOUT SCIATICA, UNSPECIFIED BACK PAIN LATERALITY: ICD-10-CM

## 2019-12-23 DIAGNOSIS — M54.50 CHRONIC LOW BACK PAIN WITHOUT SCIATICA, UNSPECIFIED BACK PAIN LATERALITY: ICD-10-CM

## 2019-12-23 DIAGNOSIS — M54.50 LUMBAR BACK PAIN: ICD-10-CM

## 2019-12-23 RX ORDER — OXYCODONE AND ACETAMINOPHEN 7.5; 325 MG/1; MG/1
1 TABLET ORAL EVERY 8 HOURS PRN
Qty: 75 TABLET | Refills: 0 | Status: SHIPPED | OUTPATIENT
Start: 2019-12-23 | End: 2020-01-24 | Stop reason: SDUPTHER

## 2019-12-23 NOTE — TELEPHONE ENCOUNTER
Patient asking for scripts to be sent in  oxyCODONE-acetaminophen (PERCOCET) 7.5-325 MG per tablet and CLonazePAM (KlonoPIN) 0.5 MG tablet

## 2019-12-23 NOTE — TELEPHONE ENCOUNTER
Set up for Dr. RODRIGUEZ to sign when here on Thursday (no MD in office until then).  Unable to leave msg on either patient phone on file

## 2019-12-26 DIAGNOSIS — F32.A ANXIETY AND DEPRESSION: ICD-10-CM

## 2019-12-26 DIAGNOSIS — I10 ESSENTIAL HYPERTENSION: ICD-10-CM

## 2019-12-26 DIAGNOSIS — F41.9 ANXIETY AND DEPRESSION: ICD-10-CM

## 2019-12-27 ENCOUNTER — HOSPITAL ENCOUNTER (EMERGENCY)
Facility: HOSPITAL | Age: 79
Discharge: HOME | End: 2019-12-27
Attending: EMERGENCY MEDICINE | Admitting: EMERGENCY MEDICINE

## 2019-12-27 ENCOUNTER — APPOINTMENT (OUTPATIENT)
Dept: ULTRASOUND IMAGING | Facility: HOSPITAL | Age: 79
End: 2019-12-27

## 2019-12-27 ENCOUNTER — HOSPITAL ENCOUNTER (OUTPATIENT)
Dept: GENERAL RADIOLOGY | Facility: HOSPITAL | Age: 79
Discharge: HOME | End: 2019-12-27

## 2019-12-27 ENCOUNTER — OFFICE VISIT (OUTPATIENT)
Dept: INTERNAL MEDICINE | Facility: CLINIC | Age: 79
End: 2019-12-27

## 2019-12-27 ENCOUNTER — APPOINTMENT (OUTPATIENT)
Dept: CT IMAGING | Facility: HOSPITAL | Age: 79
End: 2019-12-27

## 2019-12-27 ENCOUNTER — LAB (OUTPATIENT)
Dept: LAB | Facility: HOSPITAL | Age: 79
End: 2019-12-27

## 2019-12-27 ENCOUNTER — HOSPITAL ENCOUNTER (OUTPATIENT)
Dept: GENERAL RADIOLOGY | Facility: HOSPITAL | Age: 79
Discharge: HOME | End: 2019-12-27
Admitting: INTERNAL MEDICINE

## 2019-12-27 VITALS
BODY MASS INDEX: 18.31 KG/M2 | DIASTOLIC BLOOD PRESSURE: 79 MMHG | HEART RATE: 80 BPM | TEMPERATURE: 97.8 F | HEIGHT: 61 IN | WEIGHT: 97 LBS | OXYGEN SATURATION: 95 % | SYSTOLIC BLOOD PRESSURE: 180 MMHG | RESPIRATION RATE: 16 BRPM

## 2019-12-27 VITALS
RESPIRATION RATE: 16 BRPM | OXYGEN SATURATION: 96 % | WEIGHT: 97 LBS | BODY MASS INDEX: 19.04 KG/M2 | HEART RATE: 86 BPM | SYSTOLIC BLOOD PRESSURE: 126 MMHG | DIASTOLIC BLOOD PRESSURE: 70 MMHG | HEIGHT: 60 IN

## 2019-12-27 DIAGNOSIS — R06.02 SHORTNESS OF BREATH: ICD-10-CM
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
## 2019-12-27 DIAGNOSIS — I48.0 PAF (PAROXYSMAL ATRIAL FIBRILLATION) (HCC): ICD-10-CM

## 2019-12-27 DIAGNOSIS — R06.00 DYSPNEA, UNSPECIFIED TYPE: ICD-10-CM

## 2019-12-27 DIAGNOSIS — M54.50 CHRONIC BILATERAL LOW BACK PAIN WITHOUT SCIATICA: ICD-10-CM

## 2019-12-27 DIAGNOSIS — M79.604 BILATERAL LEG PAIN: Primary | ICD-10-CM

## 2019-12-27 DIAGNOSIS — G89.29 CHRONIC BILATERAL LOW BACK PAIN WITHOUT SCIATICA: ICD-10-CM

## 2019-12-27 DIAGNOSIS — M54.6 ACUTE MIDLINE THORACIC BACK PAIN: ICD-10-CM

## 2019-12-27 DIAGNOSIS — M54.9 BILATERAL BACK PAIN, UNSPECIFIED BACK LOCATION, UNSPECIFIED CHRONICITY: ICD-10-CM

## 2019-12-27 DIAGNOSIS — M79.605 BILATERAL LEG PAIN: Primary | ICD-10-CM

## 2019-12-27 DIAGNOSIS — I71.60 THORACOABDOMINAL AORTIC ANEURYSM (TAAA) WITHOUT RUPTURE (HCC): ICD-10-CM
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
## 2019-12-27 LAB
ALBUMIN SERPL-MCNC: 4.6 G/DL (ref 3.5–5.2)
ALBUMIN/GLOB SERPL: 1.7 G/DL
ALP SERPL-CCNC: 100 U/L (ref 39–117)
ALT SERPL W P-5'-P-CCNC: 14 U/L (ref 1–33)
ANION GAP SERPL CALCULATED.3IONS-SCNC: 15 MMOL/L (ref 5–15)
AST SERPL-CCNC: 23 U/L (ref 1–32)
BASOPHILS # BLD AUTO: 0.11 10*3/MM3 (ref 0–0.2)
BASOPHILS NFR BLD AUTO: 1.3 % (ref 0–1.5)
BILIRUB SERPL-MCNC: 0.4 MG/DL (ref 0.2–1.2)
BUN BLD-MCNC: 31 MG/DL (ref 8–23)
BUN/CREAT SERPL: 25.4 (ref 7–25)
CALCIUM SPEC-SCNC: 8.9 MG/DL (ref 8.6–10.5)
CHLORIDE SERPL-SCNC: 100 MMOL/L (ref 98–107)
CO2 SERPL-SCNC: 25 MMOL/L (ref 22–29)
CREAT BLD-MCNC: 1.22 MG/DL (ref 0.57–1)
D DIMER PPP FEU-MCNC: 1.35 MCGFEU/ML (ref 0–0.46)
DEPRECATED RDW RBC AUTO: 49 FL (ref 37–54)
EOSINOPHIL # BLD AUTO: 0.07 10*3/MM3 (ref 0–0.4)
EOSINOPHIL NFR BLD AUTO: 0.8 % (ref 0.3–6.2)
ERYTHROCYTE [DISTWIDTH] IN BLOOD BY AUTOMATED COUNT: 15.1 % (ref 12.3–15.4)
GFR SERPL CREATININE-BSD FRML MDRD: 43 ML/MIN/1.73
GLOBULIN UR ELPH-MCNC: 2.7 GM/DL
GLUCOSE BLD-MCNC: 109 MG/DL (ref 65–99)
HCT VFR BLD AUTO: 34.1 % (ref 34–46.6)
HGB BLD-MCNC: 11.5 G/DL (ref 12–15.9)
IMM GRANULOCYTES # BLD AUTO: 0.04 10*3/MM3 (ref 0–0.05)
IMM GRANULOCYTES NFR BLD AUTO: 0.5 % (ref 0–0.5)
LYMPHOCYTES # BLD AUTO: 3.23 10*3/MM3 (ref 0.7–3.1)
LYMPHOCYTES NFR BLD AUTO: 38 % (ref 19.6–45.3)
MCH RBC QN AUTO: 30.4 PG (ref 26.6–33)
MCHC RBC AUTO-ENTMCNC: 33.7 G/DL (ref 31.5–35.7)
MCV RBC AUTO: 90.2 FL (ref 79–97)
MONOCYTES # BLD AUTO: 0.63 10*3/MM3 (ref 0.1–0.9)
MONOCYTES NFR BLD AUTO: 7.4 % (ref 5–12)
NEUTROPHILS # BLD AUTO: 4.42 10*3/MM3 (ref 1.7–7)
NEUTROPHILS NFR BLD AUTO: 52 % (ref 42.7–76)
NRBC BLD AUTO-RTO: 0 /100 WBC (ref 0–0.2)
NT-PROBNP SERPL-MCNC: 923.4 PG/ML (ref 5–1800)
PLATELET # BLD AUTO: 319 10*3/MM3 (ref 140–450)
PMV BLD AUTO: 9.9 FL (ref 6–12)
POTASSIUM BLD-SCNC: 4.4 MMOL/L (ref 3.5–5.2)
PROT SERPL-MCNC: 7.3 G/DL (ref 6–8.5)
RBC # BLD AUTO: 3.78 10*6/MM3 (ref 3.77–5.28)
SODIUM BLD-SCNC: 140 MMOL/L (ref 136–145)
TROPONIN T SERPL-MCNC: <0.01 NG/ML (ref 0–0.03)
WBC NRBC COR # BLD: 8.5 10*3/MM3 (ref 3.4–10.8)

## 2019-12-27 PROCEDURE — 71046 X-RAY EXAM CHEST 2 VIEWS: CPT

## 2019-12-27 PROCEDURE — 71275 CT ANGIOGRAPHY CHEST: CPT

## 2019-12-27 PROCEDURE — 99284 EMERGENCY DEPT VISIT MOD MDM: CPT | Performed by: EMERGENCY MEDICINE

## 2019-12-27 PROCEDURE — 85025 COMPLETE CBC W/AUTO DIFF WBC: CPT

## 2019-12-27 PROCEDURE — 72100 X-RAY EXAM L-S SPINE 2/3 VWS: CPT

## 2019-12-27 PROCEDURE — 93970 EXTREMITY STUDY: CPT

## 2019-12-27 PROCEDURE — 99283 EMERGENCY DEPT VISIT LOW MDM: CPT

## 2019-12-27 PROCEDURE — 83880 ASSAY OF NATRIURETIC PEPTIDE: CPT | Performed by: EMERGENCY MEDICINE

## 2019-12-27 PROCEDURE — 93005 ELECTROCARDIOGRAM TRACING: CPT | Performed by: EMERGENCY MEDICINE

## 2019-12-27 PROCEDURE — 36415 COLL VENOUS BLD VENIPUNCTURE: CPT

## 2019-12-27 PROCEDURE — 99215 OFFICE O/P EST HI 40 MIN: CPT | Performed by: INTERNAL MEDICINE

## 2019-12-27 PROCEDURE — 72070 X-RAY EXAM THORAC SPINE 2VWS: CPT

## 2019-12-27 PROCEDURE — 85379 FIBRIN DEGRADATION QUANT: CPT

## 2019-12-27 PROCEDURE — 0 IOPAMIDOL PER 1 ML: Performed by: EMERGENCY MEDICINE

## 2019-12-27 PROCEDURE — 84484 ASSAY OF TROPONIN QUANT: CPT | Performed by: EMERGENCY MEDICINE

## 2019-12-27 PROCEDURE — 80053 COMPREHEN METABOLIC PANEL: CPT

## 2019-12-27 RX ORDER — CLONAZEPAM 0.5 MG/1
0.5 TABLET ORAL NIGHTLY PRN
Qty: 30 TABLET | Refills: 0 | Status: SHIPPED | OUTPATIENT
Start: 2019-12-27 | End: 2020-01-23 | Stop reason: SDUPTHER

## 2019-12-27 RX ORDER — LISINOPRIL 20 MG/1
20 TABLET ORAL DAILY
Qty: 90 TABLET | Refills: 0 | Status: SHIPPED | OUTPATIENT
Start: 2019-12-27 | End: 2020-04-01

## 2019-12-27 RX ORDER — SODIUM CHLORIDE 0.9 % (FLUSH) 0.9 %
10 SYRINGE (ML) INJECTION AS NEEDED
Status: DISCONTINUED | OUTPATIENT
Start: 2019-12-27 | End: 2019-12-27 | Stop reason: HOSPADM

## 2019-12-27 RX ADMIN — IOPAMIDOL 50 ML: 755 INJECTION, SOLUTION INTRAVENOUS at 20:20

## 2019-12-27 RX ADMIN — IOPAMIDOL 100 ML: 755 INJECTION, SOLUTION INTRAVENOUS at 20:20
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
## 2020-01-23 DIAGNOSIS — F32.A ANXIETY AND DEPRESSION: ICD-10-CM

## 2020-01-23 DIAGNOSIS — M54.50 LUMBAR BACK PAIN: ICD-10-CM

## 2020-01-23 DIAGNOSIS — F41.9 ANXIETY AND DEPRESSION: ICD-10-CM

## 2020-01-23 DIAGNOSIS — G89.29 CHRONIC LOW BACK PAIN WITHOUT SCIATICA, UNSPECIFIED BACK PAIN LATERALITY: ICD-10-CM

## 2020-01-23 DIAGNOSIS — M54.50 CHRONIC LOW BACK PAIN WITHOUT SCIATICA, UNSPECIFIED BACK PAIN LATERALITY: ICD-10-CM

## 2020-01-23 RX ORDER — OXYCODONE AND ACETAMINOPHEN 7.5; 325 MG/1; MG/1
1 TABLET ORAL EVERY 8 HOURS PRN
Qty: 75 TABLET | Refills: 0 | Status: CANCELLED | OUTPATIENT
Start: 2020-01-23

## 2020-01-23 RX ORDER — LEVOTHYROXINE SODIUM 0.05 MG/1
50 TABLET ORAL DAILY
Qty: 90 TABLET | Refills: 1 | Status: SHIPPED | OUTPATIENT
Start: 2020-01-23 | End: 2020-08-05 | Stop reason: SDUPTHER

## 2020-01-24 DIAGNOSIS — F32.A ANXIETY AND DEPRESSION: ICD-10-CM

## 2020-01-24 DIAGNOSIS — M54.50 CHRONIC LOW BACK PAIN WITHOUT SCIATICA, UNSPECIFIED BACK PAIN LATERALITY: ICD-10-CM

## 2020-01-24 DIAGNOSIS — F41.9 ANXIETY AND DEPRESSION: ICD-10-CM

## 2020-01-24 DIAGNOSIS — G89.29 CHRONIC LOW BACK PAIN WITHOUT SCIATICA, UNSPECIFIED BACK PAIN LATERALITY: ICD-10-CM

## 2020-01-24 DIAGNOSIS — M54.50 LUMBAR BACK PAIN: ICD-10-CM

## 2020-01-24 RX ORDER — CLONAZEPAM 0.5 MG/1
0.5 TABLET ORAL NIGHTLY PRN
Qty: 30 TABLET | Refills: 0 | Status: SHIPPED | OUTPATIENT
Start: 2020-01-24 | End: 2020-02-20 | Stop reason: SDUPTHER

## 2020-01-24 RX ORDER — OXYCODONE AND ACETAMINOPHEN 7.5; 325 MG/1; MG/1
1 TABLET ORAL EVERY 8 HOURS PRN
Qty: 75 TABLET | Refills: 0 | Status: SHIPPED | OUTPATIENT
Start: 2020-01-24 | End: 2020-02-20 | Stop reason: SDUPTHER

## 2020-01-24 RX ORDER — CLONAZEPAM 0.5 MG/1
0.5 TABLET ORAL NIGHTLY PRN
Qty: 30 TABLET | OUTPATIENT
Start: 2020-01-24

## 2020-01-24 NOTE — TELEPHONE ENCOUNTER
PATIENT CALLED A SECOND TIME ABOUT HER REFILL OF HYDROCODONE. SHE IS NOW COMPLETELY OUT OF THE MED AND HOPES DR. HINTON WILL CALL IT IN TODAY.

## 2020-01-28 ENCOUNTER — OFFICE VISIT (OUTPATIENT)
Dept: INTERNAL MEDICINE | Facility: CLINIC | Age: 80
End: 2020-01-28

## 2020-01-28 VITALS
DIASTOLIC BLOOD PRESSURE: 80 MMHG | HEIGHT: 61 IN | OXYGEN SATURATION: 97 % | BODY MASS INDEX: 19.52 KG/M2 | TEMPERATURE: 97.9 F | WEIGHT: 103.4 LBS | RESPIRATION RATE: 16 BRPM | HEART RATE: 90 BPM | SYSTOLIC BLOOD PRESSURE: 130 MMHG

## 2020-01-28 DIAGNOSIS — M54.50 LUMBAR BACK PAIN: ICD-10-CM

## 2020-01-28 DIAGNOSIS — M54.50 CHRONIC LOW BACK PAIN WITHOUT SCIATICA, UNSPECIFIED BACK PAIN LATERALITY: ICD-10-CM
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
## 2020-01-28 DIAGNOSIS — M54.6 ACUTE MIDLINE THORACIC BACK PAIN: ICD-10-CM

## 2020-01-28 DIAGNOSIS — I10 ESSENTIAL HYPERTENSION: ICD-10-CM

## 2020-01-28 DIAGNOSIS — G89.29 CHRONIC LOW BACK PAIN WITHOUT SCIATICA, UNSPECIFIED BACK PAIN LATERALITY: ICD-10-CM

## 2020-01-28 PROCEDURE — 99214 OFFICE O/P EST MOD 30 MIN: CPT | Performed by: INTERNAL MEDICINE

## 2020-01-28 RX ORDER — AMLODIPINE BESYLATE 10 MG/1
10 TABLET ORAL DAILY
Qty: 30 TABLET | Refills: 2 | Status: CANCELLED | OUTPATIENT
Start: 2020-01-28
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
## 2020-01-29 RX ORDER — AMLODIPINE BESYLATE 10 MG/1
10 TABLET ORAL DAILY
Qty: 30 TABLET | Refills: 2 | Status: SHIPPED | OUTPATIENT
Start: 2020-01-29 | End: 2021-03-02

## 2020-02-20 DIAGNOSIS — F41.9 ANXIETY AND DEPRESSION: ICD-10-CM

## 2020-02-20 DIAGNOSIS — F32.A ANXIETY AND DEPRESSION: ICD-10-CM

## 2020-02-20 DIAGNOSIS — G89.29 CHRONIC LOW BACK PAIN WITHOUT SCIATICA, UNSPECIFIED BACK PAIN LATERALITY: ICD-10-CM

## 2020-02-20 DIAGNOSIS — M54.50 LUMBAR BACK PAIN: ICD-10-CM

## 2020-02-20 DIAGNOSIS — M54.50 CHRONIC LOW BACK PAIN WITHOUT SCIATICA, UNSPECIFIED BACK PAIN LATERALITY: ICD-10-CM

## 2020-02-21 RX ORDER — CLONAZEPAM 0.5 MG/1
0.5 TABLET ORAL NIGHTLY PRN
Qty: 30 TABLET | Refills: 0 | Status: SHIPPED | OUTPATIENT
Start: 2020-02-21 | End: 2020-03-20 | Stop reason: SDUPTHER

## 2020-02-21 RX ORDER — OXYCODONE AND ACETAMINOPHEN 7.5; 325 MG/1; MG/1
1 TABLET ORAL EVERY 8 HOURS PRN
Qty: 75 TABLET | Refills: 0 | Status: SHIPPED | OUTPATIENT
Start: 2020-02-21 | End: 2020-03-20 | Stop reason: SDUPTHER

## 2020-02-24 DIAGNOSIS — N39.41 URGE INCONTINENCE: ICD-10-CM

## 2020-02-25 RX ORDER — OXYBUTYNIN CHLORIDE 5 MG/1
TABLET ORAL
Qty: 180 TABLET | Refills: 1 | Status: SHIPPED | OUTPATIENT
Start: 2020-02-25 | End: 2020-08-14 | Stop reason: SDUPTHER

## 2020-02-25 RX ORDER — OMEPRAZOLE 20 MG/1
20 CAPSULE, DELAYED RELEASE ORAL DAILY
Qty: 90 CAPSULE | Refills: 1 | Status: SHIPPED | OUTPATIENT
Start: 2020-02-25 | End: 2020-09-02

## 2020-03-10 ENCOUNTER — OFFICE VISIT (OUTPATIENT)
Dept: CARDIOLOGY | Facility: CLINIC | Age: 80
End: 2020-03-10

## 2020-03-10 VITALS
DIASTOLIC BLOOD PRESSURE: 70 MMHG | WEIGHT: 102.1 LBS | BODY MASS INDEX: 19.28 KG/M2 | HEART RATE: 82 BPM | SYSTOLIC BLOOD PRESSURE: 138 MMHG | HEIGHT: 61 IN

## 2020-03-10 DIAGNOSIS — I71.60 THORACOABDOMINAL AORTIC ANEURYSM (TAAA) WITHOUT RUPTURE (HCC): ICD-10-CM

## 2020-03-10 DIAGNOSIS — I77.9 PERIPHERAL ARTERIAL OCCLUSIVE DISEASE (HCC): ICD-10-CM

## 2020-03-10 DIAGNOSIS — I10 ESSENTIAL HYPERTENSION: ICD-10-CM

## 2020-03-10 DIAGNOSIS — I25.119 CORONARY ARTERY DISEASE INVOLVING NATIVE CORONARY ARTERY OF NATIVE HEART WITH ANGINA PECTORIS (HCC): ICD-10-CM

## 2020-03-10 DIAGNOSIS — I65.23 OBSTRUCTION OF CAROTID ARTERY ON BOTH SIDES: ICD-10-CM

## 2020-03-10 DIAGNOSIS — K55.1 MESENTERIC ARTERY STENOSIS (HCC): ICD-10-CM

## 2020-03-10 DIAGNOSIS — I38 VALVULAR HEART DISEASE: ICD-10-CM

## 2020-03-10 DIAGNOSIS — I48.0 PAF (PAROXYSMAL ATRIAL FIBRILLATION) (HCC): Primary | ICD-10-CM

## 2020-03-10 PROBLEM — R30.0 DYSURIA: Status: RESOLVED | Noted: 2017-02-24 | Resolved: 2020-03-10

## 2020-03-10 PROCEDURE — 99213 OFFICE O/P EST LOW 20 MIN: CPT | Performed by: INTERNAL MEDICINE

## 2020-03-10 PROCEDURE — 93000 ELECTROCARDIOGRAM COMPLETE: CPT | Performed by: INTERNAL MEDICINE

## 2020-03-10 NOTE — PROGRESS NOTES
"Date of Office Visit: 03/10/2020  Encounter Provider: Mo Calero MD  Place of Service: [facility]  Patient Name: Kaylin Ojeda  :1940    Chief Complaint   Patient presents with   • Atrial Fibrillation   :     HPI: Kaylin Ojeda is a 80 y.o. female who presents today to follow up.     She is a chronically ill woman with previous history of refractory hypertension and severe peripheral vascular disease. In May 2015, she was found to have single-vessel coronary artery disease of the circumflex and had a stent placed. Over the next two years, I had to progressively cut back on her anti-hypertensives due to low blood pressure (presumably from weight loss).      In 2017 she developed anginal chest pressure and was found to have rapid atrial fibrillation.  She cardioverted on her own and her metoprolol dose was increased.  An echo showed normal LV systolic function, moderate TR, and mild AI/MR.  She ruled out for ACS.  She was placed on apixaban.    Once she was on apixaban, she had to stop aspirin as it caused nosebleeds.    I saw her in May 2019 for palpitations and chest discomfort.  She was under a tremendous amount of stress and was in a very unfortunately living situation.  I ordered an echo, which showed normal LVSF, EF 60%, mild-moderate AI, and severe MR/TR.  I did not feel she was a candidate for further evaluation due to her comorbidities.    She has known PAD, and a small AAA.  A CTA in 2017 showed that it was 3.6cm.  In May 2018, it was 3.8cm.  There was known celiac disease (\"moderate\"), known left renal artery occlusion, and known left subclavian artery occlusion.  She has chronic claudication.  I have referred her to vascular surgery several times, but she declines to follow up or proceed with recommended testing.    She was in the ED in 2019 for leg pain and dyspnea.  A CTA was negative for PE but did show advanced emphysema. "     She continues to have chronic dyspnea but it's relatively mild.  She notes palpitations at time but not very often.  She denies chest pain, pedal edema, lightheadedness, or syncope.  She remains extremely depressed and anxious.  She continues to smoke heavily.      Past Medical History:   Diagnosis Date   • Abdominal pain, epigastric     Chronic, unclear cause, improved   • Anorexia    • Anxiety    • Arthritis    • CAD (coronary artery disease)     Cath 5/2015: nonobstructive LAD/RCA disease, 90% mid LCx s/p [id number] Xience   • Cellulitis of leg    • Cervical disc disease    • Chronic fatigue    • Colitis    • COPD (chronic obstructive pulmonary disease) (CMS/[facility])    • Depression    • Erosive gastritis    • Fibromyalgia    • Frequency of urination 6/9/2017   • Gastritis    • Hypercholesterolemia 2/2/2016   • Hyperglycemia    • Hypertension     History of refractory hypertension which improved significantly   • Insomnia    • Leukocytes in urine    • Lower back pain    • Mediastinal lymphadenopathy    • 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[facility])    • Mononucleosis    • Occlusion and stenosis of carotid artery    • Onychomycosis    • Orbit fracture    • PAF (paroxysmal atrial fibrillation) (CMS/[facility])    • Peripheral arterial disease (CMS/[facility])     Significant (carotid, subclavian, lower extremities), with claudication, medical therapy only   • Pernicious anemia    • Prediabetes    • Renal artery stenosis (CMS/HCC)     unilateral, with renal atrophy (no intervention required)   • Renal calculi    • Sinus bradycardia     mild, asymptomatic   • TIA (transient ischemic attack)    • UTI (urinary tract infection)    • Valvular heart disease     5/2015: mild-mod AI, mod MR, mod-severe TR.  6/2017: mild AI, mild MR, mod TR   • Vision problem    • Vitamin B 12 deficiency        Past Surgical History:   Procedure Laterality Date   • APPENDECTOMY     • BREAST BIOPSY Left     20+ yrs ago   • BREAST SURGERY     • CARDIAC  CATHETERIZATION  05/2015    nonobs LAD/RCA disease, 90% mid LCx s/p [id number] Xience   • CERVICAL FUSION  1997   • CHOLECYSTECTOMY     • CORONARY STENT PLACEMENT     • HYSTERECTOMY  1995   • KNEE SURGERY Right 2005   • KNEE SURGERY Left 1998       Social History     Socioeconomic History   • Marital status:      Spouse name: Willie   • Number of children: 3   • Years of education: Some College   • Highest education level: Not on file   Occupational History     Employer: RETIRED   Tobacco Use   • Smoking status: Current Every Day Smoker     Packs/day: 0.50     Types: Cigarettes, Electronic Cigarette   • Smokeless tobacco: Never Used   Substance and Sexual Activity   • Alcohol use: Yes     Frequency: Monthly or less     Comment: OCCASIONAL/ TWICE A YEAR.    • Drug use: No   • Sexual activity: Defer       Family History   Problem Relation Age of Onset   • Asthma Mother    • Emphysema Mother    • Graves' disease Mother    • Heart disease Mother    • Hypertension Mother    • Emphysema Father    • Hypertension Father    • Heart disease Father    • Kidney disease Sister    • Lupus Daughter         Systemic erythematosus   • Arthritis Other    • Crohn's disease Other    • Breast cancer Niece    • Breast cancer Maternal Cousin    • Breast cancer Maternal Cousin        Review of Systems   Constitution: Positive for malaise/fatigue.   Cardiovascular: Positive for chest pain, claudication, dyspnea on exertion and palpitations. Negative for leg swelling.   Respiratory: Negative for shortness of breath.    Musculoskeletal: Positive for arthritis and back pain.   Neurological: Positive for focal weakness. Negative for dizziness and light-headedness.   Psychiatric/Behavioral: Positive for depression. The patient is nervous/anxious.    All other systems reviewed and are negative.      Allergies   Allergen Reactions   • Aleve [Naproxen Sodium] Shortness Of Breath   • Codeine Other (See Comments)     hyperactivity   • Ibuprofen  "Unknown (See Comments)     unk   • Sulfa Antibiotics Unknown (See Comments)     \"I can't remember\"         Current Outpatient Medications:   •  albuterol sulfate  (90 Base) MCG/ACT inhaler, Inhale 2 puffs Every 4 (Four) Hours As Needed for Wheezing., Disp: 18 g, Rfl: 11  •  alendronate (FOSAMAX) 70 MG tablet, Take 1 tablet by mouth Every 7 (Seven) Days., Disp: 48 tablet, Rfl: 0  •  amLODIPine (NORVASC) 10 MG tablet, TAKE 1 TABLET BY MOUTH DAILY, Disp: 30 tablet, Rfl: 2  •  apixaban (ELIQUIS) 5 MG tablet tablet, Take 1 tablet by mouth Every 12 (Twelve) Hours., Disp: 180 tablet, Rfl: 1  •  atorvastatin (LIPITOR) 10 MG tablet, TAKE 1 TABLET BY MOUTH DAILY, Disp: 90 tablet, Rfl: 0  •  budesonide-formoterol (SYMBICORT) 160-4.5 MCG/ACT inhaler, Inhale 2 puffs 2 (Two) Times a Day., Disp: 1 inhaler, Rfl: 12  •  clonazePAM (KlonoPIN) 0.5 MG tablet, Take 1 tablet by mouth At Night As Needed for Anxiety., Disp: 30 tablet, Rfl: 0  •  DULoxetine (CYMBALTA) 60 MG capsule, Take 1 capsule by mouth Daily., Disp: 90 capsule, Rfl: 1  •  fluticasone (FLONASE) 50 MCG/ACT nasal spray, SPRAY TWICE IN EACH NOSTRIL EVERY DAY FOR 30 DAYS., Disp: 16 mL, Rfl: 6  •  levothyroxine (SYNTHROID, LEVOTHROID) 50 MCG tablet, TAKE 1 TABLET BY MOUTH DAILY, Disp: 90 tablet, Rfl: 1  •  lisinopril (PRINIVIL,ZESTRIL) 20 MG tablet, TAKE 1 TABLET BY MOUTH DAILY, Disp: 90 tablet, Rfl: 0  •  metoprolol tartrate (LOPRESSOR) 100 MG tablet, Take 1 tablet by mouth 2 (Two) Times a Day., Disp: 180 tablet, Rfl: 1  •  omeprazole (priLOSEC) 20 MG capsule, TAKE 1 CAPSULE BY MOUTH DAILY, Disp: 90 capsule, Rfl: 1  •  ondansetron (ZOFRAN) 8 MG tablet, Take 1 tablet by mouth Every 8 (Eight) Hours As Needed for Nausea or Vomiting., Disp: 20 tablet, Rfl: 1  •  oxybutynin (DITROPAN) 5 MG tablet, TAKE 1 TABLET BY MOUTH TWICE DAILY, Disp: 180 tablet, Rfl: 1  •  oxyCODONE-acetaminophen (PERCOCET) 7.5-325 MG per tablet, Take 1 tablet by mouth Every 8 (Eight) Hours As Needed " "for Severe Pain ., Disp: 75 tablet, Rfl: 0  •  traZODone (DESYREL) 50 MG tablet, Take 50 mg by mouth Every Night., Disp: , Rfl:     Current Facility-Administered Medications:   •  cyanocobalamin injection 1,000 mcg, 1,000 mcg, Intramuscular, Q28 Days, Donna Forte MD, 1,000 mcg at 11/14/18 1306     Objective:     Vitals:    03/10/20 1440   BP: 138/70   BP Location: Right arm   Pulse: 82   Weight: 46.3 kg (102 lb 1.6 oz)   Height: 154.9 cm (61\")     Body mass index is 19.29 kg/m².    Physical Exam   Constitutional: She is oriented to person, place, and time.   frail   HENT:   Head: Normocephalic.   Nose: Nose normal.   Mouth/Throat: Oropharynx is clear and moist.   Eyes: Pupils are equal, round, and reactive to light. Conjunctivae and EOM are normal.   Neck: Normal range of motion. No JVD present.   Cardiovascular: Normal rate and regular rhythm.  No extrasystoles are present. Exam reveals decreased pulses.   Murmur heard.   Systolic murmur is present with a grade of 2/6.   Diastolic murmur is present with a grade of 2/6 at the upper left sternal border.  Pulses:       Dorsalis pedis pulses are 1+ on the right side, and 1+ on the left side.        Posterior tibial pulses are 1+ on the right side, and 1+ on the left side.   Pulmonary/Chest: Effort normal. She has decreased breath sounds.   Diminished breath sounds   Abdominal: Soft. There is no tenderness.   Musculoskeletal: Normal range of motion. She exhibits no edema.   Neurological: She is alert and oriented to person, place, and time. No cranial nerve deficit.   Skin: Skin is warm and dry. No rash noted.   Psychiatric: She has a normal mood and affect. Her behavior is normal. Judgment and thought content normal.   Vitals reviewed.        ECG 12 Lead  Date/Time: 3/10/2020 2:46 PM  Performed by: Mo Calero MD  Authorized by: Mo Calero MD   Comparison: compared with previous ECG   Similar to previous ECG  Rhythm: sinus rhythm  Conduction: " conduction normal  ST Segments: ST segments normal  T Waves: T waves normal  QRS axis: normal  Other findings: left ventricular hypertrophy    Clinical impression: non-specific ECG              Assessment:       Diagnosis Plan   1. PAF (paroxysmal atrial fibrillation) (CMS/[facility])     2. Coronary artery disease involving native coronary artery of native heart with angina pectoris (CMS/HCC)     3. Essential hypertension     4. Valvular heart disease     5. Peripheral arterial occlusive disease (CMS/HCC)     6. Thoracoabdominal aortic aneurysm (TAAA) without rupture (CMS/[facility])     7. 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  Atrial Fibrillation and Atrial Flutter  Assessment  • The patient has paroxysmal atrial fibrillation  • This is non-valvular in etiology  • The patient's CHADS2-VASc score is 5  • A [id number] score of 2 or more is considered a high risk for a thromboembolic event  • Apixaban prescribed    Plan  • Attempt to maintain sinus rhythm  • Continue apixaban for antithrombotic therapy, bleeding issues discussed  • Continue beta blocker for rhythm control    2.  Coronary Artery Disease  Assessment  • The patient has no angina  • She is no longer on aspirin or clopidogrel as she's anticoagulated and had bad epistaxis.  She denies angina.    Subjective - Objective  • There has been a previous stent procedure using THELMA 2015        3.  Her BP is stable, especially for her frail size.    4.  An echo in 2019 showed mild-moderate AI, severe MR, and severe TR.  Unfortunately, she is not a candidate for intervention due to her frailty and comorbidities.  I will repeat an echo before our next visit.     5/6/7/8.  She has mesenteric disease, renal artery stenosis, carotid artery disease, a small AAA (3.8cm in May 2018), and PAD.  She's on atorvastatin.  Unfortunately she still smokes. She did see Dr Zhang last year but didn't follow up with  testing or visits.      Sincerely,       Mo Calero MD

## 2020-03-17 RX ORDER — METOPROLOL TARTRATE 100 MG/1
100 TABLET ORAL 2 TIMES DAILY
Qty: 180 TABLET | Refills: 1 | Status: SHIPPED | OUTPATIENT
Start: 2020-03-17 | End: 2021-03-02

## 2020-03-17 NOTE — TELEPHONE ENCOUNTER
I can call PT with results if ok with MD  
PATIENT MISSED HER APPOINTMENT THIS MORNING. SHE WOULD PREFER TO BE CALLED, SO SHE DOESN'T HAVE TO COME IN.  
PT advised as per message below PT voiced understanding and needed a refill on metoprolol  
Pleas be advised  
Pt doesn't have any new lab results.  Check on pt. See if pt needs refills.  I will see pt for any acute issue but will hold off on rountine f/u appt due to changes with covid-19 procedures.     
medical induction 41weeks  ftnsvd

## 2020-03-18 ENCOUNTER — TELEPHONE (OUTPATIENT)
Dept: INTERNAL MEDICINE | Facility: CLINIC | Age: 80
End: 2020-03-18

## 2020-03-18 NOTE — TELEPHONE ENCOUNTER
Patient thinks there might be something else going on with her back. The pain pills aren't helping at all. She is asking to come in, or do UTI visit. She thinks that the pain might be from a UTI? She said the pain she is having now is waist high on left side? She is more concerned because she only has the one kidney.    Does dr rodriguez want to see her or just a nurse visit.  Please call her [phone]

## 2020-03-20 ENCOUNTER — OFFICE VISIT (OUTPATIENT)
Dept: INTERNAL MEDICINE | Facility: CLINIC | Age: 80
End: 2020-03-20

## 2020-03-20 VITALS
WEIGHT: 102 LBS | RESPIRATION RATE: 16 BRPM | SYSTOLIC BLOOD PRESSURE: 128 MMHG | BODY MASS INDEX: 19.27 KG/M2 | HEART RATE: 89 BPM | DIASTOLIC BLOOD PRESSURE: 88 MMHG | OXYGEN SATURATION: 96 % | TEMPERATURE: 97.3 F

## 2020-03-20 DIAGNOSIS — M54.50 CHRONIC LOW BACK PAIN WITHOUT SCIATICA, UNSPECIFIED BACK PAIN LATERALITY: ICD-10-CM

## 2020-03-20 DIAGNOSIS — F41.9 ANXIETY AND DEPRESSION: ICD-10-CM

## 2020-03-20 DIAGNOSIS — T40.2X5A THERAPEUTIC OPIOID-INDUCED CONSTIPATION (OIC): Primary | ICD-10-CM

## 2020-03-20 DIAGNOSIS — G89.29 CHRONIC LOW BACK PAIN WITHOUT SCIATICA, UNSPECIFIED BACK PAIN LATERALITY: ICD-10-CM

## 2020-03-20 DIAGNOSIS — M54.50 LUMBAR BACK PAIN: ICD-10-CM

## 2020-03-20 DIAGNOSIS — I10 ESSENTIAL HYPERTENSION: ICD-10-CM

## 2020-03-20 DIAGNOSIS — F32.A ANXIETY AND DEPRESSION: ICD-10-CM

## 2020-03-20 DIAGNOSIS — I48.0 PAF (PAROXYSMAL ATRIAL FIBRILLATION) (HCC): ICD-10-CM

## 2020-03-20 DIAGNOSIS — K59.03 THERAPEUTIC OPIOID-INDUCED CONSTIPATION (OIC): Primary | ICD-10-CM

## 2020-03-20 PROCEDURE — 99214 OFFICE O/P EST MOD 30 MIN: CPT | Performed by: INTERNAL MEDICINE

## 2020-03-20 RX ORDER — AMOXICILLIN 250 MG
2 CAPSULE ORAL DAILY
Qty: 60 TABLET | Refills: 5 | Status: SHIPPED | OUTPATIENT
Start: 2020-03-20 | End: 2020-11-16

## 2020-03-20 RX ORDER — OXYCODONE AND ACETAMINOPHEN 7.5; 325 MG/1; MG/1
1 TABLET ORAL EVERY 8 HOURS PRN
Qty: 75 TABLET | Refills: 0 | Status: SHIPPED | OUTPATIENT
Start: 2020-03-20 | End: 2020-04-17 | Stop reason: SDUPTHER

## 2020-03-20 RX ORDER — CLONAZEPAM 0.5 MG/1
0.5 TABLET ORAL NIGHTLY PRN
Qty: 30 TABLET | Refills: 2 | Status: SHIPPED | OUTPATIENT
Start: 2020-03-20 | End: 2020-04-17 | Stop reason: SDUPTHER

## 2020-03-20 NOTE — PROGRESS NOTES
Kaylin Ojeda is a 80 y.o. female, who presents with a chief complaint of   Chief Complaint   Patient presents with   • Back Pain   • Constipation       HPI   Pt here for follow up.  Since her last appt her daughter has .  Her granddaughter and her family are living in her apartment.  She is doing the best she can to manage things.    HTN - well controlled at this time.  No ha/dizzines    Chronic back pain - Her back pain has recently flared.  She was down to 1 norco a day but is taking more now.  She also has some constipation.      PAF/CAD - pt recently had f/u with dr. Calero.  She has been doing well. No chest pain.  She is supposed to have f/u echo before her next cards appt.    Prediabetes - due for labs in .  She is trying to eat a healthy diet     Hypothyroidism - no hair/skin changes.     hld - on statin.  No cramps or myalgias.     The following portions of the patient's history were reviewed and updated as appropriate: allergies, current medications, past family history, past medical history, past social history, past surgical history and problem list.    Allergies: Aleve [naproxen sodium]; Codeine; Ibuprofen; and Sulfa antibiotics    Review of Systems   Constitutional: Negative.    HENT: Negative.    Eyes: Negative.    Respiratory: Negative.    Cardiovascular: Negative.    Gastrointestinal: Negative.    Endocrine: Negative.    Genitourinary: Negative.    Musculoskeletal: Positive for back pain.   Skin: Negative.    Allergic/Immunologic: Negative.    Neurological: Negative.    Hematological: Negative.    Psychiatric/Behavioral: Negative.    All other systems reviewed and are negative.            Wt Readings from Last 3 Encounters:   20 46.3 kg (102 lb)   03/10/20 46.3 kg (102 lb 1.6 oz)   20 46.9 kg (103 lb 6.4 oz)     Temp Readings from Last 3 Encounters:   20 97.3 °F (36.3 °C) (Oral)   20 97.9 °F (36.6 °C) (Oral)   19 97.8 °F (36.6 °C) (Oral)     BP Readings  from Last 3 Encounters:   03/20/20 128/88   03/10/20 138/70   01/28/20 130/80     Pulse Readings from Last 3 Encounters:   03/20/20 89   03/10/20 82   01/28/20 90     Body mass index is 19.27 kg/m².  @LASTSAO2(3)@    Physical Exam   Constitutional: She is oriented to person, place, and time. She appears well-developed and well-nourished. No distress.   HENT:   Head: Normocephalic and atraumatic.   Right Ear: External ear normal.   Left Ear: External ear normal.   Nose: Nose normal.   Mouth/Throat: Oropharynx is clear and moist.   Eyes: Pupils are equal, round, and reactive to light. Conjunctivae and EOM are normal.   Neck: Normal range of motion. Neck supple.   Cardiovascular: Normal rate, regular rhythm, normal heart sounds and intact distal pulses.   Pulmonary/Chest: Effort normal and breath sounds normal. No respiratory distress. She has no wheezes.   Musculoskeletal: She exhibits no edema.   Normal gait   Neurological: She is alert and oriented to person, place, and time.   Skin: Skin is warm and dry.   Psychiatric: She has a normal mood and affect. Her behavior is normal. Judgment and thought content normal.   Nursing note and vitals reviewed.      Results for orders placed or performed during the hospital encounter of 12/27/19   Troponin   Result Value Ref Range    Troponin T <0.010 0.000-<0.030 ng/mL   BNP   Result Value Ref Range    proBNP 923.4 5.0-1,800.0 pg/mL           Kaylin was seen today for back pain and constipation.    Diagnoses and all orders for this visit:    Therapeutic opioid-induced constipation (OIC)  -     sennosides-docusate (senna-docusate sodium) 8.6-50 MG per tablet; Take 2 tablets by mouth Daily.    Chronic low back pain without sciatica, unspecified back pain laterality  -     oxyCODONE-acetaminophen (Percocet) 7.5-325 MG per tablet; Take 1 tablet by mouth Every 8 (Eight) Hours As Needed for Severe Pain .    Anxiety and depression - recent exacerbation of sx since pt's daughter    Con duloxetine and refill clonazepam.    -     clonazePAM (KlonoPIN) 0.5 MG tablet; Take 1 tablet by mouth At Night As Needed for Anxiety.    Essential hypertension    PAF (paroxysmal atrial fibrillation) (CMS/HCC)    Lumbar back pain  -     oxyCODONE-acetaminophen (Percocet) 7.5-325 MG per tablet; Take 1 tablet by mouth Every 8 (Eight) Hours As Needed for Severe Pain .      Cont chronic meds.  Ov/labs in 3 mo.      Outpatient Medications Prior to Visit   Medication Sig Dispense Refill   • albuterol sulfate  (90 Base) MCG/ACT inhaler Inhale 2 puffs Every 4 (Four) Hours As Needed for Wheezing. 18 g 11   • alendronate (FOSAMAX) 70 MG tablet Take 1 tablet by mouth Every 7 (Seven) Days. 48 tablet 0   • amLODIPine (NORVASC) 10 MG tablet TAKE 1 TABLET BY MOUTH DAILY 30 tablet 2   • apixaban (ELIQUIS) 5 MG tablet tablet Take 1 tablet by mouth Every 12 (Twelve) Hours. 180 tablet 1   • atorvastatin (LIPITOR) 10 MG tablet TAKE 1 TABLET BY MOUTH DAILY 90 tablet 0   • budesonide-formoterol (SYMBICORT) 160-4.5 MCG/ACT inhaler Inhale 2 puffs 2 (Two) Times a Day. 1 inhaler 12   • DULoxetine (CYMBALTA) 60 MG capsule Take 1 capsule by mouth Daily. 90 capsule 1   • fluticasone (FLONASE) 50 MCG/ACT nasal spray SPRAY TWICE IN EACH NOSTRIL EVERY DAY FOR 30 DAYS. 16 mL 6   • levothyroxine (SYNTHROID, LEVOTHROID) 50 MCG tablet TAKE 1 TABLET BY MOUTH DAILY 90 tablet 1   • lisinopril (PRINIVIL,ZESTRIL) 20 MG tablet TAKE 1 TABLET BY MOUTH DAILY 90 tablet 0   • metoprolol tartrate (LOPRESSOR) 100 MG tablet Take 1 tablet by mouth 2 (Two) Times a Day. 180 tablet 1   • omeprazole (priLOSEC) 20 MG capsule TAKE 1 CAPSULE BY MOUTH DAILY 90 capsule 1   • ondansetron (ZOFRAN) 8 MG tablet Take 1 tablet by mouth Every 8 (Eight) Hours As Needed for Nausea or Vomiting. 20 tablet 1   • oxybutynin (DITROPAN) 5 MG tablet TAKE 1 TABLET BY MOUTH TWICE DAILY 180 tablet 1   • traZODone (DESYREL) 50 MG tablet Take 50 mg by mouth Every Night.     •  clonazePAM (KlonoPIN) 0.5 MG tablet Take 1 tablet by mouth At Night As Needed for Anxiety. 30 tablet 0   • oxyCODONE-acetaminophen (PERCOCET) 7.5-325 MG per tablet Take 1 tablet by mouth Every 8 (Eight) Hours As Needed for Severe Pain . 75 tablet 0     Facility-Administered Medications Prior to Visit   Medication Dose Route Frequency Provider Last Rate Last Dose   • cyanocobalamin injection 1,000 mcg  1,000 mcg Intramuscular Q28 Days Donna Forte MD   1,000 mcg at 11/14/18 1306     New Medications Ordered This Visit   Medications   • sennosides-docusate (senna-docusate sodium) 8.6-50 MG per tablet     Sig: Take 2 tablets by mouth Daily.     Dispense:  60 tablet     Refill:  5   • oxyCODONE-acetaminophen (Percocet) 7.5-325 MG per tablet     Sig: Take 1 tablet by mouth Every 8 (Eight) Hours As Needed for Severe Pain .     Dispense:  75 tablet     Refill:  0   • clonazePAM (KlonoPIN) 0.5 MG tablet     Sig: Take 1 tablet by mouth At Night As Needed for Anxiety.     Dispense:  30 tablet     Refill:  2     [unfilled]  Medications Discontinued During This Encounter   Medication Reason   • oxyCODONE-acetaminophen (PERCOCET) 7.5-325 MG per tablet Reorder   • clonazePAM (KlonoPIN) 0.5 MG tablet Reorder         Return in about 3 months (around 6/20/2020) for Recheck, labs.

## 2020-04-01 DIAGNOSIS — I10 ESSENTIAL HYPERTENSION: ICD-10-CM

## 2020-04-01 DIAGNOSIS — F41.9 ANXIETY: ICD-10-CM

## 2020-04-01 DIAGNOSIS — G89.4 CHRONIC PAIN SYNDROME: ICD-10-CM

## 2020-04-01 RX ORDER — LISINOPRIL 20 MG/1
20 TABLET ORAL DAILY
Qty: 90 TABLET | Refills: 0 | Status: SHIPPED | OUTPATIENT
Start: 2020-04-01 | End: 2020-07-06

## 2020-04-01 RX ORDER — DULOXETIN HYDROCHLORIDE 60 MG/1
60 CAPSULE, DELAYED RELEASE ORAL DAILY
Qty: 90 CAPSULE | Refills: 1 | Status: SHIPPED | OUTPATIENT
Start: 2020-04-01 | End: 2020-11-16

## 2020-04-17 DIAGNOSIS — F32.A ANXIETY AND DEPRESSION: ICD-10-CM

## 2020-04-17 DIAGNOSIS — M54.50 LUMBAR BACK PAIN: ICD-10-CM

## 2020-04-17 DIAGNOSIS — G89.29 CHRONIC LOW BACK PAIN WITHOUT SCIATICA, UNSPECIFIED BACK PAIN LATERALITY: ICD-10-CM

## 2020-04-17 DIAGNOSIS — F41.9 ANXIETY AND DEPRESSION: ICD-10-CM

## 2020-04-17 DIAGNOSIS — M54.50 CHRONIC LOW BACK PAIN WITHOUT SCIATICA, UNSPECIFIED BACK PAIN LATERALITY: ICD-10-CM

## 2020-04-20 ENCOUNTER — TELEPHONE (OUTPATIENT)
Dept: INTERNAL MEDICINE | Facility: CLINIC | Age: 80
End: 2020-04-20

## 2020-04-20 RX ORDER — OXYCODONE AND ACETAMINOPHEN 7.5; 325 MG/1; MG/1
1 TABLET ORAL EVERY 8 HOURS PRN
Qty: 75 TABLET | Refills: 0 | Status: SHIPPED | OUTPATIENT
Start: 2020-04-20 | End: 2020-05-18 | Stop reason: SDUPTHER

## 2020-04-20 RX ORDER — CLONAZEPAM 0.5 MG/1
0.5 TABLET ORAL NIGHTLY PRN
Qty: 30 TABLET | Refills: 2 | Status: SHIPPED | OUTPATIENT
Start: 2020-04-20 | End: 2020-05-18 | Stop reason: SDUPTHER

## 2020-04-21 DIAGNOSIS — K55.1 MESENTERIC ARTERY STENOSIS (HCC): ICD-10-CM

## 2020-04-21 DIAGNOSIS — E78.00 HYPERCHOLESTEROLEMIA: ICD-10-CM

## 2020-04-21 DIAGNOSIS — I65.23 OBSTRUCTION OF CAROTID ARTERY, BILATERAL: ICD-10-CM

## 2020-04-21 RX ORDER — ATORVASTATIN CALCIUM 10 MG/1
10 TABLET, FILM COATED ORAL DAILY
Qty: 90 TABLET | Refills: 3 | Status: SHIPPED | OUTPATIENT
Start: 2020-04-21 | End: 2021-03-02

## 2020-05-18 DIAGNOSIS — M54.50 LUMBAR BACK PAIN: ICD-10-CM

## 2020-05-18 DIAGNOSIS — F32.A ANXIETY AND DEPRESSION: ICD-10-CM

## 2020-05-18 DIAGNOSIS — G89.29 CHRONIC LOW BACK PAIN WITHOUT SCIATICA, UNSPECIFIED BACK PAIN LATERALITY: ICD-10-CM

## 2020-05-18 DIAGNOSIS — M54.50 CHRONIC LOW BACK PAIN WITHOUT SCIATICA, UNSPECIFIED BACK PAIN LATERALITY: ICD-10-CM

## 2020-05-18 DIAGNOSIS — F41.9 ANXIETY AND DEPRESSION: ICD-10-CM

## 2020-05-18 NOTE — TELEPHONE ENCOUNTER
PATIENT CALLING IN FOR REFILL    clonazePAM (KlonoPIN) 0.5 MG tablet    oxyCODONE-acetaminophen (Percocet) 7.5-325 MG per tablet    PATIENT HAS 2 PILL LEFT OF THE OXY.    PATIENT ALSO NEEDS MORE OF THE BLOOD THINNER SHE NORMALLY GETS SAMPLES OF ELOQUIST.    [id number] [place] [address] [facility] [address] [facility] [address] - [phone]  - [phone]     PATIENT ALSO WANTS TO KNOW WHEN SHE NEEDS TO COME IN FOR THE APPOINTMENT SHE CANCELLED DUE TO COVID19.    PATIENT CONTACT NUMBER -[phone]

## 2020-05-19 RX ORDER — OXYCODONE AND ACETAMINOPHEN 7.5; 325 MG/1; MG/1
1 TABLET ORAL EVERY 8 HOURS PRN
Qty: 75 TABLET | Refills: 0 | Status: SHIPPED | OUTPATIENT
Start: 2020-05-19 | End: 2020-06-18 | Stop reason: SDUPTHER

## 2020-05-19 RX ORDER — CLONAZEPAM 0.5 MG/1
0.5 TABLET ORAL NIGHTLY PRN
Qty: 30 TABLET | Refills: 2 | Status: SHIPPED | OUTPATIENT
Start: 2020-05-19 | End: 2020-06-18 | Stop reason: SDUPTHER

## 2020-06-13 ENCOUNTER — HOSPITAL ENCOUNTER (EMERGENCY)
Facility: HOSPITAL | Age: 80
Discharge: HOME | End: 2020-06-13
Attending: EMERGENCY MEDICINE | Admitting: EMERGENCY MEDICINE

## 2020-06-13 ENCOUNTER — APPOINTMENT (OUTPATIENT)
Dept: GENERAL RADIOLOGY | Facility: HOSPITAL | Age: 80
End: 2020-06-13

## 2020-06-13 VITALS
RESPIRATION RATE: 16 BRPM | BODY MASS INDEX: 19.83 KG/M2 | WEIGHT: 105 LBS | HEART RATE: 56 BPM | SYSTOLIC BLOOD PRESSURE: 126 MMHG | HEIGHT: 61 IN | TEMPERATURE: 97.6 F | OXYGEN SATURATION: 94 % | DIASTOLIC BLOOD PRESSURE: 77 MMHG

## 2020-06-13 DIAGNOSIS — J06.9 VIRAL UPPER RESPIRATORY TRACT INFECTION WITH COUGH: ICD-10-CM

## 2020-06-13 DIAGNOSIS — J43.9 PULMONARY EMPHYSEMA, UNSPECIFIED EMPHYSEMA TYPE (HCC): Primary | ICD-10-CM

## 2020-06-13 LAB
FLUAV AG NPH QL: NEGATIVE
FLUBV AG NPH QL IA: NEGATIVE

## 2020-06-13 PROCEDURE — 71045 X-RAY EXAM CHEST 1 VIEW: CPT

## 2020-06-13 PROCEDURE — 99283 EMERGENCY DEPT VISIT LOW MDM: CPT

## 2020-06-13 PROCEDURE — 99284 EMERGENCY DEPT VISIT MOD MDM: CPT | Performed by: EMERGENCY MEDICINE

## 2020-06-13 PROCEDURE — U0003 INFECTIOUS AGENT DETECTION BY NUCLEIC ACID (DNA OR RNA); SEVERE ACUTE RESPIRATORY SYNDROME CORONAVIRUS 2 (SARS-COV-2) (CORONAVIRUS DISEASE [COVID-19]), AMPLIFIED PROBE TECHNIQUE, MAKING USE OF HIGH THROUGHPUT TECHNOLOGIES AS DESCRIBED BY CMS-2020-01-R: HCPCS | Performed by: EMERGENCY MEDICINE

## 2020-06-13 PROCEDURE — 87804 INFLUENZA ASSAY W/OPTIC: CPT | Performed by: EMERGENCY MEDICINE

## 2020-06-13 NOTE — ED PROVIDER NOTES
Subjective     History provided by:  Patient    History of Present Illness    · Chief complaint: Upper respiratory tract symptoms    · Location: Upper respiratory tract.    · Quality/Severity: Sore throat, cough productive of green sputum, slight runny nose.  Mild shortness of breath.    · Timing/Onset: Started 2 weeks ago.  Cough worse at night.    · Modifying Factors: Patient unaware of aggravating or relieving factors.    · Associated symptoms: Patient denies any fever or change in taste.  Denies diarrhea.    · Narrative: The patient is a 80-year-old white female complaining of a 2-week history of upper respiratory tract symptoms as mentioned above.  She has a history of COPD secondary to smoking and quit smoking 2 weeks ago when symptoms started.  She is unaware of any new exposures to COVID.    Review of Systems   Constitutional: Negative for activity change, appetite change, chills, diaphoresis, fatigue and fever.   HENT: Positive for congestion, rhinorrhea and sore throat. Negative for dental problem, ear pain, hearing loss, mouth sores, postnasal drip, sinus pressure and voice change.    Eyes: Negative for photophobia, pain, discharge, redness and visual disturbance.   Respiratory: Positive for cough and shortness of breath. Negative for chest tightness, wheezing and stridor.    Cardiovascular: Negative for chest pain, palpitations and leg swelling.   Gastrointestinal: Negative for abdominal pain, diarrhea, nausea and vomiting.   Genitourinary: Negative for difficulty urinating, dysuria, flank pain, frequency, hematuria and urgency.   Musculoskeletal: Negative for arthralgias, back pain, gait problem, joint swelling, myalgias, neck pain and neck stiffness.   Skin: Negative for color change and rash.   Neurological: Negative for dizziness, tremors, seizures, syncope, facial asymmetry, speech difficulty, weakness, light-headedness, numbness and headaches.   Hematological: Negative for adenopathy.  "  Psychiatric/Behavioral: Negative.  Negative for confusion and decreased concentration. The patient is not nervous/anxious.      Past Medical History:   Diagnosis Date   • Abdominal pain, epigastric     Chronic, unclear cause, improved   • Anorexia    • Anxiety    • Arthritis    • CAD (coronary artery disease)     Cath 5/2015: nonobstructive LAD/RCA disease, 90% mid LCx s/p [id number] Xience   • Cellulitis of leg    • Cervical disc disease    • Chronic fatigue    • Colitis    • COPD (chronic obstructive pulmonary disease) (CMS/[facility])    • Depression    • Erosive gastritis    • Fibromyalgia    • Frequency of urination 6/9/2017   • Gastritis    • Hypercholesterolemia 2/2/2016   • Hyperglycemia    • Hypertension     History of refractory hypertension which improved significantly   • Insomnia    • Leukocytes in urine    • Lower back pain    • Mediastinal lymphadenopathy    • 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[facility])    • Mononucleosis    • Occlusion and stenosis of carotid artery    • Onychomycosis    • Orbit fracture (CMS/[facility])    • PAF (paroxysmal atrial fibrillation) (CMS/[facility])    • Peripheral arterial disease (CMS/[facility])     Significant (carotid, subclavian, lower extremities), with claudication, medical therapy only   • Pernicious anemia    • Prediabetes    • Renal artery stenosis (CMS/[facility])     unilateral, with renal atrophy (no intervention required)   • Renal calculi    • Sinus bradycardia     mild, asymptomatic   • TIA (transient ischemic attack)    • UTI (urinary tract infection)    • Valvular heart disease     5/2015: mild-mod AI, mod MR, mod-severe TR.  6/2017: mild AI, mild MR, mod TR   • Vision problem    • Vitamin B 12 deficiency      /77   Pulse 56   Temp 97.6 °F (36.4 °C) (Temporal)   Resp 16   Ht 154.9 cm (61\")   Wt 47.6 kg (105 lb)   SpO2 94%   BMI 19.84 kg/m²     Past Medical History:   Diagnosis Date   • Abdominal pain, epigastric     Chronic, unclear cause, improved   • Anorexia    • Anxiety    • " "Arthritis    • CAD (coronary artery disease)     Cath 5/2015: nonobstructive LAD/RCA disease, 90% mid LCx s/p 2.20x74kr Xience   • Cellulitis of leg    • Cervical disc disease    • Chronic fatigue    • Colitis    • COPD (chronic obstructive pulmonary disease) (CMS/[facility])    • Depression    • Erosive gastritis    • Fibromyalgia    • Frequency of urination 6/9/2017   • Gastritis    • Hypercholesterolemia 2/2/2016   • Hyperglycemia    • Hypertension     History of refractory hypertension which improved significantly   • Insomnia    • Leukocytes in urine    • Lower back pain    • Mediastinal lymphadenopathy    • 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[facility])    • Mononucleosis    • Occlusion and stenosis of carotid artery    • Onychomycosis    • Orbit fracture (CMS/[facility])    • PAF (paroxysmal atrial fibrillation) (CMS/[facility])    • Peripheral arterial disease (CMS/[facility])     Significant (carotid, subclavian, lower extremities), with claudication, medical therapy only   • Pernicious anemia    • Prediabetes    • Renal artery stenosis (CMS/[facility])     unilateral, with renal atrophy (no intervention required)   • Renal calculi    • Sinus bradycardia     mild, asymptomatic   • TIA (transient ischemic attack)    • UTI (urinary tract infection)    • Valvular heart disease     5/2015: mild-mod AI, mod MR, mod-severe TR.  6/2017: mild AI, mild MR, mod TR   • Vision problem    • Vitamin B 12 deficiency        Allergies   Allergen Reactions   • Aleve [Naproxen Sodium] Shortness Of Breath   • Codeine Other (See Comments)     hyperactivity   • Ibuprofen Unknown (See Comments)     unk   • Sulfa Antibiotics Unknown (See Comments)     \"I can't remember\"       Past Surgical History:   Procedure Laterality Date   • APPENDECTOMY     • BREAST BIOPSY Left     20+ yrs ago   • BREAST SURGERY     • CARDIAC CATHETERIZATION  05/2015    nonobs LAD/RCA disease, 90% mid LCx s/p 2.90k45ws Xience   • CERVICAL FUSION  1997   • CHOLECYSTECTOMY     • CORONARY STENT PLACEMENT  "    • HYSTERECTOMY  1995   • KNEE SURGERY Right 2005   • KNEE SURGERY Left 1998       Family History   Problem Relation Age of Onset   • Asthma Mother    • Emphysema Mother    • Graves' disease Mother    • Heart disease Mother    • Hypertension Mother    • Emphysema Father    • Hypertension Father    • Heart disease Father    • Kidney disease Sister    • Lupus Daughter         Systemic erythematosus   • Arthritis Other    • Crohn's disease Other    • Breast cancer Niece    • Breast cancer Maternal Cousin    • Breast cancer Maternal Cousin        Social History     Socioeconomic History   • Marital status:      Spouse name: Willie   • Number of children: 3   • Years of education: Some College   • Highest education level: Not on file   Occupational History     Employer: RETIRED   Tobacco Use   • Smoking status: Former Smoker     Packs/day: 0.50     Types: Cigarettes, Electronic Cigarette   • Smokeless tobacco: Never Used   • Tobacco comment: quit x2 weeks ago   Substance and Sexual Activity   • Alcohol use: Yes     Frequency: Monthly or less     Comment: OCCASIONAL/ TWICE A YEAR.    • Drug use: No   • Sexual activity: Defer           Objective   Physical Exam   Constitutional: She is oriented to person, place, and time. She appears well-developed and well-nourished. No distress.   The patient has a thin build and appears older than her stated age.  She does not appear acutely ill or in any distress.  Review of her vital signs: She is afebrile with a temperature 97.6, respirations normal 18 with a normal oxygen saturation of 97% on room air, heart rate normal 56, blood pressure normal 122/70.   HENT:   Head: Normocephalic and atraumatic.   Nose: Nose normal.   Mouth/Throat: Oropharynx is clear and moist. No oropharyngeal exudate.   Eyes: Pupils are equal, round, and reactive to light. EOM are normal. Right eye exhibits no discharge. Left eye exhibits no discharge. No scleral icterus.   Neck: Normal range of  motion. Neck supple. No JVD present. No thyromegaly present.   Cardiovascular: Normal rate, regular rhythm and normal heart sounds.   No murmur heard.  Pulmonary/Chest: Effort normal and breath sounds normal. No stridor. No respiratory distress. She has no wheezes. She has no rales. She exhibits no tenderness.   Distant breath sounds consistent with COPD.   Abdominal: Soft. Bowel sounds are normal. She exhibits no distension. There is no tenderness.   Musculoskeletal: Normal range of motion. She exhibits no edema, tenderness or deformity.   Lymphadenopathy:     She has no cervical adenopathy.   Neurological: She is alert and oriented to person, place, and time. No cranial nerve deficit. Coordination normal.   No focal motor sensory deficit   Skin: Skin is warm and dry. Capillary refill takes less than 2 seconds. No rash noted. She is not diaphoretic.   Psychiatric: She has a normal mood and affect. Her behavior is normal. Judgment and thought content normal.   Nursing note and vitals reviewed.      Procedures           ED Course  ED Course as of Jun 13 1832   Sat Jun 13, 2020 1811 The patient's chest x-ray was interpreted by me and the radiologist as background COPD, but no acute cardiopulmonary findings.  Her influenza screen is negative.  COVID-19 test is pending via [facility].  The patient does not appear acutely ill and has not coughed at all while in the ER and does not have a fever.  My gut feeling is she has COPD and an underlying viral URI, but I doubt she has COVID-19.  In any event she will be instructed to self quarantine until her COVID-19 results are back.    [TP]      ED Course User Index  [TP] Butch Lee MD                                           MDM  Number of Diagnoses or Management Options  Pulmonary emphysema, unspecified emphysema type (CMS/HCC): new and requires workup  Viral upper respiratory tract infection with cough: new and requires workup     Amount and/or Complexity of Data  Reviewed  Clinical lab tests: ordered and reviewed  Tests in the radiology section of CPT®: ordered and reviewed  Independent visualization of images, tracings, or specimens: yes    Risk of Complications, Morbidity, and/or Mortality  Presenting problems: moderate  Diagnostic procedures: high  Management options: moderate  General comments: My differential diagnosis for cough includes but is not limited to:  Upper respiratory infection, bronchitis, pneumonia, COPD exacerbation, cough variant asthma, cardiac asthma, coronary artery disease, congestive heart failure, bacterial, viral or lung infections, lung cancer, aspiration pneumonitis, aspiration of foreign body      Patient Progress  Patient progress: stable      Final diagnoses:   Pulmonary emphysema, unspecified emphysema type (CMS/[facility])   Viral upper respiratory tract infection with cough           Labs Reviewed   INFLUENZA ANTIGEN, RAPID - Normal   COVID-19,[facility],NP/OP SWAB IN TRANSPORT MEDIA OR ESWAB 72 HR TAT    Narrative:     The following orders were created for panel order COVID-19,[facility],NP/OP Swab in Transport Media or ESwab 72 HR TAT - Swab, Nasopharynx.  Procedure                               Abnormality         Status                     ---------                               -----------         ------                     COVID-19,[facility] ROUTINE...[[id number]]                      In process                   Please view results for these tests on the individual orders.   COVID-19,([facility] ROUTINE) NP/OP SWAB IN TRANSPORT MEDIA OR ESWAB 72 HR TAT     XR Chest 1 View   ED Interpretation   No acute cardiopulmonary findings.      Signer Name: Benoit Mondragon MD    Signed: 6/13/2020 6:08 PM    Workstation Name: [email or web address]     [facility]      Final Result   No acute cardiopulmonary findings.      Signer Name: Benoit Mondragon MD    Signed: 6/13/2020 6:08 PM    Workstation Name: [facility]     [facility]              Medication List      No changes were made to your prescriptions during this visit.            Butch Lee MD  06/13/20 [id number]

## 2020-06-15 ENCOUNTER — EPISODE CHANGES (OUTPATIENT)
Dept: CASE MANAGEMENT | Facility: OTHER | Age: 80
End: 2020-06-15

## 2020-06-15 ENCOUNTER — PATIENT OUTREACH (OUTPATIENT)
Dept: CASE MANAGEMENT | Facility: OTHER | Age: 80
End: 2020-06-15

## 2020-06-15 NOTE — OUTREACH NOTE
ED Potential Covid Discharge Follow-up  Talked with patient. Discussed 6/13/20 ED visit regarding pulmonary emphysema and URI.  Patient awaiting COVID 19 test results. Patient compliant with ED recommendations and under quarantine. She states she will make PCP appointment following results of COVID test.   Patient reports symptoms of: Cough especially at night.Episodes of SOB; difficulty with fatigue;appetite and sleeping which she has had in the past.  No difficulty with rever of chest pain.   Patient reports no barriers in obtaining : food; medication and has transportation.   Patient lives with family(daughter) ; independent with ADL's; compliant with medications and monitoring blood pressure (WNL's).   Reviewed with patient: ED recommendations; quarantine education; education regarding being free of fever for 72 hours without use of Tylenol; hydration;  affordability of food; medications; transportation; 24/7 Nurse Triage Line; COVID 19 information telephone line; ACM contact information;  My Chart; and Telehealth appointment availability.Patient verbalized understanding. She states to appreciate phone call and requests call back in a couple of days.  No further questions or concerns voiced at this time.       Ree Dowling RN  Ambulatory     6/15/2020, 16:49

## 2020-06-16 LAB — SARS-COV-2 RNA RESP QL NAA+PROBE: NOT DETECTED

## 2020-06-17 ENCOUNTER — EPISODE CHANGES (OUTPATIENT)
Dept: CASE MANAGEMENT | Facility: OTHER | Age: 80
End: 2020-06-17

## 2020-06-17 ENCOUNTER — TELEPHONE (OUTPATIENT)
Dept: INTERNAL MEDICINE | Facility: CLINIC | Age: 80
End: 2020-06-17

## 2020-06-17 DIAGNOSIS — G89.29 CHRONIC LOW BACK PAIN WITHOUT SCIATICA, UNSPECIFIED BACK PAIN LATERALITY: ICD-10-CM

## 2020-06-17 DIAGNOSIS — M54.50 LUMBAR BACK PAIN: ICD-10-CM

## 2020-06-17 DIAGNOSIS — M54.50 CHRONIC LOW BACK PAIN WITHOUT SCIATICA, UNSPECIFIED BACK PAIN LATERALITY: ICD-10-CM

## 2020-06-17 DIAGNOSIS — F41.9 ANXIETY AND DEPRESSION: ICD-10-CM

## 2020-06-17 DIAGNOSIS — F32.A ANXIETY AND DEPRESSION: ICD-10-CM

## 2020-06-17 RX ORDER — CLONAZEPAM 0.5 MG/1
0.5 TABLET ORAL NIGHTLY PRN
Qty: 30 TABLET | Refills: 2 | Status: CANCELLED | OUTPATIENT
Start: 2020-06-17

## 2020-06-17 RX ORDER — OXYCODONE AND ACETAMINOPHEN 7.5; 325 MG/1; MG/1
1 TABLET ORAL EVERY 8 HOURS PRN
Qty: 75 TABLET | Refills: 0 | Status: CANCELLED | OUTPATIENT
Start: 2020-06-17

## 2020-06-17 NOTE — TELEPHONE ENCOUNTER
Patient calling and states she was at the hospital on Sunday for upper respiratory symptoms. States they ran a Covid test on her and she is self quarantining. Would like to know if those results are in. She was told she had a viral infection. Would also like to schedule a hospital follow up as well. Please advise on this.    Also needs refill on:  - oxyCODONE-acetaminophen (Percocet) 7.5-325 MG per tablet  - clonazePAM (KlonoPIN) 0.5 MG tablet    Verified [facility] on [address].    Patient call back is [phone].

## 2020-06-18 ENCOUNTER — PATIENT OUTREACH (OUTPATIENT)
Dept: CASE MANAGEMENT | Facility: OTHER | Age: 80
End: 2020-06-18

## 2020-06-18 DIAGNOSIS — F32.A ANXIETY AND DEPRESSION: ICD-10-CM

## 2020-06-18 DIAGNOSIS — M54.50 CHRONIC LOW BACK PAIN WITHOUT SCIATICA, UNSPECIFIED BACK PAIN LATERALITY: ICD-10-CM

## 2020-06-18 DIAGNOSIS — M54.50 LUMBAR BACK PAIN: ICD-10-CM

## 2020-06-18 DIAGNOSIS — G89.29 CHRONIC LOW BACK PAIN WITHOUT SCIATICA, UNSPECIFIED BACK PAIN LATERALITY: ICD-10-CM

## 2020-06-18 DIAGNOSIS — F41.9 ANXIETY AND DEPRESSION: ICD-10-CM

## 2020-06-18 RX ORDER — CLONAZEPAM 0.5 MG/1
0.5 TABLET ORAL NIGHTLY PRN
Qty: 30 TABLET | Refills: 0 | Status: SHIPPED | OUTPATIENT
Start: 2020-06-18 | End: 2020-07-14 | Stop reason: SDUPTHER

## 2020-06-18 RX ORDER — OXYCODONE AND ACETAMINOPHEN 7.5; 325 MG/1; MG/1
1 TABLET ORAL EVERY 8 HOURS PRN
Qty: 75 TABLET | Refills: 0 | Status: SHIPPED | OUTPATIENT
Start: 2020-06-18 | End: 2020-07-14 | Stop reason: SDUPTHER

## 2020-06-18 NOTE — TELEPHONE ENCOUNTER
Patient advised as per below and voiced understanding.  RX refills sent to Dr. Carias in Dr. Forte's absence.

## 2020-06-18 NOTE — OUTREACH NOTE
"ED Potential Covid Discharge Follow-up    Talked with patient. Patient states to have received COVID 19 test results as negative. She has contacted PCP regarding recommendations for follow up and will be making appointment. She reports episodes of SOB \"a little\"; decrease in sleeping ( \"had for a long time\") and no difficulty with fever; chest pain or appetite. Patient states to be compliant with medications; appointments and monitoring of blood pressure (WNL's). She states will discuss with PCP recommendations regarding medications. Reviewed with patient benefit of PCP follow up; education regarding COPD and  COVID 19 precautions; 24/7 Nurse Line Telephone number; ACM contact information; Advance Directives(will discuss with PCP) : My Chart (addressed/declines); gaps in care(addressed/  flu vaccine completed)  [facility](completed)  and [employer] Medicare Replacement services. Patient verbalized understanding. She states to appreciate phone call.  No further questions or concerns voiced at this time.     Ree Dowling RN  Ambulatory     6/18/2020, 10:06      "

## 2020-06-18 NOTE — TELEPHONE ENCOUNTER
No, she does not need to self quaratinine. She still needs to follow CDC guidelines for prevention of illness.

## 2020-06-23 RX ORDER — APIXABAN 5 MG/1
TABLET, FILM COATED ORAL
Qty: 60 TABLET | Refills: 1 | Status: ON HOLD | OUTPATIENT
Start: 2020-06-23 | End: 2020-10-28 | Stop reason: SDUPTHER

## 2020-06-24 ENCOUNTER — EPISODE CHANGES (OUTPATIENT)
Dept: CASE MANAGEMENT | Facility: OTHER | Age: 80
End: 2020-06-24

## 2020-06-26 ENCOUNTER — TELEPHONE (OUTPATIENT)
Dept: INTERNAL MEDICINE | Facility: CLINIC | Age: 80
End: 2020-06-26

## 2020-06-26 NOTE — TELEPHONE ENCOUNTER
PATIENT CALLED IN REFERENCE TO A COUGH THAT WILL NOT GO AWAY.  SHE WENT TO [facility] ON 6/13/2020 AND X RAY WAS DONE. IT SHOWED HER LUNGS WERE CLEAR, NO PNEUMONIA, NO FLU. NEGATIVE FOR COVID  ITS A WET COUGH AND FEELS LIKE ITS IN HER THROAT NOT IN HER LUNGS.     HOSPITAL STATES ITS VIRAL.     PLEASE CALL AND ADVISE [phone]    SHE IS TIRED OF COUGHING AND MUCUS IS COMING UP AT TIMES    [id number] - [place] [address] AT [facility] & [address] - [phone] PH - [phone] FX  [phone]

## 2020-07-04 DIAGNOSIS — I10 ESSENTIAL HYPERTENSION: ICD-10-CM

## 2020-07-06 RX ORDER — LISINOPRIL 20 MG/1
20 TABLET ORAL DAILY
Qty: 90 TABLET | Refills: 0 | Status: SHIPPED | OUTPATIENT
Start: 2020-07-06 | End: 2020-10-05

## 2020-07-06 NOTE — TELEPHONE ENCOUNTER
Patient called back and stated she is still having the lingering cough. Patient was tested for covid and it was negative. Patient would like cough medication be called in for her    Please advise   [phone]

## 2020-07-08 ENCOUNTER — OFFICE VISIT (OUTPATIENT)
Dept: INTERNAL MEDICINE | Facility: CLINIC | Age: 80
End: 2020-07-08

## 2020-07-08 VITALS
HEIGHT: 61 IN | OXYGEN SATURATION: 93 % | HEART RATE: 63 BPM | DIASTOLIC BLOOD PRESSURE: 78 MMHG | TEMPERATURE: 97.6 F | BODY MASS INDEX: 18.77 KG/M2 | RESPIRATION RATE: 16 BRPM | SYSTOLIC BLOOD PRESSURE: 122 MMHG | WEIGHT: 99.4 LBS

## 2020-07-08 DIAGNOSIS — F41.9 ANXIETY: ICD-10-CM

## 2020-07-08 DIAGNOSIS — G89.4 CHRONIC PAIN SYNDROME: ICD-10-CM

## 2020-07-08 DIAGNOSIS — M54.50 CHRONIC LOW BACK PAIN WITHOUT SCIATICA, UNSPECIFIED BACK PAIN LATERALITY: ICD-10-CM

## 2020-07-08 DIAGNOSIS — F32.A ANXIETY AND DEPRESSION: ICD-10-CM

## 2020-07-08 DIAGNOSIS — I48.0 PAF (PAROXYSMAL ATRIAL FIBRILLATION) (HCC): ICD-10-CM

## 2020-07-08 DIAGNOSIS — I10 ESSENTIAL HYPERTENSION: ICD-10-CM

## 2020-07-08 DIAGNOSIS — R73.03 PREDIABETES: Primary | ICD-10-CM

## 2020-07-08 DIAGNOSIS — E78.00 HYPERCHOLESTEROLEMIA: ICD-10-CM

## 2020-07-08 DIAGNOSIS — G89.29 CHRONIC LOW BACK PAIN WITHOUT SCIATICA, UNSPECIFIED BACK PAIN LATERALITY: ICD-10-CM

## 2020-07-08 DIAGNOSIS — F41.9 ANXIETY AND DEPRESSION: ICD-10-CM

## 2020-07-08 DIAGNOSIS — J42 CHRONIC BRONCHITIS, UNSPECIFIED CHRONIC BRONCHITIS TYPE (HCC): ICD-10-CM

## 2020-07-08 PROCEDURE — 99214 OFFICE O/P EST MOD 30 MIN: CPT | Performed by: INTERNAL MEDICINE

## 2020-07-08 PROCEDURE — 96372 THER/PROPH/DIAG INJ SC/IM: CPT | Performed by: INTERNAL MEDICINE

## 2020-07-08 RX ORDER — ALBUTEROL SULFATE 2.5 MG/3ML
2.5 SOLUTION RESPIRATORY (INHALATION) EVERY 4 HOURS PRN
Qty: 120 VIAL | Refills: 5 | Status: SHIPPED | OUTPATIENT
Start: 2020-07-08 | End: 2020-11-16 | Stop reason: SDUPTHER

## 2020-07-08 RX ADMIN — CYANOCOBALAMIN 1000 MCG: 1000 INJECTION, SOLUTION INTRAMUSCULAR; SUBCUTANEOUS at 10:28

## 2020-07-08 NOTE — PROGRESS NOTES
Kaylin Ojeda is a 80 y.o. female, who presents with a chief complaint of   Chief Complaint   Patient presents with   • Cough   • Vomiting   • Nausea   • Dizziness   • Diarrhea       HPI   Pt here for follow up and not feeling well    She has been coughing in the mornings and has a productive cough x 6 weeks.  This morning she felt a little dizzy and sick at her stomach.  She was in the ED on  with similar sx.   cxr and covid testing at that time neg.  She has her nebulizer but is out off albuterol.    No fever.     Anxiety/depression - Her daughter  in 2020.  She has struggled with this loss and she still misses her .  Her granddaughter and her family are living in her apartment.  She is doing the best she can to manage things.  She has been checking into some assisted living facilities     HTN - well controlled at this time.  No ha/dizzines     Chronic back pain - Her back pain has recently flared.  She was down to 1 norco a day but is taking more now.  She also has some constipation.       PAF/CAD - follows with dr. Calero.  She has been doing well. No chest pain.  She is supposed to have f/u echo before her next cards appt. HR in 60's today.  She is on Metoprolol and eliquis     Prediabetes - due for labs.  She is trying to eat a healthy diet      Hypothyroidism - no hair/skin changes.      hld - on statin.  No cramps or myalgias.     The following portions of the patient's history were reviewed and updated as appropriate: allergies, current medications, past family history, past medical history, past social history, past surgical history and problem list.    Allergies: Aleve [naproxen sodium]; Codeine; Ibuprofen; and Sulfa antibiotics    Review of Systems   Constitutional: Negative.  Negative for fever.   HENT: Negative.    Eyes: Negative.    Respiratory: Positive for cough, shortness of breath and wheezing.    Cardiovascular: Negative.  Negative for chest pain, palpitations and leg  swelling.   Gastrointestinal: Negative.    Endocrine: Negative.    Genitourinary: Negative.    Musculoskeletal: Negative.    Skin: Negative.    Allergic/Immunologic: Negative.    Neurological: Positive for dizziness.   Hematological: Negative.    Psychiatric/Behavioral: Negative.    All other systems reviewed and are negative.            Wt Readings from Last 3 Encounters:   07/08/20 45.1 kg (99 lb 6.4 oz)   06/13/20 47.6 kg (105 lb)   03/20/20 46.3 kg (102 lb)     Temp Readings from Last 3 Encounters:   07/08/20 97.6 °F (36.4 °C) (Temporal)   06/13/20 97.6 °F (36.4 °C) (Temporal)   03/20/20 97.3 °F (36.3 °C) (Oral)     BP Readings from Last 3 Encounters:   07/08/20 122/78   06/13/20 126/77   03/20/20 128/88     Pulse Readings from Last 3 Encounters:   07/08/20 63   06/13/20 56   03/20/20 89     Body mass index is 18.78 kg/m².  @LASTSAO2(3)@    Physical Exam   Constitutional: She is oriented to person, place, and time. She appears well-developed and well-nourished.   HENT:   Head: Normocephalic and atraumatic.   Right Ear: External ear normal.   Left Ear: External ear normal.   Bilateral serous effusion without erythema   Eyes: Conjunctivae and lids are normal.   Neck: Normal range of motion. Neck supple.   Cardiovascular: Normal rate and regular rhythm.   Pulmonary/Chest: Effort normal and breath sounds normal. No respiratory distress. She has no wheezes.   Musculoskeletal: Normal range of motion. She exhibits no edema.   Lymphadenopathy:     She has cervical adenopathy.   Neurological: She is alert and oriented to person, place, and time. No cranial nerve deficit.   Skin: Skin is warm and dry. No rash noted.   Psychiatric: She has a normal mood and affect. Her behavior is normal. Thought content normal.   Nursing note and vitals reviewed.      Results for orders placed or performed during the hospital encounter of 06/13/20   Influenza Antigen, Rapid - Swab, Nasopharynx   Result Value Ref Range    Influenza A Ag,  EIA Negative Negative    Influenza B Ag, EIA Negative Negative   COVID-19,[facility] ROUTINE, NP/OP SWAB IN TRANSPORT MEDIA OR ESWAB 72 HR TAT - Swab, Nasopharynx   Result Value Ref Range    SARS-CoV-2, [place] Not Detected Not Detected           Kaylin was seen today for cough, vomiting, nausea, dizziness and diarrhea.    Diagnoses and all orders for this visit:    Prediabetes  -     Comprehensive Metabolic Panel  -     CBC & Differential  -     T4, Free  -     TSH  -     Lipid Panel With LDL / HDL Ratio  -     Hemoglobin A1c    Essential hypertension  -     Comprehensive Metabolic Panel  -     CBC & Differential  -     T4, Free  -     TSH  -     Lipid Panel With LDL / HDL Ratio  -     Hemoglobin A1c    Chronic low back pain without sciatica, unspecified back pain laterality    Anxiety and depression  -     Comprehensive Metabolic Panel  -     CBC & Differential  -     T4, Free  -     TSH    PAF (paroxysmal atrial fibrillation) (CMS/HCC)  -     Comprehensive Metabolic Panel  -     CBC & Differential  -     T4, Free  -     TSH  -     Lipid Panel With LDL / HDL Ratio    Chronic pain syndrome    Anxiety    Hypercholesterolemia  -     Comprehensive Metabolic Panel  -     Lipid Panel With LDL / HDL Ratio    Chronic bronchitis, unspecified chronic bronchitis type (CMS/[facility])  -     albuterol (PROVENTIL) (2.5 MG/3ML) 0.083% nebulizer solution; Take 2.5 mg by nebulization Every 4 (Four) Hours As Needed for Wheezing or Shortness of Air.      Check labs today.  Start back breathing treatments.  Recheck in 1 month.    Outpatient Medications Prior to Visit   Medication Sig Dispense Refill   • albuterol sulfate  (90 Base) MCG/ACT inhaler Inhale 2 puffs Every 4 (Four) Hours As Needed for Wheezing. 18 g 11   • alendronate (FOSAMAX) 70 MG tablet Take 1 tablet by mouth Every 7 (Seven) Days. 48 tablet 0   • amLODIPine (NORVASC) 10 MG tablet TAKE 1 TABLET BY MOUTH DAILY 30 tablet 2   • atorvastatin (LIPITOR) 10 MG tablet TAKE 1 TABLET  BY MOUTH DAILY 90 tablet 3   • budesonide-formoterol (SYMBICORT) 160-4.5 MCG/ACT inhaler Inhale 2 puffs 2 (Two) Times a Day. 1 inhaler 12   • clonazePAM (KlonoPIN) 0.5 MG tablet Take 1 tablet by mouth At Night As Needed for Anxiety. 30 tablet 0   • DULoxetine (CYMBALTA) 60 MG capsule TAKE 1 CAPSULE BY MOUTH DAILY 90 capsule 1   • ELIQUIS 5 MG tablet tablet TAKE 1 TABLET BY MOUTH EVERY 12 HOURS 60 tablet 1   • fluticasone (FLONASE) 50 MCG/ACT nasal spray SPRAY TWICE IN EACH NOSTRIL EVERY DAY FOR 30 DAYS. 16 mL 6   • levothyroxine (SYNTHROID, LEVOTHROID) 50 MCG tablet TAKE 1 TABLET BY MOUTH DAILY 90 tablet 1   • lisinopril (PRINIVIL,ZESTRIL) 20 MG tablet TAKE 1 TABLET BY MOUTH DAILY 90 tablet 0   • metoprolol tartrate (LOPRESSOR) 100 MG tablet Take 1 tablet by mouth 2 (Two) Times a Day. 180 tablet 1   • omeprazole (priLOSEC) 20 MG capsule TAKE 1 CAPSULE BY MOUTH DAILY 90 capsule 1   • ondansetron (ZOFRAN) 8 MG tablet Take 1 tablet by mouth Every 8 (Eight) Hours As Needed for Nausea or Vomiting. 20 tablet 1   • oxybutynin (DITROPAN) 5 MG tablet TAKE 1 TABLET BY MOUTH TWICE DAILY 180 tablet 1   • oxyCODONE-acetaminophen (Percocet) 7.5-325 MG per tablet Take 1 tablet by mouth Every 8 (Eight) Hours As Needed for Severe Pain . 75 tablet 0   • sennosides-docusate (senna-docusate sodium) 8.6-50 MG per tablet Take 2 tablets by mouth Daily. 60 tablet 5   • traZODone (DESYREL) 50 MG tablet Take 50 mg by mouth Every Night.       Facility-Administered Medications Prior to Visit   Medication Dose Route Frequency Provider Last Rate Last Dose   • cyanocobalamin injection 1,000 mcg  1,000 mcg Intramuscular Q28 Days Donna Forte MD   1,000 mcg at 11/14/18 1306     New Medications Ordered This Visit   Medications   • albuterol (PROVENTIL) (2.5 MG/3ML) 0.083% nebulizer solution     Sig: Take 2.5 mg by nebulization Every 4 (Four) Hours As Needed for Wheezing or Shortness of Air.     Dispense:  120 vial     Refill:  5      [unfilled]  There are no discontinued medications.      Return in about 4 weeks (around 8/5/2020) for Recheck.

## 2020-07-09 LAB
ALBUMIN SERPL-MCNC: 4.4 G/DL (ref 3.5–5.2)
ALBUMIN/GLOB SERPL: 1.5 G/DL
ALP SERPL-CCNC: 115 U/L (ref 39–117)
ALT SERPL-CCNC: 13 U/L (ref 1–33)
AST SERPL-CCNC: 19 U/L (ref 1–32)
BASOPHILS # BLD AUTO: 0.12 10*3/MM3 (ref 0–0.2)
BASOPHILS NFR BLD AUTO: 1.1 % (ref 0–1.5)
BILIRUB SERPL-MCNC: 0.3 MG/DL (ref 0–1.2)
BUN SERPL-MCNC: 26 MG/DL (ref 8–23)
BUN/CREAT SERPL: 21 (ref 7–25)
CALCIUM SERPL-MCNC: 9.4 MG/DL (ref 8.6–10.5)
CHLORIDE SERPL-SCNC: 99 MMOL/L (ref 98–107)
CHOLEST SERPL-MCNC: 125 MG/DL (ref 0–200)
CO2 SERPL-SCNC: 28 MMOL/L (ref 22–29)
CREAT SERPL-MCNC: 1.24 MG/DL (ref 0.57–1)
EOSINOPHIL # BLD AUTO: 0.22 10*3/MM3 (ref 0–0.4)
EOSINOPHIL NFR BLD AUTO: 2.1 % (ref 0.3–6.2)
ERYTHROCYTE [DISTWIDTH] IN BLOOD BY AUTOMATED COUNT: 15 % (ref 12.3–15.4)
GLOBULIN SER CALC-MCNC: 3 GM/DL
GLUCOSE SERPL-MCNC: 105 MG/DL (ref 65–99)
HBA1C MFR BLD: 6.2 % (ref 4.8–5.6)
HCT VFR BLD AUTO: 33.5 % (ref 34–46.6)
HDLC SERPL-MCNC: 40 MG/DL (ref 40–60)
HGB BLD-MCNC: 10.6 G/DL (ref 12–15.9)
IMM GRANULOCYTES # BLD AUTO: 0.07 10*3/MM3 (ref 0–0.05)
IMM GRANULOCYTES NFR BLD AUTO: 0.7 % (ref 0–0.5)
LDLC SERPL CALC-MCNC: 64 MG/DL (ref 0–100)
LDLC/HDLC SERPL: 1.6 {RATIO}
LYMPHOCYTES # BLD AUTO: 2.46 10*3/MM3 (ref 0.7–3.1)
LYMPHOCYTES NFR BLD AUTO: 23.5 % (ref 19.6–45.3)
MCH RBC QN AUTO: 28.1 PG (ref 26.6–33)
MCHC RBC AUTO-ENTMCNC: 31.6 G/DL (ref 31.5–35.7)
MCV RBC AUTO: 88.9 FL (ref 79–97)
MONOCYTES # BLD AUTO: 0.85 10*3/MM3 (ref 0.1–0.9)
MONOCYTES NFR BLD AUTO: 8.1 % (ref 5–12)
NEUTROPHILS # BLD AUTO: 6.75 10*3/MM3 (ref 1.7–7)
NEUTROPHILS NFR BLD AUTO: 64.5 % (ref 42.7–76)
NRBC BLD AUTO-RTO: 0 /100 WBC (ref 0–0.2)
PLATELET # BLD AUTO: 377 10*3/MM3 (ref 140–450)
POTASSIUM SERPL-SCNC: 5.5 MMOL/L (ref 3.5–5.2)
PROT SERPL-MCNC: 7.4 G/DL (ref 6–8.5)
RBC # BLD AUTO: 3.77 10*6/MM3 (ref 3.77–5.28)
SODIUM SERPL-SCNC: 136 MMOL/L (ref 136–145)
T4 FREE SERPL-MCNC: 0.96 NG/DL (ref 0.93–1.7)
TRIGL SERPL-MCNC: 105 MG/DL (ref 0–150)
TSH SERPL DL<=0.005 MIU/L-ACNC: 12 UIU/ML (ref 0.27–4.2)
VLDLC SERPL CALC-MCNC: 21 MG/DL
WBC # BLD AUTO: 10.47 10*3/MM3 (ref 3.4–10.8)

## 2020-07-14 DIAGNOSIS — F41.9 ANXIETY AND DEPRESSION: ICD-10-CM

## 2020-07-14 DIAGNOSIS — M54.50 LUMBAR BACK PAIN: ICD-10-CM

## 2020-07-14 DIAGNOSIS — J42 CHRONIC BRONCHITIS, UNSPECIFIED CHRONIC BRONCHITIS TYPE (HCC): Primary | ICD-10-CM

## 2020-07-14 DIAGNOSIS — M54.50 CHRONIC LOW BACK PAIN WITHOUT SCIATICA, UNSPECIFIED BACK PAIN LATERALITY: ICD-10-CM

## 2020-07-14 DIAGNOSIS — G89.29 CHRONIC LOW BACK PAIN WITHOUT SCIATICA, UNSPECIFIED BACK PAIN LATERALITY: ICD-10-CM

## 2020-07-14 DIAGNOSIS — F32.A ANXIETY AND DEPRESSION: ICD-10-CM

## 2020-07-14 NOTE — TELEPHONE ENCOUNTER
Caller: Kaylin Ojeda    Relationship: Self    Best call back number: [phone]    Medication needed:   Requested Prescriptions     Pending Prescriptions Disp Refills   • oxyCODONE-acetaminophen (Percocet) 7.5-325 MG per tablet 75 tablet 0     Sig: Take 1 tablet by mouth Every 8 (Eight) Hours As Needed for Severe Pain .   • clonazePAM (KlonoPIN) 0.5 MG tablet 30 tablet 0     Sig: Take 1 tablet by mouth At Night As Needed for Anxiety.       When do you need the refill by: SATURDAY (7/18/20)    What details did the patient provide when requesting the medication: PATIENT STATED THAT SHE CURRENTLY HAS 4 TABLETS LEFT OF THE OXYCODONE-ACETAMINOPHEN (PERCOCET) 7.5-325 MG PER TABLET, AND SHE ONLY HAS 2 TABLETS LEFT OF THE CLONAZEPAM (KLONOPIN) 0.5 MG TABLET.     Does the patient have less than a 3 day supply:  [x] Yes  [] No    What is the patient's preferred pharmacy:  [facility] [id number] [place] [address] AT [facility] [address] - [phone] PH - [phone] FX

## 2020-07-20 RX ORDER — OXYCODONE AND ACETAMINOPHEN 7.5; 325 MG/1; MG/1
1 TABLET ORAL EVERY 8 HOURS PRN
Qty: 75 TABLET | Refills: 0 | Status: SHIPPED | OUTPATIENT
Start: 2020-07-20 | End: 2020-08-14 | Stop reason: SDUPTHER

## 2020-07-20 RX ORDER — CLONAZEPAM 0.5 MG/1
0.5 TABLET ORAL NIGHTLY PRN
Qty: 30 TABLET | Refills: 0 | Status: SHIPPED | OUTPATIENT
Start: 2020-07-20 | End: 2020-08-14 | Stop reason: SDUPTHER

## 2020-07-20 NOTE — TELEPHONE ENCOUNTER
PATIENT CALLED AND INQUIRED ON AN UPDATE ON THIS PRESCRIPTION.  PATIENT ALSO STATED THAT BREATHING MEDICATION WAS SUPPOSED TO BE SENT TO THE PHARMACY.  PLEASE ADVISE.    PATIENT CALL BACK NUMBER: [phone]    [id number] [place] [facility] [address] [facility] [address] - [phone] PH - [phone] FX

## 2020-08-05 ENCOUNTER — OFFICE VISIT (OUTPATIENT)
Dept: INTERNAL MEDICINE | Facility: CLINIC | Age: 80
End: 2020-08-05

## 2020-08-05 VITALS
HEART RATE: 61 BPM | RESPIRATION RATE: 16 BRPM | OXYGEN SATURATION: 95 % | HEIGHT: 61 IN | BODY MASS INDEX: 19.11 KG/M2 | SYSTOLIC BLOOD PRESSURE: 110 MMHG | WEIGHT: 101.2 LBS | DIASTOLIC BLOOD PRESSURE: 68 MMHG | TEMPERATURE: 97.6 F

## 2020-08-05 DIAGNOSIS — I48.0 PAF (PAROXYSMAL ATRIAL FIBRILLATION) (HCC): ICD-10-CM

## 2020-08-05 DIAGNOSIS — E78.00 HYPERCHOLESTEROLEMIA: ICD-10-CM

## 2020-08-05 DIAGNOSIS — I38 VALVULAR HEART DISEASE: ICD-10-CM

## 2020-08-05 DIAGNOSIS — R73.03 PREDIABETES: ICD-10-CM

## 2020-08-05 DIAGNOSIS — E03.9 HYPOTHYROIDISM (ACQUIRED): ICD-10-CM

## 2020-08-05 DIAGNOSIS — F32.A ANXIETY AND DEPRESSION: ICD-10-CM

## 2020-08-05 DIAGNOSIS — F41.9 ANXIETY AND DEPRESSION: ICD-10-CM

## 2020-08-05 DIAGNOSIS — D64.9 ANEMIA, UNSPECIFIED TYPE: ICD-10-CM

## 2020-08-05 DIAGNOSIS — I10 ESSENTIAL HYPERTENSION: ICD-10-CM

## 2020-08-05 DIAGNOSIS — I25.119 CORONARY ARTERY DISEASE INVOLVING NATIVE CORONARY ARTERY OF NATIVE HEART WITH ANGINA PECTORIS (HCC): Primary | ICD-10-CM

## 2020-08-05 PROCEDURE — 99214 OFFICE O/P EST MOD 30 MIN: CPT | Performed by: INTERNAL MEDICINE

## 2020-08-05 RX ORDER — LEVOTHYROXINE SODIUM 0.07 MG/1
75 TABLET ORAL DAILY
Qty: 90 TABLET | Refills: 0 | Status: ON HOLD | OUTPATIENT
Start: 2020-08-05 | End: 2020-10-28

## 2020-08-05 NOTE — PROGRESS NOTES
Kaylin Ojeda is a 80 y.o. female, who presents with a chief complaint of   Chief Complaint   Patient presents with   • Follow-up   • Cough   • Nausea   • Shortness of Breath       HPI   Pt here for f/u.  She still has her granddaughter and her family living with her which is stressful. She is on a waiting list for an assisted living place.     Hypothyroidism - tsh was up on last labs.  The pt got the message to increase her med but she didn't get the higher dose of med.     Pt c/o episodic nausea and dizziness.  she has had some fast heart beats.  She has an echo scheduled for Sept 15th.  Last echo prior to this was 5/16/19 and pt had moderate AR, severe MR and severe TR.  She has known a-fib.  Hr currently in 60's.  She is already on metoprolol 200m /day and eliquis.  She has been having more SOA and more episodes of palpitations.   Sx worse if she is more active.  Vacuuming or carrying in groceries are now hard for her.  Her fatigue has been worsening.  She has known CAD.  She has taken 2 nitro in the past month.  Next f/u with cardiology 9/22.     Anemia - her hgb has slowly been going down.  Hgb 12/3 -> 11/5 -> 10.6 over the past 8 months.        The following portions of the patient's history were reviewed and updated as appropriate: allergies, current medications, past family history, past medical history, past social history, past surgical history and problem list.    Allergies: Aleve [naproxen sodium]; Codeine; Ibuprofen; and Sulfa antibiotics    Review of Systems   Constitutional: Positive for fever.   HENT: Negative.    Eyes: Negative.    Respiratory: Positive for shortness of breath.    Cardiovascular: Positive for palpitations. Negative for chest pain.   Gastrointestinal: Positive for nausea.   Endocrine: Negative.    Genitourinary: Negative.    Musculoskeletal: Negative.    Skin: Negative.    Allergic/Immunologic: Negative.    Neurological: Positive for dizziness.   Hematological: Negative.     Psychiatric/Behavioral: Negative.    All other systems reviewed and are negative.            Wt Readings from Last 3 Encounters:   08/05/20 45.9 kg (101 lb 3.2 oz)   07/08/20 45.1 kg (99 lb 6.4 oz)   06/13/20 47.6 kg (105 lb)     Temp Readings from Last 3 Encounters:   08/05/20 97.6 °F (36.4 °C) (Temporal)   07/08/20 97.6 °F (36.4 °C) (Temporal)   06/13/20 97.6 °F (36.4 °C) (Temporal)     BP Readings from Last 3 Encounters:   08/05/20 110/68   07/08/20 122/78   06/13/20 126/77     Pulse Readings from Last 3 Encounters:   08/05/20 61   07/08/20 63   06/13/20 56     Body mass index is 19.13 kg/m².  @LASTSAO2(3)@    Physical Exam   Constitutional: She is oriented to person, place, and time. She appears well-developed and well-nourished. No distress.   HENT:   Head: Normocephalic and atraumatic.   Right Ear: External ear normal.   Left Ear: External ear normal.   Nose: Nose normal.   Mouth/Throat: Oropharynx is clear and moist.   Eyes: Pupils are equal, round, and reactive to light. Conjunctivae and EOM are normal.   Neck: Normal range of motion. Neck supple.   Cardiovascular: Normal rate, regular rhythm and intact distal pulses.   Murmur heard.  Pulmonary/Chest: Effort normal and breath sounds normal. No respiratory distress. She has no wheezes.   Musculoskeletal: Normal range of motion.   Normal gait   Neurological: She is alert and oriented to person, place, and time.   Skin: Skin is warm and dry.   Psychiatric: She has a normal mood and affect. Her behavior is normal. Judgment and thought content normal.   Nursing note and vitals reviewed.      Results for orders placed or performed in visit on 07/08/20   Comprehensive Metabolic Panel   Result Value Ref Range    Glucose 105 (H) 65 - 99 mg/dL    BUN 26 (H) 8 - 23 mg/dL    Creatinine 1.24 (H) 0.57 - 1.00 mg/dL    eGFR Non African Am 42 (L) >60 mL/min/1.73    eGFR African Am 51 (L) >60 mL/min/1.73    BUN/Creatinine Ratio 21.0 7.0 - 25.0    Sodium 136 136 - 145 mmol/L     Potassium 5.5 (H) 3.5 - 5.2 mmol/L    Chloride 99 98 - 107 mmol/L    Total CO2 28.0 22.0 - 29.0 mmol/L    Calcium 9.4 8.6 - 10.5 mg/dL    Total Protein 7.4 6.0 - 8.5 g/dL    Albumin 4.40 3.50 - 5.20 g/dL    Globulin 3.0 gm/dL    A/G Ratio 1.5 g/dL    Total Bilirubin 0.3 0.0 - 1.2 mg/dL    Alkaline Phosphatase 115 39 - 117 U/L    AST (SGOT) 19 1 - 32 U/L    ALT (SGPT) 13 1 - 33 U/L   T4, Free   Result Value Ref Range    Free T4 0.96 0.93 - 1.70 ng/dL   TSH   Result Value Ref Range    TSH 12.000 (H) 0.270 - 4.200 uIU/mL   Lipid Panel With LDL / HDL Ratio   Result Value Ref Range    Total Cholesterol 125 0 - 200 mg/dL    Triglycerides 105 0 - 150 mg/dL    HDL Cholesterol 40 40 - 60 mg/dL    VLDL Cholesterol 21 mg/dL    LDL Cholesterol  64 0 - 100 mg/dL    LDL/HDL Ratio 1.60    Hemoglobin A1c   Result Value Ref Range    Hemoglobin A1C 6.20 (H) 4.80 - 5.60 %   CBC & Differential   Result Value Ref Range    WBC 10.47 3.40 - 10.80 10*3/mm3    RBC 3.77 3.77 - 5.28 10*6/mm3    Hemoglobin 10.6 (L) 12.0 - 15.9 g/dL    Hematocrit 33.5 (L) 34.0 - 46.6 %    MCV 88.9 79.0 - 97.0 fL    MCH 28.1 26.6 - 33.0 pg    MCHC 31.6 31.5 - 35.7 g/dL    RDW 15.0 12.3 - 15.4 %    Platelets 377 140 - 450 10*3/mm3    Neutrophil Rel % 64.5 42.7 - 76.0 %    Lymphocyte Rel % 23.5 19.6 - 45.3 %    Monocyte Rel % 8.1 5.0 - 12.0 %    Eosinophil Rel % 2.1 0.3 - 6.2 %    Basophil Rel % 1.1 0.0 - 1.5 %    Neutrophils Absolute 6.75 1.70 - 7.00 10*3/mm3    Lymphocytes Absolute 2.46 0.70 - 3.10 10*3/mm3    Monocytes Absolute 0.85 0.10 - 0.90 10*3/mm3    Eosinophils Absolute 0.22 0.00 - 0.40 10*3/mm3    Basophils Absolute 0.12 0.00 - 0.20 10*3/mm3    Immature Granulocyte Rel % 0.7 (H) 0.0 - 0.5 %    Immature Grans Absolute 0.07 (H) 0.00 - 0.05 10*3/mm3    nRBC 0.0 0.0 - 0.2 /100 WBC           Kaylin was seen today for follow-up, cough, nausea and shortness of breath.    Diagnoses and all orders for this visit:    Coronary artery disease involving native  coronary artery of native heart with angina pectoris (CMS/HCC)  -     Holter monitor - 24 hour; Future  -     Adult Transthoracic Echo Complete W/ Cont if Necessary Per Protocol; Future    Valvular heart disease  -     Holter monitor - 24 hour; Future  -     Adult Transthoracic Echo Complete W/ Cont if Necessary Per Protocol; Future    PAF (paroxysmal atrial fibrillation) ([facility])  -     Holter monitor - 24 hour; Future  -     Adult Transthoracic Echo Complete W/ Cont if Necessary Per Protocol; Future    Essential hypertension    Anxiety and depression    Prediabetes    Hypercholesterolemia    Hypothyroidism (acquired)  -     levothyroxine (SYNTHROID, LEVOTHROID) 75 MCG tablet; Take 1 tablet by mouth Daily.    Anemia, unspecified type  -     Comprehensive Metabolic Panel  -     CBC & Differential  -     Vitamin B12  -     Iron Profile  -     Folate      Will check echo sooner and check holter.  Will call cardiology if pt needs evaluation sooner.  Pt to go to ed or call cardiology if sx worsening.     Will work up anemia.      Outpatient Medications Prior to Visit   Medication Sig Dispense Refill   • albuterol (PROVENTIL) (2.5 MG/3ML) 0.083% nebulizer solution Take 2.5 mg by nebulization Every 4 (Four) Hours As Needed for Wheezing or Shortness of Air. 120 vial 5   • albuterol sulfate  (90 Base) MCG/ACT inhaler Inhale 2 puffs Every 4 (Four) Hours As Needed for Wheezing. 18 g 11   • alendronate (FOSAMAX) 70 MG tablet Take 1 tablet by mouth Every 7 (Seven) Days. 48 tablet 0   • amLODIPine (NORVASC) 10 MG tablet TAKE 1 TABLET BY MOUTH DAILY 30 tablet 2   • atorvastatin (LIPITOR) 10 MG tablet TAKE 1 TABLET BY MOUTH DAILY 90 tablet 3   • budesonide-formoterol (SYMBICORT) 160-4.5 MCG/ACT inhaler Inhale 2 puffs 2 (Two) Times a Day. 1 inhaler 12   • clonazePAM (KlonoPIN) 0.5 MG tablet Take 1 tablet by mouth At Night As Needed for Anxiety. 30 tablet 0   • DULoxetine (CYMBALTA) 60 MG capsule TAKE 1 CAPSULE BY MOUTH  DAILY 90 capsule 1   • ELIQUIS 5 MG tablet tablet TAKE 1 TABLET BY MOUTH EVERY 12 HOURS 60 tablet 1   • fluticasone (FLONASE) 50 MCG/ACT nasal spray SPRAY TWICE IN EACH NOSTRIL EVERY DAY FOR 30 DAYS. 16 mL 6   • lisinopril (PRINIVIL,ZESTRIL) 20 MG tablet TAKE 1 TABLET BY MOUTH DAILY 90 tablet 0   • metoprolol tartrate (LOPRESSOR) 100 MG tablet Take 1 tablet by mouth 2 (Two) Times a Day. 180 tablet 1   • omeprazole (priLOSEC) 20 MG capsule TAKE 1 CAPSULE BY MOUTH DAILY 90 capsule 1   • ondansetron (ZOFRAN) 8 MG tablet Take 1 tablet by mouth Every 8 (Eight) Hours As Needed for Nausea or Vomiting. 20 tablet 1   • oxybutynin (DITROPAN) 5 MG tablet TAKE 1 TABLET BY MOUTH TWICE DAILY 180 tablet 1   • oxyCODONE-acetaminophen (Percocet) 7.5-325 MG per tablet Take 1 tablet by mouth Every 8 (Eight) Hours As Needed for Severe Pain . 75 tablet 0   • sennosides-docusate (senna-docusate sodium) 8.6-50 MG per tablet Take 2 tablets by mouth Daily. 60 tablet 5   • traZODone (DESYREL) 50 MG tablet Take 50 mg by mouth Every Night.     • levothyroxine (SYNTHROID, LEVOTHROID) 50 MCG tablet TAKE 1 TABLET BY MOUTH DAILY 90 tablet 1     Facility-Administered Medications Prior to Visit   Medication Dose Route Frequency Provider Last Rate Last Dose   • cyanocobalamin injection 1,000 mcg  1,000 mcg Intramuscular Q28 Days Donna Forte MD   1,000 mcg at 07/08/20 1028     New Medications Ordered This Visit   Medications   • levothyroxine (SYNTHROID, LEVOTHROID) 75 MCG tablet     Sig: Take 1 tablet by mouth Daily.     Dispense:  90 tablet     Refill:  0     [unfilled]  Medications Discontinued During This Encounter   Medication Reason   • levothyroxine (SYNTHROID, LEVOTHROID) 50 MCG tablet Reorder         Return in about 4 weeks (around 9/2/2020) for Recheck.

## 2020-08-06 LAB
ALBUMIN SERPL-MCNC: 4.4 G/DL (ref 3.5–5.2)
ALBUMIN/GLOB SERPL: 1.8 G/DL
ALP SERPL-CCNC: 91 U/L (ref 39–117)
ALT SERPL-CCNC: 14 U/L (ref 1–33)
AST SERPL-CCNC: 20 U/L (ref 1–32)
BASOPHILS # BLD AUTO: 0.08 10*3/MM3 (ref 0–0.2)
BASOPHILS NFR BLD AUTO: 0.9 % (ref 0–1.5)
BILIRUB SERPL-MCNC: 0.3 MG/DL (ref 0–1.2)
BUN SERPL-MCNC: 24 MG/DL (ref 8–23)
BUN/CREAT SERPL: 23.8 (ref 7–25)
CALCIUM SERPL-MCNC: 9 MG/DL (ref 8.6–10.5)
CHLORIDE SERPL-SCNC: 100 MMOL/L (ref 98–107)
CO2 SERPL-SCNC: 27.2 MMOL/L (ref 22–29)
CREAT SERPL-MCNC: 1.01 MG/DL (ref 0.57–1)
EOSINOPHIL # BLD AUTO: 0.18 10*3/MM3 (ref 0–0.4)
EOSINOPHIL NFR BLD AUTO: 2 % (ref 0.3–6.2)
ERYTHROCYTE [DISTWIDTH] IN BLOOD BY AUTOMATED COUNT: 15.1 % (ref 12.3–15.4)
FOLATE SERPL-MCNC: 9.47 NG/ML (ref 4.78–24.2)
GLOBULIN SER CALC-MCNC: 2.4 GM/DL
GLUCOSE SERPL-MCNC: 102 MG/DL (ref 65–99)
HCT VFR BLD AUTO: 31.2 % (ref 34–46.6)
HGB BLD-MCNC: 10 G/DL (ref 12–15.9)
IMM GRANULOCYTES # BLD AUTO: 0.04 10*3/MM3 (ref 0–0.05)
IMM GRANULOCYTES NFR BLD AUTO: 0.5 % (ref 0–0.5)
IRON SATN MFR SERPL: 5 % (ref 20–50)
IRON SERPL-MCNC: 29 MCG/DL (ref 37–145)
LYMPHOCYTES # BLD AUTO: 1.93 10*3/MM3 (ref 0.7–3.1)
LYMPHOCYTES NFR BLD AUTO: 21.8 % (ref 19.6–45.3)
MCH RBC QN AUTO: 27 PG (ref 26.6–33)
MCHC RBC AUTO-ENTMCNC: 32.1 G/DL (ref 31.5–35.7)
MCV RBC AUTO: 84.3 FL (ref 79–97)
MONOCYTES # BLD AUTO: 0.71 10*3/MM3 (ref 0.1–0.9)
MONOCYTES NFR BLD AUTO: 8 % (ref 5–12)
NEUTROPHILS # BLD AUTO: 5.91 10*3/MM3 (ref 1.7–7)
NEUTROPHILS NFR BLD AUTO: 66.8 % (ref 42.7–76)
NRBC BLD AUTO-RTO: 0 /100 WBC (ref 0–0.2)
PLATELET # BLD AUTO: 294 10*3/MM3 (ref 140–450)
POTASSIUM SERPL-SCNC: 5.1 MMOL/L (ref 3.5–5.2)
PROT SERPL-MCNC: 6.8 G/DL (ref 6–8.5)
RBC # BLD AUTO: 3.7 10*6/MM3 (ref 3.77–5.28)
SODIUM SERPL-SCNC: 138 MMOL/L (ref 136–145)
TIBC SERPL-MCNC: 535 MCG/DL
UIBC SERPL-MCNC: 506 MCG/DL (ref 112–346)
VIT B12 SERPL-MCNC: 287 PG/ML (ref 211–946)
WBC # BLD AUTO: 8.85 10*3/MM3 (ref 3.4–10.8)

## 2020-08-10 ENCOUNTER — HOSPITAL ENCOUNTER (OUTPATIENT)
Dept: CARDIOLOGY | Facility: HOSPITAL | Age: 80
End: 2020-08-10

## 2020-08-10 ENCOUNTER — APPOINTMENT (OUTPATIENT)
Dept: CARDIOLOGY | Facility: HOSPITAL | Age: 80
End: 2020-08-10

## 2020-08-13 DIAGNOSIS — N39.41 URGE INCONTINENCE: ICD-10-CM

## 2020-08-14 DIAGNOSIS — F41.9 ANXIETY AND DEPRESSION: ICD-10-CM

## 2020-08-14 DIAGNOSIS — G89.29 CHRONIC LOW BACK PAIN WITHOUT SCIATICA, UNSPECIFIED BACK PAIN LATERALITY: ICD-10-CM

## 2020-08-14 DIAGNOSIS — M54.50 LUMBAR BACK PAIN: ICD-10-CM

## 2020-08-14 DIAGNOSIS — F32.A ANXIETY AND DEPRESSION: ICD-10-CM

## 2020-08-14 DIAGNOSIS — M54.50 CHRONIC LOW BACK PAIN WITHOUT SCIATICA, UNSPECIFIED BACK PAIN LATERALITY: ICD-10-CM

## 2020-08-14 RX ORDER — OXYBUTYNIN CHLORIDE 5 MG/1
TABLET ORAL
Qty: 180 TABLET | Refills: 1 | Status: SHIPPED | OUTPATIENT
Start: 2020-08-14 | End: 2021-02-09

## 2020-08-14 NOTE — TELEPHONE ENCOUNTER
Caller: Kaylin Ojeda    Relationship: Self    Best call back number: [phone]    Medication needed:   Requested Prescriptions     Pending Prescriptions Disp Refills   • oxyCODONE-acetaminophen (Percocet) 7.5-325 MG per tablet 75 tablet 0     Sig: Take 1 tablet by mouth Every 8 (Eight) Hours As Needed for Severe Pain .   • clonazePAM (KlonoPIN) 0.5 MG tablet 30 tablet 0     Sig: Take 1 tablet by mouth At Night As Needed for Anxiety.       When do you need the refill by: asap    What details did the patient provide when requesting the medication:patient is completely out of both meds     Does the patient have less than a 3 day supply:  [x] Yes  [] No    What is the patient's preferred pharmacy: [facility] [id number] [place] [address] [address] [facility] [address] - [phone] PH - [phone] FX

## 2020-08-17 RX ORDER — OXYCODONE AND ACETAMINOPHEN 7.5; 325 MG/1; MG/1
1 TABLET ORAL EVERY 8 HOURS PRN
Qty: 75 TABLET | Refills: 0 | Status: SHIPPED | OUTPATIENT
Start: 2020-08-17 | End: 2020-09-17 | Stop reason: SDUPTHER

## 2020-08-17 RX ORDER — CLONAZEPAM 0.5 MG/1
0.5 TABLET ORAL NIGHTLY PRN
Qty: 30 TABLET | Refills: 0 | Status: SHIPPED | OUTPATIENT
Start: 2020-08-17 | End: 2020-09-17 | Stop reason: SDUPTHER

## 2020-08-20 DIAGNOSIS — F32.A ANXIETY AND DEPRESSION: ICD-10-CM

## 2020-08-20 DIAGNOSIS — F41.9 ANXIETY AND DEPRESSION: ICD-10-CM

## 2020-08-20 DIAGNOSIS — M54.50 LUMBAR BACK PAIN: ICD-10-CM

## 2020-08-20 DIAGNOSIS — G89.29 CHRONIC LOW BACK PAIN WITHOUT SCIATICA, UNSPECIFIED BACK PAIN LATERALITY: ICD-10-CM

## 2020-08-20 DIAGNOSIS — M54.50 CHRONIC LOW BACK PAIN WITHOUT SCIATICA, UNSPECIFIED BACK PAIN LATERALITY: ICD-10-CM

## 2020-08-20 NOTE — TELEPHONE ENCOUNTER
Patient is calling for a refill of the following:     oxyCODONE-acetaminophen (Percocet) 7.5-325 MG per tablet      clonazePAM (KlonoPIN) 0.5 MG tablet     [id number] [place] [address] [address] [facility] [address] - [phone]  - [phone] FX  [phone]    Patient call back [phone]     Patient is completely out of both medications.

## 2020-08-21 RX ORDER — CLONAZEPAM 0.5 MG/1
0.5 TABLET ORAL NIGHTLY PRN
Qty: 30 TABLET | Refills: 0 | OUTPATIENT
Start: 2020-08-21

## 2020-08-21 RX ORDER — OXYCODONE AND ACETAMINOPHEN 7.5; 325 MG/1; MG/1
1 TABLET ORAL EVERY 8 HOURS PRN
Qty: 75 TABLET | Refills: 0 | OUTPATIENT
Start: 2020-08-21

## 2020-08-25 ENCOUNTER — HOSPITAL ENCOUNTER (OUTPATIENT)
Dept: CARDIOLOGY | Facility: HOSPITAL | Age: 80
Discharge: HOME | End: 2020-08-25

## 2020-08-25 ENCOUNTER — HOSPITAL ENCOUNTER (OUTPATIENT)
Dept: CARDIOLOGY | Facility: HOSPITAL | Age: 80
Discharge: HOME | End: 2020-08-25
Admitting: INTERNAL MEDICINE

## 2020-08-25 VITALS
HEIGHT: 60 IN | SYSTOLIC BLOOD PRESSURE: 148 MMHG | DIASTOLIC BLOOD PRESSURE: 59 MMHG | WEIGHT: 101 LBS | BODY MASS INDEX: 19.83 KG/M2

## 2020-08-25 DIAGNOSIS — I48.0 PAF (PAROXYSMAL ATRIAL FIBRILLATION) (HCC): ICD-10-CM

## 2020-08-25 DIAGNOSIS — I25.119 CORONARY ARTERY DISEASE INVOLVING NATIVE CORONARY ARTERY OF NATIVE HEART WITH ANGINA PECTORIS (HCC): ICD-10-CM

## 2020-08-25 DIAGNOSIS — I38 VALVULAR HEART DISEASE: ICD-10-CM

## 2020-08-25 LAB
BH CV ECHO MEAS - ACS: 1.7 CM
BH CV ECHO MEAS - AI DEC SLOPE: 387 CM/SEC^2
BH CV ECHO MEAS - AI MAX PG: 93.3 MMHG
BH CV ECHO MEAS - AI MAX VEL: 483 CM/SEC
BH CV ECHO MEAS - AI P1/2T: 365.5 MSEC
BH CV ECHO MEAS - AO MAX PG: 20 MMHG
BH CV ECHO MEAS - AO MEAN PG (FULL): 4.6 MMHG
BH CV ECHO MEAS - AO MEAN PG: 9 MMHG
BH CV ECHO MEAS - AO ROOT AREA (BSA CORRECTED): 1.9
BH CV ECHO MEAS - AO ROOT AREA: 5.3 CM^2
BH CV ECHO MEAS - AO ROOT DIAM: 2.6 CM
BH CV ECHO MEAS - AO V2 MAX: 222 CM/SEC
BH CV ECHO MEAS - AO V2 MEAN: 125.2 CM/SEC
BH CV ECHO MEAS - AO V2 VTI: 41.4 CM
BH CV ECHO MEAS - AVA(I,A): 1.9 CM^2
BH CV ECHO MEAS - AVA(I,D): 1.9 CM^2
BH CV ECHO MEAS - BSA(HAYCOCK): 1.4 M^2
BH CV ECHO MEAS - BSA: 1.4 M^2
BH CV ECHO MEAS - BZI_BMI: 19.7 KILOGRAMS/M^2
BH CV ECHO MEAS - BZI_METRIC_HEIGHT: 152.4 CM
BH CV ECHO MEAS - BZI_METRIC_WEIGHT: 45.8 KG
BH CV ECHO MEAS - EDV(CUBED): 58.9 ML
BH CV ECHO MEAS - EDV(MOD-SP2): 60.8 ML
BH CV ECHO MEAS - EDV(MOD-SP4): 74.5 ML
BH CV ECHO MEAS - EDV(TEICH): 65.5 ML
BH CV ECHO MEAS - EF(CUBED): 68.4 %
BH CV ECHO MEAS - EF(MOD-BP): 66.6 %
BH CV ECHO MEAS - EF(MOD-SP2): 65.3 %
BH CV ECHO MEAS - EF(MOD-SP4): 67.5 %
BH CV ECHO MEAS - EF(TEICH): 60.6 %
BH CV ECHO MEAS - ESV(CUBED): 18.6 ML
BH CV ECHO MEAS - ESV(MOD-SP2): 21.1 ML
BH CV ECHO MEAS - ESV(MOD-SP4): 24.2 ML
BH CV ECHO MEAS - ESV(TEICH): 25.8 ML
BH CV ECHO MEAS - FS: 31.9 %
BH CV ECHO MEAS - IVS/LVPW: 1
BH CV ECHO MEAS - IVSD: 0.81 CM
BH CV ECHO MEAS - LAT PEAK E' VEL: 6.5 CM/SEC
BH CV ECHO MEAS - LV DIASTOLIC VOL/BSA (35-75): 53.4 ML/M^2
BH CV ECHO MEAS - LV MASS(C)D: 88.1 GRAMS
BH CV ECHO MEAS - LV MASS(C)DI: 63.1 GRAMS/M^2
BH CV ECHO MEAS - LV MAX PG: 8 MMHG
BH CV ECHO MEAS - LV MEAN PG: 3 MMHG
BH CV ECHO MEAS - LV SYSTOLIC VOL/BSA (12-30): 17.3 ML/M^2
BH CV ECHO MEAS - LV V1 MAX: 138 CM/SEC
BH CV ECHO MEAS - LV V1 MEAN: 86.4 CM/SEC
BH CV ECHO MEAS - LV V1 VTI: 30.1 CM
BH CV ECHO MEAS - LVIDD: 3.9 CM
BH CV ECHO MEAS - LVIDS: 2.7 CM
BH CV ECHO MEAS - LVLD AP2: 6.5 CM
BH CV ECHO MEAS - LVLD AP4: 6.4 CM
BH CV ECHO MEAS - LVLS AP2: 5.3 CM
BH CV ECHO MEAS - LVLS AP4: 5.4 CM
BH CV ECHO MEAS - LVOT AREA (M): 2.5 CM^2
BH CV ECHO MEAS - LVOT AREA: 2.5 CM^2
BH CV ECHO MEAS - LVOT DIAM: 1.8 CM
BH CV ECHO MEAS - LVPWD: 0.77 CM
BH CV ECHO MEAS - MED PEAK E' VEL: 7.3 CM/SEC
BH CV ECHO MEAS - MR MEAN PG: 96 MMHG
BH CV ECHO MEAS - MR MEAN VEL: 464 CM/SEC
BH CV ECHO MEAS - MR VTI: 188 CM
BH CV ECHO MEAS - MV A DUR: 0.12 SEC
BH CV ECHO MEAS - MV A MAX VEL: 105 CM/SEC
BH CV ECHO MEAS - MV DEC SLOPE: 788.7 CM/SEC^2
BH CV ECHO MEAS - MV DEC TIME: 269 SEC
BH CV ECHO MEAS - MV E MAX VEL: 125 CM/SEC
BH CV ECHO MEAS - MV E/A: 1.2
BH CV ECHO MEAS - MV MEAN PG: 4.7 MMHG
BH CV ECHO MEAS - MV P1/2T MAX VEL: 191.7 CM/SEC
BH CV ECHO MEAS - MV P1/2T: 71.2 MSEC
BH CV ECHO MEAS - MV V2 MEAN: 95.4 CM/SEC
BH CV ECHO MEAS - MV V2 VTI: 46.5 CM
BH CV ECHO MEAS - MVA P1/2T LCG: 1.1 CM^2
BH CV ECHO MEAS - MVA(P1/2T): 3.1 CM^2
BH CV ECHO MEAS - MVA(VTI): 1.6 CM^2
BH CV ECHO MEAS - PA ACC TIME: 0.11 SEC
BH CV ECHO MEAS - PA MAX PG: 6.8 MMHG
BH CV ECHO MEAS - PA PR(ACCEL): 31.3 MMHG
BH CV ECHO MEAS - PA V2 MAX: 130 CM/SEC
BH CV ECHO MEAS - PULM A REVS DUR: 0.12 SEC
BH CV ECHO MEAS - PULM A REVS VEL: 29.6 CM/SEC
BH CV ECHO MEAS - PULM DIAS VEL: 66 CM/SEC
BH CV ECHO MEAS - PULM S/D: 1.3
BH CV ECHO MEAS - PULM SYS VEL: 83.4 CM/SEC
BH CV ECHO MEAS - QP/QS: 0.67
BH CV ECHO MEAS - RAP SYSTOLE: 8 MMHG
BH CV ECHO MEAS - RV MEAN PG: 2 MMHG
BH CV ECHO MEAS - RV V1 MEAN: 61.3 CM/SEC
BH CV ECHO MEAS - RV V1 VTI: 18 CM
BH CV ECHO MEAS - RVOT AREA: 2.8 CM^2
BH CV ECHO MEAS - RVOT DIAM: 1.9 CM
BH CV ECHO MEAS - RVSP: 48 MMHG
BH CV ECHO MEAS - SI(AO): 157.4 ML/M^2
BH CV ECHO MEAS - SI(CUBED): 28.8 ML/M^2
BH CV ECHO MEAS - SI(LVOT): 54.9 ML/M^2
BH CV ECHO MEAS - SI(MOD-SP2): 28.4 ML/M^2
BH CV ECHO MEAS - SI(MOD-SP4): 36 ML/M^2
BH CV ECHO MEAS - SI(TEICH): 28.4 ML/M^2
BH CV ECHO MEAS - SV(AO): 219.7 ML
BH CV ECHO MEAS - SV(CUBED): 40.3 ML
BH CV ECHO MEAS - SV(LVOT): 76.6 ML
BH CV ECHO MEAS - SV(MOD-SP2): 39.7 ML
BH CV ECHO MEAS - SV(MOD-SP4): 50.3 ML
BH CV ECHO MEAS - SV(RVOT): 51 ML
BH CV ECHO MEAS - SV(TEICH): 39.7 ML
BH CV ECHO MEAS - TAPSE (>1.6): 2.4 CM
BH CV ECHO MEAS - TR MAX VEL: 313.7 CM/SEC
BH CV ECHO MEASUREMENTS AVERAGE E/E' RATIO: 18.12
BH CV VAS BP RIGHT ARM: NORMAL MMHG
BH CV XLRA - RV BASE: 2.9 CM
BH CV XLRA - RV LENGTH: 4.4 CM
BH CV XLRA - RV MID: 2.4 CM
BH CV XLRA - TDI S': 17 CM/SEC
LEFT ATRIUM VOLUME INDEX: 45 ML/M2
MAXIMAL PREDICTED HEART RATE: 142 BPM
STRESS TARGET HR: 121 BPM

## 2020-08-25 PROCEDURE — 93226 XTRNL ECG REC<48 HR SCAN A/R: CPT

## 2020-08-25 PROCEDURE — 93306 TTE W/DOPPLER COMPLETE: CPT | Performed by: INTERNAL MEDICINE

## 2020-08-25 PROCEDURE — 93306 TTE W/DOPPLER COMPLETE: CPT

## 2020-08-25 PROCEDURE — 93225 XTRNL ECG REC<48 HRS REC: CPT

## 2020-08-27 PROCEDURE — 93227 XTRNL ECG REC<48 HR R&I: CPT | Performed by: INTERNAL MEDICINE

## 2020-09-02 ENCOUNTER — OFFICE VISIT (OUTPATIENT)
Dept: INTERNAL MEDICINE | Facility: CLINIC | Age: 80
End: 2020-09-02

## 2020-09-02 VITALS
TEMPERATURE: 97.6 F | SYSTOLIC BLOOD PRESSURE: 132 MMHG | HEIGHT: 60 IN | RESPIRATION RATE: 16 BRPM | HEART RATE: 50 BPM | OXYGEN SATURATION: 93 % | BODY MASS INDEX: 19.99 KG/M2 | DIASTOLIC BLOOD PRESSURE: 70 MMHG | WEIGHT: 101.8 LBS

## 2020-09-02 DIAGNOSIS — Z79.01 CHRONIC ANTICOAGULATION: ICD-10-CM

## 2020-09-02 DIAGNOSIS — D50.9 IRON DEFICIENCY ANEMIA, UNSPECIFIED IRON DEFICIENCY ANEMIA TYPE: ICD-10-CM

## 2020-09-02 DIAGNOSIS — Z23 NEED FOR INFLUENZA VACCINATION: ICD-10-CM

## 2020-09-02 DIAGNOSIS — I38 VALVULAR HEART DISEASE: ICD-10-CM

## 2020-09-02 DIAGNOSIS — F41.9 ANXIETY AND DEPRESSION: ICD-10-CM

## 2020-09-02 DIAGNOSIS — I25.119 CORONARY ARTERY DISEASE INVOLVING NATIVE CORONARY ARTERY OF NATIVE HEART WITH ANGINA PECTORIS (HCC): ICD-10-CM

## 2020-09-02 DIAGNOSIS — F32.A ANXIETY AND DEPRESSION: ICD-10-CM

## 2020-09-02 DIAGNOSIS — E53.8 B12 DEFICIENCY: ICD-10-CM

## 2020-09-02 DIAGNOSIS — I48.0 PAF (PAROXYSMAL ATRIAL FIBRILLATION) (HCC): ICD-10-CM

## 2020-09-02 DIAGNOSIS — R73.03 PREDIABETES: ICD-10-CM

## 2020-09-02 DIAGNOSIS — E03.9 HYPOTHYROIDISM (ACQUIRED): Primary | ICD-10-CM

## 2020-09-02 DIAGNOSIS — I10 ESSENTIAL HYPERTENSION: ICD-10-CM

## 2020-09-02 PROCEDURE — 90694 VACC AIIV4 NO PRSRV 0.5ML IM: CPT | Performed by: INTERNAL MEDICINE

## 2020-09-02 PROCEDURE — 96372 THER/PROPH/DIAG INJ SC/IM: CPT | Performed by: INTERNAL MEDICINE

## 2020-09-02 PROCEDURE — G0008 ADMIN INFLUENZA VIRUS VAC: HCPCS | Performed by: INTERNAL MEDICINE

## 2020-09-02 PROCEDURE — 99214 OFFICE O/P EST MOD 30 MIN: CPT | Performed by: INTERNAL MEDICINE

## 2020-09-02 RX ORDER — OMEPRAZOLE 20 MG/1
20 CAPSULE, DELAYED RELEASE ORAL DAILY
Qty: 90 CAPSULE | Refills: 1 | Status: SHIPPED | OUTPATIENT
Start: 2020-09-02 | End: 2020-11-16

## 2020-09-02 RX ORDER — CYANOCOBALAMIN 1000 UG/ML
1000 INJECTION, SOLUTION INTRAMUSCULAR; SUBCUTANEOUS
Status: DISCONTINUED | OUTPATIENT
Start: 2020-09-02 | End: 2020-10-28 | Stop reason: HOSPADM

## 2020-09-02 RX ADMIN — CYANOCOBALAMIN 1000 MCG: 1000 INJECTION, SOLUTION INTRAMUSCULAR; SUBCUTANEOUS at 17:26

## 2020-09-02 NOTE — PROGRESS NOTES
Kaylin Ojeda is a 80 y.o. female, who presents with a chief complaint of   Chief Complaint   Patient presents with   • Coronary Artery Disease   • Follow-up       HPI   Pt here for f/u.  She still has her granddaughter and her family living with her which is stressful. She is on a waiting list for an assisted living place.      Hypothyroidism - pt's meds increased from 50mcg to 75mcg at last OV.       Pt c/o episodic nausea and dizziness.  she has had some fast heart beats.  She had her echo done early.  the echo did not show significant changes.  She had moderate AR, severe MR and severe TR.  She has known a-fib.  Hr currently in 50's.  She is already on metoprolol 200m /day and eliquis.  She had a holter that showed continued a-fib with episodes of a-fib w/ rvr.  She has been having fairly severe SOA and continued episodes of palpitations.   Sx worse if she is more active.  she has to use a motorized cart at the grocery store.  Walking in and out of the doctor's office is hard.  Vacuuming or carrying in groceries are now hard for her.  Her fatigue has been worsening.  She has known CAD.  She has not taken any nitro in the past month.  Next f/u with cardiology 9/22.      Anemia - her hgb has slowly been going down.  Hgb 12.3 -> 11.5 -> 10.6->10.0 over the past 8 months. She is on eliquis and omeprazole.  She hasn't had a colonoscopy since 2013.    She also has a hx of pernicious anemia.  She is not taking b12 at this time.  She has been off and of b12 shots.      The following portions of the patient's history were reviewed and updated as appropriate: allergies, current medications, past family history, past medical history, past social history, past surgical history and problem list.    Allergies: Aleve [naproxen sodium]; Codeine; Ibuprofen; and Sulfa antibiotics    Review of Systems   Constitutional: Negative.    HENT: Negative.    Eyes: Negative.    Respiratory: Negative.    Cardiovascular: Negative.     Gastrointestinal: Negative.    Endocrine: Negative.    Genitourinary: Negative.    Musculoskeletal: Negative.    Skin: Negative.    Allergic/Immunologic: Negative.    Neurological: Negative.    Hematological: Negative.    Psychiatric/Behavioral: Negative.    All other systems reviewed and are negative.            Wt Readings from Last 3 Encounters:   09/02/20 46.2 kg (101 lb 12.8 oz)   08/25/20 45.8 kg (101 lb)   08/05/20 45.9 kg (101 lb 3.2 oz)     Temp Readings from Last 3 Encounters:   09/02/20 97.6 °F (36.4 °C) (Temporal)   08/05/20 97.6 °F (36.4 °C) (Temporal)   07/08/20 97.6 °F (36.4 °C) (Temporal)     BP Readings from Last 3 Encounters:   09/02/20 132/70   08/25/20 148/59   08/05/20 110/68     Pulse Readings from Last 3 Encounters:   09/02/20 50   08/05/20 61   07/08/20 63     Body mass index is 19.88 kg/m².  @LASTSAO2(3)@    Physical Exam   Constitutional: She is oriented to person, place, and time. No distress.   Thin, frail elderly female   HENT:   Head: Normocephalic and atraumatic.   Right Ear: External ear normal.   Left Ear: External ear normal.   Nose: Nose normal.   Mouth/Throat: Oropharynx is clear and moist.   Eyes: Pupils are equal, round, and reactive to light. Conjunctivae and EOM are normal.   Neck: Normal range of motion. Neck supple.   Cardiovascular: Normal rate, regular rhythm, normal heart sounds and intact distal pulses.   Pulmonary/Chest: Effort normal and breath sounds normal. No respiratory distress. She has no wheezes.   Musculoskeletal: Normal range of motion.   Normal gait   Neurological: She is alert and oriented to person, place, and time.   Skin: Skin is warm and dry.   Psychiatric: She has a normal mood and affect. Her behavior is normal. Judgment and thought content normal.   Nursing note and vitals reviewed.      Results for orders placed or performed during the hospital encounter of 08/25/20   Adult Transthoracic Echo Complete W/ Cont if Necessary Per Protocol   Result  Value Ref Range    BSA 1.4 m^2    IVSd 0.81 cm    LVIDd 3.9 cm    LVIDs 2.7 cm    LVPWd 0.77 cm    IVS/LVPW 1.0     FS 31.9 %    EDV(Teich) 65.5 ml    ESV(Teich) 25.8 ml    EF(Teich) 60.6 %    EDV(cubed) 58.9 ml    ESV(cubed) 18.6 ml    EF(cubed) 68.4 %    LV mass(C)d 88.1 grams    LV mass(C)dI 63.1 grams/m^2    SV(Teich) 39.7 ml    SI(Teich) 28.4 ml/m^2    SV(cubed) 40.3 ml    SI(cubed) 28.8 ml/m^2    Ao root diam 2.6 cm    Ao root area 5.3 cm^2    ACS 1.7 cm    LVOT diam 1.8 cm    LVOT area 2.5 cm^2    LVOT area(traced) 2.5 cm^2    RVOT diam 1.9 cm    RVOT area 2.8 cm^2    LVLd ap4 6.4 cm    EDV(MOD-sp4) 74.5 ml    LVLs ap4 5.4 cm    ESV(MOD-sp4) 24.2 ml    EF(MOD-sp4) 67.5 %    LVLd ap2 6.5 cm    EDV(MOD-sp2) 60.8 ml    LVLs ap2 5.3 cm    ESV(MOD-sp2) 21.1 ml    EF(MOD-sp2) 65.3 %    SV(MOD-sp4) 50.3 ml    SI(MOD-sp4) 36.0 ml/m^2    SV(MOD-sp2) 39.7 ml    SI(MOD-sp2) 28.4 ml/m^2    Ao root area (BSA corrected) 1.9     LV Mccollum Vol (BSA corrected) 53.4 ml/m^2    LV Sys Vol (BSA corrected) 17.3 ml/m^2    TAPSE (>1.6) 2.4 cm    EF(MOD-bp) 66.6 %    MV A dur 0.12 sec    MV E max agustin 125.0 cm/sec    MV A max agustin 105.0 cm/sec    MV E/A 1.2     MV V2 mean 95.4 cm/sec    MV mean PG 4.7 mmHg    MV V2 VTI 46.5 cm    MVA(VTI) 1.6 cm^2    MV P1/2t max agustin 191.7 cm/sec    MV P1/2t 71.2 msec    MVA(P1/2t) 3.1 cm^2    MV dec slope 788.7 cm/sec^2    MV dec time 269 sec    Ao V2 mean 125.2 cm/sec    Ao mean PG 9.0 mmHg    Ao mean PG (full) 4.6 mmHg    Ao V2 VTI 41.4 cm    GUILHERME(I,A) 1.9 cm^2    GUILHERME(I,D) 1.9 cm^2    AI max agustin 483.0 cm/sec    AI max PG 93.3 mmHg    AI dec slope 387.0 cm/sec^2    AI P1/2t 365.5 msec    LV V1 mean PG 3.0 mmHg    LV V1 mean 86.4 cm/sec    LV V1 VTI 30.1 cm    MR mean agustin 464.0 cm/sec    MR mean PG 96.0 mmHg    MR .0 cm    SV(Ao) 219.7 ml    SI(Ao) 157.4 ml/m^2    SV(LVOT) 76.6 ml    SV(RVOT) 51.0 ml    SI(LVOT) 54.9 ml/m^2    PA V2 max 130.0 cm/sec    PA max PG 6.8 mmHg    PA acc time 0.11 sec     RV V1 mean PG 2.0 mmHg    RV V1 mean 61.3 cm/sec    RV V1 VTI 18.0 cm    TR max vance 313.7 cm/sec    RVSP(TR) 48.0 mmHg    RAP systole 8.0 mmHg    PA pr(Accel) 31.3 mmHg    Pulm Sys Vance 83.4 cm/sec    Pulm Mccollum Vance 66.0 cm/sec    Pulm S/D 1.3     Qp/Qs 0.67     Pulm A Revs Dur 0.12 sec    Pulm A Revs Vance 29.6 cm/sec    MVA P1/2T LCG 1.1 cm^2    RV Base 2.9 cm    RV Length 4.4 cm    RV Mid 2.4 cm    Lat Peak E' Vance 6.5 cm/sec    Med Peak E' Vance 7.3 cm/sec    RV S' 17.0 cm/sec     CV ECHO SANJAY - BZI_BMI 19.7 kilograms/m^2     CV ECHO SANJAY - BSA(HAYCOCK) 1.4 m^2     CV ECHO SANJAY - BZI_METRIC_WEIGHT 45.8 kg     CV ECHO SANJAY - BZI_METRIC_HEIGHT 152.4 cm    Avg E/e' ratio 18.12     Target HR (85%) 121 bpm    Max. Pred. HR (100%) 142 bpm     CV VAS BP RIGHT /59 mmHg    LA Volume Index 45.0 mL/m2    Ao pk vance 222.0 cm/sec    LV V1 max PG 8.0 mmHg    LV V1 max 138.0 cm/sec    Ao max PG 20.0 mmHg           Kaylin was seen today for coronary artery disease and follow-up.    Diagnoses and all orders for this visit:    Hypothyroidism (acquired)  -     Comprehensive Metabolic Panel  -     CBC & Differential  -     T4, Free  -     TSH    Need for influenza vaccination  -     Fluad Quad >65 years    Coronary artery disease involving native coronary artery of native heart with angina pectoris (CMS/HCC)    Prediabetes    Essential hypertension    PAF (paroxysmal atrial fibrillation) (CMS/HCC)    Anxiety and depression    Valvular heart disease    B12 deficiency  -     cyanocobalamin injection 1,000 mcg    Iron deficiency anemia, unspecified iron deficiency anemia type  -     CBC & Differential    Chronic anticoagulation  -     Comprehensive Metabolic Panel  -     CBC & Differential      Pt encouraged to keep her cardiology appt.  Will check labs today and contact Dr. Calero is significant changes.  Pt's fatigue likely multifactorial. She has chronic a-fib, valvuar disease, anemia, depression, chronic pain, and chronic  stress at home.      Outpatient Medications Prior to Visit   Medication Sig Dispense Refill   • albuterol (PROVENTIL) (2.5 MG/3ML) 0.083% nebulizer solution Take 2.5 mg by nebulization Every 4 (Four) Hours As Needed for Wheezing or Shortness of Air. 120 vial 5   • albuterol sulfate  (90 Base) MCG/ACT inhaler Inhale 2 puffs Every 4 (Four) Hours As Needed for Wheezing. 18 g 11   • alendronate (FOSAMAX) 70 MG tablet Take 1 tablet by mouth Every 7 (Seven) Days. 48 tablet 0   • amLODIPine (NORVASC) 10 MG tablet TAKE 1 TABLET BY MOUTH DAILY 30 tablet 2   • atorvastatin (LIPITOR) 10 MG tablet TAKE 1 TABLET BY MOUTH DAILY 90 tablet 3   • budesonide-formoterol (SYMBICORT) 160-4.5 MCG/ACT inhaler Inhale 2 puffs 2 (Two) Times a Day. 1 inhaler 12   • clonazePAM (KlonoPIN) 0.5 MG tablet Take 1 tablet by mouth At Night As Needed for Anxiety. 30 tablet 0   • DULoxetine (CYMBALTA) 60 MG capsule TAKE 1 CAPSULE BY MOUTH DAILY 90 capsule 1   • ELIQUIS 5 MG tablet tablet TAKE 1 TABLET BY MOUTH EVERY 12 HOURS 60 tablet 1   • fluticasone (FLONASE) 50 MCG/ACT nasal spray SPRAY TWICE IN EACH NOSTRIL EVERY DAY FOR 30 DAYS. 16 mL 6   • levothyroxine (SYNTHROID, LEVOTHROID) 75 MCG tablet Take 1 tablet by mouth Daily. 90 tablet 0   • lisinopril (PRINIVIL,ZESTRIL) 20 MG tablet TAKE 1 TABLET BY MOUTH DAILY 90 tablet 0   • metoprolol tartrate (LOPRESSOR) 100 MG tablet Take 1 tablet by mouth 2 (Two) Times a Day. 180 tablet 1   • omeprazole (priLOSEC) 20 MG capsule TAKE 1 CAPSULE BY MOUTH DAILY 90 capsule 1   • ondansetron (ZOFRAN) 8 MG tablet Take 1 tablet by mouth Every 8 (Eight) Hours As Needed for Nausea or Vomiting. 20 tablet 1   • oxybutynin (DITROPAN) 5 MG tablet TAKE 1 TABLET BY MOUTH TWICE DAILY 180 tablet 1   • oxyCODONE-acetaminophen (Percocet) 7.5-325 MG per tablet Take 1 tablet by mouth Every 8 (Eight) Hours As Needed for Severe Pain . 75 tablet 0   • sennosides-docusate (senna-docusate sodium) 8.6-50 MG per tablet Take 2  tablets by mouth Daily. 60 tablet 5   • traZODone (DESYREL) 50 MG tablet Take 50 mg by mouth Every Night.       Facility-Administered Medications Prior to Visit   Medication Dose Route Frequency Provider Last Rate Last Dose   • cyanocobalamin injection 1,000 mcg  1,000 mcg Intramuscular Q28 Days Donna Forte MD   1,000 mcg at 07/08/20 1028     New Medications Ordered This Visit   Medications   • cyanocobalamin injection 1,000 mcg     [unfilled]  There are no discontinued medications.      Return in about 6 weeks (around 10/14/2020) for Recheck.

## 2020-09-03 LAB
ALBUMIN SERPL-MCNC: 4.3 G/DL (ref 3.5–5.2)
ALBUMIN/GLOB SERPL: 1.9 G/DL
ALP SERPL-CCNC: 97 U/L (ref 39–117)
ALT SERPL-CCNC: 11 U/L (ref 1–33)
AST SERPL-CCNC: 16 U/L (ref 1–32)
BASOPHILS # BLD AUTO: 0.07 10*3/MM3 (ref 0–0.2)
BASOPHILS NFR BLD AUTO: 0.9 % (ref 0–1.5)
BILIRUB SERPL-MCNC: 0.3 MG/DL (ref 0–1.2)
BUN SERPL-MCNC: 24 MG/DL (ref 8–23)
BUN/CREAT SERPL: 23.1 (ref 7–25)
CALCIUM SERPL-MCNC: 8.8 MG/DL (ref 8.6–10.5)
CHLORIDE SERPL-SCNC: 103 MMOL/L (ref 98–107)
CO2 SERPL-SCNC: 26 MMOL/L (ref 22–29)
CREAT SERPL-MCNC: 1.04 MG/DL (ref 0.57–1)
EOSINOPHIL # BLD AUTO: 0.1 10*3/MM3 (ref 0–0.4)
EOSINOPHIL NFR BLD AUTO: 1.3 % (ref 0.3–6.2)
ERYTHROCYTE [DISTWIDTH] IN BLOOD BY AUTOMATED COUNT: 15.3 % (ref 12.3–15.4)
GLOBULIN SER CALC-MCNC: 2.3 GM/DL
GLUCOSE SERPL-MCNC: 101 MG/DL (ref 65–99)
HCT VFR BLD AUTO: 29.4 % (ref 34–46.6)
HGB BLD-MCNC: 9.4 G/DL (ref 12–15.9)
IMM GRANULOCYTES # BLD AUTO: 0.02 10*3/MM3 (ref 0–0.05)
IMM GRANULOCYTES NFR BLD AUTO: 0.3 % (ref 0–0.5)
LYMPHOCYTES # BLD AUTO: 1.63 10*3/MM3 (ref 0.7–3.1)
LYMPHOCYTES NFR BLD AUTO: 21.6 % (ref 19.6–45.3)
MCH RBC QN AUTO: 26.8 PG (ref 26.6–33)
MCHC RBC AUTO-ENTMCNC: 32 G/DL (ref 31.5–35.7)
MCV RBC AUTO: 83.8 FL (ref 79–97)
MONOCYTES # BLD AUTO: 0.59 10*3/MM3 (ref 0.1–0.9)
MONOCYTES NFR BLD AUTO: 7.8 % (ref 5–12)
NEUTROPHILS # BLD AUTO: 5.14 10*3/MM3 (ref 1.7–7)
NEUTROPHILS NFR BLD AUTO: 68.1 % (ref 42.7–76)
NRBC BLD AUTO-RTO: 0 /100 WBC (ref 0–0.2)
PLATELET # BLD AUTO: 272 10*3/MM3 (ref 140–450)
POTASSIUM SERPL-SCNC: 5.1 MMOL/L (ref 3.5–5.2)
PROT SERPL-MCNC: 6.6 G/DL (ref 6–8.5)
RBC # BLD AUTO: 3.51 10*6/MM3 (ref 3.77–5.28)
SODIUM SERPL-SCNC: 138 MMOL/L (ref 136–145)
T4 FREE SERPL-MCNC: 2.15 NG/DL (ref 0.93–1.7)
TSH SERPL DL<=0.005 MIU/L-ACNC: 0.04 UIU/ML (ref 0.27–4.2)
WBC # BLD AUTO: 7.55 10*3/MM3 (ref 3.4–10.8)

## 2020-09-15 ENCOUNTER — TELEPHONE (OUTPATIENT)
Dept: INTERNAL MEDICINE | Facility: CLINIC | Age: 80
End: 2020-09-15

## 2020-09-15 NOTE — TELEPHONE ENCOUNTER
PATIENT IS CALLING TO GET FEEDBACK. SHE HAS BEEN SICK SINCE HER LAST APPT. SHE HAD THE FLU SHOT AND SHE IS NOT SURE IF SHE IS SICK BECAUSE OF IT. SHE IS HAVING SYMPTOMS OF: NAUSEA, DIARRHEA AND SHE IS FEELING DIZZY.    PLEASE GIVE HER A CALL WITH 'S RECOMMENDATIONS.    [facility] [place]#: [phone]

## 2020-09-17 ENCOUNTER — LAB (OUTPATIENT)
Dept: LAB | Facility: HOSPITAL | Age: 80
End: 2020-09-17

## 2020-09-17 ENCOUNTER — TELEPHONE (OUTPATIENT)
Dept: INTERNAL MEDICINE | Facility: CLINIC | Age: 80
End: 2020-09-17

## 2020-09-17 DIAGNOSIS — M54.50 CHRONIC LOW BACK PAIN WITHOUT SCIATICA, UNSPECIFIED BACK PAIN LATERALITY: ICD-10-CM

## 2020-09-17 DIAGNOSIS — F32.A ANXIETY AND DEPRESSION: ICD-10-CM

## 2020-09-17 DIAGNOSIS — G89.29 CHRONIC LOW BACK PAIN WITHOUT SCIATICA, UNSPECIFIED BACK PAIN LATERALITY: ICD-10-CM

## 2020-09-17 DIAGNOSIS — M54.50 LUMBAR BACK PAIN: ICD-10-CM

## 2020-09-17 DIAGNOSIS — F41.9 ANXIETY AND DEPRESSION: ICD-10-CM

## 2020-09-17 LAB
BACTERIA UR QL AUTO: ABNORMAL /HPF
BASOPHILS # BLD AUTO: 0.08 10*3/MM3 (ref 0–0.2)
BASOPHILS NFR BLD AUTO: 0.9 % (ref 0–1.5)
BILIRUB UR QL STRIP: NEGATIVE
CLARITY UR: ABNORMAL
COLOR UR: YELLOW
DEPRECATED RDW RBC AUTO: 46.4 FL (ref 37–54)
EOSINOPHIL # BLD AUTO: 0.14 10*3/MM3 (ref 0–0.4)
EOSINOPHIL NFR BLD AUTO: 1.5 % (ref 0.3–6.2)
ERYTHROCYTE [DISTWIDTH] IN BLOOD BY AUTOMATED COUNT: 15.5 % (ref 12.3–15.4)
GLUCOSE UR STRIP-MCNC: NEGATIVE MG/DL
HCT VFR BLD AUTO: 30.9 % (ref 34–46.6)
HGB BLD-MCNC: 9.9 G/DL (ref 12–15.9)
HGB UR QL STRIP.AUTO: NEGATIVE
HYALINE CASTS UR QL AUTO: ABNORMAL /LPF
IMM GRANULOCYTES # BLD AUTO: 0.03 10*3/MM3 (ref 0–0.05)
IMM GRANULOCYTES NFR BLD AUTO: 0.3 % (ref 0–0.5)
KETONES UR QL STRIP: ABNORMAL
LEUKOCYTE ESTERASE UR QL STRIP.AUTO: ABNORMAL
LYMPHOCYTES # BLD AUTO: 2.89 10*3/MM3 (ref 0.7–3.1)
LYMPHOCYTES NFR BLD AUTO: 31.9 % (ref 19.6–45.3)
MCH RBC QN AUTO: 26.5 PG (ref 26.6–33)
MCHC RBC AUTO-ENTMCNC: 32 G/DL (ref 31.5–35.7)
MCV RBC AUTO: 82.6 FL (ref 79–97)
MONOCYTES # BLD AUTO: 0.84 10*3/MM3 (ref 0.1–0.9)
MONOCYTES NFR BLD AUTO: 9.3 % (ref 5–12)
NEUTROPHILS NFR BLD AUTO: 5.07 10*3/MM3 (ref 1.7–7)
NEUTROPHILS NFR BLD AUTO: 56.1 % (ref 42.7–76)
NITRITE UR QL STRIP: NEGATIVE
NRBC BLD AUTO-RTO: 0 /100 WBC (ref 0–0.2)
PH UR STRIP.AUTO: 7.5 [PH] (ref 5–8)
PLATELET # BLD AUTO: 367 10*3/MM3 (ref 140–450)
PMV BLD AUTO: 10.6 FL (ref 6–12)
PROT UR QL STRIP: ABNORMAL
RBC # BLD AUTO: 3.74 10*6/MM3 (ref 3.77–5.28)
RBC # UR: ABNORMAL /HPF
REF LAB TEST METHOD: ABNORMAL
SP GR UR STRIP: 1.02 (ref 1–1.03)
SQUAMOUS #/AREA URNS HPF: ABNORMAL /HPF
UROBILINOGEN UR QL STRIP: ABNORMAL
WBC # BLD AUTO: 9.05 10*3/MM3 (ref 3.4–10.8)
WBC UR QL AUTO: ABNORMAL /HPF

## 2020-09-17 PROCEDURE — 87086 URINE CULTURE/COLONY COUNT: CPT | Performed by: INTERNAL MEDICINE

## 2020-09-17 PROCEDURE — 81001 URINALYSIS AUTO W/SCOPE: CPT | Performed by: INTERNAL MEDICINE

## 2020-09-17 PROCEDURE — 85025 COMPLETE CBC W/AUTO DIFF WBC: CPT | Performed by: INTERNAL MEDICINE

## 2020-09-17 PROCEDURE — 36415 COLL VENOUS BLD VENIPUNCTURE: CPT | Performed by: INTERNAL MEDICINE

## 2020-09-17 NOTE — TELEPHONE ENCOUNTER
PATIENT STOPS BY OFFICE REQUESTING SCRIPTS FILL FOR OXYCODONE/ACETAMINOPHINE 7.5-325 TABLETS.    TAKE 1 TAB BY MOUTH EVERY 8 HRS AS NEEDED FOR SEVERE PAIN.   2 TABLETS REMAINING  Last filled 8-    CLONAZEPAM 0.5 MG TABLETS  TAKE 1 TAB BY MOUTH AT NIGHT AS NEEDED FOR ANXIETY. (PT IS COMPLETELY OUT OF THIS MED)    [facility] IN [place]

## 2020-09-18 ENCOUNTER — OFFICE VISIT (OUTPATIENT)
Dept: INTERNAL MEDICINE | Facility: CLINIC | Age: 80
End: 2020-09-18

## 2020-09-18 DIAGNOSIS — R30.0 DYSURIA: Primary | ICD-10-CM

## 2020-09-18 PROCEDURE — 99213 OFFICE O/P EST LOW 20 MIN: CPT | Performed by: INTERNAL MEDICINE

## 2020-09-18 RX ORDER — OXYCODONE AND ACETAMINOPHEN 7.5; 325 MG/1; MG/1
1 TABLET ORAL EVERY 8 HOURS PRN
Qty: 75 TABLET | Refills: 0 | Status: SHIPPED | OUTPATIENT
Start: 2020-09-18 | End: 2020-10-19 | Stop reason: SDUPTHER

## 2020-09-18 RX ORDER — CEPHALEXIN 500 MG/1
500 CAPSULE ORAL 2 TIMES DAILY
Qty: 14 CAPSULE | Refills: 0 | Status: SHIPPED | OUTPATIENT
Start: 2020-09-18 | End: 2020-09-25

## 2020-09-18 RX ORDER — CLONAZEPAM 0.5 MG/1
0.5 TABLET ORAL NIGHTLY PRN
Qty: 30 TABLET | Refills: 0 | Status: SHIPPED | OUTPATIENT
Start: 2020-09-18 | End: 2020-10-19 | Stop reason: SDUPTHER

## 2020-09-18 NOTE — PROGRESS NOTES
Kaylin Ojeda is a 80 y.o. female, who presents with a chief complaint of No chief complaint on file.      HPI   This visit has been scheduled as a televisit to comply with patient safety concerns in accordance with CDC recommendations. The patient had issues with technology so the visit was changed from televisit to phone visit.      You have chosen to receive care through a telephone visit. Do you consent to use a telephone visit for your medical care today? Yes    Pt c/o back pain and cloudy urine.  She hasn't been feeling well.  She left a urine sample.  No n/v/d.  No fever.  She feels weak overall and has been in bed a lot.    The following portions of the patient's history were reviewed and updated as appropriate: allergies, current medications, past family history, past medical history, past social history, past surgical history and problem list.    Allergies: Aleve [naproxen sodium], Codeine, Ibuprofen, and Sulfa antibiotics    Review of Systems   Constitutional: Positive for fatigue. Negative for fever.   HENT: Negative.    Eyes: Negative.    Respiratory: Negative.    Cardiovascular: Negative.    Gastrointestinal: Negative.    Endocrine: Negative.    Genitourinary: Positive for dysuria.   Musculoskeletal: Negative.    Skin: Negative.    Allergic/Immunologic: Negative.    Neurological: Negative.    Hematological: Negative.    Psychiatric/Behavioral: Negative.    All other systems reviewed and are negative.            Wt Readings from Last 3 Encounters:   09/02/20 46.2 kg (101 lb 12.8 oz)   08/25/20 45.8 kg (101 lb)   08/05/20 45.9 kg (101 lb 3.2 oz)     Temp Readings from Last 3 Encounters:   09/02/20 97.6 °F (36.4 °C) (Temporal)   08/05/20 97.6 °F (36.4 °C) (Temporal)   07/08/20 97.6 °F (36.4 °C) (Temporal)     BP Readings from Last 3 Encounters:   09/02/20 132/70   08/25/20 148/59   08/05/20 110/68     Pulse Readings from Last 3 Encounters:   09/02/20 50   08/05/20 61   07/08/20 63     There is  no height or weight on file to calculate BMI.  @LASTSAO2(3)@    Physical Exam  Constitutional:       General: She is not in acute distress.  Pulmonary:      Effort: No respiratory distress.   Neurological:      Mental Status: She is alert and oriented to person, place, and time.   Psychiatric:         Behavior: Behavior normal.         Thought Content: Thought content normal.         Judgment: Judgment normal.         Results for orders placed or performed in visit on 09/02/20   Urine Culture - Urine, Urine, Clean Catch    Specimen: Urine, Clean Catch   Result Value Ref Range    Urine Culture Culture in progress    Comprehensive Metabolic Panel    Specimen: Blood   Result Value Ref Range    Glucose 101 (H) 65 - 99 mg/dL    BUN 24 (H) 8 - 23 mg/dL    Creatinine 1.04 (H) 0.57 - 1.00 mg/dL    eGFR Non African Am 51 (L) >60 mL/min/1.73    eGFR African Am 62 >60 mL/min/1.73    BUN/Creatinine Ratio 23.1 7.0 - 25.0    Sodium 138 136 - 145 mmol/L    Potassium 5.1 3.5 - 5.2 mmol/L    Chloride 103 98 - 107 mmol/L    Total CO2 26.0 22.0 - 29.0 mmol/L    Calcium 8.8 8.6 - 10.5 mg/dL    Total Protein 6.6 6.0 - 8.5 g/dL    Albumin 4.30 3.50 - 5.20 g/dL    Globulin 2.3 gm/dL    A/G Ratio 1.9 g/dL    Total Bilirubin 0.3 0.0 - 1.2 mg/dL    Alkaline Phosphatase 97 39 - 117 U/L    AST (SGOT) 16 1 - 32 U/L    ALT (SGPT) 11 1 - 33 U/L   T4, Free    Specimen: Blood   Result Value Ref Range    Free T4 2.15 (H) 0.93 - 1.70 ng/dL   TSH    Specimen: Blood   Result Value Ref Range    TSH 0.036 (L) 0.270 - 4.200 uIU/mL   Urinalysis With Culture If Indicated - Urine, Clean Catch    Specimen: Urine, Clean Catch   Result Value Ref Range    Color, UA Yellow Yellow, Straw    Appearance, UA Turbid (A) Clear    pH, UA 7.5 5.0 - 8.0    Specific Gravity, UA 1.024 1.005 - 1.030    Glucose, UA Negative Negative    Ketones, UA Trace (A) Negative    Bilirubin, UA Negative Negative    Blood, UA Negative Negative    Protein, UA 30 mg/dL (1+) (A) Negative     Leuk Esterase, UA Moderate (2+) (A) Negative    Nitrite, UA Negative Negative    Urobilinogen, UA 1.0 E.U./dL 0.2 - 1.0 E.U./dL   CBC Auto Differential    Specimen: Blood   Result Value Ref Range    WBC 9.05 3.40 - 10.80 10*3/mm3    RBC 3.74 (L) 3.77 - 5.28 10*6/mm3    Hemoglobin 9.9 (L) 12.0 - 15.9 g/dL    Hematocrit 30.9 (L) 34.0 - 46.6 %    MCV 82.6 79.0 - 97.0 fL    MCH 26.5 (L) 26.6 - 33.0 pg    MCHC 32.0 31.5 - 35.7 g/dL    RDW 15.5 (H) 12.3 - 15.4 %    RDW-SD 46.4 37.0 - 54.0 fl    MPV 10.6 6.0 - 12.0 fL    Platelets 367 140 - 450 10*3/mm3    Neutrophil % 56.1 42.7 - 76.0 %    Lymphocyte % 31.9 19.6 - 45.3 %    Monocyte % 9.3 5.0 - 12.0 %    Eosinophil % 1.5 0.3 - 6.2 %    Basophil % 0.9 0.0 - 1.5 %    Immature Grans % 0.3 0.0 - 0.5 %    Neutrophils, Absolute 5.07 1.70 - 7.00 10*3/mm3    Lymphocytes, Absolute 2.89 0.70 - 3.10 10*3/mm3    Monocytes, Absolute 0.84 0.10 - 0.90 10*3/mm3    Eosinophils, Absolute 0.14 0.00 - 0.40 10*3/mm3    Basophils, Absolute 0.08 0.00 - 0.20 10*3/mm3    Immature Grans, Absolute 0.03 0.00 - 0.05 10*3/mm3    nRBC 0.0 0.0 - 0.2 /100 WBC   Urinalysis, Microscopic Only - Urine, Clean Catch    Specimen: Urine, Clean Catch   Result Value Ref Range    RBC, UA 0-2 None Seen, 0-2 /HPF    WBC, UA 6-12 (A) None Seen, 0-2 /HPF    Bacteria, UA 4+ (A) None Seen /HPF    Squamous Epithelial Cells, UA 3-6 (A) None Seen, 0-2 /HPF    Hyaline Casts, UA 3-6 None Seen /LPF    Methodology Automated Microscopy    CBC & Differential    Specimen: Blood   Result Value Ref Range    WBC 7.55 3.40 - 10.80 10*3/mm3    RBC 3.51 (L) 3.77 - 5.28 10*6/mm3    Hemoglobin 9.4 (L) 12.0 - 15.9 g/dL    Hematocrit 29.4 (L) 34.0 - 46.6 %    MCV 83.8 79.0 - 97.0 fL    MCH 26.8 26.6 - 33.0 pg    MCHC 32.0 31.5 - 35.7 g/dL    RDW 15.3 12.3 - 15.4 %    Platelets 272 140 - 450 10*3/mm3    Neutrophil Rel % 68.1 42.7 - 76.0 %    Lymphocyte Rel % 21.6 19.6 - 45.3 %    Monocyte Rel % 7.8 5.0 - 12.0 %    Eosinophil Rel % 1.3 0.3 -  6.2 %    Basophil Rel % 0.9 0.0 - 1.5 %    Neutrophils Absolute 5.14 1.70 - 7.00 10*3/mm3    Lymphocytes Absolute 1.63 0.70 - 3.10 10*3/mm3    Monocytes Absolute 0.59 0.10 - 0.90 10*3/mm3    Eosinophils Absolute 0.10 0.00 - 0.40 10*3/mm3    Basophils Absolute 0.07 0.00 - 0.20 10*3/mm3    Immature Granulocyte Rel % 0.3 0.0 - 0.5 %    Immature Grans Absolute 0.02 0.00 - 0.05 10*3/mm3    nRBC 0.0 0.0 - 0.2 /100 WBC           Diagnoses and all orders for this visit:    Dysuria  -     cephalexin (Keflex) 500 MG capsule; Take 1 capsule by mouth 2 (Two) Times a Day for 7 days.          Outpatient Medications Prior to Visit   Medication Sig Dispense Refill   • albuterol (PROVENTIL) (2.5 MG/3ML) 0.083% nebulizer solution Take 2.5 mg by nebulization Every 4 (Four) Hours As Needed for Wheezing or Shortness of Air. 120 vial 5   • albuterol sulfate  (90 Base) MCG/ACT inhaler Inhale 2 puffs Every 4 (Four) Hours As Needed for Wheezing. 18 g 11   • alendronate (FOSAMAX) 70 MG tablet Take 1 tablet by mouth Every 7 (Seven) Days. 48 tablet 0   • amLODIPine (NORVASC) 10 MG tablet TAKE 1 TABLET BY MOUTH DAILY 30 tablet 2   • atorvastatin (LIPITOR) 10 MG tablet TAKE 1 TABLET BY MOUTH DAILY 90 tablet 3   • budesonide-formoterol (SYMBICORT) 160-4.5 MCG/ACT inhaler Inhale 2 puffs 2 (Two) Times a Day. 1 inhaler 12   • clonazePAM (KlonoPIN) 0.5 MG tablet Take 1 tablet by mouth At Night As Needed for Anxiety. 30 tablet 0   • DULoxetine (CYMBALTA) 60 MG capsule TAKE 1 CAPSULE BY MOUTH DAILY 90 capsule 1   • ELIQUIS 5 MG tablet tablet TAKE 1 TABLET BY MOUTH EVERY 12 HOURS 60 tablet 1   • fluticasone (FLONASE) 50 MCG/ACT nasal spray SPRAY TWICE IN EACH NOSTRIL EVERY DAY FOR 30 DAYS. 16 mL 6   • levothyroxine (SYNTHROID, LEVOTHROID) 75 MCG tablet Take 1 tablet by mouth Daily. 90 tablet 0   • lisinopril (PRINIVIL,ZESTRIL) 20 MG tablet TAKE 1 TABLET BY MOUTH DAILY 90 tablet 0   • metoprolol tartrate (LOPRESSOR) 100 MG tablet Take 1 tablet by  mouth 2 (Two) Times a Day. 180 tablet 1   • omeprazole (priLOSEC) 20 MG capsule TAKE 1 CAPSULE BY MOUTH DAILY 90 capsule 1   • ondansetron (ZOFRAN) 8 MG tablet Take 1 tablet by mouth Every 8 (Eight) Hours As Needed for Nausea or Vomiting. 20 tablet 1   • oxybutynin (DITROPAN) 5 MG tablet TAKE 1 TABLET BY MOUTH TWICE DAILY 180 tablet 1   • oxyCODONE-acetaminophen (Percocet) 7.5-325 MG per tablet Take 1 tablet by mouth Every 8 (Eight) Hours As Needed for Severe Pain . 75 tablet 0   • sennosides-docusate (senna-docusate sodium) 8.6-50 MG per tablet Take 2 tablets by mouth Daily. 60 tablet 5   • traZODone (DESYREL) 50 MG tablet Take 50 mg by mouth Every Night.       Facility-Administered Medications Prior to Visit   Medication Dose Route Frequency Provider Last Rate Last Dose   • cyanocobalamin injection 1,000 mcg  1,000 mcg Intramuscular Q28 Days Donna Forte MD   1,000 mcg at 09/02/20 1726   • cyanocobalamin injection 1,000 mcg  1,000 mcg Intramuscular Q28 Days Donna Forte MD         New Medications Ordered This Visit   Medications   • cephalexin (Keflex) 500 MG capsule     Sig: Take 1 capsule by mouth 2 (Two) Times a Day for 7 days.     Dispense:  14 capsule     Refill:  0     [unfilled]  There are no discontinued medications.      No follow-ups on file.

## 2020-09-20 LAB — BACTERIA SPEC AEROBE CULT: ABNORMAL

## 2020-09-21 NOTE — PROGRESS NOTES
"  Date of Office Visit: 2020  Encounter Provider: LILI Lockhart  Place of Service: [facility] CARDIOLOGY  Patient Name: Kaylin Ojeda  :1940  Primary Cardiologist: Dr. Calero    CC:  6 month follow up    Dear Dr. Forte    HPI: Kaylin Ojeda is a pleasant 80 y.o. female who presents 2020 for cardiac follow up.      She is a chronically ill woman with previous history of refractory hypertension and severe peripheral vascular disease. In May 2015, she was found to have single-vessel coronary artery disease of the circumflex and had a stent placed. Over the next two years, we had to progressively cut back on her anti-hypertensives due to low blood pressure (presumably from weight loss).       In 2017 she developed anginal chest pressure and was found to have rapid atrial fibrillation.  She cardioverted on her own and her metoprolol dose was increased.  An echo showed normal LV systolic function, moderate TR, and mild AI/MR.  She ruled out for ACS.  She was placed on apixaban.     Once she was on apixaban, she had to stop aspirin as it caused nosebleeds.     Dr. Calero saw her in May 2019 for palpitations and chest discomfort.  She was under a tremendous amount of stress and was in a very unfortunately living situation.  An echo showed normal LVSF, EF 60%, mild-moderate AI, and severe MR/TR.  Dr. Calero did not feel she was a candidate for further evaluation due to her comorbidities.     She has known PAD, and a small AAA.  A CTA in 2017 showed that it was 3.6cm.  In May 2018, it was 3.8cm.  There was known celiac disease (\"moderate\"), known left renal artery occlusion, and known left subclavian artery occlusion.  She has chronic claudication. She has been  referred  to vascular surgery several times, but she declines to follow up or proceed with recommended testing.     She was in the ED in 2019 for leg pain and dyspnea.  A CTA was negative for PE " but did show advanced emphysema.      Holter 8/25/2020  ·  An abnormal monitor study.  · Paroxysmal atrial fibrillation is present  · Episodes of rapid ventricular response up to 185 bpm are seen.  An episode of atrial fibrillation lasting approximately 2 hours is seen. Patient was still in atrial fibrillation at the termination of the recording, so the true duration is unknown    Echo 8/25/2020  · Left ventricular systolic function is normal. Calculated EF = 66.6%. Estimated EF was in agreement with the calculated EF. Normal left ventricular cavity size noted. All left ventricular wall segments contract normally. Left ventricular wall thickness is consistent with mild concentric hypertrophy. Left ventricular diastolic dysfunction is noted (grade II w/high LAP) consistent with pseudonormalization.  · Left atrial cavity size is moderately dilated.  · Moderate aortic valve regurgitation is present. No hemodynamically significant aortic valve stenosis is present.  · Moderate-to-severe mitral valve regurgitation is present.  · Severe tricuspid valve regurgitation is present. Estimated right ventricular systolic pressure from tricuspid regurgitation is moderately elevated (45-55 mmHg). Calculated right ventricular systolic pressure from tricuspid regurgitation is 48.0 mmHg.     Patient states that she would really like to get into an assisted living facility.  She states she is just having trouble all the way around.  She currently has a granddaughter, her  and 2 children living with her that has been stressful.  She states hopefully they will be moving out within the next couple of weeks.  She continues to have shortness of breath and states that if she does anything more than walking around her heart rate increases.  She does have dizziness with positional changes.  She states she will have some chest discomfort periodically that happens with and without activity.  She continues to smoke but states she is  smoking less than half a pack a day.  She is teary-eyed off and on during our visit.  She denies any lower extremity edema.  She states she lays around a lot and is fatigued.  She is taking her medications as directed.  She states she is now ready to seek treatment for her valvular disease.    Past Medical History:   Diagnosis Date   • Abdominal pain, epigastric     Chronic, unclear cause, improved   • Anorexia    • Anxiety    • Arthritis    • CAD (coronary artery disease)     Cath 5/2015: nonobstructive LAD/RCA disease, 90% mid LCx s/p [id number] Xience   • Cellulitis of leg    • Cervical disc disease    • Chronic fatigue    • Colitis    • COPD (chronic obstructive pulmonary disease) (CMS/HCC)    • Depression    • Erosive gastritis    • Fibromyalgia    • Frequency of urination 6/9/2017   • Gastritis    • Hypercholesterolemia 2/2/2016   • Hyperglycemia    • Hypertension     History of refractory hypertension which improved significantly   • Insomnia    • Leukocytes in urine    • Lower back pain    • Mediastinal lymphadenopathy    • 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HCC)    • Mononucleosis    • Occlusion and stenosis of carotid artery    • Onychomycosis    • Orbit fracture (CMS/HCC)    • PAF (paroxysmal atrial fibrillation) (CMS/HCC)    • Peripheral arterial disease (CMS/HCC)     Significant (carotid, subclavian, lower extremities), with claudication, medical therapy only   • Pernicious anemia    • Prediabetes    • Renal artery stenosis (CMS/HCC)     unilateral, with renal atrophy (no intervention required)   • Renal calculi    • Sinus bradycardia     mild, asymptomatic   • TIA (transient ischemic attack)    • UTI (urinary tract infection)    • Valvular heart disease     5/2015: mild-mod AI, mod MR, mod-severe TR.  6/2017: mild AI, mild MR, mod TR   • Vision problem    • Vitamin B 12 deficiency        Past Surgical History:   Procedure Laterality Date   • APPENDECTOMY     • BREAST BIOPSY Left     20+ yrs ago   • BREAST  SURGERY     • CARDIAC CATHETERIZATION  05/2015    nonobs LAD/RCA disease, 90% mid LCx s/p [id number] Xience   • CERVICAL FUSION  1997   • CHOLECYSTECTOMY     • CORONARY STENT PLACEMENT     • HYSTERECTOMY  1995   • KNEE SURGERY Right 2005   • KNEE SURGERY Left 1998       Social History     Socioeconomic History   • Marital status:      Spouse name: Willie   • Number of children: 3   • Years of education: Some College   • Highest education level: Not on file   Occupational History     Employer: RETIRED   Tobacco Use   • Smoking status: Current Some Day Smoker     Packs/day: 0.50     Types: Cigarettes   • Smokeless tobacco: Never Used   • Tobacco comment: after meals or with coffee.    Substance and Sexual Activity   • Alcohol use: Not Currently     Frequency: Monthly or less     Comment: OCCASIONAL/ TWICE A YEAR.    • Drug use: No   • Sexual activity: Defer       Family History   Problem Relation Age of Onset   • Asthma Mother    • Emphysema Mother    • Graves' disease Mother    • Heart disease Mother    • Hypertension Mother    • Emphysema Father    • Hypertension Father    • Heart disease Father    • Kidney disease Sister    • Lupus Daughter         Systemic erythematosus   • Arthritis Other    • Crohn's disease Other    • Breast cancer Niece    • Breast cancer Maternal Cousin    • Breast cancer Maternal Cousin        The following portion of the patient's history were reviewed and updated as appropriate: past medical history, past surgical history, past social history, past family history, allergies, current medications, and problem list.    Review of Systems   Constitution: Positive for malaise/fatigue. Negative for diaphoresis and fever.   HENT: Negative for congestion, hearing loss, hoarse voice, nosebleeds and sore throat.    Eyes: Negative for photophobia, vision loss in left eye, vision loss in right eye and visual disturbance.   Cardiovascular: Positive for chest pain, dyspnea on exertion and  "palpitations. Negative for irregular heartbeat, leg swelling, near-syncope, orthopnea, paroxysmal nocturnal dyspnea and syncope.   Respiratory: Positive for shortness of breath (chronic). Negative for cough, hemoptysis, sleep disturbances due to breathing, snoring, sputum production and wheezing.    Endocrine: Negative for cold intolerance, heat intolerance, polydipsia, polyphagia and polyuria.   Hematologic/Lymphatic: Negative for bleeding problem. Does not bruise/bleed easily.   Skin: Negative for color change, dry skin, poor wound healing, rash and suspicious lesions.   Musculoskeletal: Negative for arthritis, back pain, falls, gout, joint pain, joint swelling, muscle cramps, muscle weakness and myalgias.   Gastrointestinal: Negative for bloating, abdominal pain, constipation, diarrhea, dysphagia, melena, nausea and vomiting.   Neurological: Positive for dizziness (with positional changes) and weakness. Negative for excessive daytime sleepiness, headaches, light-headedness, loss of balance, numbness, paresthesias, seizures and vertigo.   Psychiatric/Behavioral: Negative for depression, memory loss and substance abuse. The patient is nervous/anxious.        Allergies   Allergen Reactions   • Aleve [Naproxen Sodium] Shortness Of Breath   • Codeine Other (See Comments)     hyperactivity   • Ibuprofen Unknown (See Comments)     unk   • Sulfa Antibiotics Unknown (See Comments)     \"I can't remember\"         Current Outpatient Medications:   •  albuterol (PROVENTIL) (2.5 MG/3ML) 0.083% nebulizer solution, Take 2.5 mg by nebulization Every 4 (Four) Hours As Needed for Wheezing or Shortness of Air., Disp: 120 vial, Rfl: 5  •  albuterol sulfate  (90 Base) MCG/ACT inhaler, Inhale 2 puffs Every 4 (Four) Hours As Needed for Wheezing., Disp: 18 g, Rfl: 11  •  alendronate (FOSAMAX) 70 MG tablet, Take 1 tablet by mouth Every 7 (Seven) Days., Disp: 48 tablet, Rfl: 0  •  amLODIPine (NORVASC) 10 MG tablet, TAKE 1 TABLET BY " MOUTH DAILY, Disp: 30 tablet, Rfl: 2  •  atorvastatin (LIPITOR) 10 MG tablet, TAKE 1 TABLET BY MOUTH DAILY, Disp: 90 tablet, Rfl: 3  •  budesonide-formoterol (SYMBICORT) 160-4.5 MCG/ACT inhaler, Inhale 2 puffs 2 (Two) Times a Day., Disp: 1 inhaler, Rfl: 12  •  cephalexin (Keflex) 500 MG capsule, Take 1 capsule by mouth 2 (Two) Times a Day for 7 days., Disp: 14 capsule, Rfl: 0  •  clonazePAM (KlonoPIN) 0.5 MG tablet, Take 1 tablet by mouth At Night As Needed for Anxiety., Disp: 30 tablet, Rfl: 0  •  DULoxetine (CYMBALTA) 60 MG capsule, TAKE 1 CAPSULE BY MOUTH DAILY, Disp: 90 capsule, Rfl: 1  •  ELIQUIS 5 MG tablet tablet, TAKE 1 TABLET BY MOUTH EVERY 12 HOURS, Disp: 60 tablet, Rfl: 1  •  fluticasone (FLONASE) 50 MCG/ACT nasal spray, SPRAY TWICE IN EACH NOSTRIL EVERY DAY FOR 30 DAYS., Disp: 16 mL, Rfl: 6  •  levothyroxine (SYNTHROID, LEVOTHROID) 75 MCG tablet, Take 1 tablet by mouth Daily., Disp: 90 tablet, Rfl: 0  •  lisinopril (PRINIVIL,ZESTRIL) 20 MG tablet, TAKE 1 TABLET BY MOUTH DAILY, Disp: 90 tablet, Rfl: 0  •  metoprolol tartrate (LOPRESSOR) 100 MG tablet, Take 1 tablet by mouth 2 (Two) Times a Day., Disp: 180 tablet, Rfl: 1  •  omeprazole (priLOSEC) 20 MG capsule, TAKE 1 CAPSULE BY MOUTH DAILY, Disp: 90 capsule, Rfl: 1  •  ondansetron (ZOFRAN) 8 MG tablet, Take 1 tablet by mouth Every 8 (Eight) Hours As Needed for Nausea or Vomiting., Disp: 20 tablet, Rfl: 1  •  oxybutynin (DITROPAN) 5 MG tablet, TAKE 1 TABLET BY MOUTH TWICE DAILY, Disp: 180 tablet, Rfl: 1  •  oxyCODONE-acetaminophen (Percocet) 7.5-325 MG per tablet, Take 1 tablet by mouth Every 8 (Eight) Hours As Needed for Severe Pain ., Disp: 75 tablet, Rfl: 0  •  sennosides-docusate (senna-docusate sodium) 8.6-50 MG per tablet, Take 2 tablets by mouth Daily., Disp: 60 tablet, Rfl: 5  •  traZODone (DESYREL) 50 MG tablet, Take 50 mg by mouth Every Night., Disp: , Rfl:     Current Facility-Administered Medications:   •  cyanocobalamin injection 1,000 mcg,  "1,000 mcg, Intramuscular, Q28 Days, Donna Forte MD, 1,000 mcg at 09/02/20 1726  •  cyanocobalamin injection 1,000 mcg, 1,000 mcg, Intramuscular, Q28 Days, Donna Forte MD        Objective:     Vitals:    09/22/20 1105   BP: 100/56   BP Location: Left arm   Pulse: (!) 49   Weight: 45.1 kg (99 lb 6.4 oz)   Height: 152.4 cm (60\")     Body mass index is 19.41 kg/m².      Vitals signs reviewed.   Constitutional:       General: Not in acute distress.     Appearance: Well-developed. Cachectic.   Eyes:      General:         Right eye: No discharge.         Left eye: No discharge.      Conjunctiva/sclera: Conjunctivae normal.   HENT:      Head: Normocephalic and atraumatic.      Right Ear: External ear normal.      Left Ear: External ear normal.      Nose: Nose normal.   Neck:      Musculoskeletal: Normal range of motion and neck supple.      Thyroid: No thyromegaly.      Vascular: No JVD.      Trachea: No tracheal deviation.      Lymphadenopathy: No cervical adenopathy.   Pulmonary:      Effort: Pulmonary effort is normal. No respiratory distress.      Breath sounds: Decreased breath sounds present. No wheezing. No rales.   Chest:      Chest wall: Not tender to palpatation.   Cardiovascular:      Normal rate. Regular rhythm.      Murmurs: There is a grade 2/6 systolic murmur.      No gallop.   Pulses:     Intact distal pulses.   Edema:     Peripheral edema absent.   Abdominal:      General: Bowel sounds are normal. There is no distension.      Palpations: Abdomen is soft.      Tenderness: There is no abdominal tenderness.   Musculoskeletal: Normal range of motion.         General: No tenderness or deformity.   Skin:     General: Skin is warm and dry.      Findings: No erythema or rash.   Neurological:      Mental Status: Alert and oriented to person, place, and time.      Coordination: Coordination normal.   Psychiatric:         Behavior: Behavior normal.         Thought Content: Thought content " normal.         Judgment: Judgment normal.               ECG 12 Lead    Date/Time: 9/22/2020 11:25 AM  Performed by: Denise Platt APRN  Authorized by: Denise Platt APRN   Comparison: compared with previous ECG from 3/10/2020  Rhythm: sinus bradycardia  Rate: bradycardic  Conduction: conduction normal  ST Segments: ST segments normal  T inversion: aVL  QRS axis: normal    Clinical impression: non-specific ECG              Assessment:       Diagnosis Plan   1. Coronary artery disease involving native coronary artery of native heart with angina pectoris (CMS/[facility])     2. Essential hypertension     3. Hypercholesterolemia     4. PAF (paroxysmal atrial fibrillation) (CMS/[facility])     5. Valvular heart disease     6. Peripheral arterial occlusive disease (CMS/[facility])            Plan:           1.  Atrial Fibrillation and Atrial Flutter  Assessment  • The patient has paroxysmal atrial fibrillation.  She is in Sinus Aj today.  • This is non-valvular in etiology  • The patient's CHADS2-VASc score is 5  • A MQC1AT1-WIOy score of 2 or more is considered a high risk for a thromboembolic event  • Apixaban prescribed     Plan  • Attempt to maintain sinus rhythm  • Continue apixaban for antithrombotic therapy, bleeding issues discussed  • Continue beta blocker for rhythm control     2.  Coronary Artery Disease  Assessment  • C/O intermittent chest discomfort, fleeting.  • She is no longer on aspirin or clopidogrel as she's anticoagulated and had bad epistaxis.     Subjective - Objective  • There has been a previous stent procedure using THELMA 2015      3.  HTN - controlled, low normal      4.  An echo in 8/2020 showed moderate aortic valve regurgitation severe MR, and severe TR. She now wishes to discuss any possible surgical treatment.  Will defer to Dr. Calero for recommendations.     5.  PAD -  She has mesenteric disease, renal artery stenosis, carotid artery disease, a small AAA (3.8cm in May 2018), and PAD.  She's on atorvastatin.   Unfortunately she still smokes but has cut back to 1/2 pack a day.  She did see Dr Zhang last year but didn't follow up with testing or visits.       ADDENDUM - Per Dr. Calero, refer to Dr. Mai.  Patient is aware.    As always, it has been a pleasure to participate in your patient's care. Thank you.       Sincerely,       LILI Lockhart      Current Outpatient Medications:   •  albuterol (PROVENTIL) (2.5 MG/3ML) 0.083% nebulizer solution, Take 2.5 mg by nebulization Every 4 (Four) Hours As Needed for Wheezing or Shortness of Air., Disp: 120 vial, Rfl: 5  •  albuterol sulfate  (90 Base) MCG/ACT inhaler, Inhale 2 puffs Every 4 (Four) Hours As Needed for Wheezing., Disp: 18 g, Rfl: 11  •  alendronate (FOSAMAX) 70 MG tablet, Take 1 tablet by mouth Every 7 (Seven) Days., Disp: 48 tablet, Rfl: 0  •  amLODIPine (NORVASC) 10 MG tablet, TAKE 1 TABLET BY MOUTH DAILY, Disp: 30 tablet, Rfl: 2  •  atorvastatin (LIPITOR) 10 MG tablet, TAKE 1 TABLET BY MOUTH DAILY, Disp: 90 tablet, Rfl: 3  •  budesonide-formoterol (SYMBICORT) 160-4.5 MCG/ACT inhaler, Inhale 2 puffs 2 (Two) Times a Day., Disp: 1 inhaler, Rfl: 12  •  cephalexin (Keflex) 500 MG capsule, Take 1 capsule by mouth 2 (Two) Times a Day for 7 days., Disp: 14 capsule, Rfl: 0  •  clonazePAM (KlonoPIN) 0.5 MG tablet, Take 1 tablet by mouth At Night As Needed for Anxiety., Disp: 30 tablet, Rfl: 0  •  DULoxetine (CYMBALTA) 60 MG capsule, TAKE 1 CAPSULE BY MOUTH DAILY, Disp: 90 capsule, Rfl: 1  •  ELIQUIS 5 MG tablet tablet, TAKE 1 TABLET BY MOUTH EVERY 12 HOURS, Disp: 60 tablet, Rfl: 1  •  fluticasone (FLONASE) 50 MCG/ACT nasal spray, SPRAY TWICE IN EACH NOSTRIL EVERY DAY FOR 30 DAYS., Disp: 16 mL, Rfl: 6  •  levothyroxine (SYNTHROID, LEVOTHROID) 75 MCG tablet, Take 1 tablet by mouth Daily., Disp: 90 tablet, Rfl: 0  •  lisinopril (PRINIVIL,ZESTRIL) 20 MG tablet, TAKE 1 TABLET BY MOUTH DAILY, Disp: 90 tablet, Rfl: 0  •  metoprolol tartrate (LOPRESSOR) 100 MG tablet,  Take 1 tablet by mouth 2 (Two) Times a Day., Disp: 180 tablet, Rfl: 1  •  omeprazole (priLOSEC) 20 MG capsule, TAKE 1 CAPSULE BY MOUTH DAILY, Disp: 90 capsule, Rfl: 1  •  ondansetron (ZOFRAN) 8 MG tablet, Take 1 tablet by mouth Every 8 (Eight) Hours As Needed for Nausea or Vomiting., Disp: 20 tablet, Rfl: 1  •  oxybutynin (DITROPAN) 5 MG tablet, TAKE 1 TABLET BY MOUTH TWICE DAILY, Disp: 180 tablet, Rfl: 1  •  oxyCODONE-acetaminophen (Percocet) 7.5-325 MG per tablet, Take 1 tablet by mouth Every 8 (Eight) Hours As Needed for Severe Pain ., Disp: 75 tablet, Rfl: 0  •  sennosides-docusate (senna-docusate sodium) 8.6-50 MG per tablet, Take 2 tablets by mouth Daily., Disp: 60 tablet, Rfl: 5  •  traZODone (DESYREL) 50 MG tablet, Take 50 mg by mouth Every Night., Disp: , Rfl:     Current Facility-Administered Medications:   •  cyanocobalamin injection 1,000 mcg, 1,000 mcg, Intramuscular, Q28 Days, Donna Forte MD, 1,000 mcg at 09/02/20 1726  •  cyanocobalamin injection 1,000 mcg, 1,000 mcg, Intramuscular, Q28 Days, Donna Forte MD    Dictated utilizing Dragon dictation

## 2020-09-22 ENCOUNTER — OFFICE VISIT (OUTPATIENT)
Dept: CARDIOLOGY | Facility: CLINIC | Age: 80
End: 2020-09-22

## 2020-09-22 VITALS
HEIGHT: 60 IN | HEART RATE: 49 BPM | SYSTOLIC BLOOD PRESSURE: 100 MMHG | BODY MASS INDEX: 19.52 KG/M2 | WEIGHT: 99.4 LBS | DIASTOLIC BLOOD PRESSURE: 56 MMHG

## 2020-09-22 DIAGNOSIS — E78.00 HYPERCHOLESTEROLEMIA: ICD-10-CM

## 2020-09-22 DIAGNOSIS — I10 ESSENTIAL HYPERTENSION: ICD-10-CM

## 2020-09-22 DIAGNOSIS — I77.9 PERIPHERAL ARTERIAL OCCLUSIVE DISEASE (HCC): ICD-10-CM

## 2020-09-22 DIAGNOSIS — I25.119 CORONARY ARTERY DISEASE INVOLVING NATIVE CORONARY ARTERY OF NATIVE HEART WITH ANGINA PECTORIS (HCC): Primary | ICD-10-CM

## 2020-09-22 DIAGNOSIS — I38 VALVULAR HEART DISEASE: ICD-10-CM

## 2020-09-22 DIAGNOSIS — I48.0 PAF (PAROXYSMAL ATRIAL FIBRILLATION) (HCC): ICD-10-CM

## 2020-09-22 PROCEDURE — 93000 ELECTROCARDIOGRAM COMPLETE: CPT | Performed by: NURSE PRACTITIONER

## 2020-09-22 PROCEDURE — 99214 OFFICE O/P EST MOD 30 MIN: CPT | Performed by: NURSE PRACTITIONER

## 2020-10-04 DIAGNOSIS — I10 ESSENTIAL HYPERTENSION: ICD-10-CM

## 2020-10-05 RX ORDER — LISINOPRIL 20 MG/1
20 TABLET ORAL DAILY
Qty: 90 TABLET | Refills: 0 | Status: SHIPPED | OUTPATIENT
Start: 2020-10-05 | End: 2021-01-15

## 2020-10-06 ENCOUNTER — TELEPHONE (OUTPATIENT)
Dept: CARDIAC SURGERY | Facility: CLINIC | Age: 80
End: 2020-10-06

## 2020-10-06 ENCOUNTER — OFFICE VISIT (OUTPATIENT)
Dept: CARDIAC SURGERY | Facility: CLINIC | Age: 80
End: 2020-10-06

## 2020-10-06 VITALS
RESPIRATION RATE: 16 BRPM | SYSTOLIC BLOOD PRESSURE: 163 MMHG | WEIGHT: 101 LBS | HEIGHT: 62 IN | OXYGEN SATURATION: 98 % | HEART RATE: 75 BPM | BODY MASS INDEX: 18.58 KG/M2 | DIASTOLIC BLOOD PRESSURE: 76 MMHG | TEMPERATURE: 96.9 F

## 2020-10-06 DIAGNOSIS — I07.1 SEVERE TRICUSPID REGURGITATION: ICD-10-CM

## 2020-10-06 DIAGNOSIS — I25.10 ASHD (ARTERIOSCLEROTIC HEART DISEASE): ICD-10-CM

## 2020-10-06 DIAGNOSIS — F32.1 MODERATE SINGLE CURRENT EPISODE OF MAJOR DEPRESSIVE DISORDER (HCC): ICD-10-CM

## 2020-10-06 DIAGNOSIS — E03.9 ACQUIRED HYPOTHYROIDISM: ICD-10-CM

## 2020-10-06 DIAGNOSIS — I34.0 MITRAL VALVE INSUFFICIENCY, UNSPECIFIED ETIOLOGY: Primary | ICD-10-CM

## 2020-10-06 DIAGNOSIS — I65.23 OBSTRUCTION OF CAROTID ARTERY ON BOTH SIDES: ICD-10-CM

## 2020-10-06 DIAGNOSIS — I65.23 OCCLUSION AND STENOSIS OF BILATERAL CAROTID ARTERIES: ICD-10-CM

## 2020-10-06 DIAGNOSIS — I77.9 PERIPHERAL ARTERIAL OCCLUSIVE DISEASE (HCC): ICD-10-CM

## 2020-10-06 DIAGNOSIS — I48.0 PAF (PAROXYSMAL ATRIAL FIBRILLATION) (HCC): ICD-10-CM

## 2020-10-06 DIAGNOSIS — I71.60 THORACOABDOMINAL AORTIC ANEURYSM (TAAA) WITHOUT RUPTURE (HCC): ICD-10-CM

## 2020-10-06 DIAGNOSIS — I10 ESSENTIAL HYPERTENSION: Primary | ICD-10-CM

## 2020-10-06 DIAGNOSIS — J42 CHRONIC BRONCHITIS, UNSPECIFIED CHRONIC BRONCHITIS TYPE (HCC): ICD-10-CM

## 2020-10-06 DIAGNOSIS — I34.0 SEVERE MITRAL REGURGITATION: ICD-10-CM

## 2020-10-06 DIAGNOSIS — K55.1 MESENTERIC ARTERY STENOSIS (HCC): ICD-10-CM

## 2020-10-06 DIAGNOSIS — I35.1 MODERATE AORTIC VALVE REGURGITATION: ICD-10-CM

## 2020-10-06 DIAGNOSIS — F17.200 TOBACCO DEPENDENCE SYNDROME: ICD-10-CM

## 2020-10-06 PROCEDURE — 99205 OFFICE O/P NEW HI 60 MIN: CPT | Performed by: THORACIC SURGERY (CARDIOTHORACIC VASCULAR SURGERY)

## 2020-10-06 NOTE — TELEPHONE ENCOUNTER
I s/w pt to inform her that orders have been placed per Dr Mai for pt to have carotid doppler/PFT/ABG room air and the orders have been sent to [facility] per pt request for scheduling. PT informed [facility] will be contacting her to schedule and was given [facility] scheduling dept number [phone]. Pt also scheduled 4 week office f/u with Dr Mai per his request.

## 2020-10-07 ENCOUNTER — TRANSCRIBE ORDERS (OUTPATIENT)
Dept: CARDIOLOGY | Facility: CLINIC | Age: 80
End: 2020-10-07

## 2020-10-07 DIAGNOSIS — I65.23 BILATERAL CAROTID ARTERY STENOSIS: Primary | ICD-10-CM

## 2020-10-09 ENCOUNTER — TRANSCRIBE ORDERS (OUTPATIENT)
Dept: OBSTETRICS AND GYNECOLOGY | Facility: CLINIC | Age: 80
End: 2020-10-09

## 2020-10-09 DIAGNOSIS — Z01.818 OTHER SPECIFIED PRE-OPERATIVE EXAMINATION: Primary | ICD-10-CM

## 2020-10-11 PROBLEM — I35.1 MODERATE AORTIC VALVE REGURGITATION: Status: ACTIVE | Noted: 2020-10-11

## 2020-10-11 PROBLEM — I34.0 SEVERE MITRAL REGURGITATION: Status: ACTIVE | Noted: 2020-10-11

## 2020-10-11 PROBLEM — I07.1 SEVERE TRICUSPID REGURGITATION: Status: ACTIVE | Noted: 2020-10-11

## 2020-10-11 NOTE — PROGRESS NOTES
10/11/2020    Seen on 10/6/2020    Chief Complaint     Dyspnea, evaluation of heart valve disease    History of Present Illness:       Dear Denise Singh APRN and Colleagues,  It was nice to see Kaylin LUNDBERG Rusty in consultation at your request. She is a 80 y.o. female with a history of multiple medical problems including abdominal aortic aneurysm, peripheral vascular disease, coronary artery disease status post PCI of the circumflex, COPD, tobacco abuse, mesenteric obstruction, cerebrovascular occlusive disease, paroxysmal atrial fibrillation, TIA and UTI who has developed progressive dyspnea of exertion.  She gets very short of breath while walking less than 50 yards.  She cannot catch her breath and improves with resting for some time.  There is no associated diaphoresis.  She has occasional palpitations and no chest pain at this time.  She does have leg pain with ambulation suggesting vascular claudication. She denies orthopnea, PND, stroke, angina syncope or lower extremity edema.  In May 2015 she was found single-vessel coronary artery disease with 90% circumflex stenosis that was stented.  She had a PS 2 years which she lost a lot of weight and what her antihypertensive medication was weaned.  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  In 2017 she developed angina she was found to have rapid atrial fibrillation.  She was cardioverted and treated with beta-blockers.  At that time an echocardiogram showed moderate TR, and mild AI and MR with a normal LV function.  She was placed on anticoagulants.  In May 2019 she developed chest discomfort and palpitations.  She was very stressed at that time and she states that her children do not take care of her.  She had a major depressive event as well.  An echocardiogram showed an EF of 60%, close to moderate AI and severe MR and TR.  Due to her comorbidities, it was not felt that she was a candidate for surgery at that time.  She continues to have dyspnea and leg  pain.  She had a chest CT that was negative for pulmonary embolic events but she had what looks like advanced emphysema.  She had an echocardiogram in August 2020 that showed an ejection fraction of 66%, left atrial cavity which is moderately dilated, moderate aortic regurgitation, moderate to severe mitral and tricuspid regurgitation and estimated right ventricular systolic pressure from tricuspid regurgitation is moderately elevated approximately 45 to 55 mmHg.  She states that she has noticed a decline over the last few months and has a granddaughter that is helping her.  She states she has dizziness which is positional and when she does even minimal walking her heart rate goes very fast.  She states she has some chest discomfort however it is no clear angina.  She still smoking but now with less than a pack a day but she used to smoke more than a pack a day for over 40 years.  She denies lower extremity edema or weight gain.  She states she is compliant with her medications.  She has no family history of aneurysms, dissections or connective tissue disorders.      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HCC)   • Obstruction of carotid artery   • Peripheral arterial occlusive disease (CMS/HCC)   • Impaired glucose tolerance   • Difficulty sleeping   • Tobacco dependence syndrome   • Iron deficiency anemia   • Vitamin B12 deficiency anemia due to intrinsic factor deficiency   • Acquired hypothyroidism   • Thoracoabdominal aortic aneurysm (CMS/HCC)   • Abdominal bloating   • Prediabetes   • Sleep difficulties   • Chronic constipation   • PAF (paroxysmal atrial fibrillation)  (CMS/[facility])   • Moderate single current episode of major depressive disorder (CMS/[facility])   • Chronic low back pain without sciatica   • Urge incontinence   • Severe mitral regurgitation   • Severe tricuspid regurgitation   • Moderate aortic valve regurgitation       Past Medical History:   Diagnosis Date   • Abdominal pain, epigastric     Chronic, unclear cause, improved   • Anorexia    • Anxiety    • Arthritis    • CAD (coronary artery disease)     Cath 5/2015: nonobstructive LAD/RCA disease, 90% mid LCx s/p [id number] Xience   • Cellulitis of leg    • Cervical disc disease    • Chronic fatigue    • Colitis    • COPD (chronic obstructive pulmonary disease) (CMS/[facility])    • Depression    • Erosive gastritis    • Fibromyalgia    • Frequency of urination 6/9/2017   • Gastritis    • Hypercholesterolemia 2/2/2016   • Hyperglycemia    • Hypertension     History of refractory hypertension which improved significantly   • Insomnia    • Leukocytes in urine    • Lower back pain    • Mediastinal lymphadenopathy    • 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[facility])    • Mononucleosis    • Occlusion and stenosis of carotid artery    • Onychomycosis    • Orbit fracture (CMS/[facility])    • PAF (paroxysmal atrial fibrillation) (CMS/[facility])    • Peripheral arterial disease (CMS/[facility])     Significant (carotid, subclavian, lower extremities), with claudication, medical therapy only   • Pernicious anemia    • Prediabetes    • Renal artery stenosis (CMS/[facility])     unilateral, with renal atrophy (no intervention required)   • Renal calculi    • Sinus bradycardia     mild, asymptomatic   • TIA (transient ischemic attack)    • UTI (urinary tract infection)    • Valvular heart disease     5/2015: mild-mod AI, mod MR, mod-severe TR.  6/2017: mild AI, mild MR, mod TR   • Vision problem    • Vitamin B 12 deficiency        Past Surgical History:   Procedure Laterality Date   • APPENDECTOMY     • BREAST BIOPSY Left     20+ yrs ago   • BREAST SURGERY     • CARDIAC  "CATHETERIZATION  05/2015    nonobs LAD/RCA disease, 90% mid LCx s/p [id number] Xience   • CERVICAL FUSION  1997   • CHOLECYSTECTOMY     • CORONARY STENT PLACEMENT     • HYSTERECTOMY  1995   • KNEE SURGERY Right 2005   • KNEE SURGERY Left 1998       Allergies   Allergen Reactions   • Aleve [Naproxen Sodium] Shortness Of Breath   • Codeine Other (See Comments)     hyperactivity   • Ibuprofen Unknown (See Comments)     unk   • Sulfa Antibiotics Unknown (See Comments)     \"I can't remember\"         Current Outpatient Medications:   •  albuterol (PROVENTIL) (2.5 MG/3ML) 0.083% nebulizer solution, Take 2.5 mg by nebulization Every 4 (Four) Hours As Needed for Wheezing or Shortness of Air., Disp: 120 vial, Rfl: 5  •  albuterol sulfate  (90 Base) MCG/ACT inhaler, Inhale 2 puffs Every 4 (Four) Hours As Needed for Wheezing., Disp: 18 g, Rfl: 11  •  alendronate (FOSAMAX) 70 MG tablet, Take 1 tablet by mouth Every 7 (Seven) Days., Disp: 48 tablet, Rfl: 0  •  amLODIPine (NORVASC) 10 MG tablet, TAKE 1 TABLET BY MOUTH DAILY, Disp: 30 tablet, Rfl: 2  •  atorvastatin (LIPITOR) 10 MG tablet, TAKE 1 TABLET BY MOUTH DAILY, Disp: 90 tablet, Rfl: 3  •  budesonide-formoterol (SYMBICORT) 160-4.5 MCG/ACT inhaler, Inhale 2 puffs 2 (Two) Times a Day., Disp: 1 inhaler, Rfl: 12  •  clonazePAM (KlonoPIN) 0.5 MG tablet, Take 1 tablet by mouth At Night As Needed for Anxiety., Disp: 30 tablet, Rfl: 0  •  DULoxetine (CYMBALTA) 60 MG capsule, TAKE 1 CAPSULE BY MOUTH DAILY, Disp: 90 capsule, Rfl: 1  •  ELIQUIS 5 MG tablet tablet, TAKE 1 TABLET BY MOUTH EVERY 12 HOURS, Disp: 60 tablet, Rfl: 1  •  levothyroxine (SYNTHROID, LEVOTHROID) 75 MCG tablet, Take 1 tablet by mouth Daily., Disp: 90 tablet, Rfl: 0  •  lisinopril (PRINIVIL,ZESTRIL) 20 MG tablet, TAKE 1 TABLET BY MOUTH DAILY, Disp: 90 tablet, Rfl: 0  •  metoprolol tartrate (LOPRESSOR) 100 MG tablet, Take 1 tablet by mouth 2 (Two) Times a Day., Disp: 180 tablet, Rfl: 1  •  omeprazole (priLOSEC) " 20 MG capsule, TAKE 1 CAPSULE BY MOUTH DAILY, Disp: 90 capsule, Rfl: 1  •  ondansetron (ZOFRAN) 8 MG tablet, Take 1 tablet by mouth Every 8 (Eight) Hours As Needed for Nausea or Vomiting., Disp: 20 tablet, Rfl: 1  •  oxybutynin (DITROPAN) 5 MG tablet, TAKE 1 TABLET BY MOUTH TWICE DAILY, Disp: 180 tablet, Rfl: 1  •  oxyCODONE-acetaminophen (Percocet) 7.5-325 MG per tablet, Take 1 tablet by mouth Every 8 (Eight) Hours As Needed for Severe Pain ., Disp: 75 tablet, Rfl: 0  •  sennosides-docusate (senna-docusate sodium) 8.6-50 MG per tablet, Take 2 tablets by mouth Daily., Disp: 60 tablet, Rfl: 5  •  fluticasone (FLONASE) 50 MCG/ACT nasal spray, SPRAY TWICE IN EACH NOSTRIL EVERY DAY FOR 30 DAYS., Disp: 16 mL, Rfl: 6  •  traZODone (DESYREL) 50 MG tablet, Take 50 mg by mouth Every Night., Disp: , Rfl:     Current Facility-Administered Medications:   •  cyanocobalamin injection 1,000 mcg, 1,000 mcg, Intramuscular, Q28 Days, Donna Forte MD, 1,000 mcg at 09/02/20 1726  •  cyanocobalamin injection 1,000 mcg, 1,000 mcg, Intramuscular, Q28 Days, Donna Forte MD    Social History     Socioeconomic History   • Marital status:      Spouse name: Willie   • Number of children: 3   • Years of education: Some College   • Highest education level: Not on file   Occupational History     Employer: RETIRED   Tobacco Use   • Smoking status: Current Some Day Smoker     Packs/day: 0.50     Types: Cigarettes   • Smokeless tobacco: Never Used   • Tobacco comment: after meals or with coffee.    Substance and Sexual Activity   • Alcohol use: Not Currently     Frequency: Monthly or less     Comment: OCCASIONAL/ TWICE A YEAR.    • Drug use: No   • Sexual activity: Defer       Family History   Problem Relation Age of Onset   • Asthma Mother    • Emphysema Mother    • Graves' disease Mother    • Heart disease Mother    • Hypertension Mother    • Emphysema Father    • Hypertension Father    • Heart disease Father    •  Kidney disease Sister    • Lupus Daughter         Systemic erythematosus   • Arthritis Other    • Crohn's disease Other    • Breast cancer Niece    • Breast cancer Maternal Cousin    • Breast cancer Maternal Cousin      Review of Systems   Constitutional: Positive for fatigue.   Respiratory: Positive for chest tightness, shortness of breath and wheezing. Negative for cough.    Cardiovascular: Positive for palpitations. Negative for chest pain and leg swelling.   Genitourinary: Positive for dysuria.   Neurological: Positive for dizziness, weakness and light-headedness. Negative for tremors, syncope and numbness.   Psychiatric/Behavioral: The patient is nervous/anxious.    All other systems reviewed and are negative.      Physical Exam:    Vital Signs:  Weight: 45.8 kg (101 lb)   Body mass index is 18.77 kg/m².  Temp: 96.9 °F (36.1 °C)   Heart Rate: 75   BP: 163/76     Constitutional:       Appearance: Well-developed.   Eyes:      Conjunctiva/sclera: Conjunctivae normal.      Pupils: Pupils are equal, round, and reactive to light.   HENT:      Head: Normocephalic and atraumatic.      Nose: Nose normal.   Neck:      Musculoskeletal: Normal range of motion and neck supple.      Thyroid: No thyromegaly.      Vascular: No JVD.      Lymphadenopathy: No cervical adenopathy.   Pulmonary:      Effort: Pulmonary effort is normal.      Breath sounds: Normal breath sounds. No rales.   Cardiovascular:      Normal rate. Regular rhythm.      Murmurs: There is a grade 3/6 high frequency blowing crescendo holosystolic murmur at the apex, radiating to the apex. The intensity does not change with Valsalva.      No gallop.   Pulses:     Carotid: 2+ bilaterally with bruit on the left.     Radial: 2+ bilaterally.     Popliteal: 1+ on the left side and 0 on the right side.  Edema:     Thigh: bilateral trace edema of the thigh.     Pretibial: bilateral trace edema of the pretibial area.     Ankle: bilateral trace edema of the ankle.      Feet: bilateral trace edema of the feet.  Abdominal:      General: Bowel sounds are normal. There is no distension.      Palpations: Abdomen is soft. There is no abdominal mass.      Tenderness: There is no abdominal tenderness.   Musculoskeletal: Normal range of motion.         General: No tenderness or deformity.   Skin:     General: Skin is warm and dry.      Findings: No erythema or rash.   Neurological:      Mental Status: Alert and oriented to person, place, and time.      Deep Tendon Reflexes: Reflexes are normal and symmetric.   Psychiatric:         Behavior: Behavior normal.     No groin or neck adenomegaly     Assessment:     Problem List Items Addressed This Visit        Cardiovascular and Mediastinum    Essential hypertension - Primary    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HCC)    Obstruction of carotid artery    Peripheral arterial occlusive disease (CMS/HCC)    Thoracoabdominal aortic aneurysm (CMS/HCC)    PAF (paroxysmal atrial fibrillation) (CMS/HCC)       Respiratory    Chronic obstructive pulmonary disease (CMS/HCC)       Endocrine    Acquired hypothyroidism       Other    Tobacco dependence syndrome    Moderate single current episode of major depressive disorder (CMS/HCC)    Severe mitral regurgitation    Severe tricuspid regurgitation    Moderate aortic valve regurgitation          1. Severe mitral regurgitation, etiology unknown  2. Severe tricuspid regurgitation, most likely functional  3. Moderate aortic regurgitation, etiology unknown  4. Paroxysmal atrial fibrillation with episodic fast rate  5. Severe peripheral vascular disease  6. Abdominal aortic aneurysm  7. Tobacco dependence still smoking  8. Coronary artery disease status post PCI of the circumflex 5 years ago, possible angina  9. Dyspnea of exertion and congestive heart failure class III  10. Frailty and weakness  11. Renovascular and visceral vascular occlusive disease  12. Possible cerebrovascular active occlusive  disease  13. COPD    Recommendation/Plan:     1. I examined the patient, reviewed all the studies myself and discussed the options with the patient.  She has multiple comorbidities which are severe and she has triple valve disease.  She has congestive heart failure class III with severe MR TR and at least moderate AR.  I believe the main reason for her dyspnea is cardiac but also there is an important component related to her tobacco abuse and COPD.  Although she could benefit from triple valve repair replacement, it is not yet clear to me whether she would be a surgical candidate based on the comorbidities.  She will need to have a cardiac cath among other studies to further evaluate extent of her problems and the availability.  2.   She has active tobacco abuse, half pack per day but she used to smoke more than a pack for many decades.  I counseled her about tobacco cessation and probably the use of a patch soon if surgery is contemplated.  She will need to have pulmonary function test, and room air ABGs to further elucidate the extent of her lung disease.  A recent CT scan showed resolving of both diaphragms and a chest shape suggesting a severe COPD.  3.  She has peripheral vascular disease with right subclavian artery stenosis which is a marker of severe cerebrovascular disease.  She will need to have carotid Dopplers to see what is the level of stenosis given that her study in 2015 showed 50% on each side, both carotids.  She has significant occlusions in the renal arteries and mesenteric artery.  It is possible due to her weight loss that she has had mesenteric ischemia.  She does have distal ascending aorta and arch calcification suggesting systemic atherosclerosis.  4.  Coronary artery disease, there is suggestion of angina with low level angina.  Could be related to her episode of rapid heart rate and AF or a consequence.  I recommend a cardiac cath to address the coronary anatomy.  5.  Atrial  fibrillation, paroxysmal.  She states frequent.'s of rapid heart rate.  If an operation is indicated then she will need to have a Maze procedure and left atrial appendage ligation as well.  6.  She has had a major depressive disorder but she seems to be doing better now.  She is frail and weak.  At the same time she is not able to have a lifestyle within normal limits due to her problems.  I discussed with the patient that we need to have all the studies done before we can go to the next step to decide what needs to be done and if she is truly a surgical candidate.  In the best case, she would be an extremely high risk for surgery with a mortality in the 20% range of morbidity over 40%.  Once the studies are completed I will see her in my office and decide on whether surgery is feasible or whether alternative MitraClip may be an option.    Thank you for allowing me to participate in her care.    Regards,    Catracho Mai M.D.  I spent approximately 45 to 60 minutes examining the patient, reviewing the records, reviewing the films myself, discussing the findings with the patient and cardiology, counseling the patient and developing a follow-up plan

## 2020-10-12 ENCOUNTER — LAB (OUTPATIENT)
Dept: LAB | Facility: HOSPITAL | Age: 80
End: 2020-10-12

## 2020-10-12 DIAGNOSIS — Z01.818 OTHER SPECIFIED PRE-OPERATIVE EXAMINATION: ICD-10-CM

## 2020-10-12 DIAGNOSIS — I38 VALVULAR HEART DISEASE: ICD-10-CM

## 2020-10-12 DIAGNOSIS — I25.119 CORONARY ARTERY DISEASE INVOLVING NATIVE CORONARY ARTERY OF NATIVE HEART WITH ANGINA PECTORIS (HCC): Primary | ICD-10-CM

## 2020-10-12 PROCEDURE — C9803 HOPD COVID-19 SPEC COLLECT: HCPCS

## 2020-10-12 PROCEDURE — U0004 COV-19 TEST NON-CDC HGH THRU: HCPCS

## 2020-10-12 NOTE — PROGRESS NOTES
Ilya, can you call Ms Ojeda and make sure she understands the tests he would like done, and see if Dr Mai ordered R+L cath, PFTs, an ABG, and carotid dopplers?    If not, can you order those?  The cath would be at [facility], the rest at [facility].    eve [email or web address]

## 2020-10-13 LAB — SARS-COV-2 RNA RESP QL NAA+PROBE: NOT DETECTED

## 2020-10-14 ENCOUNTER — HOSPITAL ENCOUNTER (OUTPATIENT)
Dept: ULTRASOUND IMAGING | Facility: HOSPITAL | Age: 80
Discharge: HOME | End: 2020-10-14

## 2020-10-14 ENCOUNTER — OFFICE VISIT (OUTPATIENT)
Dept: INTERNAL MEDICINE | Facility: CLINIC | Age: 80
End: 2020-10-14

## 2020-10-14 ENCOUNTER — HOSPITAL ENCOUNTER (OUTPATIENT)
Dept: PULMONOLOGY | Facility: HOSPITAL | Age: 80
Discharge: HOME | End: 2020-10-14

## 2020-10-14 VITALS
WEIGHT: 101.6 LBS | HEIGHT: 60 IN | RESPIRATION RATE: 16 BRPM | DIASTOLIC BLOOD PRESSURE: 72 MMHG | TEMPERATURE: 97.8 F | SYSTOLIC BLOOD PRESSURE: 102 MMHG | HEART RATE: 68 BPM | BODY MASS INDEX: 19.94 KG/M2 | OXYGEN SATURATION: 95 %

## 2020-10-14 VITALS — RESPIRATION RATE: 16 BRPM | OXYGEN SATURATION: 99 % | HEART RATE: 68 BPM

## 2020-10-14 DIAGNOSIS — I25.10 ASHD (ARTERIOSCLEROTIC HEART DISEASE): ICD-10-CM

## 2020-10-14 DIAGNOSIS — I48.0 PAF (PAROXYSMAL ATRIAL FIBRILLATION) (HCC): ICD-10-CM

## 2020-10-14 DIAGNOSIS — I25.119 CORONARY ARTERY DISEASE INVOLVING NATIVE CORONARY ARTERY OF NATIVE HEART WITH ANGINA PECTORIS (HCC): ICD-10-CM

## 2020-10-14 DIAGNOSIS — I77.9 PERIPHERAL ARTERIAL OCCLUSIVE DISEASE (HCC): ICD-10-CM

## 2020-10-14 DIAGNOSIS — F32.A ANXIETY AND DEPRESSION: ICD-10-CM

## 2020-10-14 DIAGNOSIS — G89.29 CHRONIC LOW BACK PAIN WITHOUT SCIATICA, UNSPECIFIED BACK PAIN LATERALITY: ICD-10-CM

## 2020-10-14 DIAGNOSIS — I38 VALVULAR HEART DISEASE: ICD-10-CM

## 2020-10-14 DIAGNOSIS — I65.23 BILATERAL CAROTID ARTERY STENOSIS: ICD-10-CM

## 2020-10-14 DIAGNOSIS — I34.0 MITRAL VALVE INSUFFICIENCY, UNSPECIFIED ETIOLOGY: ICD-10-CM

## 2020-10-14 DIAGNOSIS — M54.50 LUMBAR BACK PAIN: ICD-10-CM

## 2020-10-14 DIAGNOSIS — M54.50 CHRONIC LOW BACK PAIN WITHOUT SCIATICA, UNSPECIFIED BACK PAIN LATERALITY: ICD-10-CM

## 2020-10-14 DIAGNOSIS — F41.8 MIXED ANXIETY DEPRESSIVE DISORDER: ICD-10-CM

## 2020-10-14 DIAGNOSIS — F41.9 ANXIETY AND DEPRESSION: ICD-10-CM

## 2020-10-14 DIAGNOSIS — I10 ESSENTIAL HYPERTENSION: ICD-10-CM

## 2020-10-14 DIAGNOSIS — Z79.899 HIGH RISK MEDICATION USE: Primary | ICD-10-CM

## 2020-10-14 DIAGNOSIS — K55.1 MESENTERIC ARTERY STENOSIS (HCC): ICD-10-CM

## 2020-10-14 LAB
ARTERIAL PATENCY WRIST A: POSITIVE
ATMOSPHERIC PRESS: 736 MMHG
BASE EXCESS BLDA CALC-SCNC: -0.3 MMOL/L (ref 0–2)
BDY SITE: ABNORMAL
BODY TEMPERATURE: 37 C
HCO3 BLDA-SCNC: 24.2 MMOL/L (ref 20–26)
HGB BLDA-MCNC: 8.7 G/DL (ref 13.5–17.5)
MODALITY: ABNORMAL
PCO2 BLDA: 37.8 MM HG (ref 35–45)
PCO2 TEMP ADJ BLD: 37.8 MM HG (ref 35–45)
PH BLDA: 7.41 PH UNITS (ref 7.35–7.45)
PH, TEMP CORRECTED: 7.41 PH UNITS (ref 7.35–7.45)
PO2 BLDA: 78.3 MM HG (ref 83–108)
PO2 TEMP ADJ BLD: 78.3 MM HG (ref 83–108)
SAO2 % BLDCOA: 96 % (ref 94–99)
VENTILATOR MODE: ABNORMAL

## 2020-10-14 PROCEDURE — 94060 EVALUATION OF WHEEZING: CPT

## 2020-10-14 PROCEDURE — 63710000001 ALBUTEROL SULFATE HFA 108 (90 BASE) MCG/ACT AEROSOL SOLUTION 8.5 G INHALER: Performed by: NURSE PRACTITIONER

## 2020-10-14 PROCEDURE — 82803 BLOOD GASES ANY COMBINATION: CPT

## 2020-10-14 PROCEDURE — 94729 DIFFUSING CAPACITY: CPT

## 2020-10-14 PROCEDURE — 94726 PLETHYSMOGRAPHY LUNG VOLUMES: CPT

## 2020-10-14 PROCEDURE — 99215 OFFICE O/P EST HI 40 MIN: CPT | Performed by: INTERNAL MEDICINE

## 2020-10-14 PROCEDURE — A9270 NON-COVERED ITEM OR SERVICE: HCPCS | Performed by: NURSE PRACTITIONER

## 2020-10-14 PROCEDURE — 93880 EXTRACRANIAL BILAT STUDY: CPT

## 2020-10-14 PROCEDURE — 36600 WITHDRAWAL OF ARTERIAL BLOOD: CPT

## 2020-10-14 RX ORDER — ALBUTEROL SULFATE 90 UG/1
4 AEROSOL, METERED RESPIRATORY (INHALATION) ONCE
Status: COMPLETED | OUTPATIENT
Start: 2020-10-14 | End: 2020-10-14

## 2020-10-14 RX ADMIN — ALBUTEROL SULFATE 4 PUFF: 90 AEROSOL, METERED RESPIRATORY (INHALATION) at 15:05
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
## 2020-10-15 LAB
AMPHETAMINES UR QL SCN: NEGATIVE NG/ML
BARBITURATES UR QL SCN: NEGATIVE NG/ML
BENZODIAZ UR QL SCN: NEGATIVE NG/ML
BZE UR QL SCN: NEGATIVE NG/ML
CANNABINOIDS UR QL SCN: NEGATIVE NG/ML
CREAT UR-MCNC: 123.1 MG/DL (ref 20–300)
LABORATORY COMMENT REPORT: ABNORMAL
METHADONE UR QL SCN: NEGATIVE NG/ML
OPIATES UR QL SCN: POSITIVE NG/ML
OXYCODONE+OXYMORPHONE UR QL SCN: POSITIVE NG/ML
PCP UR QL: NEGATIVE NG/ML
PH UR: 7.6 [PH] (ref 4.5–8.9)
PROPOXYPH UR QL SCN: NEGATIVE NG/ML

## 2020-10-19 ENCOUNTER — TELEPHONE (OUTPATIENT)
Dept: INTERNAL MEDICINE | Facility: CLINIC | Age: 80
End: 2020-10-19

## 2020-10-19 DIAGNOSIS — M54.50 LUMBAR BACK PAIN: ICD-10-CM

## 2020-10-19 DIAGNOSIS — M54.50 CHRONIC LOW BACK PAIN WITHOUT SCIATICA, UNSPECIFIED BACK PAIN LATERALITY: ICD-10-CM

## 2020-10-19 DIAGNOSIS — G89.29 CHRONIC LOW BACK PAIN WITHOUT SCIATICA, UNSPECIFIED BACK PAIN LATERALITY: ICD-10-CM

## 2020-10-19 DIAGNOSIS — F41.9 ANXIETY AND DEPRESSION: ICD-10-CM

## 2020-10-19 DIAGNOSIS — F32.A ANXIETY AND DEPRESSION: ICD-10-CM

## 2020-10-19 RX ORDER — LEVOTHYROXINE SODIUM 0.05 MG/1
50 TABLET ORAL DAILY
Qty: 90 TABLET | Refills: 1 | Status: SHIPPED | OUTPATIENT
Start: 2020-10-19 | End: 2021-03-29 | Stop reason: SDUPTHER

## 2020-10-19 RX ORDER — OXYCODONE AND ACETAMINOPHEN 7.5; 325 MG/1; MG/1
1 TABLET ORAL EVERY 8 HOURS PRN
Qty: 75 TABLET | Refills: 0 | OUTPATIENT
Start: 2020-10-19

## 2020-10-19 RX ORDER — OXYCODONE AND ACETAMINOPHEN 7.5; 325 MG/1; MG/1
1 TABLET ORAL EVERY 8 HOURS PRN
Qty: 75 TABLET | Refills: 0 | Status: SHIPPED | OUTPATIENT
Start: 2020-10-19 | End: 2020-10-19 | Stop reason: SDUPTHER

## 2020-10-19 NOTE — TELEPHONE ENCOUNTER
PT SAYS THAT [facility] HAS NOT RECEIVED THE PRESCRIPTION FOR OXYCODONE. PT SAYS SHE SPOKE WITH DR. HINTON YESTERDAY ON THE PHONE.    PLEASE CHECK ON THIS AND LET PATIENT KNOW.

## 2020-10-19 NOTE — TELEPHONE ENCOUNTER
Caller: [id number] - [place], [place] - [address] - [phone] [facility] [phone] FX    Relationship: Pharmacy    Best call back number: [phone]    Medication needed:   Requested Prescriptions     Pending Prescriptions Disp Refills   • oxyCODONE-acetaminophen (Percocet) 7.5-325 MG per tablet 75 tablet 0     Sig: Take 1 tablet by mouth Every 8 (Eight) Hours As Needed for Severe Pain .   • clonazePAM (KlonoPIN) 0.5 MG tablet 30 tablet 0     Sig: Take 1 tablet by mouth At Night As Needed for Anxiety.       What is the patient's preferred pharmacy: [id number] - [place], [place] - [address] - [phone] [facility] [phone] FX

## 2020-10-20 RX ORDER — CLONAZEPAM 0.5 MG/1
0.5 TABLET ORAL NIGHTLY PRN
Qty: 30 TABLET | Refills: 0 | Status: SHIPPED | OUTPATIENT
Start: 2020-10-20 | End: 2020-11-16 | Stop reason: SDUPTHER

## 2020-10-20 RX ORDER — OXYCODONE AND ACETAMINOPHEN 7.5; 325 MG/1; MG/1
1 TABLET ORAL EVERY 8 HOURS PRN
Qty: 75 TABLET | Refills: 0 | Status: SHIPPED | OUTPATIENT
Start: 2020-10-20 | End: 2020-11-16 | Stop reason: SDUPTHER

## 2020-10-21 ENCOUNTER — TRANSCRIBE ORDERS (OUTPATIENT)
Dept: LAB | Facility: HOSPITAL | Age: 80
End: 2020-10-21

## 2020-10-21 ENCOUNTER — TRANSCRIBE ORDERS (OUTPATIENT)
Dept: CARDIOLOGY | Facility: CLINIC | Age: 80
End: 2020-10-21

## 2020-10-21 DIAGNOSIS — Z13.6 SCREENING FOR CARDIOVASCULAR CONDITION: Primary | ICD-10-CM

## 2020-10-21 DIAGNOSIS — Z01.810 PREPROCEDURAL CARDIOVASCULAR EXAMINATION: ICD-10-CM

## 2020-10-21 DIAGNOSIS — Z01.818 OTHER SPECIFIED PRE-OPERATIVE EXAMINATION: Primary | ICD-10-CM

## 2020-10-21 DIAGNOSIS — Z01.818 OTHER SPECIFIED PRE-OPERATIVE EXAMINATION: ICD-10-CM

## 2020-10-21 PROBLEM — I25.119 CORONARY ARTERY DISEASE INVOLVING NATIVE CORONARY ARTERY OF NATIVE HEART WITH ANGINA PECTORIS (HCC): Status: ACTIVE | Noted: 2020-10-21

## 2020-10-26 ENCOUNTER — LAB (OUTPATIENT)
Dept: LAB | Facility: HOSPITAL | Age: 80
End: 2020-10-26

## 2020-10-26 DIAGNOSIS — Z01.818 OTHER SPECIFIED PRE-OPERATIVE EXAMINATION: ICD-10-CM

## 2020-10-26 DIAGNOSIS — Z13.6 SCREENING FOR CARDIOVASCULAR CONDITION: ICD-10-CM

## 2020-10-26 DIAGNOSIS — Z01.810 PREPROCEDURAL CARDIOVASCULAR EXAMINATION: ICD-10-CM

## 2020-10-26 LAB
ANION GAP SERPL CALCULATED.3IONS-SCNC: 11.5 MMOL/L (ref 5–15)
BUN SERPL-MCNC: 25 MG/DL (ref 8–23)
BUN/CREAT SERPL: 25.5 (ref 7–25)
CALCIUM SPEC-SCNC: 9.2 MG/DL (ref 8.6–10.5)
CHLORIDE SERPL-SCNC: 103 MMOL/L (ref 98–107)
CO2 SERPL-SCNC: 21.5 MMOL/L (ref 22–29)
CREAT SERPL-MCNC: 0.98 MG/DL (ref 0.57–1)
GFR SERPL CREATININE-BSD FRML MDRD: 55 ML/MIN/1.73
GLUCOSE SERPL-MCNC: 95 MG/DL (ref 65–99)
POTASSIUM SERPL-SCNC: 4.6 MMOL/L (ref 3.5–5.2)
SODIUM SERPL-SCNC: 136 MMOL/L (ref 136–145)

## 2020-10-26 PROCEDURE — 80048 BASIC METABOLIC PNL TOTAL CA: CPT

## 2020-10-26 PROCEDURE — U0004 COV-19 TEST NON-CDC HGH THRU: HCPCS | Performed by: OBSTETRICS & GYNECOLOGY

## 2020-10-26 PROCEDURE — C9803 HOPD COVID-19 SPEC COLLECT: HCPCS

## 2020-10-26 PROCEDURE — 36415 COLL VENOUS BLD VENIPUNCTURE: CPT

## 2020-10-27 ENCOUNTER — LAB (OUTPATIENT)
Dept: LAB | Facility: HOSPITAL | Age: 80
End: 2020-10-27

## 2020-10-27 DIAGNOSIS — Z13.6 SCREENING FOR CARDIOVASCULAR CONDITION: ICD-10-CM

## 2020-10-27 DIAGNOSIS — Z01.810 PREPROCEDURAL CARDIOVASCULAR EXAMINATION: ICD-10-CM

## 2020-10-27 LAB
BASOPHILS # BLD AUTO: 0.1 10*3/MM3 (ref 0–0.2)
BASOPHILS NFR BLD AUTO: 1.3 % (ref 0–1.5)
DEPRECATED RDW RBC AUTO: 49.7 FL (ref 37–54)
EOSINOPHIL # BLD AUTO: 0.2 10*3/MM3 (ref 0–0.4)
EOSINOPHIL NFR BLD AUTO: 2.6 % (ref 0.3–6.2)
ERYTHROCYTE [DISTWIDTH] IN BLOOD BY AUTOMATED COUNT: 16.5 % (ref 12.3–15.4)
HCT VFR BLD AUTO: 30.4 % (ref 34–46.6)
HGB BLD-MCNC: 9.3 G/DL (ref 12–15.9)
IMM GRANULOCYTES # BLD AUTO: 0.03 10*3/MM3 (ref 0–0.05)
IMM GRANULOCYTES NFR BLD AUTO: 0.4 % (ref 0–0.5)
LYMPHOCYTES # BLD AUTO: 1.94 10*3/MM3 (ref 0.7–3.1)
LYMPHOCYTES NFR BLD AUTO: 25.6 % (ref 19.6–45.3)
MCH RBC QN AUTO: 25.2 PG (ref 26.6–33)
MCHC RBC AUTO-ENTMCNC: 30.6 G/DL (ref 31.5–35.7)
MCV RBC AUTO: 82.4 FL (ref 79–97)
MONOCYTES # BLD AUTO: 0.6 10*3/MM3 (ref 0.1–0.9)
MONOCYTES NFR BLD AUTO: 7.9 % (ref 5–12)
NEUTROPHILS NFR BLD AUTO: 4.7 10*3/MM3 (ref 1.7–7)
NEUTROPHILS NFR BLD AUTO: 62.2 % (ref 42.7–76)
NRBC BLD AUTO-RTO: 0 /100 WBC (ref 0–0.2)
PLATELET # BLD AUTO: 315 10*3/MM3 (ref 140–450)
PMV BLD AUTO: 10.4 FL (ref 6–12)
RBC # BLD AUTO: 3.69 10*6/MM3 (ref 3.77–5.28)
SARS-COV-2 RNA RESP QL NAA+PROBE: NOT DETECTED
WBC # BLD AUTO: 7.57 10*3/MM3 (ref 3.4–10.8)

## 2020-10-27 PROCEDURE — 36415 COLL VENOUS BLD VENIPUNCTURE: CPT

## 2020-10-27 PROCEDURE — 85025 COMPLETE CBC W/AUTO DIFF WBC: CPT

## 2020-10-28 ENCOUNTER — HOSPITAL ENCOUNTER (OUTPATIENT)
Facility: HOSPITAL | Age: 80
Setting detail: HOSPITAL OUTPATIENT SURGERY
Discharge: HOME | End: 2020-10-28
Attending: INTERNAL MEDICINE | Admitting: INTERNAL MEDICINE

## 2020-10-28 VITALS
HEIGHT: 61 IN | BODY MASS INDEX: 18.45 KG/M2 | RESPIRATION RATE: 18 BRPM | HEART RATE: 89 BPM | OXYGEN SATURATION: 97 % | DIASTOLIC BLOOD PRESSURE: 67 MMHG | WEIGHT: 97.7 LBS | TEMPERATURE: 97.5 F | SYSTOLIC BLOOD PRESSURE: 149 MMHG

## 2020-10-28 DIAGNOSIS — I38 VALVULAR HEART DISEASE: ICD-10-CM

## 2020-10-28 DIAGNOSIS — I25.119 CORONARY ARTERY DISEASE INVOLVING NATIVE CORONARY ARTERY OF NATIVE HEART WITH ANGINA PECTORIS (HCC): ICD-10-CM

## 2020-10-28 PROCEDURE — C1769 GUIDE WIRE: HCPCS | Performed by: INTERNAL MEDICINE

## 2020-10-28 PROCEDURE — C1894 INTRO/SHEATH, NON-LASER: HCPCS | Performed by: INTERNAL MEDICINE

## 2020-10-28 PROCEDURE — S0260 H&P FOR SURGERY: HCPCS | Performed by: INTERNAL MEDICINE

## 2020-10-28 PROCEDURE — 25010000002 MIDAZOLAM PER 1 MG: Performed by: INTERNAL MEDICINE

## 2020-10-28 PROCEDURE — 25010000002 HYDRALAZINE PER 20 MG: Performed by: INTERNAL MEDICINE

## 2020-10-28 PROCEDURE — 0 IOPAMIDOL PER 1 ML: Performed by: INTERNAL MEDICINE

## 2020-10-28 PROCEDURE — 93460 R&L HRT ART/VENTRICLE ANGIO: CPT | Performed by: INTERNAL MEDICINE

## 2020-10-28 PROCEDURE — 85014 HEMATOCRIT: CPT

## 2020-10-28 PROCEDURE — 25010000002 FENTANYL CITRATE (PF) 100 MCG/2ML SOLUTION: Performed by: INTERNAL MEDICINE

## 2020-10-28 PROCEDURE — 85018 HEMOGLOBIN: CPT

## 2020-10-28 RX ORDER — MIDAZOLAM HYDROCHLORIDE 1 MG/ML
INJECTION INTRAMUSCULAR; INTRAVENOUS AS NEEDED
Status: DISCONTINUED | OUTPATIENT
Start: 2020-10-28 | End: 2020-10-28 | Stop reason: HOSPADM

## 2020-10-28 RX ORDER — SODIUM CHLORIDE 9 MG/ML
250 INJECTION, SOLUTION INTRAVENOUS ONCE AS NEEDED
Status: DISCONTINUED | OUTPATIENT
Start: 2020-10-28 | End: 2020-10-28 | Stop reason: HOSPADM

## 2020-10-28 RX ORDER — SODIUM CHLORIDE 0.9 % (FLUSH) 0.9 %
3 SYRINGE (ML) INJECTION EVERY 12 HOURS SCHEDULED
Status: DISCONTINUED | OUTPATIENT
Start: 2020-10-28 | End: 2020-10-28 | Stop reason: HOSPADM

## 2020-10-28 RX ORDER — ACETAMINOPHEN 325 MG/1
650 TABLET ORAL EVERY 4 HOURS PRN
Status: CANCELLED | OUTPATIENT
Start: 2020-10-28

## 2020-10-28 RX ORDER — SODIUM CHLORIDE 9 MG/ML
75 INJECTION, SOLUTION INTRAVENOUS CONTINUOUS
Status: DISCONTINUED | OUTPATIENT
Start: 2020-10-28 | End: 2020-10-28 | Stop reason: HOSPADM

## 2020-10-28 RX ORDER — SODIUM CHLORIDE 0.9 % (FLUSH) 0.9 %
3 SYRINGE (ML) INJECTION EVERY 12 HOURS SCHEDULED
Status: CANCELLED | OUTPATIENT
Start: 2020-10-28

## 2020-10-28 RX ORDER — FENTANYL CITRATE 50 UG/ML
INJECTION, SOLUTION INTRAMUSCULAR; INTRAVENOUS AS NEEDED
Status: DISCONTINUED | OUTPATIENT
Start: 2020-10-28 | End: 2020-10-28 | Stop reason: HOSPADM

## 2020-10-28 RX ORDER — LIDOCAINE HYDROCHLORIDE 10 MG/ML
INJECTION, SOLUTION INFILTRATION; PERINEURAL AS NEEDED
Status: DISCONTINUED | OUTPATIENT
Start: 2020-10-28 | End: 2020-10-28 | Stop reason: HOSPADM

## 2020-10-28 RX ORDER — SODIUM CHLORIDE 0.9 % (FLUSH) 0.9 %
10 SYRINGE (ML) INJECTION AS NEEDED
Status: CANCELLED | OUTPATIENT
Start: 2020-10-28

## 2020-10-28 RX ORDER — LIDOCAINE HYDROCHLORIDE 20 MG/ML
INJECTION, SOLUTION INFILTRATION; PERINEURAL AS NEEDED
Status: DISCONTINUED | OUTPATIENT
Start: 2020-10-28 | End: 2020-10-28 | Stop reason: HOSPADM

## 2020-10-28 RX ORDER — SODIUM CHLORIDE 0.9 % (FLUSH) 0.9 %
10 SYRINGE (ML) INJECTION AS NEEDED
Status: DISCONTINUED | OUTPATIENT
Start: 2020-10-28 | End: 2020-10-28 | Stop reason: HOSPADM

## 2020-10-28 RX ORDER — HYDRALAZINE HYDROCHLORIDE 20 MG/ML
INJECTION INTRAMUSCULAR; INTRAVENOUS AS NEEDED
Status: DISCONTINUED | OUTPATIENT
Start: 2020-10-28 | End: 2020-10-28 | Stop reason: HOSPADM

## 2020-10-28 RX ADMIN — SODIUM CHLORIDE 75 ML/HR: 9 INJECTION, SOLUTION INTRAVENOUS at 09:48

## 2020-10-30 LAB
HCT VFR BLDA CALC: 28 % (ref 38–51)
HCT VFR BLDA CALC: 29 % (ref 38–51)
HGB BLDA-MCNC: 9.5 G/DL (ref 12–17)
HGB BLDA-MCNC: 9.9 G/DL (ref 12–17)
SAO2 % BLDA: 55 % (ref 95–98)
SAO2 % BLDA: 91 % (ref 95–98)

## 2020-11-08 DIAGNOSIS — E03.9 HYPOTHYROIDISM (ACQUIRED): ICD-10-CM

## 2020-11-09 ENCOUNTER — OFFICE VISIT (OUTPATIENT)
Dept: CARDIAC SURGERY | Facility: CLINIC | Age: 80
End: 2020-11-09

## 2020-11-09 ENCOUNTER — TELEPHONE (OUTPATIENT)
Dept: CARDIAC SURGERY | Facility: CLINIC | Age: 80
End: 2020-11-09

## 2020-11-09 VITALS
DIASTOLIC BLOOD PRESSURE: 65 MMHG | HEIGHT: 61 IN | RESPIRATION RATE: 16 BRPM | TEMPERATURE: 97.5 F | BODY MASS INDEX: 18.88 KG/M2 | HEART RATE: 72 BPM | OXYGEN SATURATION: 94 % | WEIGHT: 100 LBS | SYSTOLIC BLOOD PRESSURE: 112 MMHG

## 2020-11-09 DIAGNOSIS — I10 ESSENTIAL HYPERTENSION: Primary | ICD-10-CM

## 2020-11-09 DIAGNOSIS — I77.9 PERIPHERAL ARTERIAL OCCLUSIVE DISEASE (HCC): ICD-10-CM

## 2020-11-09 DIAGNOSIS — J42 CHRONIC BRONCHITIS, UNSPECIFIED CHRONIC BRONCHITIS TYPE (HCC): ICD-10-CM

## 2020-11-09 DIAGNOSIS — E03.9 ACQUIRED HYPOTHYROIDISM: ICD-10-CM

## 2020-11-09 DIAGNOSIS — I07.1 SEVERE TRICUSPID REGURGITATION: ICD-10-CM

## 2020-11-09 DIAGNOSIS — I48.0 PAF (PAROXYSMAL ATRIAL FIBRILLATION) (HCC): ICD-10-CM

## 2020-11-09 DIAGNOSIS — K55.1 MESENTERIC ARTERY STENOSIS (HCC): ICD-10-CM

## 2020-11-09 DIAGNOSIS — I25.119 CORONARY ARTERY DISEASE INVOLVING NATIVE CORONARY ARTERY OF NATIVE HEART WITH ANGINA PECTORIS (HCC): ICD-10-CM

## 2020-11-09 DIAGNOSIS — R63.0 LOSS OF APPETITE: ICD-10-CM

## 2020-11-09 DIAGNOSIS — F41.8 MIXED ANXIETY DEPRESSIVE DISORDER: ICD-10-CM

## 2020-11-09 DIAGNOSIS — I34.0 SEVERE MITRAL REGURGITATION: ICD-10-CM

## 2020-11-09 DIAGNOSIS — F17.200 TOBACCO DEPENDENCE SYNDROME: ICD-10-CM

## 2020-11-09 DIAGNOSIS — I38 VALVULAR HEART DISEASE: ICD-10-CM

## 2020-11-09 DIAGNOSIS — I65.23 OBSTRUCTION OF CAROTID ARTERY ON BOTH SIDES: ICD-10-CM

## 2020-11-09 PROCEDURE — 99214 OFFICE O/P EST MOD 30 MIN: CPT | Performed by: THORACIC SURGERY (CARDIOTHORACIC VASCULAR SURGERY)

## 2020-11-09 RX ORDER — LEVOTHYROXINE SODIUM 0.07 MG/1
75 TABLET ORAL DAILY
Qty: 90 TABLET | Refills: 0 | Status: SHIPPED | OUTPATIENT
Start: 2020-11-09 | End: 2020-11-16

## 2020-11-10 ENCOUNTER — TELEPHONE (OUTPATIENT)
Dept: CARDIOLOGY | Facility: CLINIC | Age: 80
End: 2020-11-10

## 2020-11-10 DIAGNOSIS — I34.0 MITRAL VALVE INSUFFICIENCY, UNSPECIFIED ETIOLOGY: Primary | ICD-10-CM

## 2020-11-10 DIAGNOSIS — I65.23 BILATERAL CAROTID ARTERY STENOSIS: Primary | ICD-10-CM

## 2020-11-10 NOTE — TELEPHONE ENCOUNTER
Dr Mai's office called, he would like for pt to have a RICKY.  If this is acceptable please place order.    Thank you    Reena LAWTON

## 2020-11-12 ENCOUNTER — TRANSCRIBE ORDERS (OUTPATIENT)
Dept: OBSTETRICS AND GYNECOLOGY | Facility: CLINIC | Age: 80
End: 2020-11-12

## 2020-11-12 ENCOUNTER — TRANSCRIBE ORDERS (OUTPATIENT)
Dept: CARDIOLOGY | Facility: CLINIC | Age: 80
End: 2020-11-12

## 2020-11-12 DIAGNOSIS — Z13.6 SCREENING FOR CARDIOVASCULAR CONDITION: Primary | ICD-10-CM

## 2020-11-12 DIAGNOSIS — Z01.810 PREPROCEDURAL CARDIOVASCULAR EXAMINATION: ICD-10-CM

## 2020-11-12 DIAGNOSIS — Z01.818 OTHER SPECIFIED PRE-OPERATIVE EXAMINATION: Primary | ICD-10-CM

## 2020-11-14 NOTE — PROGRESS NOTES
11/14/2020    Seen on 11/10/20    Chief Complaint:     Follow-up evaluation of mitral regurgitation    History of Present Illness:       Dear Crow and Colleagues,  It was nice to see Kaylin Ojeda in follow up evaluation for her heart valve disease. She is a 80 y.o. female with a history of multiple medical problems including mitral valve regurgitation, tricuspid regurgitation, moderate aortic regurgitation, paroxysmal atrial fibrillation and moderate coronary artery disease who I saw few weeks ago and evaluation for possible surgery.  She is very short of breath and fatigues very easily.  She is an active smoker and she smoked for decades.  And the pulmonary function test that showed her FEV1 to be 97% of predicted with a 1.62 L and a DLCO to be 58% of predicted.  I was quite surprising. She had a cardiac cath 60 to 70% proximal LAD followed by a 70% in the mid to distal third of the vessel.  She states she feels short of breath in the interval and that she is eating more time to get stronger. She denies chest pain, orthopnea, PND, lower extremity edema but has occasional palpitations.  Despite the findings in the pulmonary function test the CT scan of the chest shows significant emphysematous changes.  Carotid Dopplers showed close to 70% stenosis in both internal carotid arteries.  She denies amaurosis fugax, numbness or TIAs.  The chest and abdomen CTA showed mild dilatation of the thoracoabdominal aorta, occlusion of the left subclavian artery and proximal origin of the vertebral artery.    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HCC)   • Obstruction of carotid artery   •  Peripheral arterial occlusive disease (CMS/HCC)   • Impaired glucose tolerance   • Difficulty sleeping   • Tobacco dependence syndrome   • Iron deficiency anemia   • Vitamin B12 deficiency anemia due to intrinsic factor deficiency   • Acquired hypothyroidism   • Thoracoabdominal aortic aneurysm (CMS/HCC)   • Abdominal bloating   • Prediabetes   • Sleep difficulties   • Chronic constipation   • PAF (paroxysmal atrial fibrillation) (CMS/[facility])   • Moderate single current episode of major depressive disorder (CMS/[facility])   • Chronic low back pain without sciatica   • Urge incontinence   • Severe mitral regurgitation   • Severe tricuspid regurgitation   • Moderate aortic valve regurgitation   • Coronary artery disease involving native coronary artery of native heart with angina pectoris (CMS/[facility])       Past Medical History:   Diagnosis Date   • Abdominal pain, epigastric     Chronic, unclear cause, improved   • Anorexia    • Anxiety    • Arthritis    • CAD (coronary artery disease)     Cath 5/2015: nonobstructive LAD/RCA disease, 90% mid LCx s/p [id number] Xience   • Cellulitis of leg    • Cervical disc disease    • Chronic fatigue    • Colitis    • COPD (chronic obstructive pulmonary disease) (CMS/[facility])    • Depression    • Erosive gastritis    • Fibromyalgia    • Frequency of urination 6/9/2017   • Gastritis    • Hypercholesterolemia 2/2/2016   • Hyperglycemia    • Hypertension     History of refractory hypertension which improved significantly   • Insomnia    • Leukocytes in urine    • Lower back pain    • Mediastinal lymphadenopathy    • 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[facility])    • Mononucleosis    • Occlusion and stenosis of carotid artery    • Onychomycosis    • Orbit fracture (CMS/[facility])    • PAF (paroxysmal atrial fibrillation) (CMS/[facility])    • Peripheral arterial disease (CMS/[facility])     Significant (carotid, subclavian, lower extremities), with claudication, medical therapy only   • Pernicious anemia    • Prediabetes    •  "Renal artery stenosis (CMS/HCC)     unilateral, with renal atrophy (no intervention required)   • Renal calculi    • Sinus bradycardia     mild, asymptomatic   • TIA (transient ischemic attack)    • UTI (urinary tract infection)    • Valvular heart disease     5/2015: mild-mod AI, mod MR, mod-severe TR.  6/2017: mild AI, mild MR, mod TR   • Vision problem    • Vitamin B 12 deficiency        Past Surgical History:   Procedure Laterality Date   • APPENDECTOMY     • BREAST BIOPSY Left     20+ yrs ago   • BREAST SURGERY     • CARDIAC CATHETERIZATION  05/2015    nonobs LAD/RCA disease, 90% mid LCx s/p 2.07l00hr Xience   • CARDIAC CATHETERIZATION N/A 10/28/2020    Procedure: Right and Left Heart Cath;  Surgeon: Opal Contreras MD;  Location:  [facility];  Service: Cardiovascular;  Laterality: N/A;  for cardiac testing prior to possible valve surgery per Dr. Mai   • CARDIAC CATHETERIZATION N/A 10/28/2020    Procedure: Coronary angiography;  Surgeon: Opal Contreras MD;  Location:  [facility];  Service: Cardiovascular;  Laterality: N/A;   • CERVICAL FUSION  1997   • CHOLECYSTECTOMY     • CORONARY STENT PLACEMENT     • HYSTERECTOMY  1995   • KNEE SURGERY Right 2005   • KNEE SURGERY Left 1998       Allergies   Allergen Reactions   • Aleve [Naproxen Sodium] Shortness Of Breath   • Codeine Other (See Comments)     hyperactivity   • Ibuprofen Unknown (See Comments)     unk   • Sulfa Antibiotics Unknown (See Comments)     \"I can't remember\"         Current Outpatient Medications:   •  amLODIPine (NORVASC) 10 MG tablet, TAKE 1 TABLET BY MOUTH DAILY, Disp: 30 tablet, Rfl: 2  •  apixaban (Eliquis) 5 MG tablet tablet, Take 1 tablet by mouth Every 12 (Twelve) Hours. Resume on 11/2/2020, Disp: 60 tablet, Rfl: 1  •  atorvastatin (LIPITOR) 10 MG tablet, TAKE 1 TABLET BY MOUTH DAILY, Disp: 90 tablet, Rfl: 3  •  clonazePAM (KlonoPIN) 0.5 MG tablet, Take 1 tablet by mouth At Night As Needed for " Anxiety., Disp: 30 tablet, Rfl: 0  •  fluticasone (FLONASE) 50 MCG/ACT nasal spray, SPRAY TWICE IN EACH NOSTRIL EVERY DAY FOR 30 DAYS., Disp: 16 mL, Rfl: 6  •  levothyroxine (SYNTHROID, LEVOTHROID) 50 MCG tablet, TAKE 1 TABLET BY MOUTH DAILY, Disp: 90 tablet, Rfl: 1  •  lisinopril (PRINIVIL,ZESTRIL) 20 MG tablet, TAKE 1 TABLET BY MOUTH DAILY, Disp: 90 tablet, Rfl: 0  •  metoprolol tartrate (LOPRESSOR) 100 MG tablet, Take 1 tablet by mouth 2 (Two) Times a Day., Disp: 180 tablet, Rfl: 1  •  ondansetron (ZOFRAN) 8 MG tablet, Take 1 tablet by mouth Every 8 (Eight) Hours As Needed for Nausea or Vomiting., Disp: 20 tablet, Rfl: 1  •  oxybutynin (DITROPAN) 5 MG tablet, TAKE 1 TABLET BY MOUTH TWICE DAILY, Disp: 180 tablet, Rfl: 1  •  oxyCODONE-acetaminophen (Percocet) 7.5-325 MG per tablet, Take 1 tablet by mouth Every 8 (Eight) Hours As Needed for Severe Pain ., Disp: 75 tablet, Rfl: 0  •  sennosides-docusate (senna-docusate sodium) 8.6-50 MG per tablet, Take 2 tablets by mouth Daily., Disp: 60 tablet, Rfl: 5  •  albuterol (PROVENTIL) (2.5 MG/3ML) 0.083% nebulizer solution, Take 2.5 mg by nebulization Every 4 (Four) Hours As Needed for Wheezing or Shortness of Air., Disp: 120 vial, Rfl: 5  •  albuterol sulfate  (90 Base) MCG/ACT inhaler, Inhale 2 puffs Every 4 (Four) Hours As Needed for Wheezing., Disp: 18 g, Rfl: 11  •  budesonide-formoterol (SYMBICORT) 160-4.5 MCG/ACT inhaler, Inhale 2 puffs 2 (Two) Times a Day., Disp: 1 inhaler, Rfl: 12  •  DULoxetine (CYMBALTA) 60 MG capsule, TAKE 1 CAPSULE BY MOUTH DAILY, Disp: 90 capsule, Rfl: 1  •  levothyroxine (SYNTHROID, LEVOTHROID) 75 MCG tablet, TAKE 1 TABLET BY MOUTH DAILY, Disp: 90 tablet, Rfl: 0  •  omeprazole (priLOSEC) 20 MG capsule, TAKE 1 CAPSULE BY MOUTH DAILY, Disp: 90 capsule, Rfl: 1    Social History     Socioeconomic History   • Marital status:      Spouse name: Willie   • Number of children: 3   • Years of education: Some College   • Highest education  level: Not on file   Occupational History     Employer: RETIRED   Tobacco Use   • Smoking status: Current Some Day Smoker     Packs/day: 0.50     Types: Cigarettes   • Smokeless tobacco: Never Used   • Tobacco comment: after meals or with coffee.    Substance and Sexual Activity   • Alcohol use: Not Currently     Frequency: Monthly or less     Comment: OCCASIONAL/ TWICE A YEAR.    • Drug use: No   • Sexual activity: Defer       Family History   Problem Relation Age of Onset   • Asthma Mother    • Emphysema Mother    • Graves' disease Mother    • Heart disease Mother    • Hypertension Mother    • Emphysema Father    • Hypertension Father    • Heart disease Father    • Kidney disease Sister    • Lupus Daughter         Systemic erythematosus   • Arthritis Other    • Crohn's disease Other    • Breast cancer Niece    • Breast cancer Maternal Cousin    • Breast cancer Maternal Cousin        Review of Systems   Constitutional: Positive for fatigue.   Respiratory: Positive for cough and shortness of breath.    Cardiovascular: Positive for chest pain and palpitations. Negative for leg swelling.   Genitourinary: Negative for hematuria.   Neurological: Positive for weakness. Negative for dizziness and syncope.   All other systems reviewed and are negative.      Physical Exam:    Vital Signs:  Weight: 45.4 kg (100 lb)   Body mass index is 18.89 kg/m².  Temp: 97.5 °F (36.4 °C)   Heart Rate: 72   BP: 112/65     Constitutional:       Appearance: Well-developed.   Eyes:      Conjunctiva/sclera: Conjunctivae normal.      Pupils: Pupils are equal, round, and reactive to light.   HENT:      Head: Normocephalic and atraumatic.      Nose: Nose normal.   Neck:      Musculoskeletal: Normal range of motion and neck supple.      Thyroid: No thyromegaly.      Vascular: No JVD.      Lymphadenopathy: No cervical adenopathy.   Pulmonary:      Effort: Pulmonary effort is normal.      Breath sounds: Normal breath sounds. No rales.    Cardiovascular:      Normal rate. Regular rhythm.      Murmurs: There is a grade 3/6 high frequency blowing holosystolic murmur at the apex. The intensity does not change with Valsalva. The intensity does not change with respiration.      No gallop.   Pulses:     Intact distal pulses.   Abdominal:      General: Bowel sounds are normal. There is no distension.      Palpations: Abdomen is soft. There is no abdominal mass.      Tenderness: There is no abdominal tenderness.   Musculoskeletal: Normal range of motion.         General: No tenderness or deformity.   Skin:     General: Skin is warm and dry.      Findings: No erythema or rash.   Neurological:      Mental Status: Alert and oriented to person, place, and time.      Deep Tendon Reflexes: Reflexes are normal and symmetric.   Psychiatric:         Behavior: Behavior normal.          Assessment:     Problems Addressed this Visit        Cardiovascular and Mediastinum    Valvular heart disease    Essential hypertension - Primary    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HCC)    Obstruction of carotid artery    Peripheral arterial occlusive disease (CMS/HCC)    PAF (paroxysmal atrial fibrillation) (CMS/HCC)    Coronary artery disease involving native coronary artery of native heart with angina pectoris (CMS/HCC)       Respiratory    Chronic obstructive pulmonary disease (CMS/HCC)       Endocrine    Acquired hypothyroidism       Other    Loss of appetite    Mixed anxiety depressive disorder    Tobacco dependence syndrome    Severe mitral regurgitation    Severe tricuspid regurgitation      Diagnoses       Codes Comments    Essential hypertension    -  Primary ICD-10-CM: I10  ICD-9-CM: 401.9     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HCC)     ICD-10-CM: K55.1  ICD-9-CM: 557.1     Obstruction of carotid artery on both sides     ICD-10-CM: I65.23  ICD-9-CM: 433.10, 433.30     Peripheral arterial occlusive disease (CMS/HCC)     ICD-10-CM: I77.9  ICD-9-CM: 444.22     PAF (paroxysmal  atrial fibrillation) (CMS/[facility])     ICD-10-CM: I48.0  ICD-9-CM: 427.31     Coronary artery disease involving native coronary artery of native heart with angina pectoris (CMS/[facility])     ICD-10-CM: I25.119  ICD-9-CM: 414.01, 413.9     Valvular heart disease     ICD-10-CM: I38  ICD-9-CM: 424.90     Chronic bronchitis, unspecified chronic bronchitis type (CMS/[facility])     ICD-10-CM: J42  ICD-9-CM: 491.9     Acquired hypothyroidism     ICD-10-CM: E03.9  ICD-9-CM: 244.9     Mixed anxiety depressive disorder     ICD-10-CM: F41.8  ICD-9-CM: 300.4     Loss of appetite     ICD-10-CM: R63.0  ICD-9-CM: 783.0     Tobacco dependence syndrome     ICD-10-CM: F17.200  ICD-9-CM: 305.1     Severe mitral regurgitation     ICD-10-CM: I34.0  ICD-9-CM: 424.0     Severe tricuspid regurgitation     ICD-10-CM: I07.1  ICD-9-CM: 397.0           1. Dyspnea of exertion most likely cardiac and pulmonary related  2. Severe mitral and tricuspid regurgitation  3. At least moderate aortic regurgitation  4. Single-vessel coronary artery disease  5. COPD at least moderate if not severe  6. Congestive heart failure class III  7. Bilateral asymptomatic carotid artery and a RICKY to further address    Recommendation/Plan:     She has severe triple valve disease and she is having ongoing work-up for possible surgery.  Has seen she is a candidate for surgery although high risk.  I would favor her to have neck CTA to rule out severity of the carotid disease and a RICKY to further evaluate the mitral and aortic valves.  I recommend mitral tricuspid valve repair, possible aortic valve replacement, CABG to the LAD and second diagonal and Maze procedure.  I quoted a surgical risk of approximately 8 to 10% for mortality and 30% for complications.  Once the work-up is complete we will schedule her for surgery.  She agreed to proceed with surgery.    COPD, we discussed smoke cessation and she is trying to quit.        Thank you for allowing me to participate in her  care.    Regards,    Catracho Mai M.D.

## 2020-11-16 ENCOUNTER — OFFICE VISIT (OUTPATIENT)
Dept: INTERNAL MEDICINE | Facility: CLINIC | Age: 80
End: 2020-11-16

## 2020-11-16 ENCOUNTER — APPOINTMENT (OUTPATIENT)
Dept: LAB | Facility: HOSPITAL | Age: 80
End: 2020-11-16

## 2020-11-16 VITALS
OXYGEN SATURATION: 100 % | DIASTOLIC BLOOD PRESSURE: 78 MMHG | RESPIRATION RATE: 16 BRPM | HEART RATE: 71 BPM | HEIGHT: 61 IN | SYSTOLIC BLOOD PRESSURE: 114 MMHG | BODY MASS INDEX: 19.45 KG/M2 | WEIGHT: 103 LBS | TEMPERATURE: 97.8 F

## 2020-11-16 DIAGNOSIS — K55.1 MESENTERIC ARTERY STENOSIS (HCC): ICD-10-CM

## 2020-11-16 DIAGNOSIS — R11.0 NAUSEA: ICD-10-CM

## 2020-11-16 DIAGNOSIS — F41.8 MIXED ANXIETY DEPRESSIVE DISORDER: ICD-10-CM

## 2020-11-16 DIAGNOSIS — G89.29 CHRONIC LOW BACK PAIN WITHOUT SCIATICA, UNSPECIFIED BACK PAIN LATERALITY: ICD-10-CM

## 2020-11-16 DIAGNOSIS — M54.50 CHRONIC LOW BACK PAIN WITHOUT SCIATICA, UNSPECIFIED BACK PAIN LATERALITY: ICD-10-CM

## 2020-11-16 DIAGNOSIS — F32.A ANXIETY AND DEPRESSION: ICD-10-CM

## 2020-11-16 DIAGNOSIS — J42 CHRONIC BRONCHITIS, UNSPECIFIED CHRONIC BRONCHITIS TYPE (HCC): ICD-10-CM

## 2020-11-16 DIAGNOSIS — F41.9 ANXIETY AND DEPRESSION: ICD-10-CM

## 2020-11-16 DIAGNOSIS — M54.50 LUMBAR BACK PAIN: ICD-10-CM

## 2020-11-16 DIAGNOSIS — I25.119 CORONARY ARTERY DISEASE INVOLVING NATIVE CORONARY ARTERY OF NATIVE HEART WITH ANGINA PECTORIS (HCC): ICD-10-CM
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
## 2020-11-16 DIAGNOSIS — I38 VALVULAR HEART DISEASE: Primary | ICD-10-CM
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
## 2020-11-16 PROCEDURE — 99214 OFFICE O/P EST MOD 30 MIN: CPT | Performed by: INTERNAL MEDICINE

## 2020-11-16 RX ORDER — CLONAZEPAM 0.5 MG/1
0.5 TABLET ORAL NIGHTLY PRN
Qty: 30 TABLET | Refills: 0 | Status: SHIPPED | OUTPATIENT
Start: 2020-11-16 | End: 2020-12-14 | Stop reason: SDUPTHER

## 2020-11-16 RX ORDER — ONDANSETRON HYDROCHLORIDE 8 MG/1
8 TABLET, FILM COATED ORAL EVERY 8 HOURS PRN
Qty: 20 TABLET | Refills: 1 | Status: SHIPPED | OUTPATIENT
Start: 2020-11-16 | End: 2021-09-22

## 2020-11-16 RX ORDER — OXYCODONE AND ACETAMINOPHEN 7.5; 325 MG/1; MG/1
1 TABLET ORAL EVERY 8 HOURS PRN
Qty: 75 TABLET | Refills: 0 | Status: SHIPPED | OUTPATIENT
Start: 2020-11-16 | End: 2020-12-14 | Stop reason: SDUPTHER

## 2020-11-16 RX ORDER — ALBUTEROL SULFATE 2.5 MG/3ML
2.5 SOLUTION RESPIRATORY (INHALATION) EVERY 4 HOURS PRN
Qty: 120 VIAL | Refills: 5 | Status: SHIPPED | OUTPATIENT
Start: 2020-11-16 | End: 2021-12-07 | Stop reason: SDUPTHER

## 2020-11-16 NOTE — PROGRESS NOTES
"      Kaylin Ojeda is a 80 y.o. female, who presents with a chief complaint of   Chief Complaint   Patient presents with   • Med Management   • Fall   • Back Pain       HPI     Kaylin is here for f/u.     Reports she fell 2 weeks ago, reports feeling dizzy prior to falling but doesn't remember the fall itself. Suffered a mild abrasion to her left frontal mitch. Additionally hit elbow and left lateral side. Experiencing severe back pain localized to lateral left lower lumbar region that is impairing ability to ambulate and perform IADLs. Had to have a wheelchair bring her up to this appointment. Did drive herself her \"because she didn't have any choice.\" Pain relieved with percocet but ran out last night. Recently taking 2-3/day to try to save them. Additionally notes that she suffered a black eye on left, since then has noticed worsening vision that is slightly blurrier than before.     States she is concerned about paying her medications, has not been taking some of them due to cost. Her granddaughter finally moved out and is living in an apartment in the same complex. She was supporting 6 people and is trying to get back on her feet financially.  She has talked to [employer] wheels, wheels transportation, and a senior counselor.  She hasn't had albuterol, symbicort, alendronate, fluticasone and docusate senna.     Recently stopped Cymbalta 2 weeks ago because she felt it wasn't working for her. Did notice increase in sadness and crying after coming off medication but feeling well now. Some depressed mood but no hopelessness or suicidal ideation. She says she finally feels like she is grieving the loss of her  and daughter.      Valvular heart disease-Recently saw CT surgery, planning for mitral/tricuspid valve repair, aortic valve replacement, CABG to LAD and second diagonal and maze procedure. High risk from complications 10 % mortality and 30 % complications. Will get RICKY on Friday and follow-up " with CT surgery to determine if surgery will be performed or not. Tentatively by the end of the year. Cutting down smoking, down to 5 to 6 cigarettes a day.     The following portions of the patient's history were reviewed and updated as appropriate: allergies, current medications, past family history, past medical history, past social history, past surgical history and problem list.    Allergies: Aleve [naproxen sodium], Codeine, Ibuprofen, and Sulfa antibiotics    Current Outpatient Medications:   •  albuterol (PROVENTIL) (2.5 MG/3ML) 0.083% nebulizer solution, Take 2.5 mg by nebulization Every 4 (Four) Hours As Needed for Wheezing or Shortness of Air., Disp: 120 vial, Rfl: 5  •  albuterol sulfate  (90 Base) MCG/ACT inhaler, Inhale 2 puffs Every 4 (Four) Hours As Needed for Wheezing., Disp: 18 g, Rfl: 11  •  amLODIPine (NORVASC) 10 MG tablet, TAKE 1 TABLET BY MOUTH DAILY, Disp: 30 tablet, Rfl: 2  •  apixaban (Eliquis) 5 MG tablet tablet, Take 1 tablet by mouth Every 12 (Twelve) Hours. Resume on 11/2/2020, Disp: 60 tablet, Rfl: 1  •  atorvastatin (LIPITOR) 10 MG tablet, TAKE 1 TABLET BY MOUTH DAILY, Disp: 90 tablet, Rfl: 3  •  budesonide-formoterol (SYMBICORT) 160-4.5 MCG/ACT inhaler, Inhale 2 puffs 2 (Two) Times a Day., Disp: 1 inhaler, Rfl: 12  •  clonazePAM (KlonoPIN) 0.5 MG tablet, Take 1 tablet by mouth At Night As Needed for Anxiety., Disp: 30 tablet, Rfl: 0  •  fluticasone (FLONASE) 50 MCG/ACT nasal spray, SPRAY TWICE IN EACH NOSTRIL EVERY DAY FOR 30 DAYS., Disp: 16 mL, Rfl: 6  •  levothyroxine (SYNTHROID, LEVOTHROID) 50 MCG tablet, TAKE 1 TABLET BY MOUTH DAILY, Disp: 90 tablet, Rfl: 1  •  lisinopril (PRINIVIL,ZESTRIL) 20 MG tablet, TAKE 1 TABLET BY MOUTH DAILY, Disp: 90 tablet, Rfl: 0  •  metoprolol tartrate (LOPRESSOR) 100 MG tablet, Take 1 tablet by mouth 2 (Two) Times a Day., Disp: 180 tablet, Rfl: 1  •  ondansetron (ZOFRAN) 8 MG tablet, Take 1 tablet by mouth Every 8 (Eight) Hours As Needed for  Nausea or Vomiting., Disp: 20 tablet, Rfl: 1  •  oxybutynin (DITROPAN) 5 MG tablet, TAKE 1 TABLET BY MOUTH TWICE DAILY, Disp: 180 tablet, Rfl: 1  •  oxyCODONE-acetaminophen (Percocet) 7.5-325 MG per tablet, Take 1 tablet by mouth Every 8 (Eight) Hours As Needed for Severe Pain ., Disp: 75 tablet, Rfl: 0  Medications Discontinued During This Encounter   Medication Reason   • sennosides-docusate (senna-docusate sodium) 8.6-50 MG per tablet    • omeprazole (priLOSEC) 20 MG capsule    • levothyroxine (SYNTHROID, LEVOTHROID) 75 MCG tablet *Therapy completed   • DULoxetine (CYMBALTA) 60 MG capsule    • ondansetron (ZOFRAN) 8 MG tablet Reorder   • albuterol (PROVENTIL) (2.5 MG/3ML) 0.083% nebulizer solution Reorder   • oxyCODONE-acetaminophen (Percocet) 7.5-325 MG per tablet Reorder   • clonazePAM (KlonoPIN) 0.5 MG tablet Reorder       Review of Systems   Constitutional: Negative for activity change, chills, fatigue and fever.   HENT: Negative for congestion, sinus pressure, sinus pain, sore throat and trouble swallowing.    Eyes: Positive for visual disturbance. Negative for photophobia and redness.   Respiratory: Negative for choking, shortness of breath and wheezing.    Cardiovascular: Negative for chest pain, palpitations and leg swelling.   Gastrointestinal: Negative for abdominal pain, constipation, diarrhea, nausea and vomiting.   Genitourinary: Negative for decreased urine volume, difficulty urinating and frequency (resolved with oxybutynin).   Musculoskeletal: Positive for back pain and gait problem.   Skin: Positive for wound. Negative for color change and rash.   Allergic/Immunologic: Negative for environmental allergies and food allergies.   Neurological: Negative for dizziness, tremors and headaches.   Hematological: Negative for adenopathy. Does not bruise/bleed easily.   Psychiatric/Behavioral: Negative for confusion, decreased concentration and dysphoric mood.             /78 (BP Location: Right arm,  "Patient Position: Sitting, Cuff Size: Adult)   Pulse 71   Temp 97.8 °F (36.6 °C) (Temporal)   Resp 16   Ht 154.9 cm (61\")   Wt 46.7 kg (103 lb)   SpO2 100%   BMI 19.46 kg/m²       Physical Exam  Vitals signs and nursing note reviewed.   Constitutional:       Comments: Thin chronically ill appearing female   HENT:      Head: Normocephalic.      Comments: Healing ecchymoses and mild edema inferior to left eye        Right Ear: External ear normal.      Left Ear: External ear normal.      Nose: Nose normal.      Mouth/Throat:      Mouth: Mucous membranes are moist.      Pharynx: No oropharyngeal exudate or posterior oropharyngeal erythema.   Eyes:      Pupils: Pupils are equal, round, and reactive to light.   Neck:      Musculoskeletal: Normal range of motion and neck supple.   Cardiovascular:      Rate and Rhythm: Normal rate and regular rhythm.      Heart sounds: Normal heart sounds.   Pulmonary:      Effort: Pulmonary effort is normal. No respiratory distress.      Breath sounds: Normal breath sounds.   Abdominal:      General: Bowel sounds are normal. There is no distension.      Palpations: Abdomen is soft.      Tenderness: There is no abdominal tenderness.   Musculoskeletal: Normal range of motion.         General: Tenderness: Pain on palaption of lateral lumbar paraspinal musculature. No pain over midline spine or hip       Right lower leg: No edema.      Left lower leg: No edema.   Skin:     General: Skin is warm and dry.      Capillary Refill: Capillary refill takes less than 2 seconds.      Comments: Healing abrasion on left elbow      Neurological:      General: No focal deficit present.      Mental Status: She is alert and oriented to person, place, and time.   Psychiatric:         Mood and Affect: Mood normal.         Behavior: Behavior normal.         Lab Results (most recent)     None          Results for orders placed or performed during the hospital encounter of 10/28/20   POC Panel 9, ISTAT    " Specimen: Blood   Result Value Ref Range    Hemoglobin 9.9 (L) 12.0 - 17.0 g/dL    Hematocrit 29 (L) 38 - 51 %    O2 Saturation, Arterial 55 (L) 95 - 98 %   POC Panel 9, ISTAT    Specimen: Blood   Result Value Ref Range    Hemoglobin 9.5 (L) 12.0 - 17.0 g/dL    Hematocrit 28 (L) 38 - 51 %    O2 Saturation, Arterial 91 (L) 95 - 98 %           Diagnoses and all orders for this visit:    1. Valvular heart disease (Primary)    2. Coronary artery disease involving native coronary artery of native heart with angina pectoris (CMS/HCC)    3. Chronic low back pain without sciatica, unspecified back pain laterality  -     oxyCODONE-acetaminophen (Percocet) 7.5-325 MG per tablet; Take 1 tablet by mouth Every 8 (Eight) Hours As Needed for Severe Pain .  Dispense: 75 tablet; Refill: 0    4. Fall in home, sequela    5. Mixed anxiety depressive disorder    6. 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[facility])  -     ondansetron (ZOFRAN) 8 MG tablet; Take 1 tablet by mouth Every 8 (Eight) Hours As Needed for Nausea or Vomiting.  Dispense: 20 tablet; Refill: 1    7. Nausea  -     ondansetron (ZOFRAN) 8 MG tablet; Take 1 tablet by mouth Every 8 (Eight) Hours As Needed for Nausea or Vomiting.  Dispense: 20 tablet; Refill: 1    8. Anxiety and depression  -     clonazePAM (KlonoPIN) 0.5 MG tablet; Take 1 tablet by mouth At Night As Needed for Anxiety.  Dispense: 30 tablet; Refill: 0    9. Chronic bronchitis, unspecified chronic bronchitis type (CMS/[facility])  -     albuterol (PROVENTIL) (2.5 MG/3ML) 0.083% nebulizer solution; Take 2.5 mg by nebulization Every 4 (Four) Hours As Needed for Wheezing or Shortness of Air.  Dispense: 120 vial; Refill: 5    10. Lumbar back pain  -     oxyCODONE-acetaminophen (Percocet) 7.5-325 MG per tablet; Take 1 tablet by mouth Every 8 (Eight) Hours As Needed for Severe Pain .  Dispense: 75 tablet; Refill: 0      The patient has read and signed the [facility] Controlled Substance Contract.  I will continue to see patient  for regular follow up appointments.  They are well controlled on their medication.  ALEXEI is updated every 3 months. The patient is aware of the potential for addiction and dependence.    Erwin Jennifer   Internal Medicine-Pediatrics PGY3     Return in about 4 weeks (around 12/14/2020) for Recheck.    Reviewed pt's med list.  She is only on eliquis and symbicort that are branded.  Hopefully her granddaugter and her family moving out will help with the pt's finances.  I encouraged her to call and check with different pharmacies to see if she can get her generic meds cheaper.       I have seen and examined the patient independently.  Agree with above.     Donna Forte MD  11/16/2020

## 2020-11-17 ENCOUNTER — LAB (OUTPATIENT)
Dept: LAB | Facility: HOSPITAL | Age: 80
End: 2020-11-17

## 2020-11-17 ENCOUNTER — HOSPITAL ENCOUNTER (OUTPATIENT)
Dept: CT IMAGING | Facility: HOSPITAL | Age: 80
Discharge: HOME | End: 2020-11-17

## 2020-11-17 DIAGNOSIS — Z01.810 PREPROCEDURAL CARDIOVASCULAR EXAMINATION: ICD-10-CM

## 2020-11-17 DIAGNOSIS — Z13.6 SCREENING FOR CARDIOVASCULAR CONDITION: ICD-10-CM

## 2020-11-17 DIAGNOSIS — Z01.818 OTHER SPECIFIED PRE-OPERATIVE EXAMINATION: ICD-10-CM

## 2020-11-17 DIAGNOSIS — I65.23 BILATERAL CAROTID ARTERY STENOSIS: ICD-10-CM

## 2020-11-17 LAB
ANION GAP SERPL CALCULATED.3IONS-SCNC: 8.8 MMOL/L (ref 5–15)
BUN SERPL-MCNC: 19 MG/DL (ref 8–23)
BUN/CREAT SERPL: 22.1 (ref 7–25)
CALCIUM SPEC-SCNC: 8.1 MG/DL (ref 8.6–10.5)
CHLORIDE SERPL-SCNC: 102 MMOL/L (ref 98–107)
CO2 SERPL-SCNC: 23.2 MMOL/L (ref 22–29)
CREAT SERPL-MCNC: 0.86 MG/DL (ref 0.57–1)
GFR SERPL CREATININE-BSD FRML MDRD: 64 ML/MIN/1.73
GLUCOSE SERPL-MCNC: 76 MG/DL (ref 65–99)
POTASSIUM SERPL-SCNC: 4.7 MMOL/L (ref 3.5–5.2)
SODIUM SERPL-SCNC: 134 MMOL/L (ref 136–145)

## 2020-11-17 PROCEDURE — 80048 BASIC METABOLIC PNL TOTAL CA: CPT

## 2020-11-17 PROCEDURE — 70498 CT ANGIOGRAPHY NECK: CPT

## 2020-11-17 PROCEDURE — C9803 HOPD COVID-19 SPEC COLLECT: HCPCS

## 2020-11-17 PROCEDURE — U0004 COV-19 TEST NON-CDC HGH THRU: HCPCS | Performed by: OBSTETRICS & GYNECOLOGY

## 2020-11-17 PROCEDURE — 0 IOPAMIDOL PER 1 ML: Performed by: NURSE PRACTITIONER

## 2020-11-17 PROCEDURE — 36415 COLL VENOUS BLD VENIPUNCTURE: CPT

## 2020-11-17 RX ADMIN — IOPAMIDOL 100 ML: 755 INJECTION, SOLUTION INTRAVENOUS at 11:34

## 2020-11-18 LAB — SARS-COV-2 RNA RESP QL NAA+PROBE: NOT DETECTED

## 2020-11-19 ENCOUNTER — HOSPITAL ENCOUNTER (OUTPATIENT)
Dept: POSTOP/PACU | Facility: HOSPITAL | Age: 80
Discharge: HOME | End: 2020-11-19

## 2020-11-19 ENCOUNTER — ANESTHESIA (OUTPATIENT)
Dept: POSTOP/PACU | Facility: HOSPITAL | Age: 80
End: 2020-11-19

## 2020-11-19 ENCOUNTER — ANESTHESIA EVENT (OUTPATIENT)
Dept: POSTOP/PACU | Facility: HOSPITAL | Age: 80
End: 2020-11-19

## 2020-11-19 VITALS
BODY MASS INDEX: 19.45 KG/M2 | HEART RATE: 64 BPM | OXYGEN SATURATION: 98 % | DIASTOLIC BLOOD PRESSURE: 58 MMHG | SYSTOLIC BLOOD PRESSURE: 142 MMHG | TEMPERATURE: 98 F | WEIGHT: 103 LBS | RESPIRATION RATE: 16 BRPM | HEIGHT: 61 IN

## 2020-11-19 DIAGNOSIS — I34.0 MITRAL VALVE INSUFFICIENCY, UNSPECIFIED ETIOLOGY: ICD-10-CM

## 2020-11-19 LAB
BH CV ECHO MEAS - BSA(HAYCOCK): 1.4 M^2
BH CV ECHO MEAS - BSA: 1.4 M^2
BH CV ECHO MEAS - BZI_BMI: 19.5 KILOGRAMS/M^2
BH CV ECHO MEAS - BZI_METRIC_HEIGHT: 154.9 CM
BH CV ECHO MEAS - BZI_METRIC_WEIGHT: 46.7 KG
BH CV ECHO MEAS - MV A DUR: 0.15 SEC
BH CV ECHO MEAS - MV A MAX VEL: 79.9 CM/SEC
BH CV ECHO MEAS - MV DEC SLOPE: 846 CM/SEC^2
BH CV ECHO MEAS - MV DEC TIME: 0.14 SEC
BH CV ECHO MEAS - MV E MAX VEL: 100 CM/SEC
BH CV ECHO MEAS - MV E/A: 1.3
BH CV ECHO MEAS - MV MAX PG: 9.2 MMHG
BH CV ECHO MEAS - MV MEAN PG: 2 MMHG
BH CV ECHO MEAS - MV P1/2T MAX VEL: 154 CM/SEC
BH CV ECHO MEAS - MV P1/2T: 53.3 MSEC
BH CV ECHO MEAS - MV V2 MAX: 152 CM/SEC
BH CV ECHO MEAS - MV V2 MEAN: 57.6 CM/SEC
BH CV ECHO MEAS - MV V2 VTI: 25.9 CM
BH CV ECHO MEAS - MVA P1/2T LCG: 1.4 CM^2
BH CV ECHO MEAS - MVA(P1/2T): 4.1 CM^2
BH CV ECHO MEAS - RVSP: 37 MMHG
BH CV ECHO MEAS - TR MAX VEL: 293 CM/SEC
BH CV VAS BP LEFT ARM: NORMAL MMHG
LV EF 2D ECHO EST: 60 %

## 2020-11-19 PROCEDURE — 93320 DOPPLER ECHO COMPLETE: CPT | Performed by: INTERNAL MEDICINE

## 2020-11-19 PROCEDURE — 25010000002 PROPOFOL 10 MG/ML EMULSION: Performed by: NURSE ANESTHETIST, CERTIFIED REGISTERED

## 2020-11-19 PROCEDURE — 93312 ECHO TRANSESOPHAGEAL: CPT

## 2020-11-19 PROCEDURE — 93325 DOPPLER ECHO COLOR FLOW MAPG: CPT | Performed by: INTERNAL MEDICINE

## 2020-11-19 PROCEDURE — 93320 DOPPLER ECHO COMPLETE: CPT

## 2020-11-19 PROCEDURE — 93312 ECHO TRANSESOPHAGEAL: CPT | Performed by: INTERNAL MEDICINE

## 2020-11-19 PROCEDURE — 93325 DOPPLER ECHO COLOR FLOW MAPG: CPT

## 2020-11-19 RX ORDER — FLUMAZENIL 0.1 MG/ML
0.2 INJECTION INTRAVENOUS AS NEEDED
Status: DISCONTINUED | OUTPATIENT
Start: 2020-11-19 | End: 2020-11-20 | Stop reason: HOSPADM

## 2020-11-19 RX ORDER — NALOXONE HCL 0.4 MG/ML
0.2 VIAL (ML) INJECTION AS NEEDED
Status: DISCONTINUED | OUTPATIENT
Start: 2020-11-19 | End: 2020-11-20 | Stop reason: HOSPADM

## 2020-11-19 RX ORDER — LIDOCAINE HYDROCHLORIDE 20 MG/ML
INJECTION, SOLUTION INFILTRATION; PERINEURAL AS NEEDED
Status: DISCONTINUED | OUTPATIENT
Start: 2020-11-19 | End: 2020-11-19 | Stop reason: SURG

## 2020-11-19 RX ORDER — FENTANYL CITRATE 50 UG/ML
50 INJECTION, SOLUTION INTRAMUSCULAR; INTRAVENOUS
Status: DISCONTINUED | OUTPATIENT
Start: 2020-11-19 | End: 2020-11-20 | Stop reason: HOSPADM

## 2020-11-19 RX ORDER — LABETALOL HYDROCHLORIDE 5 MG/ML
5 INJECTION, SOLUTION INTRAVENOUS
Status: DISCONTINUED | OUTPATIENT
Start: 2020-11-19 | End: 2020-11-20 | Stop reason: HOSPADM

## 2020-11-19 RX ORDER — SODIUM CHLORIDE 0.9 % (FLUSH) 0.9 %
10 SYRINGE (ML) INJECTION AS NEEDED
Status: CANCELLED | OUTPATIENT
Start: 2020-11-19

## 2020-11-19 RX ORDER — SODIUM CHLORIDE, SODIUM LACTATE, POTASSIUM CHLORIDE, CALCIUM CHLORIDE 600; 310; 30; 20 MG/100ML; MG/100ML; MG/100ML; MG/100ML
9 INJECTION, SOLUTION INTRAVENOUS CONTINUOUS PRN
Status: CANCELLED | OUTPATIENT
Start: 2020-11-19

## 2020-11-19 RX ORDER — EPHEDRINE SULFATE 50 MG/ML
5 INJECTION, SOLUTION INTRAVENOUS ONCE AS NEEDED
Status: DISCONTINUED | OUTPATIENT
Start: 2020-11-19 | End: 2020-11-20 | Stop reason: HOSPADM

## 2020-11-19 RX ORDER — ONDANSETRON 2 MG/ML
4 INJECTION INTRAMUSCULAR; INTRAVENOUS ONCE AS NEEDED
Status: DISCONTINUED | OUTPATIENT
Start: 2020-11-19 | End: 2020-11-20 | Stop reason: HOSPADM

## 2020-11-19 RX ORDER — FAMOTIDINE 10 MG/ML
20 INJECTION, SOLUTION INTRAVENOUS
Status: CANCELLED | OUTPATIENT
Start: 2020-11-19

## 2020-11-19 RX ORDER — SODIUM CHLORIDE 0.9 % (FLUSH) 0.9 %
10 SYRINGE (ML) INJECTION EVERY 12 HOURS SCHEDULED
Status: CANCELLED | OUTPATIENT
Start: 2020-11-19

## 2020-11-19 RX ORDER — HYDRALAZINE HYDROCHLORIDE 20 MG/ML
5 INJECTION INTRAMUSCULAR; INTRAVENOUS
Status: DISCONTINUED | OUTPATIENT
Start: 2020-11-19 | End: 2020-11-20 | Stop reason: HOSPADM

## 2020-11-19 RX ORDER — MIDAZOLAM HYDROCHLORIDE 1 MG/ML
0.5 INJECTION INTRAMUSCULAR; INTRAVENOUS
Status: CANCELLED | OUTPATIENT
Start: 2020-11-19

## 2020-11-19 RX ORDER — SODIUM CHLORIDE 9 MG/ML
INJECTION, SOLUTION INTRAVENOUS CONTINUOUS PRN
Status: DISCONTINUED | OUTPATIENT
Start: 2020-11-19 | End: 2020-11-19 | Stop reason: SURG

## 2020-11-19 RX ORDER — PROPOFOL 10 MG/ML
VIAL (ML) INTRAVENOUS AS NEEDED
Status: DISCONTINUED | OUTPATIENT
Start: 2020-11-19 | End: 2020-11-19 | Stop reason: SURG

## 2020-11-19 RX ADMIN — PROPOFOL 50 MG: 10 INJECTION, EMULSION INTRAVENOUS at 07:18

## 2020-11-19 RX ADMIN — PROPOFOL 50 MG: 10 INJECTION, EMULSION INTRAVENOUS at 07:20

## 2020-11-19 RX ADMIN — PROPOFOL 50 MG: 10 INJECTION, EMULSION INTRAVENOUS at 07:22

## 2020-11-19 RX ADMIN — SODIUM CHLORIDE: 9 INJECTION, SOLUTION INTRAVENOUS at 07:15

## 2020-11-19 RX ADMIN — LIDOCAINE HYDROCHLORIDE 60 MG: 20 INJECTION, SOLUTION INFILTRATION; PERINEURAL at 07:17

## 2020-11-19 NOTE — ANESTHESIA PREPROCEDURE EVALUATION
Anesthesia Evaluation     Patient summary reviewed   NPO Solid Status: > 8 hours             Airway   Mallampati: II  No difficulty expected  Dental      Pulmonary    (+) COPD,   Cardiovascular   Exercise tolerance: poor (<4 METS)    ECG reviewed  Rhythm: regular    (+) hypertension, valvular problems/murmurs MR and TI, CAD,       Neuro/Psych  GI/Hepatic/Renal/Endo      Musculoskeletal     Abdominal    Substance History      OB/GYN          Other          Other Comment: Hb 9.3                  Anesthesia Plan    ASA 3     MAC       Anesthetic plan, all risks, benefits, and alternatives have been provided, discussed and informed consent has been obtained with: patient.  Use of blood products discussed with patient .

## 2020-11-23 ENCOUNTER — TELEPHONE (OUTPATIENT)
Dept: CARDIOLOGY | Facility: CLINIC | Age: 80
End: 2020-11-23

## 2020-11-23 ENCOUNTER — OFFICE VISIT (OUTPATIENT)
Dept: INTERNAL MEDICINE | Facility: CLINIC | Age: 80
End: 2020-11-23

## 2020-11-23 VITALS
DIASTOLIC BLOOD PRESSURE: 58 MMHG | RESPIRATION RATE: 18 BRPM | BODY MASS INDEX: 20.58 KG/M2 | OXYGEN SATURATION: 95 % | TEMPERATURE: 97.1 F | WEIGHT: 109 LBS | HEIGHT: 61 IN | SYSTOLIC BLOOD PRESSURE: 120 MMHG | HEART RATE: 52 BPM

## 2020-11-23 DIAGNOSIS — M54.50 CHRONIC LOW BACK PAIN WITHOUT SCIATICA, UNSPECIFIED BACK PAIN LATERALITY: ICD-10-CM

## 2020-11-23 DIAGNOSIS — M89.8X8 ILIAC CREST BONE PAIN: ICD-10-CM

## 2020-11-23 DIAGNOSIS — F41.9 ANXIETY AND DEPRESSION: ICD-10-CM

## 2020-11-23 DIAGNOSIS — G89.29 CHRONIC LOW BACK PAIN WITHOUT SCIATICA, UNSPECIFIED BACK PAIN LATERALITY: ICD-10-CM

## 2020-11-23 DIAGNOSIS — R10.2 PELVIC PAIN: ICD-10-CM

## 2020-11-23 DIAGNOSIS — I25.119 CORONARY ARTERY DISEASE INVOLVING NATIVE CORONARY ARTERY OF NATIVE HEART WITH ANGINA PECTORIS (HCC): ICD-10-CM

## 2020-11-23 DIAGNOSIS — F32.A ANXIETY AND DEPRESSION: ICD-10-CM

## 2020-11-23 DIAGNOSIS — I38 VALVULAR HEART DISEASE: Primary | ICD-10-CM

## 2020-11-23 PROCEDURE — 99214 OFFICE O/P EST MOD 30 MIN: CPT | Performed by: INTERNAL MEDICINE

## 2020-11-23 RX ORDER — SERTRALINE HYDROCHLORIDE 25 MG/1
25 TABLET, FILM COATED ORAL DAILY
Qty: 30 TABLET | Refills: 0 | Status: SHIPPED | OUTPATIENT
Start: 2020-11-23 | End: 2021-01-04

## 2020-11-23 NOTE — TELEPHONE ENCOUNTER
Pt reports she needs results of her echo from Friday and for samples of eliquis. I have samples ready for her out here at [place]

## 2020-11-23 NOTE — ANESTHESIA POSTPROCEDURE EVALUATION
"Patient: Kaylin Oejda    Procedure Summary     Date: 11/19/20 Room / Location: [facility] PACU    Anesthesia Start: 0715 Anesthesia Stop: 0733    Procedure: ADULT TRANSESOPHAGEAL ECHO (RICKY) W/ CONT IF NECESSARY PER PROTOCOL Diagnosis:       Mitral valve insufficiency, unspecified etiology      (Valvular Disease)    Scheduled Providers:  Provider: Artemio Mccain MD    Anesthesia Type: MAC ASA Status: 3          Anesthesia Type: MAC    Vitals  Vitals Value Taken Time   /56 11/19/20 0746   Temp 36.7 °C (98 °F) 11/19/20 0746   Pulse     Resp 16 11/19/20 0746   SpO2             Post Anesthesia Care and Evaluation      Comments: Patient discharged before being evaluated by an Anesthesiologist. No apparent complications per the record.  This case was not medically directed. I am completing this chart for medical records purposes; I personally have no medical involvement with this patient.    /58   Pulse 64   Temp 36.7 °C (98 °F) (Oral)   Resp 16   Ht 154.9 cm (61\")   Wt 46.7 kg (103 lb)   SpO2 98%   BMI 19.46 kg/m²           "

## 2020-11-23 NOTE — PROGRESS NOTES
Kaylin Ojeda is a 80 y.o. female, who presents with a chief complaint of   Chief Complaint   Patient presents with   • wants to talk about medications       HPI   Pt here for follow up.  Pt had RICKY last week and is awaiting word from CT surgery about where to go from here.  She is almost out of eliquis.  She is anxious about the possibility of surgery vs medical treatment and what this looks like for her health.  She was on duloxetine, sertraline, mirtazapine, and fluoxetine in the past.      Her hip has been hurting more since her fall and has had occ numbness in her left leg and arm.  She wants more pain meds bc she says she runs out at times and doesn't want to go through withdrawals.  We have discussed trying to limit narcotic use multiple times.  I have advised her that if she needs more narcotics she must see pain management.      She has had issues sleeping.  She takes her clonazepam at night.  This helps her relax to go to sleep but then she keeps waking up.  She is also taking melatonin and tylenol PM at times.  I reviewed that narcotics and benzos are high risk meds.  It is preferable for her not to be on this combination but we should definitely avoid increasing doses in this combo.    The following portions of the patient's history were reviewed and updated as appropriate: allergies, current medications, past family history, past medical history, past social history, past surgical history and problem list.    Allergies: Aleve [naproxen sodium], Codeine, Ibuprofen, and Sulfa antibiotics    Review of Systems   Constitutional: Negative.    HENT: Negative.    Eyes: Negative.    Respiratory: Negative.    Cardiovascular: Negative.    Gastrointestinal: Negative.    Endocrine: Negative.    Genitourinary: Negative.    Musculoskeletal: Negative.    Skin: Negative.    Allergic/Immunologic: Negative.    Neurological: Negative.    Hematological: Negative.    Psychiatric/Behavioral: The patient is  nervous/anxious.    All other systems reviewed and are negative.            Wt Readings from Last 3 Encounters:   11/23/20 49.4 kg (109 lb)   11/19/20 46.7 kg (103 lb)   11/16/20 46.7 kg (103 lb)     Temp Readings from Last 3 Encounters:   11/23/20 97.1 °F (36.2 °C) (Temporal)   11/19/20 98 °F (36.7 °C) (Oral)   11/16/20 97.8 °F (36.6 °C) (Temporal)     BP Readings from Last 3 Encounters:   11/23/20 120/58   11/19/20 142/58   11/16/20 114/78     Pulse Readings from Last 3 Encounters:   11/23/20 52   11/19/20 64   11/16/20 71     Body mass index is 20.61 kg/m².  @LASTSAO2(3)@    Physical Exam  Vitals signs and nursing note reviewed.   Constitutional:       General: She is not in acute distress.     Appearance: She is well-developed.   HENT:      Head: Normocephalic and atraumatic.      Right Ear: External ear normal.      Left Ear: External ear normal.      Nose: Nose normal.   Eyes:      Conjunctiva/sclera: Conjunctivae normal.      Pupils: Pupils are equal, round, and reactive to light.   Neck:      Musculoskeletal: Normal range of motion and neck supple.   Cardiovascular:      Rate and Rhythm: Normal rate and regular rhythm.      Heart sounds: Normal heart sounds.   Pulmonary:      Effort: Pulmonary effort is normal. No respiratory distress.      Breath sounds: Normal breath sounds. No wheezing.   Musculoskeletal:      Comments: Antalgic gait, ttp of left paraspinal muscles near iliac crest.   Skin:     General: Skin is warm and dry.   Neurological:      Mental Status: She is alert and oriented to person, place, and time.   Psychiatric:         Behavior: Behavior normal.         Thought Content: Thought content normal.         Judgment: Judgment normal.         Results for orders placed or performed during the hospital encounter of 11/19/20   Adult Transesophageal Echo (RICKY) W/ Cont if Necessary Per Protocol   Result Value Ref Range    BSA 1.4 m^2    MV A dur 0.15 sec    MV E max agustin 100.0 cm/sec    MV A max agustin  79.9 cm/sec    MV E/A 1.3     MV V2 max 152.0 cm/sec    MV max PG 9.2 mmHg    MV V2 mean 57.6 cm/sec    MV mean PG 2.0 mmHg    MV V2 VTI 25.9 cm    MV P1/2t max agustin 154.0 cm/sec    MV P1/2t 53.3 msec    MVA(P1/2t) 4.1 cm^2    MV dec slope 846.0 cm/sec^2    MV dec time 0.14 sec    TR max agustin 293.0 cm/sec    MVA P1/2T LCG 1.4 cm^2    BH CV ECHO SANJAY - BZI_BMI 19.5 kilograms/m^2    BH CV ECHO SANJAY - BSA(HAYCOCK) 1.4 m^2    BH CV ECHO SANJAY - BZI_METRIC_WEIGHT 46.7 kg     CV ECHO SANJAY - BZI_METRIC_HEIGHT 154.9 cm     CV VAS BP LEFT /58 mmHg    Echo EF Estimated 60 %    RVSP(TR) 37 mmHg           Diagnoses and all orders for this visit:    1. Valvular heart disease (Primary)    2. Coronary artery disease involving native coronary artery of native heart with angina pectoris (CMS/HCC)    3. Chronic low back pain without sciatica, unspecified back pain laterality    4. Anxiety and depression  -     sertraline (ZOLOFT) 25 MG tablet; Take 1 tablet by mouth Daily.  Dispense: 30 tablet; Refill: 0    5. Iliac crest bone pain  -     XR Hips Bilateral With or Without Pelvis 2 View; Future    6. Pelvic pain  -     XR Hips Bilateral With or Without Pelvis 2 View; Future      F/u in 2 weeks for f/u.     Outpatient Medications Prior to Visit   Medication Sig Dispense Refill   • albuterol sulfate  (90 Base) MCG/ACT inhaler Inhale 2 puffs Every 4 (Four) Hours As Needed for Wheezing. 18 g 11   • amLODIPine (NORVASC) 10 MG tablet TAKE 1 TABLET BY MOUTH DAILY 30 tablet 2   • apixaban (Eliquis) 5 MG tablet tablet Take 1 tablet by mouth Every 12 (Twelve) Hours. Resume on 11/2/2020 60 tablet 1   • atorvastatin (LIPITOR) 10 MG tablet TAKE 1 TABLET BY MOUTH DAILY 90 tablet 3   • budesonide-formoterol (SYMBICORT) 160-4.5 MCG/ACT inhaler Inhale 2 puffs 2 (Two) Times a Day. 1 inhaler 12   • clonazePAM (KlonoPIN) 0.5 MG tablet Take 1 tablet by mouth At Night As Needed for Anxiety. 30 tablet 0   • fluticasone (FLONASE) 50 MCG/ACT  nasal spray SPRAY TWICE IN EACH NOSTRIL EVERY DAY FOR 30 DAYS. 16 mL 6   • levothyroxine (SYNTHROID, LEVOTHROID) 50 MCG tablet TAKE 1 TABLET BY MOUTH DAILY 90 tablet 1   • lisinopril (PRINIVIL,ZESTRIL) 20 MG tablet TAKE 1 TABLET BY MOUTH DAILY 90 tablet 0   • metoprolol tartrate (LOPRESSOR) 100 MG tablet Take 1 tablet by mouth 2 (Two) Times a Day. 180 tablet 1   • oxybutynin (DITROPAN) 5 MG tablet TAKE 1 TABLET BY MOUTH TWICE DAILY 180 tablet 1   • oxyCODONE-acetaminophen (Percocet) 7.5-325 MG per tablet Take 1 tablet by mouth Every 8 (Eight) Hours As Needed for Severe Pain . 75 tablet 0   • albuterol (PROVENTIL) (2.5 MG/3ML) 0.083% nebulizer solution Take 2.5 mg by nebulization Every 4 (Four) Hours As Needed for Wheezing or Shortness of Air. 120 vial 5   • ondansetron (ZOFRAN) 8 MG tablet Take 1 tablet by mouth Every 8 (Eight) Hours As Needed for Nausea or Vomiting. 20 tablet 1     No facility-administered medications prior to visit.      New Medications Ordered This Visit   Medications   • sertraline (ZOLOFT) 25 MG tablet     Sig: Take 1 tablet by mouth Daily.     Dispense:  30 tablet     Refill:  0     [unfilled]  There are no discontinued medications.      Return for Next scheduled follow up.

## 2020-11-23 NOTE — TELEPHONE ENCOUNTER
Katerina- I called the patient and reviewed her RICKY results.  I discussed it with Dr. Calero-plan of care will be determined by Dr. Mai.  She will call their office if she has not heard about the plan of care from their office soon.  I also notified her that you have left her samples of Eliquis at [place] so she knows to pick them up    Catia or Dr. Mai- can you please call patient and discuss her RICKY and your plans? She in anxiously awaiting. Thank you.

## 2020-11-24 ENCOUNTER — TELEPHONE (OUTPATIENT)
Dept: INTERNAL MEDICINE | Facility: CLINIC | Age: 80
End: 2020-11-24

## 2020-11-24 NOTE — TELEPHONE ENCOUNTER
Referral assigned to [facility] de[facility].  Patient advised to come in tomorrow after 8:00 am for xray, as no appt is necessary. Advised to register in front of hospital.

## 2020-11-24 NOTE — TELEPHONE ENCOUNTER
PATIENT CALLED REGARDING XRAY OF HER HIP.  REFERRAL IS LISTED BUT NOT ASSIGNED.  PLEASE ADVISE    PATIENT CALL BACK: [phone]

## 2020-11-25 ENCOUNTER — HOSPITAL ENCOUNTER (OUTPATIENT)
Dept: GENERAL RADIOLOGY | Facility: HOSPITAL | Age: 80
Discharge: HOME | End: 2020-11-25
Admitting: INTERNAL MEDICINE

## 2020-11-25 ENCOUNTER — TELEPHONE (OUTPATIENT)
Dept: CARDIAC SURGERY | Facility: CLINIC | Age: 80
End: 2020-11-25

## 2020-11-25 DIAGNOSIS — R10.2 PELVIC PAIN: ICD-10-CM

## 2020-11-25 DIAGNOSIS — M89.8X8 ILIAC CREST BONE PAIN: ICD-10-CM

## 2020-11-25 PROCEDURE — 73521 X-RAY EXAM HIPS BI 2 VIEWS: CPT

## 2020-11-30 NOTE — TELEPHONE ENCOUNTER
I s/w her and we chatted.  She had some reasonable questions about outcomes and awaits Dr Mai's recs.

## 2020-12-09 ENCOUNTER — OFFICE VISIT (OUTPATIENT)
Dept: INTERNAL MEDICINE | Facility: CLINIC | Age: 80
End: 2020-12-09

## 2020-12-09 VITALS
HEART RATE: 70 BPM | WEIGHT: 101.4 LBS | RESPIRATION RATE: 16 BRPM | SYSTOLIC BLOOD PRESSURE: 104 MMHG | BODY MASS INDEX: 19.14 KG/M2 | OXYGEN SATURATION: 84 % | HEIGHT: 61 IN | TEMPERATURE: 97.8 F | DIASTOLIC BLOOD PRESSURE: 70 MMHG

## 2020-12-09 DIAGNOSIS — K55.1 MESENTERIC ARTERY STENOSIS (HCC): ICD-10-CM

## 2020-12-09 DIAGNOSIS — D50.9 IRON DEFICIENCY ANEMIA, UNSPECIFIED IRON DEFICIENCY ANEMIA TYPE: ICD-10-CM

## 2020-12-09 DIAGNOSIS — G45.8 SUBCLAVIAN STEAL SYNDROME: ICD-10-CM

## 2020-12-09 DIAGNOSIS — H53.9 VISION CHANGES: ICD-10-CM

## 2020-12-09 DIAGNOSIS — I38 VALVULAR HEART DISEASE: Primary | ICD-10-CM

## 2020-12-09 DIAGNOSIS — I77.9 PERIPHERAL ARTERIAL OCCLUSIVE DISEASE (HCC): ICD-10-CM

## 2020-12-09 DIAGNOSIS — E53.8 B12 DEFICIENCY: ICD-10-CM

## 2020-12-09 DIAGNOSIS — F41.8 MIXED ANXIETY DEPRESSIVE DISORDER: ICD-10-CM

## 2020-12-09 DIAGNOSIS — N18.31 STAGE 3A CHRONIC KIDNEY DISEASE (HCC): ICD-10-CM

## 2020-12-09 DIAGNOSIS — I10 ESSENTIAL HYPERTENSION: ICD-10-CM

## 2020-12-09 DIAGNOSIS — E78.00 HYPERCHOLESTEROLEMIA: ICD-10-CM

## 2020-12-09 DIAGNOSIS — M54.50 LUMBAR BACK PAIN: ICD-10-CM

## 2020-12-09 DIAGNOSIS — I25.119 CORONARY ARTERY DISEASE INVOLVING NATIVE CORONARY ARTERY OF NATIVE HEART WITH ANGINA PECTORIS (HCC): ICD-10-CM

## 2020-12-09 DIAGNOSIS — Z00.00 MEDICARE ANNUAL WELLNESS VISIT, SUBSEQUENT: ICD-10-CM

## 2020-12-09 DIAGNOSIS — R73.03 PREDIABETES: ICD-10-CM

## 2020-12-09 DIAGNOSIS — I48.0 PAF (PAROXYSMAL ATRIAL FIBRILLATION) (HCC): ICD-10-CM

## 2020-12-09 DIAGNOSIS — E03.9 HYPOTHYROIDISM (ACQUIRED): ICD-10-CM

## 2020-12-09 PROCEDURE — 96372 THER/PROPH/DIAG INJ SC/IM: CPT | Performed by: INTERNAL MEDICINE

## 2020-12-09 PROCEDURE — 99214 OFFICE O/P EST MOD 30 MIN: CPT | Performed by: INTERNAL MEDICINE

## 2020-12-09 PROCEDURE — 1126F AMNT PAIN NOTED NONE PRSNT: CPT | Performed by: INTERNAL MEDICINE

## 2020-12-09 PROCEDURE — G0439 PPPS, SUBSEQ VISIT: HCPCS | Performed by: INTERNAL MEDICINE

## 2020-12-09 PROCEDURE — 1170F FXNL STATUS ASSESSED: CPT | Performed by: INTERNAL MEDICINE

## 2020-12-09 PROCEDURE — 1159F MED LIST DOCD IN RCRD: CPT | Performed by: INTERNAL MEDICINE

## 2020-12-09 RX ORDER — CYANOCOBALAMIN 1000 UG/ML
1000 INJECTION, SOLUTION INTRAMUSCULAR; SUBCUTANEOUS
Status: DISCONTINUED | OUTPATIENT
Start: 2020-12-09 | End: 2021-03-10

## 2020-12-09 RX ADMIN — CYANOCOBALAMIN 1000 MCG: 1000 INJECTION, SOLUTION INTRAMUSCULAR; SUBCUTANEOUS at 12:53

## 2020-12-09 NOTE — PATIENT INSTRUCTIONS
For more information:    Quit Now [place]  1-800-QUIT-NOW  https://[place].quitlogix.org/en-[place]/  Steps to Quit Smoking  Smoking tobacco can be harmful to your health and can affect almost every organ in your body. Smoking puts you, and those around you, at risk for developing many serious chronic diseases. Quitting smoking is difficult, but it is one of the best things that you can do for your health. It is never too late to quit.  What are the benefits of quitting smoking?  When you quit smoking, you lower your risk of developing serious diseases and conditions, such as:  · Lung cancer or lung disease, such as COPD.  · Heart disease.  · Stroke.  · Heart attack.  · Infertility.  · Osteoporosis and bone fractures.  Additionally, symptoms such as coughing, wheezing, and shortness of breath may get better when you quit. You may also find that you get sick less often because your body is stronger at fighting off colds and infections. If you are pregnant, quitting smoking can help to reduce your chances of having a baby of low birth weight.  How do I get ready to quit?  When you decide to quit smoking, create a plan to make sure that you are successful. Before you quit:  · Pick a date to quit. Set a date within the next two weeks to give you time to prepare.  · Write down the reasons why you are quitting. Keep this list in places where you will see it often, such as on your bathroom mirror or in your car or wallet.  · Identify the people, places, things, and activities that make you want to smoke (triggers) and avoid them. Make sure to take these actions:  ¨ Throw away all cigarettes at home, at work, and in your car.  ¨ Throw away smoking accessories, such as ashtrays and lighters.  ¨ Clean your car and make sure to empty the ashtray.  ¨ Clean your home, including curtains and carpets.  · Tell your family, friends, and coworkers that you are quitting. Support from your loved ones can make quitting easier.  · Talk with  your health care provider about your options for quitting smoking.  · Find out what treatment options are covered by your health insurance.  What strategies can I use to quit smoking?  Talk with your healthcare provider about different strategies to quit smoking. Some strategies include:  · Quitting smoking altogether instead of gradually lessening how much you smoke over a period of time. Research shows that quitting “cold turkey” is more successful than gradually quitting.  · Attending in-person counseling to help you build problem-solving skills. You are more likely to have success in quitting if you attend several counseling sessions. Even short sessions of 10 minutes can be effective.  · Finding resources and support systems that can help you to quit smoking and remain smoke-free after you quit. These resources are most helpful when you use them often. They can include:  ¨ Online chats with a counselor.  ¨ Telephone quitlines.  ¨ Printed self-help materials.  ¨ Support groups or group counseling.  ¨ Text messaging programs.  ¨ Mobile phone applications.  · Taking medicines to help you quit smoking. (If you are pregnant or breastfeeding, talk with your health care provider first.) Some medicines contain nicotine and some do not. Both types of medicines help with cravings, but the medicines that include nicotine help to relieve withdrawal symptoms. Your health care provider may recommend:  ¨ Nicotine patches, gum, or lozenges.  ¨ Nicotine inhalers or sprays.  ¨ Non-nicotine medicine that is taken by mouth.  Talk with your health care provider about combining strategies, such as taking medicines while you are also receiving in-person counseling. Using these two strategies together makes you more likely to succeed in quitting than if you used either strategy on its own.  If you are pregnant or breastfeeding, talk with your health care provider about finding counseling or other support strategies to quit smoking. Do  not take medicine to help you quit smoking unless told to do so by your health care provider.  What things can I do to make it easier to quit?  Quitting smoking might feel overwhelming at first, but there is a lot that you can do to make it easier. Take these important actions:  · Reach out to your family and friends and ask that they support and encourage you during this time. Call telephone quitlines, reach out to support groups, or work with a counselor for support.  · Ask people who smoke to avoid smoking around you.  · Avoid places that trigger you to smoke, such as bars, parties, or smoke-break areas at work.  · Spend time around people who do not smoke.  · Lessen stress in your life, because stress can be a smoking trigger for some people. To lessen stress, try:  ¨ Exercising regularly.  ¨ Deep-breathing exercises.  ¨ Yoga.  ¨ Meditating.  ¨ Performing a body scan. This involves closing your eyes, scanning your body from head to toe, and noticing which parts of your body are particularly tense. Purposefully relax the muscles in those areas.  · Download or purchase mobile phone or tablet apps (applications) that can help you stick to your quit plan by providing reminders, tips, and encouragement. There are many free apps, such as QuitGuide from the CDC (Centers for Disease Control and Prevention). You can find other support for quitting smoking (smoking cessation) through smokefree.gov and other websites.  How will I feel when I quit smoking?  Within the first 24 hours of quitting smoking, you may start to feel some withdrawal symptoms. These symptoms are usually most noticeable 2-3 days after quitting, but they usually do not last beyond 2-3 weeks. Changes or symptoms that you might experience include:  · Mood swings.  · Restlessness, anxiety, or irritation.  · Difficulty concentrating.  · Dizziness.  · Strong cravings for sugary foods in addition to nicotine.  · Mild weight  gain.  · Constipation.  · Nausea.  · Coughing or a sore throat.  · Changes in how your medicines work in your body.  · A depressed mood.  · Difficulty sleeping (insomnia).  After the first 2-3 weeks of quitting, you may start to notice more positive results, such as:  · Improved sense of smell and taste.  · Decreased coughing and sore throat.  · Slower heart rate.  · Lower blood pressure.  · Clearer skin.  · The ability to breathe more easily.  · Fewer sick days.  Quitting smoking is very challenging for most people. Do not get discouraged if you are not successful the first time. Some people need to make many attempts to quit before they achieve long-term success. Do your best to stick to your quit plan, and talk with your health care provider if you have any questions or concerns.  This information is not intended to replace advice given to you by your health care provider. Make sure you discuss any questions you have with your health care provider.  Document Released: 2002 Document Revised: 08/15/2017 Document Reviewed: 2016  [employer] Interactive Patient Education © 2017 [employer]    Medicare Wellness  Personal Prevention Plan of Service     Date of Office Visit:  2020  Encounter Provider:  Donna Forte MD  Place of Service:  [facility]  Patient Name: Kaylin Ojeda  :  1940    As part of the Medicare Wellness portion of your visit today, we are providing you with this personalized preventive plan of services (PPPS). This plan is based upon recommendations of the [place] Preventive Services Task Force (USPSTF) and the Advisory Committee on Immunization Practices (ACIP).    This lists the preventive care services that should be considered, and provides dates of when you are due. Items listed as completed are up-to-date and do not require any further intervention.    Health Maintenance   Topic Date Due   • ZOSTER VACCINE (1 of 2)  02/16/1992   • HEPATITIS C SCREENING  12/11/2018   • COLOGUARD  12/12/2021 (Originally 2/2/2016)   • TDAP/TD VACCINES (1 - Tdap) 08/19/2022 (Originally 2/16/1959)   • MAMMOGRAM  01/11/2021   • DXA SCAN  01/11/2021   • LIPID PANEL  07/08/2021   • ANNUAL WELLNESS VISIT  12/09/2021   • INFLUENZA VACCINE  Completed   • Pneumococcal Vaccine 65+  Completed       Orders Placed This Encounter   Procedures   • Comprehensive Metabolic Panel   • T4, Free   • TSH   • Lipid Panel With LDL / HDL Ratio   • Hemoglobin A1c   • Vitamin D 25 Hydroxy   • Vitamin B12   • Folate   • Iron Profile   • Erythropoietin   • Ambulatory Referral to Hematology / Oncology     Referral Priority:   Routine     Requested Specialty:   Hematology and Oncology     Number of Visits Requested:   1   • Ambulatory Referral to Ophthalmology     Referral Priority:   Routine     Referral Type:   Consultation     Referral Reason:   Specialty Services Required     Requested Specialty:   Ophthalmology     Number of Visits Requested:   1   • CBC & Differential     Order Specific Question:   Manual Differential     Answer:   No       Return in about 1 month (around 1/9/2021) for Recheck.

## 2020-12-09 NOTE — PROGRESS NOTES
The ABCs of the Annual Wellness Visit  Subsequent Medicare Wellness Visit    Chief Complaint   Patient presents with   • 8 week recheck       Subjective   History of Present Illness:  Kaylin Ojeda is a 80 y.o. female who presents for a Subsequent Medicare Wellness Visit.    HEALTH RISK ASSESSMENT    Recent Hospitalizations:  No hospitalization(s) within the last year.    Current Medical Providers:  Patient Care Team:  Donna Forte MD as PCP - General (Internal Medicine & Pediatrics)  Donna Forte MD as PCP - Family Medicine  Estelle Rendon MD (Inactive) as Consulting Physician (Hematology and Oncology)  Chris Jimenez MD as Referring Physician (Family Medicine)    Smoking Status:  Social History     Tobacco Use   Smoking Status Current Some Day Smoker   • Packs/day: 0.50   • Types: Cigarettes   Smokeless Tobacco Never Used   Tobacco Comment    after meals or with coffee.        Alcohol Consumption:  Social History     Substance and Sexual Activity   Alcohol Use Not Currently   • Frequency: Monthly or less    Comment: OCCASIONAL/ TWICE A YEAR.        Depression Screen:   PHQ-2/PHQ-9 Depression Screening 12/4/2019   Little interest or pleasure in doing things 1   Feeling down, depressed, or hopeless 1   Trouble falling or staying asleep, or sleeping too much 1   Feeling tired or having little energy 1   Poor appetite or overeating 1   Feeling bad about yourself - or that you are a failure or have let yourself or your family down 0   Trouble concentrating on things, such as reading the newspaper or watching television 1   Moving or speaking so slowly that other people could have noticed. Or the opposite - being so fidgety or restless that you have been moving around a lot more than usual 0   Thoughts that you would be better off dead, or of hurting yourself in some way 0   Total Score 6   If you checked off any problems, how difficult have these problems made it for you to do your  work, take care of things at home, or get along with other people? Somewhat difficult       Fall Risk Screen:  CADNICE Fall Risk Assessment has not been completed.    Health Habits and Functional and Cognitive Screening:  Functional & Cognitive Status 12/4/2019   Do you have difficulty preparing food and eating? No   Do you have difficulty bathing yourself, getting dressed or grooming yourself? No   Do you have difficulty using the toilet? No   Do you have difficulty moving around from place to place? No   Do you have trouble with steps or getting out of a bed or a chair? No   Current Diet Unhealthy Diet   Dental Exam Up to date   Eye Exam Up to date   Exercise (times per week) 0 times per week   Current Exercise Activities Include No Regular Exercise   Do you need help using the phone?  No   Are you deaf or do you have serious difficulty hearing?  No   Do you need help with transportation? No   Do you need help shopping? No   Do you need help preparing meals?  No   Do you need help with housework?  No   Do you need help with laundry? No   Do you need help taking your medications? No   Do you need help managing money? No   Do you ever drive or ride in a car without wearing a seat belt? No   Have you felt unusual stress, anger or loneliness in the last month? Yes   Who do you live with? Child   If you need help, do you have trouble finding someone available to you? No   Have you been bothered in the last four weeks by sexual problems? No   Do you have difficulty concentrating, remembering or making decisions? No         Does the patient have evidence of cognitive impairment? No    Asprin use counseling:on eliquis    Age-appropriate Screening Schedule:  Refer to the list below for future screening recommendations based on patient's age, sex and/or medical conditions. Orders for these recommended tests are listed in the plan section. The patient has been provided with a written plan.    Health Maintenance   Topic Date  Due   • ZOSTER VACCINE (1 of 2) 02/16/1992   • TDAP/TD VACCINES (1 - Tdap) 08/19/2022 (Originally 2/16/1959)   • MAMMOGRAM  01/11/2021   • DXA SCAN  01/11/2021   • LIPID PANEL  07/08/2021   • INFLUENZA VACCINE  Completed          The following portions of the patient's history were reviewed and updated as appropriate: allergies, current medications, past family history, past medical history, past social history, past surgical history and problem list.    Outpatient Medications Prior to Visit   Medication Sig Dispense Refill   • albuterol (PROVENTIL) (2.5 MG/3ML) 0.083% nebulizer solution Take 2.5 mg by nebulization Every 4 (Four) Hours As Needed for Wheezing or Shortness of Air. 120 vial 5   • albuterol sulfate  (90 Base) MCG/ACT inhaler Inhale 2 puffs Every 4 (Four) Hours As Needed for Wheezing. 18 g 11   • amLODIPine (NORVASC) 10 MG tablet TAKE 1 TABLET BY MOUTH DAILY 30 tablet 2   • apixaban (Eliquis) 5 MG tablet tablet Take 1 tablet by mouth Every 12 (Twelve) Hours. Resume on 11/2/2020 60 tablet 1   • atorvastatin (LIPITOR) 10 MG tablet TAKE 1 TABLET BY MOUTH DAILY 90 tablet 3   • budesonide-formoterol (SYMBICORT) 160-4.5 MCG/ACT inhaler Inhale 2 puffs 2 (Two) Times a Day. 1 inhaler 12   • clonazePAM (KlonoPIN) 0.5 MG tablet Take 1 tablet by mouth At Night As Needed for Anxiety. 30 tablet 0   • fluticasone (FLONASE) 50 MCG/ACT nasal spray SPRAY TWICE IN EACH NOSTRIL EVERY DAY FOR 30 DAYS. 16 mL 6   • levothyroxine (SYNTHROID, LEVOTHROID) 50 MCG tablet TAKE 1 TABLET BY MOUTH DAILY 90 tablet 1   • lisinopril (PRINIVIL,ZESTRIL) 20 MG tablet TAKE 1 TABLET BY MOUTH DAILY 90 tablet 0   • metoprolol tartrate (LOPRESSOR) 100 MG tablet Take 1 tablet by mouth 2 (Two) Times a Day. 180 tablet 1   • ondansetron (ZOFRAN) 8 MG tablet Take 1 tablet by mouth Every 8 (Eight) Hours As Needed for Nausea or Vomiting. 20 tablet 1   • oxybutynin (DITROPAN) 5 MG tablet TAKE 1 TABLET BY MOUTH TWICE DAILY 180 tablet 1   •  oxyCODONE-acetaminophen (Percocet) 7.5-325 MG per tablet Take 1 tablet by mouth Every 8 (Eight) Hours As Needed for Severe Pain . 75 tablet 0   • sertraline (ZOLOFT) 25 MG tablet Take 1 tablet by mouth Daily. 30 tablet 0     No facility-administered medications prior to visit.        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[facility])   • Obstruction of carotid artery   • Peripheral arterial occlusive disease (CMS/[facility])   • Impaired glucose tolerance   • Difficulty sleeping   • Tobacco dependence syndrome   • Iron deficiency anemia   • Vitamin B12 deficiency anemia due to intrinsic factor deficiency   • Acquired hypothyroidism   • Thoracoabdominal aortic aneurysm (CMS/[facility])   • Abdominal bloating   • Prediabetes   • Sleep difficulties   • Chronic constipation   • PAF (paroxysmal atrial fibrillation) (CMS/[facility])   • Moderate single current episode of major depressive disorder (CMS/[facility])   • Chronic low back pain without sciatica   • Urge incontinence   • Severe mitral regurgitation   • Severe tricuspid regurgitation   • Moderate aortic valve regurgitation   • Coronary artery disease involving native coronary artery of native heart with angina pectoris (CMS/[facility])       Advanced Care Planning:  ACP discussion was held with the patient during this visit. Patient has an advance directive (not in EMR), copy requested.    Review of Systems    Compared to one year ago, the patient feels her physical health is worse.  Compared to one year ago, the patient feels her mental health is worse.    Reviewed chart for potential of high risk medication in the elderly: yes  Reviewed chart for potential of  "harmful drug interactions in the elderly:yes    Objective         Vitals:    12/09/20 1143   BP: 104/70   BP Location: Right arm   Patient Position: Sitting   Cuff Size: Adult   Pulse: 70   Resp: 16   Temp: 97.8 °F (36.6 °C)   TempSrc: Temporal   SpO2: (!) 84%   Weight: 46 kg (101 lb 6.4 oz)   Height: 154.9 cm (61\")       Body mass index is 19.16 kg/m².  Discussed the patient's BMI with her. The BMI is below average; no BMI management plan is appropriate.    Physical Exam          Assessment/Plan   Medicare Risks and Personalized Health Plan  CMS Preventative Services Quick Reference  Advance Directive Discussion  Cardiovascular risk  Colon Cancer Screening  Depression/Dysphoria  Fall Risk  Lung Cancer Risk  Polypharmacy  Tobacco Use/Dependance (use dotphrase .tobaccocessation for documentation)     Kaylin Ojeda  reports that she has been smoking cigarettes. She has been smoking about 0.50 packs per day. She has never used smokeless tobacco.. I have educated her on the risk of diseases from using tobacco products such as cancer, COPD and heart disease.     I advised her to quit and she is willing to quit. We have discussed the following method/s for tobacco cessation:  Education Material Counseling.  Together we have set a quit date for 1 week from today.  She will follow up with me in 4 weeks or sooner to check on her progress.    I spent 3  minutes counseling the patient.         The above risks/problems have been discussed with the patient.  Pertinent information has been shared with the patient in the After Visit Summary.  Follow up plans and orders are seen below in the Assessment/Plan Section.    Diagnoses and all orders for this visit:    1. Valvular heart disease (Primary)  -     Ambulatory Referral to Ophthalmology    2. Coronary artery disease involving native coronary artery of native heart with angina pectoris (CMS/[facility])    3. B12 deficiency  -     Ambulatory Referral to Hematology / Oncology  -     " Vitamin B12  -     Folate    4. Iron deficiency anemia, unspecified iron deficiency anemia type  -     Ambulatory Referral to Hematology / Oncology  -     Vitamin B12  -     Folate  -     Iron Profile  -     Erythropoietin    5. Mixed anxiety depressive disorder    6. PAF (paroxysmal atrial fibrillation) (CMS/HCC)  -     Comprehensive Metabolic Panel  -     CBC & Differential  -     T4, Free  -     TSH    7. Essential hypertension  -     Comprehensive Metabolic Panel  -     CBC & Differential  -     T4, Free  -     Lipid Panel With LDL / HDL Ratio    8. Hypothyroidism (acquired)  -     Comprehensive Metabolic Panel  -     CBC & Differential  -     T4, Free  -     TSH    9. Lumbar back pain    10. 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HCC)    11. Stage 3a chronic kidney disease  -     Comprehensive Metabolic Panel  -     CBC & Differential  -     Vitamin D 25 Hydroxy  -     Vitamin B12  -     Folate  -     Iron Profile  -     Erythropoietin    12. Prediabetes  -     Comprehensive Metabolic Panel  -     CBC & Differential  -     T4, Free  -     TSH  -     Lipid Panel With LDL / HDL Ratio  -     Hemoglobin A1c    13. Peripheral arterial occlusive disease (CMS/HCC)  -     Lipid Panel With LDL / HDL Ratio    14. Hypercholesterolemia  -     Comprehensive Metabolic Panel  -     Lipid Panel With LDL / HDL Ratio    15. Medicare annual wellness visit, subsequent    16. Vision changes  -     Ambulatory Referral to Ophthalmology    17. Subclavian steal syndrome  -     Ambulatory Referral to Ophthalmology      Follow Up:  Return in about 1 month (around 1/9/2021) for Recheck.     An After Visit Summary and PPPS were given to the patient.

## 2020-12-09 NOTE — PROGRESS NOTES
Kaylin Ojeda is a 80 y.o. female, who presents with a chief complaint of   Chief Complaint   Patient presents with   • 8 week recheck       HPI   Pt here for f/u     Pt has severe triple valve disease, CAD, and a-fib.  She has been having a extensive work up to see if she was a candidate for open heart surgery.  She says dr Mai told her she was high risk for surgery. 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  There was also carotid stenois of 30-60%. She is on a bb, ace-I, statin, and eliquis    Chronic anemia.  Pt gets b12 monthly.  mcv low and iron levels low back in august. She c/o fatigue  Pt says she has required iv iron in past.     Hypothyroidism - pt stopped taking her synthroid bc she thought it caused nausea.    Prediabetes - due for labs    htn - well controlled.  On netoprolol, lisinopril, ,and amlodipine.      Nausea - She has nausea about 1 time per month but it is a sudden severe episode then she is fine.    pt has known mesenteric artery stenosis and this has been a chronic issue.      A-fib- on bb and elizuis    Depression/anxiety- on sertaline and takes clonazepam nightly PRN.     Chronic low back pain - on percocet    Copd - pt still smoking.  Breathing at baseline.  She gets short of breath easily.      The following portions of the patient's history were reviewed and updated as appropriate: allergies, current medications, past family history, past medical history, past social history, past surgical history and problem list.    Allergies: Aleve [naproxen sodium], Codeine, Ibuprofen, and Sulfa antibiotics    Review of Systems   Constitutional: Positive for fatigue.   HENT: Negative.    Eyes: Negative.    Respiratory: Negative.    Cardiovascular: Negative.    Gastrointestinal: Negative.    Endocrine: Negative.    Genitourinary: Negative.    Musculoskeletal: Negative.    Skin: Negative.     Allergic/Immunologic: Negative.    Neurological: Negative.    Hematological: Negative.    Psychiatric/Behavioral: Negative.    All other systems reviewed and are negative.            Wt Readings from Last 3 Encounters:   12/09/20 46 kg (101 lb 6.4 oz)   11/23/20 49.4 kg (109 lb)   11/19/20 46.7 kg (103 lb)     Temp Readings from Last 3 Encounters:   12/09/20 97.8 °F (36.6 °C) (Temporal)   11/23/20 97.1 °F (36.2 °C) (Temporal)   11/19/20 98 °F (36.7 °C) (Oral)     BP Readings from Last 3 Encounters:   12/09/20 104/70   11/23/20 120/58   11/19/20 142/58     Pulse Readings from Last 3 Encounters:   12/09/20 70   11/23/20 52   11/19/20 64     Body mass index is 19.16 kg/m².  @LASTSAO2(3)@    Physical Exam  Vitals signs and nursing note reviewed.   Constitutional:       General: She is not in acute distress.     Appearance: She is well-developed.      Comments: Thin, chronically ill appearing.  In wheelchair.   HENT:      Head: Normocephalic and atraumatic.      Right Ear: External ear normal.      Left Ear: External ear normal.      Nose: Nose normal.   Eyes:      Conjunctiva/sclera: Conjunctivae normal.      Pupils: Pupils are equal, round, and reactive to light.   Neck:      Musculoskeletal: Normal range of motion and neck supple.   Cardiovascular:      Rate and Rhythm: Normal rate and regular rhythm.      Heart sounds: Murmur present.   Pulmonary:      Effort: Pulmonary effort is normal. No respiratory distress.      Breath sounds: Normal breath sounds. No wheezing.   Musculoskeletal: Normal range of motion.      Comments: Normal gait   Skin:     General: Skin is warm and dry.   Neurological:      Mental Status: She is alert and oriented to person, place, and time.   Psychiatric:         Behavior: Behavior normal.         Thought Content: Thought content normal.         Judgment: Judgment normal.         CTA neck 11/18/20  IMPRESSION:     1. There is occlusion of the left subclavian artery at its origin by soft  plaque or thrombus with reconstitution of the left subclavian the retrograde flow left vertebral artery. This is consistent with known subclavian steal. There is disease at the  origin of the left vertebral artery with hemodynamically significant stenosis. The right vertebral artery is codominant but there is also high-grade stenosis at the origin of the right vertebral artery. Intracranially the basilar is of very small caliber  and the patient probably has an anterior dominant circulation where included in field-of-view.  2. By NASCET criteria there is about 53% diameter stenosis at the proximal right internal carotid artery and about 33% diameter stenosis of the proximal left internal carotid artery.  3. There is also kinking of the cervical left internal carotid artery with about 60% diameter stenosis resulting.  4. There is 30-40% stenosis at the origin of the left common carotid artery. There is also mild narrowing at the origin of the innominate and right subclavian artery.  5. Hemodynamically significant stenosis at the origin of both external carotid arteries.  6. Emphysema    Results for orders placed or performed during the hospital encounter of 11/19/20   Adult Transesophageal Echo (RICKY) W/ Cont if Necessary Per Protocol   Result Value Ref Range    BSA 1.4 m^2    MV A dur 0.15 sec    MV E max agustin 100.0 cm/sec    MV A max agustin 79.9 cm/sec    MV E/A 1.3     MV V2 max 152.0 cm/sec    MV max PG 9.2 mmHg    MV V2 mean 57.6 cm/sec    MV mean PG 2.0 mmHg    MV V2 VTI 25.9 cm    MV P1/2t max agustin 154.0 cm/sec    MV P1/2t 53.3 msec    MVA(P1/2t) 4.1 cm^2    MV dec slope 846.0 cm/sec^2    MV dec time 0.14 sec    TR max agustin 293.0 cm/sec    MVA P1/2T LCG 1.4 cm^2    BH CV ECHO SANJAY - BZI_BMI 19.5 kilograms/m^2    BH CV ECHO SANJAY - BSA(HAYCOCK) 1.4 m^2    BH CV ECHO SANJAY - BZI_METRIC_WEIGHT 46.7 kg     CV ECHO SANJAY - BZI_METRIC_HEIGHT 154.9 cm     CV VAS BP LEFT /58 mmHg    Echo EF Estimated 60 %    RVSP(TR)  37 mmHg           Diagnoses and all orders for this visit:    1. Valvular heart disease (Primary)- keep f/u with dr. hairston next week.    2. Coronary artery disease involving native coronary artery of native heart with angina pectoris (CMS/[facility])    3. B12 deficiency  -     Ambulatory Referral to Hematology / Oncology  -     Vitamin B12  -     Folate    4. Iron deficiency anemia, unspecified iron deficiency anemia type  -     Ambulatory Referral to Hematology / Oncology  -     Vitamin B12  -     Folate  -     Iron Profile  -     Erythropoietin    5. Mixed anxiety depressive disorder    6. PAF (paroxysmal atrial fibrillation) (CMS/[facility])  -     Comprehensive Metabolic Panel  -     CBC & Differential  -     T4, Free  -     TSH    7. Essential hypertension  -     Comprehensive Metabolic Panel  -     CBC & Differential  -     T4, Free  -     Lipid Panel With LDL / HDL Ratio    8. Hypothyroidism (acquired)  -     Comprehensive Metabolic Panel  -     CBC & Differential  -     T4, Free  -     TSH    9. Lumbar back pain    10. 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[facility])    11. Stage 3a chronic kidney disease  -     Comprehensive Metabolic Panel  -     CBC & Differential  -     Vitamin D 25 Hydroxy  -     Vitamin B12  -     Folate  -     Iron Profile  -     Erythropoietin    12. Prediabetes  -     Comprehensive Metabolic Panel  -     CBC & Differential  -     T4, Free  -     TSH  -     Lipid Panel With LDL / HDL Ratio  -     Hemoglobin A1c    13. Peripheral arterial occlusive disease (CMS/[facility])  -     Lipid Panel With LDL / HDL Ratio    14. Hypercholesterolemia  -     Comprehensive Metabolic Panel  -     Lipid Panel With LDL / HDL Ratio    15.  Vision changes - will sent to ophthalmology for full exam          Outpatient Medications Prior to Visit   Medication Sig Dispense Refill   • albuterol (PROVENTIL) (2.5 MG/3ML) 0.083% nebulizer solution Take 2.5 mg by nebulization Every 4 (Four) Hours As Needed for Wheezing or Shortness of Air.  120 vial 5   • albuterol sulfate  (90 Base) MCG/ACT inhaler Inhale 2 puffs Every 4 (Four) Hours As Needed for Wheezing. 18 g 11   • amLODIPine (NORVASC) 10 MG tablet TAKE 1 TABLET BY MOUTH DAILY 30 tablet 2   • apixaban (Eliquis) 5 MG tablet tablet Take 1 tablet by mouth Every 12 (Twelve) Hours. Resume on 11/2/2020 60 tablet 1   • atorvastatin (LIPITOR) 10 MG tablet TAKE 1 TABLET BY MOUTH DAILY 90 tablet 3   • budesonide-formoterol (SYMBICORT) 160-4.5 MCG/ACT inhaler Inhale 2 puffs 2 (Two) Times a Day. 1 inhaler 12   • clonazePAM (KlonoPIN) 0.5 MG tablet Take 1 tablet by mouth At Night As Needed for Anxiety. 30 tablet 0   • fluticasone (FLONASE) 50 MCG/ACT nasal spray SPRAY TWICE IN EACH NOSTRIL EVERY DAY FOR 30 DAYS. 16 mL 6   • levothyroxine (SYNTHROID, LEVOTHROID) 50 MCG tablet TAKE 1 TABLET BY MOUTH DAILY 90 tablet 1   • lisinopril (PRINIVIL,ZESTRIL) 20 MG tablet TAKE 1 TABLET BY MOUTH DAILY 90 tablet 0   • metoprolol tartrate (LOPRESSOR) 100 MG tablet Take 1 tablet by mouth 2 (Two) Times a Day. 180 tablet 1   • ondansetron (ZOFRAN) 8 MG tablet Take 1 tablet by mouth Every 8 (Eight) Hours As Needed for Nausea or Vomiting. 20 tablet 1   • oxybutynin (DITROPAN) 5 MG tablet TAKE 1 TABLET BY MOUTH TWICE DAILY 180 tablet 1   • oxyCODONE-acetaminophen (Percocet) 7.5-325 MG per tablet Take 1 tablet by mouth Every 8 (Eight) Hours As Needed for Severe Pain . 75 tablet 0   • sertraline (ZOLOFT) 25 MG tablet Take 1 tablet by mouth Daily. 30 tablet 0     No facility-administered medications prior to visit.      No orders of the defined types were placed in this encounter.    [unfilled]  There are no discontinued medications.      Return in about 1 month (around 1/9/2021) for Recheck.

## 2020-12-10 LAB
25(OH)D3+25(OH)D2 SERPL-MCNC: 17.8 NG/ML (ref 30–100)
ALBUMIN SERPL-MCNC: 4.8 G/DL (ref 3.7–4.7)
ALBUMIN/GLOB SERPL: 1.6 {RATIO} (ref 1.2–2.2)
ALP SERPL-CCNC: 111 IU/L (ref 39–117)
ALT SERPL-CCNC: 16 IU/L (ref 0–32)
AST SERPL-CCNC: 21 IU/L (ref 0–40)
BASOPHILS # BLD AUTO: 0.1 X10E3/UL (ref 0–0.2)
BASOPHILS NFR BLD AUTO: 1 %
BILIRUB SERPL-MCNC: 0.2 MG/DL (ref 0–1.2)
BUN SERPL-MCNC: 22 MG/DL (ref 8–27)
BUN/CREAT SERPL: 22 (ref 12–28)
CALCIUM SERPL-MCNC: 9.4 MG/DL (ref 8.7–10.3)
CHLORIDE SERPL-SCNC: 101 MMOL/L (ref 96–106)
CHOLEST SERPL-MCNC: 121 MG/DL (ref 100–199)
CO2 SERPL-SCNC: 21 MMOL/L (ref 20–29)
CREAT SERPL-MCNC: 0.99 MG/DL (ref 0.57–1)
EOSINOPHIL # BLD AUTO: 0.1 X10E3/UL (ref 0–0.4)
EOSINOPHIL NFR BLD AUTO: 1 %
EPO SERPL-ACNC: 110.4 MIU/ML (ref 2.6–18.5)
ERYTHROCYTE [DISTWIDTH] IN BLOOD BY AUTOMATED COUNT: 16 % (ref 11.7–15.4)
FOLATE SERPL-MCNC: 10 NG/ML
GLOBULIN SER CALC-MCNC: 3 G/DL (ref 1.5–4.5)
GLUCOSE SERPL-MCNC: 90 MG/DL (ref 65–99)
HBA1C MFR BLD: 6.2 % (ref 4.8–5.6)
HCT VFR BLD AUTO: 32.6 % (ref 34–46.6)
HCV AB S/CO SERPL IA: <0.1 S/CO RATIO (ref 0–0.9)
HDLC SERPL-MCNC: 56 MG/DL
HGB BLD-MCNC: 10 G/DL (ref 11.1–15.9)
IMM GRANULOCYTES # BLD AUTO: 0 X10E3/UL (ref 0–0.1)
IMM GRANULOCYTES NFR BLD AUTO: 0 %
IRON SATN MFR SERPL: 4 % (ref 15–55)
IRON SERPL-MCNC: 20 UG/DL (ref 27–139)
LDLC SERPL CALC-MCNC: 51 MG/DL (ref 0–99)
LDLC/HDLC SERPL: 0.9 RATIO (ref 0–3.2)
LYMPHOCYTES # BLD AUTO: 1.9 X10E3/UL (ref 0.7–3.1)
LYMPHOCYTES NFR BLD AUTO: 26 %
MCH RBC QN AUTO: 25 PG (ref 26.6–33)
MCHC RBC AUTO-ENTMCNC: 30.7 G/DL (ref 31.5–35.7)
MCV RBC AUTO: 82 FL (ref 79–97)
MONOCYTES # BLD AUTO: 0.4 X10E3/UL (ref 0.1–0.9)
MONOCYTES NFR BLD AUTO: 6 %
NEUTROPHILS # BLD AUTO: 4.8 X10E3/UL (ref 1.4–7)
NEUTROPHILS NFR BLD AUTO: 66 %
PLATELET # BLD AUTO: 348 X10E3/UL (ref 150–450)
POTASSIUM SERPL-SCNC: 5.6 MMOL/L (ref 3.5–5.2)
PROT SERPL-MCNC: 7.8 G/DL (ref 6–8.5)
RBC # BLD AUTO: 4 X10E6/UL (ref 3.77–5.28)
SODIUM SERPL-SCNC: 138 MMOL/L (ref 134–144)
T4 FREE SERPL-MCNC: 1.17 NG/DL (ref 0.82–1.77)
TIBC SERPL-MCNC: 500 UG/DL (ref 250–450)
TRIGL SERPL-MCNC: 69 MG/DL (ref 0–149)
TSH SERPL DL<=0.005 MIU/L-ACNC: 0.77 UIU/ML (ref 0.45–4.5)
UIBC SERPL-MCNC: 480 UG/DL (ref 118–369)
VIT B12 SERPL-MCNC: >2000 PG/ML (ref 232–1245)
VLDLC SERPL CALC-MCNC: 14 MG/DL (ref 5–40)
WBC # BLD AUTO: 7.2 X10E3/UL (ref 3.4–10.8)

## 2020-12-14 ENCOUNTER — OFFICE VISIT (OUTPATIENT)
Dept: CARDIAC SURGERY | Facility: CLINIC | Age: 80
End: 2020-12-14

## 2020-12-14 ENCOUNTER — PREP FOR SURGERY (OUTPATIENT)
Dept: OTHER | Facility: HOSPITAL | Age: 80
End: 2020-12-14

## 2020-12-14 VITALS
BODY MASS INDEX: 19.07 KG/M2 | DIASTOLIC BLOOD PRESSURE: 48 MMHG | TEMPERATURE: 97.1 F | WEIGHT: 101 LBS | HEIGHT: 61 IN | SYSTOLIC BLOOD PRESSURE: 104 MMHG | HEART RATE: 54 BPM | RESPIRATION RATE: 20 BRPM

## 2020-12-14 DIAGNOSIS — F41.9 ANXIETY AND DEPRESSION: ICD-10-CM

## 2020-12-14 DIAGNOSIS — I71.60 THORACOABDOMINAL AORTIC ANEURYSM (TAAA) WITHOUT RUPTURE (HCC): ICD-10-CM

## 2020-12-14 DIAGNOSIS — E11.9 TYPE 2 DIABETES MELLITUS WITHOUT COMPLICATIONS (HCC): ICD-10-CM

## 2020-12-14 DIAGNOSIS — E78.00 HYPERCHOLESTEROLEMIA: ICD-10-CM

## 2020-12-14 DIAGNOSIS — M54.50 CHRONIC LOW BACK PAIN WITHOUT SCIATICA, UNSPECIFIED BACK PAIN LATERALITY: ICD-10-CM

## 2020-12-14 DIAGNOSIS — G89.29 CHRONIC LOW BACK PAIN WITHOUT SCIATICA, UNSPECIFIED BACK PAIN LATERALITY: ICD-10-CM

## 2020-12-14 DIAGNOSIS — R79.1 ABNORMAL COAGULATION PROFILE: ICD-10-CM

## 2020-12-14 DIAGNOSIS — I35.1 MODERATE AORTIC VALVE REGURGITATION: ICD-10-CM

## 2020-12-14 DIAGNOSIS — I25.119 CORONARY ARTERY DISEASE INVOLVING NATIVE CORONARY ARTERY OF NATIVE HEART WITH ANGINA PECTORIS (HCC): ICD-10-CM

## 2020-12-14 DIAGNOSIS — F32.A ANXIETY AND DEPRESSION: ICD-10-CM

## 2020-12-14 DIAGNOSIS — M54.50 LUMBAR BACK PAIN: ICD-10-CM

## 2020-12-14 DIAGNOSIS — I48.0 PAF (PAROXYSMAL ATRIAL FIBRILLATION) (HCC): ICD-10-CM

## 2020-12-14 DIAGNOSIS — I77.9 PERIPHERAL ARTERIAL OCCLUSIVE DISEASE (HCC): ICD-10-CM

## 2020-12-14 DIAGNOSIS — I25.10 CORONARY ARTERY DISEASE INVOLVING NATIVE CORONARY ARTERY OF NATIVE HEART WITHOUT ANGINA PECTORIS: Primary | ICD-10-CM

## 2020-12-14 DIAGNOSIS — I11.0 HYPERTENSIVE HEART DISEASE WITH HEART FAILURE (HCC): ICD-10-CM

## 2020-12-14 DIAGNOSIS — I34.0 SEVERE MITRAL REGURGITATION: ICD-10-CM

## 2020-12-14 DIAGNOSIS — I10 ESSENTIAL HYPERTENSION: ICD-10-CM

## 2020-12-14 DIAGNOSIS — I07.1 SEVERE TRICUSPID REGURGITATION: ICD-10-CM

## 2020-12-14 DIAGNOSIS — I38 VALVULAR HEART DISEASE: Primary | ICD-10-CM

## 2020-12-14 PROCEDURE — 99024 POSTOP FOLLOW-UP VISIT: CPT | Performed by: THORACIC SURGERY (CARDIOTHORACIC VASCULAR SURGERY)

## 2020-12-14 RX ORDER — CHLORHEXIDINE GLUCONATE 500 MG/1
1 CLOTH TOPICAL EVERY 12 HOURS PRN
Status: CANCELLED | OUTPATIENT
Start: 2020-12-14

## 2020-12-14 RX ORDER — CEFAZOLIN SODIUM 2 G/100ML
2 INJECTION, SOLUTION INTRAVENOUS
Status: CANCELLED | OUTPATIENT
Start: 2020-12-15 | End: 2020-12-16

## 2020-12-14 RX ORDER — ERGOCALCIFEROL 1.25 MG/1
50000 CAPSULE ORAL WEEKLY
Qty: 13 CAPSULE | Refills: 3 | Status: SHIPPED | OUTPATIENT
Start: 2020-12-14 | End: 2021-09-22 | Stop reason: SDUPTHER

## 2020-12-14 RX ORDER — CHLORHEXIDINE GLUCONATE 0.12 MG/ML
15 RINSE ORAL ONCE
Status: CANCELLED | OUTPATIENT
Start: 2021-01-20 | End: 2020-12-14

## 2020-12-14 RX ORDER — CEFAZOLIN SODIUM 2 G/100ML
2 INJECTION, SOLUTION INTRAVENOUS
Status: CANCELLED | OUTPATIENT
Start: 2021-01-20 | End: 2021-01-21

## 2020-12-14 RX ORDER — CHLORHEXIDINE GLUCONATE 0.12 MG/ML
15 RINSE ORAL EVERY 12 HOURS
Status: CANCELLED | OUTPATIENT
Start: 2020-12-14 | End: 2020-12-15

## 2020-12-14 RX ORDER — CHLORHEXIDINE GLUCONATE 500 MG/1
1 CLOTH TOPICAL EVERY 12 HOURS PRN
Status: CANCELLED | OUTPATIENT
Start: 2021-01-20

## 2020-12-14 RX ORDER — CHLORHEXIDINE GLUCONATE 0.12 MG/ML
15 RINSE ORAL ONCE
Status: CANCELLED | OUTPATIENT
Start: 2020-12-14 | End: 2020-12-14

## 2020-12-14 NOTE — TELEPHONE ENCOUNTER
Pt is requesting a refill on clonazePAM (KlonoPIN) 0.5 MG tablet and oxyCODONE-acetaminophen (Percocet) 7.5-325 MG per tablet    [id number] [place] [address] [facility] [address] [facility] [address] - [phone] PH - [phone] FX   
done

## 2020-12-15 PROBLEM — I25.10 CORONARY ARTERY DISEASE INVOLVING NATIVE CORONARY ARTERY OF NATIVE HEART WITHOUT ANGINA PECTORIS: Status: ACTIVE | Noted: 2020-12-15

## 2020-12-15 RX ORDER — OXYCODONE AND ACETAMINOPHEN 7.5; 325 MG/1; MG/1
1 TABLET ORAL EVERY 8 HOURS PRN
Qty: 75 TABLET | Refills: 0 | Status: SHIPPED | OUTPATIENT
Start: 2020-12-15 | End: 2021-01-14 | Stop reason: SDUPTHER

## 2020-12-15 RX ORDER — CLONAZEPAM 0.5 MG/1
0.5 TABLET ORAL NIGHTLY PRN
Qty: 30 TABLET | Refills: 0 | Status: SHIPPED | OUTPATIENT
Start: 2020-12-15 | End: 2021-01-14 | Stop reason: SDUPTHER

## 2020-12-16 NOTE — PROGRESS NOTES
[facility] note  I saw Mrs. Ojeda in follow up to discuss studies.  She is a 80-year-old female with multiple medical problems including severe mitral regurgitation moderate aortic regurgitation, atrial fibrillation and two-vessel coronary artery disease who I have been following in evaluation for possible surgery.  She has severe COPD, severe diffuse body atherosclerosis with mesenteric artery stenosis and coronary disease and aortic calcification.  She has a small thoracoabdominal aortic aneurysm.  The RICKY was completed and showed moderate mitral valve regurgitation, moderate to severe aortic regurgitation and maybe moderate tricuspid regurgitation.  Ejection fraction was 60%.  All other studies for comorbidities suggest that she can have an operation at high risk.  I had discussed with him the risks of surgery and I quoted a mortality risk of approximately 10% and complication rate of 20%.  She wishes to proceed.  She is symptomatic, she fatigues easily and she gets short of breath very easily.  I explained alternatives of surgery and benefits and she wants to proceed hopefully after the beginning of the year

## 2020-12-18 ENCOUNTER — OFFICE VISIT (OUTPATIENT)
Dept: INTERNAL MEDICINE | Facility: CLINIC | Age: 80
End: 2020-12-18

## 2020-12-18 DIAGNOSIS — R30.0 DYSURIA: ICD-10-CM

## 2020-12-18 DIAGNOSIS — E87.5 HYPERKALEMIA: ICD-10-CM

## 2020-12-18 DIAGNOSIS — I25.119 CORONARY ARTERY DISEASE INVOLVING NATIVE CORONARY ARTERY OF NATIVE HEART WITH ANGINA PECTORIS (HCC): ICD-10-CM

## 2020-12-18 DIAGNOSIS — I38 VALVULAR HEART DISEASE: Primary | ICD-10-CM

## 2020-12-18 DIAGNOSIS — R73.03 PREDIABETES: ICD-10-CM

## 2020-12-18 PROCEDURE — 99443 PR PHYS/QHP TELEPHONE EVALUATION 21-30 MIN: CPT | Performed by: INTERNAL MEDICINE

## 2020-12-18 NOTE — PROGRESS NOTES
Kaylin Ojeda is a 80 y.o. female, who presents with a chief complaint of No chief complaint on file.      HPI   This visit has been scheduled as a televisit to comply with patient safety concerns in accordance with CDC recommendations. The patient had issues with technology so the visit was changed from televisit to phone visit.      You have chosen to receive care through a telephone visit. Do you consent to use a telephone visit for your medical care today? Yes    Pt here for f/u      Pt has severe triple valve disease, CAD, and a-fib.  She has been having a extensive work up to see if she was a candidate for open heart surgery. She saw dr. Mai on 12/14/20.  She is currently scheduled for surgery in January.  The pt is nervous about surgery because of her complex medical history.  She is asking is she should have a TAVR procedure instead of open surgery.  She has valvular issues, CAD, and extensive PAD.  She is on a bb, ace-I, statin, and eliquis     She has had some dysuria and odor to her urine.  No fever.  No abd pain.  No n/v/d.  She would like to come by and leave a urine sample.       She had hyperkalemia on last labs and will come for a lab recheck    The following portions of the patient's history were reviewed and updated as appropriate: allergies, current medications, past family history, past medical history, past social history, past surgical history and problem list.    Allergies: Aleve [naproxen sodium], Codeine, Ibuprofen, and Sulfa antibiotics    Review of Systems   Constitutional: Positive for fatigue.   HENT: Negative.    Eyes: Negative.    Respiratory: Negative.    Cardiovascular: Negative.    Gastrointestinal: Negative.    Endocrine: Negative.    Genitourinary: Negative.    Musculoskeletal: Negative.    Skin: Negative.    Allergic/Immunologic: Negative.    Neurological: Negative.    Hematological: Negative.    Psychiatric/Behavioral: Negative.    All other systems reviewed and are  negative.            Wt Readings from Last 3 Encounters:   12/14/20 45.8 kg (101 lb)   12/09/20 46 kg (101 lb 6.4 oz)   11/23/20 49.4 kg (109 lb)     Temp Readings from Last 3 Encounters:   12/14/20 97.1 °F (36.2 °C) (Oral)   12/09/20 97.8 °F (36.6 °C) (Temporal)   11/23/20 97.1 °F (36.2 °C) (Temporal)     BP Readings from Last 3 Encounters:   12/14/20 104/48   12/09/20 104/70   11/23/20 120/58     Pulse Readings from Last 3 Encounters:   12/14/20 54   12/09/20 70   11/23/20 52     There is no height or weight on file to calculate BMI.  @LASTSAO2(3)@    Physical Exam  Vitals signs and nursing note reviewed.   Constitutional:       General: She is not in acute distress.     Appearance: She is well-developed.   HENT:      Head: Normocephalic and atraumatic.      Right Ear: External ear normal.      Left Ear: External ear normal.      Nose: Nose normal.   Eyes:      Conjunctiva/sclera: Conjunctivae normal.      Pupils: Pupils are equal, round, and reactive to light.   Neck:      Musculoskeletal: Normal range of motion and neck supple.   Cardiovascular:      Rate and Rhythm: Normal rate and regular rhythm.      Heart sounds: Normal heart sounds.   Pulmonary:      Effort: Pulmonary effort is normal. No respiratory distress.      Breath sounds: Normal breath sounds. No wheezing.   Musculoskeletal: Normal range of motion.      Comments: Normal gait   Skin:     General: Skin is warm and dry.   Neurological:      Mental Status: She is alert and oriented to person, place, and time.   Psychiatric:         Behavior: Behavior normal.         Thought Content: Thought content normal.         Judgment: Judgment normal.         Results for orders placed or performed in visit on 12/09/20   Comprehensive Metabolic Panel    Specimen: Blood   Result Value Ref Range    Glucose 90 65 - 99 mg/dL    BUN 22 8 - 27 mg/dL    Creatinine 0.99 0.57 - 1.00 mg/dL    eGFR Non African Am 55 (L) >59 mL/min/1.73    eGFR African Am 63 >59 mL/min/1.73     BUN/Creatinine Ratio 22 12 - 28    Sodium 138 134 - 144 mmol/L    Potassium 5.6 (H) 3.5 - 5.2 mmol/L    Chloride 101 96 - 106 mmol/L    Total CO2 21 20 - 29 mmol/L    Calcium 9.4 8.7 - 10.3 mg/dL    Total Protein 7.8 6.0 - 8.5 g/dL    Albumin 4.8 (H) 3.7 - 4.7 g/dL    Globulin 3.0 1.5 - 4.5 g/dL    A/G Ratio 1.6 1.2 - 2.2    Total Bilirubin 0.2 0.0 - 1.2 mg/dL    Alkaline Phosphatase 111 39 - 117 IU/L    AST (SGOT) 21 0 - 40 IU/L    ALT (SGPT) 16 0 - 32 IU/L   T4, Free    Specimen: Blood   Result Value Ref Range    Free T4 1.17 0.82 - 1.77 ng/dL   TSH    Specimen: Blood   Result Value Ref Range    TSH 0.774 0.450 - 4.500 uIU/mL   Lipid Panel With LDL / HDL Ratio    Specimen: Blood   Result Value Ref Range    Total Cholesterol 121 100 - 199 mg/dL    Triglycerides 69 0 - 149 mg/dL    HDL Cholesterol 56 >39 mg/dL    VLDL Cholesterol Ricky 14 5 - 40 mg/dL    LDL Chol Calc (NIH) 51 0 - 99 mg/dL    LDL/HDL RATIO 0.9 0.0 - 3.2 ratio   Hemoglobin A1c    Specimen: Blood   Result Value Ref Range    Hemoglobin A1C 6.2 (H) 4.8 - 5.6 %   Vitamin D 25 Hydroxy    Specimen: Blood   Result Value Ref Range    25 Hydroxy, Vitamin D 17.8 (L) 30.0 - 100.0 ng/mL   Vitamin B12    Specimen: Blood   Result Value Ref Range    Vitamin B-12 >2000 (H) 232 - 1245 pg/mL   Folate    Specimen: Blood   Result Value Ref Range    Folate 10.0 >3.0 ng/mL   Iron Profile    Specimen: Blood   Result Value Ref Range    TIBC 500 (H) 250 - 450 ug/dL    UIBC 480 (H) 118 - 369 ug/dL    Iron 20 (L) 27 - 139 ug/dL    Iron Saturation 4 (L) 15 - 55 %   Erythropoietin    Specimen: Blood   Result Value Ref Range    Erythropoietin 110.4 (H) 2.6 - 18.5 mIU/mL   Hepatitis C Antibody   Result Value Ref Range    Hep C Virus Ab <0.1 0.0 - 0.9 s/co ratio   CBC & Differential    Specimen: Blood   Result Value Ref Range    WBC 7.2 3.4 - 10.8 x10E3/uL    RBC 4.00 3.77 - 5.28 x10E6/uL    Hemoglobin 10.0 (L) 11.1 - 15.9 g/dL    Hematocrit 32.6 (L) 34.0 - 46.6 %    MCV 82 79 - 97  fL    MCH 25.0 (L) 26.6 - 33.0 pg    MCHC 30.7 (L) 31.5 - 35.7 g/dL    RDW 16.0 (H) 11.7 - 15.4 %    Platelets 348 150 - 450 x10E3/uL    Neutrophil Rel % 66 Not Estab. %    Lymphocyte Rel % 26 Not Estab. %    Monocyte Rel % 6 Not Estab. %    Eosinophil Rel % 1 Not Estab. %    Basophil Rel % 1 Not Estab. %    Neutrophils Absolute 4.8 1.4 - 7.0 x10E3/uL    Lymphocytes Absolute 1.9 0.7 - 3.1 x10E3/uL    Monocytes Absolute 0.4 0.1 - 0.9 x10E3/uL    Eosinophils Absolute 0.1 0.0 - 0.4 x10E3/uL    Basophils Absolute 0.1 0.0 - 0.2 x10E3/uL    Immature Granulocyte Rel % 0 Not Estab. %    Immature Grans Absolute 0.0 0.0 - 0.1 x10E3/uL           Diagnoses and all orders for this visit:    1. Valvular heart disease (Primary) - I advised the pt that she should discuss her concerns with dr. Mai.  She wants to know about the TAVR procedure and will make an appt with dr. Calero to ask her questions.     2. Coronary artery disease involving native coronary artery of native heart with angina pectoris (CMS/[facility])    3. Prediabetes  -     Microalbumin / Creatinine Urine Ratio - Urine, Clean Catch    4. Dysuria  -     Urinalysis With Culture If Indicated -; Future      25 minutes was spent in patient care with >50% in counseling about the above issues including medications and disease management plan.         Outpatient Medications Prior to Visit   Medication Sig Dispense Refill   • albuterol (PROVENTIL) (2.5 MG/3ML) 0.083% nebulizer solution Take 2.5 mg by nebulization Every 4 (Four) Hours As Needed for Wheezing or Shortness of Air. 120 vial 5   • albuterol sulfate  (90 Base) MCG/ACT inhaler Inhale 2 puffs Every 4 (Four) Hours As Needed for Wheezing. 18 g 11   • amLODIPine (NORVASC) 10 MG tablet TAKE 1 TABLET BY MOUTH DAILY 30 tablet 2   • apixaban (Eliquis) 5 MG tablet tablet Take 1 tablet by mouth Every 12 (Twelve) Hours. Resume on 11/2/2020 60 tablet 1   • atorvastatin (LIPITOR) 10 MG tablet TAKE 1 TABLET BY MOUTH DAILY 90  tablet 3   • budesonide-formoterol (SYMBICORT) 160-4.5 MCG/ACT inhaler Inhale 2 puffs 2 (Two) Times a Day. 1 inhaler 12   • clonazePAM (KlonoPIN) 0.5 MG tablet Take 1 tablet by mouth At Night As Needed for Anxiety. 30 tablet 0   • fluticasone (FLONASE) 50 MCG/ACT nasal spray SPRAY TWICE IN EACH NOSTRIL EVERY DAY FOR 30 DAYS. 16 mL 6   • levothyroxine (SYNTHROID, LEVOTHROID) 50 MCG tablet TAKE 1 TABLET BY MOUTH DAILY 90 tablet 1   • lisinopril (PRINIVIL,ZESTRIL) 20 MG tablet TAKE 1 TABLET BY MOUTH DAILY 90 tablet 0   • metoprolol tartrate (LOPRESSOR) 100 MG tablet Take 1 tablet by mouth 2 (Two) Times a Day. 180 tablet 1   • ondansetron (ZOFRAN) 8 MG tablet Take 1 tablet by mouth Every 8 (Eight) Hours As Needed for Nausea or Vomiting. 20 tablet 1   • oxybutynin (DITROPAN) 5 MG tablet TAKE 1 TABLET BY MOUTH TWICE DAILY 180 tablet 1   • oxyCODONE-acetaminophen (Percocet) 7.5-325 MG per tablet Take 1 tablet by mouth Every 8 (Eight) Hours As Needed for Severe Pain . 75 tablet 0   • sertraline (ZOLOFT) 25 MG tablet Take 1 tablet by mouth Daily. 30 tablet 0   • vitamin D (ERGOCALCIFEROL) 1.25 MG (34310 UT) capsule capsule Take 1 capsule by mouth 1 (One) Time Per Week. 13 capsule 3     Facility-Administered Medications Prior to Visit   Medication Dose Route Frequency Provider Last Rate Last Admin   • cyanocobalamin injection 1,000 mcg  1,000 mcg Intramuscular Q28 Days Donna Forte MD   1,000 mcg at 12/09/20 1253     No orders of the defined types were placed in this encounter.    [unfilled]  There are no discontinued medications.      Return in about 1 month (around 1/18/2021) for Recheck.

## 2020-12-21 ENCOUNTER — TELEPHONE (OUTPATIENT)
Dept: CARDIAC SURGERY | Facility: CLINIC | Age: 80
End: 2020-12-21

## 2020-12-21 ENCOUNTER — TELEPHONE (OUTPATIENT)
Dept: CARDIOLOGY | Facility: CLINIC | Age: 80
End: 2020-12-21

## 2020-12-21 NOTE — TELEPHONE ENCOUNTER
Spoke to patient with PAT and surgery times. PAT is on 1- at 0830 with all testing to follow. Surgery is scheduled for 1- at 0730 with an 0500 arrival time.  Her last dose of ELIQUIS will be on 1- per Catia.   The patient expressed a verbal understanding of these instructions.  She was instructed to call the office with any further questions prior to surgery.

## 2020-12-21 NOTE — TELEPHONE ENCOUNTER
Pt called and is scheduled for open heart surgery on 01/20/21.  Pt is concerned and has some questions she would like to discuss with your prior to this surgery.  She has the surgery scheduled, still has some concerns that may prevent her from proceeding with the scheduled surgery.     Note: pt states that her telephone is not set up for vm and her telephone is not near her bed, so you may have to call back to back for her to answer.

## 2020-12-22 NOTE — TELEPHONE ENCOUNTER
I called, she is worried about the risk of surgery but understands things wouldn't get better without it.

## 2020-12-23 ENCOUNTER — RESULTS ENCOUNTER (OUTPATIENT)
Dept: INTERNAL MEDICINE | Facility: CLINIC | Age: 80
End: 2020-12-23

## 2020-12-23 ENCOUNTER — LAB (OUTPATIENT)
Dept: LAB | Facility: HOSPITAL | Age: 80
End: 2020-12-23

## 2020-12-23 ENCOUNTER — CONSULT (OUTPATIENT)
Dept: ONCOLOGY | Facility: CLINIC | Age: 80
End: 2020-12-23

## 2020-12-23 VITALS
RESPIRATION RATE: 16 BRPM | HEIGHT: 61 IN | OXYGEN SATURATION: 98 % | BODY MASS INDEX: 19.24 KG/M2 | WEIGHT: 101.9 LBS | DIASTOLIC BLOOD PRESSURE: 57 MMHG | HEART RATE: 77 BPM | SYSTOLIC BLOOD PRESSURE: 92 MMHG | TEMPERATURE: 98.9 F

## 2020-12-23 DIAGNOSIS — E87.5 HYPERKALEMIA: ICD-10-CM

## 2020-12-23 DIAGNOSIS — D50.8 OTHER IRON DEFICIENCY ANEMIA: Primary | ICD-10-CM

## 2020-12-23 DIAGNOSIS — E53.8 B12 DEFICIENCY: Primary | ICD-10-CM

## 2020-12-23 LAB
BASOPHILS # BLD AUTO: 0.09 10*3/MM3 (ref 0–0.2)
BASOPHILS NFR BLD AUTO: 1.1 % (ref 0–1.5)
DEPRECATED RDW RBC AUTO: 50.1 FL (ref 37–54)
EOSINOPHIL # BLD AUTO: 0.12 10*3/MM3 (ref 0–0.4)
EOSINOPHIL NFR BLD AUTO: 1.4 % (ref 0.3–6.2)
ERYTHROCYTE [DISTWIDTH] IN BLOOD BY AUTOMATED COUNT: 16.8 % (ref 12.3–15.4)
FERRITIN SERPL-MCNC: 20 NG/ML (ref 13–150)
HCT VFR BLD AUTO: 30.3 % (ref 34–46.6)
HGB BLD-MCNC: 9.4 G/DL (ref 12–15.9)
IMM GRANULOCYTES # BLD AUTO: 0.04 10*3/MM3 (ref 0–0.05)
IMM GRANULOCYTES NFR BLD AUTO: 0.5 % (ref 0–0.5)
IRON 24H UR-MRATE: 24 MCG/DL (ref 37–145)
IRON SATN MFR SERPL: 5 % (ref 20–50)
LYMPHOCYTES # BLD AUTO: 2.12 10*3/MM3 (ref 0.7–3.1)
LYMPHOCYTES NFR BLD AUTO: 25.2 % (ref 19.6–45.3)
MCH RBC QN AUTO: 25.3 PG (ref 26.6–33)
MCHC RBC AUTO-ENTMCNC: 31 G/DL (ref 31.5–35.7)
MCV RBC AUTO: 81.5 FL (ref 79–97)
MONOCYTES # BLD AUTO: 0.67 10*3/MM3 (ref 0.1–0.9)
MONOCYTES NFR BLD AUTO: 8 % (ref 5–12)
NEUTROPHILS NFR BLD AUTO: 5.37 10*3/MM3 (ref 1.7–7)
NEUTROPHILS NFR BLD AUTO: 63.8 % (ref 42.7–76)
NRBC BLD AUTO-RTO: 0 /100 WBC (ref 0–0.2)
PLATELET # BLD AUTO: 298 10*3/MM3 (ref 140–450)
PMV BLD AUTO: 10.1 FL (ref 6–12)
RBC # BLD AUTO: 3.72 10*6/MM3 (ref 3.77–5.28)
TIBC SERPL-MCNC: 442 MCG/DL (ref 298–536)
UIBC SERPL-MCNC: 418 MCG/DL (ref 112–346)
WBC # BLD AUTO: 8.41 10*3/MM3 (ref 3.4–10.8)

## 2020-12-23 PROCEDURE — 83550 IRON BINDING TEST: CPT | Performed by: INTERNAL MEDICINE

## 2020-12-23 PROCEDURE — 82728 ASSAY OF FERRITIN: CPT | Performed by: INTERNAL MEDICINE

## 2020-12-23 PROCEDURE — 83540 ASSAY OF IRON: CPT | Performed by: INTERNAL MEDICINE

## 2020-12-23 PROCEDURE — 36415 COLL VENOUS BLD VENIPUNCTURE: CPT

## 2020-12-23 PROCEDURE — 85025 COMPLETE CBC W/AUTO DIFF WBC: CPT

## 2020-12-23 PROCEDURE — 99204 OFFICE O/P NEW MOD 45 MIN: CPT | Performed by: INTERNAL MEDICINE

## 2020-12-23 RX ORDER — DIPHENHYDRAMINE HYDROCHLORIDE 50 MG/ML
50 INJECTION INTRAMUSCULAR; INTRAVENOUS AS NEEDED
Status: CANCELLED | OUTPATIENT
Start: 2020-12-23

## 2020-12-23 RX ORDER — SODIUM CHLORIDE 9 MG/ML
250 INJECTION, SOLUTION INTRAVENOUS ONCE
Status: CANCELLED | OUTPATIENT
Start: 2020-12-23

## 2020-12-23 RX ORDER — PROCHLORPERAZINE MALEATE 10 MG
10 TABLET ORAL ONCE
Status: CANCELLED
Start: 2020-12-23

## 2020-12-23 RX ORDER — METHYLPREDNISOLONE SODIUM SUCCINATE 125 MG/2ML
125 INJECTION, POWDER, LYOPHILIZED, FOR SOLUTION INTRAMUSCULAR; INTRAVENOUS AS NEEDED
Status: CANCELLED | OUTPATIENT
Start: 2020-12-23

## 2020-12-23 NOTE — PROGRESS NOTES
Subjective     REASON FOR CONSULTATION:  anemia  Provide an opinion on any further workup or treatment                             REQUESTING PHYSICIAN:  Dr. Forte    RECORDS OBTAINED:  Records of the patients history including those obtained from the referring provider were reviewed and summarized in detail.    HISTORY OF PRESENT ILLNESS:  The patient is a 80 y.o. year old female who is here for an opinion about the above issue.    History of Present Illness   This is a very pleasant 80-year-old lady with multiple medical comorbidities including significant coronary artery and valvular heart disease.  The patient is referred for evaluation of anemia.  Reviewing her records, the patient has developed anemia that has worsened over the past 1 year.  Her CBC on 12/4/2019 showed a hemoglobin of 12.3 with an MCV 89.2.  A CBC in July 2020 showed a decline in the hemoglobin to 10.6.  Her current hemoglobin is 9.4 with more microcytic, hypochromic red blood cell indices.  The patient has history of B12 deficiency but has not been on B12 in quite some time;. her B12 level on 12/9/2020 was adequate greater than 2000.  The folic acid was normal at 10.0.  Her iron profile was suggestive of iron deficiency with an iron saturation 4% and an elevated TIBC 500.  Her erythropoietin level was elevated 110, and her CMP showed a normal serum creatinine 0.99, normal total protein 7.8 with a normal globulin.  Her white blood cell and platelet counts are persistently normal.    The patient complains of significant fatigue, global weakness and spends much of her time now in bed.  She has depression after the death of her  and daughter.  She is scheduled for cardiac surgery first of the year but is unsure if she wants to proceed.  She denies bright red blood per rectum or melanotic stool.  She is on anticoagulation.    Past Medical History:   Diagnosis Date   • Abdominal pain, epigastric     Chronic, unclear cause, improved   •  Anorexia    • Anxiety    • Arthritis    • CAD (coronary artery disease)     Cath 5/2015: nonobstructive LAD/RCA disease, 90% mid LCx s/p [id number] Xience   • Cellulitis of leg    • Cervical disc disease    • Chronic fatigue    • Colitis    • COPD (chronic obstructive pulmonary disease) (CMS/[facility])    • Depression    • Erosive gastritis    • Fibromyalgia    • Frequency of urination 6/9/2017   • Gastritis    • Hypercholesterolemia 2/2/2016   • Hyperglycemia    • Hypertension     History of refractory hypertension which improved significantly   • Insomnia    • Leukocytes in urine    • Lower back pain    • Mediastinal lymphadenopathy    • 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[facility])    • Mononucleosis    • Occlusion and stenosis of carotid artery    • Onychomycosis    • Orbit fracture (CMS/[facility])    • PAF (paroxysmal atrial fibrillation) (CMS/[facility])    • Peripheral arterial disease (CMS/[facility])     Significant (carotid, subclavian, lower extremities), with claudication, medical therapy only   • Pernicious anemia    • Prediabetes    • Renal artery stenosis (CMS/[facility])     unilateral, with renal atrophy (no intervention required)   • Renal calculi    • Sinus bradycardia     mild, asymptomatic   • TIA (transient ischemic attack)    • UTI (urinary tract infection)    • Valvular heart disease     5/2015: mild-mod AI, mod MR, mod-severe TR.  6/2017: mild AI, mild MR, mod TR   • Vision problem    • Vitamin B 12 deficiency         Past Surgical History:   Procedure Laterality Date   • APPENDECTOMY     • BREAST BIOPSY Left     20+ yrs ago   • BREAST SURGERY     • CARDIAC CATHETERIZATION  05/2015    nonobs LAD/RCA disease, 90% mid LCx s/p [id number] Xience   • CARDIAC CATHETERIZATION N/A 10/28/2020    Procedure: Right and Left Heart Cath;  Surgeon: Opal Contreras MD;  Location: [facility] CATH INVASIVE LOCATION;  Service: Cardiovascular;  Laterality: N/A;  for cardiac testing prior to possible valve surgery per Dr. Mai   • CARDIAC CATHETERIZATION  N/A 10/28/2020    Procedure: Coronary angiography;  Surgeon: Opal Contreras MD;  Location: [facility] INVASIVE LOCATION;  Service: Cardiovascular;  Laterality: N/A;   • CERVICAL FUSION  1997   • CHOLECYSTECTOMY     • CORONARY STENT PLACEMENT     • HYSTERECTOMY  1995   • KNEE SURGERY Right 2005   • KNEE SURGERY Left 1998        Current Outpatient Medications on File Prior to Visit   Medication Sig Dispense Refill   • albuterol (PROVENTIL) (2.5 MG/3ML) 0.083% nebulizer solution Take 2.5 mg by nebulization Every 4 (Four) Hours As Needed for Wheezing or Shortness of Air. 120 vial 5   • albuterol sulfate  (90 Base) MCG/ACT inhaler Inhale 2 puffs Every 4 (Four) Hours As Needed for Wheezing. 18 g 11   • amLODIPine (NORVASC) 10 MG tablet TAKE 1 TABLET BY MOUTH DAILY 30 tablet 2   • apixaban (Eliquis) 5 MG tablet tablet Take 1 tablet by mouth Every 12 (Twelve) Hours. Resume on 11/2/2020 60 tablet 1   • atorvastatin (LIPITOR) 10 MG tablet TAKE 1 TABLET BY MOUTH DAILY 90 tablet 3   • budesonide-formoterol (SYMBICORT) 160-4.5 MCG/ACT inhaler Inhale 2 puffs 2 (Two) Times a Day. 1 inhaler 12   • clonazePAM (KlonoPIN) 0.5 MG tablet Take 1 tablet by mouth At Night As Needed for Anxiety. 30 tablet 0   • fluticasone (FLONASE) 50 MCG/ACT nasal spray SPRAY TWICE IN EACH NOSTRIL EVERY DAY FOR 30 DAYS. 16 mL 6   • levothyroxine (SYNTHROID, LEVOTHROID) 50 MCG tablet TAKE 1 TABLET BY MOUTH DAILY 90 tablet 1   • lisinopril (PRINIVIL,ZESTRIL) 20 MG tablet TAKE 1 TABLET BY MOUTH DAILY 90 tablet 0   • metoprolol tartrate (LOPRESSOR) 100 MG tablet Take 1 tablet by mouth 2 (Two) Times a Day. 180 tablet 1   • ondansetron (ZOFRAN) 8 MG tablet Take 1 tablet by mouth Every 8 (Eight) Hours As Needed for Nausea or Vomiting. 20 tablet 1   • oxybutynin (DITROPAN) 5 MG tablet TAKE 1 TABLET BY MOUTH TWICE DAILY 180 tablet 1   • oxyCODONE-acetaminophen (Percocet) 7.5-325 MG per tablet Take 1 tablet by mouth Every 8 (Eight) Hours As Needed for  "Severe Pain . 75 tablet 0   • sertraline (ZOLOFT) 25 MG tablet Take 1 tablet by mouth Daily. 30 tablet 0   • vitamin D (ERGOCALCIFEROL) 1.25 MG (29186 UT) capsule capsule Take 1 capsule by mouth 1 (One) Time Per Week. 13 capsule 3     Current Facility-Administered Medications on File Prior to Visit   Medication Dose Route Frequency Provider Last Rate Last Admin   • cyanocobalamin injection 1,000 mcg  1,000 mcg Intramuscular Q28 Days Donna Forte MD   1,000 mcg at 12/09/20 1253        ALLERGIES:    Allergies   Allergen Reactions   • Aleve [Naproxen Sodium] Shortness Of Breath   • Codeine Other (See Comments)     hyperactivity   • Ibuprofen Unknown (See Comments)     unk   • Sulfa Antibiotics Unknown (See Comments)     \"I can't remember\"        Social History     Socioeconomic History   • Marital status:      Spouse name: Willie   • Number of children: 3   • Years of education: Some College   • Highest education level: Not on file   Occupational History     Employer: RETIRED   Tobacco Use   • Smoking status: Current Some Day Smoker     Packs/day: 0.50     Types: Cigarettes   • Smokeless tobacco: Never Used   • Tobacco comment: trying to quit, pt states she has basically quit but will still have one when she stressed   Substance and Sexual Activity   • Alcohol use: Not Currently     Frequency: Monthly or less     Comment: OCCASIONAL/ TWICE A YEAR.    • Drug use: No   • Sexual activity: Defer        Family History   Problem Relation Age of Onset   • Asthma Mother    • Emphysema Mother    • Graves' disease Mother    • Heart disease Mother    • Hypertension Mother    • Emphysema Father    • Hypertension Father    • Heart disease Father    • Kidney disease Sister    • Lupus Daughter         Systemic erythematosus   • Arthritis Other    • Crohn's disease Other    • Breast cancer Niece    • Breast cancer Maternal Cousin    • Breast cancer Maternal Cousin         Review of Systems   Constitutional: " "Positive for appetite change and fatigue. Negative for fever.   HENT: Negative.    Respiratory: Positive for shortness of breath. Negative for cough and wheezing.    Cardiovascular: Positive for chest pain and palpitations. Negative for leg swelling.   Gastrointestinal: Positive for constipation. Negative for diarrhea and nausea.   Genitourinary: Negative.    Musculoskeletal: Positive for arthralgias and back pain.   Skin: Negative.    Allergic/Immunologic: Negative.    Neurological: Positive for dizziness, weakness and light-headedness.   Hematological: Negative.    Psychiatric/Behavioral: Positive for decreased concentration and dysphoric mood.          Objective     Vitals:    12/23/20 1026   BP: 92/57   Pulse: 77   Resp: 16   Temp: 98.9 °F (37.2 °C)   TempSrc: Infrared   Weight: 46.2 kg (101 lb 14.4 oz)   Height: 156 cm (61.42\")   PainSc:   7   PainLoc: Back     Current Status 12/23/2020   ECOG score 0       Physical Exam    CONSTITUTIONAL: pleasant somewhat chronic ill-appearing elderly woman  HEENT: no icterus, no thrush, moist membranes  NECK: no jvd  LYMPH: no cervical or supraclavicular lad  CV: RRR, S1S2, + murmur  RESP: cta bilat, no wheezing, no rales  GI: soft, non-tender, no splenomegaly, +bs  MUSC: no edema, normal gait  NEURO: alert and oriented x3, normal strength  PSYCH: depressed mood/affect    RECENT LABS:  Hematology WBC   Date Value Ref Range Status   12/23/2020 8.41 3.40 - 10.80 10*3/mm3 Final   12/09/2020 7.2 3.4 - 10.8 x10E3/uL Final     RBC   Date Value Ref Range Status   12/23/2020 3.72 (L) 3.77 - 5.28 10*6/mm3 Final   12/09/2020 4.00 3.77 - 5.28 x10E6/uL Final     Hemoglobin   Date Value Ref Range Status   12/23/2020 9.4 (L) 12.0 - 15.9 g/dL Final     Hematocrit   Date Value Ref Range Status   12/23/2020 30.3 (L) 34.0 - 46.6 % Final     Platelets   Date Value Ref Range Status   12/23/2020 298 140 - 450 10*3/mm3 Final          Assessment/Plan     1.  Worsening microcytic, hypochromic " anemia: The patient appears to have iron deficiency with an iron saturation 4% and elevated TIBC 500.  I will check a ferritin to confirm.  She has required IV iron in the past as she does not tolerate oral iron products secondary to GI upset.  In addition the patient needs a rapid increase in her hemoglobin as she is scheduled for cardiac surgery in January.  I therefore recommended she received 2 doses of Injectafer to replace her iron stores and improve her hemoglobin.  We will repeat her hemoglobin in approximately 1 month.    2.  History of B12 deficiency: Recent B12 level 12/9/2020 was adequate greater than 2000    3.  Significant cardiac disease in need of open heart surgery scheduled in January necessitating rapid improvement in her hemoglobin

## 2020-12-31 ENCOUNTER — INFUSION (OUTPATIENT)
Dept: ONCOLOGY | Facility: HOSPITAL | Age: 80
End: 2020-12-31

## 2020-12-31 VITALS
BODY MASS INDEX: 18.92 KG/M2 | DIASTOLIC BLOOD PRESSURE: 60 MMHG | WEIGHT: 101.5 LBS | HEART RATE: 65 BPM | SYSTOLIC BLOOD PRESSURE: 117 MMHG | TEMPERATURE: 98.2 F

## 2020-12-31 DIAGNOSIS — F41.9 ANXIETY AND DEPRESSION: ICD-10-CM

## 2020-12-31 DIAGNOSIS — D50.8 OTHER IRON DEFICIENCY ANEMIA: Primary | ICD-10-CM

## 2020-12-31 DIAGNOSIS — F32.A ANXIETY AND DEPRESSION: ICD-10-CM

## 2020-12-31 PROCEDURE — 63710000001 PROCHLORPERAZINE MALEATE PER 5 MG: Performed by: INTERNAL MEDICINE

## 2020-12-31 PROCEDURE — 25010000002 FERRIC CARBOXYMALTOSE 750 MG/15ML SOLUTION 15 ML VIAL: Performed by: INTERNAL MEDICINE

## 2020-12-31 PROCEDURE — 96374 THER/PROPH/DIAG INJ IV PUSH: CPT

## 2020-12-31 RX ORDER — SODIUM CHLORIDE 9 MG/ML
250 INJECTION, SOLUTION INTRAVENOUS ONCE
Status: COMPLETED | OUTPATIENT
Start: 2020-12-31 | End: 2020-12-31

## 2020-12-31 RX ORDER — DIPHENHYDRAMINE HYDROCHLORIDE 50 MG/ML
50 INJECTION INTRAMUSCULAR; INTRAVENOUS AS NEEDED
Status: CANCELLED | OUTPATIENT
Start: 2021-01-07

## 2020-12-31 RX ORDER — METHYLPREDNISOLONE SODIUM SUCCINATE 125 MG/2ML
125 INJECTION, POWDER, LYOPHILIZED, FOR SOLUTION INTRAMUSCULAR; INTRAVENOUS AS NEEDED
Status: CANCELLED | OUTPATIENT
Start: 2021-01-07

## 2020-12-31 RX ORDER — SODIUM CHLORIDE 9 MG/ML
250 INJECTION, SOLUTION INTRAVENOUS ONCE
Status: CANCELLED | OUTPATIENT
Start: 2021-01-07

## 2020-12-31 RX ORDER — PROCHLORPERAZINE MALEATE 5 MG/1
10 TABLET ORAL ONCE
Status: COMPLETED | OUTPATIENT
Start: 2020-12-31 | End: 2020-12-31

## 2020-12-31 RX ORDER — PROCHLORPERAZINE MALEATE 5 MG/1
10 TABLET ORAL ONCE
Status: CANCELLED
Start: 2021-01-07

## 2020-12-31 RX ADMIN — FERRIC CARBOXYMALTOSE INJECTION 690 MG: 50 INJECTION, SOLUTION INTRAVENOUS at 11:58

## 2020-12-31 RX ADMIN — SODIUM CHLORIDE 250 ML: 9 INJECTION, SOLUTION INTRAVENOUS at 11:41

## 2020-12-31 RX ADMIN — PROCHLORPERAZINE MALEATE 10 MG: 5 TABLET ORAL at 11:41

## 2021-01-04 RX ORDER — SERTRALINE HYDROCHLORIDE 25 MG/1
25 TABLET, FILM COATED ORAL DAILY
Qty: 90 TABLET | Refills: 3 | Status: SHIPPED | OUTPATIENT
Start: 2021-01-04 | End: 2021-03-29 | Stop reason: SDUPTHER

## 2021-01-07 ENCOUNTER — INFUSION (OUTPATIENT)
Dept: ONCOLOGY | Facility: HOSPITAL | Age: 81
End: 2021-01-07

## 2021-01-07 VITALS
HEART RATE: 86 BPM | SYSTOLIC BLOOD PRESSURE: 119 MMHG | DIASTOLIC BLOOD PRESSURE: 67 MMHG | BODY MASS INDEX: 18.6 KG/M2 | WEIGHT: 99.8 LBS | TEMPERATURE: 98 F

## 2021-01-07 DIAGNOSIS — D50.8 OTHER IRON DEFICIENCY ANEMIA: Primary | ICD-10-CM

## 2021-01-07 PROCEDURE — 25010000002 FERRIC CARBOXYMALTOSE 750 MG/15ML SOLUTION 15 ML VIAL: Performed by: INTERNAL MEDICINE

## 2021-01-07 PROCEDURE — 96374 THER/PROPH/DIAG INJ IV PUSH: CPT

## 2021-01-07 PROCEDURE — 63710000001 PROCHLORPERAZINE MALEATE PER 5 MG: Performed by: INTERNAL MEDICINE

## 2021-01-07 RX ORDER — PROCHLORPERAZINE MALEATE 5 MG/1
10 TABLET ORAL ONCE
Status: CANCELLED
Start: 2021-01-14

## 2021-01-07 RX ORDER — METHYLPREDNISOLONE SODIUM SUCCINATE 125 MG/2ML
125 INJECTION, POWDER, LYOPHILIZED, FOR SOLUTION INTRAMUSCULAR; INTRAVENOUS AS NEEDED
Status: CANCELLED | OUTPATIENT
Start: 2021-01-14

## 2021-01-07 RX ORDER — SODIUM CHLORIDE 9 MG/ML
250 INJECTION, SOLUTION INTRAVENOUS ONCE
Status: CANCELLED | OUTPATIENT
Start: 2021-01-14

## 2021-01-07 RX ORDER — DIPHENHYDRAMINE HYDROCHLORIDE 50 MG/ML
50 INJECTION INTRAMUSCULAR; INTRAVENOUS AS NEEDED
Status: CANCELLED | OUTPATIENT
Start: 2021-01-14

## 2021-01-07 RX ORDER — SODIUM CHLORIDE 9 MG/ML
250 INJECTION, SOLUTION INTRAVENOUS ONCE
Status: COMPLETED | OUTPATIENT
Start: 2021-01-07 | End: 2021-01-07

## 2021-01-07 RX ORDER — PROCHLORPERAZINE MALEATE 5 MG/1
10 TABLET ORAL ONCE
Status: COMPLETED | OUTPATIENT
Start: 2021-01-07 | End: 2021-01-07

## 2021-01-07 RX ADMIN — SODIUM CHLORIDE 250 ML: 9 INJECTION, SOLUTION INTRAVENOUS at 11:47

## 2021-01-07 RX ADMIN — FERRIC CARBOXYMALTOSE INJECTION 679.5 MG: 50 INJECTION, SOLUTION INTRAVENOUS at 11:56

## 2021-01-07 RX ADMIN — PROCHLORPERAZINE MALEATE 10 MG: 5 TABLET ORAL at 11:43

## 2021-01-07 NOTE — NURSING NOTE
Pt presents for 2nd dose of injectafer today. Has new insurance. Reviewed with Bing. Ok to treat today.

## 2021-01-11 ENCOUNTER — TELEPHONE (OUTPATIENT)
Dept: CARDIAC SURGERY | Facility: CLINIC | Age: 81
End: 2021-01-11

## 2021-01-11 ENCOUNTER — OFFICE VISIT (OUTPATIENT)
Dept: INTERNAL MEDICINE | Facility: CLINIC | Age: 81
End: 2021-01-11

## 2021-01-11 ENCOUNTER — APPOINTMENT (OUTPATIENT)
Dept: CARDIOLOGY | Facility: HOSPITAL | Age: 81
End: 2021-01-11

## 2021-01-11 VITALS
BODY MASS INDEX: 19.3 KG/M2 | TEMPERATURE: 97.8 F | HEIGHT: 61 IN | DIASTOLIC BLOOD PRESSURE: 76 MMHG | OXYGEN SATURATION: 74 % | HEART RATE: 68 BPM | WEIGHT: 102.2 LBS | SYSTOLIC BLOOD PRESSURE: 120 MMHG | RESPIRATION RATE: 16 BRPM

## 2021-01-11 DIAGNOSIS — E87.5 HYPERKALEMIA: ICD-10-CM

## 2021-01-11 DIAGNOSIS — M54.50 LUMBAR BACK PAIN: ICD-10-CM

## 2021-01-11 DIAGNOSIS — I38 VALVULAR HEART DISEASE: Primary | ICD-10-CM

## 2021-01-11 DIAGNOSIS — D50.9 IRON DEFICIENCY ANEMIA, UNSPECIFIED IRON DEFICIENCY ANEMIA TYPE: ICD-10-CM

## 2021-01-11 DIAGNOSIS — R35.0 URINARY FREQUENCY: ICD-10-CM

## 2021-01-11 DIAGNOSIS — I25.119 CORONARY ARTERY DISEASE INVOLVING NATIVE CORONARY ARTERY OF NATIVE HEART WITH ANGINA PECTORIS (HCC): ICD-10-CM

## 2021-01-11 LAB
BILIRUB BLD-MCNC: NEGATIVE MG/DL
CLARITY, POC: ABNORMAL
COLOR UR: YELLOW
GLUCOSE UR STRIP-MCNC: NEGATIVE MG/DL
KETONES UR QL: ABNORMAL
LEUKOCYTE EST, POC: ABNORMAL
NITRITE UR-MCNC: NEGATIVE MG/ML
PH UR: 7 [PH] (ref 5–8)
PROT UR STRIP-MCNC: NEGATIVE MG/DL
RBC # UR STRIP: NEGATIVE /UL
SP GR UR: 1.02 (ref 1–1.03)
UROBILINOGEN UR QL: NORMAL

## 2021-01-11 PROCEDURE — 99214 OFFICE O/P EST MOD 30 MIN: CPT | Performed by: INTERNAL MEDICINE

## 2021-01-11 PROCEDURE — 81003 URINALYSIS AUTO W/O SCOPE: CPT | Performed by: INTERNAL MEDICINE

## 2021-01-11 NOTE — PROGRESS NOTES
Kaylin Ojeda is a 80 y.o. female, who presents with a chief complaint of   Chief Complaint   Patient presents with   • Valvular Heart Disease   • Follow-up       HPI     The following portions of the patient's history were reviewed and updated as appropriate: allergies, current medications, past family history, past medical history, past social history, past surgical history and problem list.    Allergies: Aleve [naproxen sodium], Codeine, Ibuprofen, and Sulfa antibiotics    Review of Systems          Wt Readings from Last 3 Encounters:   01/11/21 46.4 kg (102 lb 3.2 oz)   01/07/21 45.3 kg (99 lb 12.8 oz)   12/31/20 46 kg (101 lb 8 oz)     Temp Readings from Last 3 Encounters:   01/11/21 97.8 °F (36.6 °C) (Temporal)   01/07/21 98 °F (36.7 °C)   12/31/20 98.2 °F (36.8 °C) (Infrared)     BP Readings from Last 3 Encounters:   01/11/21 120/76   01/07/21 119/67   12/31/20 117/60     Pulse Readings from Last 3 Encounters:   01/11/21 68   01/07/21 86   12/31/20 65     Body mass index is 19.05 kg/m².  @LASTSAO2(3)@    Physical Exam    Results for orders placed or performed in visit on 12/23/20   Ferritin    Specimen: Blood   Result Value Ref Range    Ferritin 20.00 13.00 - 150.00 ng/mL   Iron Profile    Specimen: Blood   Result Value Ref Range    Iron 24 (L) 37 - 145 mcg/dL    Iron Saturation 5 (L) 20 - 50 %    UIBC 418 (H) 112 - 346 mcg/dL    TIBC 442 298 - 536 mcg/dL           There are no diagnoses linked to this encounter.      Outpatient Medications Prior to Visit   Medication Sig Dispense Refill   • albuterol (PROVENTIL) (2.5 MG/3ML) 0.083% nebulizer solution Take 2.5 mg by nebulization Every 4 (Four) Hours As Needed for Wheezing or Shortness of Air. 120 vial 5   • albuterol sulfate  (90 Base) MCG/ACT inhaler Inhale 2 puffs Every 4 (Four) Hours As Needed for Wheezing. 18 g 11   • amLODIPine (NORVASC) 10 MG tablet TAKE 1 TABLET BY MOUTH DAILY 30 tablet 2   • apixaban (Eliquis) 5 MG tablet tablet Take  1 tablet by mouth Every 12 (Twelve) Hours. Resume on 11/2/2020 60 tablet 1   • atorvastatin (LIPITOR) 10 MG tablet TAKE 1 TABLET BY MOUTH DAILY 90 tablet 3   • budesonide-formoterol (SYMBICORT) 160-4.5 MCG/ACT inhaler Inhale 2 puffs 2 (Two) Times a Day. 1 inhaler 12   • clonazePAM (KlonoPIN) 0.5 MG tablet Take 1 tablet by mouth At Night As Needed for Anxiety. 30 tablet 0   • fluticasone (FLONASE) 50 MCG/ACT nasal spray SPRAY TWICE IN EACH NOSTRIL EVERY DAY FOR 30 DAYS. 16 mL 6   • levothyroxine (SYNTHROID, LEVOTHROID) 50 MCG tablet TAKE 1 TABLET BY MOUTH DAILY 90 tablet 1   • lisinopril (PRINIVIL,ZESTRIL) 20 MG tablet TAKE 1 TABLET BY MOUTH DAILY 90 tablet 0   • metoprolol tartrate (LOPRESSOR) 100 MG tablet Take 1 tablet by mouth 2 (Two) Times a Day. 180 tablet 1   • ondansetron (ZOFRAN) 8 MG tablet Take 1 tablet by mouth Every 8 (Eight) Hours As Needed for Nausea or Vomiting. 20 tablet 1   • oxybutynin (DITROPAN) 5 MG tablet TAKE 1 TABLET BY MOUTH TWICE DAILY 180 tablet 1   • oxyCODONE-acetaminophen (Percocet) 7.5-325 MG per tablet Take 1 tablet by mouth Every 8 (Eight) Hours As Needed for Severe Pain . 75 tablet 0   • sertraline (ZOLOFT) 25 MG tablet TAKE 1 TABLET BY MOUTH DAILY 90 tablet 3   • vitamin D (ERGOCALCIFEROL) 1.25 MG (40954 UT) capsule capsule Take 1 capsule by mouth 1 (One) Time Per Week. 13 capsule 3     Facility-Administered Medications Prior to Visit   Medication Dose Route Frequency Provider Last Rate Last Admin   • cyanocobalamin injection 1,000 mcg  1,000 mcg Intramuscular Q28 Days Donna Forte MD   1,000 mcg at 12/09/20 1253     No orders of the defined types were placed in this encounter.    [unfilled]  There are no discontinued medications.      No follow-ups on file.

## 2021-01-11 NOTE — PROGRESS NOTES
"Chief Complaint  Valvular Heart Disease and Follow-up    Subjective          Kaylin Ojeda presents to [facility] for   History of Present Illness  Pt here for follow up.  She says she is feeling a little better.  She had an iron infusion to help her anemia.  She was on the schedule to have heart surgery on the 20th. She says she is very nervous about having major surgery with all of her medical conditions.  She is worried that there is a no visitor policy in the hospital with covid.  She says she would rather pass away of natural causes than suffer after surgery or be alone in a nursing home.  She has f/u with hematology on 1/21.    She c/o frequency and cloudy urine.  She left a urine sample today.  No fever.  No n/v/d.     She says her back pain and lack of sleep are her biggest issues.  She is already on percocet that she takes 1-2 times a day and she takes clonazepam at night for anxiety/sleep.  She wants something to take in between the percocet.  She has taken a few tylenol PRN.      She has a friend of her daughters (in her 50's) now living with her and the pt thinks it has helped her to not be alone.     Objective   Vital Signs:   /76 (BP Location: Right arm, Patient Position: Sitting, Cuff Size: Adult)   Pulse 68   Temp 97.8 °F (36.6 °C) (Temporal)   Resp 16   Ht 156 cm (61.42\")   Wt 46.4 kg (102 lb 3.2 oz)   SpO2 (!) 74% Comment: no trouble breathing  BMI 19.05 kg/m²     Physical Exam  Vitals signs and nursing note reviewed.   Constitutional:       General: She is not in acute distress.     Appearance: She is well-developed.      Comments: Very thin, chronically ill appearing   HENT:      Head: Normocephalic and atraumatic.      Right Ear: External ear normal.      Left Ear: External ear normal.      Nose: Nose normal.   Eyes:      Conjunctiva/sclera: Conjunctivae normal.      Pupils: Pupils are equal, round, and reactive to light.   Neck:      " Musculoskeletal: Normal range of motion and neck supple.   Cardiovascular:      Rate and Rhythm: Normal rate and regular rhythm.      Heart sounds: Murmur present.   Pulmonary:      Effort: Pulmonary effort is normal. No respiratory distress.      Breath sounds: Normal breath sounds. No wheezing.   Musculoskeletal: Normal range of motion.      Comments: Normal gait   Skin:     General: Skin is warm and dry.   Neurological:      Mental Status: She is alert and oriented to person, place, and time.   Psychiatric:         Behavior: Behavior normal.         Thought Content: Thought content normal.         Judgment: Judgment normal.        Result Review :   The following data was reviewed by: Donna Forte MD on 01/11/2021:  Common labs    Common Labsle 11/17/20 12/9/20 12/9/20 12/9/20 12/9/20 12/23/20     1230 1230 1230 1230    Glucose  90       BUN 19 22       Creatinine 0.86 0.99       eGFR Non African Am 64 55 (A)       eGFR African Am  63       Sodium 134 (A) 138       Potassium 4.7 5.6 (A)       Chloride 102 101       Calcium 8.1 (A) 9.4       Total Protein  7.8       Albumin  4.8 (A)       Total Bilirubin  0.2       Alkaline Phosphatase  111       AST (SGOT)  21       ALT (SGPT)  16       WBC   7.2   8.41   Hemoglobin   10.0 (A)   9.4 (A)   Hematocrit   32.6 (A)   30.3 (A)   Platelets   348   298   Total Cholesterol    121     Triglycerides    69     HDL Cholesterol    56     LDL Cholesterol     51     Hemoglobin A1C     6.2 (A)    (A) Abnormal value       Comments are available for some flowsheets but are not being displayed.           ua - small leukocytes, no nitrites.  No blood.     Data reviewed: Consultant notes dr. hairston and dr. king          Assessment and Plan    Problem List Items Addressed This Visit        Cardiac and Vasculature    Valvular heart disease - Primary - pt wants to delay surgery. Discussed concerns especially in light of covid.    Overview     5/2015: mild-mod AI, mod MR, mod-severe  TR         Coronary artery disease involving native coronary artery of native heart with angina pectoris (CMS/HCC)    Overview     Added automatically from request for surgery [id number]            Hematology and Neoplasia    Iron deficiency anemia - s/p iron infusion.  Has f/u with heme/onc on 1/21 for labs and ov.      Other Visit Diagnoses     Lumbar back pain     - pt high risk for increasing opioid dosing since she is also on clonazepam. She can take up to 2 tylenol per day in addition to her percocet.      Urinary frequency     - small leukocytes but ua otherwise normal.  Will send urine culture and then treat accordingly.    Relevant Orders    POC Urinalysis Dipstick, Automated       Hyperkalemia - recheck bmp today           Follow Up   Return in about 2 months (around 3/11/2021).  Patient was given instructions and counseling regarding her condition or for health maintenance advice. Please see specific information pulled into the AVS if appropriate.

## 2021-01-12 LAB
BUN SERPL-MCNC: 28 MG/DL (ref 8–27)
BUN/CREAT SERPL: 36 (ref 12–28)
CALCIUM SERPL-MCNC: 9 MG/DL (ref 8.7–10.3)
CHLORIDE SERPL-SCNC: 102 MMOL/L (ref 96–106)
CO2 SERPL-SCNC: 23 MMOL/L (ref 20–29)
CREAT SERPL-MCNC: 0.78 MG/DL (ref 0.57–1)
GLUCOSE SERPL-MCNC: 90 MG/DL (ref 65–99)
POTASSIUM SERPL-SCNC: 5 MMOL/L (ref 3.5–5.2)
SODIUM SERPL-SCNC: 138 MMOL/L (ref 134–144)

## 2021-01-13 LAB
BACTERIA UR CULT: ABNORMAL
BACTERIA UR CULT: ABNORMAL
OTHER ANTIBIOTIC SUSC ISLT: ABNORMAL

## 2021-01-13 RX ORDER — CEPHALEXIN 500 MG/1
500 CAPSULE ORAL 2 TIMES DAILY
Qty: 14 CAPSULE | Refills: 0 | Status: SHIPPED | OUTPATIENT
Start: 2021-01-13 | End: 2021-01-20

## 2021-01-14 DIAGNOSIS — G89.29 CHRONIC LOW BACK PAIN WITHOUT SCIATICA, UNSPECIFIED BACK PAIN LATERALITY: ICD-10-CM

## 2021-01-14 DIAGNOSIS — F41.9 ANXIETY AND DEPRESSION: ICD-10-CM

## 2021-01-14 DIAGNOSIS — M54.50 LUMBAR BACK PAIN: ICD-10-CM

## 2021-01-14 DIAGNOSIS — I10 ESSENTIAL HYPERTENSION: ICD-10-CM

## 2021-01-14 DIAGNOSIS — F32.A ANXIETY AND DEPRESSION: ICD-10-CM

## 2021-01-14 DIAGNOSIS — M54.50 CHRONIC LOW BACK PAIN WITHOUT SCIATICA, UNSPECIFIED BACK PAIN LATERALITY: ICD-10-CM

## 2021-01-14 NOTE — TELEPHONE ENCOUNTER
Caller: Kaylin Ojeda    Relationship: Self    Best call back number: [phone]    Medication needed:   Requested Prescriptions     Pending Prescriptions Disp Refills   • cephalexin (Keflex) 500 MG capsule 14 capsule 0     Sig: Take 1 capsule by mouth 2 (Two) Times a Day for 7 days.   • oxyCODONE-acetaminophen (Percocet) 7.5-325 MG per tablet 75 tablet 0     Sig: Take 1 tablet by mouth Every 8 (Eight) Hours As Needed for Severe Pain .   • clonazePAM (KlonoPIN) 0.5 MG tablet 30 tablet 0     Sig: Take 1 tablet by mouth At Night As Needed for Anxiety.       When do you need the refill by: 1/15/2021    What details did the patient provide when requesting the medication: PATIENT ADVISED SHE HAS 2 DAYS LEFT    Does the patient have less than a 3 day supply:  [x] Yes  [] No    What is the patient's preferred pharmacy: [id number] - [place] [address] AT [facility] & [address] - [phone]  - [phone] FX

## 2021-01-15 RX ORDER — OXYCODONE AND ACETAMINOPHEN 7.5; 325 MG/1; MG/1
1 TABLET ORAL EVERY 8 HOURS PRN
Qty: 75 TABLET | Refills: 0 | Status: SHIPPED | OUTPATIENT
Start: 2021-01-15 | End: 2021-02-16 | Stop reason: SDUPTHER

## 2021-01-15 RX ORDER — CEPHALEXIN 500 MG/1
500 CAPSULE ORAL 2 TIMES DAILY
Qty: 14 CAPSULE | Refills: 0 | OUTPATIENT
Start: 2021-01-15 | End: 2021-01-22

## 2021-01-15 RX ORDER — LISINOPRIL 20 MG/1
20 TABLET ORAL DAILY
Qty: 90 TABLET | Refills: 0 | Status: SHIPPED | OUTPATIENT
Start: 2021-01-15 | End: 2021-04-26

## 2021-01-15 RX ORDER — CLONAZEPAM 0.5 MG/1
0.5 TABLET ORAL NIGHTLY PRN
Qty: 30 TABLET | Refills: 0 | Status: SHIPPED | OUTPATIENT
Start: 2021-01-15 | End: 2021-02-16 | Stop reason: SDUPTHER

## 2021-01-18 ENCOUNTER — TELEPHONE (OUTPATIENT)
Dept: INTERNAL MEDICINE | Facility: CLINIC | Age: 81
End: 2021-01-18

## 2021-01-18 ENCOUNTER — APPOINTMENT (OUTPATIENT)
Dept: CARDIOLOGY | Facility: HOSPITAL | Age: 81
End: 2021-01-18

## 2021-01-18 ENCOUNTER — APPOINTMENT (OUTPATIENT)
Dept: PREADMISSION TESTING | Facility: HOSPITAL | Age: 81
End: 2021-01-18

## 2021-01-18 NOTE — TELEPHONE ENCOUNTER
Called [facility] pharmacy and spoke with Veda who stated that the patients medications did go through and should be ready for  today. Called patient and left voicemail with information.

## 2021-01-18 NOTE — TELEPHONE ENCOUNTER
Patient recently changed health insurance to [employer]. Pharmacy told her that in order to prescribe Oxycodone and Clonazepam, Dr Forte will need to contact [facility] to provide reasons why patient needs to have those medications prescribed.    Please advise    [phone]

## 2021-01-19 NOTE — TELEPHONE ENCOUNTER
JIGNESH HOLLINS NEEDS ADDITIONAL CLINICAL QUESTIONS ANSWERED FOR PRIOR AUTH. ON OXYCODONE REFERENCE NUMBER [id number]

## 2021-01-21 ENCOUNTER — OFFICE VISIT (OUTPATIENT)
Dept: ONCOLOGY | Facility: CLINIC | Age: 81
End: 2021-01-21

## 2021-01-21 ENCOUNTER — LAB (OUTPATIENT)
Dept: LAB | Facility: HOSPITAL | Age: 81
End: 2021-01-21

## 2021-01-21 VITALS
DIASTOLIC BLOOD PRESSURE: 58 MMHG | RESPIRATION RATE: 16 BRPM | SYSTOLIC BLOOD PRESSURE: 111 MMHG | WEIGHT: 102.2 LBS | OXYGEN SATURATION: 97 % | HEART RATE: 57 BPM | TEMPERATURE: 98 F | BODY MASS INDEX: 19.3 KG/M2 | HEIGHT: 61 IN

## 2021-01-21 DIAGNOSIS — D50.8 OTHER IRON DEFICIENCY ANEMIA: Primary | ICD-10-CM

## 2021-01-21 DIAGNOSIS — D50.8 OTHER IRON DEFICIENCY ANEMIA: ICD-10-CM

## 2021-01-21 LAB
BASOPHILS # BLD AUTO: 0.09 10*3/MM3 (ref 0–0.2)
BASOPHILS NFR BLD AUTO: 1.3 % (ref 0–1.5)
DEPRECATED RDW RBC AUTO: ABNORMAL FL
EOSINOPHIL # BLD AUTO: 0.11 10*3/MM3 (ref 0–0.4)
EOSINOPHIL NFR BLD AUTO: 1.6 % (ref 0.3–6.2)
ERYTHROCYTE [DISTWIDTH] IN BLOOD BY AUTOMATED COUNT: ABNORMAL %
HCT VFR BLD AUTO: 37.6 % (ref 34–46.6)
HGB BLD-MCNC: 11.8 G/DL (ref 12–15.9)
IMM GRANULOCYTES # BLD AUTO: 0.02 10*3/MM3 (ref 0–0.05)
IMM GRANULOCYTES NFR BLD AUTO: 0.3 % (ref 0–0.5)
LYMPHOCYTES # BLD AUTO: 1.68 10*3/MM3 (ref 0.7–3.1)
LYMPHOCYTES NFR BLD AUTO: 24.6 % (ref 19.6–45.3)
MCH RBC QN AUTO: 28.9 PG (ref 26.6–33)
MCHC RBC AUTO-ENTMCNC: 31.4 G/DL (ref 31.5–35.7)
MCV RBC AUTO: 91.9 FL (ref 79–97)
MONOCYTES # BLD AUTO: 0.57 10*3/MM3 (ref 0.1–0.9)
MONOCYTES NFR BLD AUTO: 8.3 % (ref 5–12)
NEUTROPHILS NFR BLD AUTO: 4.36 10*3/MM3 (ref 1.7–7)
NEUTROPHILS NFR BLD AUTO: 63.9 % (ref 42.7–76)
NRBC BLD AUTO-RTO: 0 /100 WBC (ref 0–0.2)
PLATELET # BLD AUTO: 234 10*3/MM3 (ref 140–450)
PMV BLD AUTO: 9.7 FL (ref 6–12)
RBC # BLD AUTO: 4.09 10*6/MM3 (ref 3.77–5.28)
WBC # BLD AUTO: 6.83 10*3/MM3 (ref 3.4–10.8)

## 2021-01-21 PROCEDURE — 85025 COMPLETE CBC W/AUTO DIFF WBC: CPT

## 2021-01-21 PROCEDURE — 36415 COLL VENOUS BLD VENIPUNCTURE: CPT

## 2021-01-21 PROCEDURE — 99212 OFFICE O/P EST SF 10 MIN: CPT | Performed by: INTERNAL MEDICINE

## 2021-01-21 NOTE — PROGRESS NOTES
Subjective     REASON FOR CONSULTATION:  anemia  Provide an opinion on any further workup or treatment                             REQUESTING PHYSICIAN:  Dr. Forte    RECORDS OBTAINED:  Records of the patients history including those obtained from the referring provider were reviewed and summarized in detail.    HISTORY OF PRESENT ILLNESS:  The patient is a 80 y.o. year old female who is here for an opinion about the above issue.    History of Present Illness   This is a very pleasant 80-year-old lady with multiple medical comorbidities including significant coronary artery and valvular heart disease.  The patient is referred for evaluation of anemia.  Reviewing her records, the patient has developed anemia that has worsened over the past 1 year.  Her CBC on 12/4/2019 showed a hemoglobin of 12.3 with an MCV 89.2.  A CBC in July 2020 showed a decline in the hemoglobin to 10.6.  Her current hemoglobin is 9.4 with more microcytic, hypochromic red blood cell indices.  The patient has history of B12 deficiency but has not been on B12 in quite some time;. her B12 level on 12/9/2020 was adequate greater than 2000.  The folic acid was normal at 10.0.  Her iron profile was suggestive of iron deficiency with an iron saturation 4% and an elevated TIBC 500.  Her erythropoietin level was elevated 110, and her CMP showed a normal serum creatinine 0.99, normal total protein 7.8 with a normal globulin.  Her white blood cell and platelet counts are persistently normal.    The patient complains of significant fatigue, global weakness and spends much of her time now in bed.  She has depression after the death of her  and daughter.  She is scheduled for cardiac surgery first of the year but is unsure if she wants to proceed.  She denies bright red blood per rectum or melanotic stool.  She is on anticoagulation.    The patient was seen on 12/23/2020 with a hemoglobin 9.4, ferritin 20, and iron saturation 5%.  She was given 2  doses of Injectafer completed on 1/7/2021.  She has noticed significant improvement in her stamina and weakness since receiving IV iron.  Her hemoglobin has improved to 11.8 today.    Past Medical History:   Diagnosis Date   • Abdominal pain, epigastric     Chronic, unclear cause, improved   • Anorexia    • Anxiety    • Arthritis    • CAD (coronary artery disease)     Cath 5/2015: nonobstructive LAD/RCA disease, 90% mid LCx s/p [id number] Xience   • Cellulitis of leg    • Cervical disc disease    • Chronic fatigue    • Colitis    • COPD (chronic obstructive pulmonary disease) (CMS/[facility]ly [facility])    • Depression    • Erosive gastritis    • Fibromyalgia    • Frequency of urination 6/9/2017   • Gastritis    • Hypercholesterolemia 2/2/2016   • Hyperglycemia    • Hypertension     History of refractory hypertension which improved significantly   • Insomnia    • Leukocytes in urine    • Lower back pain    • Mediastinal lymphadenopathy    • 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HCC)    • Mononucleosis    • Occlusion and stenosis of carotid artery    • Onychomycosis    • Orbit fracture (CMS/[facility]ly [facility])    • PAF (paroxysmal atrial fibrillation) (CMS/Formerly [facility])    • Peripheral arterial disease (CMS/Formerly [facility])     Significant (carotid, subclavian, lower extremities), with claudication, medical therapy only   • Pernicious anemia    • Prediabetes    • Renal artery stenosis (CMS/HCC)     unilateral, with renal atrophy (no intervention required)   • Renal calculi    • Sinus bradycardia     mild, asymptomatic   • TIA (transient ischemic attack)    • UTI (urinary tract infection)    • Valvular heart disease     5/2015: mild-mod AI, mod MR, mod-severe TR.  6/2017: mild AI, mild MR, mod TR   • Vision problem    • Vitamin B 12 deficiency         Past Surgical History:   Procedure Laterality Date   • APPENDECTOMY     • BREAST BIOPSY Left     20+ yrs ago   • BREAST SURGERY     • CARDIAC CATHETERIZATION  05/2015    nonobs LAD/RCA disease, 90% mid LCx s/p [id number] Xience   •  CARDIAC CATHETERIZATION N/A 10/28/2020    Procedure: Right and Left Heart Cath;  Surgeon: Opal Contreras MD;  Location:  [facility];  Service: Cardiovascular;  Laterality: N/A;  for cardiac testing prior to possible valve surgery per Dr. Mai   • CARDIAC CATHETERIZATION N/A 10/28/2020    Procedure: Coronary angiography;  Surgeon: Opal Contreras MD;  Location:  [facility];  Service: Cardiovascular;  Laterality: N/A;   • CERVICAL FUSION  1997   • CHOLECYSTECTOMY     • CORONARY STENT PLACEMENT     • HYSTERECTOMY  1995   • KNEE SURGERY Right 2005   • KNEE SURGERY Left 1998        Current Outpatient Medications on File Prior to Visit   Medication Sig Dispense Refill   • albuterol (PROVENTIL) (2.5 MG/3ML) 0.083% nebulizer solution Take 2.5 mg by nebulization Every 4 (Four) Hours As Needed for Wheezing or Shortness of Air. 120 vial 5   • albuterol sulfate  (90 Base) MCG/ACT inhaler Inhale 2 puffs Every 4 (Four) Hours As Needed for Wheezing. 18 g 11   • amLODIPine (NORVASC) 10 MG tablet TAKE 1 TABLET BY MOUTH DAILY 30 tablet 2   • apixaban (Eliquis) 5 MG tablet tablet Take 1 tablet by mouth Every 12 (Twelve) Hours. Resume on 11/2/2020 60 tablet 1   • atorvastatin (LIPITOR) 10 MG tablet TAKE 1 TABLET BY MOUTH DAILY 90 tablet 3   • budesonide-formoterol (SYMBICORT) 160-4.5 MCG/ACT inhaler Inhale 2 puffs 2 (Two) Times a Day. 1 inhaler 12   • clonazePAM (KlonoPIN) 0.5 MG tablet Take 1 tablet by mouth At Night As Needed for Anxiety. 30 tablet 0   • fluticasone (FLONASE) 50 MCG/ACT nasal spray SPRAY TWICE IN EACH NOSTRIL EVERY DAY FOR 30 DAYS. 16 mL 6   • levothyroxine (SYNTHROID, LEVOTHROID) 50 MCG tablet TAKE 1 TABLET BY MOUTH DAILY 90 tablet 1   • lisinopril (PRINIVIL,ZESTRIL) 20 MG tablet TAKE 1 TABLET BY MOUTH DAILY 90 tablet 0   • metoprolol tartrate (LOPRESSOR) 100 MG tablet Take 1 tablet by mouth 2 (Two) Times a Day. 180 tablet 1   • ondansetron (ZOFRAN) 8 MG tablet Take 1  "tablet by mouth Every 8 (Eight) Hours As Needed for Nausea or Vomiting. 20 tablet 1   • oxybutynin (DITROPAN) 5 MG tablet TAKE 1 TABLET BY MOUTH TWICE DAILY 180 tablet 1   • oxyCODONE-acetaminophen (Percocet) 7.5-325 MG per tablet Take 1 tablet by mouth Every 8 (Eight) Hours As Needed for Severe Pain . 75 tablet 0   • sertraline (ZOLOFT) 25 MG tablet TAKE 1 TABLET BY MOUTH DAILY 90 tablet 3   • vitamin D (ERGOCALCIFEROL) 1.25 MG (80579 UT) capsule capsule Take 1 capsule by mouth 1 (One) Time Per Week. 13 capsule 3   • [] cephalexin (Keflex) 500 MG capsule Take 1 capsule by mouth 2 (Two) Times a Day for 7 days. 14 capsule 0     Current Facility-Administered Medications on File Prior to Visit   Medication Dose Route Frequency Provider Last Rate Last Admin   • cyanocobalamin injection 1,000 mcg  1,000 mcg Intramuscular Q28 Days Donna Forte MD   1,000 mcg at 20 1253        ALLERGIES:    Allergies   Allergen Reactions   • Aleve [Naproxen Sodium] Shortness Of Breath   • Codeine Unknown - Low Severity     hyperactivity   • Ibuprofen Unknown - Low Severity     unk   • Sulfa Antibiotics Unknown - Low Severity     \"I can't remember\"        Social History     Socioeconomic History   • Marital status:      Spouse name: Willie   • Number of children: 3   • Years of education: Some College   • Highest education level: Not on file   Occupational History     Employer: RETIRED   Tobacco Use   • Smoking status: Current Some Day Smoker     Packs/day: 0.50     Types: Cigarettes   • Smokeless tobacco: Never Used   • Tobacco comment: trying to quit, pt states she has basically quit but will still have one when she stressed   Substance and Sexual Activity   • Alcohol use: Not Currently     Frequency: Monthly or less     Comment: OCCASIONAL/ TWICE A YEAR.    • Drug use: No   • Sexual activity: Defer        Family History   Problem Relation Age of Onset   • Asthma Mother    • Emphysema Mother    • Graves' " "disease Mother    • Heart disease Mother    • Hypertension Mother    • Emphysema Father    • Hypertension Father    • Heart disease Father    • Kidney disease Sister    • Lupus Daughter         Systemic erythematosus   • Arthritis Other    • Crohn's disease Other    • Breast cancer Niece    • Breast cancer Maternal Cousin    • Breast cancer Maternal Cousin         Review of Systems   Constitutional: Positive for appetite change and fatigue. Negative for fever.   HENT: Negative.    Respiratory: Positive for shortness of breath. Negative for cough and wheezing.    Cardiovascular: Positive for chest pain and palpitations. Negative for leg swelling.   Gastrointestinal: Positive for constipation. Negative for diarrhea and nausea.   Genitourinary: Negative.    Musculoskeletal: Positive for arthralgias and back pain.   Skin: Negative.    Allergic/Immunologic: Negative.    Neurological: Positive for dizziness and weakness. Negative for light-headedness.   Hematological: Negative.    Psychiatric/Behavioral: Positive for decreased concentration and dysphoric mood.          Objective     Vitals:    01/21/21 1137   BP: 111/58   Pulse: 57   Resp: 16   Temp: 98 °F (36.7 °C)   TempSrc: Temporal   SpO2: 97%   Weight: 46.4 kg (102 lb 3.2 oz)   Height: 156 cm (61.42\")   PainSc: 0-No pain     Current Status 12/31/2020   ECOG score 0       Physical Exam    CONSTITUTIONAL: pleasant somewhat chronic ill-appearing elderly woman  HEENT: no icterus, no thrush, moist membranes  NECK: no jvd  LYMPH: no cervical or supraclavicular lad  CV: RRR, S1S2, + murmur  RESP: cta bilat, no wheezing, no rales  GI: soft, non-tender, no splenomegaly, +bs  MUSC: no edema, normal gait  NEURO: alert and oriented x3, normal strength  PSYCH: depressed mood/affect  Exam unchanged-1/21/2021 although she does look a little stronger today  RECENT LABS:  Hematology WBC   Date Value Ref Range Status   01/21/2021 6.83 3.40 - 10.80 10*3/mm3 Final   12/09/2020 7.2 3.4 - " 10.8 x10E3/uL Final     RBC   Date Value Ref Range Status   01/21/2021 4.09 3.77 - 5.28 10*6/mm3 Final   12/09/2020 4.00 3.77 - 5.28 x10E6/uL Final     Hemoglobin   Date Value Ref Range Status   01/21/2021 11.8 (L) 12.0 - 15.9 g/dL Final     Hematocrit   Date Value Ref Range Status   01/21/2021 37.6 34.0 - 46.6 % Final     Platelets   Date Value Ref Range Status   01/21/2021 234 140 - 450 10*3/mm3 Final          Assessment/Plan     1.  Worsening microcytic, hypochromic anemia secondary to iron deficiency.  She does not tolerate oral iron well because of GI upset and therefore received 2 doses of Injectafer completed 2 weeks ago.  She has had a significant improvement in her hemoglobin up to 11.8 today.    2.  History of B12 deficiency: Recent B12 level 12/9/2020 was adequate greater than 2000    3.  Significant cardiac disease in need of open heart surgery scheduled in January necessitating rapid improvement in her hemoglobin    Recommendations:  I recommended the patient keep up-to-date on colonoscopy and GI screening  I recommended she follow-up with her PCP with CBC, ferritin and iron profile every 3 to 4 months to monitor iron deficiency and anemia.  If her ferritin drops below 50 or iron saturation below 20% I would like to see her back and discuss further iron replacement.    Thank you for allowing me to participate in the patient's care.

## 2021-02-09 DIAGNOSIS — N39.41 URGE INCONTINENCE: ICD-10-CM

## 2021-02-09 RX ORDER — OXYBUTYNIN CHLORIDE 5 MG/1
TABLET ORAL
Qty: 180 TABLET | Refills: 1 | Status: SHIPPED | OUTPATIENT
Start: 2021-02-09 | End: 2021-03-29 | Stop reason: SDUPTHER

## 2021-02-16 DIAGNOSIS — F41.9 ANXIETY AND DEPRESSION: ICD-10-CM

## 2021-02-16 DIAGNOSIS — M54.50 CHRONIC LOW BACK PAIN WITHOUT SCIATICA, UNSPECIFIED BACK PAIN LATERALITY: ICD-10-CM

## 2021-02-16 DIAGNOSIS — M54.50 LUMBAR BACK PAIN: ICD-10-CM

## 2021-02-16 DIAGNOSIS — G89.29 CHRONIC LOW BACK PAIN WITHOUT SCIATICA, UNSPECIFIED BACK PAIN LATERALITY: ICD-10-CM

## 2021-02-16 DIAGNOSIS — F32.A ANXIETY AND DEPRESSION: ICD-10-CM

## 2021-02-16 RX ORDER — CLONAZEPAM 0.5 MG/1
0.5 TABLET ORAL NIGHTLY PRN
Qty: 30 TABLET | Refills: 0 | Status: SHIPPED | OUTPATIENT
Start: 2021-02-16 | End: 2021-03-16 | Stop reason: SDUPTHER

## 2021-02-16 RX ORDER — OXYCODONE AND ACETAMINOPHEN 7.5; 325 MG/1; MG/1
1 TABLET ORAL EVERY 8 HOURS PRN
Qty: 75 TABLET | Refills: 0 | Status: SHIPPED | OUTPATIENT
Start: 2021-02-16 | End: 2021-03-16 | Stop reason: SDUPTHER

## 2021-02-16 NOTE — TELEPHONE ENCOUNTER
Caller: Kaylin Ojeda    Relationship: Self    Best call back number: [phone]    Medication needed:   Requested Prescriptions     Pending Prescriptions Disp Refills   • oxyCODONE-acetaminophen (Percocet) 7.5-325 MG per tablet 75 tablet 0     Sig: Take 1 tablet by mouth Every 8 (Eight) Hours As Needed for Severe Pain .   • clonazePAM (KlonoPIN) 0.5 MG tablet 30 tablet 0     Sig: Take 1 tablet by mouth At Night As Needed for Anxiety.       When do you need the refill by: ASAP    What details did the patient provide when requesting the medication: PATIENT OUT OF MEDICINE.    Does the patient have less than a 3 day supply:  [x] Yes  [] No    What is the patient's preferred pharmacy: [id number] [place] [address] [address] [facility] [address] [facility] [address] - [phone] PH - [phone] FX

## 2021-03-02 ENCOUNTER — OFFICE VISIT (OUTPATIENT)
Dept: CARDIOLOGY | Facility: CLINIC | Age: 81
End: 2021-03-02

## 2021-03-02 VITALS
HEART RATE: 77 BPM | SYSTOLIC BLOOD PRESSURE: 130 MMHG | BODY MASS INDEX: 18.92 KG/M2 | HEIGHT: 61 IN | DIASTOLIC BLOOD PRESSURE: 64 MMHG | WEIGHT: 100.2 LBS

## 2021-03-02 DIAGNOSIS — J42 CHRONIC BRONCHITIS, UNSPECIFIED CHRONIC BRONCHITIS TYPE (HCC): ICD-10-CM

## 2021-03-02 DIAGNOSIS — I77.9 PERIPHERAL ARTERIAL OCCLUSIVE DISEASE (HCC): ICD-10-CM

## 2021-03-02 DIAGNOSIS — I38 VALVULAR HEART DISEASE: ICD-10-CM

## 2021-03-02 DIAGNOSIS — I48.0 PAF (PAROXYSMAL ATRIAL FIBRILLATION) (HCC): Primary | ICD-10-CM

## 2021-03-02 DIAGNOSIS — I10 ESSENTIAL HYPERTENSION: ICD-10-CM

## 2021-03-02 DIAGNOSIS — R63.6 UNDERWEIGHT: ICD-10-CM

## 2021-03-02 DIAGNOSIS — I25.119 CORONARY ARTERY DISEASE INVOLVING NATIVE CORONARY ARTERY OF NATIVE HEART WITH ANGINA PECTORIS (HCC): ICD-10-CM

## 2021-03-02 PROCEDURE — 93000 ELECTROCARDIOGRAM COMPLETE: CPT | Performed by: INTERNAL MEDICINE

## 2021-03-02 PROCEDURE — 99214 OFFICE O/P EST MOD 30 MIN: CPT | Performed by: INTERNAL MEDICINE

## 2021-03-02 NOTE — PROGRESS NOTES
Date of Office Visit: 2021  Encounter Provider: Mo Calero MD  Place of Service: [facility] CARDIOLOGY  Patient Name: Kaylin Ojeda  :1940    Chief Complaint   Patient presents with   • Atrial Fibrillation   :     HPI: Kaylin Ojeda is a 81 y.o. female who presents today to follow up. I have reviewed prior notes and there are no changes except for any new updates described below. I have also reviewed any information entered into the medical record by the patient or by ancillary staff.     She is a chronically ill woman with previous history of refractory hypertension and severe peripheral vascular disease. In May 2015, she was found to have single-vessel coronary artery disease of the circumflex and had a stent placed. Over the next two years, I had to progressively cut back on her anti-hypertensives due to low blood pressure (presumably from weight loss).      In 2017 she developed anginal chest pressure and was found to have rapid atrial fibrillation.  She cardioverted on her own and her metoprolol dose was increased.  An echo showed normal LV systolic function, moderate TR, and mild AI/MR.  She ruled out for ACS.  She was placed on apixaban. Once she was on apixaban, she had to stop aspirin as it caused nosebleeds.    I saw her in May 2019 for palpitations and chest discomfort.  She was under a tremendous amount of stress and was in a very unfortunately living situation. I ordered an echo, which showed normal LVSF, EF 60%, mild-moderate AI, and severe MR/TR.  I did not feel she was a candidate for further evaluation due to her comorbidities.    In late , she requested to be evaluated by Dr Mai.  He ordered a cath; this showed a patent LCx stent, and moderate disease of the distal LAD.  He ordered a RICKY, which showed normal LVSF, moderate-severe AI, moderate MR, and mild-moderate TR.  PFTs showed severely reduced DLCO; the spirometry was reported as  normal, but the loops were poor quality.  A previous CT of the chest had reported severe emphysematous changes.  A CTA of the chest neck revealed left subclavian occlusion, severe disease of the ostial left vertebral artery, 53% stenosis of the right ICA, and 60% disease of the left ICA.      Ultimately, she decided against surgery, as she felt it would be too high risk.    She's actually doing pretty well. She stopped metoprolol and amlodipine due to low blood pressure, and atorvastatin due to nausea/vomiting.  Her symptoms improved afterwards.  She is living alone and doing well.  She has stable chronic exertional dyspnea, and stable claudication. She is not having chest pain, palpitations, lightheadedness, or syncope. She continues to smoke cigarettes.  Past Medical History:   Diagnosis Date   • Abdominal pain, epigastric     Chronic, unclear cause, improved   • Anorexia    • Anxiety    • Arthritis    • CAD (coronary artery disease)     Cath 5/2015: nonobstructive LAD/RCA disease, 90% mid LCx s/p 2.01i23cg Xience.  Cath 1/2021: 50-60% prox LAD/70-80% distal LAD, patent Cx stent, 20% RCA   • Cellulitis of leg    • Cervical disc disease    • Chronic fatigue    • Colitis    • COPD (chronic obstructive pulmonary disease) (CMS/HCC)    • Depression    • Erosive gastritis    • Fibromyalgia    • Frequency of urination 6/9/2017   • Gastritis    • Hypercholesterolemia 2/2/2016   • Hyperglycemia    • Hypertension     History of refractory hypertension which improved significantly   • Insomnia    • Leukocytes in urine    • Lower back pain    • Mediastinal lymphadenopathy    • 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HCC)    • Mononucleosis    • Occlusion and stenosis of carotid artery    • Onychomycosis    • Orbit fracture (CMS/HCC)    • PAF (paroxysmal atrial fibrillation) (CMS/HCC)    • Peripheral arterial disease (CMS/HCC)     Significant (carotid, subclavian, renal arteries, lower extremities), with claudication, medical therapy  only   • Pernicious anemia    • Prediabetes    • Renal artery stenosis (CMS/HCC)     unilateral, with renal atrophy (no intervention required)   • Renal calculi    • Sinus bradycardia     mild, asymptomatic   • TIA (transient ischemic attack)    • UTI (urinary tract infection)    • Valvular heart disease     1/2021: mod-severe AI, mod MR, mild-mod TR   • Vision problem    • Vitamin B 12 deficiency        Past Surgical History:   Procedure Laterality Date   • APPENDECTOMY     • BREAST BIOPSY Left     20+ yrs ago   • BREAST SURGERY     • CARDIAC CATHETERIZATION  05/2015    nonobs LAD/RCA disease, 90% mid LCx s/p [id number] Xience   • CARDIAC CATHETERIZATION N/A 10/28/2020    Procedure: Right and Left Heart Cath;  Surgeon: Opal Contreras MD;  Location:  [facility];  Service: Cardiovascular;  Laterality: N/A;  for cardiac testing prior to possible valve surgery per Dr. Mai   • CARDIAC CATHETERIZATION N/A 10/28/2020    Procedure: Coronary angiography;  Surgeon: Opal Contreras MD;  Location:  [facility];  Service: Cardiovascular;  Laterality: N/A;   • CERVICAL FUSION  1997   • CHOLECYSTECTOMY     • CORONARY STENT PLACEMENT     • HYSTERECTOMY  1995   • KNEE SURGERY Right 2005   • KNEE SURGERY Left 1998       Social History     Socioeconomic History   • Marital status:      Spouse name: Willie   • Number of children: 3   • Years of education: Some College   • Highest education level: Not on file   Occupational History     Employer: RETIRED   Tobacco Use   • Smoking status: Current Some Day Smoker     Packs/day: 0.50     Types: Cigarettes   • Smokeless tobacco: Never Used   • Tobacco comment: trying to quit, pt states she has basically quit but will still have one when she stressed   Substance and Sexual Activity   • Alcohol use: Not Currently     Frequency: Monthly or less     Comment: OCCASIONAL/ TWICE A YEAR.    • Drug use: No   • Sexual activity: Defer   Social History  "Narrative    Pt drinks 2-3 cups of coffee daily and smokes 3 cigarettes a day.         Family History   Problem Relation Age of Onset   • Asthma Mother    • Emphysema Mother    • Graves' disease Mother    • Heart disease Mother    • Hypertension Mother    • Emphysema Father    • Hypertension Father    • Heart disease Father    • Kidney disease Sister    • Lupus Daughter         Systemic erythematosus   • Arthritis Other    • Crohn's disease Other    • Breast cancer Niece    • Breast cancer Maternal Cousin    • Breast cancer Maternal Cousin        Review of Systems   Constitution: Positive for malaise/fatigue.   Cardiovascular: Positive for claudication and dyspnea on exertion. Negative for leg swelling.   Respiratory: Negative for shortness of breath.    Musculoskeletal: Positive for arthritis and back pain.   Neurological: Positive for focal weakness. Negative for dizziness and light-headedness.   Psychiatric/Behavioral: Positive for depression. The patient is nervous/anxious.    All other systems reviewed and are negative.      Allergies   Allergen Reactions   • Aleve [Naproxen Sodium] Shortness Of Breath   • Codeine Unknown - Low Severity     hyperactivity   • Ibuprofen Unknown - Low Severity     unk   • Sulfa Antibiotics Unknown - Low Severity     \"I can't remember\"         Current Outpatient Medications:   •  albuterol (PROVENTIL) (2.5 MG/3ML) 0.083% nebulizer solution, Take 2.5 mg by nebulization Every 4 (Four) Hours As Needed for Wheezing or Shortness of Air., Disp: 120 vial, Rfl: 5  •  albuterol sulfate  (90 Base) MCG/ACT inhaler, Inhale 2 puffs Every 4 (Four) Hours As Needed for Wheezing., Disp: 18 g, Rfl: 11  •  apixaban (Eliquis) 5 MG tablet tablet, Take 1 tablet by mouth Every 12 (Twelve) Hours. Resume on 11/2/2020, Disp: 60 tablet, Rfl: 1  •  clonazePAM (KlonoPIN) 0.5 MG tablet, Take 1 tablet by mouth At Night As Needed for Anxiety., Disp: 30 tablet, Rfl: 0  •  fluticasone (FLONASE) 50 MCG/ACT " "nasal spray, SPRAY TWICE IN EACH NOSTRIL EVERY DAY FOR 30 DAYS., Disp: 16 mL, Rfl: 6  •  levothyroxine (SYNTHROID, LEVOTHROID) 50 MCG tablet, TAKE 1 TABLET BY MOUTH DAILY, Disp: 90 tablet, Rfl: 1  •  lisinopril (PRINIVIL,ZESTRIL) 20 MG tablet, TAKE 1 TABLET BY MOUTH DAILY, Disp: 90 tablet, Rfl: 0  •  ondansetron (ZOFRAN) 8 MG tablet, Take 1 tablet by mouth Every 8 (Eight) Hours As Needed for Nausea or Vomiting., Disp: 20 tablet, Rfl: 1  •  oxybutynin (DITROPAN) 5 MG tablet, TAKE 1 TABLET BY MOUTH TWICE DAILY, Disp: 180 tablet, Rfl: 1  •  oxyCODONE-acetaminophen (Percocet) 7.5-325 MG per tablet, Take 1 tablet by mouth Every 8 (Eight) Hours As Needed for Severe Pain ., Disp: 75 tablet, Rfl: 0  •  sertraline (ZOLOFT) 25 MG tablet, TAKE 1 TABLET BY MOUTH DAILY, Disp: 90 tablet, Rfl: 3  •  vitamin D (ERGOCALCIFEROL) 1.25 MG (99131 UT) capsule capsule, Take 1 capsule by mouth 1 (One) Time Per Week., Disp: 13 capsule, Rfl: 3  •  budesonide-formoterol (SYMBICORT) 160-4.5 MCG/ACT inhaler, Inhale 2 puffs 2 (Two) Times a Day., Disp: 1 inhaler, Rfl: 12    Current Facility-Administered Medications:   •  cyanocobalamin injection 1,000 mcg, 1,000 mcg, Intramuscular, Q28 Days, Donna Forte MD, 1,000 mcg at 12/09/20 1253     Objective:     Vitals:    03/02/21 1148   BP: 130/64   BP Location: Right arm   Pulse: 77   Weight: 45.5 kg (100 lb 3.2 oz)   Height: 156 cm (61.42\")     Body mass index is 18.67 kg/m².    Physical Exam   Constitutional: She is oriented to person, place, and time.   Frail, chronically ill appearing   HENT:   Head: Normocephalic.   Nose: Nose normal.   Masked   Eyes: Conjunctivae and EOM are normal.   Neck: Normal range of motion. No JVD present.   Cardiovascular: Normal rate and regular rhythm.  No extrasystoles are present. Exam reveals decreased pulses.   Murmur heard.   Systolic murmur is present with a grade of 2/6 radiating to the apex.   Diastolic murmur is present with a grade of 2/6 at the " upper left sternal border.  Pulses:       Carotid pulses are on the right side with bruit and on the left side with bruit.       Dorsalis pedis pulses are 1+ on the right side and 1+ on the left side.        Posterior tibial pulses are 1+ on the right side and 1+ on the left side.   Pulmonary/Chest: Effort normal. She has decreased breath sounds.   Diminished breath sounds   Abdominal: Soft. There is no abdominal tenderness.   Musculoskeletal: Normal range of motion.         General: No edema.   Neurological: She is alert and oriented to person, place, and time. No cranial nerve deficit.   Skin: Skin is warm and dry. No rash noted.   Psychiatric: She has a normal mood and affect. Her behavior is normal. Judgment and thought content normal.   Vitals reviewed.        ECG 12 Lead    Date/Time: 3/2/2021 12:05 PM  Performed by: Mo Calero MD  Authorized by: Mo Calero MD   Comparison: compared with previous ECG   Similar to previous ECG  Rhythm: sinus rhythm  Conduction: conduction normal  ST Segments: ST segments normal  T Waves: T waves normal  QRS axis: normal  Other findings: left ventricular hypertrophy    Clinical impression: abnormal EKG              Assessment:       Diagnosis Plan   1. PAF (paroxysmal atrial fibrillation) (CMS/HCC)     2. Coronary artery disease involving native coronary artery of native heart with angina pectoris (CMS/HCC)  ECG 12 Lead   3. Valvular heart disease     4. Essential hypertension     5. Peripheral arterial occlusive disease (CMS/HCC)     6. Chronic bronchitis, unspecified chronic bronchitis type (CMS/HCC)     7. Underweight          Plan:       1.  Atrial Fibrillation and Atrial Flutter  Assessment  • The patient has paroxysmal atrial fibrillation  • This is non-valvular in etiology  • The patient's CHADS2-VASc score is 5  • A [id number] score of 2 or more is considered a high risk for a thromboembolic event  • Apixaban prescribed    Plan  • Attempt to maintain sinus  rhythm  • Continue apixaban for antithrombotic therapy, bleeding issues discussed  • Continue beta blocker for rhythm control    When she turns 82, we will need to decrease apixaban to 2.5mg BID.    2.  Coronary Artery Disease  Assessment  • The patient has no angina  • She is no longer on aspirin or clopidogrel as she's anticoagulated and had bad epistaxis.  She denies angina.    Subjective - Objective  • There has been a previous stent procedure using THELMA 2015        3.  A RICKY in 2021 showed mod-severe AI, mod-MR, and mild-mod TR; she has seen Dr Mai but elected against surgery due to it's high mortality/morbidity. She's doing okay right now.     4.  Her BP is stable, especially for her frail size.    5. She has mesenteric disease, renal artery stenosis, carotid artery disease, a small AAA (3.8cm in May 2018), subclavian disease, and PAD.  She does not tolerate antiplatelets due to epistaxis, or statins due to GI side effects. Unfortunately she still smokes. She has not followed up with vascular as the office is too far from her house.     Sincerely,       Mo Calero MD

## 2021-03-10 ENCOUNTER — OFFICE VISIT (OUTPATIENT)
Dept: INTERNAL MEDICINE | Facility: CLINIC | Age: 81
End: 2021-03-10

## 2021-03-10 VITALS
DIASTOLIC BLOOD PRESSURE: 60 MMHG | BODY MASS INDEX: 18.88 KG/M2 | SYSTOLIC BLOOD PRESSURE: 120 MMHG | HEIGHT: 61 IN | TEMPERATURE: 97.3 F | RESPIRATION RATE: 16 BRPM | OXYGEN SATURATION: 98 % | WEIGHT: 100 LBS | HEART RATE: 113 BPM

## 2021-03-10 DIAGNOSIS — I25.119 CORONARY ARTERY DISEASE INVOLVING NATIVE CORONARY ARTERY OF NATIVE HEART WITH ANGINA PECTORIS (HCC): ICD-10-CM

## 2021-03-10 DIAGNOSIS — K55.1 MESENTERIC ARTERY STENOSIS (HCC): ICD-10-CM

## 2021-03-10 DIAGNOSIS — F41.8 MIXED ANXIETY DEPRESSIVE DISORDER: ICD-10-CM

## 2021-03-10 DIAGNOSIS — E53.8 B12 DEFICIENCY: ICD-10-CM

## 2021-03-10 DIAGNOSIS — R73.03 PREDIABETES: ICD-10-CM

## 2021-03-10 DIAGNOSIS — N18.31 STAGE 3A CHRONIC KIDNEY DISEASE (HCC): ICD-10-CM

## 2021-03-10 DIAGNOSIS — E03.9 HYPOTHYROIDISM (ACQUIRED): ICD-10-CM

## 2021-03-10 DIAGNOSIS — G89.29 CHRONIC LOW BACK PAIN WITHOUT SCIATICA, UNSPECIFIED BACK PAIN LATERALITY: ICD-10-CM

## 2021-03-10 DIAGNOSIS — M54.50 CHRONIC LOW BACK PAIN WITHOUT SCIATICA, UNSPECIFIED BACK PAIN LATERALITY: ICD-10-CM

## 2021-03-10 DIAGNOSIS — H53.9 VISION CHANGES: ICD-10-CM

## 2021-03-10 DIAGNOSIS — I48.0 PAF (PAROXYSMAL ATRIAL FIBRILLATION) (HCC): Primary | ICD-10-CM

## 2021-03-10 DIAGNOSIS — I77.9 PERIPHERAL ARTERIAL OCCLUSIVE DISEASE (HCC): ICD-10-CM

## 2021-03-10 DIAGNOSIS — I38 VALVULAR HEART DISEASE: ICD-10-CM

## 2021-03-10 PROCEDURE — 99214 OFFICE O/P EST MOD 30 MIN: CPT | Performed by: INTERNAL MEDICINE

## 2021-03-10 RX ORDER — CYANOCOBALAMIN 1000 UG/ML
1000 INJECTION, SOLUTION INTRAMUSCULAR; SUBCUTANEOUS
Status: DISCONTINUED | OUTPATIENT
Start: 2021-03-10 | End: 2022-01-01

## 2021-03-10 NOTE — PROGRESS NOTES
Kaylin Ojeda is a 81 y.o. female, who presents with a chief complaint of   Chief Complaint   Patient presents with   • 4 mo follow up           HPI   Pt here for follow up.  She appears to be doing better today.  She says she is feeling some better.  She says she has been able to walk further distances than before.  She stopped her metoprolol and amlodipine bc her bp was low.  She thought the atorvastatin was upsetting her stomach.  She stopped it and feels better.  She saw dr. Calero recently for cardiology follow up.  She has had both covid vaccines.      Pt has CAD and a-fib.  She is on eliquis and doing well.  She is off metoprolol and  today after walking to the room but was 80 on recheck.  She says she has more energy off the metoprolol.      htn - on lisinopril    Hypothyroidism - on synthroid.    Chronic back pain - she is on percocet.  She is interested in medical marijuana but this is not available for pain management at this time.     Chronic anemia - pt has had some iron infusions and feels better.  She has also been on b12    Depression/anxiety - on low dose sertraline and takes clonazepam PRN.      Pt c/o more drooping in left eye after a fall a while ago.  She would like to f/u with ophthalmology.    The following portions of the patient's history were reviewed and updated as appropriate: allergies, current medications, past family history, past medical history, past social history, past surgical history and problem list.    Allergies: Aleve [naproxen sodium], Codeine, Ibuprofen, and Sulfa antibiotics    Review of Systems   Constitutional: Negative.    HENT: Negative.    Eyes: Negative.    Respiratory: Negative.    Cardiovascular: Negative.    Gastrointestinal: Negative.    Endocrine: Negative.    Genitourinary: Negative.    Musculoskeletal: Negative.    Skin: Negative.    Allergic/Immunologic: Negative.    Neurological: Negative.    Hematological: Negative.    Psychiatric/Behavioral:  Negative.    All other systems reviewed and are negative.            Wt Readings from Last 3 Encounters:   03/10/21 45.4 kg (100 lb)   03/02/21 45.5 kg (100 lb 3.2 oz)   01/21/21 46.4 kg (102 lb 3.2 oz)     Temp Readings from Last 3 Encounters:   03/10/21 97.3 °F (36.3 °C) (Temporal)   01/21/21 98 °F (36.7 °C) (Temporal)   01/11/21 97.8 °F (36.6 °C) (Temporal)     BP Readings from Last 3 Encounters:   03/10/21 120/60   03/02/21 130/64   01/21/21 111/58     Pulse Readings from Last 3 Encounters:   03/10/21 113   03/02/21 77   01/21/21 57     Body mass index is 18.64 kg/m².  SpO2 Readings from Last 3 Encounters:   03/10/21 98%   01/21/21 97%   01/11/21 (!) 74%          Physical Exam  Vitals and nursing note reviewed.   Constitutional:       General: She is not in acute distress.     Appearance: She is well-developed.      Comments: Very thin elderly female   HENT:      Head: Normocephalic and atraumatic.      Right Ear: External ear normal.      Left Ear: External ear normal.      Nose: Nose normal.   Eyes:      Conjunctiva/sclera: Conjunctivae normal.      Pupils: Pupils are equal, round, and reactive to light.   Cardiovascular:      Rate and Rhythm: Normal rate and regular rhythm.      Heart sounds: Murmur present.   Pulmonary:      Effort: Pulmonary effort is normal. No respiratory distress.      Breath sounds: Normal breath sounds. No wheezing.   Musculoskeletal:         General: Normal range of motion.      Cervical back: Normal range of motion and neck supple.      Comments: Normal gait   Skin:     General: Skin is warm and dry.   Neurological:      Mental Status: She is alert and oriented to person, place, and time.   Psychiatric:         Mood and Affect: Mood normal.         Behavior: Behavior normal.         Thought Content: Thought content normal.         Judgment: Judgment normal.         Results for orders placed or performed in visit on 01/21/21   CBC Auto Differential    Specimen: Blood   Result Value  Ref Range    WBC 6.83 3.40 - 10.80 10*3/mm3    RBC 4.09 3.77 - 5.28 10*6/mm3    Hemoglobin 11.8 (L) 12.0 - 15.9 g/dL    Hematocrit 37.6 34.0 - 46.6 %    MCV 91.9 79.0 - 97.0 fL    MCH 28.9 26.6 - 33.0 pg    MCHC 31.4 (L) 31.5 - 35.7 g/dL    RDW      RDW-SD      MPV 9.7 6.0 - 12.0 fL    Platelets 234 140 - 450 10*3/mm3    Neutrophil % 63.9 42.7 - 76.0 %    Lymphocyte % 24.6 19.6 - 45.3 %    Monocyte % 8.3 5.0 - 12.0 %    Eosinophil % 1.6 0.3 - 6.2 %    Basophil % 1.3 0.0 - 1.5 %    Immature Grans % 0.3 0.0 - 0.5 %    Neutrophils, Absolute 4.36 1.70 - 7.00 10*3/mm3    Lymphocytes, Absolute 1.68 0.70 - 3.10 10*3/mm3    Monocytes, Absolute 0.57 0.10 - 0.90 10*3/mm3    Eosinophils, Absolute 0.11 0.00 - 0.40 10*3/mm3    Basophils, Absolute 0.09 0.00 - 0.20 10*3/mm3    Immature Grans, Absolute 0.02 0.00 - 0.05 10*3/mm3    nRBC 0.0 0.0 - 0.2 /100 WBC     Result Review :       Data reviewed: Consultant notes cardiology           Assessment and Plan    Diagnoses and all orders for this visit:    1. PAF (paroxysmal atrial fibrillation) (CMS/[facility]) (Primary)    2. Chronic low back pain without sciatica, unspecified back pain laterality    3. Valvular heart disease    4. Coronary artery disease involving native coronary artery of native heart with angina pectoris (CMS/[facility])    5. Prediabetes    6. Mixed anxiety depressive disorder    7. Peripheral arterial occlusive disease (CMS/[facility])    8. Stage 3a chronic kidney disease (CMS/HCC)    9. 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[facility])    10. Hypothyroidism (acquired)    11. B12 deficiency  -     cyanocobalamin injection 1,000 mcg    12. Vision changes  -     Ambulatory Referral to Ophthalmology       Continue current medications.  Encouraged healthy diet/exercise.  OV labs in    months.  Pt to call sooner if any other issues arise.      Patient's Body mass index is 18.64 kg/m². BMI is below normal parameters. Recommendations include: treating the underlying disease process.              Outpatient Medications Prior to Visit   Medication Sig Dispense Refill   • albuterol (PROVENTIL) (2.5 MG/3ML) 0.083% nebulizer solution Take 2.5 mg by nebulization Every 4 (Four) Hours As Needed for Wheezing or Shortness of Air. 120 vial 5   • albuterol sulfate  (90 Base) MCG/ACT inhaler Inhale 2 puffs Every 4 (Four) Hours As Needed for Wheezing. 18 g 11   • apixaban (Eliquis) 5 MG tablet tablet Take 1 tablet by mouth Every 12 (Twelve) Hours. Resume on 11/2/2020 60 tablet 1   • budesonide-formoterol (SYMBICORT) 160-4.5 MCG/ACT inhaler Inhale 2 puffs 2 (Two) Times a Day. 1 inhaler 12   • clonazePAM (KlonoPIN) 0.5 MG tablet Take 1 tablet by mouth At Night As Needed for Anxiety. 30 tablet 0   • fluticasone (FLONASE) 50 MCG/ACT nasal spray SPRAY TWICE IN EACH NOSTRIL EVERY DAY FOR 30 DAYS. 16 mL 6   • levothyroxine (SYNTHROID, LEVOTHROID) 50 MCG tablet TAKE 1 TABLET BY MOUTH DAILY 90 tablet 1   • lisinopril (PRINIVIL,ZESTRIL) 20 MG tablet TAKE 1 TABLET BY MOUTH DAILY 90 tablet 0   • oxybutynin (DITROPAN) 5 MG tablet TAKE 1 TABLET BY MOUTH TWICE DAILY 180 tablet 1   • oxyCODONE-acetaminophen (Percocet) 7.5-325 MG per tablet Take 1 tablet by mouth Every 8 (Eight) Hours As Needed for Severe Pain . 75 tablet 0   • sertraline (ZOLOFT) 25 MG tablet TAKE 1 TABLET BY MOUTH DAILY 90 tablet 3   • vitamin D (ERGOCALCIFEROL) 1.25 MG (78287 UT) capsule capsule Take 1 capsule by mouth 1 (One) Time Per Week. 13 capsule 3   • ondansetron (ZOFRAN) 8 MG tablet Take 1 tablet by mouth Every 8 (Eight) Hours As Needed for Nausea or Vomiting. 20 tablet 1     Facility-Administered Medications Prior to Visit   Medication Dose Route Frequency Provider Last Rate Last Admin   • cyanocobalamin injection 1,000 mcg  1,000 mcg Intramuscular Q28 Days Donna Forte MD   1,000 mcg at 12/09/20 1253     New Medications Ordered This Visit   Medications   • cyanocobalamin injection 1,000 mcg     [unfilled]  Medications  Discontinued During This Encounter   Medication Reason   • cyanocobalamin injection 1,000 mcg          Return in about 3 months (around 6/10/2021) for Recheck, labs.    Patient was given instructions and counseling regarding her condition or for health maintenance advice. Please see specific information pulled into the AVS if appropriate.

## 2021-03-16 DIAGNOSIS — M54.50 LUMBAR BACK PAIN: ICD-10-CM

## 2021-03-16 DIAGNOSIS — F32.A ANXIETY AND DEPRESSION: ICD-10-CM

## 2021-03-16 DIAGNOSIS — F41.9 ANXIETY AND DEPRESSION: ICD-10-CM

## 2021-03-16 DIAGNOSIS — G89.29 CHRONIC LOW BACK PAIN WITHOUT SCIATICA, UNSPECIFIED BACK PAIN LATERALITY: ICD-10-CM

## 2021-03-16 DIAGNOSIS — M54.50 CHRONIC LOW BACK PAIN WITHOUT SCIATICA, UNSPECIFIED BACK PAIN LATERALITY: ICD-10-CM

## 2021-03-16 NOTE — TELEPHONE ENCOUNTER
Caller: Kaylin Ojeda    Relationship: Self    Best call back number: [phone]    Medication needed:   Requested Prescriptions     Pending Prescriptions Disp Refills   • oxyCODONE-acetaminophen (Percocet) 7.5-325 MG per tablet 75 tablet 0     Sig: Take 1 tablet by mouth Every 8 (Eight) Hours As Needed for Severe Pain .   • clonazePAM (KlonoPIN) 0.5 MG tablet 30 tablet 0     Sig: Take 1 tablet by mouth At Night As Needed for Anxiety.       When do you need the refill by: 3/17/2021    What additional details did the patient provide when requesting the medication: Not completely out, but they are due for refill today.    Does the patient have less than a 3 day supply:  [x] Yes  [] No    What is the patient's preferred pharmacy: [id number] - [place] AT [facility] & [address] - [phone]  - [phone] FX

## 2021-03-17 RX ORDER — OXYCODONE AND ACETAMINOPHEN 7.5; 325 MG/1; MG/1
1 TABLET ORAL EVERY 8 HOURS PRN
Qty: 75 TABLET | Refills: 0 | Status: SHIPPED | OUTPATIENT
Start: 2021-03-17 | End: 2021-04-15 | Stop reason: SDUPTHER

## 2021-03-17 RX ORDER — CLONAZEPAM 0.5 MG/1
0.5 TABLET ORAL NIGHTLY PRN
Qty: 30 TABLET | Refills: 0 | Status: SHIPPED | OUTPATIENT
Start: 2021-03-17 | End: 2021-04-15 | Stop reason: SDUPTHER

## 2021-03-29 DIAGNOSIS — F41.9 ANXIETY AND DEPRESSION: ICD-10-CM

## 2021-03-29 DIAGNOSIS — N39.41 URGE INCONTINENCE: ICD-10-CM

## 2021-03-29 DIAGNOSIS — F32.A ANXIETY AND DEPRESSION: ICD-10-CM

## 2021-03-29 RX ORDER — ATORVASTATIN CALCIUM 10 MG/1
10 TABLET, FILM COATED ORAL DAILY
Qty: 90 TABLET | Refills: 1 | Status: SHIPPED | OUTPATIENT
Start: 2021-03-29 | End: 2021-05-12

## 2021-03-29 RX ORDER — OXYBUTYNIN CHLORIDE 5 MG/1
5 TABLET ORAL 2 TIMES DAILY
Qty: 180 TABLET | Refills: 1 | Status: SHIPPED | OUTPATIENT
Start: 2021-03-29 | End: 2021-09-22

## 2021-03-29 RX ORDER — SERTRALINE HYDROCHLORIDE 25 MG/1
25 TABLET, FILM COATED ORAL DAILY
Qty: 90 TABLET | Refills: 3 | Status: SHIPPED | OUTPATIENT
Start: 2021-03-29 | End: 2021-09-22 | Stop reason: SDUPTHER

## 2021-03-29 RX ORDER — LEVOTHYROXINE SODIUM 0.05 MG/1
50 TABLET ORAL DAILY
Qty: 90 TABLET | Refills: 1 | Status: SHIPPED | OUTPATIENT
Start: 2021-03-29 | End: 2021-04-26

## 2021-04-07 ENCOUNTER — TELEPHONE (OUTPATIENT)
Dept: CARDIAC SURGERY | Facility: CLINIC | Age: 81
End: 2021-04-07

## 2021-04-07 ENCOUNTER — OFFICE VISIT (OUTPATIENT)
Dept: INTERNAL MEDICINE | Facility: CLINIC | Age: 81
End: 2021-04-07

## 2021-04-07 VITALS
RESPIRATION RATE: 16 BRPM | TEMPERATURE: 97.1 F | HEIGHT: 61 IN | DIASTOLIC BLOOD PRESSURE: 58 MMHG | SYSTOLIC BLOOD PRESSURE: 110 MMHG | WEIGHT: 102 LBS | HEART RATE: 75 BPM | OXYGEN SATURATION: 98 % | BODY MASS INDEX: 19.26 KG/M2

## 2021-04-07 DIAGNOSIS — Z78.0 POST-MENOPAUSAL: ICD-10-CM

## 2021-04-07 DIAGNOSIS — R30.0 DYSURIA: Primary | ICD-10-CM

## 2021-04-07 DIAGNOSIS — Z12.31 BREAST CANCER SCREENING BY MAMMOGRAM: ICD-10-CM

## 2021-04-07 LAB
BILIRUB BLD-MCNC: NEGATIVE MG/DL
CLARITY, POC: CLEAR
COLOR UR: YELLOW
GLUCOSE UR STRIP-MCNC: NEGATIVE MG/DL
KETONES UR QL: NEGATIVE
LEUKOCYTE EST, POC: ABNORMAL
NITRITE UR-MCNC: NEGATIVE MG/ML
PH UR: 5.5 [PH] (ref 5–8)
PROT UR STRIP-MCNC: NEGATIVE MG/DL
RBC # UR STRIP: NEGATIVE /UL
SP GR UR: 1.02 (ref 1–1.03)
UROBILINOGEN UR QL: NORMAL

## 2021-04-07 PROCEDURE — 99214 OFFICE O/P EST MOD 30 MIN: CPT | Performed by: INTERNAL MEDICINE

## 2021-04-07 PROCEDURE — 81003 URINALYSIS AUTO W/O SCOPE: CPT | Performed by: INTERNAL MEDICINE

## 2021-04-07 RX ORDER — CEPHALEXIN 500 MG/1
500 CAPSULE ORAL 2 TIMES DAILY
Qty: 14 CAPSULE | Refills: 0 | Status: SHIPPED | OUTPATIENT
Start: 2021-04-07 | End: 2021-04-14

## 2021-04-07 NOTE — PROGRESS NOTES
Kaylin Ojeda is a 81 y.o. female, who presents with a chief complaint of   Chief Complaint   Patient presents with   • Urinary Tract Infection           HPI   Pt here bc of dysuria.  + urgency and frequency.  No fever.  No n/v/d    She c/o continued vision changes.  She was referred to ophthalmology but hasnt scheduled and appt.     Pt due for eye exam, mammogram, and dexa.     The following portions of the patient's history were reviewed and updated as appropriate: allergies, current medications, past family history, past medical history, past social history, past surgical history and problem list.    Allergies: Aleve [naproxen sodium], Codeine, Ibuprofen, and Sulfa antibiotics    Review of Systems   Constitutional: Negative.    HENT: Negative.    Eyes: Negative.    Respiratory: Negative.    Cardiovascular: Negative.    Gastrointestinal: Negative.    Endocrine: Negative.    Genitourinary: Positive for dysuria.   Musculoskeletal: Negative.    Skin: Negative.    Allergic/Immunologic: Negative.    Neurological: Negative.    Hematological: Negative.    Psychiatric/Behavioral: Negative.    All other systems reviewed and are negative.            Wt Readings from Last 3 Encounters:   04/07/21 46.3 kg (102 lb)   03/10/21 45.4 kg (100 lb)   03/02/21 45.5 kg (100 lb 3.2 oz)     Temp Readings from Last 3 Encounters:   04/07/21 97.1 °F (36.2 °C) (Temporal)   03/10/21 97.3 °F (36.3 °C) (Temporal)   01/21/21 98 °F (36.7 °C) (Temporal)     BP Readings from Last 3 Encounters:   04/07/21 110/58   03/10/21 120/60   03/02/21 130/64     Pulse Readings from Last 3 Encounters:   04/07/21 75   03/10/21 113   03/02/21 77     Body mass index is 19.01 kg/m².  SpO2 Readings from Last 3 Encounters:   04/07/21 98%   03/10/21 98%   01/21/21 97%          Physical Exam  Vitals and nursing note reviewed.   Constitutional:       General: She is not in acute distress.     Appearance: She is well-developed.   HENT:      Head: Normocephalic  and atraumatic.      Right Ear: External ear normal.      Left Ear: External ear normal.      Nose: Nose normal.   Eyes:      Conjunctiva/sclera: Conjunctivae normal.      Pupils: Pupils are equal, round, and reactive to light.   Cardiovascular:      Rate and Rhythm: Normal rate and regular rhythm.      Heart sounds: Normal heart sounds.   Pulmonary:      Effort: Pulmonary effort is normal. No respiratory distress.      Breath sounds: Normal breath sounds. No wheezing.   Musculoskeletal:         General: Normal range of motion.      Cervical back: Normal range of motion and neck supple.      Comments: Normal gait   Skin:     General: Skin is warm and dry.   Neurological:      Mental Status: She is alert and oriented to person, place, and time.   Psychiatric:         Behavior: Behavior normal.         Thought Content: Thought content normal.         Judgment: Judgment normal.         Results for orders placed or performed in visit on 04/07/21   POCT urinalysis dipstick, automated    Specimen: Urine   Result Value Ref Range    Color Yellow Yellow, Straw, Dark Yellow, Betty    Clarity, UA Clear Clear    Specific Gravity  1.020 1.005 - 1.030    pH, Urine 5.5 5.0 - 8.0    Leukocytes Small (1+) (A) Negative    Nitrite, UA Negative Negative    Protein, POC Negative Negative mg/dL    Glucose, UA Negative Negative, 1000 mg/dL (3+) mg/dL    Ketones, UA Negative Negative    Urobilinogen, UA Normal Normal    Bilirubin Negative Negative    Blood, UA Negative Negative     Result Review :                  Assessment and Plan    Diagnoses and all orders for this visit:    1. Dysuria (Primary)  -     POCT urinalysis dipstick, automated  -     cephalexin (Keflex) 500 MG capsule; Take 1 capsule by mouth 2 (Two) Times a Day for 7 days.  Dispense: 14 capsule; Refill: 0  -     Urine Culture - Urine, Urine, Clean Catch; Future    2. Breast cancer screening by mammogram  -     Mammo Screening Bilateral With CAD; Future    3.  Post-menopausal  -     DEXA Bone Density Axial; Future     4. chronic vision changes - ophtho referral has been placed.  encouraged pt to call to set up evaluation and she agrees to call.          Outpatient Medications Prior to Visit   Medication Sig Dispense Refill   • albuterol sulfate  (90 Base) MCG/ACT inhaler Inhale 2 puffs Every 4 (Four) Hours As Needed for Wheezing. 18 g 11   • apixaban (Eliquis) 5 MG tablet tablet Take 1 tablet by mouth Every 12 (Twelve) Hours. Resume on 11/2/2020 60 tablet 1   • atorvastatin (LIPITOR) 10 MG tablet Take 1 tablet by mouth Daily. 90 tablet 1   • clonazePAM (KlonoPIN) 0.5 MG tablet Take 1 tablet by mouth At Night As Needed for Anxiety. 30 tablet 0   • fluticasone (FLONASE) 50 MCG/ACT nasal spray SPRAY TWICE IN EACH NOSTRIL EVERY DAY FOR 30 DAYS. 16 mL 6   • levothyroxine (SYNTHROID, LEVOTHROID) 50 MCG tablet Take 1 tablet by mouth Daily. 90 tablet 1   • lisinopril (PRINIVIL,ZESTRIL) 20 MG tablet TAKE 1 TABLET BY MOUTH DAILY 90 tablet 0   • ondansetron (ZOFRAN) 8 MG tablet Take 1 tablet by mouth Every 8 (Eight) Hours As Needed for Nausea or Vomiting. 20 tablet 1   • oxybutynin (DITROPAN) 5 MG tablet Take 1 tablet by mouth 2 (Two) Times a Day. 180 tablet 1   • oxyCODONE-acetaminophen (Percocet) 7.5-325 MG per tablet Take 1 tablet by mouth Every 8 (Eight) Hours As Needed for Severe Pain . 75 tablet 0   • sertraline (ZOLOFT) 25 MG tablet Take 1 tablet by mouth Daily. 90 tablet 3   • vitamin D (ERGOCALCIFEROL) 1.25 MG (79358 UT) capsule capsule Take 1 capsule by mouth 1 (One) Time Per Week. 13 capsule 3   • albuterol (PROVENTIL) (2.5 MG/3ML) 0.083% nebulizer solution Take 2.5 mg by nebulization Every 4 (Four) Hours As Needed for Wheezing or Shortness of Air. 120 vial 5   • budesonide-formoterol (SYMBICORT) 160-4.5 MCG/ACT inhaler Inhale 2 puffs 2 (Two) Times a Day. 1 inhaler 12     Facility-Administered Medications Prior to Visit   Medication Dose Route Frequency Provider  Last Rate Last Admin   • cyanocobalamin injection 1,000 mcg  1,000 mcg Intramuscular Q28 Days Donna Forte MD         New Medications Ordered This Visit   Medications   • cephalexin (Keflex) 500 MG capsule     Sig: Take 1 capsule by mouth 2 (Two) Times a Day for 7 days.     Dispense:  14 capsule     Refill:  0     [unfilled]  There are no discontinued medications.      Return if symptoms worsen or fail to improve.    Patient was given instructions and counseling regarding her condition or for health maintenance advice. Please see specific information pulled into the AVS if appropriate.

## 2021-04-07 NOTE — TELEPHONE ENCOUNTER
Reached out to patient to see how she was doing. She states that she is doing fine and has been following up with her family doctor and Dr. Calero.  She is not interested in scheduling surgery at this time. I explained to her that we are here if she needs us. Explained to her the need to continue with the follow up care of Dr. Calero.  Call office if anything changes.

## 2021-04-14 ENCOUNTER — HOSPITAL ENCOUNTER (OUTPATIENT)
Dept: MAMMOGRAPHY | Facility: HOSPITAL | Age: 81
Discharge: HOME | End: 2021-04-14

## 2021-04-14 ENCOUNTER — APPOINTMENT (OUTPATIENT)
Dept: BONE DENSITY | Facility: HOSPITAL | Age: 81
End: 2021-04-14

## 2021-04-14 DIAGNOSIS — Z78.0 POST-MENOPAUSAL: ICD-10-CM

## 2021-04-14 DIAGNOSIS — Z12.31 BREAST CANCER SCREENING BY MAMMOGRAM: ICD-10-CM

## 2021-04-14 PROCEDURE — 77080 DXA BONE DENSITY AXIAL: CPT

## 2021-04-14 PROCEDURE — 77067 SCR MAMMO BI INCL CAD: CPT

## 2021-04-14 PROCEDURE — 77063 BREAST TOMOSYNTHESIS BI: CPT

## 2021-04-15 DIAGNOSIS — M54.50 LUMBAR BACK PAIN: ICD-10-CM

## 2021-04-15 DIAGNOSIS — F41.9 ANXIETY AND DEPRESSION: ICD-10-CM

## 2021-04-15 DIAGNOSIS — G89.29 CHRONIC LOW BACK PAIN WITHOUT SCIATICA, UNSPECIFIED BACK PAIN LATERALITY: ICD-10-CM

## 2021-04-15 DIAGNOSIS — F32.A ANXIETY AND DEPRESSION: ICD-10-CM

## 2021-04-15 DIAGNOSIS — M54.50 CHRONIC LOW BACK PAIN WITHOUT SCIATICA, UNSPECIFIED BACK PAIN LATERALITY: ICD-10-CM

## 2021-04-15 NOTE — TELEPHONE ENCOUNTER
Caller: Kaylin Ojeda    Relationship: Self    Best call back number: [phone]    Medication needed:   Requested Prescriptions     Pending Prescriptions Disp Refills   • oxyCODONE-acetaminophen (Percocet) 7.5-325 MG per tablet 75 tablet 0     Sig: Take 1 tablet by mouth Every 8 (Eight) Hours As Needed for Severe Pain .   • clonazePAM (KlonoPIN) 0.5 MG tablet 30 tablet 0     Sig: Take 1 tablet by mouth At Night As Needed for Anxiety.       When do you need the refill by: 04/17/2021    What additional details did the patient provide when requesting the medication: PATIENT NEEDS THE ABOVE MEDICATIONS APPROVED AND READY FOR REFILL.    Does the patient have less than a 3 day supply:  [x] Yes  [] No    What is the patient's preferred pharmacy: [id number] - [place] [facility] [address] - [phone]  - [phone] FX

## 2021-04-16 RX ORDER — CLONAZEPAM 0.5 MG/1
0.5 TABLET ORAL NIGHTLY PRN
Qty: 30 TABLET | Refills: 0 | Status: SHIPPED | OUTPATIENT
Start: 2021-04-16 | End: 2021-05-17 | Stop reason: SDUPTHER

## 2021-04-16 RX ORDER — OXYCODONE AND ACETAMINOPHEN 7.5; 325 MG/1; MG/1
1 TABLET ORAL EVERY 8 HOURS PRN
Qty: 75 TABLET | Refills: 0 | Status: SHIPPED | OUTPATIENT
Start: 2021-04-16 | End: 2021-05-17 | Stop reason: SDUPTHER

## 2021-04-24 DIAGNOSIS — I10 ESSENTIAL HYPERTENSION: ICD-10-CM

## 2021-04-26 RX ORDER — LISINOPRIL 20 MG/1
20 TABLET ORAL DAILY
Qty: 90 TABLET | Refills: 3 | Status: SHIPPED | OUTPATIENT
Start: 2021-04-26 | End: 2021-06-18 | Stop reason: SDUPTHER

## 2021-04-26 RX ORDER — LEVOTHYROXINE SODIUM 0.05 MG/1
50 TABLET ORAL DAILY
Qty: 90 TABLET | Refills: 1 | Status: SHIPPED | OUTPATIENT
Start: 2021-04-26 | End: 2021-05-13 | Stop reason: DRUGHIGH

## 2021-05-12 RX ORDER — ATORVASTATIN CALCIUM 10 MG/1
10 TABLET, FILM COATED ORAL DAILY
Qty: 90 TABLET | Refills: 1 | Status: SHIPPED | OUTPATIENT
Start: 2021-05-12 | End: 2021-05-13

## 2021-05-13 ENCOUNTER — OFFICE VISIT (OUTPATIENT)
Dept: INTERNAL MEDICINE | Facility: CLINIC | Age: 81
End: 2021-05-13

## 2021-05-13 VITALS
DIASTOLIC BLOOD PRESSURE: 82 MMHG | RESPIRATION RATE: 18 BRPM | OXYGEN SATURATION: 97 % | WEIGHT: 99.4 LBS | HEIGHT: 61 IN | BODY MASS INDEX: 18.77 KG/M2 | TEMPERATURE: 97.7 F | SYSTOLIC BLOOD PRESSURE: 140 MMHG | HEART RATE: 77 BPM

## 2021-05-13 DIAGNOSIS — M51.36 DDD (DEGENERATIVE DISC DISEASE), LUMBAR: Primary | ICD-10-CM

## 2021-05-13 PROCEDURE — 99213 OFFICE O/P EST LOW 20 MIN: CPT | Performed by: NURSE PRACTITIONER

## 2021-05-13 NOTE — PROGRESS NOTES
"Chief Complaint   Patient presents with   • Back Pain       Subjective     Kaylin Ojeda is a 81 y.o. female being seen for an  acute visit for back pain. She is a patient of Dr. Rodriguez's.  She reports back pain for 15 years. She is describing lower back pain with intermittent radiation into legs. She reports that \"it is so bad, that I have cornelius in the bed for 2 weeks.\" Pain rated 10 of 10 today. [facility].  She is currently on Percocet 7.5/325mg daily managed by Dr. rodriguez. Last imaging 12- showed DDD.       History of Present Illness     Allergies   Allergen Reactions   • Aleve [Naproxen Sodium] Shortness Of Breath   • Codeine Unknown - Low Severity     hyperactivity   • Ibuprofen Unknown - Low Severity     unk   • Sulfa Antibiotics Unknown - Low Severity     \"I can't remember\"         Current Outpatient Medications:   •  albuterol (PROVENTIL) (2.5 MG/3ML) 0.083% nebulizer solution, Take 2.5 mg by nebulization Every 4 (Four) Hours As Needed for Wheezing or Shortness of Air., Disp: 120 vial, Rfl: 5  •  albuterol sulfate  (90 Base) MCG/ACT inhaler, Inhale 2 puffs Every 4 (Four) Hours As Needed for Wheezing., Disp: 18 g, Rfl: 11  •  apixaban (Eliquis) 5 MG tablet tablet, Take 1 tablet by mouth Every 12 (Twelve) Hours. Resume on 11/2/2020, Disp: 60 tablet, Rfl: 1  •  budesonide-formoterol (SYMBICORT) 160-4.5 MCG/ACT inhaler, Inhale 2 puffs 2 (Two) Times a Day., Disp: 1 inhaler, Rfl: 12  •  clonazePAM (KlonoPIN) 0.5 MG tablet, Take 1 tablet by mouth At Night As Needed for Anxiety., Disp: 30 tablet, Rfl: 0  •  fluticasone (FLONASE) 50 MCG/ACT nasal spray, SPRAY TWICE IN EACH NOSTRIL EVERY DAY FOR 30 DAYS., Disp: 16 mL, Rfl: 6  •  lisinopril (PRINIVIL,ZESTRIL) 20 MG tablet, TAKE 1 TABLET BY MOUTH DAILY, Disp: 90 tablet, Rfl: 3  •  ondansetron (ZOFRAN) 8 MG tablet, Take 1 tablet by mouth Every 8 (Eight) Hours As Needed for Nausea or Vomiting., Disp: 20 tablet, Rfl: 1  •  oxybutynin " (DITROPAN) 5 MG tablet, Take 1 tablet by mouth 2 (Two) Times a Day., Disp: 180 tablet, Rfl: 1  •  oxyCODONE-acetaminophen (Percocet) 7.5-325 MG per tablet, Take 1 tablet by mouth Every 8 (Eight) Hours As Needed for Severe Pain ., Disp: 75 tablet, Rfl: 0  •  sertraline (ZOLOFT) 25 MG tablet, Take 1 tablet by mouth Daily., Disp: 90 tablet, Rfl: 3  •  vitamin D (ERGOCALCIFEROL) 1.25 MG (61743 UT) capsule capsule, Take 1 capsule by mouth 1 (One) Time Per Week., Disp: 13 capsule, Rfl: 3  •  levothyroxine (SYNTHROID, LEVOTHROID) 50 MCG tablet, TAKE 1 TABLET BY MOUTH DAILY, Disp: 90 tablet, Rfl: 1    Current Facility-Administered Medications:   •  cyanocobalamin injection 1,000 mcg, 1,000 mcg, Intramuscular, Q28 Days, Donna Forte MD    The following portions of the patient's history were reviewed and updated as appropriate: allergies, current medications, past family history, past medical history, past social history, past surgical history and problem list.    Review of Systems   Constitutional: Positive for fatigue.   Respiratory: Negative.  Negative for cough and shortness of breath.    Cardiovascular: Negative for chest pain and leg swelling.   Gastrointestinal: Negative for abdominal distention.   Musculoskeletal: Positive for arthralgias and back pain.   Neurological: Positive for dizziness.   Hematological: Negative.    Psychiatric/Behavioral: Positive for sleep disturbance.       Assessment     Physical Exam  Vitals reviewed.   Constitutional:       Comments: Appears thin and frail,  smells of cigarette smoke   Cardiovascular:      Rate and Rhythm: Normal rate and regular rhythm.      Pulses: Normal pulses.      Heart sounds: Normal heart sounds.   Pulmonary:      Effort: Pulmonary effort is normal. No respiratory distress.      Breath sounds: Normal breath sounds. No stridor. No wheezing.   Musculoskeletal:      Cervical back: Decreased range of motion.      Thoracic back: Scoliosis present.       Lumbar back: Decreased range of motion.      Comments: Diffuse joint changes consistent with Osteoarthritis   Skin:     General: Skin is warm and dry.   Neurological:      Mental Status: She is alert.   Psychiatric:         Mood and Affect: Mood normal.         Thought Content: Thought content normal.         Plan         Diagnoses and all orders for this visit:    1. DDD (degenerative disc disease), lumbar (Primary)  -     MRI Lumbar Spine Without Contrast; Future  -     Ambulatory Referral to Hospital Pain Management Department    Chronic back pain due to DDD. Medicated with Percocet per Narc policy. Will refer to pain mgnt for lumbar epidurals.    Follow up 6-16-21 with Dr. Forte

## 2021-05-17 ENCOUNTER — TELEPHONE (OUTPATIENT)
Dept: INTERNAL MEDICINE | Facility: CLINIC | Age: 81
End: 2021-05-17

## 2021-05-17 DIAGNOSIS — G89.29 CHRONIC LOW BACK PAIN WITHOUT SCIATICA, UNSPECIFIED BACK PAIN LATERALITY: ICD-10-CM

## 2021-05-17 DIAGNOSIS — M54.50 LUMBAR BACK PAIN: ICD-10-CM

## 2021-05-17 DIAGNOSIS — F41.9 ANXIETY AND DEPRESSION: ICD-10-CM

## 2021-05-17 DIAGNOSIS — M54.50 CHRONIC LOW BACK PAIN WITHOUT SCIATICA, UNSPECIFIED BACK PAIN LATERALITY: ICD-10-CM

## 2021-05-17 DIAGNOSIS — F32.A ANXIETY AND DEPRESSION: ICD-10-CM

## 2021-05-17 RX ORDER — CLONAZEPAM 0.5 MG/1
0.5 TABLET ORAL NIGHTLY PRN
Qty: 30 TABLET | Refills: 0 | Status: SHIPPED | OUTPATIENT
Start: 2021-05-17 | End: 2021-06-11 | Stop reason: SDUPTHER

## 2021-05-17 RX ORDER — OXYCODONE AND ACETAMINOPHEN 7.5; 325 MG/1; MG/1
1 TABLET ORAL EVERY 8 HOURS PRN
Qty: 75 TABLET | Refills: 0 | Status: SHIPPED | OUTPATIENT
Start: 2021-05-17 | End: 2021-06-11 | Stop reason: SDUPTHER

## 2021-05-17 NOTE — TELEPHONE ENCOUNTER
PATIENT CALLED FOR MEDICATION REFILL OF     oxyCODONE-acetaminophen (Percocet) 7.5-325 MG per tablet    clonazePAM (KlonoPIN) 0.5 MG tablet    SHE IS OUT OF MEDICATIONS    [facility] #[id number] - [place], [place] - [address] AT [facility] [address] - [phone]  - [phone]  [phone]    CALL BACK NUMBER [phone]

## 2021-06-08 ENCOUNTER — HOSPITAL ENCOUNTER (OUTPATIENT)
Dept: MRI IMAGING | Facility: HOSPITAL | Age: 81
Discharge: HOME | End: 2021-06-08
Admitting: NURSE PRACTITIONER

## 2021-06-08 DIAGNOSIS — M51.36 DDD (DEGENERATIVE DISC DISEASE), LUMBAR: ICD-10-CM

## 2021-06-08 PROCEDURE — 72148 MRI LUMBAR SPINE W/O DYE: CPT

## 2021-06-09 LAB
BUN SERPL-MCNC: 22 MG/DL (ref 8–23)
BUN/CREAT SERPL: 22.9 (ref 7–25)
CALCIUM SERPL-MCNC: 9.7 MG/DL (ref 8.6–10.5)
CHLORIDE SERPL-SCNC: 101 MMOL/L (ref 98–107)
CO2 SERPL-SCNC: 27.5 MMOL/L (ref 22–29)
CREAT SERPL-MCNC: 0.96 MG/DL (ref 0.57–1)
GLUCOSE SERPL-MCNC: 84 MG/DL (ref 65–99)
POTASSIUM SERPL-SCNC: 5.4 MMOL/L (ref 3.5–5.2)
SODIUM SERPL-SCNC: 136 MMOL/L (ref 136–145)

## 2021-06-10 LAB
ALBUMIN/CREAT UR: 7 MG/G CREAT (ref 0–29)
CREAT UR-MCNC: 84.8 MG/DL
MICROALBUMIN UR-MCNC: 6.2 UG/ML

## 2021-06-11 DIAGNOSIS — M54.50 CHRONIC LOW BACK PAIN WITHOUT SCIATICA, UNSPECIFIED BACK PAIN LATERALITY: ICD-10-CM

## 2021-06-11 DIAGNOSIS — F41.9 ANXIETY AND DEPRESSION: ICD-10-CM

## 2021-06-11 DIAGNOSIS — F32.A ANXIETY AND DEPRESSION: ICD-10-CM

## 2021-06-11 DIAGNOSIS — G89.29 CHRONIC LOW BACK PAIN WITHOUT SCIATICA, UNSPECIFIED BACK PAIN LATERALITY: ICD-10-CM

## 2021-06-11 DIAGNOSIS — M54.50 LUMBAR BACK PAIN: ICD-10-CM

## 2021-06-11 RX ORDER — CLONAZEPAM 0.5 MG/1
0.5 TABLET ORAL NIGHTLY PRN
Qty: 30 TABLET | Refills: 0 | Status: SHIPPED | OUTPATIENT
Start: 2021-06-11 | End: 2021-07-09 | Stop reason: SDUPTHER

## 2021-06-11 RX ORDER — OXYCODONE AND ACETAMINOPHEN 7.5; 325 MG/1; MG/1
1 TABLET ORAL EVERY 8 HOURS PRN
Qty: 75 TABLET | Refills: 0 | Status: SHIPPED | OUTPATIENT
Start: 2021-06-11 | End: 2021-07-09 | Stop reason: SDUPTHER

## 2021-06-11 NOTE — TELEPHONE ENCOUNTER
Caller: Kaylin Ojeda    Relationship: Self    Best call back number: [phone]     Medication needed:   Requested Prescriptions     Pending Prescriptions Disp Refills   • oxyCODONE-acetaminophen (Percocet) 7.5-325 MG per tablet 75 tablet 0     Sig: Take 1 tablet by mouth Every 8 (Eight) Hours As Needed for Severe Pain .   • clonazePAM (KlonoPIN) 0.5 MG tablet 30 tablet 0     Sig: Take 1 tablet by mouth At Night As Needed for Anxiety.       When do you need the refill by: 06/11/21    What additional details did the patient provide when requesting the medication: PATIENT CALLING STATING SHE WILL BE OUT OF MEDICATION TOMORROW WILL NOT HAVE ENOUGH FOR THE WEEKEND. PATIENT STATED [facility] WILL ALSO BE SENDING A QUESTIONNAIRE FOR HER TO FILL OUT TO BE ELIGIBLE  FOR MAIL ORDER SERVICE. PATIENT ALSO STATED SHE HAS BEEN IN A LOT OF PAIN SINCE HER LAST INJECTION.    Does the patient have less than a 3 day supply:  [x] Yes  [] No    What is the patient's preferred pharmacy: [id number] - [place] - [facility] [address] [facility] [address] [facility] [address] - [phone]  - [phone] FX

## 2021-06-18 DIAGNOSIS — I10 ESSENTIAL HYPERTENSION: ICD-10-CM

## 2021-06-18 RX ORDER — LISINOPRIL 20 MG/1
20 TABLET ORAL DAILY
Qty: 90 TABLET | Refills: 0 | Status: SHIPPED | OUTPATIENT
Start: 2021-06-18 | End: 2021-08-17

## 2021-06-18 NOTE — TELEPHONE ENCOUNTER
Caller: [facility]    Relationship:     Best call back number: [phone]    Medication needed:   Requested Prescriptions     Pending Prescriptions Disp Refills   • lisinopril (PRINIVIL,ZESTRIL) 20 MG tablet 90 tablet 3     Sig: Take 1 tablet by mouth Daily.       When do you need the refill by: ASAP    What additional details did the patient provide when requesting the medication: OUT OF MEDICATION    Does the patient have less than a 3 day supply:  [x] Yes  [] No    What is the patient's preferred pharmacy: [facility] - [place] [id number] [facility] - [phone]  - [phone]

## 2021-06-22 ENCOUNTER — TELEPHONE (OUTPATIENT)
Dept: INTERNAL MEDICINE | Facility: CLINIC | Age: 81
End: 2021-06-22

## 2021-06-22 NOTE — TELEPHONE ENCOUNTER
i'm sure you have this message about pt. The HUB still has referral it has nothing to do with pt, JUST WAITING ON THE HUB TO RADHA THIS PT__i'm pretty sure her appt will be pretty far out          Mary Mayorga APRN  [facility]  Cc: Inessa Anaya  She will have to discuss Percocet with Dr. Forte due to her narcotic contract, I would not advise increasing dose. Her pain mgnt referral is already placed with Dr. Mckeon, will forward to Inessa to address. (placed 5-)

## 2021-06-25 ENCOUNTER — OFFICE VISIT (OUTPATIENT)
Dept: INTERNAL MEDICINE | Facility: CLINIC | Age: 81
End: 2021-06-25

## 2021-06-25 VITALS
HEIGHT: 61 IN | DIASTOLIC BLOOD PRESSURE: 82 MMHG | BODY MASS INDEX: 18.58 KG/M2 | RESPIRATION RATE: 18 BRPM | SYSTOLIC BLOOD PRESSURE: 124 MMHG | WEIGHT: 98.4 LBS | HEART RATE: 86 BPM | OXYGEN SATURATION: 97 % | TEMPERATURE: 97.5 F

## 2021-06-25 DIAGNOSIS — M51.36 DDD (DEGENERATIVE DISC DISEASE), LUMBAR: ICD-10-CM

## 2021-06-25 DIAGNOSIS — I25.119 CORONARY ARTERY DISEASE INVOLVING NATIVE CORONARY ARTERY OF NATIVE HEART WITH ANGINA PECTORIS (HCC): ICD-10-CM

## 2021-06-25 DIAGNOSIS — I10 ESSENTIAL HYPERTENSION: Primary | ICD-10-CM

## 2021-06-25 DIAGNOSIS — R73.03 PREDIABETES: ICD-10-CM

## 2021-06-25 DIAGNOSIS — N18.31 STAGE 3A CHRONIC KIDNEY DISEASE (HCC): ICD-10-CM

## 2021-06-25 DIAGNOSIS — E55.9 VITAMIN D DEFICIENCY: ICD-10-CM

## 2021-06-25 DIAGNOSIS — D50.9 IRON DEFICIENCY ANEMIA, UNSPECIFIED IRON DEFICIENCY ANEMIA TYPE: ICD-10-CM

## 2021-06-25 DIAGNOSIS — M54.50 LUMBAR BACK PAIN: ICD-10-CM

## 2021-06-25 DIAGNOSIS — I48.0 PAF (PAROXYSMAL ATRIAL FIBRILLATION) (HCC): ICD-10-CM

## 2021-06-25 DIAGNOSIS — F41.8 MIXED ANXIETY DEPRESSIVE DISORDER: ICD-10-CM

## 2021-06-25 DIAGNOSIS — E53.8 B12 DEFICIENCY: ICD-10-CM

## 2021-06-25 DIAGNOSIS — E03.9 HYPOTHYROIDISM (ACQUIRED): ICD-10-CM

## 2021-06-25 PROCEDURE — 96372 THER/PROPH/DIAG INJ SC/IM: CPT | Performed by: INTERNAL MEDICINE

## 2021-06-25 PROCEDURE — 99214 OFFICE O/P EST MOD 30 MIN: CPT | Performed by: INTERNAL MEDICINE

## 2021-06-25 RX ADMIN — CYANOCOBALAMIN 1000 MCG: 1000 INJECTION, SOLUTION INTRAMUSCULAR; SUBCUTANEOUS at 14:37

## 2021-06-25 NOTE — PROGRESS NOTES
Kaylin Ojeda is a 81 y.o. female, who presents with a chief complaint of   Chief Complaint   Patient presents with   • Essential hypertension   • Hyperlipidemia           HPI     Pt here for follow up.  She has been having more back pain.  She had a MRI of her back done and has L4-L5 with severe facet arthropathy and severe canal stenosis.  Pt has pending referral to pain mgt for epidurals.      Pt has CAD, a-fib, severe MR, severe TR.  She is on eliquis and doing well.  She is off metoprolol and HR 86.  She stopped the med bc she thought it made her tired and upset her stomach.  She says she has more energy off the metoprolol.  she has f/u with dr. Calero in July.       htn - on lisinopril.  No ha/dizziness.      Hypothyroidism - she was on synthroid but stopped it bc she thought it made her feel bad.       Chronic back pain - she is on percocet.  She is interested in medical marijuana but this is not available for pain management at this time.      Chronic anemia - pt has had some iron infusions and feels better.  She has also been on b12     Depression/anxiety - on low dose sertraline and takes clonazepam PRN.       Hyperkalemia - elevated on last labs.  Pt is on lisinopril.  No supplements/mvi.      The following portions of the patient's history were reviewed and updated as appropriate: allergies, current medications, past family history, past medical history, past social history, past surgical history and problem list.    Allergies: Aleve [naproxen sodium], Codeine, Ibuprofen, and Sulfa antibiotics    Review of Systems   Constitutional: Negative.    HENT: Negative.    Eyes: Negative.    Respiratory: Negative.    Cardiovascular: Negative.    Gastrointestinal: Negative.    Endocrine: Negative.    Genitourinary: Negative.    Musculoskeletal: Positive for back pain.   Skin: Negative.    Allergic/Immunologic: Negative.    Neurological: Negative.    Hematological: Negative.    Psychiatric/Behavioral:  Negative.    All other systems reviewed and are negative.            Wt Readings from Last 3 Encounters:   06/25/21 44.6 kg (98 lb 6.4 oz)   06/08/21 45.4 kg (100 lb)   05/13/21 45.1 kg (99 lb 6.4 oz)     Temp Readings from Last 3 Encounters:   06/25/21 97.5 °F (36.4 °C) (Temporal)   05/13/21 97.7 °F (36.5 °C) (Temporal)   04/07/21 97.1 °F (36.2 °C) (Temporal)     BP Readings from Last 3 Encounters:   06/25/21 124/82   05/13/21 140/82   04/07/21 110/58     Pulse Readings from Last 3 Encounters:   06/25/21 86   05/13/21 77   04/07/21 75     Body mass index is 18.34 kg/m².  SpO2 Readings from Last 3 Encounters:   06/25/21 97%   05/13/21 97%   04/07/21 98%          Physical Exam  Vitals and nursing note reviewed.   Constitutional:       General: She is not in acute distress.     Appearance: She is well-developed.      Comments: thin   HENT:      Head: Normocephalic and atraumatic.      Right Ear: External ear normal.      Left Ear: External ear normal.      Nose: Nose normal.   Eyes:      Conjunctiva/sclera: Conjunctivae normal.      Pupils: Pupils are equal, round, and reactive to light.   Cardiovascular:      Rate and Rhythm: Normal rate and regular rhythm.      Heart sounds: Normal heart sounds.   Pulmonary:      Effort: Pulmonary effort is normal. No respiratory distress.      Breath sounds: Normal breath sounds. No wheezing.   Musculoskeletal:      Cervical back: Normal range of motion and neck supple.      Right lower leg: No edema.      Left lower leg: No edema.      Comments: +kyphosis   Skin:     General: Skin is warm and dry.   Neurological:      General: No focal deficit present.      Mental Status: She is alert and oriented to person, place, and time.   Psychiatric:         Behavior: Behavior normal.         Thought Content: Thought content normal.         Judgment: Judgment normal.         Results for orders placed or performed in visit on 04/07/21   POCT urinalysis dipstick, automated    Specimen: Urine    Result Value Ref Range    Color Yellow Yellow, Straw, Dark Yellow, Betty    Clarity, UA Clear Clear    Specific Gravity  1.020 1.005 - 1.030    pH, Urine 5.5 5.0 - 8.0    Leukocytes Small (1+) (A) Negative    Nitrite, UA Negative Negative    Protein, POC Negative Negative mg/dL    Glucose, UA Negative Negative, 1000 mg/dL (3+) mg/dL    Ketones, UA Negative Negative    Urobilinogen, UA Normal Normal    Bilirubin Negative Negative    Blood, UA Negative Negative     Result Review :                  Assessment and Plan    Diagnoses and all orders for this visit:    1. Essential hypertension (Primary)  -     Comprehensive Metabolic Panel  -     CBC & Differential  -     T4, Free  -     TSH    2. Lumbar back pain - pt waiting on pain mgt appt.    3. DDD (degenerative disc disease), lumbar    4. Coronary artery disease involving native coronary artery of native heart with angina pectoris (CMS/[facility])    5. PAF (paroxysmal atrial fibrillation) (CMS/[facility]) - encouraged pt to restart her metoprolol.  Keep f/u with dr. guillaume  -     Comprehensive Metabolic Panel  -     Lipid Panel With LDL / HDL Ratio    6. Mixed anxiety depressive disorder    7. Stage 3a chronic kidney disease (CMS/[facility])    8. B12 deficiency  -     Vitamin B12    9. Hypothyroidism (acquired)  -     Comprehensive Metabolic Panel  -     CBC & Differential  -     T4, Free  -     TSH  -     Lipid Panel With LDL / HDL Ratio  -     Hemoglobin A1c    10. Iron deficiency anemia, unspecified iron deficiency anemia type  -     CBC & Differential  -     Iron Profile    11. Vitamin D deficiency  -     Vitamin D 25 Hydroxy    12. Prediabetes  -     Comprehensive Metabolic Panel  -     CBC & Differential  -     T4, Free  -     TSH  -     Lipid Panel With LDL / HDL Ratio  -     Hemoglobin A1c    Other orders  -     apixaban (Eliquis) 5 MG tablet tablet; Take 1 tablet by mouth Every 12 (Twelve) Hours. Resume on 11/2/2020  Dispense: 60 tablet; Refill: 1       Reviewed current  medication plan with patient today.  Continue current medications.  Encouraged healthy diet/exercise.  OV labs in  3  months.  Pt to call sooner if any other issues arise.      Encouraged covid vaccination if not already done.  Covid vaccination can be scheduled at [email or web address]             Outpatient Medications Prior to Visit   Medication Sig Dispense Refill   • albuterol (PROVENTIL) (2.5 MG/3ML) 0.083% nebulizer solution Take 2.5 mg by nebulization Every 4 (Four) Hours As Needed for Wheezing or Shortness of Air. 120 vial 5   • albuterol sulfate  (90 Base) MCG/ACT inhaler Inhale 2 puffs Every 4 (Four) Hours As Needed for Wheezing. 18 g 11   • budesonide-formoterol (SYMBICORT) 160-4.5 MCG/ACT inhaler Inhale 2 puffs 2 (Two) Times a Day. 1 inhaler 12   • clonazePAM (KlonoPIN) 0.5 MG tablet Take 1 tablet by mouth At Night As Needed for Anxiety. 30 tablet 0   • fluticasone (FLONASE) 50 MCG/ACT nasal spray SPRAY TWICE IN EACH NOSTRIL EVERY DAY FOR 30 DAYS. 16 mL 6   • lisinopril (PRINIVIL,ZESTRIL) 20 MG tablet Take 1 tablet by mouth Daily. 90 tablet 0   • ondansetron (ZOFRAN) 8 MG tablet Take 1 tablet by mouth Every 8 (Eight) Hours As Needed for Nausea or Vomiting. 20 tablet 1   • oxybutynin (DITROPAN) 5 MG tablet Take 1 tablet by mouth 2 (Two) Times a Day. 180 tablet 1   • oxyCODONE-acetaminophen (Percocet) 7.5-325 MG per tablet Take 1 tablet by mouth Every 8 (Eight) Hours As Needed for Severe Pain . 75 tablet 0   • sertraline (ZOLOFT) 25 MG tablet Take 1 tablet by mouth Daily. 90 tablet 3   • vitamin D (ERGOCALCIFEROL) 1.25 MG (52018 UT) capsule capsule Take 1 capsule by mouth 1 (One) Time Per Week. 13 capsule 3   • apixaban (Eliquis) 5 MG tablet tablet Take 1 tablet by mouth Every 12 (Twelve) Hours. Resume on 11/2/2020 60 tablet 1     Facility-Administered Medications Prior to Visit   Medication Dose Route Frequency Provider Last Rate Last Admin   • cyanocobalamin injection 1,000  mcg  1,000 mcg Intramuscular Q28 Days Donna Forte MD         New Medications Ordered This Visit   Medications   • apixaban (Eliquis) 5 MG tablet tablet     Sig: Take 1 tablet by mouth Every 12 (Twelve) Hours. Resume on 11/2/2020     Dispense:  60 tablet     Refill:  1     [unfilled]  Medications Discontinued During This Encounter   Medication Reason   • apixaban (Eliquis) 5 MG tablet tablet Reorder         Return in about 3 months (around 9/25/2021) for Recheck, labs.    Patient was given instructions and counseling regarding her condition or for health maintenance advice. Please see specific information pulled into the AVS if appropriate.

## 2021-07-09 DIAGNOSIS — M54.50 CHRONIC LOW BACK PAIN WITHOUT SCIATICA, UNSPECIFIED BACK PAIN LATERALITY: ICD-10-CM

## 2021-07-09 DIAGNOSIS — F32.A ANXIETY AND DEPRESSION: ICD-10-CM

## 2021-07-09 DIAGNOSIS — F41.9 ANXIETY AND DEPRESSION: ICD-10-CM

## 2021-07-09 DIAGNOSIS — G89.29 CHRONIC LOW BACK PAIN WITHOUT SCIATICA, UNSPECIFIED BACK PAIN LATERALITY: ICD-10-CM

## 2021-07-09 DIAGNOSIS — M54.50 LUMBAR BACK PAIN: ICD-10-CM

## 2021-07-09 RX ORDER — OXYCODONE AND ACETAMINOPHEN 7.5; 325 MG/1; MG/1
1 TABLET ORAL EVERY 8 HOURS PRN
Qty: 75 TABLET | Refills: 0 | Status: SHIPPED | OUTPATIENT
Start: 2021-07-09 | End: 2021-07-27 | Stop reason: SDUPTHER

## 2021-07-09 RX ORDER — CLONAZEPAM 0.5 MG/1
0.5 TABLET ORAL NIGHTLY PRN
Qty: 30 TABLET | Refills: 0 | Status: SHIPPED | OUTPATIENT
Start: 2021-07-09 | End: 2021-07-27 | Stop reason: SDUPTHER

## 2021-07-09 NOTE — TELEPHONE ENCOUNTER
DELETE AFTER REVIEWING: Send the encounter HIGH priority, If patient has less than a 3 day supply. If the patient will run out of medication over the weekend add that information to the additional details line. Send this encounter to the clinical pool.    Caller: Kaylin Ojeda    Relationship: Self    Best call back number: [phone]  Medication needed:   Requested Prescriptions     Pending Prescriptions Disp Refills   • clonazePAM (KlonoPIN) 0.5 MG tablet 30 tablet 0     Sig: Take 1 tablet by mouth At Night As Needed for Anxiety.   • oxyCODONE-acetaminophen (Percocet) 7.5-325 MG per tablet 75 tablet 0     Sig: Take 1 tablet by mouth Every 8 (Eight) Hours As Needed for Severe Pain .               Does the patient have less than a 3 day supply:  [x] Yes  [] No    What is the patient's preferred pharmacy: [id number] - [place] [address] AT [facility] [address] - [phone]  - [phone]

## 2021-07-27 ENCOUNTER — TELEPHONE (OUTPATIENT)
Dept: INTERNAL MEDICINE | Facility: CLINIC | Age: 81
End: 2021-07-27

## 2021-07-27 DIAGNOSIS — M54.50 LUMBAR BACK PAIN: ICD-10-CM

## 2021-07-27 DIAGNOSIS — F41.9 ANXIETY AND DEPRESSION: ICD-10-CM

## 2021-07-27 DIAGNOSIS — M54.50 CHRONIC LOW BACK PAIN WITHOUT SCIATICA, UNSPECIFIED BACK PAIN LATERALITY: ICD-10-CM

## 2021-07-27 DIAGNOSIS — F32.A ANXIETY AND DEPRESSION: ICD-10-CM

## 2021-07-27 DIAGNOSIS — G89.29 CHRONIC LOW BACK PAIN WITHOUT SCIATICA, UNSPECIFIED BACK PAIN LATERALITY: ICD-10-CM

## 2021-07-27 RX ORDER — CLONAZEPAM 0.5 MG/1
0.5 TABLET ORAL NIGHTLY PRN
Qty: 30 TABLET | Refills: 0 | Status: SHIPPED | OUTPATIENT
Start: 2021-07-27 | End: 2021-09-09 | Stop reason: SDUPTHER

## 2021-07-27 RX ORDER — OXYCODONE AND ACETAMINOPHEN 7.5; 325 MG/1; MG/1
1 TABLET ORAL EVERY 8 HOURS PRN
Qty: 75 TABLET | Refills: 0 | Status: SHIPPED | OUTPATIENT
Start: 2021-07-27 | End: 2021-09-09 | Stop reason: SDUPTHER

## 2021-07-27 NOTE — TELEPHONE ENCOUNTER
Caller: Kaylin Ojeda    Relationship: Self    Best call back number: [phone]    Medication needed:   Requested Prescriptions     Pending Prescriptions Disp Refills   • clonazePAM (KlonoPIN) 0.5 MG tablet 30 tablet 0     Sig: Take 1 tablet by mouth At Night As Needed for Anxiety.   • oxyCODONE-acetaminophen (Percocet) 7.5-325 MG per tablet 75 tablet 0     Sig: Take 1 tablet by mouth Every 8 (Eight) Hours As Needed for Severe Pain .       When do you need the refill by: ASAP  What additional details did the patient provide when requesting the medication: A COUPLE WEEKS LEFT   Does the patient have less than a 3 day supply:  [] Yes  [x] No    What is the patient's preferred pharmacy: [facility] - [facility] [address] - [phone]  - [phone]       PATIENT CALLED IN AND WANTED TO MAKE SURE THAT  HER MEDICATION IS APPROVED TO GO THROUGH [facility]. PLEASE CALL PATIENT AND ADVISE.

## 2021-08-16 DIAGNOSIS — I10 ESSENTIAL HYPERTENSION: ICD-10-CM

## 2021-08-17 RX ORDER — LISINOPRIL 20 MG/1
TABLET ORAL
Qty: 90 TABLET | Refills: 1 | Status: SHIPPED | OUTPATIENT
Start: 2021-08-17 | End: 2022-01-01 | Stop reason: SDUPTHER

## 2021-08-20 NOTE — PROGRESS NOTES
RM:________     PCP: Donna Forte MD    : 1940  AGE: 81 y.o.  EST PATIENT   REASON FOR VISIT/  CC:    BP Readings from Last 3 Encounters:   21 124/82   21 140/82   21 110/58        WT: ____________ BP: __________L __________R HR______    CHEST PAIN: _____________    SOA: _____________PALPS: _______________     LIGHTHEADED: ___________FATIGUE: ________________ EDEMA __________    ALLERGIES:Aleve [naproxen sodium], Codeine, Ibuprofen, and Sulfa antibiotics SMOKING HISTORY:  Social History     Tobacco Use   • Smoking status: Current Some Day Smoker     Packs/day: 0.50     Types: Cigarettes   • Smokeless tobacco: Never Used   • Tobacco comment: trying to quit, pt states she has basically quit but will still have one when she stressed   Substance Use Topics   • Alcohol use: Not Currently     Comment: OCCASIONAL/ TWICE A YEAR.    • Drug use: No     CAFFEINE USE_________________  ALCOHOL ______________________    Below is the patient's most recent value for Albumin, ALT, AST, BUN, Calcium, Chloride, Cholesterol, CO2, Creatinine, GFR, Glucose, HDL, Hematocrit, Hemoglobin, Hemoglobin A1C, LDL, Magnesium, Phosphorus, Platelets, Potassium, PSA, Sodium, Triglycerides, TSH and WBC.   Lab Results   Component Value Date    ALBUMIN 4.8 (H) 2020    ALT 16 2020    AST 21 2020    BUN 22 2021    CALCIUM 9.7 2021     2021    CHOL 133 06/10/2019    CO2 27.5 2021    CREATININE 0.96 2021    GLU 84 2021    HDL 56 2020    HCT 37.6 2021    HGB 11.8 (L) 2021    HGBA1C 6.2 (H) 2020    LDL 51 2020    MG 2.4 2017     2021    K 5.4 (H) 2021     2021    TRIG 69 2020    TSH 0.774 2020    WBC 6.83 2021          NEW DIAGNOSIS/ SURGERY/ HOSP OR ED VISITS: ______________________    __________________________________________________________________      RECENT LABS OR  DIAGNOSTIC TESTING:  _____________________________    __________________________________________________________________      ASSESSMENT/ PLAN: _______________________________________________    __________________________________________________________________

## 2021-08-24 ENCOUNTER — OFFICE VISIT (OUTPATIENT)
Dept: CARDIOLOGY | Facility: CLINIC | Age: 81
End: 2021-08-24

## 2021-08-24 VITALS
HEIGHT: 61 IN | HEART RATE: 75 BPM | BODY MASS INDEX: 18.5 KG/M2 | WEIGHT: 98 LBS | DIASTOLIC BLOOD PRESSURE: 70 MMHG | SYSTOLIC BLOOD PRESSURE: 136 MMHG

## 2021-08-24 DIAGNOSIS — I77.9 PERIPHERAL ARTERIAL OCCLUSIVE DISEASE (HCC): ICD-10-CM

## 2021-08-24 DIAGNOSIS — I10 ESSENTIAL HYPERTENSION: ICD-10-CM

## 2021-08-24 DIAGNOSIS — J42 CHRONIC BRONCHITIS, UNSPECIFIED CHRONIC BRONCHITIS TYPE (HCC): ICD-10-CM

## 2021-08-24 DIAGNOSIS — I48.0 PAF (PAROXYSMAL ATRIAL FIBRILLATION) (HCC): Primary | ICD-10-CM

## 2021-08-24 DIAGNOSIS — I25.119 CORONARY ARTERY DISEASE INVOLVING NATIVE CORONARY ARTERY OF NATIVE HEART WITH ANGINA PECTORIS (HCC): ICD-10-CM

## 2021-08-24 DIAGNOSIS — I38 VALVULAR HEART DISEASE: ICD-10-CM

## 2021-08-24 PROCEDURE — 99213 OFFICE O/P EST LOW 20 MIN: CPT | Performed by: INTERNAL MEDICINE

## 2021-08-24 PROCEDURE — 93000 ELECTROCARDIOGRAM COMPLETE: CPT | Performed by: INTERNAL MEDICINE

## 2021-08-24 NOTE — PROGRESS NOTES
Date of Office Visit: 2021  Encounter Provider: Mo Calero MD  Place of Service: [facility] CARDIOLOGY  Patient Name: Kaylin Ojeda  :1940    Chief Complaint   Patient presents with   • Atrial Fibrillation   :     HPI: Kaylin Ojeda is a 81 y.o. female who presents today to follow up. I have reviewed prior notes and there are no changes except for any new updates described below. I have also reviewed any information entered into the medical record by the patient or by ancillary staff.     She is a chronically ill woman with previous history of refractory hypertension and severe peripheral vascular disease. In May 2015, she was found to have single-vessel coronary artery disease of the circumflex and had a stent placed. Over the next two years, I had to progressively cut back on her anti-hypertensives due to low blood pressure (presumably from weight loss).      In 2017 she developed anginal chest pressure and was found to have rapid atrial fibrillation.  She cardioverted on her own and her metoprolol dose was increased.  An echo showed normal LV systolic function, moderate TR, and mild AI/MR.  She ruled out for ACS.  She was placed on apixaban. Once she was on apixaban, she had to stop aspirin as it caused nosebleeds.    I saw her in May 2019 for palpitations and chest discomfort.  She was under a tremendous amount of stress and was in a very unfortunately living situation. I ordered an echo, which showed normal LVSF, EF 60%, mild-moderate AI, and severe MR/TR.  I did not feel she was a candidate for further evaluation due to her comorbidities.    In late , she requested to be evaluated by Dr Mai.  He ordered a cath; this showed a patent LCx stent, and moderate disease of the distal LAD.  He ordered a RICKY, which showed normal LVSF, moderate-severe AI, moderate MR, and mild-moderate TR.  PFTs showed severely reduced DLCO; the spirometry was reported as  normal, but the loops were poor quality.  A previous CT of the chest had reported severe emphysematous changes.  A CTA of the chest neck revealed left subclavian occlusion, severe disease of the ostial left vertebral artery, 53% stenosis of the right ICA, and 60% disease of the left ICA.      Ultimately, she decided against surgery, as she felt it would be too high risk.    She's actually doing pretty well. She stopped metoprolol and amlodipine due to low blood pressure, and atorvastatin due to nausea/vomiting.  Her symptoms improved afterwards.  She is living alone and doing well.  She has stable chronic exertional dyspnea, and stable claudication. She is not having chest pain, palpitations, lightheadedness, or syncope. She continues to smoke cigarettes.    Past Medical History:   Diagnosis Date   • Abdominal pain, epigastric     Chronic, unclear cause, improved   • Anorexia    • Anxiety    • Arthritis    • CAD (coronary artery disease)     Cath 5/2015: nonobstructive LAD/RCA disease, 90% mid LCx s/p [id number] Xience.  Cath 1/2021: 50-60% prox LAD/70-80% distal LAD, patent Cx stent, 20% RCA   • Cellulitis of leg    • Cervical disc disease    • Chronic fatigue    • Colitis    • COPD (chronic obstructive pulmonary disease) (CMS/HCC)    • Depression    • Erosive gastritis    • Fibromyalgia    • Frequency of urination 6/9/2017   • Gastritis    • Hypercholesterolemia 2/2/2016   • Hyperglycemia    • Hypertension     History of refractory hypertension which improved significantly   • Insomnia    • Leukocytes in urine    • Lower back pain    • Mediastinal lymphadenopathy    • 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HCC)    • Mononucleosis    • Occlusion and stenosis of carotid artery    • Onychomycosis    • Orbit fracture (CMS/HCC)    • PAF (paroxysmal atrial fibrillation) (CMS/HCC)    • Peripheral arterial disease (CMS/HCC)     Significant (carotid, subclavian, renal arteries, lower extremities), with claudication, medical  therapy only   • Pernicious anemia    • Prediabetes    • Renal artery stenosis (CMS/HCC)     unilateral, with renal atrophy (no intervention required)   • Renal calculi    • Sinus bradycardia     mild, asymptomatic   • TIA (transient ischemic attack)    • UTI (urinary tract infection)    • Valvular heart disease     1/2021: mod-severe AI, mod MR, mild-mod TR   • Vision problem    • Vitamin B 12 deficiency        Past Surgical History:   Procedure Laterality Date   • APPENDECTOMY     • BREAST BIOPSY Left     20+ yrs ago   • BREAST SURGERY     • CARDIAC CATHETERIZATION  05/2015    nonobs LAD/RCA disease, 90% mid LCx s/p [id number] Xience   • CARDIAC CATHETERIZATION N/A 10/28/2020    Procedure: Right and Left Heart Cath;  Surgeon: Opal Contreras MD;  Location:  [facility];  Service: Cardiovascular;  Laterality: N/A;  for cardiac testing prior to possible valve surgery per Dr. Mai   • CARDIAC CATHETERIZATION N/A 10/28/2020    Procedure: Coronary angiography;  Surgeon: Opal Contreras MD;  Location:  [facility];  Service: Cardiovascular;  Laterality: N/A;   • CERVICAL FUSION  1997   • CHOLECYSTECTOMY     • CORONARY STENT PLACEMENT     • HYSTERECTOMY  1995   • KNEE SURGERY Right 2005   • KNEE SURGERY Left 1998       Social History     Socioeconomic History   • Marital status:      Spouse name: Willie   • Number of children: 3   • Years of education: Some College   • Highest education level: Not on file   Tobacco Use   • Smoking status: Current Some Day Smoker     Packs/day: 0.50     Types: Cigarettes   • Smokeless tobacco: Never Used   • Tobacco comment: trying to quit, pt states she has basically quit but will still have one when she stressed   Substance and Sexual Activity   • Alcohol use: Not Currently     Comment: OCCASIONAL/ TWICE A YEAR.    • Drug use: No   • Sexual activity: Not Currently       Family History   Problem Relation Age of Onset   • Asthma Mother    •  "Emphysema Mother    • Graves' disease Mother    • Heart disease Mother    • Hypertension Mother    • Emphysema Father    • Hypertension Father    • Heart disease Father    • Kidney disease Sister    • Lupus Daughter         Systemic erythematosus   • Arthritis Other    • Crohn's disease Other    • Breast cancer Niece    • Breast cancer Maternal Cousin    • Breast cancer Maternal Cousin        Review of Systems   Constitutional: Positive for malaise/fatigue.   Cardiovascular: Positive for claudication and dyspnea on exertion. Negative for leg swelling.   Respiratory: Negative for shortness of breath.    Musculoskeletal: Positive for arthritis and back pain.   Neurological: Positive for focal weakness. Negative for dizziness and light-headedness.   Psychiatric/Behavioral: Positive for depression. The patient is nervous/anxious.    All other systems reviewed and are negative.      Allergies   Allergen Reactions   • Aleve [Naproxen Sodium] Shortness Of Breath   • Codeine Unknown - Low Severity     hyperactivity   • Ibuprofen Unknown - Low Severity     unk   • Sulfa Antibiotics Unknown - Low Severity     \"I can't remember\"         Current Outpatient Medications:   •  albuterol (PROVENTIL) (2.5 MG/3ML) 0.083% nebulizer solution, Take 2.5 mg by nebulization Every 4 (Four) Hours As Needed for Wheezing or Shortness of Air., Disp: 120 vial, Rfl: 5  •  apixaban (Eliquis) 5 MG tablet tablet, Take 1 tablet by mouth Every 12 (Twelve) Hours. Resume on 11/2/2020, Disp: 60 tablet, Rfl: 1  •  clonazePAM (KlonoPIN) 0.5 MG tablet, Take 1 tablet by mouth At Night As Needed for Anxiety., Disp: 30 tablet, Rfl: 0  •  lisinopril (PRINIVIL,ZESTRIL) 20 MG tablet, TAKE 1 TABLET EVERY DAY, Disp: 90 tablet, Rfl: 1  •  ondansetron (ZOFRAN) 8 MG tablet, Take 1 tablet by mouth Every 8 (Eight) Hours As Needed for Nausea or Vomiting., Disp: 20 tablet, Rfl: 1  •  oxybutynin (DITROPAN) 5 MG tablet, Take 1 tablet by mouth 2 (Two) Times a Day., Disp: 180 " "tablet, Rfl: 1  •  oxyCODONE-acetaminophen (Percocet) 7.5-325 MG per tablet, Take 1 tablet by mouth Every 8 (Eight) Hours As Needed for Severe Pain ., Disp: 75 tablet, Rfl: 0  •  albuterol sulfate  (90 Base) MCG/ACT inhaler, Inhale 2 puffs Every 4 (Four) Hours As Needed for Wheezing., Disp: 18 g, Rfl: 11  •  budesonide-formoterol (SYMBICORT) 160-4.5 MCG/ACT inhaler, Inhale 2 puffs 2 (Two) Times a Day., Disp: 1 inhaler, Rfl: 12  •  fluticasone (FLONASE) 50 MCG/ACT nasal spray, SPRAY TWICE IN EACH NOSTRIL EVERY DAY FOR 30 DAYS., Disp: 16 mL, Rfl: 6  •  sertraline (ZOLOFT) 25 MG tablet, Take 1 tablet by mouth Daily., Disp: 90 tablet, Rfl: 3  •  vitamin D (ERGOCALCIFEROL) 1.25 MG (83626 UT) capsule capsule, Take 1 capsule by mouth 1 (One) Time Per Week., Disp: 13 capsule, Rfl: 3    Current Facility-Administered Medications:   •  cyanocobalamin injection 1,000 mcg, 1,000 mcg, Intramuscular, Q28 Days, JannDonna MD, 1,000 mcg at 06/25/21 1437     Objective:     Vitals:    08/24/21 1418   BP: 136/70   BP Location: Right arm   Pulse: 75   Weight: 44.5 kg (98 lb)   Height: 156 cm (61.42\")     Body mass index is 18.26 kg/m².    Physical Exam  Vitals reviewed.   Constitutional:       Comments: Frail, chronically ill appearing   HENT:      Head: Normocephalic.      Nose: Nose normal.   Eyes:      Conjunctiva/sclera: Conjunctivae normal.   Neck:      Vascular: No JVD.   Cardiovascular:      Rate and Rhythm: Normal rate and regular rhythm.  No extrasystoles are present.     Pulses: Decreased pulses.           Carotid pulses are on the right side with bruit and on the left side with bruit.       Dorsalis pedis pulses are 1+ on the right side and 1+ on the left side.        Posterior tibial pulses are 1+ on the right side and 1+ on the left side.      Heart sounds: Murmur heard.   Systolic murmur is present with a grade of 2/6 radiating to the apex.   Diastolic murmur is present with a grade of 2/4 at the " upper left sternal border.     Pulmonary:      Effort: Pulmonary effort is normal.      Breath sounds: Decreased breath sounds present.   Abdominal:      Palpations: Abdomen is soft.      Tenderness: There is no abdominal tenderness.   Musculoskeletal:         General: Normal range of motion.      Cervical back: Normal range of motion.   Skin:     General: Skin is warm and dry.      Findings: No rash.   Neurological:      General: No focal deficit present.      Mental Status: She is alert.      Cranial Nerves: No cranial nerve deficit.   Psychiatric:         Mood and Affect: Mood normal.         Behavior: Behavior normal.         Thought Content: Thought content normal.           ECG 12 Lead    Date/Time: 8/24/2021 2:28 PM  Performed by: Mo Calero MD  Authorized by: Mo Calero MD   Comparison: compared with previous ECG   Similar to previous ECG  Rhythm: sinus rhythm  Conduction: conduction normal  T Waves: T waves normal  QRS axis: normal  Other findings: non-specific ST-T wave changes and left ventricular hypertrophy    Clinical impression: abnormal EKG            Assessment:       Diagnosis Plan   1. PAF (paroxysmal atrial fibrillation) (CMS/[facility])  ECG 12 Lead   2. Coronary artery disease involving native coronary artery of native heart with angina pectoris (CMS/[facility])     3. Valvular heart disease     4. Essential hypertension     5. Peripheral arterial occlusive disease (CMS/HCC)     6. Chronic bronchitis, unspecified chronic bronchitis type (CMS/[facility])       Plan:       1.  Atrial Fibrillation and Atrial Flutter  Assessment  • The patient has paroxysmal atrial fibrillation  • The patient's CHADS2-VASc score is 5  • A [id number] score of 2 or more is considered a high risk for a thromboembolic event  • Apixaban prescribed    Plan  • Attempt to maintain sinus rhythm  • Continue apixaban for antithrombotic therapy, bleeding issues discussed  • Continue beta blocker for rhythm control    When she turns 82,  we will need to decrease apixaban to 2.5mg BID. Even with her valvular disease, I feel that apixaban is the safest option for her.     2.  Coronary Artery Disease  Assessment  • The patient has no angina  • She is no longer on aspirin or clopidogrel as she's anticoagulated and had bad epistaxis.  She denies angina.    Subjective - Objective  • There has been a previous stent procedure using THELMA 2015      3.  A RICKY in 2021 showed mod-severe AI, mod-MR, and mild-mod TR; she has seen Dr Mai but elected against surgery due to it's high mortality/morbidity. She's doing okay right now.     4.  Her BP is stable, especially for her frail size.    5. She has mesenteric disease, renal artery stenosis, carotid artery disease, a small AAA (3.8cm in May 2018), subclavian disease, and PAD.  She does not tolerate antiplatelets due to epistaxis, or statins due to GI side effects. Unfortunately she still smokes. She has not followed up with vascular as the office is too far from her house.     Sincerely,       Mo Calero MD

## 2021-08-25 RX ORDER — ATORVASTATIN CALCIUM 10 MG/1
TABLET, FILM COATED ORAL
Qty: 90 TABLET | Refills: 3 | Status: SHIPPED | OUTPATIENT
Start: 2021-08-25 | End: 2022-02-25

## 2021-09-01 DIAGNOSIS — M51.36 DDD (DEGENERATIVE DISC DISEASE), LUMBAR: Primary | ICD-10-CM

## 2021-09-09 ENCOUNTER — TELEPHONE (OUTPATIENT)
Dept: INTERNAL MEDICINE | Facility: CLINIC | Age: 81
End: 2021-09-09

## 2021-09-09 DIAGNOSIS — M54.50 LUMBAR BACK PAIN: ICD-10-CM

## 2021-09-09 DIAGNOSIS — M54.50 CHRONIC LOW BACK PAIN WITHOUT SCIATICA, UNSPECIFIED BACK PAIN LATERALITY: ICD-10-CM

## 2021-09-09 DIAGNOSIS — F41.9 ANXIETY AND DEPRESSION: ICD-10-CM

## 2021-09-09 DIAGNOSIS — G89.29 CHRONIC LOW BACK PAIN WITHOUT SCIATICA, UNSPECIFIED BACK PAIN LATERALITY: ICD-10-CM

## 2021-09-09 DIAGNOSIS — F32.A ANXIETY AND DEPRESSION: ICD-10-CM

## 2021-09-09 NOTE — TELEPHONE ENCOUNTER
SHE IS NEEDING A PRIOR AUTHORIZATION BECAUSE SHE IS OUT OF HER PAIN MEDICATION AND SLEEPING MEDICATION FOR TWO DAYS AND NEEDING TO PICK THEM UP TODAY.SHE WANTS TO GO TO [facility] IN [place] TO GET IT.     [phone]

## 2021-09-09 NOTE — TELEPHONE ENCOUNTER
oxyCODONE-acetaminophen (Percocet) 7.5-325      clonazePAM (KlonoPIN) 0.5 MG tablet    MG per tablet        PATIENT STATES ABOVE MEDICATIONS ARE WHAT SHE NEEDS. PATIENT IS COMPLETELY OUT OF MEDICATION

## 2021-09-10 RX ORDER — CLONAZEPAM 0.5 MG/1
0.5 TABLET ORAL NIGHTLY PRN
Qty: 30 TABLET | Refills: 0 | Status: SHIPPED | OUTPATIENT
Start: 2021-09-10 | End: 2021-10-11 | Stop reason: SDUPTHER

## 2021-09-10 RX ORDER — OXYCODONE AND ACETAMINOPHEN 7.5; 325 MG/1; MG/1
1 TABLET ORAL EVERY 8 HOURS PRN
Qty: 75 TABLET | Refills: 0 | Status: SHIPPED | OUTPATIENT
Start: 2021-09-10 | End: 2021-10-11 | Stop reason: SDUPTHER

## 2021-09-14 ENCOUNTER — TELEPHONE (OUTPATIENT)
Dept: INTERNAL MEDICINE | Facility: CLINIC | Age: 81
End: 2021-09-14

## 2021-09-14 NOTE — TELEPHONE ENCOUNTER
Hub staff attempted to follow warm transfer process and was unsuccessful     Caller: Kaylin Ojeda    Relationship to patient: Self    Best call back number: [phone]    Patient is needing: PLEASE CALL PATIENT TO RESCHEDULE HER LABS FOR TOMORROW MORNING.

## 2021-09-17 LAB
25(OH)D3+25(OH)D2 SERPL-MCNC: 32.6 NG/ML (ref 30–100)
ALBUMIN SERPL-MCNC: 4.6 G/DL (ref 3.5–5.2)
ALBUMIN/GLOB SERPL: 1.8 G/DL
ALP SERPL-CCNC: 90 U/L (ref 39–117)
ALT SERPL-CCNC: 14 U/L (ref 1–33)
AST SERPL-CCNC: 22 U/L (ref 1–32)
BASOPHILS # BLD AUTO: 0.07 10*3/MM3 (ref 0–0.2)
BASOPHILS NFR BLD AUTO: 1.1 % (ref 0–1.5)
BILIRUB SERPL-MCNC: 0.4 MG/DL (ref 0–1.2)
BUN SERPL-MCNC: 15 MG/DL (ref 8–23)
BUN/CREAT SERPL: 17.4 (ref 7–25)
CALCIUM SERPL-MCNC: 9.5 MG/DL (ref 8.6–10.5)
CHLORIDE SERPL-SCNC: 100 MMOL/L (ref 98–107)
CHOLEST SERPL-MCNC: 193 MG/DL (ref 0–200)
CO2 SERPL-SCNC: 27.7 MMOL/L (ref 22–29)
CREAT SERPL-MCNC: 0.86 MG/DL (ref 0.57–1)
EOSINOPHIL # BLD AUTO: 0.08 10*3/MM3 (ref 0–0.4)
EOSINOPHIL NFR BLD AUTO: 1.2 % (ref 0.3–6.2)
ERYTHROCYTE [DISTWIDTH] IN BLOOD BY AUTOMATED COUNT: 12.5 % (ref 12.3–15.4)
GLOBULIN SER CALC-MCNC: 2.5 GM/DL
GLUCOSE SERPL-MCNC: 79 MG/DL (ref 65–99)
HBA1C MFR BLD: 5.7 % (ref 4.8–5.6)
HCT VFR BLD AUTO: 44.2 % (ref 34–46.6)
HDLC SERPL-MCNC: 57 MG/DL (ref 40–60)
HGB BLD-MCNC: 14.1 G/DL (ref 12–15.9)
IMM GRANULOCYTES # BLD AUTO: 0.03 10*3/MM3 (ref 0–0.05)
IMM GRANULOCYTES NFR BLD AUTO: 0.5 % (ref 0–0.5)
IRON SATN MFR SERPL: 20 % (ref 20–50)
IRON SERPL-MCNC: 74 MCG/DL (ref 37–145)
LDLC SERPL CALC-MCNC: 116 MG/DL (ref 0–100)
LDLC/HDLC SERPL: 2 {RATIO}
LYMPHOCYTES # BLD AUTO: 1.77 10*3/MM3 (ref 0.7–3.1)
LYMPHOCYTES NFR BLD AUTO: 26.6 % (ref 19.6–45.3)
MCH RBC QN AUTO: 31.8 PG (ref 26.6–33)
MCHC RBC AUTO-ENTMCNC: 31.9 G/DL (ref 31.5–35.7)
MCV RBC AUTO: 99.5 FL (ref 79–97)
MONOCYTES # BLD AUTO: 0.41 10*3/MM3 (ref 0.1–0.9)
MONOCYTES NFR BLD AUTO: 6.2 % (ref 5–12)
NEUTROPHILS # BLD AUTO: 4.29 10*3/MM3 (ref 1.7–7)
NEUTROPHILS NFR BLD AUTO: 64.4 % (ref 42.7–76)
NRBC BLD AUTO-RTO: 0 /100 WBC (ref 0–0.2)
PLATELET # BLD AUTO: 230 10*3/MM3 (ref 140–450)
POTASSIUM SERPL-SCNC: 4.8 MMOL/L (ref 3.5–5.2)
PROT SERPL-MCNC: 7.1 G/DL (ref 6–8.5)
RBC # BLD AUTO: 4.44 10*6/MM3 (ref 3.77–5.28)
SODIUM SERPL-SCNC: 139 MMOL/L (ref 136–145)
T4 FREE SERPL-MCNC: 0.93 NG/DL (ref 0.93–1.7)
TIBC SERPL-MCNC: 363 MCG/DL
TRIGL SERPL-MCNC: 110 MG/DL (ref 0–150)
TSH SERPL DL<=0.005 MIU/L-ACNC: 5.49 UIU/ML (ref 0.27–4.2)
UIBC SERPL-MCNC: 289 MCG/DL (ref 112–346)
VIT B12 SERPL-MCNC: 368 PG/ML (ref 211–946)
VLDLC SERPL CALC-MCNC: 20 MG/DL (ref 5–40)
WBC # BLD AUTO: 6.65 10*3/MM3 (ref 3.4–10.8)

## 2021-09-22 ENCOUNTER — OFFICE VISIT (OUTPATIENT)
Dept: INTERNAL MEDICINE | Facility: CLINIC | Age: 81
End: 2021-09-22

## 2021-09-22 VITALS
HEART RATE: 73 BPM | BODY MASS INDEX: 18.5 KG/M2 | WEIGHT: 98 LBS | RESPIRATION RATE: 16 BRPM | HEIGHT: 61 IN | OXYGEN SATURATION: 97 % | TEMPERATURE: 97.2 F | SYSTOLIC BLOOD PRESSURE: 130 MMHG | DIASTOLIC BLOOD PRESSURE: 80 MMHG

## 2021-09-22 DIAGNOSIS — R73.03 PREDIABETES: ICD-10-CM

## 2021-09-22 DIAGNOSIS — D51.0 VITAMIN B12 DEFICIENCY ANEMIA DUE TO INTRINSIC FACTOR DEFICIENCY: ICD-10-CM

## 2021-09-22 DIAGNOSIS — E03.9 ACQUIRED HYPOTHYROIDISM: ICD-10-CM

## 2021-09-22 DIAGNOSIS — I10 ESSENTIAL HYPERTENSION: ICD-10-CM

## 2021-09-22 DIAGNOSIS — R11.0 NAUSEA: ICD-10-CM

## 2021-09-22 DIAGNOSIS — F32.A ANXIETY AND DEPRESSION: ICD-10-CM

## 2021-09-22 DIAGNOSIS — I77.9 PERIPHERAL ARTERIAL OCCLUSIVE DISEASE (HCC): ICD-10-CM

## 2021-09-22 DIAGNOSIS — E78.00 HYPERCHOLESTEROLEMIA: ICD-10-CM

## 2021-09-22 DIAGNOSIS — F41.9 ANXIETY AND DEPRESSION: ICD-10-CM

## 2021-09-22 DIAGNOSIS — I25.119 CORONARY ARTERY DISEASE INVOLVING NATIVE CORONARY ARTERY OF NATIVE HEART WITH ANGINA PECTORIS (HCC): Primary | ICD-10-CM

## 2021-09-22 DIAGNOSIS — F41.8 MIXED ANXIETY DEPRESSIVE DISORDER: ICD-10-CM

## 2021-09-22 DIAGNOSIS — J42 CHRONIC BRONCHITIS, UNSPECIFIED CHRONIC BRONCHITIS TYPE (HCC): ICD-10-CM

## 2021-09-22 DIAGNOSIS — K55.1 MESENTERIC ARTERY STENOSIS (HCC): ICD-10-CM

## 2021-09-22 DIAGNOSIS — I48.0 PAF (PAROXYSMAL ATRIAL FIBRILLATION) (HCC): ICD-10-CM

## 2021-09-22 PROCEDURE — 99214 OFFICE O/P EST MOD 30 MIN: CPT | Performed by: INTERNAL MEDICINE

## 2021-09-22 PROCEDURE — 96372 THER/PROPH/DIAG INJ SC/IM: CPT | Performed by: INTERNAL MEDICINE

## 2021-09-22 RX ORDER — ERGOCALCIFEROL 1.25 MG/1
50000 CAPSULE ORAL WEEKLY
Qty: 13 CAPSULE | Refills: 3 | Status: SHIPPED | OUTPATIENT
Start: 2021-09-22 | End: 2022-03-07 | Stop reason: SDUPTHER

## 2021-09-22 RX ORDER — OXYBUTYNIN CHLORIDE 5 MG/1
TABLET ORAL
COMMUNITY
Start: 2021-08-09 | End: 2021-09-22

## 2021-09-22 RX ADMIN — CYANOCOBALAMIN 1000 MCG: 1000 INJECTION, SOLUTION INTRAMUSCULAR; SUBCUTANEOUS at 14:11

## 2021-09-22 NOTE — ASSESSMENT & PLAN NOTE
Coronary artery disease is unchanged.  Stop smoking.  Continue current medications.   She is not on a statin due to adverse effects  Cardiac status will be reassessed at next office visit.

## 2021-09-22 NOTE — PROGRESS NOTES
"Chief Complaint  Hypertension    Subjective          Kaylin Ojeda presents to [facility]  History of Present Illness     Pt states appetite is poor overall.    CAD, HTN, PAF, PAD: Pt recently saw cardiology in August 2021. She is not currently on a beta blocker due to hypotension. She is on Eliquis for her A-fib and Dr. Calero feels that this is appropriate with her moderate to severe aortic insufficiency. Denies any chest pain or syncope. She does get dizzy when she stands up too fast.    HLD - pt currently off statin.  She thinks she feels better off the med.    Prediabetes - denies any numbness or tingling.  a1c 5.7    COPD - does not take any inhalers regularly. Denies any SOB on exertion. Is not on any home oxygen.    Hypothyroidism - tsh slightly elevated with low-normal ft4.  She was on synthroid in past but isnt taking it right now.  She says she just wanted to take what she absolutely needs and cut everything else out.     Anxiety - She is on Zoloft. She can't tell if it helps or not.    Insomnia - She takes Klonipin which helps her get to sleep. She does still wake up in the middle of the night.    Low back pain - On Percocet for chronic pain. She does take it 3 times a day. She does not any baseline Tylenol or Ibuprofen.    Vitamin D deficiency - She has been out of meds for approx 3 months    Vitamin B12 deficiency - Last shot was approx 3 months ago.    Objective   Vital Signs:   /80   Pulse 73   Temp 97.2 °F (36.2 °C)   Resp 16   Ht 156 cm (61.42\")   Wt 44.5 kg (98 lb)   SpO2 97%   BMI 18.27 kg/m²     Physical Exam  Vitals and nursing note reviewed.   Constitutional:       General: She is not in acute distress.     Appearance: Normal appearance. She is normal weight.   HENT:      Head: Normocephalic and atraumatic.      Right Ear: Tympanic membrane, ear canal and external ear normal.      Left Ear: Tympanic membrane, ear canal and external ear normal.      " Nose: Nose normal.      Mouth/Throat:      Mouth: Mucous membranes are moist.      Pharynx: No oropharyngeal exudate.   Eyes:      Pupils: Pupils are equal, round, and reactive to light.   Cardiovascular:      Rate and Rhythm: Normal rate and regular rhythm.      Pulses: Normal pulses.      Heart sounds: Murmur heard.   No friction rub. No gallop.    Pulmonary:      Effort: Pulmonary effort is normal. No respiratory distress.      Breath sounds: Normal breath sounds. No wheezing or rales.   Abdominal:      General: Abdomen is flat. Bowel sounds are normal. There is no distension.      Palpations: Abdomen is soft.      Tenderness: There is no abdominal tenderness.   Musculoskeletal:         General: Normal range of motion.      Cervical back: Normal range of motion.   Lymphadenopathy:      Cervical: No cervical adenopathy.   Skin:     General: Skin is warm.      Capillary Refill: Capillary refill takes less than 2 seconds.   Neurological:      General: No focal deficit present.      Mental Status: She is alert and oriented to person, place, and time. Mental status is at baseline.   Psychiatric:         Mood and Affect: Mood normal.         Behavior: Behavior normal.         Thought Content: Thought content normal.         Judgment: Judgment normal.        Result Review :   The following data was reviewed by: Donna Forte MD on 09/22/2021:  CMP    CMP 1/11/21 6/9/21 9/16/21   Glucose 90 84 79   BUN 28 (A) 22 15   Creatinine 0.78 0.96 0.86   eGFR Non  Am 73 56 (A) 64   eGFR  Am 84 68 77   Sodium 138 136 139   Potassium 5.0 5.4 (A) 4.8   Chloride 102 101 100   Calcium 9.0 9.7 9.5   Total Protein   7.1   Albumin   4.60   Globulin   2.5   Total Bilirubin   0.4   Alkaline Phosphatase   90   AST (SGOT)   22   ALT (SGPT)   14   (A) Abnormal value       Comments are available for some flowsheets but are not being displayed.           CBC    CBC 12/23/20 1/21/21 9/16/21   WBC 8.41 6.83 6.65   RBC 3.72 (A)  4.09 4.44   Hemoglobin 9.4 (A) 11.8 (A) 14.1   Hematocrit 30.3 (A) 37.6 44.2   MCV 81.5 91.9 99.5 (A)   MCH 25.3 (A) 28.9 31.8   MCHC 31.0 (A) 31.4 (A) 31.9   RDW 16.8 (A)  12.5   Platelets 298 234 230   (A) Abnormal value       Comments are available for some flowsheets but are not being displayed.           Lipid Panel    Lipid Panel 12/9/20 9/16/21   Total Cholesterol 121 193   Triglycerides 69 110   HDL Cholesterol 56 57   VLDL Cholesterol 14 20   LDL Cholesterol  51 116 (A)   LDL/HDL Ratio 0.9 2.00   (A) Abnormal value       Comments are available for some flowsheets but are not being displayed.           TSH    TSH 12/9/20 9/16/21   TSH 0.774 5.490 (A)   (A) Abnormal value            A1C Last 3 Results    HGBA1C Last 3 Results 12/9/20 9/16/21   Hemoglobin A1C 6.2 (A) 5.70 (A)   (A) Abnormal value       Comments are available for some flowsheets but are not being displayed.           Results for LILLIAN PIZARRO (MRN [id number]) as of 9/22/2021 12:59   Ref. Range 9/16/2021 10:10   Iron Latest Ref Range: 37 - 145 mcg/dL 74   Iron Saturation Latest Ref Range: 20 - 50 % 20   TIBC Latest Units: mcg/dL 363   UIBC Latest Ref Range: 112 - 346 mcg/dL 289      Ref. Range 9/16/2021 10:10   Vitamin B-12 Latest Ref Range: 211 - 946 pg/mL 368   25 Hydroxy, Vitamin D Latest Ref Range: 30.0 - 100.0 ng/ml 32.6                   Assessment and Plan    Diagnoses and all orders for this visit:    1. Coronary artery disease involving native coronary artery of native heart with angina pectoris (HCC) (Primary)  Assessment & Plan:  Coronary artery disease is unchanged.  Follows with Dr. Calero with cardiology  Stop smoking.  Continue current medications.   She is not on a statin due to adverse effects  Cardiac status will be reassessed at next office visit.        2. Essential hypertension  Assessment & Plan:  Hypertension is unchanged. Cont lisinopril  Continue current treatment regimen.  Stop smoking.  Continue current  medications.  Blood pressure will be reassessed at the next regular appointment.    Orders:  -     Comprehensive Metabolic Panel; Future  -     CBC & Differential; Future  -     T4, Free; Future  -     TSH; Future  -     Lipid Panel With LDL / HDL Ratio; Future    3. Hypercholesterolemia  Assessment & Plan:  Lipid abnormalities are unchanged.pt doesn't want to restart statin.  Lipid-lowering therapy was not prescribed due to previous adverse reaction.  Lipids will be reassessed in 6 months.    Orders:  -     Comprehensive Metabolic Panel; Future  -     Lipid Panel With LDL / HDL Ratio; Future    4. Peripheral arterial occlusive disease (CMS/LTAC, located within [facility])  Assessment & Plan:  Follows with cardiology. PAD is stable. Pt currently smoking.      5. PAF (paroxysmal atrial fibrillation) (CMS/LTAC, located within [facility])  Assessment & Plan:  Follows with cardiology. On Eliquis, but no beta blocker due to hypotension.      6. Prediabetes  Assessment & Plan:  A1c improved from 6.2 to 5.7. Continue lifestyle modifications.    Orders:  -     Comprehensive Metabolic Panel; Future  -     CBC & Differential; Future  -     T4, Free; Future  -     TSH; Future  -     Lipid Panel With LDL / HDL Ratio; Future  -     Hemoglobin A1c; Future    7. Acquired hypothyroidism - restart levothyroxine  -     T4, Free; Future  -     TSH; Future    8. Mixed anxiety depressive disorder    9. Chronic bronchitis, unspecified chronic bronchitis type (HCC) - encouraged pt to use inhalers regularly.  Assessment & Plan:  COPD is unchanged.  Discussed monitoring symptoms and use of quick-relief medications and contacting us early in the course of exacerbations.  Counseled to avoid exposure to cigarette smoke.    10. Vitamin B12 deficiency anemia due to intrinsic factor deficiency - b12 shot today    11. 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HCC)    12. Nausea    13. Anxiety and depression - increase sertraline  -     sertraline (ZOLOFT) 50 MG tablet; Take 1 tablet by mouth Daily.  Dispense: 90  tablet; Refill: 0    Other orders  -     vitamin D (ERGOCALCIFEROL) 1.25 MG (08431 UT) capsule capsule; Take 1 capsule by mouth 1 (One) Time Per Week.  Dispense: 13 capsule; Refill: 3      Follow Up   Return in about 3 months (around 12/22/2021) for Recheck, labs.  Patient was given instructions and counseling regarding her condition or for health maintenance advice. Please see specific information pulled into the AVS if appropriate.     Carlos Ambrose MD  Internal Medicine/Pediatrics, PGY-1      I have seen and examined the patient independently.  Agree with above.

## 2021-10-01 DIAGNOSIS — M54.50 CHRONIC LOW BACK PAIN WITHOUT SCIATICA, UNSPECIFIED BACK PAIN LATERALITY: ICD-10-CM

## 2021-10-01 DIAGNOSIS — F41.9 ANXIETY AND DEPRESSION: ICD-10-CM

## 2021-10-01 DIAGNOSIS — G89.29 CHRONIC LOW BACK PAIN WITHOUT SCIATICA, UNSPECIFIED BACK PAIN LATERALITY: ICD-10-CM

## 2021-10-01 DIAGNOSIS — F32.A ANXIETY AND DEPRESSION: ICD-10-CM

## 2021-10-01 DIAGNOSIS — M54.50 LUMBAR BACK PAIN: ICD-10-CM

## 2021-10-11 RX ORDER — OXYCODONE AND ACETAMINOPHEN 7.5; 325 MG/1; MG/1
1 TABLET ORAL EVERY 8 HOURS PRN
Qty: 75 TABLET | Refills: 0 | Status: SHIPPED | OUTPATIENT
Start: 2021-10-11 | End: 2021-11-15 | Stop reason: SDUPTHER

## 2021-10-11 RX ORDER — CLONAZEPAM 0.5 MG/1
0.5 TABLET ORAL NIGHTLY PRN
Qty: 30 TABLET | Refills: 0 | Status: SHIPPED | OUTPATIENT
Start: 2021-10-11 | End: 2021-11-15 | Stop reason: SDUPTHER

## 2021-11-12 ENCOUNTER — TELEPHONE (OUTPATIENT)
Dept: INTERNAL MEDICINE | Facility: CLINIC | Age: 81
End: 2021-11-12

## 2021-11-12 NOTE — TELEPHONE ENCOUNTER
Pt calling said she called on the 8th and still doesn't have her meds. She needs   • oxyCODONE-acetaminophen (Percocet) 7.5-325 MG per tablet 75 tablet 0       Sig: Take 1 tablet by mouth Every 8 (Eight) Hours As Needed for Severe Pain .   • clonazePAM (KlonoPIN) 0.5 MG tablet 30 tablet 0       Sig: Take 1 tablet by mouth At Night As Needed for Anxiety.      Filled at [facility]---pt wants a call when meds have been sent. She was upset that she had her family running to the pharmacy late last night trying to get meds and still wasn't available. She also wants a call from the manager. She is ready to call [facility] because she is dealing with this every month per pt. I told pt I would send jennifer the message and the manager. If pt doesn't answer please leave a message she'll call back she is have some issues with her phone. Thank you

## 2021-11-15 DIAGNOSIS — M54.50 CHRONIC LOW BACK PAIN WITHOUT SCIATICA, UNSPECIFIED BACK PAIN LATERALITY: ICD-10-CM

## 2021-11-15 DIAGNOSIS — M54.50 LUMBAR BACK PAIN: ICD-10-CM

## 2021-11-15 DIAGNOSIS — G89.29 CHRONIC LOW BACK PAIN WITHOUT SCIATICA, UNSPECIFIED BACK PAIN LATERALITY: ICD-10-CM

## 2021-11-15 DIAGNOSIS — F41.9 ANXIETY AND DEPRESSION: ICD-10-CM

## 2021-11-15 DIAGNOSIS — F32.A ANXIETY AND DEPRESSION: ICD-10-CM

## 2021-11-15 RX ORDER — OXYCODONE AND ACETAMINOPHEN 7.5; 325 MG/1; MG/1
1 TABLET ORAL EVERY 8 HOURS PRN
Qty: 75 TABLET | Refills: 0 | Status: SHIPPED | OUTPATIENT
Start: 2021-11-15 | End: 2021-12-15 | Stop reason: SDUPTHER

## 2021-11-15 RX ORDER — CLONAZEPAM 0.5 MG/1
0.5 TABLET ORAL NIGHTLY PRN
Qty: 30 TABLET | Refills: 0 | Status: SHIPPED | OUTPATIENT
Start: 2021-11-15 | End: 2021-12-15 | Stop reason: SDUPTHER

## 2021-11-15 NOTE — TELEPHONE ENCOUNTER
PATIENT NEEDS REFILL ON:oxyCODONE-acetaminophen (Percocet) 7.5-325 MG per tablet  clonazePAM (KlonoPIN) 0.5 MG tablet     PATIENT CAN BE REACHED ON: [phone]     PHARMACY [id number] - [place], [place] - [address] - [phone]  - [phone] FX  [phone]

## 2021-11-15 NOTE — TELEPHONE ENCOUNTER
PATIENT IS VERY UPSET THAT HER MEDICATIONS HAVE NOT BEEN REFILLED AND THAT SHE HAS NOT GOTTEN A CALL BACK ABOUT THEM OR ANY OF HER CALLS.    WARM TRANSFER UNSUCCESSFUL.     PLEASE ADVISE ASAP, OK TO LEAVE VM    CONTACT: [phone] (A)

## 2021-11-24 NOTE — TELEPHONE ENCOUNTER
Called Mrs Ojeda to inquire about her insurance. Day was terminated as os 12-. She now has [employer]. She went on sto say that she was feeling better and did not feel that she could emotionally go through heart surgery at this time.  Plus the fact that there is a no Visitor policy as well.  Told patient that I will put her on our cancellation list and I will stay in touch with her over the next few months.  Surgery, PAT, and vein mapping has been cancelled.   How Severe Is Your Skin Lesion?: moderate Have Your Skin Lesions Been Treated?: not been treated Is This A New Presentation, Or A Follow-Up?: Skin Lesions

## 2021-11-29 ENCOUNTER — OFFICE VISIT (OUTPATIENT)
Dept: INTERNAL MEDICINE | Facility: CLINIC | Age: 81
End: 2021-11-29

## 2021-11-29 VITALS
HEART RATE: 108 BPM | DIASTOLIC BLOOD PRESSURE: 70 MMHG | SYSTOLIC BLOOD PRESSURE: 105 MMHG | OXYGEN SATURATION: 94 % | WEIGHT: 97.8 LBS | BODY MASS INDEX: 18.46 KG/M2 | HEIGHT: 61 IN | TEMPERATURE: 98.8 F | RESPIRATION RATE: 20 BRPM

## 2021-11-29 DIAGNOSIS — J44.1 COPD WITH EXACERBATION (HCC): Primary | ICD-10-CM

## 2021-11-29 PROCEDURE — 99213 OFFICE O/P EST LOW 20 MIN: CPT | Performed by: INTERNAL MEDICINE

## 2021-11-29 RX ORDER — PREDNISONE 20 MG/1
60 TABLET ORAL DAILY
Qty: 15 TABLET | Refills: 0 | Status: SHIPPED | OUTPATIENT
Start: 2021-11-29 | End: 2021-12-04

## 2021-11-29 RX ORDER — CEFDINIR 300 MG/1
300 CAPSULE ORAL 2 TIMES DAILY
Qty: 20 CAPSULE | Refills: 0 | Status: SHIPPED | OUTPATIENT
Start: 2021-11-29 | End: 2021-12-09

## 2021-11-29 NOTE — PROGRESS NOTES
Kaylin Ojeda is a 81 y.o. female, who presents with a chief complaint of   Chief Complaint   Patient presents with   • Cough     positive flu test 11/20           HPI   Pt has had issues with URI that persisted.  Records reviewed.  covid and flu both neg on 11/20.  The pt is a smoker.  She has copd and has a productive cough.  She is not using her albuterol nebulizer or inhaler.  She hasnt had her flu shot or covid booster bc she got sick.      The following portions of the patient's history were reviewed and updated as appropriate: allergies, current medications, past family history, past medical history, past social history, past surgical history and problem list.    Allergies: Aleve [naproxen sodium], Codeine, Ibuprofen, and Sulfa antibiotics    Review of Systems   Constitutional: Positive for fatigue.   HENT: Positive for congestion.    Eyes: Negative.    Respiratory: Positive for cough.    Cardiovascular: Negative.    Gastrointestinal: Negative.    Endocrine: Negative.    Genitourinary: Negative.    Musculoskeletal: Negative.    Skin: Negative.    Allergic/Immunologic: Negative.    Neurological: Negative.    Hematological: Negative.    Psychiatric/Behavioral: Negative.    All other systems reviewed and are negative.            Wt Readings from Last 3 Encounters:   11/29/21 44.4 kg (97 lb 12.8 oz)   11/20/21 44.9 kg (99 lb)   09/22/21 44.5 kg (98 lb)     Temp Readings from Last 3 Encounters:   11/29/21 98.8 °F (37.1 °C) (Temporal)   11/20/21 96.9 °F (36.1 °C) (Infrared)   09/22/21 97.2 °F (36.2 °C)     BP Readings from Last 3 Encounters:   11/29/21 105/70   11/20/21 (!) 190/76   09/22/21 130/80     Pulse Readings from Last 3 Encounters:   11/29/21 108   11/20/21 79   09/22/21 73     Body mass index is 18.49 kg/m².  SpO2 Readings from Last 3 Encounters:   11/29/21 94%   11/20/21 94%   09/22/21 97%          Physical Exam  Vitals and nursing note reviewed.   Constitutional:       General: She is not in  acute distress.     Appearance: She is well-developed.   HENT:      Head: Normocephalic and atraumatic.      Right Ear: External ear normal.      Left Ear: External ear normal.      Nose: Nose normal.   Eyes:      Conjunctiva/sclera: Conjunctivae normal.      Pupils: Pupils are equal, round, and reactive to light.   Cardiovascular:      Rate and Rhythm: Normal rate and regular rhythm.      Heart sounds: Normal heart sounds.   Pulmonary:      Effort: Pulmonary effort is normal. No respiratory distress.      Breath sounds: Normal breath sounds. No wheezing.   Musculoskeletal:         General: Normal range of motion.      Cervical back: Normal range of motion and neck supple.      Comments: Normal gait   Skin:     General: Skin is warm and dry.   Neurological:      Mental Status: She is alert and oriented to person, place, and time.   Psychiatric:         Behavior: Behavior normal.         Thought Content: Thought content normal.         Judgment: Judgment normal.         Results for orders placed or performed during the hospital encounter of 11/20/21   COVID-19,[facility] ROUTINE, NP/OP SWAB IN TRANSPORT MEDIA OR ESWAB 72 HR TAT - Swab, Anterior nasal    Specimen: Anterior nasal; Swab   Result Value Ref Range    SARS-CoV-2, [place] Not Detected Not Detected   COVID [facility] Priority - Swab, Anterior nasal    Specimen: Anterior nasal; Swab   Result Value Ref Range    COVID [facility] PRIORITY Comment    SARS-CoV-2, [place] 2 DAY TAT - Swab, Anterior nasal    Specimen: Anterior nasal; Swab   Result Value Ref Range    [facility] SARS-COV-2, [place] 2 DAY TAT Performed    POCT Influenza A/B    Specimen: Swab   Result Value Ref Range    Rapid Influenza A Ag Negative Negative    Rapid Influenza B Ag Negative Negative    Internal Control Passed Passed    Lot Number [id number]     Expiration Date 05/07/2022      Result Review :                  Assessment and Plan    Diagnoses and all orders for this visit:    1. COPD with exacerbation (HCC)  (Primary)  -     predniSONE (DELTASONE) 20 MG tablet; Take 3 tablets by mouth Daily for 5 days.  Dispense: 15 tablet; Refill: 0  -     cefdinir (OMNICEF) 300 MG capsule; Take 1 capsule by mouth 2 (Two) Times a Day for 10 days.  Dispense: 20 capsule; Refill: 0                 Outpatient Medications Prior to Visit   Medication Sig Dispense Refill   • albuterol (PROVENTIL) (2.5 MG/3ML) 0.083% nebulizer solution Take 2.5 mg by nebulization Every 4 (Four) Hours As Needed for Wheezing or Shortness of Air. 120 vial 5   • albuterol sulfate  (90 Base) MCG/ACT inhaler Inhale 2 puffs Every 4 (Four) Hours As Needed for Wheezing. 18 g 11   • apixaban (Eliquis) 5 MG tablet tablet Take 1 tablet by mouth Every 12 (Twelve) Hours. Resume on 11/2/2020 60 tablet 1   • atorvastatin (LIPITOR) 10 MG tablet TAKE 1 TABLET EVERY DAY 90 tablet 3   • budesonide-formoterol (SYMBICORT) 160-4.5 MCG/ACT inhaler Inhale 2 puffs 2 (Two) Times a Day. 1 inhaler 12   • clonazePAM (KlonoPIN) 0.5 MG tablet Take 1 tablet by mouth At Night As Needed for Anxiety. 30 tablet 0   • fluticasone (FLONASE) 50 MCG/ACT nasal spray SPRAY TWICE IN EACH NOSTRIL EVERY DAY FOR 30 DAYS. 16 mL 6   • lisinopril (PRINIVIL,ZESTRIL) 20 MG tablet TAKE 1 TABLET EVERY DAY 90 tablet 1   • oxyCODONE-acetaminophen (Percocet) 7.5-325 MG per tablet Take 1 tablet by mouth Every 8 (Eight) Hours As Needed for Severe Pain . 75 tablet 0   • sertraline (ZOLOFT) 50 MG tablet Take 1 tablet by mouth Daily. 90 tablet 0   • vitamin D (ERGOCALCIFEROL) 1.25 MG (64968 UT) capsule capsule Take 1 capsule by mouth 1 (One) Time Per Week. 13 capsule 3     Facility-Administered Medications Prior to Visit   Medication Dose Route Frequency Provider Last Rate Last Admin   • cyanocobalamin injection 1,000 mcg  1,000 mcg Intramuscular Q28 Days Donna Forte MD   1,000 mcg at 09/22/21 1411     New Medications Ordered This Visit   Medications   • predniSONE (DELTASONE) 20 MG tablet     Sig:  Take 3 tablets by mouth Daily for 5 days.     Dispense:  15 tablet     Refill:  0   • cefdinir (OMNICEF) 300 MG capsule     Sig: Take 1 capsule by mouth 2 (Two) Times a Day for 10 days.     Dispense:  20 capsule     Refill:  0     [unfilled]  There are no discontinued medications.      Return in about 9 days (around 12/8/2021) for Recheck, well exam.    Patient was given instructions and counseling regarding her condition or for health maintenance advice. Please see specific information pulled into the AVS if appropriate.

## 2021-12-07 DIAGNOSIS — J42 CHRONIC BRONCHITIS, UNSPECIFIED CHRONIC BRONCHITIS TYPE (HCC): ICD-10-CM

## 2021-12-07 NOTE — TELEPHONE ENCOUNTER
CALLED PT TO CHANGE OFFICE VS. SHE EXPLAINED THAT DR HINTON WANTED HER TO USE HER NEBULIZER. SHE DOESN'T HAVE ONE. SHE THOUGHT SHE HAD ONE BUT SHE DOESN'T. SHE IS COMING BY THE OFFICE TO  ONE AND WILL NEED MED SENT TO [facility] [place]. THANKS

## 2021-12-09 RX ORDER — ALBUTEROL SULFATE 2.5 MG/3ML
2.5 SOLUTION RESPIRATORY (INHALATION) EVERY 4 HOURS PRN
Qty: 150 ML | Refills: 2 | Status: SHIPPED | OUTPATIENT
Start: 2021-12-09 | End: 2022-01-01 | Stop reason: SDUPTHER

## 2021-12-10 ENCOUNTER — CLINICAL SUPPORT (OUTPATIENT)
Dept: INTERNAL MEDICINE | Facility: CLINIC | Age: 81
End: 2021-12-10

## 2021-12-10 DIAGNOSIS — D51.0 VITAMIN B12 DEFICIENCY ANEMIA DUE TO INTRINSIC FACTOR DEFICIENCY: Primary | ICD-10-CM

## 2021-12-10 PROCEDURE — 96372 THER/PROPH/DIAG INJ SC/IM: CPT | Performed by: INTERNAL MEDICINE

## 2021-12-10 RX ADMIN — CYANOCOBALAMIN 1000 MCG: 1000 INJECTION, SOLUTION INTRAMUSCULAR; SUBCUTANEOUS at 14:44

## 2021-12-10 NOTE — PROGRESS NOTES
Injection  Injection performed by Bella Baugh CMA. Patient tolerated the procedure well without complications.  12/10/21   Bella Baugh CMA

## 2021-12-15 DIAGNOSIS — F32.A ANXIETY AND DEPRESSION: ICD-10-CM

## 2021-12-15 DIAGNOSIS — G89.29 CHRONIC LOW BACK PAIN WITHOUT SCIATICA, UNSPECIFIED BACK PAIN LATERALITY: ICD-10-CM

## 2021-12-15 DIAGNOSIS — M54.50 LUMBAR BACK PAIN: ICD-10-CM

## 2021-12-15 DIAGNOSIS — F41.9 ANXIETY AND DEPRESSION: ICD-10-CM

## 2021-12-15 DIAGNOSIS — M54.50 CHRONIC LOW BACK PAIN WITHOUT SCIATICA, UNSPECIFIED BACK PAIN LATERALITY: ICD-10-CM

## 2021-12-15 RX ORDER — CLONAZEPAM 0.5 MG/1
0.5 TABLET ORAL NIGHTLY PRN
Qty: 30 TABLET | Refills: 0 | Status: SHIPPED | OUTPATIENT
Start: 2021-12-15 | End: 2022-01-12 | Stop reason: SDUPTHER

## 2021-12-15 RX ORDER — OXYCODONE AND ACETAMINOPHEN 7.5; 325 MG/1; MG/1
1 TABLET ORAL EVERY 8 HOURS PRN
Qty: 75 TABLET | Refills: 0 | Status: SHIPPED | OUTPATIENT
Start: 2021-12-15 | End: 2022-01-12 | Stop reason: SDUPTHER

## 2021-12-15 NOTE — TELEPHONE ENCOUNTER
Caller: Kaylin Ojeda    Relationship: Self    Best call back number: [phone]     Requested Prescriptions:   Requested Prescriptions     Pending Prescriptions Disp Refills   • oxyCODONE-acetaminophen (Percocet) 7.5-325 MG per tablet 75 tablet 0     Sig: Take 1 tablet by mouth Every 8 (Eight) Hours As Needed for Severe Pain .   • clonazePAM (KlonoPIN) 0.5 MG tablet 30 tablet 0     Sig: Take 1 tablet by mouth At Night As Needed for Anxiety.        [place]arm[place] where [place] be sent[place] [facility] [place] [address] [phone] [facility] [phone] FX     Additional details provided by patient: PATIENT IS OUT OF MEDICATION    Does the patient have less than a 3 day supply:  [x] Yes  [] No    Tristen Ervin Rep   12/15/21 10:16 EST

## 2021-12-22 ENCOUNTER — OFFICE VISIT (OUTPATIENT)
Dept: INTERNAL MEDICINE | Facility: CLINIC | Age: 81
End: 2021-12-22

## 2021-12-22 ENCOUNTER — HOSPITAL ENCOUNTER (OUTPATIENT)
Dept: GENERAL RADIOLOGY | Facility: HOSPITAL | Age: 81
Discharge: HOME | End: 2021-12-22
Admitting: INTERNAL MEDICINE

## 2021-12-22 VITALS
TEMPERATURE: 98.6 F | BODY MASS INDEX: 18.69 KG/M2 | DIASTOLIC BLOOD PRESSURE: 80 MMHG | WEIGHT: 99 LBS | OXYGEN SATURATION: 98 % | HEART RATE: 76 BPM | HEIGHT: 61 IN | RESPIRATION RATE: 21 BRPM | SYSTOLIC BLOOD PRESSURE: 122 MMHG

## 2021-12-22 DIAGNOSIS — J44.1 COPD WITH EXACERBATION (HCC): Primary | ICD-10-CM

## 2021-12-22 PROCEDURE — 99213 OFFICE O/P EST LOW 20 MIN: CPT | Performed by: INTERNAL MEDICINE

## 2021-12-22 PROCEDURE — 71046 X-RAY EXAM CHEST 2 VIEWS: CPT

## 2021-12-22 RX ORDER — LEVOFLOXACIN 750 MG/1
750 TABLET ORAL DAILY
Qty: 5 TABLET | Refills: 0 | Status: SHIPPED | OUTPATIENT
Start: 2021-12-22 | End: 2022-02-02

## 2021-12-22 NOTE — PROGRESS NOTES
Kaylin Ojeda is a 81 y.o. female, who presents with a chief complaint of   Chief Complaint   Patient presents with   • COPD           HPI   Pt here bc she is still sick.  She was here on 11/29 w/ complaints of continued cough and congestion.  She was Covid and flu neg at that time.  She has been using her nebulizer but it makes her dizzy sometimes.  She is having some upper back pain.  She doesn't think she has a fever.  She will get hot then cold.  She is coughing up some sputum.  She is a smoker. She hasnt had her flu shot or covid booster bc of this illness.     The following portions of the patient's history were reviewed and updated as appropriate: allergies, current medications, past family history, past medical history, past social history, past surgical history and problem list.    Allergies: Aleve [naproxen sodium], Codeine, Ibuprofen, and Sulfa antibiotics    Review of Systems   Constitutional: Positive for fatigue. Negative for fever.   HENT: Positive for congestion.    Eyes: Negative.    Respiratory: Positive for cough and shortness of breath.    Cardiovascular: Negative.    Gastrointestinal: Negative.    Endocrine: Negative.    Genitourinary: Negative.    Musculoskeletal: Negative.    Skin: Negative.    Allergic/Immunologic: Negative.    Neurological: Positive for dizziness.   Hematological: Negative.    Psychiatric/Behavioral: Negative.    All other systems reviewed and are negative.            Wt Readings from Last 3 Encounters:   12/22/21 44.9 kg (99 lb)   11/29/21 44.4 kg (97 lb 12.8 oz)   11/20/21 44.9 kg (99 lb)     Temp Readings from Last 3 Encounters:   12/22/21 98.6 °F (37 °C) (Temporal)   11/29/21 98.8 °F (37.1 °C) (Temporal)   11/20/21 96.9 °F (36.1 °C) (Infrared)     BP Readings from Last 3 Encounters:   12/22/21 122/80   11/29/21 105/70   11/20/21 (!) 190/76     Pulse Readings from Last 3 Encounters:   12/22/21 76   11/29/21 108   11/20/21 79     Body mass index is 18.72  kg/m².  SpO2 Readings from Last 3 Encounters:   12/22/21 98%   11/29/21 94%   11/20/21 94%          Physical Exam  Vitals and nursing note reviewed.   Constitutional:       General: She is not in acute distress.     Appearance: She is well-developed.   HENT:      Head: Normocephalic and atraumatic.      Right Ear: External ear normal.      Left Ear: External ear normal.      Nose: Nose normal.   Eyes:      Conjunctiva/sclera: Conjunctivae normal.      Pupils: Pupils are equal, round, and reactive to light.   Cardiovascular:      Rate and Rhythm: Normal rate and regular rhythm.      Heart sounds: Normal heart sounds.   Pulmonary:      Effort: Pulmonary effort is normal. No respiratory distress.      Breath sounds: No wheezing.      Comments: Decreased breath sounds throughout  Musculoskeletal:         General: Normal range of motion.      Cervical back: Normal range of motion and neck supple.      Comments: Normal gait   Skin:     General: Skin is warm and dry.   Neurological:      Mental Status: She is alert and oriented to person, place, and time.   Psychiatric:         Behavior: Behavior normal.         Thought Content: Thought content normal.         Judgment: Judgment normal.         Results for orders placed or performed during the hospital encounter of 12/10/21   COVID-19,[facility] ROUTINE, NP/OP SWAB IN TRANSPORT MEDIA OR ESWAB 72 HR TAT - Swab, Anterior nasal    Specimen: Anterior nasal; Swab   Result Value Ref Range    SARS-CoV-2, [place] Not Detected Not Detected   COVID [facility] Priority - Swab, Anterior nasal    Specimen: Anterior nasal; Swab   Result Value Ref Range    COVID [facility] PRIORITY Comment    SARS-CoV-2, [place] 2 DAY TAT - Swab, Anterior nasal    Specimen: Anterior nasal; Swab   Result Value Ref Range    [facility] SARS-COV-2, [place] 2 DAY TAT Performed      Result Review :{Labs  Result Review  Imaging  Med Tab  Media :23}                  Assessment and Plan    Diagnoses and all orders for this  visit:    1. COPD with exacerbation (HCC) (Primary)  -     XR Chest PA & Lateral    Other orders  -     levoFLOXacin (Levaquin) 750 MG tablet; Take 1 tablet by mouth Daily.  Dispense: 5 tablet; Refill: 0                 Outpatient Medications Prior to Visit   Medication Sig Dispense Refill   • albuterol (PROVENTIL) (2.5 MG/3ML) 0.083% nebulizer solution Take 2.5 mg by nebulization Every 4 (Four) Hours As Needed for Wheezing or Shortness of Air. 150 mL 2   • albuterol sulfate  (90 Base) MCG/ACT inhaler Inhale 2 puffs Every 4 (Four) Hours As Needed for Wheezing. 18 g 11   • apixaban (Eliquis) 5 MG tablet tablet Take 1 tablet by mouth Every 12 (Twelve) Hours. Resume on 11/2/2020 60 tablet 1   • atorvastatin (LIPITOR) 10 MG tablet TAKE 1 TABLET EVERY DAY 90 tablet 3   • budesonide-formoterol (SYMBICORT) 160-4.5 MCG/ACT inhaler Inhale 2 puffs 2 (Two) Times a Day. 1 inhaler 12   • clonazePAM (KlonoPIN) 0.5 MG tablet Take 1 tablet by mouth At Night As Needed for Anxiety. 30 tablet 0   • fluticasone (FLONASE) 50 MCG/ACT nasal spray SPRAY TWICE IN EACH NOSTRIL EVERY DAY FOR 30 DAYS. 16 mL 6   • lisinopril (PRINIVIL,ZESTRIL) 20 MG tablet TAKE 1 TABLET EVERY DAY 90 tablet 1   • oxyCODONE-acetaminophen (Percocet) 7.5-325 MG per tablet Take 1 tablet by mouth Every 8 (Eight) Hours As Needed for Severe Pain . 75 tablet 0   • sertraline (ZOLOFT) 50 MG tablet Take 1 tablet by mouth Daily. 90 tablet 0   • vitamin D (ERGOCALCIFEROL) 1.25 MG (24582 UT) capsule capsule Take 1 capsule by mouth 1 (One) Time Per Week. 13 capsule 3     Facility-Administered Medications Prior to Visit   Medication Dose Route Frequency Provider Last Rate Last Admin   • cyanocobalamin injection 1,000 mcg  1,000 mcg Intramuscular Q28 Days Donna Forte MD   1,000 mcg at 12/10/21 1444     New Medications Ordered This Visit   Medications   • levoFLOXacin (Levaquin) 750 MG tablet     Sig: Take 1 tablet by mouth Daily.     Dispense:  5 tablet      Refill:  0     [unfilled]  There are no discontinued medications.      Return in about 2 weeks (around 1/5/2022).    Patient was given instructions and counseling regarding her condition or for health maintenance advice. Please see specific information pulled into the AVS if appropriate.

## 2022-01-01 ENCOUNTER — APPOINTMENT (OUTPATIENT)
Dept: CT IMAGING | Facility: HOSPITAL | Age: 82
End: 2022-01-01

## 2022-01-01 ENCOUNTER — READMISSION MANAGEMENT (OUTPATIENT)
Dept: CALL CENTER | Facility: HOSPITAL | Age: 82
End: 2022-01-01

## 2022-01-01 ENCOUNTER — CLINICAL SUPPORT (OUTPATIENT)
Dept: INTERNAL MEDICINE | Facility: CLINIC | Age: 82
End: 2022-01-01

## 2022-01-01 ENCOUNTER — APPOINTMENT (OUTPATIENT)
Dept: GENERAL RADIOLOGY | Facility: HOSPITAL | Age: 82
End: 2022-01-01

## 2022-01-01 ENCOUNTER — HOSPITAL ENCOUNTER (OUTPATIENT)
Dept: CARDIOLOGY | Facility: HOSPITAL | Age: 82
Discharge: HOME | End: 2022-10-06
Admitting: NURSE PRACTITIONER

## 2022-01-01 ENCOUNTER — OFFICE VISIT (OUTPATIENT)
Dept: CARDIOLOGY | Facility: CLINIC | Age: 82
End: 2022-01-01

## 2022-01-01 ENCOUNTER — ANESTHESIA EVENT (OUTPATIENT)
Dept: PERIOP | Facility: HOSPITAL | Age: 82
End: 2022-01-01

## 2022-01-01 ENCOUNTER — APPOINTMENT (OUTPATIENT)
Dept: CARDIOLOGY | Facility: HOSPITAL | Age: 82
End: 2022-01-01

## 2022-01-01 ENCOUNTER — HOME HEALTH ADMISSION (OUTPATIENT)
Dept: HOME HEALTH SERVICES | Facility: HOME HEALTHCARE | Age: 82
End: 2022-01-01

## 2022-01-01 ENCOUNTER — OFFICE VISIT (OUTPATIENT)
Dept: INTERNAL MEDICINE | Facility: CLINIC | Age: 82
End: 2022-01-01

## 2022-01-01 ENCOUNTER — TRANSITIONAL CARE MANAGEMENT TELEPHONE ENCOUNTER (OUTPATIENT)
Dept: CALL CENTER | Facility: HOSPITAL | Age: 82
End: 2022-01-01

## 2022-01-01 ENCOUNTER — HOSPITAL ENCOUNTER (OUTPATIENT)
Facility: HOSPITAL | Age: 82
LOS: 1 days | Discharge: HOME | End: 2022-09-29
Attending: EMERGENCY MEDICINE | Admitting: STUDENT IN AN ORGANIZED HEALTH CARE EDUCATION/TRAINING PROGRAM

## 2022-01-01 ENCOUNTER — ANESTHESIA (OUTPATIENT)
Dept: PERIOP | Facility: HOSPITAL | Age: 82
End: 2022-01-01

## 2022-01-01 ENCOUNTER — TELEPHONE (OUTPATIENT)
Dept: INTERNAL MEDICINE | Facility: CLINIC | Age: 82
End: 2022-01-01

## 2022-01-01 ENCOUNTER — APPOINTMENT (OUTPATIENT)
Dept: ULTRASOUND IMAGING | Facility: HOSPITAL | Age: 82
End: 2022-01-01

## 2022-01-01 ENCOUNTER — HOSPITAL ENCOUNTER (OUTPATIENT)
Facility: HOSPITAL | Age: 82
Discharge: NURSING, REHAB OR LONG-TERM CARE | End: 2022-12-16
Attending: EMERGENCY MEDICINE | Admitting: EMERGENCY MEDICINE

## 2022-01-01 ENCOUNTER — OFFICE VISIT (OUTPATIENT)
Dept: ORTHOPEDIC SURGERY | Facility: CLINIC | Age: 82
End: 2022-01-01

## 2022-01-01 VITALS
SYSTOLIC BLOOD PRESSURE: 172 MMHG | HEART RATE: 85 BPM | OXYGEN SATURATION: 94 % | TEMPERATURE: 97.6 F | RESPIRATION RATE: 16 BRPM | BODY MASS INDEX: 18.73 KG/M2 | DIASTOLIC BLOOD PRESSURE: 88 MMHG | HEIGHT: 61 IN | WEIGHT: 99.21 LBS

## 2022-01-01 VITALS
OXYGEN SATURATION: 96 % | HEIGHT: 60 IN | TEMPERATURE: 98.9 F | WEIGHT: 99.9 LBS | RESPIRATION RATE: 16 BRPM | DIASTOLIC BLOOD PRESSURE: 79 MMHG | HEART RATE: 108 BPM | BODY MASS INDEX: 19.61 KG/M2 | SYSTOLIC BLOOD PRESSURE: 103 MMHG

## 2022-01-01 VITALS
WEIGHT: 102.2 LBS | SYSTOLIC BLOOD PRESSURE: 142 MMHG | DIASTOLIC BLOOD PRESSURE: 68 MMHG | OXYGEN SATURATION: 98 % | HEART RATE: 68 BPM | HEIGHT: 60 IN | BODY MASS INDEX: 20.07 KG/M2

## 2022-01-01 VITALS
HEART RATE: 102 BPM | SYSTOLIC BLOOD PRESSURE: 100 MMHG | BODY MASS INDEX: 19.44 KG/M2 | DIASTOLIC BLOOD PRESSURE: 71 MMHG | WEIGHT: 99 LBS | HEIGHT: 60 IN

## 2022-01-01 VITALS
BODY MASS INDEX: 19.48 KG/M2 | RESPIRATION RATE: 18 BRPM | OXYGEN SATURATION: 98 % | DIASTOLIC BLOOD PRESSURE: 56 MMHG | SYSTOLIC BLOOD PRESSURE: 110 MMHG | TEMPERATURE: 97.7 F | WEIGHT: 99.2 LBS | HEART RATE: 60 BPM | HEIGHT: 60 IN

## 2022-01-01 VITALS
DIASTOLIC BLOOD PRESSURE: 70 MMHG | OXYGEN SATURATION: 100 % | WEIGHT: 99 LBS | BODY MASS INDEX: 19.44 KG/M2 | HEART RATE: 49 BPM | SYSTOLIC BLOOD PRESSURE: 150 MMHG | RESPIRATION RATE: 16 BRPM | HEIGHT: 60 IN

## 2022-01-01 VITALS
DIASTOLIC BLOOD PRESSURE: 66 MMHG | TEMPERATURE: 97.1 F | HEIGHT: 60 IN | OXYGEN SATURATION: 98 % | WEIGHT: 100.4 LBS | HEART RATE: 81 BPM | SYSTOLIC BLOOD PRESSURE: 120 MMHG | BODY MASS INDEX: 19.71 KG/M2 | RESPIRATION RATE: 18 BRPM

## 2022-01-01 VITALS
OXYGEN SATURATION: 97 % | SYSTOLIC BLOOD PRESSURE: 126 MMHG | WEIGHT: 102.2 LBS | HEIGHT: 60 IN | BODY MASS INDEX: 20.07 KG/M2 | DIASTOLIC BLOOD PRESSURE: 82 MMHG | HEART RATE: 66 BPM | TEMPERATURE: 97.7 F

## 2022-01-01 DIAGNOSIS — I48.0 PAROXYSMAL ATRIAL FIBRILLATION WITH RVR: ICD-10-CM

## 2022-01-01 DIAGNOSIS — F41.9 ANXIETY AND DEPRESSION: ICD-10-CM

## 2022-01-01 DIAGNOSIS — G89.29 CHRONIC LOW BACK PAIN WITHOUT SCIATICA, UNSPECIFIED BACK PAIN LATERALITY: ICD-10-CM

## 2022-01-01 DIAGNOSIS — R53.83 MALAISE AND FATIGUE: ICD-10-CM
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
## 2022-01-01 DIAGNOSIS — M54.50 CHRONIC LOW BACK PAIN WITHOUT SCIATICA, UNSPECIFIED BACK PAIN LATERALITY: ICD-10-CM

## 2022-01-01 DIAGNOSIS — R53.81 MALAISE AND FATIGUE: ICD-10-CM

## 2022-01-01 DIAGNOSIS — I10 ESSENTIAL HYPERTENSION: ICD-10-CM

## 2022-01-01 DIAGNOSIS — I48.0 PAF (PAROXYSMAL ATRIAL FIBRILLATION): ICD-10-CM

## 2022-01-01 DIAGNOSIS — I48.92 ATRIAL FLUTTER WITH RAPID VENTRICULAR RESPONSE: Primary | ICD-10-CM

## 2022-01-01 DIAGNOSIS — I65.23 OBSTRUCTION OF CAROTID ARTERY ON BOTH SIDES: ICD-10-CM

## 2022-01-01 DIAGNOSIS — F32.A ANXIETY AND DEPRESSION: ICD-10-CM

## 2022-01-01 DIAGNOSIS — M25.521 RIGHT ELBOW PAIN: ICD-10-CM

## 2022-01-01 DIAGNOSIS — G89.29 CHRONIC LOW BACK PAIN WITHOUT SCIATICA, UNSPECIFIED BACK PAIN LATERALITY: Primary | ICD-10-CM

## 2022-01-01 DIAGNOSIS — E03.9 ACQUIRED HYPOTHYROIDISM: ICD-10-CM

## 2022-01-01 DIAGNOSIS — S52.021E OLECRANON FRACTURE, RIGHT, OPEN TYPE I OR II, WITH ROUTINE HEALING, SUBSEQUENT ENCOUNTER: Primary | ICD-10-CM

## 2022-01-01 DIAGNOSIS — J42 CHRONIC BRONCHITIS, UNSPECIFIED CHRONIC BRONCHITIS TYPE: ICD-10-CM

## 2022-01-01 DIAGNOSIS — D51.0 VITAMIN B12 DEFICIENCY ANEMIA DUE TO INTRINSIC FACTOR DEFICIENCY: ICD-10-CM

## 2022-01-01 DIAGNOSIS — E78.00 HYPERCHOLESTEROLEMIA: ICD-10-CM

## 2022-01-01 DIAGNOSIS — I48.0 PAF (PAROXYSMAL ATRIAL FIBRILLATION): Primary | ICD-10-CM

## 2022-01-01 DIAGNOSIS — N39.0 URINARY TRACT INFECTION WITHOUT HEMATURIA, SITE UNSPECIFIED: ICD-10-CM

## 2022-01-01 DIAGNOSIS — M54.50 LUMBAR BACK PAIN: ICD-10-CM

## 2022-01-01 DIAGNOSIS — S52.021B TYPE I OR II OPEN FRACTURE OF OLECRANON PROCESS OF RIGHT ULNA, INITIAL ENCOUNTER: Primary | ICD-10-CM

## 2022-01-01 DIAGNOSIS — M54.50 CHRONIC LOW BACK PAIN WITHOUT SCIATICA, UNSPECIFIED BACK PAIN LATERALITY: Primary | ICD-10-CM

## 2022-01-01 DIAGNOSIS — R73.03 PREDIABETES: ICD-10-CM

## 2022-01-01 DIAGNOSIS — Z00.00 MEDICARE ANNUAL WELLNESS VISIT, SUBSEQUENT: Primary | ICD-10-CM

## 2022-01-01 DIAGNOSIS — I48.0 PAROXYSMAL ATRIAL FIBRILLATION: ICD-10-CM
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
## 2022-01-01 DIAGNOSIS — I25.119 CORONARY ARTERY DISEASE INVOLVING NATIVE CORONARY ARTERY OF NATIVE HEART WITH ANGINA PECTORIS: ICD-10-CM

## 2022-01-01 DIAGNOSIS — R41.841 COGNITIVE COMMUNICATION DEFICIT: ICD-10-CM

## 2022-01-01 DIAGNOSIS — M54.9 OTHER CHRONIC BACK PAIN: ICD-10-CM

## 2022-01-01 DIAGNOSIS — D51.9 ANEMIA DUE TO VITAMIN B12 DEFICIENCY, UNSPECIFIED B12 DEFICIENCY TYPE: ICD-10-CM

## 2022-01-01 DIAGNOSIS — I38 VALVULAR HEART DISEASE: ICD-10-CM

## 2022-01-01 DIAGNOSIS — I73.9 PERIPHERAL ARTERIAL DISEASE: ICD-10-CM

## 2022-01-01 DIAGNOSIS — S09.90XA CLOSED HEAD INJURY, INITIAL ENCOUNTER: ICD-10-CM

## 2022-01-01 DIAGNOSIS — Z79.01 ON CONTINUOUS ORAL ANTICOAGULATION: ICD-10-CM

## 2022-01-01 DIAGNOSIS — M48.061 SPINAL STENOSIS AT L4-L5 LEVEL: Primary | ICD-10-CM

## 2022-01-01 DIAGNOSIS — G89.29 OTHER CHRONIC BACK PAIN: ICD-10-CM

## 2022-01-01 DIAGNOSIS — I77.9 PERIPHERAL ARTERIAL OCCLUSIVE DISEASE: ICD-10-CM

## 2022-01-01 LAB
ALBUMIN SERPL-MCNC: 4.1 G/DL (ref 3.5–5.2)
ALBUMIN SERPL-MCNC: 4.3 G/DL (ref 3.5–5.2)
ALBUMIN/GLOB SERPL: 1.1 G/DL
ALBUMIN/GLOB SERPL: 1.5 G/DL
ALP SERPL-CCNC: 120 U/L (ref 39–117)
ALP SERPL-CCNC: 98 U/L (ref 39–117)
ALT SERPL W P-5'-P-CCNC: 10 U/L (ref 1–33)
ALT SERPL W P-5'-P-CCNC: 15 U/L (ref 1–33)
AMPHET+METHAMPHET UR QL: NEGATIVE
AMPHETAMINES UR QL: NEGATIVE
ANION GAP SERPL CALCULATED.3IONS-SCNC: 10.2 MMOL/L (ref 5–15)
ANION GAP SERPL CALCULATED.3IONS-SCNC: 11 MMOL/L (ref 5–15)
ANION GAP SERPL CALCULATED.3IONS-SCNC: 11.3 MMOL/L (ref 5–15)
ANION GAP SERPL CALCULATED.3IONS-SCNC: 11.4 MMOL/L (ref 5–15)
ANION GAP SERPL CALCULATED.3IONS-SCNC: 11.9 MMOL/L (ref 5–15)
ANION GAP SERPL CALCULATED.3IONS-SCNC: 13 MMOL/L (ref 5–15)
ANION GAP SERPL CALCULATED.3IONS-SCNC: 7.9 MMOL/L (ref 5–15)
AORTIC DIMENSIONLESS INDEX: 0.8 (DI)
APTT PPP: 31.1 SECONDS (ref 24.3–38.1)
AST SERPL-CCNC: 18 U/L (ref 1–32)
AST SERPL-CCNC: 20 U/L (ref 1–32)
BACTERIA SPEC AEROBE CULT: ABNORMAL
BACTERIA SPEC AEROBE CULT: ABNORMAL
BACTERIA SPEC AEROBE CULT: NORMAL
BACTERIA SPEC AEROBE CULT: NORMAL
BACTERIA UR QL AUTO: ABNORMAL /HPF
BACTERIA UR QL AUTO: ABNORMAL /HPF
BARBITURATES UR QL SCN: NEGATIVE
BASOPHILS # BLD AUTO: 0.04 10*3/MM3 (ref 0–0.2)
BASOPHILS # BLD AUTO: 0.05 10*3/MM3 (ref 0–0.2)
BASOPHILS # BLD AUTO: 0.06 10*3/MM3 (ref 0–0.2)
BASOPHILS NFR BLD AUTO: 0.5 % (ref 0–1.5)
BASOPHILS NFR BLD AUTO: 0.5 % (ref 0–1.5)
BASOPHILS NFR BLD AUTO: 0.6 % (ref 0–1.5)
BASOPHILS NFR BLD AUTO: 0.6 % (ref 0–1.5)
BASOPHILS NFR BLD AUTO: 0.7 % (ref 0–1.5)
BASOPHILS NFR BLD AUTO: 0.7 % (ref 0–1.5)
BASOPHILS NFR BLD AUTO: 0.8 % (ref 0–1.5)
BENZODIAZ UR QL SCN: NEGATIVE
BH CV ECHO MEAS - AI P1/2T: 513.9 MSEC
BH CV ECHO MEAS - AO MAX PG: 12.1 MMHG
BH CV ECHO MEAS - AO MEAN PG: 6 MMHG
BH CV ECHO MEAS - AO V2 MAX: 174 CM/SEC
BH CV ECHO MEAS - AO V2 VTI: 27.8 CM
BH CV ECHO MEAS - AVA(I,D): 1.45 CM2
BH CV ECHO MEAS - EDV(CUBED): 42.9 ML
BH CV ECHO MEAS - EDV(MOD-SP2): 36 ML
BH CV ECHO MEAS - EDV(MOD-SP4): 48 ML
BH CV ECHO MEAS - EF(MOD-BP): 62 %
BH CV ECHO MEAS - EF(MOD-SP2): 61.1 %
BH CV ECHO MEAS - EF(MOD-SP4): 64.6 %
BH CV ECHO MEAS - ESV(CUBED): 11.9 ML
BH CV ECHO MEAS - ESV(MOD-SP2): 14 ML
BH CV ECHO MEAS - ESV(MOD-SP4): 17 ML
BH CV ECHO MEAS - FS: 34.8 %
BH CV ECHO MEAS - IVS/LVPW: 1 CM
BH CV ECHO MEAS - IVSD: 1.1 CM
BH CV ECHO MEAS - LV DIASTOLIC VOL/BSA (35-75): 34.4 CM2
BH CV ECHO MEAS - LV MASS(C)D: 119 GRAMS
BH CV ECHO MEAS - LV MAX PG: 6 MMHG
BH CV ECHO MEAS - LV MEAN PG: 3 MMHG
BH CV ECHO MEAS - LV SYSTOLIC VOL/BSA (12-30): 12.2 CM2
BH CV ECHO MEAS - LV V1 MAX: 122.3 CM/SEC
BH CV ECHO MEAS - LV V1 VTI: 18.4 CM
BH CV ECHO MEAS - LVIDD: 3.5 CM
BH CV ECHO MEAS - LVIDS: 2.28 CM
BH CV ECHO MEAS - LVOT AREA: 2.19 CM2
BH CV ECHO MEAS - LVOT DIAM: 1.67 CM
BH CV ECHO MEAS - LVPWD: 1.1 CM
BH CV ECHO MEAS - MV DEC SLOPE: 773.7 CM/SEC2
BH CV ECHO MEAS - MV DEC TIME: 0.25 MSEC
BH CV ECHO MEAS - MV MAX PG: 8.2 MMHG
BH CV ECHO MEAS - MV MEAN PG: 2.8 MMHG
BH CV ECHO MEAS - MV P1/2T: 56.2 MSEC
BH CV ECHO MEAS - MV V2 VTI: 22.1 CM
BH CV ECHO MEAS - MVA(P1/2T): 3.9 CM2
BH CV ECHO MEAS - MVA(VTI): 1.83 CM2
BH CV ECHO MEAS - PA ACC TIME: 0.07 SEC
BH CV ECHO MEAS - PA PR(ACCEL): 45.7 MMHG
BH CV ECHO MEAS - PA V2 MAX: 103 CM/SEC
BH CV ECHO MEAS - RAP SYSTOLE: 3 MMHG
BH CV ECHO MEAS - RV MAX PG: 2.37 MMHG
BH CV ECHO MEAS - RV V1 MAX: 77 CM/SEC
BH CV ECHO MEAS - RV V1 VTI: 8.7 CM
BH CV ECHO MEAS - RVSP: 29 MMHG
BH CV ECHO MEAS - SI(MOD-SP2): 15.8 ML/M2
BH CV ECHO MEAS - SI(MOD-SP4): 22.2 ML/M2
BH CV ECHO MEAS - SV(LVOT): 40.2 ML
BH CV ECHO MEAS - SV(MOD-SP2): 22 ML
BH CV ECHO MEAS - SV(MOD-SP4): 31 ML
BH CV ECHO MEAS - TR MAX PG: 26.7 MMHG
BH CV ECHO MEAS - TR MAX VEL: 258.3 CM/SEC
BH CV XLRA - RV BASE: 3.3 CM
BH CV XLRA - RV LENGTH: 5.5 CM
BH CV XLRA - RV MID: 2.5 CM
BH CV XLRA - TDI S': 21.8 CM/SEC
BILIRUB SERPL-MCNC: 0.5 MG/DL (ref 0–1.2)
BILIRUB SERPL-MCNC: 0.5 MG/DL (ref 0–1.2)
BILIRUB UR QL STRIP: NEGATIVE
BILIRUB UR QL STRIP: NEGATIVE
BUN SERPL-MCNC: 13 MG/DL (ref 8–23)
BUN SERPL-MCNC: 18 MG/DL (ref 8–23)
BUN SERPL-MCNC: 20 MG/DL (ref 8–23)
BUN SERPL-MCNC: 20 MG/DL (ref 8–23)
BUN SERPL-MCNC: 30 MG/DL (ref 8–23)
BUN SERPL-MCNC: 8 MG/DL (ref 8–23)
BUN SERPL-MCNC: 9 MG/DL (ref 8–23)
BUN/CREAT SERPL: 11 (ref 7–25)
BUN/CREAT SERPL: 15 (ref 7–25)
BUN/CREAT SERPL: 18.1 (ref 7–25)
BUN/CREAT SERPL: 24.1 (ref 7–25)
BUN/CREAT SERPL: 24.3 (ref 7–25)
BUN/CREAT SERPL: 27.4 (ref 7–25)
BUN/CREAT SERPL: 29.4 (ref 7–25)
BUPRENORPHINE SERPL-MCNC: NEGATIVE NG/ML
CALCIUM SPEC-SCNC: 8.1 MG/DL (ref 8.6–10.5)
CALCIUM SPEC-SCNC: 8.3 MG/DL (ref 8.6–10.5)
CALCIUM SPEC-SCNC: 8.6 MG/DL (ref 8.6–10.5)
CALCIUM SPEC-SCNC: 9 MG/DL (ref 8.6–10.5)
CALCIUM SPEC-SCNC: 9.1 MG/DL (ref 8.6–10.5)
CALCIUM SPEC-SCNC: 9.2 MG/DL (ref 8.6–10.5)
CALCIUM SPEC-SCNC: 9.5 MG/DL (ref 8.6–10.5)
CANNABINOIDS SERPL QL: NEGATIVE
CHLORIDE SERPL-SCNC: 100 MMOL/L (ref 98–107)
CHLORIDE SERPL-SCNC: 102 MMOL/L (ref 98–107)
CHLORIDE SERPL-SCNC: 95 MMOL/L (ref 98–107)
CHLORIDE SERPL-SCNC: 97 MMOL/L (ref 98–107)
CHLORIDE SERPL-SCNC: 97 MMOL/L (ref 98–107)
CK SERPL-CCNC: 170 U/L (ref 20–180)
CK SERPL-CCNC: 72 U/L (ref 20–180)
CLARITY UR: ABNORMAL
CLARITY UR: CLEAR
CO2 SERPL-SCNC: 24 MMOL/L (ref 22–29)
CO2 SERPL-SCNC: 24.7 MMOL/L (ref 22–29)
CO2 SERPL-SCNC: 25.6 MMOL/L (ref 22–29)
CO2 SERPL-SCNC: 26 MMOL/L (ref 22–29)
CO2 SERPL-SCNC: 26.1 MMOL/L (ref 22–29)
CO2 SERPL-SCNC: 27.8 MMOL/L (ref 22–29)
CO2 SERPL-SCNC: 28.1 MMOL/L (ref 22–29)
COCAINE UR QL: NEGATIVE
COLOR UR: YELLOW
COLOR UR: YELLOW
CREAT SERPL-MCNC: 0.6 MG/DL (ref 0.57–1)
CREAT SERPL-MCNC: 0.72 MG/DL (ref 0.57–1)
CREAT SERPL-MCNC: 0.73 MG/DL (ref 0.57–1)
CREAT SERPL-MCNC: 0.73 MG/DL (ref 0.57–1)
CREAT SERPL-MCNC: 0.74 MG/DL (ref 0.57–1)
CREAT SERPL-MCNC: 0.83 MG/DL (ref 0.57–1)
CREAT SERPL-MCNC: 1.02 MG/DL (ref 0.57–1)
D DIMER PPP FEU-MCNC: 3.14 MCGFEU/ML (ref 0–0.46)
D-LACTATE SERPL-SCNC: 1.2 MMOL/L (ref 0.5–2)
D-LACTATE SERPL-SCNC: 2.5 MMOL/L (ref 0.5–2)
DEPRECATED RDW RBC AUTO: 47.3 FL (ref 37–54)
DEPRECATED RDW RBC AUTO: 48.2 FL (ref 37–54)
DEPRECATED RDW RBC AUTO: 54.5 FL (ref 37–54)
DEPRECATED RDW RBC AUTO: 55.2 FL (ref 37–54)
DEPRECATED RDW RBC AUTO: 55.3 FL (ref 37–54)
DEPRECATED RDW RBC AUTO: 56.4 FL (ref 37–54)
DEPRECATED RDW RBC AUTO: 60.1 FL (ref 37–54)
DIGOXIN SERPL-MCNC: <0.3 NG/ML (ref 0.6–1.2)
EGFRCR SERPLBLD CKD-EPI 2021: 55.7 ML/MIN/1.73
EGFRCR SERPLBLD CKD-EPI 2021: 71.4 ML/MIN/1.73
EGFRCR SERPLBLD CKD-EPI 2021: 81.9 ML/MIN/1.73
EGFRCR SERPLBLD CKD-EPI 2021: 83.3 ML/MIN/1.73
EGFRCR SERPLBLD CKD-EPI 2021: 83.3 ML/MIN/1.73
EGFRCR SERPLBLD CKD-EPI 2021: 84.6 ML/MIN/1.73
EGFRCR SERPLBLD CKD-EPI 2021: 90.9 ML/MIN/1.73
EOSINOPHIL # BLD AUTO: 0.03 10*3/MM3 (ref 0–0.4)
EOSINOPHIL # BLD AUTO: 0.04 10*3/MM3 (ref 0–0.4)
EOSINOPHIL # BLD AUTO: 0.04 10*3/MM3 (ref 0–0.4)
EOSINOPHIL # BLD AUTO: 0.05 10*3/MM3 (ref 0–0.4)
EOSINOPHIL # BLD AUTO: 0.05 10*3/MM3 (ref 0–0.4)
EOSINOPHIL # BLD AUTO: 0.06 10*3/MM3 (ref 0–0.4)
EOSINOPHIL # BLD AUTO: 0.13 10*3/MM3 (ref 0–0.4)
EOSINOPHIL NFR BLD AUTO: 0.4 % (ref 0.3–6.2)
EOSINOPHIL NFR BLD AUTO: 0.5 % (ref 0.3–6.2)
EOSINOPHIL NFR BLD AUTO: 0.5 % (ref 0.3–6.2)
EOSINOPHIL NFR BLD AUTO: 0.6 % (ref 0.3–6.2)
EOSINOPHIL NFR BLD AUTO: 0.7 % (ref 0.3–6.2)
EOSINOPHIL NFR BLD AUTO: 1 % (ref 0.3–6.2)
EOSINOPHIL NFR BLD AUTO: 1.6 % (ref 0.3–6.2)
ERYTHROCYTE [DISTWIDTH] IN BLOOD BY AUTOMATED COUNT: 14 % (ref 12.3–15.4)
ERYTHROCYTE [DISTWIDTH] IN BLOOD BY AUTOMATED COUNT: 14.1 % (ref 12.3–15.4)
ERYTHROCYTE [DISTWIDTH] IN BLOOD BY AUTOMATED COUNT: 15.8 % (ref 12.3–15.4)
ERYTHROCYTE [DISTWIDTH] IN BLOOD BY AUTOMATED COUNT: 15.9 % (ref 12.3–15.4)
ERYTHROCYTE [DISTWIDTH] IN BLOOD BY AUTOMATED COUNT: 15.9 % (ref 12.3–15.4)
ERYTHROCYTE [DISTWIDTH] IN BLOOD BY AUTOMATED COUNT: 16 % (ref 12.3–15.4)
ERYTHROCYTE [DISTWIDTH] IN BLOOD BY AUTOMATED COUNT: 16.3 % (ref 12.3–15.4)
ERYTHROCYTE [SEDIMENTATION RATE] IN BLOOD: 19 MM/HR (ref 0–30)
ERYTHROCYTE [SEDIMENTATION RATE] IN BLOOD: 40 MM/HR (ref 0–30)
FLUAV RNA RESP QL NAA+PROBE: NOT DETECTED
FLUBV RNA RESP QL NAA+PROBE: NOT DETECTED
FOLATE SERPL-MCNC: 19 NG/ML (ref 4.78–24.2)
GLOBULIN UR ELPH-MCNC: 2.8 GM/DL
GLOBULIN UR ELPH-MCNC: 3.9 GM/DL
GLUCOSE SERPL-MCNC: 110 MG/DL (ref 65–99)
GLUCOSE SERPL-MCNC: 112 MG/DL (ref 65–99)
GLUCOSE SERPL-MCNC: 113 MG/DL (ref 65–99)
GLUCOSE SERPL-MCNC: 221 MG/DL (ref 65–99)
GLUCOSE SERPL-MCNC: 88 MG/DL (ref 65–99)
GLUCOSE SERPL-MCNC: 92 MG/DL (ref 65–99)
GLUCOSE SERPL-MCNC: 95 MG/DL (ref 65–99)
GLUCOSE UR STRIP-MCNC: NEGATIVE MG/DL
GLUCOSE UR STRIP-MCNC: NEGATIVE MG/DL
HBA1C MFR BLD: 5.5 % (ref 4.8–5.6)
HBA1C MFR BLD: 6.2 % (ref 4.8–5.6)
HCT VFR BLD AUTO: 27.5 % (ref 34–46.6)
HCT VFR BLD AUTO: 28.3 % (ref 34–46.6)
HCT VFR BLD AUTO: 28.8 % (ref 34–46.6)
HCT VFR BLD AUTO: 31.6 % (ref 34–46.6)
HCT VFR BLD AUTO: 31.8 % (ref 34–46.6)
HCT VFR BLD AUTO: 35.1 % (ref 34–46.6)
HCT VFR BLD AUTO: 36.5 % (ref 34–46.6)
HCT VFR BLD AUTO: 44.9 % (ref 34–46.6)
HGB BLD-MCNC: 10.1 G/DL (ref 12–15.9)
HGB BLD-MCNC: 11.3 G/DL (ref 12–15.9)
HGB BLD-MCNC: 11.9 G/DL (ref 12–15.9)
HGB BLD-MCNC: 14.5 G/DL (ref 12–15.9)
HGB BLD-MCNC: 8.8 G/DL (ref 12–15.9)
HGB BLD-MCNC: 9 G/DL (ref 12–15.9)
HGB BLD-MCNC: 9.4 G/DL (ref 12–15.9)
HGB BLD-MCNC: 9.8 G/DL (ref 12–15.9)
HGB UR QL STRIP.AUTO: ABNORMAL
HGB UR QL STRIP.AUTO: ABNORMAL
HOLD SPECIMEN: NORMAL
HOLD SPECIMEN: NORMAL
HYALINE CASTS UR QL AUTO: ABNORMAL /LPF
HYALINE CASTS UR QL AUTO: ABNORMAL /LPF
IMM GRANULOCYTES # BLD AUTO: 0.01 10*3/MM3 (ref 0–0.05)
IMM GRANULOCYTES # BLD AUTO: 0.02 10*3/MM3 (ref 0–0.05)
IMM GRANULOCYTES # BLD AUTO: 0.03 10*3/MM3 (ref 0–0.05)
IMM GRANULOCYTES # BLD AUTO: 0.04 10*3/MM3 (ref 0–0.05)
IMM GRANULOCYTES # BLD AUTO: 0.05 10*3/MM3 (ref 0–0.05)
IMM GRANULOCYTES NFR BLD AUTO: 0.1 % (ref 0–0.5)
IMM GRANULOCYTES NFR BLD AUTO: 0.3 % (ref 0–0.5)
IMM GRANULOCYTES NFR BLD AUTO: 0.4 % (ref 0–0.5)
IMM GRANULOCYTES NFR BLD AUTO: 0.5 % (ref 0–0.5)
IMM GRANULOCYTES NFR BLD AUTO: 0.7 % (ref 0–0.5)
INR PPP: 1.1 (ref 0.9–1.1)
KETONES UR QL STRIP: NEGATIVE
KETONES UR QL STRIP: NEGATIVE
LEFT ATRIUM VOLUME INDEX: 64.6 ML/M2
LEUKOCYTE ESTERASE UR QL STRIP.AUTO: ABNORMAL
LEUKOCYTE ESTERASE UR QL STRIP.AUTO: ABNORMAL
LYMPHOCYTES # BLD AUTO: 1.14 10*3/MM3 (ref 0.7–3.1)
LYMPHOCYTES # BLD AUTO: 1.37 10*3/MM3 (ref 0.7–3.1)
LYMPHOCYTES # BLD AUTO: 1.46 10*3/MM3 (ref 0.7–3.1)
LYMPHOCYTES # BLD AUTO: 1.65 10*3/MM3 (ref 0.7–3.1)
LYMPHOCYTES # BLD AUTO: 1.72 10*3/MM3 (ref 0.7–3.1)
LYMPHOCYTES # BLD AUTO: 1.93 10*3/MM3 (ref 0.7–3.1)
LYMPHOCYTES # BLD AUTO: 2.13 10*3/MM3 (ref 0.7–3.1)
LYMPHOCYTES NFR BLD AUTO: 14.2 % (ref 19.6–45.3)
LYMPHOCYTES NFR BLD AUTO: 16.9 % (ref 19.6–45.3)
LYMPHOCYTES NFR BLD AUTO: 17 % (ref 19.6–45.3)
LYMPHOCYTES NFR BLD AUTO: 21.2 % (ref 19.6–45.3)
LYMPHOCYTES NFR BLD AUTO: 24.7 % (ref 19.6–45.3)
LYMPHOCYTES NFR BLD AUTO: 29 % (ref 19.6–45.3)
LYMPHOCYTES NFR BLD AUTO: 32.3 % (ref 19.6–45.3)
MAGNESIUM SERPL-MCNC: 1.9 MG/DL (ref 1.6–2.4)
MAGNESIUM SERPL-MCNC: 2.2 MG/DL (ref 1.6–2.4)
MAGNESIUM SERPL-MCNC: 2.2 MG/DL (ref 1.6–2.4)
MAXIMAL PREDICTED HEART RATE: 140 BPM
MAXIMAL PREDICTED HEART RATE: 140 BPM
MCH RBC QN AUTO: 29.9 PG (ref 26.6–33)
MCH RBC QN AUTO: 29.9 PG (ref 26.6–33)
MCH RBC QN AUTO: 30.2 PG (ref 26.6–33)
MCH RBC QN AUTO: 30.2 PG (ref 26.6–33)
MCH RBC QN AUTO: 30.3 PG (ref 26.6–33)
MCH RBC QN AUTO: 30.4 PG (ref 26.6–33)
MCH RBC QN AUTO: 30.9 PG (ref 26.6–33)
MCHC RBC AUTO-ENTMCNC: 30.8 G/DL (ref 31.5–35.7)
MCHC RBC AUTO-ENTMCNC: 31.8 G/DL (ref 31.5–35.7)
MCHC RBC AUTO-ENTMCNC: 32 G/DL (ref 31.5–35.7)
MCHC RBC AUTO-ENTMCNC: 32 G/DL (ref 31.5–35.7)
MCHC RBC AUTO-ENTMCNC: 32.2 G/DL (ref 31.5–35.7)
MCHC RBC AUTO-ENTMCNC: 32.3 G/DL (ref 31.5–35.7)
MCHC RBC AUTO-ENTMCNC: 32.6 G/DL (ref 31.5–35.7)
MCV RBC AUTO: 91.7 FL (ref 79–97)
MCV RBC AUTO: 92.6 FL (ref 79–97)
MCV RBC AUTO: 94.1 FL (ref 79–97)
MCV RBC AUTO: 94.6 FL (ref 79–97)
MCV RBC AUTO: 95.6 FL (ref 79–97)
MCV RBC AUTO: 96.5 FL (ref 79–97)
MCV RBC AUTO: 98.1 FL (ref 79–97)
METHADONE UR QL SCN: NEGATIVE
MONOCYTES # BLD AUTO: 0.49 10*3/MM3 (ref 0.1–0.9)
MONOCYTES # BLD AUTO: 0.54 10*3/MM3 (ref 0.1–0.9)
MONOCYTES # BLD AUTO: 0.55 10*3/MM3 (ref 0.1–0.9)
MONOCYTES # BLD AUTO: 0.58 10*3/MM3 (ref 0.1–0.9)
MONOCYTES # BLD AUTO: 0.6 10*3/MM3 (ref 0.1–0.9)
MONOCYTES # BLD AUTO: 0.65 10*3/MM3 (ref 0.1–0.9)
MONOCYTES # BLD AUTO: 0.7 10*3/MM3 (ref 0.1–0.9)
MONOCYTES NFR BLD AUTO: 6.1 % (ref 5–12)
MONOCYTES NFR BLD AUTO: 7.5 % (ref 5–12)
MONOCYTES NFR BLD AUTO: 7.5 % (ref 5–12)
MONOCYTES NFR BLD AUTO: 7.8 % (ref 5–12)
MONOCYTES NFR BLD AUTO: 8.1 % (ref 5–12)
MONOCYTES NFR BLD AUTO: 8.4 % (ref 5–12)
MONOCYTES NFR BLD AUTO: 9.7 % (ref 5–12)
NEUTROPHILS NFR BLD AUTO: 3.34 10*3/MM3 (ref 1.7–7)
NEUTROPHILS NFR BLD AUTO: 4.52 10*3/MM3 (ref 1.7–7)
NEUTROPHILS NFR BLD AUTO: 4.54 10*3/MM3 (ref 1.7–7)
NEUTROPHILS NFR BLD AUTO: 5.35 10*3/MM3 (ref 1.7–7)
NEUTROPHILS NFR BLD AUTO: 5.96 10*3/MM3 (ref 1.7–7)
NEUTROPHILS NFR BLD AUTO: 56 % (ref 42.7–76)
NEUTROPHILS NFR BLD AUTO: 6.2 10*3/MM3 (ref 1.7–7)
NEUTROPHILS NFR BLD AUTO: 6.35 10*3/MM3 (ref 1.7–7)
NEUTROPHILS NFR BLD AUTO: 61.7 % (ref 42.7–76)
NEUTROPHILS NFR BLD AUTO: 65.4 % (ref 42.7–76)
NEUTROPHILS NFR BLD AUTO: 68.8 % (ref 42.7–76)
NEUTROPHILS NFR BLD AUTO: 73.4 % (ref 42.7–76)
NEUTROPHILS NFR BLD AUTO: 74.1 % (ref 42.7–76)
NEUTROPHILS NFR BLD AUTO: 77.5 % (ref 42.7–76)
NITRITE UR QL STRIP: NEGATIVE
NITRITE UR QL STRIP: POSITIVE
NRBC BLD AUTO-RTO: 0 /100 WBC (ref 0–0.2)
NT-PROBNP SERPL-MCNC: 6500 PG/ML (ref 0–1800)
OPIATES UR QL: NEGATIVE
OXYCODONE UR QL SCN: POSITIVE
PCP UR QL SCN: NEGATIVE
PH UR STRIP.AUTO: 5.5 [PH] (ref 4.5–8)
PH UR STRIP.AUTO: 7 [PH] (ref 4.5–8)
PHOSPHATE SERPL-MCNC: 3.4 MG/DL (ref 2.5–4.5)
PLATELET # BLD AUTO: 147 10*3/MM3 (ref 140–450)
PLATELET # BLD AUTO: 163 10*3/MM3 (ref 140–450)
PLATELET # BLD AUTO: 179 10*3/MM3 (ref 140–450)
PLATELET # BLD AUTO: 180 10*3/MM3 (ref 140–450)
PLATELET # BLD AUTO: 191 10*3/MM3 (ref 140–450)
PLATELET # BLD AUTO: 193 10*3/MM3 (ref 140–450)
PLATELET # BLD AUTO: 251 10*3/MM3 (ref 140–450)
PMV BLD AUTO: 10.1 FL (ref 6–12)
PMV BLD AUTO: 10.3 FL (ref 6–12)
PMV BLD AUTO: 10.4 FL (ref 6–12)
PMV BLD AUTO: 10.5 FL (ref 6–12)
PMV BLD AUTO: 10.9 FL (ref 6–12)
PMV BLD AUTO: 9.7 FL (ref 6–12)
PMV BLD AUTO: 9.9 FL (ref 6–12)
POTASSIUM SERPL-SCNC: 3.4 MMOL/L (ref 3.5–5.2)
POTASSIUM SERPL-SCNC: 3.4 MMOL/L (ref 3.5–5.2)
POTASSIUM SERPL-SCNC: 3.6 MMOL/L (ref 3.5–5.2)
POTASSIUM SERPL-SCNC: 3.8 MMOL/L (ref 3.5–5.2)
POTASSIUM SERPL-SCNC: 3.8 MMOL/L (ref 3.5–5.2)
POTASSIUM SERPL-SCNC: 4.2 MMOL/L (ref 3.5–5.2)
POTASSIUM SERPL-SCNC: 4.5 MMOL/L (ref 3.5–5.2)
PROPOXYPH UR QL: NEGATIVE
PROT SERPL-MCNC: 7.1 G/DL (ref 6–8.5)
PROT SERPL-MCNC: 8 G/DL (ref 6–8.5)
PROT UR QL STRIP: ABNORMAL
PROT UR QL STRIP: NEGATIVE
PROTHROMBIN TIME: 14.3 SECONDS (ref 12.1–15)
QT INTERVAL: 287 MS
QT INTERVAL: 332 MS
QT INTERVAL: 438 MS
RBC # BLD AUTO: 2.85 10*6/MM3 (ref 3.77–5.28)
RBC # BLD AUTO: 2.96 10*6/MM3 (ref 3.77–5.28)
RBC # BLD AUTO: 3.24 10*6/MM3 (ref 3.77–5.28)
RBC # BLD AUTO: 3.34 10*6/MM3 (ref 3.77–5.28)
RBC # BLD AUTO: 3.73 10*6/MM3 (ref 3.77–5.28)
RBC # BLD AUTO: 3.98 10*6/MM3 (ref 3.77–5.28)
RBC # BLD AUTO: 4.85 10*6/MM3 (ref 3.77–5.28)
RBC # UR STRIP: ABNORMAL /HPF
RBC # UR STRIP: ABNORMAL /HPF
REF LAB TEST METHOD: ABNORMAL
REF LAB TEST METHOD: ABNORMAL
SARS-COV-2 RNA RESP QL NAA+PROBE: NOT DETECTED
SINUS: 2.35 CM
SODIUM SERPL-SCNC: 134 MMOL/L (ref 136–145)
SODIUM SERPL-SCNC: 134 MMOL/L (ref 136–145)
SODIUM SERPL-SCNC: 135 MMOL/L (ref 136–145)
SODIUM SERPL-SCNC: 136 MMOL/L (ref 136–145)
SODIUM SERPL-SCNC: 136 MMOL/L (ref 136–145)
SODIUM SERPL-SCNC: 137 MMOL/L (ref 136–145)
SODIUM SERPL-SCNC: 138 MMOL/L (ref 136–145)
SP GR UR STRIP: 1.01 (ref 1–1.03)
SP GR UR STRIP: 1.02 (ref 1–1.03)
SQUAMOUS #/AREA URNS HPF: ABNORMAL /HPF
SQUAMOUS #/AREA URNS HPF: ABNORMAL /HPF
STRESS TARGET HR: 119 BPM
STRESS TARGET HR: 119 BPM
TRICYCLICS UR QL SCN: NEGATIVE
TROPONIN T SERPL-MCNC: <0.01 NG/ML (ref 0–0.03)
TSH SERPL DL<=0.05 MIU/L-ACNC: 2.1 UIU/ML (ref 0.27–4.2)
TSH SERPL DL<=0.05 MIU/L-ACNC: 4.6 UIU/ML (ref 0.27–4.2)
UROBILINOGEN UR QL STRIP: ABNORMAL
UROBILINOGEN UR QL STRIP: ABNORMAL
VIT B12 BLD-MCNC: 412 PG/ML (ref 211–946)
WBC # UR STRIP: ABNORMAL /HPF
WBC # UR STRIP: ABNORMAL /HPF
WBC NRBC COR # BLD: 5.97 10*3/MM3 (ref 3.4–10.8)
WBC NRBC COR # BLD: 6.95 10*3/MM3 (ref 3.4–10.8)
WBC NRBC COR # BLD: 7.34 10*3/MM3 (ref 3.4–10.8)
WBC NRBC COR # BLD: 7.78 10*3/MM3 (ref 3.4–10.8)
WBC NRBC COR # BLD: 8.01 10*3/MM3 (ref 3.4–10.8)
WBC NRBC COR # BLD: 8.04 10*3/MM3 (ref 3.4–10.8)
WBC NRBC COR # BLD: 8.64 10*3/MM3 (ref 3.4–10.8)
WHOLE BLOOD HOLD COAG: NORMAL
WHOLE BLOOD HOLD SPECIMEN: NORMAL

## 2022-01-01 PROCEDURE — A9270 NON-COVERED ITEM OR SERVICE: HCPCS | Performed by: INTERNAL MEDICINE

## 2022-01-01 PROCEDURE — 99226 PR SBSQ OBSERVATION CARE/DAY 35 MINUTES: CPT | Performed by: NURSE PRACTITIONER

## 2022-01-01 PROCEDURE — C9803 HOPD COVID-19 SPEC COLLECT: HCPCS

## 2022-01-01 PROCEDURE — C1713 ANCHOR/SCREW BN/BN,TIS/BN: HCPCS | Performed by: INTERNAL MEDICINE

## 2022-01-01 PROCEDURE — 99214 OFFICE O/P EST MOD 30 MIN: CPT | Performed by: INTERNAL MEDICINE

## 2022-01-01 PROCEDURE — 25010000002 DIGOXIN PER 500 MCG: Performed by: INTERNAL MEDICINE

## 2022-01-01 PROCEDURE — G0378 HOSPITAL OBSERVATION PER HR: HCPCS

## 2022-01-01 PROCEDURE — 76775 US EXAM ABDO BACK WALL LIM: CPT

## 2022-01-01 PROCEDURE — 71100 X-RAY EXAM RIBS UNI 2 VIEWS: CPT

## 2022-01-01 PROCEDURE — 85025 COMPLETE CBC W/AUTO DIFF WBC: CPT | Performed by: NURSE PRACTITIONER

## 2022-01-01 PROCEDURE — 85652 RBC SED RATE AUTOMATED: CPT | Performed by: NURSE PRACTITIONER

## 2022-01-01 PROCEDURE — 93010 ELECTROCARDIOGRAM REPORT: CPT | Performed by: STUDENT IN AN ORGANIZED HEALTH CARE EDUCATION/TRAINING PROGRAM

## 2022-01-01 PROCEDURE — 63710000001 DILTIAZEM CD 180 MG CAPSULE SUSTAINED-RELEASE 24 HR: Performed by: NURSE PRACTITIONER

## 2022-01-01 PROCEDURE — 96372 THER/PROPH/DIAG INJ SC/IM: CPT | Performed by: INTERNAL MEDICINE

## 2022-01-01 PROCEDURE — 94664 DEMO&/EVAL PT USE INHALER: CPT

## 2022-01-01 PROCEDURE — 97161 PT EVAL LOW COMPLEX 20 MIN: CPT | Performed by: PHYSICAL THERAPIST

## 2022-01-01 PROCEDURE — 63710000001 POVIDONE-IODINE 10 % SOLUTION 118 ML BOTTLE: Performed by: INTERNAL MEDICINE

## 2022-01-01 PROCEDURE — 63710000001 DILTIAZEM 60 MG TABLET: Performed by: NURSE PRACTITIONER

## 2022-01-01 PROCEDURE — 25010000002 HYDROMORPHONE PER 4 MG: Performed by: NURSE PRACTITIONER

## 2022-01-01 PROCEDURE — 25010000002 NEOSTIGMINE 10 MG/10ML SOLUTION: Performed by: REGISTERED NURSE

## 2022-01-01 PROCEDURE — 73070 X-RAY EXAM OF ELBOW: CPT

## 2022-01-01 PROCEDURE — 63710000001 NICOTINE 14 MG/24HR PATCH 24 HOUR: Performed by: INTERNAL MEDICINE

## 2022-01-01 PROCEDURE — 70486 CT MAXILLOFACIAL W/O DYE: CPT

## 2022-01-01 PROCEDURE — 94761 N-INVAS EAR/PLS OXIMETRY MLT: CPT

## 2022-01-01 PROCEDURE — A9270 NON-COVERED ITEM OR SERVICE: HCPCS | Performed by: NURSE PRACTITIONER

## 2022-01-01 PROCEDURE — 93306 TTE W/DOPPLER COMPLETE: CPT

## 2022-01-01 PROCEDURE — 85014 HEMATOCRIT: CPT | Performed by: STUDENT IN AN ORGANIZED HEALTH CARE EDUCATION/TRAINING PROGRAM

## 2022-01-01 PROCEDURE — 63710000001 SENNOSIDES-DOCUSATE 8.6-50 MG TABLET: Performed by: INTERNAL MEDICINE

## 2022-01-01 PROCEDURE — 63710000001 APIXABAN 2.5 MG TABLET: Performed by: INTERNAL MEDICINE

## 2022-01-01 PROCEDURE — 80048 BASIC METABOLIC PNL TOTAL CA: CPT | Performed by: NURSE PRACTITIONER

## 2022-01-01 PROCEDURE — A9270 NON-COVERED ITEM OR SERVICE: HCPCS | Performed by: STUDENT IN AN ORGANIZED HEALTH CARE EDUCATION/TRAINING PROGRAM

## 2022-01-01 PROCEDURE — 63710000001 DOXYCYCLINE 100 MG CAPSULE: Performed by: INTERNAL MEDICINE

## 2022-01-01 PROCEDURE — 63710000001 CLONAZEPAM 0.5 MG TABLET: Performed by: INTERNAL MEDICINE

## 2022-01-01 PROCEDURE — 85025 COMPLETE CBC W/AUTO DIFF WBC: CPT | Performed by: EMERGENCY MEDICINE

## 2022-01-01 PROCEDURE — 25010000002 FENTANYL CITRATE (PF) 100 MCG/2ML SOLUTION: Performed by: REGISTERED NURSE

## 2022-01-01 PROCEDURE — 63710000001 ATORVASTATIN 10 MG TABLET: Performed by: INTERNAL MEDICINE

## 2022-01-01 PROCEDURE — 99496 TRANSJ CARE MGMT HIGH F2F 7D: CPT | Performed by: INTERNAL MEDICINE

## 2022-01-01 PROCEDURE — 93010 ELECTROCARDIOGRAM REPORT: CPT | Performed by: INTERNAL MEDICINE

## 2022-01-01 PROCEDURE — 81001 URINALYSIS AUTO W/SCOPE: CPT | Performed by: EMERGENCY MEDICINE

## 2022-01-01 PROCEDURE — 63710000001 POLYETHYLENE GLYCOL 17 G PACK: Performed by: INTERNAL MEDICINE

## 2022-01-01 PROCEDURE — 99284 EMERGENCY DEPT VISIT MOD MDM: CPT

## 2022-01-01 PROCEDURE — 94640 AIRWAY INHALATION TREATMENT: CPT

## 2022-01-01 PROCEDURE — 63710000001 HYDROXYZINE 25 MG TABLET: Performed by: INTERNAL MEDICINE

## 2022-01-01 PROCEDURE — 80048 BASIC METABOLIC PNL TOTAL CA: CPT | Performed by: INTERNAL MEDICINE

## 2022-01-01 PROCEDURE — 94799 UNLISTED PULMONARY SVC/PX: CPT

## 2022-01-01 PROCEDURE — 97530 THERAPEUTIC ACTIVITIES: CPT

## 2022-01-01 PROCEDURE — 97165 OT EVAL LOW COMPLEX 30 MIN: CPT

## 2022-01-01 PROCEDURE — 80306 DRUG TEST PRSMV INSTRMNT: CPT | Performed by: NURSE PRACTITIONER

## 2022-01-01 PROCEDURE — 96366 THER/PROPH/DIAG IV INF ADDON: CPT

## 2022-01-01 PROCEDURE — 25010000002 IOPAMIDOL 61 % SOLUTION: Performed by: STUDENT IN AN ORGANIZED HEALTH CARE EDUCATION/TRAINING PROGRAM

## 2022-01-01 PROCEDURE — 84484 ASSAY OF TROPONIN QUANT: CPT | Performed by: EMERGENCY MEDICINE

## 2022-01-01 PROCEDURE — 63710000001 FLUOXETINE 20 MG CAPSULE: Performed by: INTERNAL MEDICINE

## 2022-01-01 PROCEDURE — 25010000002 SUCCINYLCHOLINE PER 20 MG: Performed by: REGISTERED NURSE

## 2022-01-01 PROCEDURE — 25010000002 DIGOXIN PER 500 MCG: Performed by: EMERGENCY MEDICINE

## 2022-01-01 PROCEDURE — 63710000001 POTASSIUM CHLORIDE 20 MEQ TABLET CONTROLLED-RELEASE: Performed by: STUDENT IN AN ORGANIZED HEALTH CARE EDUCATION/TRAINING PROGRAM

## 2022-01-01 PROCEDURE — 96361 HYDRATE IV INFUSION ADD-ON: CPT

## 2022-01-01 PROCEDURE — 96376 TX/PRO/DX INJ SAME DRUG ADON: CPT

## 2022-01-01 PROCEDURE — 87077 CULTURE AEROBIC IDENTIFY: CPT | Performed by: NURSE PRACTITIONER

## 2022-01-01 PROCEDURE — 97116 GAIT TRAINING THERAPY: CPT

## 2022-01-01 PROCEDURE — 96365 THER/PROPH/DIAG IV INF INIT: CPT

## 2022-01-01 PROCEDURE — 63710000001 DILTIAZEM 30 MG TABLET: Performed by: NURSE PRACTITIONER

## 2022-01-01 PROCEDURE — 63710000001 OXYCODONE-ACETAMINOPHEN 7.5-325 MG TABLET: Performed by: NURSE PRACTITIONER

## 2022-01-01 PROCEDURE — 63710000001 MELATONIN 5 MG TABLET: Performed by: INTERNAL MEDICINE

## 2022-01-01 PROCEDURE — 63710000001 METOPROLOL TARTRATE 25 MG TABLET: Performed by: INTERNAL MEDICINE

## 2022-01-01 PROCEDURE — 0 CEFAZOLIN SODIUM-DEXTROSE 2-3 GM-%(50ML) RECONSTITUTED SOLUTION: Performed by: INTERNAL MEDICINE

## 2022-01-01 PROCEDURE — 99225 PR SBSQ OBSERVATION CARE/DAY 25 MINUTES: CPT | Performed by: NURSE PRACTITIONER

## 2022-01-01 PROCEDURE — 73562 X-RAY EXAM OF KNEE 3: CPT

## 2022-01-01 PROCEDURE — 63710000001 OXYCODONE-ACETAMINOPHEN 10-325 MG TABLET: Performed by: STUDENT IN AN ORGANIZED HEALTH CARE EDUCATION/TRAINING PROGRAM

## 2022-01-01 PROCEDURE — 99225 PR SBSQ OBSERVATION CARE/DAY 25 MINUTES: CPT | Performed by: INTERNAL MEDICINE

## 2022-01-01 PROCEDURE — 80162 ASSAY OF DIGOXIN TOTAL: CPT | Performed by: EMERGENCY MEDICINE

## 2022-01-01 PROCEDURE — 72125 CT NECK SPINE W/O DYE: CPT

## 2022-01-01 PROCEDURE — 99024 POSTOP FOLLOW-UP VISIT: CPT | Performed by: INTERNAL MEDICINE

## 2022-01-01 PROCEDURE — 63710000001 FAMOTIDINE 20 MG TABLET: Performed by: INTERNAL MEDICINE

## 2022-01-01 PROCEDURE — 85018 HEMOGLOBIN: CPT | Performed by: STUDENT IN AN ORGANIZED HEALTH CARE EDUCATION/TRAINING PROGRAM

## 2022-01-01 PROCEDURE — 74177 CT ABD & PELVIS W/CONTRAST: CPT

## 2022-01-01 PROCEDURE — 82550 ASSAY OF CK (CPK): CPT | Performed by: NURSE PRACTITIONER

## 2022-01-01 PROCEDURE — 83036 HEMOGLOBIN GLYCOSYLATED A1C: CPT | Performed by: NURSE PRACTITIONER

## 2022-01-01 PROCEDURE — 93227 XTRNL ECG REC<48 HR R&I: CPT | Performed by: INTERNAL MEDICINE

## 2022-01-01 PROCEDURE — P9612 CATHETERIZE FOR URINE SPEC: HCPCS

## 2022-01-01 PROCEDURE — 36415 COLL VENOUS BLD VENIPUNCTURE: CPT

## 2022-01-01 PROCEDURE — 84443 ASSAY THYROID STIM HORMONE: CPT | Performed by: NURSE PRACTITIONER

## 2022-01-01 PROCEDURE — 25010000002 PROPOFOL 200 MG/20ML EMULSION: Performed by: REGISTERED NURSE

## 2022-01-01 PROCEDURE — 87636 SARSCOV2 & INF A&B AMP PRB: CPT | Performed by: EMERGENCY MEDICINE

## 2022-01-01 PROCEDURE — 83735 ASSAY OF MAGNESIUM: CPT | Performed by: STUDENT IN AN ORGANIZED HEALTH CARE EDUCATION/TRAINING PROGRAM

## 2022-01-01 PROCEDURE — 97535 SELF CARE MNGMENT TRAINING: CPT

## 2022-01-01 PROCEDURE — 93005 ELECTROCARDIOGRAM TRACING: CPT | Performed by: EMERGENCY MEDICINE

## 2022-01-01 PROCEDURE — 25010000002 DIGOXIN PER 500 MCG: Performed by: STUDENT IN AN ORGANIZED HEALTH CARE EDUCATION/TRAINING PROGRAM

## 2022-01-01 PROCEDURE — 81001 URINALYSIS AUTO W/SCOPE: CPT | Performed by: NURSE PRACTITIONER

## 2022-01-01 PROCEDURE — 25010000002 ENOXAPARIN PER 10 MG: Performed by: NURSE PRACTITIONER

## 2022-01-01 PROCEDURE — 93005 ELECTROCARDIOGRAM TRACING: CPT | Performed by: STUDENT IN AN ORGANIZED HEALTH CARE EDUCATION/TRAINING PROGRAM

## 2022-01-01 PROCEDURE — 99217 PR OBSERVATION CARE DISCHARGE MANAGEMENT: CPT | Performed by: NURSE PRACTITIONER

## 2022-01-01 PROCEDURE — 25010000002 CEFTRIAXONE SODIUM-DEXTROSE 1-3.74 GM-%(50ML) RECONSTITUTED SOLUTION: Performed by: EMERGENCY MEDICINE

## 2022-01-01 PROCEDURE — 25010000002 ONDANSETRON PER 1 MG: Performed by: ANESTHESIOLOGY

## 2022-01-01 PROCEDURE — 71045 X-RAY EXAM CHEST 1 VIEW: CPT

## 2022-01-01 PROCEDURE — 73070 X-RAY EXAM OF ELBOW: CPT | Performed by: INTERNAL MEDICINE

## 2022-01-01 PROCEDURE — 93306 TTE W/DOPPLER COMPLETE: CPT | Performed by: INTERNAL MEDICINE

## 2022-01-01 PROCEDURE — 63710000001 HYDROCODONE-ACETAMINOPHEN 7.5-325 MG TABLET: Performed by: INTERNAL MEDICINE

## 2022-01-01 PROCEDURE — 80053 COMPREHEN METABOLIC PANEL: CPT | Performed by: EMERGENCY MEDICINE

## 2022-01-01 PROCEDURE — 63710000001 METOPROLOL TARTRATE 50 MG TABLET: Performed by: HOSPITALIST

## 2022-01-01 PROCEDURE — 73200 CT UPPER EXTREMITY W/O DYE: CPT

## 2022-01-01 PROCEDURE — 92507 TX SP LANG VOICE COMM INDIV: CPT

## 2022-01-01 PROCEDURE — 63710000001 DILTIAZEM CD 120 MG CAPSULE SUSTAINED-RELEASE 24 HR: Performed by: NURSE PRACTITIONER

## 2022-01-01 PROCEDURE — A9270 NON-COVERED ITEM OR SERVICE: HCPCS | Performed by: HOSPITALIST

## 2022-01-01 PROCEDURE — 87086 URINE CULTURE/COLONY COUNT: CPT | Performed by: NURSE PRACTITIONER

## 2022-01-01 PROCEDURE — 85025 COMPLETE CBC W/AUTO DIFF WBC: CPT | Performed by: INTERNAL MEDICINE

## 2022-01-01 PROCEDURE — 63710000001 DILTIAZEM 30 MG TABLET: Performed by: STUDENT IN AN ORGANIZED HEALTH CARE EDUCATION/TRAINING PROGRAM

## 2022-01-01 PROCEDURE — 63710000001 OXYCODONE-ACETAMINOPHEN 10-325 MG TABLET: Performed by: INTERNAL MEDICINE

## 2022-01-01 PROCEDURE — 99225 PR SBSQ OBSERVATION CARE/DAY 25 MINUTES: CPT | Performed by: STUDENT IN AN ORGANIZED HEALTH CARE EDUCATION/TRAINING PROGRAM

## 2022-01-01 PROCEDURE — 85379 FIBRIN DEGRADATION QUANT: CPT | Performed by: NURSE PRACTITIONER

## 2022-01-01 PROCEDURE — 25010000002 CEFAZOLIN PER 500 MG: Performed by: REGISTERED NURSE

## 2022-01-01 PROCEDURE — G0439 PPPS, SUBSEQ VISIT: HCPCS | Performed by: INTERNAL MEDICINE

## 2022-01-01 PROCEDURE — 87040 BLOOD CULTURE FOR BACTERIA: CPT | Performed by: EMERGENCY MEDICINE

## 2022-01-01 PROCEDURE — 63710000001 CLONAZEPAM 0.5 MG TABLET: Performed by: NURSE PRACTITIONER

## 2022-01-01 PROCEDURE — 96375 TX/PRO/DX INJ NEW DRUG ADDON: CPT

## 2022-01-01 PROCEDURE — 99214 OFFICE O/P EST MOD 30 MIN: CPT | Performed by: NURSE PRACTITIONER

## 2022-01-01 PROCEDURE — 97110 THERAPEUTIC EXERCISES: CPT

## 2022-01-01 PROCEDURE — 24685 OPTX ULNAR FX PROX END W/FIX: CPT | Performed by: INTERNAL MEDICINE

## 2022-01-01 PROCEDURE — 70450 CT HEAD/BRAIN W/O DYE: CPT

## 2022-01-01 PROCEDURE — 24685 OPTX ULNAR FX PROX END W/FIX: CPT | Performed by: SPECIALIST/TECHNOLOGIST, OTHER

## 2022-01-01 PROCEDURE — 73030 X-RAY EXAM OF SHOULDER: CPT

## 2022-01-01 PROCEDURE — 82607 VITAMIN B-12: CPT | Performed by: NURSE PRACTITIONER

## 2022-01-01 PROCEDURE — 99220 PR INITIAL OBSERVATION CARE/DAY 70 MINUTES: CPT | Performed by: NURSE PRACTITIONER

## 2022-01-01 PROCEDURE — 25010000002 HYDROMORPHONE 1 MG/ML SOLUTION: Performed by: REGISTERED NURSE

## 2022-01-01 PROCEDURE — 85610 PROTHROMBIN TIME: CPT | Performed by: EMERGENCY MEDICINE

## 2022-01-01 PROCEDURE — 93226 XTRNL ECG REC<48 HR SCAN A/R: CPT

## 2022-01-01 PROCEDURE — 99204 OFFICE O/P NEW MOD 45 MIN: CPT | Performed by: INTERNAL MEDICINE

## 2022-01-01 PROCEDURE — 99214 OFFICE O/P EST MOD 30 MIN: CPT | Performed by: STUDENT IN AN ORGANIZED HEALTH CARE EDUCATION/TRAINING PROGRAM

## 2022-01-01 PROCEDURE — 84484 ASSAY OF TROPONIN QUANT: CPT | Performed by: NURSE PRACTITIONER

## 2022-01-01 PROCEDURE — 93225 XTRNL ECG REC<48 HRS REC: CPT

## 2022-01-01 PROCEDURE — 84100 ASSAY OF PHOSPHORUS: CPT | Performed by: STUDENT IN AN ORGANIZED HEALTH CARE EDUCATION/TRAINING PROGRAM

## 2022-01-01 PROCEDURE — 83605 ASSAY OF LACTIC ACID: CPT | Performed by: EMERGENCY MEDICINE

## 2022-01-01 PROCEDURE — 1111F DSCHRG MED/CURRENT MED MERGE: CPT | Performed by: INTERNAL MEDICINE

## 2022-01-01 PROCEDURE — 73080 X-RAY EXAM OF ELBOW: CPT

## 2022-01-01 PROCEDURE — 76000 FLUOROSCOPY <1 HR PHYS/QHP: CPT

## 2022-01-01 PROCEDURE — 85730 THROMBOPLASTIN TIME PARTIAL: CPT | Performed by: EMERGENCY MEDICINE

## 2022-01-01 PROCEDURE — 87186 SC STD MICRODIL/AGAR DIL: CPT | Performed by: NURSE PRACTITIONER

## 2022-01-01 PROCEDURE — 92523 SPEECH SOUND LANG COMPREHEN: CPT

## 2022-01-01 PROCEDURE — 82746 ASSAY OF FOLIC ACID SERUM: CPT | Performed by: NURSE PRACTITIONER

## 2022-01-01 PROCEDURE — 63710000001 BUDESONIDE-FORMOTEROL 160-4.5 MCG/ACT AEROSOL 6 G INHALER: Performed by: INTERNAL MEDICINE

## 2022-01-01 PROCEDURE — 93970 EXTREMITY STUDY: CPT

## 2022-01-01 PROCEDURE — 71275 CT ANGIOGRAPHY CHEST: CPT

## 2022-01-01 PROCEDURE — 96372 THER/PROPH/DIAG INJ SC/IM: CPT

## 2022-01-01 PROCEDURE — 83880 ASSAY OF NATRIURETIC PEPTIDE: CPT | Performed by: NURSE PRACTITIONER

## 2022-01-01 PROCEDURE — 25010000002 PHENYLEPHRINE 10 MG/ML SOLUTION: Performed by: REGISTERED NURSE

## 2022-01-01 PROCEDURE — 93000 ELECTROCARDIOGRAM COMPLETE: CPT | Performed by: NURSE PRACTITIONER

## 2022-01-01 PROCEDURE — 97161 PT EVAL LOW COMPLEX 20 MIN: CPT

## 2022-01-01 DEVICE — IMPLANTABLE DEVICE: Type: IMPLANTABLE DEVICE | Site: ARM | Status: FUNCTIONAL

## 2022-01-01 DEVICE — SCRW GEMINUS PA NL TI 3.5X16MM: Type: IMPLANTABLE DEVICE | Site: ARM | Status: FUNCTIONAL

## 2022-01-01 DEVICE — SCRW M/THRD LK TI 3.5X16MM: Type: IMPLANTABLE DEVICE | Site: ARM | Status: FUNCTIONAL

## 2022-01-01 DEVICE — SCRW M/THRD LK TI 3.5X18MM: Type: IMPLANTABLE DEVICE | Site: ARM | Status: FUNCTIONAL

## 2022-01-01 DEVICE — SCRW DIST/ELBW PA NL TI 3.5X20MM: Type: IMPLANTABLE DEVICE | Site: ARM | Status: FUNCTIONAL

## 2022-01-01 DEVICE — SUT FW 5 W .5 CIR CUT NDL 48M AR7211: Type: IMPLANTABLE DEVICE | Site: ELBOW | Status: FUNCTIONAL

## 2022-01-01 DEVICE — SUT FW #2 W/TPR NDL 1/2 CIR 38IN 97CM 26.5MM BLU: Type: IMPLANTABLE DEVICE | Site: ELBOW | Status: FUNCTIONAL

## 2022-01-01 RX ORDER — BISACODYL 10 MG
10 SUPPOSITORY, RECTAL RECTAL DAILY PRN
Start: 2022-01-01

## 2022-01-01 RX ORDER — FAMOTIDINE 20 MG/1
20 TABLET, FILM COATED ORAL
Start: 2022-01-01

## 2022-01-01 RX ORDER — FLUOXETINE 10 MG/1
20 CAPSULE ORAL DAILY
Status: DISCONTINUED | OUTPATIENT
Start: 2022-01-01 | End: 2022-01-01 | Stop reason: HOSPADM

## 2022-01-01 RX ORDER — SODIUM CHLORIDE, SODIUM LACTATE, POTASSIUM CHLORIDE, CALCIUM CHLORIDE 600; 310; 30; 20 MG/100ML; MG/100ML; MG/100ML; MG/100ML
9 INJECTION, SOLUTION INTRAVENOUS CONTINUOUS PRN
Status: DISCONTINUED | OUTPATIENT
Start: 2022-01-01 | End: 2022-01-01 | Stop reason: HOSPADM

## 2022-01-01 RX ORDER — ALBUTEROL SULFATE 90 UG/1
2 AEROSOL, METERED RESPIRATORY (INHALATION) EVERY 4 HOURS PRN
Qty: 18 G | Refills: 5 | Status: SHIPPED | OUTPATIENT
Start: 2022-01-01 | End: 2022-01-01 | Stop reason: HOSPADM

## 2022-01-01 RX ORDER — SODIUM CHLORIDE 0.9 % (FLUSH) 0.9 %
10 SYRINGE (ML) INJECTION AS NEEDED
Status: DISCONTINUED | OUTPATIENT
Start: 2022-01-01 | End: 2022-01-01 | Stop reason: HOSPADM

## 2022-01-01 RX ORDER — CEFAZOLIN SODIUM 2 G/50ML
2 SOLUTION INTRAVENOUS EVERY 8 HOURS
Status: DISCONTINUED | OUTPATIENT
Start: 2022-01-01 | End: 2022-01-01

## 2022-01-01 RX ORDER — SODIUM CHLORIDE 9 MG/ML
100 INJECTION, SOLUTION INTRAVENOUS CONTINUOUS
Status: DISCONTINUED | OUTPATIENT
Start: 2022-01-01 | End: 2022-01-01

## 2022-01-01 RX ORDER — PROPOFOL 10 MG/ML
INJECTION, EMULSION INTRAVENOUS AS NEEDED
Status: DISCONTINUED | OUTPATIENT
Start: 2022-01-01 | End: 2022-01-01 | Stop reason: SURG

## 2022-01-01 RX ORDER — FLUOXETINE HYDROCHLORIDE 20 MG/1
20 CAPSULE ORAL DAILY
Qty: 30 CAPSULE | Refills: 0 | Status: SHIPPED | OUTPATIENT
Start: 2022-01-01 | End: 2022-01-01

## 2022-01-01 RX ORDER — CEFTRIAXONE 1 G/50ML
1 INJECTION, SOLUTION INTRAVENOUS ONCE
Status: COMPLETED | OUTPATIENT
Start: 2022-01-01 | End: 2022-01-01

## 2022-01-01 RX ORDER — OXYCODONE AND ACETAMINOPHEN 7.5; 325 MG/1; MG/1
1 TABLET ORAL EVERY 8 HOURS
Status: DISCONTINUED | OUTPATIENT
Start: 2022-01-01 | End: 2022-01-01 | Stop reason: HOSPADM

## 2022-01-01 RX ORDER — CEFUROXIME AXETIL 250 MG/1
250 TABLET ORAL EVERY 12 HOURS SCHEDULED
Status: DISCONTINUED | OUTPATIENT
Start: 2022-01-01 | End: 2022-01-01 | Stop reason: HOSPADM

## 2022-01-01 RX ORDER — DIGOXIN 0.25 MG/ML
250 INJECTION INTRAMUSCULAR; INTRAVENOUS ONCE
Status: COMPLETED | OUTPATIENT
Start: 2022-01-01 | End: 2022-01-01

## 2022-01-01 RX ORDER — SODIUM CHLORIDE 9 MG/ML
100 INJECTION, SOLUTION INTRAVENOUS CONTINUOUS
Status: ACTIVE | OUTPATIENT
Start: 2022-01-01 | End: 2022-01-01

## 2022-01-01 RX ORDER — LISINOPRIL 20 MG/1
20 TABLET ORAL DAILY
Qty: 90 TABLET | Refills: 1 | Status: SHIPPED | OUTPATIENT
Start: 2022-01-01 | End: 2022-01-01 | Stop reason: HOSPADM

## 2022-01-01 RX ORDER — CLONAZEPAM 0.5 MG/1
TABLET ORAL
Qty: 30 TABLET | Refills: 0 | Status: SHIPPED | OUTPATIENT
Start: 2022-01-01 | End: 2022-01-01 | Stop reason: SDUPTHER

## 2022-01-01 RX ORDER — CLONAZEPAM 0.5 MG/1
0.5 TABLET ORAL NIGHTLY PRN
Qty: 30 TABLET | Refills: 0 | Status: SHIPPED | OUTPATIENT
Start: 2022-01-01 | End: 2022-01-01 | Stop reason: SDUPTHER

## 2022-01-01 RX ORDER — OXYCODONE AND ACETAMINOPHEN 7.5; 325 MG/1; MG/1
1 TABLET ORAL EVERY 8 HOURS PRN
Qty: 90 TABLET | Refills: 0 | OUTPATIENT
Start: 2022-01-01

## 2022-01-01 RX ORDER — GLYCOPYRROLATE 0.2 MG/ML
INJECTION INTRAMUSCULAR; INTRAVENOUS AS NEEDED
Status: DISCONTINUED | OUTPATIENT
Start: 2022-01-01 | End: 2022-01-01 | Stop reason: SURG

## 2022-01-01 RX ORDER — ALBUTEROL SULFATE 2.5 MG/3ML
2.5 SOLUTION RESPIRATORY (INHALATION) EVERY 4 HOURS PRN
Status: DISCONTINUED | OUTPATIENT
Start: 2022-01-01 | End: 2022-01-01 | Stop reason: HOSPADM

## 2022-01-01 RX ORDER — ONDANSETRON 2 MG/ML
4 INJECTION INTRAMUSCULAR; INTRAVENOUS EVERY 6 HOURS PRN
Status: DISCONTINUED | OUTPATIENT
Start: 2022-01-01 | End: 2022-01-01 | Stop reason: HOSPADM

## 2022-01-01 RX ORDER — BUDESONIDE AND FORMOTEROL FUMARATE DIHYDRATE 160; 4.5 UG/1; UG/1
2 AEROSOL RESPIRATORY (INHALATION)
Status: DISCONTINUED | OUTPATIENT
Start: 2022-01-01 | End: 2022-01-01 | Stop reason: HOSPADM

## 2022-01-01 RX ORDER — OXYCODONE AND ACETAMINOPHEN 10; 325 MG/1; MG/1
1 TABLET ORAL EVERY 4 HOURS PRN
Status: DISCONTINUED | OUTPATIENT
Start: 2022-01-01 | End: 2022-01-01 | Stop reason: SDUPTHER

## 2022-01-01 RX ORDER — HYDROXYZINE HYDROCHLORIDE 25 MG/1
25 TABLET, FILM COATED ORAL 3 TIMES DAILY
Status: DISCONTINUED | OUTPATIENT
Start: 2022-01-01 | End: 2022-01-01 | Stop reason: HOSPADM

## 2022-01-01 RX ORDER — DEXMEDETOMIDINE HYDROCHLORIDE 100 UG/ML
INJECTION, SOLUTION INTRAVENOUS AS NEEDED
Status: DISCONTINUED | OUTPATIENT
Start: 2022-01-01 | End: 2022-01-01 | Stop reason: SURG

## 2022-01-01 RX ORDER — SODIUM CHLORIDE 9 MG/ML
40 INJECTION, SOLUTION INTRAVENOUS AS NEEDED
Status: DISCONTINUED | OUTPATIENT
Start: 2022-01-01 | End: 2022-01-01 | Stop reason: HOSPADM

## 2022-01-01 RX ORDER — HYDROXYZINE HYDROCHLORIDE 25 MG/1
25 TABLET, FILM COATED ORAL EVERY 6 HOURS PRN
Start: 2022-01-01

## 2022-01-01 RX ORDER — OXYCODONE AND ACETAMINOPHEN 7.5; 325 MG/1; MG/1
1 TABLET ORAL EVERY 8 HOURS PRN
Qty: 90 TABLET | Refills: 0 | Status: SHIPPED | OUTPATIENT
Start: 2022-01-01 | End: 2022-01-01 | Stop reason: SDUPTHER

## 2022-01-01 RX ORDER — ROCURONIUM BROMIDE 10 MG/ML
INJECTION, SOLUTION INTRAVENOUS AS NEEDED
Status: DISCONTINUED | OUTPATIENT
Start: 2022-01-01 | End: 2022-01-01 | Stop reason: SURG

## 2022-01-01 RX ORDER — CLONAZEPAM 0.5 MG/1
0.5 TABLET ORAL EVERY 8 HOURS PRN
Status: DISCONTINUED | OUTPATIENT
Start: 2022-01-01 | End: 2022-01-01 | Stop reason: HOSPADM

## 2022-01-01 RX ORDER — NALOXONE HCL 0.4 MG/ML
0.4 VIAL (ML) INJECTION
Status: DISCONTINUED | OUTPATIENT
Start: 2022-01-01 | End: 2022-01-01 | Stop reason: HOSPADM

## 2022-01-01 RX ORDER — MAGNESIUM HYDROXIDE 1200 MG/15ML
LIQUID ORAL AS NEEDED
Status: DISCONTINUED | OUTPATIENT
Start: 2022-01-01 | End: 2022-01-01 | Stop reason: HOSPADM

## 2022-01-01 RX ORDER — HYDROMORPHONE HCL 110MG/55ML
0.5 PATIENT CONTROLLED ANALGESIA SYRINGE INTRAVENOUS
Status: DISCONTINUED | OUTPATIENT
Start: 2022-01-01 | End: 2022-01-01

## 2022-01-01 RX ORDER — HYDROCODONE BITARTRATE AND ACETAMINOPHEN 7.5; 325 MG/1; MG/1
1 TABLET ORAL EVERY 4 HOURS PRN
Status: DISCONTINUED | OUTPATIENT
Start: 2022-01-01 | End: 2022-01-01

## 2022-01-01 RX ORDER — BISACODYL 5 MG/1
5 TABLET, DELAYED RELEASE ORAL DAILY PRN
Status: DISCONTINUED | OUTPATIENT
Start: 2022-01-01 | End: 2022-01-01 | Stop reason: HOSPADM

## 2022-01-01 RX ORDER — FENTANYL CITRATE 50 UG/ML
INJECTION, SOLUTION INTRAMUSCULAR; INTRAVENOUS AS NEEDED
Status: DISCONTINUED | OUTPATIENT
Start: 2022-01-01 | End: 2022-01-01 | Stop reason: SURG

## 2022-01-01 RX ORDER — AMOXICILLIN 250 MG
2 CAPSULE ORAL 2 TIMES DAILY
Start: 2022-01-01 | End: 2023-01-01

## 2022-01-01 RX ORDER — DOXYCYCLINE 100 MG/1
100 CAPSULE ORAL EVERY 12 HOURS SCHEDULED
Status: DISCONTINUED | OUTPATIENT
Start: 2022-01-01 | End: 2022-01-01 | Stop reason: HOSPADM

## 2022-01-01 RX ORDER — CEFTRIAXONE 1 G/50ML
1 INJECTION, SOLUTION INTRAVENOUS EVERY 24 HOURS
Status: DISCONTINUED | OUTPATIENT
Start: 2022-01-01 | End: 2022-01-01

## 2022-01-01 RX ORDER — OXYCODONE AND ACETAMINOPHEN 7.5; 325 MG/1; MG/1
1 TABLET ORAL EVERY 6 HOURS PRN
Status: DISCONTINUED | OUTPATIENT
Start: 2022-01-01 | End: 2022-01-01 | Stop reason: HOSPADM

## 2022-01-01 RX ORDER — CLONAZEPAM 0.5 MG/1
0.5 TABLET ORAL NIGHTLY PRN
Status: DISCONTINUED | OUTPATIENT
Start: 2022-01-01 | End: 2022-01-01

## 2022-01-01 RX ORDER — HYDROCODONE BITARTRATE AND ACETAMINOPHEN 7.5; 325 MG/1; MG/1
2 TABLET ORAL EVERY 4 HOURS PRN
Status: DISCONTINUED | OUTPATIENT
Start: 2022-01-01 | End: 2022-01-01

## 2022-01-01 RX ORDER — LIDOCAINE HYDROCHLORIDE 20 MG/ML
INJECTION, SOLUTION INTRAVENOUS AS NEEDED
Status: DISCONTINUED | OUTPATIENT
Start: 2022-01-01 | End: 2022-01-01 | Stop reason: SURG

## 2022-01-01 RX ORDER — POTASSIUM CHLORIDE 20 MEQ/1
40 TABLET, EXTENDED RELEASE ORAL ONCE
Status: COMPLETED | OUTPATIENT
Start: 2022-01-01 | End: 2022-01-01

## 2022-01-01 RX ORDER — CHOLECALCIFEROL (VITAMIN D3) 125 MCG
5 CAPSULE ORAL NIGHTLY PRN
Status: DISCONTINUED | OUTPATIENT
Start: 2022-01-01 | End: 2022-01-01 | Stop reason: HOSPADM

## 2022-01-01 RX ORDER — OXYCODONE AND ACETAMINOPHEN 7.5; 325 MG/1; MG/1
1 TABLET ORAL EVERY 8 HOURS PRN
Qty: 90 TABLET | Refills: 0 | Status: ON HOLD | OUTPATIENT
Start: 2022-01-01 | End: 2022-01-01 | Stop reason: SDUPTHER

## 2022-01-01 RX ORDER — ATORVASTATIN CALCIUM 10 MG/1
10 TABLET, FILM COATED ORAL DAILY
Status: DISCONTINUED | OUTPATIENT
Start: 2022-01-01 | End: 2022-01-01 | Stop reason: HOSPADM

## 2022-01-01 RX ORDER — CEFAZOLIN SODIUM 2 G/50ML
2 SOLUTION INTRAVENOUS EVERY 8 HOURS
Status: COMPLETED | OUTPATIENT
Start: 2022-01-01 | End: 2022-01-01

## 2022-01-01 RX ORDER — FAMOTIDINE 20 MG/1
20 TABLET, FILM COATED ORAL
Status: DISCONTINUED | OUTPATIENT
Start: 2022-01-01 | End: 2022-01-01 | Stop reason: HOSPADM

## 2022-01-01 RX ORDER — DOXYCYCLINE 100 MG/1
100 CAPSULE ORAL EVERY 12 HOURS SCHEDULED
Qty: 17 CAPSULE | Refills: 0
Start: 2022-01-01 | End: 2022-01-01

## 2022-01-01 RX ORDER — DILTIAZEM HYDROCHLORIDE 5 MG/ML
10 INJECTION INTRAVENOUS ONCE
Status: COMPLETED | OUTPATIENT
Start: 2022-01-01 | End: 2022-01-01

## 2022-01-01 RX ORDER — CEFAZOLIN SODIUM 1 G/3ML
INJECTION, POWDER, FOR SOLUTION INTRAMUSCULAR; INTRAVENOUS AS NEEDED
Status: DISCONTINUED | OUTPATIENT
Start: 2022-01-01 | End: 2022-01-01 | Stop reason: SURG

## 2022-01-01 RX ORDER — POLYETHYLENE GLYCOL 3350 17 G/17G
17 POWDER, FOR SOLUTION ORAL DAILY PRN
Status: DISCONTINUED | OUTPATIENT
Start: 2022-01-01 | End: 2022-01-01 | Stop reason: HOSPADM

## 2022-01-01 RX ORDER — SODIUM CHLORIDE, SODIUM LACTATE, POTASSIUM CHLORIDE, CALCIUM CHLORIDE 600; 310; 30; 20 MG/100ML; MG/100ML; MG/100ML; MG/100ML
100 INJECTION, SOLUTION INTRAVENOUS CONTINUOUS
Status: DISCONTINUED | OUTPATIENT
Start: 2022-01-01 | End: 2022-01-01

## 2022-01-01 RX ORDER — ENOXAPARIN SODIUM 100 MG/ML
1 INJECTION SUBCUTANEOUS EVERY 12 HOURS
Status: DISCONTINUED | OUTPATIENT
Start: 2022-01-01 | End: 2022-01-01

## 2022-01-01 RX ORDER — NEOSTIGMINE METHYLSULFATE 1 MG/ML
INJECTION, SOLUTION INTRAVENOUS AS NEEDED
Status: DISCONTINUED | OUTPATIENT
Start: 2022-01-01 | End: 2022-01-01 | Stop reason: SURG

## 2022-01-01 RX ORDER — CALCIUM CARBONATE 160(400)MG
1 TABLET,CHEWABLE ORAL TAKE AS DIRECTED
Qty: 1 EACH | Refills: 0 | Status: SHIPPED | OUTPATIENT
Start: 2022-01-01 | End: 2022-01-01 | Stop reason: HOSPADM

## 2022-01-01 RX ORDER — METOPROLOL TARTRATE 50 MG/1
50 TABLET, FILM COATED ORAL EVERY 12 HOURS SCHEDULED
Status: DISCONTINUED | OUTPATIENT
Start: 2022-01-01 | End: 2022-01-01

## 2022-01-01 RX ORDER — ACETAMINOPHEN 325 MG/1
650 TABLET ORAL EVERY 4 HOURS PRN
Start: 2022-01-01

## 2022-01-01 RX ORDER — DILTIAZEM HYDROCHLORIDE 300 MG/1
300 CAPSULE, COATED, EXTENDED RELEASE ORAL
Start: 2022-01-01 | End: 2023-01-01 | Stop reason: SDUPTHER

## 2022-01-01 RX ORDER — CYANOCOBALAMIN 1000 UG/ML
1000 INJECTION, SOLUTION INTRAMUSCULAR; SUBCUTANEOUS
Status: SHIPPED | OUTPATIENT
Start: 2022-01-01

## 2022-01-01 RX ORDER — CEFUROXIME AXETIL 250 MG/1
250 TABLET ORAL EVERY 12 HOURS SCHEDULED
Qty: 8 TABLET | Refills: 0 | Status: SHIPPED | OUTPATIENT
Start: 2022-01-01 | End: 2022-01-01

## 2022-01-01 RX ORDER — ERGOCALCIFEROL 1.25 MG/1
50000 CAPSULE ORAL WEEKLY
Qty: 13 CAPSULE | Refills: 3 | Status: SHIPPED | OUTPATIENT
Start: 2022-01-01

## 2022-01-01 RX ORDER — FLUOXETINE HYDROCHLORIDE 20 MG/1
20 CAPSULE ORAL DAILY
Status: DISCONTINUED | OUTPATIENT
Start: 2022-01-01 | End: 2022-01-01 | Stop reason: HOSPADM

## 2022-01-01 RX ORDER — BISACODYL 10 MG
10 SUPPOSITORY, RECTAL RECTAL DAILY PRN
Status: DISCONTINUED | OUTPATIENT
Start: 2022-01-01 | End: 2022-01-01 | Stop reason: HOSPADM

## 2022-01-01 RX ORDER — FAMOTIDINE 10 MG/ML
20 INJECTION, SOLUTION INTRAVENOUS
Status: COMPLETED | OUTPATIENT
Start: 2022-01-01 | End: 2022-01-01

## 2022-01-01 RX ORDER — ALBUTEROL SULFATE 2.5 MG/3ML
2.5 SOLUTION RESPIRATORY (INHALATION) EVERY 4 HOURS PRN
Qty: 150 ML | Refills: 2 | Status: SHIPPED | OUTPATIENT
Start: 2022-01-01

## 2022-01-01 RX ORDER — NICOTINE 21 MG/24HR
1 PATCH, TRANSDERMAL 24 HOURS TRANSDERMAL EVERY 24 HOURS
Status: DISCONTINUED | OUTPATIENT
Start: 2022-01-01 | End: 2022-01-01 | Stop reason: HOSPADM

## 2022-01-01 RX ORDER — ATORVASTATIN CALCIUM 10 MG/1
10 TABLET, FILM COATED ORAL DAILY
Qty: 90 TABLET | Refills: 1 | Status: SHIPPED | OUTPATIENT
Start: 2022-01-01 | End: 2023-01-01

## 2022-01-01 RX ORDER — FENTANYL CITRATE 50 UG/ML
25 INJECTION, SOLUTION INTRAMUSCULAR; INTRAVENOUS
Status: DISCONTINUED | OUTPATIENT
Start: 2022-01-01 | End: 2022-01-01 | Stop reason: HOSPADM

## 2022-01-01 RX ORDER — FLUOXETINE HYDROCHLORIDE 20 MG/1
20 CAPSULE ORAL DAILY
Qty: 30 CAPSULE | Refills: 0 | Status: ON HOLD | OUTPATIENT
Start: 2022-01-01 | End: 2022-01-01

## 2022-01-01 RX ORDER — CALCIUM CARBONATE 160(400)MG
1 TABLET,CHEWABLE ORAL TAKE AS DIRECTED
Qty: 1 EACH | Refills: 0 | Status: SHIPPED | OUTPATIENT
Start: 2022-01-01 | End: 2022-01-01 | Stop reason: SDUPTHER

## 2022-01-01 RX ORDER — ONDANSETRON 2 MG/ML
4 INJECTION INTRAMUSCULAR; INTRAVENOUS ONCE AS NEEDED
Status: DISCONTINUED | OUTPATIENT
Start: 2022-01-01 | End: 2022-01-01 | Stop reason: HOSPADM

## 2022-01-01 RX ORDER — CLONAZEPAM 0.5 MG/1
0.5 TABLET ORAL NIGHTLY PRN
Qty: 30 TABLET | Refills: 0 | OUTPATIENT
Start: 2022-01-01

## 2022-01-01 RX ORDER — ONDANSETRON 2 MG/ML
4 INJECTION INTRAMUSCULAR; INTRAVENOUS ONCE
Status: COMPLETED | OUTPATIENT
Start: 2022-01-01 | End: 2022-01-01

## 2022-01-01 RX ORDER — BUDESONIDE AND FORMOTEROL FUMARATE DIHYDRATE 160; 4.5 UG/1; UG/1
2 AEROSOL RESPIRATORY (INHALATION)
Refills: 12
Start: 2022-01-01

## 2022-01-01 RX ORDER — ONDANSETRON 4 MG/1
4 TABLET, FILM COATED ORAL EVERY 6 HOURS PRN
Start: 2022-01-01

## 2022-01-01 RX ORDER — DILTIAZEM HYDROCHLORIDE 60 MG/1
60 TABLET, FILM COATED ORAL EVERY 6 HOURS SCHEDULED
Status: DISCONTINUED | OUTPATIENT
Start: 2022-01-01 | End: 2022-01-01

## 2022-01-01 RX ORDER — CLONAZEPAM 0.5 MG/1
0.5 TABLET ORAL NIGHTLY PRN
Qty: 30 TABLET | Refills: 0 | Status: SHIPPED | OUTPATIENT
Start: 2022-01-01 | End: 2022-01-01

## 2022-01-01 RX ORDER — NICOTINE 21 MG/24HR
1 PATCH, TRANSDERMAL 24 HOURS TRANSDERMAL EVERY 24 HOURS
Start: 2022-01-01 | End: 2023-01-01

## 2022-01-01 RX ORDER — BISACODYL 5 MG/1
5 TABLET, DELAYED RELEASE ORAL DAILY PRN
Start: 2022-01-01

## 2022-01-01 RX ORDER — AMOXICILLIN 250 MG
2 CAPSULE ORAL 2 TIMES DAILY
Status: DISCONTINUED | OUTPATIENT
Start: 2022-01-01 | End: 2022-01-01 | Stop reason: HOSPADM

## 2022-01-01 RX ORDER — ONDANSETRON 4 MG/1
4 TABLET, FILM COATED ORAL EVERY 6 HOURS PRN
Status: DISCONTINUED | OUTPATIENT
Start: 2022-01-01 | End: 2022-01-01 | Stop reason: HOSPADM

## 2022-01-01 RX ORDER — CLONAZEPAM 0.5 MG/1
0.5 TABLET ORAL NIGHTLY PRN
Status: DISCONTINUED | OUTPATIENT
Start: 2022-01-01 | End: 2022-01-01 | Stop reason: HOSPADM

## 2022-01-01 RX ORDER — SODIUM CHLORIDE 0.9 % (FLUSH) 0.9 %
10 SYRINGE (ML) INJECTION EVERY 12 HOURS SCHEDULED
Status: DISCONTINUED | OUTPATIENT
Start: 2022-01-01 | End: 2022-01-01 | Stop reason: HOSPADM

## 2022-01-01 RX ORDER — ACETAMINOPHEN 325 MG/1
650 TABLET ORAL EVERY 4 HOURS PRN
Status: DISCONTINUED | OUTPATIENT
Start: 2022-01-01 | End: 2022-01-01 | Stop reason: HOSPADM

## 2022-01-01 RX ORDER — CLONAZEPAM 0.5 MG/1
0.5 TABLET ORAL NIGHTLY PRN
Qty: 30 TABLET | Refills: 0 | Status: ON HOLD | OUTPATIENT
Start: 2022-01-01 | End: 2022-01-01 | Stop reason: SDUPTHER

## 2022-01-01 RX ORDER — NITROGLYCERIN 0.4 MG/1
0.4 TABLET SUBLINGUAL
Status: DISCONTINUED | OUTPATIENT
Start: 2022-01-01 | End: 2022-01-01 | Stop reason: HOSPADM

## 2022-01-01 RX ORDER — PHENYLEPHRINE HYDROCHLORIDE 10 MG/ML
INJECTION INTRAVENOUS AS NEEDED
Status: DISCONTINUED | OUTPATIENT
Start: 2022-01-01 | End: 2022-01-01 | Stop reason: SURG

## 2022-01-01 RX ORDER — ACETAMINOPHEN 160 MG/5ML
650 SOLUTION ORAL EVERY 4 HOURS PRN
Status: DISCONTINUED | OUTPATIENT
Start: 2022-01-01 | End: 2022-01-01 | Stop reason: HOSPADM

## 2022-01-01 RX ORDER — POLYETHYLENE GLYCOL 3350 17 G/17G
17 POWDER, FOR SOLUTION ORAL DAILY PRN
Start: 2022-01-01

## 2022-01-01 RX ORDER — HYDROCODONE BITARTRATE AND ACETAMINOPHEN 5; 325 MG/1; MG/1
1 TABLET ORAL EVERY 4 HOURS PRN
Qty: 40 TABLET | Refills: 0 | Status: SHIPPED | OUTPATIENT
Start: 2022-01-01 | End: 2023-01-01

## 2022-01-01 RX ORDER — CHOLECALCIFEROL (VITAMIN D3) 125 MCG
5 CAPSULE ORAL NIGHTLY PRN
Start: 2022-01-01

## 2022-01-01 RX ORDER — CLONAZEPAM 0.5 MG/1
0.5 TABLET ORAL NIGHTLY PRN
Qty: 30 TABLET | Refills: 0 | Status: SHIPPED | OUTPATIENT
Start: 2022-01-01 | End: 2023-01-01 | Stop reason: SDUPTHER

## 2022-01-01 RX ORDER — DILTIAZEM HYDROCHLORIDE 60 MG/1
60 TABLET, FILM COATED ORAL EVERY 8 HOURS SCHEDULED
Status: DISCONTINUED | OUTPATIENT
Start: 2022-01-01 | End: 2022-01-01

## 2022-01-01 RX ORDER — FLUTICASONE PROPIONATE 50 MCG
2 SPRAY, SUSPENSION (ML) NASAL EVERY 12 HOURS PRN
Status: DISCONTINUED | OUTPATIENT
Start: 2022-01-01 | End: 2022-01-01 | Stop reason: HOSPADM

## 2022-01-01 RX ORDER — OXYCODONE AND ACETAMINOPHEN 10; 325 MG/1; MG/1
1 TABLET ORAL EVERY 4 HOURS PRN
Status: DISCONTINUED | OUTPATIENT
Start: 2022-01-01 | End: 2022-01-01

## 2022-01-01 RX ORDER — DOXYCYCLINE HYCLATE 100 MG/1
100 CAPSULE ORAL 2 TIMES DAILY
Qty: 28 CAPSULE | Refills: 0 | Status: SHIPPED | OUTPATIENT
Start: 2022-01-01 | End: 2022-01-01 | Stop reason: HOSPADM

## 2022-01-01 RX ORDER — SUCCINYLCHOLINE CHLORIDE 20 MG/ML
INJECTION INTRAMUSCULAR; INTRAVENOUS AS NEEDED
Status: DISCONTINUED | OUTPATIENT
Start: 2022-01-01 | End: 2022-01-01 | Stop reason: SURG

## 2022-01-01 RX ORDER — DIGOXIN 0.25 MG/ML
125 INJECTION INTRAMUSCULAR; INTRAVENOUS ONCE
Status: COMPLETED | OUTPATIENT
Start: 2022-01-01 | End: 2022-01-01

## 2022-01-01 RX ORDER — ACETAMINOPHEN 650 MG/1
650 SUPPOSITORY RECTAL EVERY 4 HOURS PRN
Status: DISCONTINUED | OUTPATIENT
Start: 2022-01-01 | End: 2022-01-01 | Stop reason: HOSPADM

## 2022-01-01 RX ORDER — FLUOXETINE HYDROCHLORIDE 20 MG/1
20 CAPSULE ORAL DAILY
Qty: 30 CAPSULE | Refills: 0 | Status: SHIPPED | OUTPATIENT
Start: 2022-01-01 | End: 2023-01-01

## 2022-01-01 RX ORDER — OXYCODONE AND ACETAMINOPHEN 7.5; 325 MG/1; MG/1
1 TABLET ORAL EVERY 8 HOURS PRN
Qty: 9 TABLET | Refills: 0 | Status: SHIPPED | OUTPATIENT
Start: 2022-01-01 | End: 2022-01-01

## 2022-01-01 RX ORDER — KETAMINE HYDROCHLORIDE 10 MG/ML
INJECTION INTRAMUSCULAR; INTRAVENOUS AS NEEDED
Status: DISCONTINUED | OUTPATIENT
Start: 2022-01-01 | End: 2022-01-01 | Stop reason: SURG

## 2022-01-01 RX ORDER — OXYCODONE HYDROCHLORIDE AND ACETAMINOPHEN 5; 325 MG/1; MG/1
1 TABLET ORAL ONCE AS NEEDED
Status: DISCONTINUED | OUTPATIENT
Start: 2022-01-01 | End: 2022-01-01 | Stop reason: HOSPADM

## 2022-01-01 RX ORDER — HYDROCODONE BITARTRATE AND ACETAMINOPHEN 7.5; 325 MG/1; MG/1
1 TABLET ORAL EVERY 6 HOURS PRN
COMMUNITY
End: 2022-01-01

## 2022-01-01 RX ORDER — FLUOXETINE HYDROCHLORIDE 20 MG/1
20 CAPSULE ORAL DAILY
Qty: 30 CAPSULE | Refills: 0 | Status: SHIPPED | OUTPATIENT
Start: 2022-01-01 | End: 2022-01-01 | Stop reason: SDUPTHER

## 2022-01-01 RX ORDER — ASPIRIN 325 MG
325 TABLET ORAL ONCE
Status: DISCONTINUED | OUTPATIENT
Start: 2022-01-01 | End: 2022-01-01

## 2022-01-01 RX ADMIN — Medication 10 ML: at 23:24

## 2022-01-01 RX ADMIN — BUDESONIDE AND FORMOTEROL FUMARATE DIHYDRATE 2 PUFF: 160; 4.5 AEROSOL RESPIRATORY (INHALATION) at 09:18

## 2022-01-01 RX ADMIN — DILTIAZEM HYDROCHLORIDE 60 MG: 60 TABLET, FILM COATED ORAL at 06:26

## 2022-01-01 RX ADMIN — LIDOCAINE HYDROCHLORIDE 50 MG: 20 INJECTION, SOLUTION INTRAVENOUS at 08:02

## 2022-01-01 RX ADMIN — DILTIAZEM HYDROCHLORIDE 60 MG: 60 TABLET, FILM COATED ORAL at 13:02

## 2022-01-01 RX ADMIN — HYDROMORPHONE HYDROCHLORIDE 0.5 MG: 1 INJECTION, SOLUTION INTRAMUSCULAR; INTRAVENOUS; SUBCUTANEOUS at 11:11

## 2022-01-01 RX ADMIN — ATORVASTATIN CALCIUM 10 MG: 10 TABLET, FILM COATED ORAL at 12:53

## 2022-01-01 RX ADMIN — Medication 10 ML: at 08:57

## 2022-01-01 RX ADMIN — KETAMINE HYDROCHLORIDE 10 MG: 10 INJECTION INTRAMUSCULAR; INTRAVENOUS at 08:36

## 2022-01-01 RX ADMIN — DILTIAZEM HYDROCHLORIDE 10 MG: 5 INJECTION INTRAVENOUS at 11:43

## 2022-01-01 RX ADMIN — OXYCODONE HYDROCHLORIDE AND ACETAMINOPHEN 1 TABLET: 7.5; 325 TABLET ORAL at 11:46

## 2022-01-01 RX ADMIN — OXYCODONE HYDROCHLORIDE AND ACETAMINOPHEN 1 TABLET: 10; 325 TABLET ORAL at 12:40

## 2022-01-01 RX ADMIN — IOPAMIDOL 90 ML: 612 INJECTION, SOLUTION INTRAVENOUS at 19:31

## 2022-01-01 RX ADMIN — SENNOSIDES AND DOCUSATE SODIUM 2 TABLET: 50; 8.6 TABLET ORAL at 21:46

## 2022-01-01 RX ADMIN — FAMOTIDINE 20 MG: 20 TABLET ORAL at 06:44

## 2022-01-01 RX ADMIN — CEFAZOLIN SODIUM 2 G: 2 SOLUTION INTRAVENOUS at 14:27

## 2022-01-01 RX ADMIN — OXYCODONE HYDROCHLORIDE AND ACETAMINOPHEN 1 TABLET: 10; 325 TABLET ORAL at 18:58

## 2022-01-01 RX ADMIN — CEFUROXIME AXETIL 250 MG: 250 TABLET, FILM COATED ORAL at 12:28

## 2022-01-01 RX ADMIN — DOXYCYCLINE 100 MG: 100 CAPSULE ORAL at 20:13

## 2022-01-01 RX ADMIN — FLUOXETINE 20 MG: 20 CAPSULE ORAL at 20:00

## 2022-01-01 RX ADMIN — DILTIAZEM HYDROCHLORIDE 30 MG: 30 TABLET, FILM COATED ORAL at 17:12

## 2022-01-01 RX ADMIN — DEXMEDETOMIDINE 4 MCG: 100 INJECTION, SOLUTION, CONCENTRATE INTRAVENOUS at 08:04

## 2022-01-01 RX ADMIN — SODIUM CHLORIDE, POTASSIUM CHLORIDE, SODIUM LACTATE AND CALCIUM CHLORIDE 1000 ML: 600; 310; 30; 20 INJECTION, SOLUTION INTRAVENOUS at 14:25

## 2022-01-01 RX ADMIN — Medication 10 ML: at 08:40

## 2022-01-01 RX ADMIN — OXYCODONE HYDROCHLORIDE AND ACETAMINOPHEN 1 TABLET: 10; 325 TABLET ORAL at 21:29

## 2022-01-01 RX ADMIN — APIXABAN 2.5 MG: 2.5 TABLET, FILM COATED ORAL at 08:42

## 2022-01-01 RX ADMIN — PHENYLEPHRINE HYDROCHLORIDE 100 MCG: 10 INJECTION INTRAVENOUS at 08:56

## 2022-01-01 RX ADMIN — DIGOXIN 125 MCG: 0.25 INJECTION INTRAMUSCULAR; INTRAVENOUS at 14:12

## 2022-01-01 RX ADMIN — FAMOTIDINE 20 MG: 20 TABLET ORAL at 06:55

## 2022-01-01 RX ADMIN — CLONAZEPAM 0.5 MG: 0.5 TABLET ORAL at 20:13

## 2022-01-01 RX ADMIN — SENNOSIDES AND DOCUSATE SODIUM 2 TABLET: 50; 8.6 TABLET ORAL at 08:57

## 2022-01-01 RX ADMIN — NEOSTIGMINE METHYLSULFATE 3 MG: 0.5 INJECTION INTRAVENOUS at 10:14

## 2022-01-01 RX ADMIN — METOPROLOL TARTRATE 50 MG: 50 TABLET, FILM COATED ORAL at 08:39

## 2022-01-01 RX ADMIN — ATORVASTATIN CALCIUM 10 MG: 10 TABLET, FILM COATED ORAL at 08:45

## 2022-01-01 RX ADMIN — DILTIAZEM HYDROCHLORIDE 60 MG: 60 TABLET, FILM COATED ORAL at 06:41

## 2022-01-01 RX ADMIN — DOXYCYCLINE 100 MG: 100 CAPSULE ORAL at 22:03

## 2022-01-01 RX ADMIN — FAMOTIDINE 20 MG: 20 TABLET ORAL at 17:41

## 2022-01-01 RX ADMIN — CLONAZEPAM 0.5 MG: 0.5 TABLET ORAL at 00:46

## 2022-01-01 RX ADMIN — DILTIAZEM HYDROCHLORIDE 60 MG: 60 TABLET, FILM COATED ORAL at 17:53

## 2022-01-01 RX ADMIN — DEXMEDETOMIDINE 4 MCG: 100 INJECTION, SOLUTION, CONCENTRATE INTRAVENOUS at 08:36

## 2022-01-01 RX ADMIN — DIGOXIN 250 MCG: 0.25 INJECTION INTRAMUSCULAR; INTRAVENOUS at 21:41

## 2022-01-01 RX ADMIN — OXYCODONE HYDROCHLORIDE AND ACETAMINOPHEN 1 TABLET: 10; 325 TABLET ORAL at 08:45

## 2022-01-01 RX ADMIN — Medication 10 ML: at 09:00

## 2022-01-01 RX ADMIN — ATORVASTATIN CALCIUM 10 MG: 10 TABLET, FILM COATED ORAL at 08:16

## 2022-01-01 RX ADMIN — SODIUM CHLORIDE 10 MG/HR: 900 INJECTION, SOLUTION INTRAVENOUS at 21:40

## 2022-01-01 RX ADMIN — HYDROXYZINE HYDROCHLORIDE 25 MG: 25 TABLET ORAL at 22:03

## 2022-01-01 RX ADMIN — DOXYCYCLINE 100 MG: 100 CAPSULE ORAL at 09:13

## 2022-01-01 RX ADMIN — APIXABAN 2.5 MG: 2.5 TABLET, FILM COATED ORAL at 10:45

## 2022-01-01 RX ADMIN — METOPROLOL TARTRATE 12.5 MG: 25 TABLET, FILM COATED ORAL at 08:16

## 2022-01-01 RX ADMIN — DILTIAZEM HYDROCHLORIDE 30 MG: 30 TABLET, FILM COATED ORAL at 00:44

## 2022-01-01 RX ADMIN — OXYCODONE HYDROCHLORIDE AND ACETAMINOPHEN 1 TABLET: 10; 325 TABLET ORAL at 17:40

## 2022-01-01 RX ADMIN — CYANOCOBALAMIN 1000 MCG: 1000 INJECTION, SOLUTION INTRAMUSCULAR; SUBCUTANEOUS at 14:19

## 2022-01-01 RX ADMIN — BUDESONIDE AND FORMOTEROL FUMARATE DIHYDRATE 2 PUFF: 160; 4.5 AEROSOL RESPIRATORY (INHALATION) at 20:13

## 2022-01-01 RX ADMIN — SENNOSIDES AND DOCUSATE SODIUM 2 TABLET: 50; 8.6 TABLET ORAL at 20:13

## 2022-01-01 RX ADMIN — POLYETHYLENE GLYCOL 3350 17 G: 17 POWDER, FOR SOLUTION ORAL at 08:39

## 2022-01-01 RX ADMIN — CEFAZOLIN SODIUM 2 G: 2 SOLUTION INTRAVENOUS at 13:49

## 2022-01-01 RX ADMIN — METOPROLOL TARTRATE 12.5 MG: 25 TABLET, FILM COATED ORAL at 12:53

## 2022-01-01 RX ADMIN — FLUOXETINE 20 MG: 10 CAPSULE ORAL at 20:27

## 2022-01-01 RX ADMIN — DIGOXIN 250 MCG: 0.25 INJECTION INTRAMUSCULAR; INTRAVENOUS at 11:12

## 2022-01-01 RX ADMIN — APIXABAN 2.5 MG: 2.5 TABLET, FILM COATED ORAL at 20:35

## 2022-01-01 RX ADMIN — FAMOTIDINE 20 MG: 20 TABLET ORAL at 17:53

## 2022-01-01 RX ADMIN — METOPROLOL TARTRATE 5 MG: 1 INJECTION, SOLUTION INTRAVENOUS at 12:21

## 2022-01-01 RX ADMIN — HYDROXYZINE HYDROCHLORIDE 25 MG: 25 TABLET ORAL at 08:58

## 2022-01-01 RX ADMIN — HYDROMORPHONE HYDROCHLORIDE 0.5 MG: 2 INJECTION, SOLUTION INTRAMUSCULAR; INTRAVENOUS; SUBCUTANEOUS at 05:37

## 2022-01-01 RX ADMIN — OXYCODONE HYDROCHLORIDE AND ACETAMINOPHEN 1 TABLET: 10; 325 TABLET ORAL at 22:22

## 2022-01-01 RX ADMIN — DILTIAZEM HYDROCHLORIDE 10 MG: 5 INJECTION INTRAVENOUS at 13:22

## 2022-01-01 RX ADMIN — DOXYCYCLINE 100 MG: 100 CAPSULE ORAL at 21:46

## 2022-01-01 RX ADMIN — DOXYCYCLINE 100 MG: 100 CAPSULE ORAL at 20:35

## 2022-01-01 RX ADMIN — SODIUM CHLORIDE, POTASSIUM CHLORIDE, SODIUM LACTATE AND CALCIUM CHLORIDE 500 ML: 600; 310; 30; 20 INJECTION, SOLUTION INTRAVENOUS at 14:11

## 2022-01-01 RX ADMIN — SENNOSIDES AND DOCUSATE SODIUM 2 TABLET: 50; 8.6 TABLET ORAL at 08:45

## 2022-01-01 RX ADMIN — DEXMEDETOMIDINE 4 MCG: 100 INJECTION, SOLUTION, CONCENTRATE INTRAVENOUS at 09:08

## 2022-01-01 RX ADMIN — CEFAZOLIN 1 G: 330 INJECTION, POWDER, FOR SOLUTION INTRAMUSCULAR; INTRAVENOUS at 09:35

## 2022-01-01 RX ADMIN — BUDESONIDE AND FORMOTEROL FUMARATE DIHYDRATE 2 PUFF: 160; 4.5 AEROSOL RESPIRATORY (INHALATION) at 10:00

## 2022-01-01 RX ADMIN — OXYCODONE HYDROCHLORIDE AND ACETAMINOPHEN 1 TABLET: 10; 325 TABLET ORAL at 21:31

## 2022-01-01 RX ADMIN — HYDROXYZINE HYDROCHLORIDE 25 MG: 25 TABLET ORAL at 15:00

## 2022-01-01 RX ADMIN — Medication 10 ML: at 20:36

## 2022-01-01 RX ADMIN — SODIUM CHLORIDE 5 MG/HR: 900 INJECTION, SOLUTION INTRAVENOUS at 13:25

## 2022-01-01 RX ADMIN — BUDESONIDE AND FORMOTEROL FUMARATE DIHYDRATE 2 PUFF: 160; 4.5 AEROSOL RESPIRATORY (INHALATION) at 09:44

## 2022-01-01 RX ADMIN — METOPROLOL TARTRATE 25 MG: 25 TABLET, FILM COATED ORAL at 20:35

## 2022-01-01 RX ADMIN — DILTIAZEM HYDROCHLORIDE 30 MG: 30 TABLET, FILM COATED ORAL at 09:02

## 2022-01-01 RX ADMIN — ATORVASTATIN CALCIUM 10 MG: 10 TABLET, FILM COATED ORAL at 08:42

## 2022-01-01 RX ADMIN — OXYCODONE HYDROCHLORIDE AND ACETAMINOPHEN 1 TABLET: 7.5; 325 TABLET ORAL at 05:10

## 2022-01-01 RX ADMIN — Medication 1 PATCH: at 17:40

## 2022-01-01 RX ADMIN — HYDROXYZINE HYDROCHLORIDE 25 MG: 25 TABLET ORAL at 08:45

## 2022-01-01 RX ADMIN — SENNOSIDES AND DOCUSATE SODIUM 2 TABLET: 50; 8.6 TABLET ORAL at 12:53

## 2022-01-01 RX ADMIN — Medication 5 MG: at 20:03

## 2022-01-01 RX ADMIN — ATORVASTATIN CALCIUM 10 MG: 10 TABLET, FILM COATED ORAL at 08:57

## 2022-01-01 RX ADMIN — BUDESONIDE AND FORMOTEROL FUMARATE DIHYDRATE 2 PUFF: 160; 4.5 AEROSOL RESPIRATORY (INHALATION) at 09:33

## 2022-01-01 RX ADMIN — ENOXAPARIN SODIUM 50 MG: 60 INJECTION SUBCUTANEOUS at 05:30

## 2022-01-01 RX ADMIN — CEFAZOLIN SODIUM 2 G: 2 SOLUTION INTRAVENOUS at 20:02

## 2022-01-01 RX ADMIN — APIXABAN 2.5 MG: 2.5 TABLET, FILM COATED ORAL at 21:46

## 2022-01-01 RX ADMIN — APIXABAN 2.5 MG: 2.5 TABLET, FILM COATED ORAL at 08:58

## 2022-01-01 RX ADMIN — ATORVASTATIN CALCIUM 10 MG: 10 TABLET, FILM COATED ORAL at 20:20

## 2022-01-01 RX ADMIN — DIGOXIN 250 MCG: 0.25 INJECTION INTRAMUSCULAR; INTRAVENOUS at 15:05

## 2022-01-01 RX ADMIN — CEFAZOLIN SODIUM 2 G: 2 SOLUTION INTRAVENOUS at 05:03

## 2022-01-01 RX ADMIN — OXYCODONE HYDROCHLORIDE AND ACETAMINOPHEN 1 TABLET: 10; 325 TABLET ORAL at 05:09

## 2022-01-01 RX ADMIN — ONDANSETRON 4 MG: 2 INJECTION INTRAMUSCULAR; INTRAVENOUS at 07:53

## 2022-01-01 RX ADMIN — FAMOTIDINE 20 MG: 20 TABLET ORAL at 17:12

## 2022-01-01 RX ADMIN — OXYCODONE HYDROCHLORIDE AND ACETAMINOPHEN 1 TABLET: 7.5; 325 TABLET ORAL at 03:36

## 2022-01-01 RX ADMIN — OXYCODONE HYDROCHLORIDE AND ACETAMINOPHEN 1 TABLET: 7.5; 325 TABLET ORAL at 23:16

## 2022-01-01 RX ADMIN — APIXABAN 2.5 MG: 2.5 TABLET, FILM COATED ORAL at 20:20

## 2022-01-01 RX ADMIN — Medication 1 PATCH: at 17:27

## 2022-01-01 RX ADMIN — Medication 10 ML: at 20:02

## 2022-01-01 RX ADMIN — KETAMINE HYDROCHLORIDE 10 MG: 10 INJECTION INTRAMUSCULAR; INTRAVENOUS at 08:02

## 2022-01-01 RX ADMIN — Medication 5 MG: at 23:17

## 2022-01-01 RX ADMIN — FLUOXETINE 20 MG: 20 CAPSULE ORAL at 23:16

## 2022-01-01 RX ADMIN — DILTIAZEM HYDROCHLORIDE 60 MG: 60 TABLET, FILM COATED ORAL at 13:50

## 2022-01-01 RX ADMIN — POTASSIUM CHLORIDE 40 MEQ: 1500 TABLET, EXTENDED RELEASE ORAL at 14:12

## 2022-01-01 RX ADMIN — DEXMEDETOMIDINE 4 MCG: 100 INJECTION, SOLUTION, CONCENTRATE INTRAVENOUS at 09:43

## 2022-01-01 RX ADMIN — FENTANYL CITRATE 25 MCG: 50 INJECTION, SOLUTION INTRAMUSCULAR; INTRAVENOUS at 08:02

## 2022-01-01 RX ADMIN — METOPROLOL TARTRATE 12.5 MG: 25 TABLET, FILM COATED ORAL at 20:02

## 2022-01-01 RX ADMIN — FLUOXETINE 20 MG: 20 CAPSULE ORAL at 20:03

## 2022-01-01 RX ADMIN — HYDROMORPHONE HYDROCHLORIDE 0.5 MG: 1 INJECTION, SOLUTION INTRAMUSCULAR; INTRAVENOUS; SUBCUTANEOUS at 11:21

## 2022-01-01 RX ADMIN — FENTANYL CITRATE 25 MCG: 50 INJECTION, SOLUTION INTRAMUSCULAR; INTRAVENOUS at 09:43

## 2022-01-01 RX ADMIN — OXYCODONE HYDROCHLORIDE AND ACETAMINOPHEN 1 TABLET: 10; 325 TABLET ORAL at 22:14

## 2022-01-01 RX ADMIN — SENNOSIDES AND DOCUSATE SODIUM 2 TABLET: 50; 8.6 TABLET ORAL at 09:12

## 2022-01-01 RX ADMIN — DOXYCYCLINE 100 MG: 100 CAPSULE ORAL at 08:45

## 2022-01-01 RX ADMIN — FLUOXETINE 20 MG: 10 CAPSULE ORAL at 08:42

## 2022-01-01 RX ADMIN — APIXABAN 2.5 MG: 2.5 TABLET, FILM COATED ORAL at 22:03

## 2022-01-01 RX ADMIN — BUDESONIDE AND FORMOTEROL FUMARATE DIHYDRATE 2 PUFF: 160; 4.5 AEROSOL RESPIRATORY (INHALATION) at 21:30

## 2022-01-01 RX ADMIN — Medication 10 ML: at 22:06

## 2022-01-01 RX ADMIN — METOPROLOL TARTRATE 25 MG: 25 TABLET, FILM COATED ORAL at 09:26

## 2022-01-01 RX ADMIN — HYDROXYZINE HYDROCHLORIDE 25 MG: 25 TABLET ORAL at 16:55

## 2022-01-01 RX ADMIN — BUDESONIDE AND FORMOTEROL FUMARATE DIHYDRATE 2 PUFF: 160; 4.5 AEROSOL RESPIRATORY (INHALATION) at 21:07

## 2022-01-01 RX ADMIN — METOPROLOL TARTRATE 12.5 MG: 25 TABLET, FILM COATED ORAL at 20:13

## 2022-01-01 RX ADMIN — HYDROXYZINE HYDROCHLORIDE 25 MG: 25 TABLET ORAL at 17:53

## 2022-01-01 RX ADMIN — Medication 5 MG: at 22:14

## 2022-01-01 RX ADMIN — DILTIAZEM HYDROCHLORIDE 60 MG: 60 TABLET, FILM COATED ORAL at 17:02

## 2022-01-01 RX ADMIN — ROCURONIUM BROMIDE 5 MG: 10 INJECTION, SOLUTION INTRAVENOUS at 08:02

## 2022-01-01 RX ADMIN — DILTIAZEM HYDROCHLORIDE 30 MG: 30 TABLET, FILM COATED ORAL at 06:44

## 2022-01-01 RX ADMIN — HYDROCODONE BITARTRATE AND ACETAMINOPHEN 1 TABLET: 7.5; 325 TABLET ORAL at 13:49

## 2022-01-01 RX ADMIN — HYDROXYZINE HYDROCHLORIDE 25 MG: 25 TABLET ORAL at 23:17

## 2022-01-01 RX ADMIN — Medication 1 PATCH: at 17:13

## 2022-01-01 RX ADMIN — SENNOSIDES AND DOCUSATE SODIUM 2 TABLET: 50; 8.6 TABLET ORAL at 20:00

## 2022-01-01 RX ADMIN — APIXABAN 2.5 MG: 2.5 TABLET, FILM COATED ORAL at 08:57

## 2022-01-01 RX ADMIN — ATORVASTATIN CALCIUM 10 MG: 10 TABLET, FILM COATED ORAL at 08:40

## 2022-01-01 RX ADMIN — DOXYCYCLINE 100 MG: 100 CAPSULE ORAL at 08:57

## 2022-01-01 RX ADMIN — DOXYCYCLINE 100 MG: 100 CAPSULE ORAL at 08:39

## 2022-01-01 RX ADMIN — HYDROXYZINE HYDROCHLORIDE 25 MG: 25 TABLET ORAL at 08:39

## 2022-01-01 RX ADMIN — OXYCODONE HYDROCHLORIDE AND ACETAMINOPHEN 1 TABLET: 7.5; 325 TABLET ORAL at 13:50

## 2022-01-01 RX ADMIN — DILTIAZEM HYDROCHLORIDE 60 MG: 60 TABLET, FILM COATED ORAL at 01:40

## 2022-01-01 RX ADMIN — CEFAZOLIN SODIUM 2 G: 2 SOLUTION INTRAVENOUS at 21:32

## 2022-01-01 RX ADMIN — BUDESONIDE AND FORMOTEROL FUMARATE DIHYDRATE 2 PUFF: 160; 4.5 AEROSOL RESPIRATORY (INHALATION) at 09:56

## 2022-01-01 RX ADMIN — FLUOXETINE 20 MG: 20 CAPSULE ORAL at 22:03

## 2022-01-01 RX ADMIN — BUDESONIDE AND FORMOTEROL FUMARATE DIHYDRATE 2 PUFF: 160; 4.5 AEROSOL RESPIRATORY (INHALATION) at 19:47

## 2022-01-01 RX ADMIN — ATORVASTATIN CALCIUM 10 MG: 10 TABLET, FILM COATED ORAL at 09:13

## 2022-01-01 RX ADMIN — OXYCODONE HYDROCHLORIDE AND ACETAMINOPHEN 1 TABLET: 10; 325 TABLET ORAL at 05:44

## 2022-01-01 RX ADMIN — CEFTRIAXONE 1 G: 1 INJECTION, SOLUTION INTRAVENOUS at 14:43

## 2022-01-01 RX ADMIN — FAMOTIDINE 20 MG: 20 TABLET ORAL at 17:25

## 2022-01-01 RX ADMIN — Medication 5 MG: at 21:46

## 2022-01-01 RX ADMIN — FAMOTIDINE 20 MG: 20 TABLET ORAL at 17:40

## 2022-01-01 RX ADMIN — HYDROXYZINE HYDROCHLORIDE 25 MG: 25 TABLET ORAL at 08:57

## 2022-01-01 RX ADMIN — SODIUM CHLORIDE 100 ML/HR: 9 INJECTION, SOLUTION INTRAVENOUS at 20:22

## 2022-01-01 RX ADMIN — BUDESONIDE AND FORMOTEROL FUMARATE DIHYDRATE 2 PUFF: 160; 4.5 AEROSOL RESPIRATORY (INHALATION) at 20:49

## 2022-01-01 RX ADMIN — OXYCODONE HYDROCHLORIDE AND ACETAMINOPHEN 1 TABLET: 7.5; 325 TABLET ORAL at 13:02

## 2022-01-01 RX ADMIN — GLYCOPYRROLATE 0.2 MG: 0.2 INJECTION INTRAMUSCULAR; INTRAVENOUS at 08:22

## 2022-01-01 RX ADMIN — Medication 1 PATCH: at 17:03

## 2022-01-01 RX ADMIN — Medication 10 ML: at 20:09

## 2022-01-01 RX ADMIN — Medication 10 ML: at 13:57

## 2022-01-01 RX ADMIN — Medication 10 ML: at 08:45

## 2022-01-01 RX ADMIN — OXYCODONE HYDROCHLORIDE AND ACETAMINOPHEN 1 TABLET: 10; 325 TABLET ORAL at 22:45

## 2022-01-01 RX ADMIN — METOPROLOL TARTRATE 12.5 MG: 25 TABLET, FILM COATED ORAL at 09:12

## 2022-01-01 RX ADMIN — KETAMINE HYDROCHLORIDE 10 MG: 10 INJECTION INTRAMUSCULAR; INTRAVENOUS at 09:08

## 2022-01-01 RX ADMIN — PROPOFOL INJECTABLE EMULSION 100 MG: 10 INJECTION, EMULSION INTRAVENOUS at 08:02

## 2022-01-01 RX ADMIN — DOXYCYCLINE 100 MG: 100 CAPSULE ORAL at 23:17

## 2022-01-01 RX ADMIN — HYDROXYZINE HYDROCHLORIDE 25 MG: 25 TABLET ORAL at 16:59

## 2022-01-01 RX ADMIN — BUDESONIDE AND FORMOTEROL FUMARATE DIHYDRATE 2 PUFF: 160; 4.5 AEROSOL RESPIRATORY (INHALATION) at 20:55

## 2022-01-01 RX ADMIN — FAMOTIDINE 20 MG: 20 TABLET ORAL at 08:30

## 2022-01-01 RX ADMIN — Medication 10 ML: at 21:47

## 2022-01-01 RX ADMIN — SENNOSIDES AND DOCUSATE SODIUM 2 TABLET: 50; 8.6 TABLET ORAL at 22:02

## 2022-01-01 RX ADMIN — FAMOTIDINE 20 MG: 10 INJECTION, SOLUTION INTRAVENOUS at 07:53

## 2022-01-01 RX ADMIN — Medication 1 PATCH: at 17:42

## 2022-01-01 RX ADMIN — FAMOTIDINE 20 MG: 20 TABLET ORAL at 09:12

## 2022-01-01 RX ADMIN — ENOXAPARIN SODIUM 50 MG: 60 INJECTION SUBCUTANEOUS at 19:08

## 2022-01-01 RX ADMIN — GLYCOPYRROLATE 0.6 MG: 0.2 INJECTION INTRAMUSCULAR; INTRAVENOUS at 10:14

## 2022-01-01 RX ADMIN — SUCCINYLCHOLINE CHLORIDE 100 MG: 20 INJECTION, SOLUTION INTRAMUSCULAR; INTRAVENOUS at 08:02

## 2022-01-01 RX ADMIN — HYDROXYZINE HYDROCHLORIDE 25 MG: 25 TABLET ORAL at 20:35

## 2022-01-01 RX ADMIN — OXYCODONE HYDROCHLORIDE AND ACETAMINOPHEN 1 TABLET: 7.5; 325 TABLET ORAL at 21:46

## 2022-01-01 RX ADMIN — BUDESONIDE AND FORMOTEROL FUMARATE DIHYDRATE 2 PUFF: 160; 4.5 AEROSOL RESPIRATORY (INHALATION) at 19:45

## 2022-01-01 RX ADMIN — SODIUM CHLORIDE 5 MG/HR: 900 INJECTION, SOLUTION INTRAVENOUS at 03:31

## 2022-01-01 RX ADMIN — APIXABAN 2.5 MG: 2.5 TABLET, FILM COATED ORAL at 23:16

## 2022-01-01 RX ADMIN — Medication 10 ML: at 20:50

## 2022-01-01 RX ADMIN — OXYCODONE HYDROCHLORIDE AND ACETAMINOPHEN 1 TABLET: 10; 325 TABLET ORAL at 17:17

## 2022-01-01 RX ADMIN — BUDESONIDE AND FORMOTEROL FUMARATE DIHYDRATE 2 PUFF: 160; 4.5 AEROSOL RESPIRATORY (INHALATION) at 09:54

## 2022-01-01 RX ADMIN — DILTIAZEM HYDROCHLORIDE 60 MG: 60 TABLET, FILM COATED ORAL at 23:23

## 2022-01-01 RX ADMIN — CEFAZOLIN SODIUM 2 G: 2 SOLUTION INTRAVENOUS at 05:29

## 2022-01-01 RX ADMIN — Medication 1 PATCH: at 17:54

## 2022-01-01 RX ADMIN — CLONAZEPAM 0.5 MG: 0.5 TABLET ORAL at 23:10

## 2022-01-01 RX ADMIN — FAMOTIDINE 20 MG: 20 TABLET ORAL at 17:02

## 2022-01-01 RX ADMIN — FAMOTIDINE 20 MG: 20 TABLET ORAL at 08:16

## 2022-01-01 RX ADMIN — SODIUM CHLORIDE 100 ML/HR: 9 INJECTION, SOLUTION INTRAVENOUS at 05:30

## 2022-01-01 RX ADMIN — SENNOSIDES AND DOCUSATE SODIUM 2 TABLET: 50; 8.6 TABLET ORAL at 20:02

## 2022-01-01 RX ADMIN — DOXYCYCLINE 100 MG: 100 CAPSULE ORAL at 08:16

## 2022-01-01 RX ADMIN — FLUOXETINE 20 MG: 20 CAPSULE ORAL at 20:13

## 2022-01-01 RX ADMIN — SODIUM CHLORIDE 100 ML/HR: 9 INJECTION, SOLUTION INTRAVENOUS at 23:12

## 2022-01-01 RX ADMIN — FLUOXETINE 20 MG: 20 CAPSULE ORAL at 21:46

## 2022-01-01 RX ADMIN — Medication 5 MG: at 01:11

## 2022-01-01 RX ADMIN — APIXABAN 2.5 MG: 2.5 TABLET, FILM COATED ORAL at 08:45

## 2022-01-01 RX ADMIN — ATORVASTATIN CALCIUM 10 MG: 10 TABLET, FILM COATED ORAL at 08:59

## 2022-01-01 RX ADMIN — FAMOTIDINE 20 MG: 20 TABLET ORAL at 17:11

## 2022-01-01 RX ADMIN — SENNOSIDES AND DOCUSATE SODIUM 2 TABLET: 50; 8.6 TABLET ORAL at 20:35

## 2022-01-01 RX ADMIN — DILTIAZEM HYDROCHLORIDE 300 MG: 180 CAPSULE, COATED, EXTENDED RELEASE ORAL at 11:40

## 2022-01-01 RX ADMIN — APIXABAN 2.5 MG: 2.5 TABLET, FILM COATED ORAL at 08:39

## 2022-01-01 RX ADMIN — SENNOSIDES AND DOCUSATE SODIUM 2 TABLET: 50; 8.6 TABLET ORAL at 09:00

## 2022-01-01 RX ADMIN — SENNOSIDES AND DOCUSATE SODIUM 2 TABLET: 50; 8.6 TABLET ORAL at 08:39

## 2022-01-01 RX ADMIN — DOXYCYCLINE 100 MG: 100 CAPSULE ORAL at 08:58

## 2022-01-01 RX ADMIN — BUDESONIDE AND FORMOTEROL FUMARATE DIHYDRATE 2 PUFF: 160; 4.5 AEROSOL RESPIRATORY (INHALATION) at 20:44

## 2022-01-01 RX ADMIN — SODIUM CHLORIDE, POTASSIUM CHLORIDE, SODIUM LACTATE AND CALCIUM CHLORIDE 9 ML/HR: 600; 310; 30; 20 INJECTION, SOLUTION INTRAVENOUS at 07:56

## 2022-01-01 RX ADMIN — CYANOCOBALAMIN 1000 MCG: 1000 INJECTION, SOLUTION INTRAMUSCULAR; SUBCUTANEOUS at 11:52

## 2022-01-01 RX ADMIN — ROCURONIUM BROMIDE 25 MG: 10 INJECTION, SOLUTION INTRAVENOUS at 08:16

## 2022-01-01 RX ADMIN — METOPROLOL TARTRATE 12.5 MG: 25 TABLET, FILM COATED ORAL at 20:00

## 2022-01-01 RX ADMIN — PHENYLEPHRINE HYDROCHLORIDE 100 MCG: 10 INJECTION INTRAVENOUS at 08:26

## 2022-01-01 RX ADMIN — CLONAZEPAM 0.5 MG: 0.5 TABLET ORAL at 22:45

## 2022-01-01 RX ADMIN — BUDESONIDE AND FORMOTEROL FUMARATE DIHYDRATE 2 PUFF: 160; 4.5 AEROSOL RESPIRATORY (INHALATION) at 09:22

## 2022-01-01 RX ADMIN — FAMOTIDINE 20 MG: 20 TABLET ORAL at 06:41

## 2022-01-01 RX ADMIN — SENNOSIDES AND DOCUSATE SODIUM 2 TABLET: 50; 8.6 TABLET ORAL at 08:59

## 2022-01-01 RX ADMIN — HYDROXYZINE HYDROCHLORIDE 25 MG: 25 TABLET ORAL at 21:46

## 2022-01-01 RX ADMIN — Medication 10 ML: at 12:40

## 2022-01-01 RX ADMIN — FLUOXETINE 20 MG: 20 CAPSULE ORAL at 20:35

## 2022-01-01 RX ADMIN — Medication 10 ML: at 08:59

## 2022-01-01 RX ADMIN — SENNOSIDES AND DOCUSATE SODIUM 2 TABLET: 50; 8.6 TABLET ORAL at 23:16

## 2022-01-12 DIAGNOSIS — M54.50 CHRONIC LOW BACK PAIN WITHOUT SCIATICA, UNSPECIFIED BACK PAIN LATERALITY: ICD-10-CM

## 2022-01-12 DIAGNOSIS — F41.9 ANXIETY AND DEPRESSION: ICD-10-CM

## 2022-01-12 DIAGNOSIS — G89.29 CHRONIC LOW BACK PAIN WITHOUT SCIATICA, UNSPECIFIED BACK PAIN LATERALITY: ICD-10-CM

## 2022-01-12 DIAGNOSIS — F32.A ANXIETY AND DEPRESSION: ICD-10-CM

## 2022-01-12 DIAGNOSIS — M54.50 LUMBAR BACK PAIN: ICD-10-CM

## 2022-01-12 RX ORDER — OXYCODONE AND ACETAMINOPHEN 7.5; 325 MG/1; MG/1
1 TABLET ORAL EVERY 8 HOURS PRN
Qty: 75 TABLET | Refills: 0 | Status: SHIPPED | OUTPATIENT
Start: 2022-01-12 | End: 2022-02-02 | Stop reason: SDUPTHER

## 2022-01-12 RX ORDER — CLONAZEPAM 0.5 MG/1
0.5 TABLET ORAL NIGHTLY PRN
Qty: 30 TABLET | Refills: 0 | Status: SHIPPED | OUTPATIENT
Start: 2022-01-12 | End: 2022-02-09 | Stop reason: SDUPTHER

## 2022-02-02 ENCOUNTER — OFFICE VISIT (OUTPATIENT)
Dept: INTERNAL MEDICINE | Facility: CLINIC | Age: 82
End: 2022-02-02

## 2022-02-02 VITALS
DIASTOLIC BLOOD PRESSURE: 68 MMHG | HEART RATE: 86 BPM | OXYGEN SATURATION: 94 % | TEMPERATURE: 98.3 F | HEIGHT: 61 IN | RESPIRATION RATE: 19 BRPM | WEIGHT: 97.8 LBS | BODY MASS INDEX: 18.46 KG/M2 | SYSTOLIC BLOOD PRESSURE: 118 MMHG

## 2022-02-02 DIAGNOSIS — M54.50 LUMBAR BACK PAIN: ICD-10-CM

## 2022-02-02 DIAGNOSIS — I10 ESSENTIAL HYPERTENSION: ICD-10-CM

## 2022-02-02 DIAGNOSIS — J44.1 COPD WITH EXACERBATION: ICD-10-CM

## 2022-02-02 DIAGNOSIS — F32.A ANXIETY AND DEPRESSION: Primary | ICD-10-CM

## 2022-02-02 DIAGNOSIS — D51.0 VITAMIN B12 DEFICIENCY ANEMIA DUE TO INTRINSIC FACTOR DEFICIENCY: ICD-10-CM

## 2022-02-02 DIAGNOSIS — F41.9 ANXIETY AND DEPRESSION: Primary | ICD-10-CM

## 2022-02-02 DIAGNOSIS — E78.00 HYPERCHOLESTEROLEMIA: ICD-10-CM

## 2022-02-02 DIAGNOSIS — G89.29 CHRONIC LOW BACK PAIN WITHOUT SCIATICA, UNSPECIFIED BACK PAIN LATERALITY: ICD-10-CM

## 2022-02-02 DIAGNOSIS — M54.50 CHRONIC LOW BACK PAIN WITHOUT SCIATICA, UNSPECIFIED BACK PAIN LATERALITY: ICD-10-CM

## 2022-02-02 DIAGNOSIS — I25.119 CORONARY ARTERY DISEASE INVOLVING NATIVE CORONARY ARTERY OF NATIVE HEART WITH ANGINA PECTORIS: ICD-10-CM

## 2022-02-02 DIAGNOSIS — E03.9 ACQUIRED HYPOTHYROIDISM: ICD-10-CM

## 2022-02-02 PROCEDURE — 96372 THER/PROPH/DIAG INJ SC/IM: CPT | Performed by: INTERNAL MEDICINE

## 2022-02-02 PROCEDURE — 99214 OFFICE O/P EST MOD 30 MIN: CPT | Performed by: INTERNAL MEDICINE

## 2022-02-02 RX ORDER — CYANOCOBALAMIN 1000 UG/ML
1000 INJECTION, SOLUTION INTRAMUSCULAR; SUBCUTANEOUS
Status: DISCONTINUED | OUTPATIENT
Start: 2022-02-02 | End: 2022-01-01 | Stop reason: HOSPADM

## 2022-02-02 RX ORDER — OXYCODONE AND ACETAMINOPHEN 7.5; 325 MG/1; MG/1
1 TABLET ORAL EVERY 8 HOURS PRN
Qty: 90 TABLET | Refills: 0 | Status: SHIPPED | OUTPATIENT
Start: 2022-02-02 | End: 2022-03-01 | Stop reason: SDUPTHER

## 2022-02-02 RX ADMIN — CYANOCOBALAMIN 1000 MCG: 1000 INJECTION, SOLUTION INTRAMUSCULAR; SUBCUTANEOUS at 13:07

## 2022-02-02 NOTE — PROGRESS NOTES
Kaylin Ojeda is a 81 y.o. female, who presents with a chief complaint of   Chief Complaint   Patient presents with   • COPD           HPI   Pt here for follow up    She thinks she has flu and covid.  Testing was neg in Dec but she has been at home and just didn't go back to get retested.  She says she has been very tired and feels like she has brain fog.  She has been using her nebulizer and her coughing is slowly getting better.  The soa is slowly improving.  She says she didn't go anywhere for 6 weeks.  She got her covid booster last week.  She hasnt gotten her flu shot yet. She has cut way back on her smoking bc her breathing was so bad.     chronic low back pain/DDD - She says that her pain is worse.  She says 75 percocet don't last her through the month.  She wants to take the med at least 3 times every day.  I had been trying to wean her down on her narcotics but this has been very difficult.      She has insomnia.  She is taking her clonazepam every night to try to help sleep. She is also taking melatonin.  If she takes unisom she feels like she is in a fog.      Chronic afib - on eliquis and HR in 80's today. She follows with cardiology.   hld and PAD - on statin    HTN - on lisinopril    Anxiety/depression  - pt was on sertraline but stopped the med.  She thinks she feels better off the med.  She has been on clonazepam for years after being changed from years of taking valium    HLD - pt stopped her lipitor bc she thinks it made her feel bad.  She had severe atherosclerosis with PAD and CAD.  We discussed this and she is open to restarting her statin.    The following portions of the patient's history were reviewed and updated as appropriate: allergies, current medications, past family history, past medical history, past social history, past surgical history and problem list.    Allergies: Aleve [naproxen sodium], Codeine, Ibuprofen, and Sulfa antibiotics    Review of Systems   Constitutional:  Negative.    HENT: Negative.    Eyes: Negative.    Respiratory: Negative.    Cardiovascular: Negative.    Gastrointestinal: Negative.    Endocrine: Negative.    Genitourinary: Negative.    Musculoskeletal: Positive for back pain.   Skin: Negative.    Allergic/Immunologic: Negative.    Neurological: Negative.    Hematological: Negative.    Psychiatric/Behavioral: Negative.    All other systems reviewed and are negative.            Wt Readings from Last 3 Encounters:   02/02/22 44.4 kg (97 lb 12.8 oz)   12/22/21 44.9 kg (99 lb)   11/29/21 44.4 kg (97 lb 12.8 oz)     Temp Readings from Last 3 Encounters:   02/02/22 98.3 °F (36.8 °C) (Temporal)   12/22/21 98.6 °F (37 °C) (Temporal)   11/29/21 98.8 °F (37.1 °C) (Temporal)     BP Readings from Last 3 Encounters:   02/02/22 118/68   12/22/21 122/80   11/29/21 105/70     Pulse Readings from Last 3 Encounters:   02/02/22 86   12/22/21 76   11/29/21 108     Body mass index is 18.49 kg/m².  SpO2 Readings from Last 3 Encounters:   02/02/22 94%   12/22/21 98%   11/29/21 94%          Physical Exam  Vitals and nursing note reviewed.   Constitutional:       General: She is not in acute distress.     Appearance: She is well-developed.      Comments: Thin elderly female who appears chronically ill   HENT:      Head: Normocephalic and atraumatic.      Right Ear: External ear normal.      Left Ear: External ear normal.      Nose: Nose normal.   Eyes:      Conjunctiva/sclera: Conjunctivae normal.      Pupils: Pupils are equal, round, and reactive to light.   Cardiovascular:      Rate and Rhythm: Normal rate and regular rhythm.      Heart sounds: Normal heart sounds.   Pulmonary:      Effort: Pulmonary effort is normal. No respiratory distress.      Breath sounds: Normal breath sounds. No wheezing.   Musculoskeletal:         General: Normal range of motion.      Cervical back: Normal range of motion and neck supple.      Comments: Normal gait   Skin:     General: Skin is warm and dry.    Neurological:      Mental Status: She is alert and oriented to person, place, and time.   Psychiatric:         Behavior: Behavior normal.         Thought Content: Thought content normal.         Judgment: Judgment normal.         Results for orders placed or performed in visit on 12/22/21   Comprehensive Metabolic Panel    Specimen: Blood   Result Value Ref Range    Glucose 111 (H) 65 - 99 mg/dL    BUN 18 8 - 27 mg/dL    Creatinine 1.02 (H) 0.57 - 1.00 mg/dL    eGFR Non African Am 52 (L) >59 mL/min/1.73    eGFR African Am 60 >59 mL/min/1.73    BUN/Creatinine Ratio 18 12 - 28    Sodium 141 134 - 144 mmol/L    Potassium 4.9 3.5 - 5.2 mmol/L    Chloride 103 96 - 106 mmol/L    Total CO2 22 20 - 29 mmol/L    Calcium 9.6 8.7 - 10.3 mg/dL    Total Protein 7.2 6.0 - 8.5 g/dL    Albumin 4.3 3.7 - 4.7 g/dL    Globulin 2.9 1.5 - 4.5 g/dL    A/G Ratio 1.5 1.2 - 2.2    Total Bilirubin 0.5 0.0 - 1.2 mg/dL    Alkaline Phosphatase 119 44 - 121 IU/L    AST (SGOT) 18 0 - 40 IU/L    ALT (SGPT) 8 0 - 32 IU/L   T4, Free    Specimen: Blood   Result Value Ref Range    Free T4 1.00 0.82 - 1.77 ng/dL   TSH    Specimen: Blood   Result Value Ref Range    TSH 2.230 0.450 - 4.500 uIU/mL   Lipid Panel With LDL / HDL Ratio    Specimen: Blood   Result Value Ref Range    Total Cholesterol 182 100 - 199 mg/dL    Triglycerides 115 0 - 149 mg/dL    HDL Cholesterol 48 >39 mg/dL    VLDL Cholesterol Ricky 21 5 - 40 mg/dL    LDL Chol Calc (NIH) 113 (H) 0 - 99 mg/dL    LDL/HDL RATIO 2.4 0.0 - 3.2 ratio   Hemoglobin A1c    Specimen: Blood   Result Value Ref Range    Hemoglobin A1C 5.9 (H) 4.8 - 5.6 %   CBC & Differential    Specimen: Blood   Result Value Ref Range    WBC 7.8 3.4 - 10.8 x10E3/uL    RBC 4.60 3.77 - 5.28 x10E6/uL    Hemoglobin 14.2 11.1 - 15.9 g/dL    Hematocrit 43.5 34.0 - 46.6 %    MCV 95 79 - 97 fL    MCH 30.9 26.6 - 33.0 pg    MCHC 32.6 31.5 - 35.7 g/dL    RDW 13.1 11.7 - 15.4 %    Platelets 278 150 - 450 x10E3/uL    Neutrophil Rel % 74  Not Estab. %    Lymphocyte Rel % 18 Not Estab. %    Monocyte Rel % 6 Not Estab. %    Eosinophil Rel % 1 Not Estab. %    Basophil Rel % 1 Not Estab. %    Neutrophils Absolute 5.7 1.4 - 7.0 x10E3/uL    Lymphocytes Absolute 1.4 0.7 - 3.1 x10E3/uL    Monocytes Absolute 0.5 0.1 - 0.9 x10E3/uL    Eosinophils Absolute 0.1 0.0 - 0.4 x10E3/uL    Basophils Absolute 0.1 0.0 - 0.2 x10E3/uL    Immature Granulocyte Rel % 0 Not Estab. %    Immature Grans Absolute 0.0 0.0 - 0.1 x10E3/uL     Result Review :                  Assessment and Plan    Diagnoses and all orders for this visit:    1. Anxiety and depression (Primary)    2. Chronic low back pain without sciatica, unspecified back pain laterality  -     oxyCODONE-acetaminophen (Percocet) 7.5-325 MG per tablet; Take 1 tablet by mouth Every 8 (Eight) Hours As Needed for Severe Pain .  Dispense: 90 tablet; Refill: 0    3. Lumbar back pain  -     oxyCODONE-acetaminophen (Percocet) 7.5-325 MG per tablet; Take 1 tablet by mouth Every 8 (Eight) Hours As Needed for Severe Pain .  Dispense: 90 tablet; Refill: 0    4. Essential hypertension    5. COPD with exacerbation (HCC)    6. Acquired hypothyroidism    7. Hypercholesterolemia    8. Coronary artery disease involving native coronary artery of native heart with angina pectoris (HCC)    9. Vitamin B12 deficiency anemia due to intrinsic factor deficiency  -     cyanocobalamin injection 1,000 mcg     pt slowly recovering from covid 19.  Back pain continues to be a big issue.  She would like to take stronger meds for sleep but I am already worried about her being on clonazepam and norco.  She is at high risk for falls.  We have weaned benzos to get to low dose clonazepam nightly.  Will try to control pain some better during the day but will not go up on clonazepam or add other sleep aid with addiction potential such as ambien.             Outpatient Medications Prior to Visit   Medication Sig Dispense Refill   • albuterol (PROVENTIL) (2.5  MG/3ML) 0.083% nebulizer solution Take 2.5 mg by nebulization Every 4 (Four) Hours As Needed for Wheezing or Shortness of Air. 150 mL 2   • albuterol sulfate  (90 Base) MCG/ACT inhaler Inhale 2 puffs Every 4 (Four) Hours As Needed for Wheezing. 18 g 11   • apixaban (Eliquis) 5 MG tablet tablet Take 1 tablet by mouth Every 12 (Twelve) Hours. Resume on 11/2/2020 60 tablet 1   • atorvastatin (LIPITOR) 10 MG tablet TAKE 1 TABLET EVERY DAY 90 tablet 3   • budesonide-formoterol (SYMBICORT) 160-4.5 MCG/ACT inhaler Inhale 2 puffs 2 (Two) Times a Day. 1 inhaler 12   • clonazePAM (KlonoPIN) 0.5 MG tablet Take 1 tablet by mouth At Night As Needed for Anxiety. 30 tablet 0   • lisinopril (PRINIVIL,ZESTRIL) 20 MG tablet TAKE 1 TABLET EVERY DAY 90 tablet 1   • levoFLOXacin (Levaquin) 750 MG tablet Take 1 tablet by mouth Daily. 5 tablet 0   • fluticasone (FLONASE) 50 MCG/ACT nasal spray SPRAY TWICE IN EACH NOSTRIL EVERY DAY FOR 30 DAYS. 16 mL 6   • vitamin D (ERGOCALCIFEROL) 1.25 MG (89300 UT) capsule capsule Take 1 capsule by mouth 1 (One) Time Per Week. 13 capsule 3   • oxyCODONE-acetaminophen (Percocet) 7.5-325 MG per tablet Take 1 tablet by mouth Every 8 (Eight) Hours As Needed for Severe Pain . 75 tablet 0   • sertraline (ZOLOFT) 50 MG tablet Take 1 tablet by mouth Daily. 90 tablet 0     Facility-Administered Medications Prior to Visit   Medication Dose Route Frequency Provider Last Rate Last Admin   • cyanocobalamin injection 1,000 mcg  1,000 mcg Intramuscular Q28 Days Donna Forte MD   1,000 mcg at 12/10/21 1444     New Medications Ordered This Visit   Medications   • oxyCODONE-acetaminophen (Percocet) 7.5-325 MG per tablet     Sig: Take 1 tablet by mouth Every 8 (Eight) Hours As Needed for Severe Pain .     Dispense:  90 tablet     Refill:  0   • cyanocobalamin injection 1,000 mcg     [unfilled]  Medications Discontinued During This Encounter   Medication Reason   • levoFLOXacin (Levaquin) 750 MG  tablet    • sertraline (ZOLOFT) 50 MG tablet Non-compliance   • oxyCODONE-acetaminophen (Percocet) 7.5-325 MG per tablet Reorder         No follow-ups on file.    Patient was given instructions and counseling regarding her condition or for health maintenance advice. Please see specific information pulled into the AVS if appropriate.

## 2022-02-02 NOTE — PROGRESS NOTES
Injection  Injection performed by MELANIA Streeter. Patient tolerated the procedure well without complications.  02/02/22   Lesli Dugan PCT

## 2022-02-09 ENCOUNTER — TELEPHONE (OUTPATIENT)
Dept: INTERNAL MEDICINE | Facility: CLINIC | Age: 82
End: 2022-02-09

## 2022-02-09 DIAGNOSIS — F41.9 ANXIETY AND DEPRESSION: ICD-10-CM

## 2022-02-09 DIAGNOSIS — F32.A ANXIETY AND DEPRESSION: ICD-10-CM

## 2022-02-09 RX ORDER — CLONAZEPAM 0.5 MG/1
0.5 TABLET ORAL NIGHTLY PRN
Qty: 30 TABLET | Refills: 0 | Status: SHIPPED | OUTPATIENT
Start: 2022-02-09 | End: 2022-03-01 | Stop reason: SDUPTHER

## 2022-02-09 NOTE — TELEPHONE ENCOUNTER
Caller: Kaylin Ojeda    Relationship: Self    Best call back number: [phone] (H)    Requested Prescriptions:   clonazePAM (KlonoPIN) 0.5 MG tablet     Pharmacy where request should be sent:  [facility] - [place], [place] - [address] [address] [facility] - [phone]  - [phone] FX        Additional details provided by patient: PATIENT CALLED TO REQUEST A MEDICATION REFILL ON  HER MEDICATION. PATIENT STATES THAT SHE IS COMPLETELY OUT OF THIS MEDICATION. PATIENT STATES THAT DR. HINTON WAS GOING TO PRESCRIBE A MEDICATION FOR PATIENT'S HEART.     PLEASE CONTACT PATIENT TO ADVISE.             Does the patient have less than a 3 day supply:  [x] Yes  [] No    Tristen Mendoza Rep   02/09/22 11:37 EST         THANKS

## 2022-02-25 ENCOUNTER — OFFICE VISIT (OUTPATIENT)
Dept: CARDIOLOGY | Facility: CLINIC | Age: 82
End: 2022-02-25

## 2022-02-25 VITALS
HEIGHT: 60 IN | WEIGHT: 99 LBS | SYSTOLIC BLOOD PRESSURE: 140 MMHG | DIASTOLIC BLOOD PRESSURE: 80 MMHG | HEART RATE: 77 BPM | OXYGEN SATURATION: 97 % | BODY MASS INDEX: 19.44 KG/M2 | RESPIRATION RATE: 16 BRPM

## 2022-02-25 DIAGNOSIS — I10 ESSENTIAL HYPERTENSION: ICD-10-CM

## 2022-02-25 DIAGNOSIS — I25.119 CORONARY ARTERY DISEASE INVOLVING NATIVE CORONARY ARTERY OF NATIVE HEART WITH ANGINA PECTORIS: Primary | ICD-10-CM

## 2022-02-25 DIAGNOSIS — I48.0 PAF (PAROXYSMAL ATRIAL FIBRILLATION): ICD-10-CM

## 2022-02-25 DIAGNOSIS — I38 VALVULAR HEART DISEASE: ICD-10-CM

## 2022-02-25 DIAGNOSIS — E78.00 HYPERCHOLESTEROLEMIA: ICD-10-CM

## 2022-02-25 PROCEDURE — 99214 OFFICE O/P EST MOD 30 MIN: CPT | Performed by: NURSE PRACTITIONER

## 2022-02-25 PROCEDURE — 93000 ELECTROCARDIOGRAM COMPLETE: CPT | Performed by: NURSE PRACTITIONER

## 2022-02-25 RX ORDER — OXYBUTYNIN CHLORIDE 5 MG/1
5 TABLET ORAL DAILY
COMMUNITY
Start: 2022-01-29 | End: 2022-01-01 | Stop reason: HOSPADM

## 2022-02-25 NOTE — PROGRESS NOTES
Date of Office Visit: 2022  Encounter Provider: LILI Lockhart  Place of Service: [facility] CARDIOLOGY  Patient Name: Kaylin Ojeda  :1940  Primary Cardiologist: Dr. Calero    CC:  6 month follow up    Dear Dr. Forte    HPI: Kaylin Ojeda is a pleasant 82 y.o. female who presents 2022 for cardiac follow up.  I reviewed her past medical records including notes, labs and testing in preparation for today's visit.    She is a chronically ill woman with previous history of refractory hypertension and severe peripheral vascular disease. In May 2015, she was found to have single-vessel coronary artery disease of the circumflex and had a stent placed. Over the next two years, we had to progressively cut back on her anti-hypertensives due to low blood pressure (presumably from weight loss).       In 2017 she developed anginal chest pressure and was found to have rapid atrial fibrillation.  She cardioverted on her own and her metoprolol dose was increased.  An echo showed normal LV systolic function, moderate TR, and mild AI/MR.  She ruled out for ACS.  She was placed on apixaban. Once she was on apixaban, she had to stop aspirin as it caused nosebleeds.     Dr. Calero saw her in May 2019 for palpitations and chest discomfort.  She was under a tremendous amount of stress and was in a very unfortunately living situation. An echo showed normal LVSF, EF 60%, mild-moderate AI, and severe MR/TR.  Dr. Calero did not feel she was a candidate for further evaluation due to her comorbidities.     In late , she requested to be evaluated by Dr Mai.  He ordered a cath; this showed a patent LCx stent, and moderate disease of the distal LAD.  He ordered a RICKY, which showed normal LVSF, moderate-severe AI, moderate MR, and mild-moderate TR.  PFTs showed severely reduced DLCO; the spirometry was reported as normal, but the loops were poor quality.  A previous CT of the chest  had reported severe emphysematous changes.  A CTA of the chest neck revealed left subclavian occlusion, severe disease of the ostial left vertebral artery, 53% stenosis of the right ICA, and 60% disease of the left ICA.       Ultimately, she decided against surgery, as she felt it would be too high risk.      Overall she states she is doing well.  She states she stopped the atorvastatin last fall secondary to nausea and vomiting.  She states she will stop several medications and really does not know which one was given in the trouble.  Evidently she did restart atorvastatin but then ran out and has not taken it.  She would really like to not take the medicine.  I will leave that up to you.  I am decreasing her Eliquis down to 2.5 mg twice daily as she meets 2 out of 3 criteria for the lower dose.  She denies any palpitations, shortness of breath, lower extremity edema.  She states rarely is she dizzy.  She denies any chest pain or chest pressure.  She states she has fatigue.  She states that she had the flu and then she had COVID last fall.  She continues to smoke.  Her blood pressure is stable.  Past Medical History:   Diagnosis Date   • Abdominal pain, epigastric     Chronic, unclear cause, improved   • Anorexia    • Anxiety    • Arthritis    • CAD (coronary artery disease)     Cath 5/2015: nonobstructive LAD/RCA disease, 90% mid LCx s/p 2.04a05th Xience.  Cath 1/2021: 50-60% prox LAD/70-80% distal LAD, patent Cx stent, 20% RCA   • Cellulitis of leg    • Cervical disc disease    • Chronic fatigue    • Colitis    • COPD (chronic obstructive pulmonary disease) ([facility])    • Depression    • Erosive gastritis    • Fibromyalgia    • Frequency of urination 6/9/2017   • Gastritis    • Hypercholesterolemia 2/2/2016   • Hyperglycemia    • Hypertension     History of refractory hypertension which improved significantly   • Insomnia    • Leukocytes in urine    • Lower back pain    • Mediastinal lymphadenopathy    • Mesenteric  artery stenosis (HCC)    • Mononucleosis    • Occlusion and stenosis of carotid artery    • Onychomycosis    • Orbit fracture (HCC)    • PAF (paroxysmal atrial fibrillation) (HCC)    • Peripheral arterial disease (HCC)     Significant (carotid, subclavian, renal arteries, lower extremities), with claudication, medical therapy only   • Pernicious anemia    • Prediabetes    • Renal artery stenosis (HCC)     unilateral, with renal atrophy (no intervention required)   • Renal calculi    • Sinus bradycardia     mild, asymptomatic   • TIA (transient ischemic attack)    • UTI (urinary tract infection)    • Valvular heart disease     1/2021: mod-severe AI, mod MR, mild-mod TR   • Vision problem    • Vitamin B 12 deficiency        Past Surgical History:   Procedure Laterality Date   • APPENDECTOMY     • BREAST BIOPSY Left     20+ yrs ago   • BREAST SURGERY     • CARDIAC CATHETERIZATION  05/2015    nonobs LAD/RCA disease, 90% mid LCx s/p 2.06x56ri Xience   • CARDIAC CATHETERIZATION N/A 10/28/2020    Procedure: Right and Left Heart Cath;  Surgeon: Opal Contreras MD;  Location:  [facility];  Service: Cardiovascular;  Laterality: N/A;  for cardiac testing prior to possible valve surgery per Dr. Mai   • CARDIAC CATHETERIZATION N/A 10/28/2020    Procedure: Coronary angiography;  Surgeon: Opal Contreras MD;  Location:  [facility];  Service: Cardiovascular;  Laterality: N/A;   • CERVICAL FUSION  1997   • CHOLECYSTECTOMY     • CORONARY STENT PLACEMENT     • HYSTERECTOMY  1995   • KNEE SURGERY Right 2005   • KNEE SURGERY Left 1998       Social History     Socioeconomic History   • Marital status:      Spouse name: Willie   • Number of children: 3   • Years of education: Some College   Tobacco Use   • Smoking status: Current Some Day Smoker     Packs/day: 0.50     Types: Cigarettes   • Smokeless tobacco: Never Used   • Tobacco comment: trying to quit, pt states she has basically quit but  will still have one when she stressed   Vaping Use   • Vaping Use: Never used   Substance and Sexual Activity   • Alcohol use: Not Currently     Comment: OCCASIONAL/ TWICE A YEAR. // daily caffiene   • Drug use: No   • Sexual activity: Not Currently       Family History   Problem Relation Age of Onset   • Asthma Mother    • Emphysema Mother    • Graves' disease Mother    • Heart disease Mother    • Hypertension Mother    • Emphysema Father    • Hypertension Father    • Heart disease Father    • Kidney disease Sister    • Lupus Daughter         Systemic erythematosus   • Arthritis Other    • Crohn's disease Other    • Breast cancer Niece    • Breast cancer Maternal Cousin    • Breast cancer Maternal Cousin        The following portion of the patient's history were reviewed and updated as appropriate: past medical history, past surgical history, past social history, past family history, allergies, current medications, and problem list.    Review of Systems   Constitutional: Positive for malaise/fatigue. Negative for diaphoresis and fever.   HENT: Negative for congestion, hearing loss, hoarse voice, nosebleeds and sore throat.    Eyes: Negative for photophobia, vision loss in left eye, vision loss in right eye and visual disturbance.   Cardiovascular: Negative for chest pain, dyspnea on exertion, irregular heartbeat, leg swelling, near-syncope, orthopnea, palpitations, paroxysmal nocturnal dyspnea and syncope.   Respiratory: Negative for cough, hemoptysis, shortness of breath, sleep disturbances due to breathing, snoring, sputum production and wheezing.    Endocrine: Negative for cold intolerance, heat intolerance, polydipsia, polyphagia and polyuria.   Hematologic/Lymphatic: Negative for bleeding problem. Does not bruise/bleed easily.   Skin: Negative for color change, dry skin, poor wound healing, rash and suspicious lesions.   Musculoskeletal: Negative for arthritis, back pain, falls, gout, joint pain, joint  "swelling, muscle cramps, muscle weakness and myalgias.   Gastrointestinal: Negative for bloating, abdominal pain, constipation, diarrhea, dysphagia, melena, nausea and vomiting.   Neurological: Positive for dizziness (rare). Negative for excessive daytime sleepiness, headaches, light-headedness, loss of balance, numbness, paresthesias, seizures, vertigo and weakness.   Psychiatric/Behavioral: Negative for depression, memory loss and substance abuse. The patient is not nervous/anxious.        Allergies   Allergen Reactions   • Aleve [Naproxen Sodium] Shortness Of Breath   • Codeine Unknown - Low Severity     hyperactivity   • Ibuprofen Unknown - Low Severity     unk   • Sulfa Antibiotics Unknown - Low Severity     \"I can't remember\"         Current Outpatient Medications:   •  albuterol (PROVENTIL) (2.5 MG/3ML) 0.083% nebulizer solution, Take 2.5 mg by nebulization Every 4 (Four) Hours As Needed for Wheezing or Shortness of Air., Disp: 150 mL, Rfl: 2  •  albuterol sulfate  (90 Base) MCG/ACT inhaler, Inhale 2 puffs Every 4 (Four) Hours As Needed for Wheezing., Disp: 18 g, Rfl: 11  •  apixaban (Eliquis) 5 MG tablet tablet, Take 1 tablet by mouth Every 12 (Twelve) Hours. Resume on 11/2/2020, Disp: 60 tablet, Rfl: 1  •  budesonide-formoterol (SYMBICORT) 160-4.5 MCG/ACT inhaler, Inhale 2 puffs 2 (Two) Times a Day., Disp: 1 inhaler, Rfl: 12  •  clonazePAM (KlonoPIN) 0.5 MG tablet, Take 1 tablet by mouth At Night As Needed for Anxiety., Disp: 30 tablet, Rfl: 0  •  fluticasone (FLONASE) 50 MCG/ACT nasal spray, SPRAY TWICE IN EACH NOSTRIL EVERY DAY FOR 30 DAYS., Disp: 16 mL, Rfl: 6  •  lisinopril (PRINIVIL,ZESTRIL) 20 MG tablet, TAKE 1 TABLET EVERY DAY, Disp: 90 tablet, Rfl: 1  •  oxybutynin (DITROPAN) 5 MG tablet, , Disp: , Rfl:   •  oxyCODONE-acetaminophen (Percocet) 7.5-325 MG per tablet, Take 1 tablet by mouth Every 8 (Eight) Hours As Needed for Severe Pain ., Disp: 90 tablet, Rfl: 0  •  vitamin D (ERGOCALCIFEROL) " "1.25 MG (68814 UT) capsule capsule, Take 1 capsule by mouth 1 (One) Time Per Week., Disp: 13 capsule, Rfl: 3    Current Facility-Administered Medications:   •  cyanocobalamin injection 1,000 mcg, 1,000 mcg, Intramuscular, Q28 Days, Donna Forte MD, 1,000 mcg at 12/10/21 1444  •  cyanocobalamin injection 1,000 mcg, 1,000 mcg, Intramuscular, Q28 Days, Donna Forte MD, 1,000 mcg at 02/02/22 1307        Objective:     Vitals:    02/25/22 1347   BP: 140/80   Pulse: 77   Resp: 16   SpO2: 97%   Weight: 44.9 kg (99 lb)   Height: 152.4 cm (60\")     Body mass index is 19.33 kg/m².      Vitals reviewed.   Constitutional:       General: Not in acute distress.     Appearance: Well-developed and underweight. Frail. Chronically ill-appearing.   Eyes:      General:         Right eye: No discharge.         Left eye: No discharge.      Conjunctiva/sclera: Conjunctivae normal.   HENT:      Head: Normocephalic and atraumatic.      Right Ear: External ear normal.      Left Ear: External ear normal.      Nose: Nose normal.   Neck:      Thyroid: No thyromegaly.      Vascular: No JVD.      Trachea: No tracheal deviation.      Lymphadenopathy: No cervical adenopathy.   Pulmonary:      Effort: Pulmonary effort is normal. No respiratory distress.      Breath sounds: Normal breath sounds. No wheezing. No rales.   Chest:      Chest wall: Not tender to palpatation.   Cardiovascular:      Normal rate. Regular rhythm.      Murmurs: There is a systolic murmur.      No gallop.   Pulses:     Intact distal pulses.   Edema:     Peripheral edema absent.   Abdominal:      General: There is no distension.      Palpations: Abdomen is soft.      Tenderness: There is no abdominal tenderness.   Musculoskeletal: Normal range of motion.         General: No tenderness or deformity.      Cervical back: Normal range of motion and neck supple. Skin:     General: Skin is warm and dry.      Findings: No erythema or rash.   Neurological:      " Mental Status: Alert and oriented to person, place, and time.      Coordination: Coordination normal.   Psychiatric:         Attention and Perception: Attention normal.         Mood and Affect: Mood normal.         Speech: Speech normal.         Behavior: Behavior normal. Behavior is cooperative.         Thought Content: Thought content normal.         Cognition and Memory: Cognition normal.         Judgment: Judgment normal.               ECG 12 Lead    Date/Time: 2/25/2022 1:53 PM  Performed by: Denise Platt APRN  Authorized by: Denise Platt APRN   Comparison: compared with previous ECG from 8/24/2022  Similar to previous ECG  Rhythm: sinus rhythm  Rate: normal  Conduction: conduction normal  ST Segments: ST segments normal  T Waves: T waves normal  QRS axis: normal  Other findings: non-specific ST-T wave changes and left ventricular hypertrophy    Clinical impression: abnormal EKG              Assessment:       Diagnosis Plan   1. Coronary artery disease involving native coronary artery of native heart with angina pectoris ([facility])     2. PAF (paroxysmal atrial fibrillation) ([facility])     3. Valvular heart disease     4. Essential hypertension     5. Hypercholesterolemia            Plan:       1.  Atrial Fibrillation and Atrial Flutter  Assessment  • The patient has paroxysmal atrial fibrillation  • The patient's CHADS2-VASc score is 5  • A [id number] score of 2 or more is considered a high risk for a thromboembolic event  • Apixaban prescribed and dose has been decreased to 2.5 mg BID.       Plan  • Attempt to maintain sinus rhythm  • Continue apixaban for antithrombotic therapy, bleeding issues discussed  • Continue beta blocker for rhythm control     2.  Coronary Artery Disease  Assessment  • The patient has no angina  • She is no longer on aspirin or clopidogrel as she's anticoagulated and had bad epistaxis.  Denies angina     Subjective - Objective  • There has been a previous stent procedure using [facility]  2015        3.  A RICKY in 2021 showed mod-severe AI, mod-MR, and mild-mod TR; she has seen Dr Mai but elected against surgery due to it's high mortality/morbidity. Stable     4.  Her BP is stable, continue same     5. She has mesenteric disease, renal artery stenosis, carotid artery disease, a small AAA (3.8cm in May 2018), subclavian disease, and PAD.  She does not tolerate antiplatelets due to epistaxis, or statins due to GI side effects. . She has not followed up with vascular as the office is too far from her house. She continues to smoke.    Decrease Eliquis to 2.5 mg BID  RTO in 6 months with JK     As always, it has been a pleasure to participate in your patient's care. Thank you.       Sincerely,       LILI Lockhart      Current Outpatient Medications:   •  albuterol (PROVENTIL) (2.5 MG/3ML) 0.083% nebulizer solution, Take 2.5 mg by nebulization Every 4 (Four) Hours As Needed for Wheezing or Shortness of Air., Disp: 150 mL, Rfl: 2  •  albuterol sulfate  (90 Base) MCG/ACT inhaler, Inhale 2 puffs Every 4 (Four) Hours As Needed for Wheezing., Disp: 18 g, Rfl: 11  •  budesonide-formoterol (SYMBICORT) 160-4.5 MCG/ACT inhaler, Inhale 2 puffs 2 (Two) Times a Day., Disp: 1 inhaler, Rfl: 12  •  clonazePAM (KlonoPIN) 0.5 MG tablet, Take 1 tablet by mouth At Night As Needed for Anxiety., Disp: 30 tablet, Rfl: 0  •  fluticasone (FLONASE) 50 MCG/ACT nasal spray, SPRAY TWICE IN EACH NOSTRIL EVERY DAY FOR 30 DAYS., Disp: 16 mL, Rfl: 6  •  lisinopril (PRINIVIL,ZESTRIL) 20 MG tablet, TAKE 1 TABLET EVERY DAY, Disp: 90 tablet, Rfl: 1  •  oxybutynin (DITROPAN) 5 MG tablet, , Disp: , Rfl:   •  oxyCODONE-acetaminophen (Percocet) 7.5-325 MG per tablet, Take 1 tablet by mouth Every 8 (Eight) Hours As Needed for Severe Pain ., Disp: 90 tablet, Rfl: 0  •  vitamin D (ERGOCALCIFEROL) 1.25 MG (15193 UT) capsule capsule, Take 1 capsule by mouth 1 (One) Time Per Week., Disp: 13 capsule, Rfl: 3  •  apixaban (ELIQUIS) 2.5 MG  tablet tablet, Take 1 tablet by mouth 2 (Two) Times a Day., Disp: 60 tablet, Rfl: 5    Current Facility-Administered Medications:   •  cyanocobalamin injection 1,000 mcg, 1,000 mcg, Intramuscular, Q28 Days, Donna Forte MD, 1,000 mcg at 12/10/21 1444  •  cyanocobalamin injection 1,000 mcg, 1,000 mcg, Intramuscular, Q28 Days, Donna Forte MD, 1,000 mcg at 02/02/22 1307      Dictated utilizing Dragon dictation

## 2022-03-01 DIAGNOSIS — G89.29 CHRONIC LOW BACK PAIN WITHOUT SCIATICA, UNSPECIFIED BACK PAIN LATERALITY: ICD-10-CM

## 2022-03-01 DIAGNOSIS — F41.9 ANXIETY AND DEPRESSION: ICD-10-CM

## 2022-03-01 DIAGNOSIS — M54.50 LUMBAR BACK PAIN: ICD-10-CM

## 2022-03-01 DIAGNOSIS — F32.A ANXIETY AND DEPRESSION: ICD-10-CM

## 2022-03-01 DIAGNOSIS — M54.50 CHRONIC LOW BACK PAIN WITHOUT SCIATICA, UNSPECIFIED BACK PAIN LATERALITY: ICD-10-CM

## 2022-03-01 RX ORDER — OXYCODONE AND ACETAMINOPHEN 7.5; 325 MG/1; MG/1
1 TABLET ORAL EVERY 8 HOURS PRN
Qty: 90 TABLET | Refills: 0 | Status: SHIPPED | OUTPATIENT
Start: 2022-03-01 | End: 2022-01-01 | Stop reason: SDUPTHER

## 2022-03-01 RX ORDER — CLONAZEPAM 0.5 MG/1
0.5 TABLET ORAL NIGHTLY PRN
Qty: 30 TABLET | Refills: 0 | Status: SHIPPED | OUTPATIENT
Start: 2022-03-01 | End: 2022-01-01 | Stop reason: SDUPTHER

## 2022-03-01 NOTE — TELEPHONE ENCOUNTER
Caller: Kaylin Ojeda    Relationship: Self    Best call back number: [phone]    Requested Prescriptions:   Requested Prescriptions     Pending Prescriptions Disp Refills   • clonazePAM (KlonoPIN) 0.5 MG tablet 30 tablet 0     Sig: Take 1 tablet by mouth At Night As Needed for Anxiety.   • oxyCODONE-acetaminophen (Percocet) 7.5-325 MG per tablet 90 tablet 0     Sig: Take 1 tablet by mouth Every 8 (Eight) Hours As Needed for Severe Pain .        [place] where [place] be sent[place] [facility] [place] [address] [facility] [phone] [facility] [phone] FX     Additional details provided by patient: PATIENT IS OUT OF MEDICATION AS OF TOMORROW.    Does the patient have less than a 3 day supply:  [x] Yes  [] No    Tristen Hoffman Rep   03/01/22 10:01 EST

## 2022-03-07 RX ORDER — ERGOCALCIFEROL 1.25 MG/1
50000 CAPSULE ORAL WEEKLY
Qty: 13 CAPSULE | Refills: 3 | Status: SHIPPED | OUTPATIENT
Start: 2022-03-07 | End: 2022-01-01 | Stop reason: SDUPTHER

## 2022-03-07 NOTE — TELEPHONE ENCOUNTER
Rx Refill Note  Requested Prescriptions     Pending Prescriptions Disp Refills   • vitamin D (ERGOCALCIFEROL) 1.25 MG (45201 UT) capsule capsule 13 capsule 3     Sig: Take 1 capsule by mouth 1 (One) Time Per Week.      Last office visit with prescribing clinician: 2/2/2022      Next office visit with prescribing clinician: 3/22/2022            Lanie Hughes MA  03/07/22, 11:48 EST

## 2022-03-22 NOTE — PROGRESS NOTES
Kaylin Ojeda is a 82 y.o. female, who presents with a chief complaint of   Chief Complaint   Patient presents with   • Medicare Wellness-subsequent   • Leg Pain   • Depression           HPI   Pt here for follow up and medicare wellness    Pt feels like her depression is getting worse.  She has lots of worry about her great granddaughter and what will happen to her if the pt dies.  She has lots of chronic back pain.  She has had lots of issues since her  and her daughter .  She just tries to push the memories away and keep going on.  She prays but doesn't go to [employer] anymore bc she doesn't feel like it.  We discussed going to the [facility] or other ways to help feel connected.  She doesn't want to talk to a counselor.  She used to see her cousin daily but had not seen her since the pandemic started.  They finally saw each other this weekend.  She was on sertraline for her depression but didn't think it helped much. She has taken clonazepam daily for years and we have tried to wean the dose slowly.  She has lots of trouble sleeping.  She takes 20-30mg melatonin at night.  She has tried tylenol PM.  She takes her percocet at night as well.      Pt c/o leg pain. She has extensive know vascular disease including mesenteric disease, renal artery stenosis, CAD, small AAA, subclavian disease, and PAD.  She hasn't followed up with vascular bc it is all the way in [place].  I offered vascular referral to her today and she declines.  If she has to walk at the grocery or anywhere outside of her apartment her legs start hurting. She is supposed to be taking eliquis 2.5mg bid but is only taking it daily.  She says she feels better off her statin.  She continues to smoke.     Vit b 12 deficiency - pt's levels are good when she is on shots but drop again as soon as she doesn't get the injections consistently.     The following portions of the patient's history were reviewed and updated as appropriate:  allergies, current medications, past family history, past medical history, past social history, past surgical history and problem list.    Allergies: Aleve [naproxen sodium], Codeine, Ibuprofen, and Sulfa antibiotics    Review of Systems   Constitutional: Negative.    HENT: Negative.    Eyes: Negative.    Respiratory: Negative.    Cardiovascular: Negative.    Gastrointestinal: Negative.    Endocrine: Negative.    Genitourinary: Negative.    Musculoskeletal:        Leg pain   Skin: Negative.    Allergic/Immunologic: Negative.    Neurological: Negative.    Hematological: Negative.    Psychiatric/Behavioral: Negative.    All other systems reviewed and are negative.            Wt Readings from Last 3 Encounters:   03/22/22 45.5 kg (100 lb 6.4 oz)   02/25/22 44.9 kg (99 lb)   02/02/22 44.4 kg (97 lb 12.8 oz)     Temp Readings from Last 3 Encounters:   03/22/22 97.1 °F (36.2 °C) (Tympanic)   02/02/22 98.3 °F (36.8 °C) (Temporal)   12/22/21 98.6 °F (37 °C) (Temporal)     BP Readings from Last 3 Encounters:   03/22/22 120/66   02/25/22 140/80   02/02/22 118/68     Pulse Readings from Last 3 Encounters:   03/22/22 81   02/25/22 77   02/02/22 86     Body mass index is 19.61 kg/m².  SpO2 Readings from Last 3 Encounters:   03/22/22 98%   02/25/22 97%   02/02/22 94%          Physical Exam  Vitals and nursing note reviewed.   Constitutional:       General: She is not in acute distress.     Appearance: She is well-developed.   HENT:      Head: Normocephalic and atraumatic.      Right Ear: External ear normal.      Left Ear: External ear normal.      Nose: Nose normal.   Eyes:      Conjunctiva/sclera: Conjunctivae normal.      Pupils: Pupils are equal, round, and reactive to light.   Cardiovascular:      Rate and Rhythm: Normal rate and regular rhythm.      Heart sounds: Normal heart sounds.   Pulmonary:      Effort: Pulmonary effort is normal. No respiratory distress.      Breath sounds: Normal breath sounds. No wheezing.    Musculoskeletal:         General: Normal range of motion.      Cervical back: Normal range of motion and neck supple.      Comments: Normal gait   Skin:     General: Skin is warm and dry.   Neurological:      Mental Status: She is alert and oriented to person, place, and time.   Psychiatric:         Behavior: Behavior normal.         Thought Content: Thought content normal.         Judgment: Judgment normal.         Results for orders placed or performed in visit on 12/22/21   Comprehensive Metabolic Panel    Specimen: Blood   Result Value Ref Range    Glucose 111 (H) 65 - 99 mg/dL    BUN 18 8 - 27 mg/dL    Creatinine 1.02 (H) 0.57 - 1.00 mg/dL    eGFR Non African Am 52 (L) >59 mL/min/1.73    eGFR African Am 60 >59 mL/min/1.73    BUN/Creatinine Ratio 18 12 - 28    Sodium 141 134 - 144 mmol/L    Potassium 4.9 3.5 - 5.2 mmol/L    Chloride 103 96 - 106 mmol/L    Total CO2 22 20 - 29 mmol/L    Calcium 9.6 8.7 - 10.3 mg/dL    Total Protein 7.2 6.0 - 8.5 g/dL    Albumin 4.3 3.7 - 4.7 g/dL    Globulin 2.9 1.5 - 4.5 g/dL    A/G Ratio 1.5 1.2 - 2.2    Total Bilirubin 0.5 0.0 - 1.2 mg/dL    Alkaline Phosphatase 119 44 - 121 IU/L    AST (SGOT) 18 0 - 40 IU/L    ALT (SGPT) 8 0 - 32 IU/L   T4, Free    Specimen: Blood   Result Value Ref Range    Free T4 1.00 0.82 - 1.77 ng/dL   TSH    Specimen: Blood   Result Value Ref Range    TSH 2.230 0.450 - 4.500 uIU/mL   Lipid Panel With LDL / HDL Ratio    Specimen: Blood   Result Value Ref Range    Total Cholesterol 182 100 - 199 mg/dL    Triglycerides 115 0 - 149 mg/dL    HDL Cholesterol 48 >39 mg/dL    VLDL Cholesterol Ricky 21 5 - 40 mg/dL    LDL Chol Calc (NIH) 113 (H) 0 - 99 mg/dL    LDL/HDL RATIO 2.4 0.0 - 3.2 ratio   Hemoglobin A1c    Specimen: Blood   Result Value Ref Range    Hemoglobin A1C 5.9 (H) 4.8 - 5.6 %   CBC & Differential    Specimen: Blood   Result Value Ref Range    WBC 7.8 3.4 - 10.8 x10E3/uL    RBC 4.60 3.77 - 5.28 x10E6/uL    Hemoglobin 14.2 11.1 - 15.9 g/dL     Hematocrit 43.5 34.0 - 46.6 %    MCV 95 79 - 97 fL    MCH 30.9 26.6 - 33.0 pg    MCHC 32.6 31.5 - 35.7 g/dL    RDW 13.1 11.7 - 15.4 %    Platelets 278 150 - 450 x10E3/uL    Neutrophil Rel % 74 Not Estab. %    Lymphocyte Rel % 18 Not Estab. %    Monocyte Rel % 6 Not Estab. %    Eosinophil Rel % 1 Not Estab. %    Basophil Rel % 1 Not Estab. %    Neutrophils Absolute 5.7 1.4 - 7.0 x10E3/uL    Lymphocytes Absolute 1.4 0.7 - 3.1 x10E3/uL    Monocytes Absolute 0.5 0.1 - 0.9 x10E3/uL    Eosinophils Absolute 0.1 0.0 - 0.4 x10E3/uL    Basophils Absolute 0.1 0.0 - 0.2 x10E3/uL    Immature Granulocyte Rel % 0 Not Estab. %    Immature Grans Absolute 0.0 0.0 - 0.1 x10E3/uL     Result Review :                  Assessment and Plan    Diagnoses and all orders for this visit:    1. Medicare annual wellness visit, subsequent (Primary)    2. Anxiety and depression - pt off her sertraline.  She declines behavioral health referral.  No SI.  -     FLUoxetine (PROzac) 20 MG capsule; Take 1 capsule by mouth Daily.  Dispense: 30 capsule; Refill: 0    3. Essential hypertension    4. Lumbar back pain    5. Acquired hypothyroidism    6. Vitamin B12 deficiency anemia due to intrinsic factor deficiency  -     cyanocobalamin injection 1,000 mcg    7. Coronary artery disease involving native coronary artery of native heart with angina pectoris ([facility])    8. PAF (paroxysmal atrial fibrillation) ([facility])  -     apixaban (ELIQUIS) 2.5 MG tablet tablet; Take 1 tablet by mouth 2 (Two) Times a Day.  Dispense: 60 tablet; Refill: 5    9. Peripheral arterial disease ([facility]) - pt with worsening claudication.  She declines vascular referral.  I discussed RBA of the referral and she doesn't want to go to the appt at this time.                 Outpatient Medications Prior to Visit   Medication Sig Dispense Refill   • albuterol (PROVENTIL) (2.5 MG/3ML) 0.083% nebulizer solution Take 2.5 mg by nebulization Every 4 (Four) Hours As Needed for Wheezing or Shortness of  Air. (Patient taking differently: Take 2.5 mg by nebulization Every 4 (Four) Hours As Needed for Wheezing or Shortness of Air. prn) 150 mL 2   • albuterol sulfate  (90 Base) MCG/ACT inhaler Inhale 2 puffs Every 4 (Four) Hours As Needed for Wheezing. (Patient taking differently: Inhale 2 puffs Every 4 (Four) Hours As Needed for Wheezing. prn) 18 g 11   • budesonide-formoterol (SYMBICORT) 160-4.5 MCG/ACT inhaler Inhale 2 puffs 2 (Two) Times a Day. (Patient taking differently: Inhale 2 puffs 2 (Two) Times a Day. prn) 1 inhaler 12   • clonazePAM (KlonoPIN) 0.5 MG tablet Take 1 tablet by mouth At Night As Needed for Anxiety. 30 tablet 0   • fluticasone (FLONASE) 50 MCG/ACT nasal spray SPRAY TWICE IN EACH NOSTRIL EVERY DAY FOR 30 DAYS. (Patient taking differently: prn) 16 mL 6   • lisinopril (PRINIVIL,ZESTRIL) 20 MG tablet TAKE 1 TABLET EVERY DAY 90 tablet 1   • oxybutynin (DITROPAN) 5 MG tablet Take 5 mg by mouth Daily.     • oxyCODONE-acetaminophen (Percocet) 7.5-325 MG per tablet Take 1 tablet by mouth Every 8 (Eight) Hours As Needed for Severe Pain . 90 tablet 0   • vitamin D (ERGOCALCIFEROL) 1.25 MG (01608 UT) capsule capsule Take 1 capsule by mouth 1 (One) Time Per Week. 13 capsule 3   • apixaban (ELIQUIS) 2.5 MG tablet tablet Take 1 tablet by mouth 2 (Two) Times a Day. (Patient taking differently: Take 2.5 mg by mouth Daily.) 60 tablet 5     Facility-Administered Medications Prior to Visit   Medication Dose Route Frequency Provider Last Rate Last Admin   • cyanocobalamin injection 1,000 mcg  1,000 mcg Intramuscular Q28 Days Donna Forte MD   1,000 mcg at 02/02/22 1307   • cyanocobalamin injection 1,000 mcg  1,000 mcg Intramuscular Q28 Days Donna Forte MD   1,000 mcg at 12/10/21 1444     New Medications Ordered This Visit   Medications   • apixaban (ELIQUIS) 2.5 MG tablet tablet     Sig: Take 1 tablet by mouth 2 (Two) Times a Day.     Dispense:  60 tablet     Refill:  5   •  cyanocobalamin injection 1,000 mcg   • FLUoxetine (PROzac) 20 MG capsule     Sig: Take 1 capsule by mouth Daily.     Dispense:  30 capsule     Refill:  0     [unfilled]  Medications Discontinued During This Encounter   Medication Reason   • cyanocobalamin injection 1,000 mcg *Therapy completed   • apixaban (ELIQUIS) 2.5 MG tablet tablet          Return in about 1 month (around 4/22/2022) for Recheck.    Patient was given instructions and counseling regarding her condition or for health maintenance advice. Please see specific information pulled into the AVS if appropriate.

## 2022-03-22 NOTE — PROGRESS NOTES
The ABCs of the Annual Wellness Visit  Subsequent Medicare Wellness Visit    Chief Complaint   Patient presents with   • Medicare Wellness-subsequent   • Leg Pain   • Depression      Subjective    History of Present Illness:  Kaylin Ojeda is a 82 y.o. female who presents for a Subsequent Medicare Wellness Visit.    The following portions of the patient's history were reviewed and   updated as appropriate: allergies, current medications, past family history, past medical history, past social history, past surgical history and problem list.    Compared to one year ago, the patient feels her physical   health is the same.    Compared to one year ago, the patient feels her mental   health is the same.    Recent Hospitalizations:  She was not admitted to the hospital during the last year.       Current Medical Providers:  Patient Care Team:  Donna Forte MD as PCP - General (Internal Medicine & Pediatrics)  Donna Forte MD as PCP - Family Medicine  Donna Forte MD as Referring Physician (Internal Medicine & Pediatrics)    Outpatient Medications Prior to Visit   Medication Sig Dispense Refill   • albuterol (PROVENTIL) (2.5 MG/3ML) 0.083% nebulizer solution Take 2.5 mg by nebulization Every 4 (Four) Hours As Needed for Wheezing or Shortness of Air. (Patient taking differently: Take 2.5 mg by nebulization Every 4 (Four) Hours As Needed for Wheezing or Shortness of Air. prn) 150 mL 2   • albuterol sulfate  (90 Base) MCG/ACT inhaler Inhale 2 puffs Every 4 (Four) Hours As Needed for Wheezing. (Patient taking differently: Inhale 2 puffs Every 4 (Four) Hours As Needed for Wheezing. prn) 18 g 11   • budesonide-formoterol (SYMBICORT) 160-4.5 MCG/ACT inhaler Inhale 2 puffs 2 (Two) Times a Day. (Patient taking differently: Inhale 2 puffs 2 (Two) Times a Day. prn) 1 inhaler 12   • clonazePAM (KlonoPIN) 0.5 MG tablet Take 1 tablet by mouth At Night As Needed for Anxiety. 30 tablet 0    • fluticasone (FLONASE) 50 MCG/ACT nasal spray SPRAY TWICE IN EACH NOSTRIL EVERY DAY FOR 30 DAYS. (Patient taking differently: prn) 16 mL 6   • lisinopril (PRINIVIL,ZESTRIL) 20 MG tablet TAKE 1 TABLET EVERY DAY 90 tablet 1   • oxybutynin (DITROPAN) 5 MG tablet Take 5 mg by mouth Daily.     • oxyCODONE-acetaminophen (Percocet) 7.5-325 MG per tablet Take 1 tablet by mouth Every 8 (Eight) Hours As Needed for Severe Pain . 90 tablet 0   • vitamin D (ERGOCALCIFEROL) 1.25 MG (38780 UT) capsule capsule Take 1 capsule by mouth 1 (One) Time Per Week. 13 capsule 3   • apixaban (ELIQUIS) 2.5 MG tablet tablet Take 1 tablet by mouth 2 (Two) Times a Day. (Patient taking differently: Take 2.5 mg by mouth Daily.) 60 tablet 5     Facility-Administered Medications Prior to Visit   Medication Dose Route Frequency Provider Last Rate Last Admin   • cyanocobalamin injection 1,000 mcg  1,000 mcg Intramuscular Q28 Days Donna Forte MD   1,000 mcg at 02/02/22 1307   • cyanocobalamin injection 1,000 mcg  1,000 mcg Intramuscular Q28 Days Donna Forte MD   1,000 mcg at 12/10/21 1444       Opioid medication/s are on active medication list.  and I have evaluated her active treatment plan and pain score trends (see table).  There were no vitals filed for this visit.  I have reviewed the chart for potential of high risk medication and harmful drug interactions in the elderly.            Aspirin is not on active medication list.  Aspirin use is contraindicated for this patient due to: current use of Eliquis.  .    Patient Active Problem List   Diagnosis   • CAD (coronary artery disease)   • Valvular heart disease   • Essential hypertension   • Hypercholesterolemia   • Anemia due to vitamin B12 deficiency   • Loss of appetite   • Anxiety   • Chronic obstructive pulmonary disease (HCC)   • Mixed anxiety depressive disorder   • Fall in home   • Insomnia   • 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HCC)   • Obstruction of carotid artery  "  • Peripheral arterial occlusive disease (HCC)   • Impaired glucose tolerance   • Difficulty sleeping   • Tobacco dependence syndrome   • Iron deficiency anemia   • Vitamin B12 deficiency anemia due to intrinsic factor deficiency   • Acquired hypothyroidism   • Thoracoabdominal aortic aneurysm (HCC)   • Abdominal bloating   • Prediabetes   • Sleep difficulties   • Chronic constipation   • PAF (paroxysmal atrial fibrillation) (HCC)   • Moderate single current episode of major depressive disorder (HCC)   • Chronic low back pain without sciatica   • Urge incontinence   • Severe mitral regurgitation   • Severe tricuspid regurgitation   • Moderate aortic valve regurgitation   • Underweight   • DDD (degenerative disc disease), lumbar     Advance Care Planning  Advance Directive is not on file.  ACP discussion was held with the patient during this visit. Patient has an advance directive (not in EMR), copy requested.          Objective    Vitals:    03/22/22 1027   BP: 120/66   BP Location: Left arm   Patient Position: Sitting   Cuff Size: Adult   Pulse: 81   Resp: 18   Temp: 97.1 °F (36.2 °C)   TempSrc: Tympanic   SpO2: 98%   Weight: 45.5 kg (100 lb 6.4 oz)   Height: 152.4 cm (60\")     BMI Readings from Last 1 Encounters:   03/22/22 19.61 kg/m²   BMI is within normal parameters. No follow-up required.    Does the patient have evidence of cognitive impairment? No    Physical Exam  Lab Results   Component Value Date    CHLPL 182 01/05/2022    TRIG 115 01/05/2022    HDL 48 01/05/2022     (H) 01/05/2022    VLDL 21 01/05/2022    HGBA1C 5.9 (H) 01/05/2022            HEALTH RISK ASSESSMENT    Smoking Status:  Social History     Tobacco Use   Smoking Status Current Some Day Smoker   • Packs/day: 0.50   • Years: 50.00   • Pack years: 25.00   • Types: Cigarettes   Smokeless Tobacco Never Used   Tobacco Comment    trying to quit, pt states she has basically quit but will still have one when she stressed     Alcohol " Consumption:  Social History     Substance and Sexual Activity   Alcohol Use Not Currently    Comment: OCCASIONAL/ TWICE A YEAR. // daily caffiene     Fall Risk Screen:    CANDICE Fall Risk Assessment was completed, and patient is at HIGH risk for falls. Assessment completed on:3/22/2022    Depression Screening:  PHQ-2/PHQ-9 Depression Screening 3/22/2022   Retired PHQ-9 Total Score -   Retired Total Score -   Little Interest or Pleasure in Doing Things 3-->nearly every day   Feeling Down, Depressed or Hopeless 3-->nearly every day   Trouble Falling or Staying Asleep, or Sleeping Too Much 3-->nearly every day   Feeling Tired or Having Little Energy 3-->nearly every day   Poor Appetite or Overeating 3-->nearly every day   Feeling Bad about Yourself - or that You are a Failure or Have Let Yourself or Your Family Down 0-->not at all   Trouble Concentrating on Things, Such as Reading the Newspaper or Watching Television 0-->not at all   Moving or Speaking So Slowly that Other People Could Have Noticed? Or the Opposite - Being So Fidgety 0-->not at all   Thoughts that You Would be Better Off Dead or of Hurting Yourself in Some Way 0-->not at all   PHQ-9: Brief Depression Severity Measure Score 15   If You Checked Off Any Problems, How Difficult Have These Problems Made It For You to Do Your Work, Take Care of Things at Home, or Get Along with Other People? extremely difficult       Health Habits and Functional and Cognitive Screening:  Functional & Cognitive Status 3/22/2022   Do you have difficulty preparing food and eating? Yes   Do you have difficulty bathing yourself, getting dressed or grooming yourself? No   Do you have difficulty using the toilet? No   Do you have difficulty moving around from place to place? No   Do you have trouble with steps or getting out of a bed or a chair? Yes   Current Diet Other        Current Diet Comment typically eats soup, does not eat much meat   Dental Exam Not up to date   Eye Exam  Not up to date   Exercise (times per week) 0 times per week        Exercise Frequency Comment cleaning her house   Current Exercises Include No Regular Exercise   Current Exercise Activities Include -   Do you need help using the phone?  No   Are you deaf or do you have serious difficulty hearing?  No   Do you need help with transportation? No   Do you need help shopping? Yes   Do you need help preparing meals?  No   Do you need help with housework?  No   Do you need help with laundry? No   Do you need help taking your medications? No   Do you need help managing money? Yes   Do you ever drive or ride in a car without wearing a seat belt? No   Have you felt unusual stress, anger or loneliness in the last month? Yes   Who do you live with? Child   If you need help, do you have trouble finding someone available to you? -   Have you been bothered in the last four weeks by sexual problems? No   Do you have difficulty concentrating, remembering or making decisions? Yes       Age-appropriate Screening Schedule:  Refer to the list below for future screening recommendations based on patient's age, sex and/or medical conditions. Orders for these recommended tests are listed in the plan section. The patient has been provided with a written plan.    Health Maintenance   Topic Date Due   • ZOSTER VACCINE (1 of 2) Never done   • DIABETIC EYE EXAM  10/08/2019   • INFLUENZA VACCINE  08/01/2021   • TDAP/TD VACCINES (1 - Tdap) 08/19/2022 (Originally 2/16/1959)   • URINE MICROALBUMIN  06/09/2022   • HEMOGLOBIN A1C  07/05/2022   • LIPID PANEL  01/05/2023   • MAMMOGRAM  04/14/2023   • DXA SCAN  04/14/2023              Assessment/Plan   CMS Preventative Services Quick Reference  Risk Factors Identified During Encounter  Cardiovascular Disease  Chronic Pain   Depression/Dysphoria  Polypharmacy  underweight - discussed nutrition  The above risks/problems have been discussed with the patient.  Follow up actions/plans if indicated are seen  below in the Assessment/Plan Section.  Pertinent information has been shared with the patient in the After Visit Summary.    Diagnoses and all orders for this visit:    1. Medicare annual wellness visit, subsequent (Primary)    2. Anxiety and depression  -     FLUoxetine (PROzac) 20 MG capsule; Take 1 capsule by mouth Daily.  Dispense: 30 capsule; Refill: 0    3. Essential hypertension    4. Lumbar back pain    5. Acquired hypothyroidism    6. Vitamin B12 deficiency anemia due to intrinsic factor deficiency  -     cyanocobalamin injection 1,000 mcg    7. Coronary artery disease involving native coronary artery of native heart with angina pectoris (HCC)    8. PAF (paroxysmal atrial fibrillation) (HCC)- pt only taking eliquis daily.  I reviewed cardiology note.  Pt is supposed to be on eliquis 2.5mg bid.  Reviewed this with pt.   -     apixaban (ELIQUIS) 2.5 MG tablet tablet; Take 1 tablet by mouth 2 (Two) Times a Day.  Dispense: 60 tablet; Refill: 5    9. Peripheral arterial disease (HCC)        Follow Up:   Return in about 1 month (around 4/22/2022) for Recheck.     An After Visit Summary and PPPS were made available to the patient.

## 2022-03-22 NOTE — PATIENT INSTRUCTIONS
Medicare Wellness  Personal Prevention Plan of Service     Date of Office Visit:    Encounter Provider:  Donna Forte MD  Place of Service:  [facility]  Patient Name: Kaylin Ojeda  :  1940    As part of the Medicare Wellness portion of your visit today, we are providing you with this personalized preventive plan of services (PPPS). This plan is based upon recommendations of the [place] Preventive Services Task Force (USPSTF) and the Advisory Committee on Immunization Practices (ACIP).    This lists the preventive care services that should be considered, and provides dates of when you are due. Items listed as completed are up-to-date and do not require any further intervention.    Health Maintenance   Topic Date Due    ZOSTER VACCINE (1 of 2) Never done    COLORECTAL CANCER SCREENING  2016    DIABETIC EYE EXAM  10/08/2019    LUNG CANCER SCREENING  2020    INFLUENZA VACCINE  2021    COVID-19 Vaccine (3 - Booster for Moderna series) 2021    ANNUAL WELLNESS VISIT  2021    TDAP/TD VACCINES (1 - Tdap) 2022 (Originally 1959)    URINE MICROALBUMIN  2022    HEMOGLOBIN A1C  2022    LIPID PANEL  2023    MAMMOGRAM  2023    DXA SCAN  2023    Pneumococcal Vaccine 65+  Completed       No orders of the defined types were placed in this encounter.      Return in about 1 month (around 2022) for Recheck.

## 2022-03-29 NOTE — TELEPHONE ENCOUNTER
Caller: Kaylin Ojeda    Relationship: Self    Best call back number: [phone]    Requested Prescriptions:   Requested Prescriptions     Pending Prescriptions Disp Refills   • oxyCODONE-acetaminophen (Percocet) 7.5-325 MG per tablet 90 tablet 0     Sig: Take 1 tablet by mouth Every 8 (Eight) Hours As Needed for Severe Pain .   • clonazePAM (KlonoPIN) 0.5 MG tablet 30 tablet 0     Sig: Take 1 tablet by mouth At Night As Needed for Anxiety.        [place]arm[place] where request [place] be sent[place] [facility] [place] [place] [address] [phone] [facility] [phone] [facility]     Additional details provided by patient: PATIENT STATES SHE WILL BE OUT OF HER OXYCODONE PRESCRIPTION BY Saturday, 04/02/2022.     Does the patient have less than a 3 day supply:  [] Yes  [x] No    Tristen Patel Rep   03/29/22 14:30 EDT

## 2022-03-30 NOTE — TELEPHONE ENCOUNTER
Rx Refill Note  Requested Prescriptions     Pending Prescriptions Disp Refills    oxyCODONE-acetaminophen (Percocet) 7.5-325 MG per tablet 90 tablet 0     Sig: Take 1 tablet by mouth Every 8 (Eight) Hours As Needed for Severe Pain .    clonazePAM (KlonoPIN) 0.5 MG tablet 30 tablet 0     Sig: Take 1 tablet by mouth At Night As Needed for Anxiety.      Last office visit with prescribing clinician: 3/22/2022      Next office visit with prescribing clinician: 4/19/2022            Lanie Hughes MA  03/30/22, 10:41 EDT

## 2022-04-19 NOTE — PROGRESS NOTES
Kaylin Ojeda is a 82 y.o. female, who presents with a chief complaint of   Chief Complaint   Patient presents with   • Pain           HPI   Pt here for follow up.  She is taking her pain meds for her back tid and this helps some but she still has lots of pain.  Pt c/o leg pain. She cont to have pain and her knees are hurting now.  She has had both knees replaced.  She has extensive known vascular disease including mesenteric disease, renal artery stenosis, CAD, small AAA, subclavian disease, and PAD.  She hasn't followed up with vascular bc it is all the way in [place].  I offered vascular referral again and she declines.  If she has to walk at the grocery or anywhere outside of her apartment her legs start hurting. She says she feels better off her statin.  She continues to smoke.     She still feels bad from when she had covid.  She is more tired than before.     She cont to deal with depression.   She hasn't tried to go to the senior center.  Her granddaughter hasn't been at their home for a few weeks so she cont to help care for her great granddaughter.        The following portions of the patient's history were reviewed and updated as appropriate: allergies, current medications, past family history, past medical history, past social history, past surgical history and problem list.    Allergies: Aleve [naproxen sodium], Codeine, Ibuprofen, and Sulfa antibiotics    Review of Systems   Constitutional: Negative.    HENT: Negative.    Eyes: Negative.    Respiratory: Negative.    Cardiovascular: Negative.    Gastrointestinal: Negative.    Endocrine: Negative.    Genitourinary: Negative.    Musculoskeletal: Negative.    Skin: Negative.    Allergic/Immunologic: Negative.    Neurological: Negative.    Hematological: Negative.    Psychiatric/Behavioral: Negative.    All other systems reviewed and are negative.            Wt Readings from Last 3 Encounters:   04/19/22 46.4 kg (102 lb 3.2 oz)   03/22/22 45.5  kg (100 lb 6.4 oz)   02/25/22 44.9 kg (99 lb)     Temp Readings from Last 3 Encounters:   03/22/22 97.1 °F (36.2 °C) (Tympanic)   02/02/22 98.3 °F (36.8 °C) (Temporal)   12/22/21 98.6 °F (37 °C) (Temporal)     BP Readings from Last 3 Encounters:   04/19/22 142/68   03/22/22 120/66   02/25/22 140/80     Pulse Readings from Last 3 Encounters:   04/19/22 68   03/22/22 81   02/25/22 77     Body mass index is 19.96 kg/m².  SpO2 Readings from Last 3 Encounters:   04/19/22 98%   03/22/22 98%   02/25/22 97%          Physical Exam  Vitals and nursing note reviewed.   Constitutional:       General: She is not in acute distress.     Appearance: She is well-developed.      Comments: Thin, elderly female   HENT:      Head: Normocephalic and atraumatic.      Right Ear: External ear normal.      Left Ear: External ear normal.      Nose: Nose normal.   Eyes:      Conjunctiva/sclera: Conjunctivae normal.      Pupils: Pupils are equal, round, and reactive to light.   Cardiovascular:      Rate and Rhythm: Normal rate and regular rhythm.      Heart sounds: Normal heart sounds.   Pulmonary:      Effort: Pulmonary effort is normal. No respiratory distress.      Breath sounds: Normal breath sounds. No wheezing.   Musculoskeletal:         General: Normal range of motion.      Cervical back: Normal range of motion and neck supple.      Comments: Normal gait   Skin:     General: Skin is warm and dry.   Neurological:      Mental Status: She is alert and oriented to person, place, and time.   Psychiatric:         Behavior: Behavior normal.         Thought Content: Thought content normal.         Judgment: Judgment normal.         Results for orders placed or performed in visit on 12/22/21   Comprehensive Metabolic Panel    Specimen: Blood   Result Value Ref Range    Glucose 111 (H) 65 - 99 mg/dL    BUN 18 8 - 27 mg/dL    Creatinine 1.02 (H) 0.57 - 1.00 mg/dL    eGFR Non African Am 52 (L) >59 mL/min/1.73    eGFR African Am 60 >59 mL/min/1.73     BUN/Creatinine Ratio 18 12 - 28    Sodium 141 134 - 144 mmol/L    Potassium 4.9 3.5 - 5.2 mmol/L    Chloride 103 96 - 106 mmol/L    Total CO2 22 20 - 29 mmol/L    Calcium 9.6 8.7 - 10.3 mg/dL    Total Protein 7.2 6.0 - 8.5 g/dL    Albumin 4.3 3.7 - 4.7 g/dL    Globulin 2.9 1.5 - 4.5 g/dL    A/G Ratio 1.5 1.2 - 2.2    Total Bilirubin 0.5 0.0 - 1.2 mg/dL    Alkaline Phosphatase 119 44 - 121 IU/L    AST (SGOT) 18 0 - 40 IU/L    ALT (SGPT) 8 0 - 32 IU/L   T4, Free    Specimen: Blood   Result Value Ref Range    Free T4 1.00 0.82 - 1.77 ng/dL   TSH    Specimen: Blood   Result Value Ref Range    TSH 2.230 0.450 - 4.500 uIU/mL   Lipid Panel With LDL / HDL Ratio    Specimen: Blood   Result Value Ref Range    Total Cholesterol 182 100 - 199 mg/dL    Triglycerides 115 0 - 149 mg/dL    HDL Cholesterol 48 >39 mg/dL    VLDL Cholesterol Ricky 21 5 - 40 mg/dL    LDL Chol Calc (NIH) 113 (H) 0 - 99 mg/dL    LDL/HDL RATIO 2.4 0.0 - 3.2 ratio   Hemoglobin A1c    Specimen: Blood   Result Value Ref Range    Hemoglobin A1C 5.9 (H) 4.8 - 5.6 %   CBC & Differential    Specimen: Blood   Result Value Ref Range    WBC 7.8 3.4 - 10.8 x10E3/uL    RBC 4.60 3.77 - 5.28 x10E6/uL    Hemoglobin 14.2 11.1 - 15.9 g/dL    Hematocrit 43.5 34.0 - 46.6 %    MCV 95 79 - 97 fL    MCH 30.9 26.6 - 33.0 pg    MCHC 32.6 31.5 - 35.7 g/dL    RDW 13.1 11.7 - 15.4 %    Platelets 278 150 - 450 x10E3/uL    Neutrophil Rel % 74 Not Estab. %    Lymphocyte Rel % 18 Not Estab. %    Monocyte Rel % 6 Not Estab. %    Eosinophil Rel % 1 Not Estab. %    Basophil Rel % 1 Not Estab. %    Neutrophils Absolute 5.7 1.4 - 7.0 x10E3/uL    Lymphocytes Absolute 1.4 0.7 - 3.1 x10E3/uL    Monocytes Absolute 0.5 0.1 - 0.9 x10E3/uL    Eosinophils Absolute 0.1 0.0 - 0.4 x10E3/uL    Basophils Absolute 0.1 0.0 - 0.2 x10E3/uL    Immature Granulocyte Rel % 0 Not Estab. %    Immature Grans Absolute 0.0 0.0 - 0.1 x10E3/uL     Result Review :                  Assessment and Plan    Diagnoses and all  orders for this visit:    1. Chronic low back pain without sciatica, unspecified back pain laterality (Primary) -cont norco as prescribed    2. Lumbar back pain    3. Anxiety and depression -offered counseling but pt declines    4. Vitamin B12 deficiency anemia due to intrinsic factor deficiency - b12 shot today    5. PAF (paroxysmal atrial fibrillation) (HCC)  -     Comprehensive Metabolic Panel; Future  -     CBC & Differential; Future  -     T4, Free; Future  -     TSH; Future    6. Essential hypertension  -     Comprehensive Metabolic Panel; Future  -     CBC & Differential; Future  -     Lipid Panel With LDL / HDL Ratio; Future  -     T4, Free; Future  -     TSH; Future    7. Prediabetes  -     Comprehensive Metabolic Panel; Future  -     CBC & Differential; Future  -     Hemoglobin A1c; Future  -     Lipid Panel With LDL / HDL Ratio; Future  -     T4, Free; Future  -     TSH; Future    8. Hypercholesterolemia  -     Comprehensive Metabolic Panel; Future  -     Lipid Panel With LDL / HDL Ratio; Future       b12 shot today.    Reviewed current medication plan with patient today.  Continue current medications.  Encouraged healthy diet/exercise.  OV labs in   3 months.  Pt to call sooner if any other issues arise.      Encouraged covid vaccination if not already done.  Covid vaccination can be scheduled at [email or web address]          Outpatient Medications Prior to Visit   Medication Sig Dispense Refill   • albuterol (PROVENTIL) (2.5 MG/3ML) 0.083% nebulizer solution Take 2.5 mg by nebulization Every 4 (Four) Hours As Needed for Wheezing or Shortness of Air. (Patient taking differently: Take 2.5 mg by nebulization Every 4 (Four) Hours As Needed for Wheezing or Shortness of Air. prn) 150 mL 2   • albuterol sulfate  (90 Base) MCG/ACT inhaler Inhale 2 puffs Every 4 (Four) Hours As Needed for Wheezing. (Patient taking differently: Inhale 2 puffs Every 4 (Four) Hours As Needed for  Wheezing. prn) 18 g 11   • apixaban (ELIQUIS) 2.5 MG tablet tablet Take 1 tablet by mouth 2 (Two) Times a Day. 60 tablet 5   • budesonide-formoterol (SYMBICORT) 160-4.5 MCG/ACT inhaler Inhale 2 puffs 2 (Two) Times a Day. (Patient taking differently: Inhale 2 puffs 2 (Two) Times a Day. prn) 1 inhaler 12   • clonazePAM (KlonoPIN) 0.5 MG tablet Take 1 tablet by mouth At Night As Needed for Anxiety. 30 tablet 0   • FLUoxetine (PROzac) 20 MG capsule Take 1 capsule by mouth Daily. 30 capsule 0   • fluticasone (FLONASE) 50 MCG/ACT nasal spray SPRAY TWICE IN EACH NOSTRIL EVERY DAY FOR 30 DAYS. (Patient taking differently: prn) 16 mL 6   • lisinopril (PRINIVIL,ZESTRIL) 20 MG tablet TAKE 1 TABLET EVERY DAY 90 tablet 1   • oxybutynin (DITROPAN) 5 MG tablet Take 5 mg by mouth Daily.     • oxyCODONE-acetaminophen (Percocet) 7.5-325 MG per tablet Take 1 tablet by mouth Every 8 (Eight) Hours As Needed for Severe Pain . 90 tablet 0   • vitamin D (ERGOCALCIFEROL) 1.25 MG (12230 UT) capsule capsule Take 1 capsule by mouth 1 (One) Time Per Week. 13 capsule 3     Facility-Administered Medications Prior to Visit   Medication Dose Route Frequency Provider Last Rate Last Admin   • cyanocobalamin injection 1,000 mcg  1,000 mcg Intramuscular Q28 Days Donna Forte MD   1,000 mcg at 02/02/22 1307   • cyanocobalamin injection 1,000 mcg  1,000 mcg Intramuscular Q28 Days Donna Forte MD   1,000 mcg at 04/19/22 1419     No orders of the defined types were placed in this encounter.    [unfilled]  There are no discontinued medications.      Return in about 3 months (around 7/19/2022) for Recheck, labs.    Patient was given instructions and counseling regarding her condition or for health maintenance advice. Please see specific information pulled into the AVS if appropriate.

## 2022-04-28 NOTE — TELEPHONE ENCOUNTER
Caller: Kaylin Ojeda    Relationship: Self    Best call back number: [phone]    Requested Prescriptions:   Requested Prescriptions     Pending Prescriptions Disp Refills   • oxyCODONE-acetaminophen (Percocet) 7.5-325 MG per tablet 90 tablet 0     Sig: Take 1 tablet by mouth Every 8 (Eight) Hours As Needed for Severe Pain .   • clonazePAM (KlonoPIN) 0.5 MG tablet 30 tablet 0     Sig: Take 1 tablet by mouth At Night As Needed for Anxiety.        [place]arm[place] where [place] be sent[place] [facility] [place] [address] [facility] [phone] [facility] [phone] FX     Does the patient have less than a 3 day supply:  [x] Yes  [] No    Tristen Hope Rep   04/28/22 15:09 EDT

## 2022-05-19 NOTE — TELEPHONE ENCOUNTER
Rx Refill Note  Requested Prescriptions      No prescriptions requested or ordered in this encounter      Last office visit with prescribing clinician: 2/25/2022      Next office visit with prescribing clinician: Visit date not found            Katerina Hammond MA  05/19/22, 11:28 EDT

## 2022-06-01 NOTE — TELEPHONE ENCOUNTER
Caller: Kaylin Ojeda    Relationship: Self    Best call back number: [phone]    Requested Prescriptions:   Requested Prescriptions     Pending Prescriptions Disp Refills   • oxyCODONE-acetaminophen (Percocet) 7.5-325 MG per tablet 90 tablet 0     Sig: Take 1 tablet by mouth Every 8 (Eight) Hours As Needed for Severe Pain .   • clonazePAM (KlonoPIN) 0.5 MG tablet 30 tablet 0     Sig: Take 1 tablet by mouth At Night As Needed for Anxiety.   • lisinopril (PRINIVIL,ZESTRIL) 20 MG tablet 90 tablet 1     Sig: Take 1 tablet by mouth Daily.        Pharm[place] where request [place] be sent[place] [facility] [place] [place] - [address] [facility] [phone] [facility] [phone] FX     Additional details provided by patient: SHE WILL BE OUT OF THE PAIN MEDICATION 6/2    Does the patient have less than a 3 day supply:  [x] Yes  [] No    Tristen Kothari Rep   06/01/22 11:24 EDT

## 2022-06-02 NOTE — TELEPHONE ENCOUNTER
Rx Refill Note  Requested Prescriptions     Pending Prescriptions Disp Refills    oxyCODONE-acetaminophen (Percocet) 7.5-325 MG per tablet 90 tablet 0     Sig: Take 1 tablet by mouth Every 8 (Eight) Hours As Needed for Severe Pain .    clonazePAM (KlonoPIN) 0.5 MG tablet 30 tablet 0     Sig: Take 1 tablet by mouth At Night As Needed for Anxiety.    lisinopril (PRINIVIL,ZESTRIL) 20 MG tablet 90 tablet 1     Sig: Take 1 tablet by mouth Daily.      Last office visit with prescribing clinician: 4/19/2022      Next office visit with prescribing clinician: 7/26/2022            SKYE RODRIGUEZ MA  06/02/22, 09:35 EDT

## 2022-07-05 NOTE — TELEPHONE ENCOUNTER
Rx Refill Note  Requested Prescriptions     Pending Prescriptions Disp Refills    oxyCODONE-acetaminophen (Percocet) 7.5-325 MG per tablet 90 tablet 0     Sig: Take 1 tablet by mouth Every 8 (Eight) Hours As Needed for Severe Pain .    clonazePAM (KlonoPIN) 0.5 MG tablet 30 tablet 0     Sig: Take 1 tablet by mouth At Night As Needed for Anxiety.      Last office visit with prescribing clinician: 4/19/2022      Next office visit with prescribing clinician: 7/26/2022            SKYE RODRIGUEZ MA  07/05/22, 11:11 EDT

## 2022-07-05 NOTE — TELEPHONE ENCOUNTER
Caller: Kaylin Ojeda    Relationship: Self    Best call back number: [phone]    Requested Prescriptions:   Requested Prescriptions     Pending Prescriptions Disp Refills   • oxyCODONE-acetaminophen (Percocet) 7.5-325 MG per tablet 90 tablet 0     Sig: Take 1 tablet by mouth Every 8 (Eight) Hours As Needed for Severe Pain .   • clonazePAM (KlonoPIN) 0.5 MG tablet 30 tablet 0     Sig: Take 1 tablet by mouth At Night As Needed for Anxiety.        Pharmacy where request should be sent: [facility] [place] - [address] [facility] [phone] [facility] [phone] FX     Additional details provided by patient: PATIENT STATES SHE RAN OUT OF MEDICATION OVER THE HOLIDAY WEEKEND.    Does the patient have less than a 3 day supply:  [x] Yes  [] No    Tristen Barahona Rep   07/05/22 11:03 EDT

## 2022-07-26 NOTE — PROGRESS NOTES
Kaylni Ojeda is a 82 y.o. female, who presents with a chief complaint of   Chief Complaint   Patient presents with   • Back Pain     3 month follow up. Gotten worse on the right side and involves whole right side. Difficulty with walking and cant stand straight up or walk very far.           HPI   Pt here bc of her back pain.  She feels like her back pain is getting worse.  She is having a harder time walking.  She can't walk very far.  She struggles katalina if she tries to carry groceries and walk.  It is getting harder for her to stand up. The pain is worse on the right side.  She doesn't want surgery.  She has seen dr. Mckeon in the past. She has been taking her pain pills more than before.       Prediabetes - a1c up to 6.0    A-fib - on eliquis.  She is not currently on a beta blocker but HR in 60's.     CAD/PAD/HLD - pt currently off her statin.  rec restarting lipitor    Hypothyroidism - pt stopped  Her levothyroxint.  tsh now up. ft4 on low end of normal.    The following portions of the patient's history were reviewed and updated as appropriate: allergies, current medications, past family history, past medical history, past social history, past surgical history and problem list.    Allergies: Aleve [naproxen sodium], Codeine, Ibuprofen, and Sulfa antibiotics    Review of Systems   Constitutional: Negative.    HENT: Negative.    Eyes: Negative.    Respiratory: Negative.    Cardiovascular: Negative.    Gastrointestinal: Negative.    Endocrine: Negative.    Genitourinary: Negative.    Musculoskeletal: Positive for back pain and gait problem.   Skin: Negative.    Allergic/Immunologic: Negative.    Hematological: Negative.    Psychiatric/Behavioral: Negative.    All other systems reviewed and are negative.            Wt Readings from Last 3 Encounters:   07/26/22 46.4 kg (102 lb 3.2 oz)   04/19/22 46.4 kg (102 lb 3.2 oz)   03/22/22 45.5 kg (100 lb 6.4 oz)     Temp Readings from Last 3 Encounters:    07/26/22 97.7 °F (36.5 °C)   03/22/22 97.1 °F (36.2 °C) (Tympanic)   02/02/22 98.3 °F (36.8 °C) (Temporal)     BP Readings from Last 3 Encounters:   07/26/22 126/82   04/19/22 142/68   03/22/22 120/66     Pulse Readings from Last 3 Encounters:   07/26/22 66   04/19/22 68   03/22/22 81     Body mass index is 19.96 kg/m².  SpO2 Readings from Last 3 Encounters:   07/26/22 97%   04/19/22 98%   03/22/22 98%          Physical Exam  Vitals and nursing note reviewed.   Constitutional:       General: She is not in acute distress.     Appearance: She is well-developed.   HENT:      Head: Normocephalic and atraumatic.      Right Ear: External ear normal.      Left Ear: External ear normal.      Nose: Nose normal.   Eyes:      Conjunctiva/sclera: Conjunctivae normal.      Pupils: Pupils are equal, round, and reactive to light.   Cardiovascular:      Rate and Rhythm: Normal rate and regular rhythm.      Heart sounds: Normal heart sounds.   Pulmonary:      Effort: Pulmonary effort is normal. No respiratory distress.      Breath sounds: Normal breath sounds. No wheezing.   Musculoskeletal:         General: Normal range of motion.      Cervical back: Normal range of motion and neck supple.   Skin:     General: Skin is warm and dry.   Neurological:      Mental Status: She is alert and oriented to person, place, and time.      Gait: Gait abnormal.   Psychiatric:         Mood and Affect: Mood normal.         Behavior: Behavior normal.         Thought Content: Thought content normal.         Judgment: Judgment normal.         Results for orders placed or performed in visit on 07/18/22   Comprehensive Metabolic Panel    Specimen: Blood   Result Value Ref Range    Glucose 80 65 - 99 mg/dL    BUN 14 8 - 27 mg/dL    Creatinine 0.88 0.57 - 1.00 mg/dL    EGFR Result 66 >59 mL/min/1.73    BUN/Creatinine Ratio 16 12 - 28    Sodium 138 134 - 144 mmol/L    Potassium 4.6 3.5 - 5.2 mmol/L    Chloride 99 96 - 106 mmol/L    Total CO2 25 20 - 29  mmol/L    Calcium 9.2 8.7 - 10.3 mg/dL    Total Protein 7.1 6.0 - 8.5 g/dL    Albumin 4.3 3.7 - 4.7 g/dL    Globulin 2.8 1.5 - 4.5 g/dL    A/G Ratio 1.5 1.2 - 2.2    Total Bilirubin 0.3 0.0 - 1.2 mg/dL    Alkaline Phosphatase 133 (H) 44 - 121 IU/L    AST (SGOT) 17 0 - 40 IU/L    ALT (SGPT) 8 0 - 32 IU/L   Hemoglobin A1c    Specimen: Blood   Result Value Ref Range    Hemoglobin A1C 6.0 (H) 4.8 - 5.6 %   Lipid Panel With LDL / HDL Ratio    Specimen: Blood   Result Value Ref Range    Total Cholesterol 163 100 - 199 mg/dL    Triglycerides 116 0 - 149 mg/dL    HDL Cholesterol 46 >39 mg/dL    VLDL Cholesterol Ricky 21 5 - 40 mg/dL    LDL Chol Calc (NIH) 96 0 - 99 mg/dL    LDL/HDL RATIO 2.1 0.0 - 3.2 ratio   T4, Free    Specimen: Blood   Result Value Ref Range    Free T4 0.89 0.82 - 1.77 ng/dL   TSH    Specimen: Blood   Result Value Ref Range    TSH 7.680 (H) 0.450 - 4.500 uIU/mL   CBC & Differential    Specimen: Blood   Result Value Ref Range    WBC 7.1 3.4 - 10.8 x10E3/uL    RBC 4.13 3.77 - 5.28 x10E6/uL    Hemoglobin 13.0 11.1 - 15.9 g/dL    Hematocrit 38.4 34.0 - 46.6 %    MCV 93 79 - 97 fL    MCH 31.5 26.6 - 33.0 pg    MCHC 33.9 31.5 - 35.7 g/dL    RDW 13.2 11.7 - 15.4 %    Platelets 226 150 - 450 x10E3/uL    Neutrophil Rel % 68 Not Estab. %    Lymphocyte Rel % 22 Not Estab. %    Monocyte Rel % 7 Not Estab. %    Eosinophil Rel % 2 Not Estab. %    Basophil Rel % 1 Not Estab. %    Neutrophils Absolute 4.8 1.4 - 7.0 x10E3/uL    Lymphocytes Absolute 1.6 0.7 - 3.1 x10E3/uL    Monocytes Absolute 0.5 0.1 - 0.9 x10E3/uL    Eosinophils Absolute 0.1 0.0 - 0.4 x10E3/uL    Basophils Absolute 0.1 0.0 - 0.2 x10E3/uL    Immature Granulocyte Rel % 0 Not Estab. %    Immature Grans Absolute 0.0 0.0 - 0.1 x10E3/uL     Result Review :                  Assessment and Plan    Diagnoses and all orders for this visit:    1. Spinal stenosis at L4-L5 level (Primary)  -     Ambulatory Referral to Pain Management  -     Discontinue: Misc. Devices  (Rollator Ultra-Light) misc; 1 each Take As Directed.  Dispense: 1 each; Refill: 0  -     Misc. Devices (Rollator Ultra-Light) misc; 1 each Take As Directed.  Dispense: 1 each; Refill: 0    2. Coronary artery disease involving native coronary artery of native heart with angina pectoris (HCC)  -     atorvastatin (Lipitor) 10 MG tablet; Take 1 tablet by mouth Daily.  Dispense: 90 tablet; Refill: 1    3. Valvular heart disease    4. Essential hypertension    5. Hypercholesterolemia  -     atorvastatin (Lipitor) 10 MG tablet; Take 1 tablet by mouth Daily.  Dispense: 90 tablet; Refill: 1    6. Prediabetes    7. Acquired hypothyroidism    8. Chronic low back pain without sciatica, unspecified back pain laterality  -     oxyCODONE-acetaminophen (Percocet) 7.5-325 MG per tablet; Take 1 tablet by mouth Every 8 (Eight) Hours As Needed for Severe Pain .  Dispense: 90 tablet; Refill: 0    9. Lumbar back pain  -     oxyCODONE-acetaminophen (Percocet) 7.5-325 MG per tablet; Take 1 tablet by mouth Every 8 (Eight) Hours As Needed for Severe Pain .  Dispense: 90 tablet; Refill: 0    10. Chronic bronchitis, unspecified chronic bronchitis type (HCC)  -     albuterol sulfate  (90 Base) MCG/ACT inhaler; Inhale 2 puffs Every 4 (Four) Hours As Needed for Wheezing. prn  Dispense: 18 g; Refill: 5  -     albuterol (PROVENTIL) (2.5 MG/3ML) 0.083% nebulizer solution; Take 2.5 mg by nebulization Every 4 (Four) Hours As Needed for Wheezing or Shortness of Air.  Dispense: 150 mL; Refill: 2    11. Anxiety and depression  -     FLUoxetine (PROzac) 20 MG capsule; Take 1 capsule by mouth Daily.  Dispense: 30 capsule; Refill: 0         BMI is within normal parameters. No other follow-up for BMI required.             Outpatient Medications Prior to Visit   Medication Sig Dispense Refill   • apixaban (ELIQUIS) 2.5 MG tablet tablet Take 1 tablet by mouth 2 (Two) Times a Day. 58 tablet 0   • clonazePAM (KlonoPIN) 0.5 MG tablet Take 1 tablet by  mouth At Night As Needed for Anxiety. 30 tablet 0   • fluticasone (FLONASE) 50 MCG/ACT nasal spray SPRAY TWICE IN EACH NOSTRIL EVERY DAY FOR 30 DAYS. (Patient taking differently: As Needed. prn) 16 mL 6   • lisinopril (PRINIVIL,ZESTRIL) 20 MG tablet Take 1 tablet by mouth Daily. 90 tablet 1   • albuterol (PROVENTIL) (2.5 MG/3ML) 0.083% nebulizer solution Take 2.5 mg by nebulization Every 4 (Four) Hours As Needed for Wheezing or Shortness of Air. (Patient taking differently: Take 2.5 mg by nebulization As Needed for Wheezing or Shortness of Air. prn) 150 mL 2   • oxyCODONE-acetaminophen (Percocet) 7.5-325 MG per tablet Take 1 tablet by mouth Every 8 (Eight) Hours As Needed for Severe Pain . 90 tablet 0   • budesonide-formoterol (SYMBICORT) 160-4.5 MCG/ACT inhaler Inhale 2 puffs 2 (Two) Times a Day. (Patient taking differently: Inhale 2 puffs 2 (Two) Times a Day. prn) 1 inhaler 12   • oxybutynin (DITROPAN) 5 MG tablet Take 5 mg by mouth Daily.     • vitamin D (ERGOCALCIFEROL) 1.25 MG (08009 UT) capsule capsule Take 1 capsule by mouth 1 (One) Time Per Week. 13 capsule 3   • albuterol sulfate  (90 Base) MCG/ACT inhaler Inhale 2 puffs Every 4 (Four) Hours As Needed for Wheezing. (Patient taking differently: Inhale 2 puffs Every 4 (Four) Hours As Needed for Wheezing. prn) 18 g 11   • FLUoxetine (PROzac) 20 MG capsule TAKE 1 CAPSULE BY MOUTH DAILY. 30 capsule 0     Facility-Administered Medications Prior to Visit   Medication Dose Route Frequency Provider Last Rate Last Admin   • cyanocobalamin injection 1,000 mcg  1,000 mcg Intramuscular Q28 Days Donna Forte MD   1,000 mcg at 02/02/22 1307   • cyanocobalamin injection 1,000 mcg  1,000 mcg Intramuscular Q28 Days Donna Forte MD   1,000 mcg at 04/19/22 1419     New Medications Ordered This Visit   Medications   • atorvastatin (Lipitor) 10 MG tablet     Sig: Take 1 tablet by mouth Daily.     Dispense:  90 tablet     Refill:  1   • Misc.  Devices (Rollator Ultra-Light) misc     Si each Take As Directed.     Dispense:  1 each     Refill:  0   • oxyCODONE-acetaminophen (Percocet) 7.5-325 MG per tablet     Sig: Take 1 tablet by mouth Every 8 (Eight) Hours As Needed for Severe Pain .     Dispense:  90 tablet     Refill:  0     Fill when eligible   • albuterol sulfate  (90 Base) MCG/ACT inhaler     Sig: Inhale 2 puffs Every 4 (Four) Hours As Needed for Wheezing. prn     Dispense:  18 g     Refill:  5   • albuterol (PROVENTIL) (2.5 MG/3ML) 0.083% nebulizer solution     Sig: Take 2.5 mg by nebulization Every 4 (Four) Hours As Needed for Wheezing or Shortness of Air.     Dispense:  150 mL     Refill:  2   • FLUoxetine (PROzac) 20 MG capsule     Sig: Take 1 capsule by mouth Daily.     Dispense:  30 capsule     Refill:  0     2022 9:43:18 AM     [unfilled]  Medications Discontinued During This Encounter   Medication Reason   • oxyCODONE-acetaminophen (Percocet) 7.5-325 MG per tablet Reorder   • Misc. Devices (Rollator Ultra-Light) misc Reorder   • albuterol sulfate  (90 Base) MCG/ACT inhaler Reorder   • albuterol (PROVENTIL) (2.5 MG/3ML) 0.083% nebulizer solution Reorder   • FLUoxetine (PROzac) 20 MG capsule Reorder         Return in about 3 months (around 10/26/2022) for Recheck.    Patient was given instructions and counseling regarding her condition or for health maintenance advice. Please see specific information pulled into the AVS if appropriate.

## 2022-08-27 NOTE — TELEPHONE ENCOUNTER
Rx Refill Note  Requested Prescriptions     Pending Prescriptions Disp Refills    FLUoxetine (PROzac) 20 MG capsule [Pharmacy Med Name: FLUOXETINE 20MG] 30 capsule 0     Sig: Take 1 capsule by mouth Daily.      Last office visit with prescribing clinician: 7/26/2022      Next office visit with prescribing clinician: Visit date not found            SKYE RODRIGUEZ MA  08/27/22, 15:39 EDT

## 2022-08-30 NOTE — TELEPHONE ENCOUNTER
Rx Refill Note  Requested Prescriptions     Pending Prescriptions Disp Refills    oxyCODONE-acetaminophen (Percocet) 7.5-325 MG per tablet 90 tablet 0     Sig: Take 1 tablet by mouth Every 8 (Eight) Hours As Needed for Severe Pain.    clonazePAM (KlonoPIN) 0.5 MG tablet 30 tablet 0     Sig: Take 1 tablet by mouth At Night As Needed for Anxiety.      Last office visit with prescribing clinician: 7/26/2022      Next office visit with prescribing clinician: Visit date not found            SKYE RODRIGUEZ MA  08/30/22, 11:37 EDT

## 2022-08-30 NOTE — TELEPHONE ENCOUNTER
Caller: Kaylin Ojeda    Relationship: Self    Best call back number: [phone]    Requested Prescriptions:   Requested Prescriptions     Pending Prescriptions Disp Refills   • oxyCODONE-acetaminophen (Percocet) 7.5-325 MG per tablet 90 tablet 0     Sig: Take 1 tablet by mouth Every 8 (Eight) Hours As Needed for Severe Pain.   • clonazePAM (KlonoPIN) 0.5 MG tablet 30 tablet 0     Sig: Take 1 tablet by mouth At Night As Needed for Anxiety.        [place] where [place] be sent[place] [facility] [place] [address] [facility] [phone] [facility] [phone] FX     Additional details provided by patient: PATIENT STATES SHE WILL BE OUT OF MEDICATION TOMORROW.     Does the patient have less than a 3 day supply:  [x] Yes  [] No    Tristen Barahona Rep   08/30/22 10:42 EDT

## 2022-09-28 PROBLEM — I48.92 ATRIAL FLUTTER WITH RAPID VENTRICULAR RESPONSE (HCC): Status: ACTIVE | Noted: 2022-01-01

## 2022-09-28 NOTE — TELEPHONE ENCOUNTER
Caller: Francine Rosenthal    Relationship: Emergency Contact    Best call back number: [phone]     What is the best time to reach you: ANY    Who are you requesting to speak with (clinical staff, provider,  specific staff member): CLINICAL    Do you know the name of the person who called:     What was the call regarding: PATIENT'S DAUGHTER IN LAW CALLED TO ADVISE PCP THAT THE PATIENT WAS JUST DROPPED OFF AT THE ER. THE PATIENT FELL LAST WEEK AND HAS BEEN VERY WEAK AND NOT EATING SINCE THE FALL. THEY JUST WANTED LARRY HINTON KNOW.    Do you require a callback:

## 2022-09-28 NOTE — TELEPHONE ENCOUNTER
Rx Refill Note  Requested Prescriptions     Pending Prescriptions Disp Refills   • FLUoxetine (PROzac) 20 MG capsule [Pharmacy Med Name: FLUOXETINE 20MG] 30 capsule 0     Sig: TAKE 1 CAPSULE BY MOUTH DAILY.      Last office visit with prescribing clinician: 7/26/2022      Next office visit with prescribing clinician: 9/28/2022            Silvana Smith MA  09/28/22, 13:29 EDT

## 2022-09-29 PROBLEM — R53.83 MALAISE AND FATIGUE: Status: ACTIVE | Noted: 2022-01-01

## 2022-09-29 PROBLEM — R53.81 MALAISE AND FATIGUE: Status: ACTIVE | Noted: 2022-01-01

## 2022-09-29 PROBLEM — N39.0 URINARY TRACT INFECTION WITHOUT HEMATURIA: Status: ACTIVE | Noted: 2022-01-01

## 2022-09-30 NOTE — OUTREACH NOTE
Call Center TCM Note    Flowsheet Row Responses   [facility] patient discharged from? [place]   Does the patient have one of the following disease processes/diagnoses(primary or secondary)? Other   TCM attempt successful? No  [No verbal consent]   Unsuccessful attempts Attempt 1          Geovanna Kelly RN    9/30/2022, 12:13 EDT

## 2022-09-30 NOTE — OUTREACH NOTE
Prep Survey    Flowsheet Row Responses   [facility] patient discharged from? [place]   Is LACE score < 7 ? No   Emergency Room discharge w/ pulse ox? No   Eligibility [facility] [place]   Date of Admission 09/28/22   Date of Discharge 09/29/22   Discharge Disposition Home or Self Care   Discharge diagnosis Acute UTI, Atrial fibrillation with RVR, Acute generalized weakness   Does the patient have one of the following disease processes/diagnoses(primary or secondary)? Other   Does the patient have Home health ordered? No   Is there a DME ordered? No   Prep survey completed? Yes          ADAM RUIZ - Registered Nurse

## 2022-09-30 NOTE — OUTREACH NOTE
Call Center TCM Note    Flowsheet Row Responses   [facility] facility patient discharged from? [place]   Does the patient have one of the following disease processes/diagnoses(primary or secondary)? Other   TCM attempt successful? No   Unsuccessful attempts Attempt 2   Comments Cardiology fu appt on 10/6/22 at 11:00 AM          Geovanna Kelly RN    9/30/2022, 14:05 EDT

## 2022-10-03 NOTE — OUTREACH NOTE
"Call Center TCM Note    Flowsheet Row Responses   [facility] [place] patient discharged from? [place]   Does the patient have one of the following disease processes/diagnoses(primary or secondary)? Other   TCM attempt successful? Yes   Discharge diagnosis Acute UTI, Atrial fibrillation with RVR, Acute generalized weakness   Meds reviewed with patient/caregiver? Yes   Is the patient having any side effects they believe may be caused by any medication additions or changes? No   Does the patient have all medications ordered at discharge? Yes   Is the patient taking all medications as directed (includes completed medication regime)? Yes   Comments Pt sees CARDIO MD on 10/06 for 6 mth ov, & TCM APPT with PCP Dr Forte is 10/07/2022   Has home health visited the patient within 72 hours of discharge? N/A   Psychosocial issues? No   Did the patient receive a copy of their discharge instructions? Yes   Nursing interventions Reviewed instructions with patient   What is the patient's perception of their health status since discharge? Same   Is the patient/caregiver able to teach back signs and symptoms related to disease process for when to call PCP? Yes   Is the patient/caregiver able to teach back signs and symptoms related to disease process for when to call 911? Yes   Is the patient/caregiver able to teach back the hierarchy of who to call/visit for symptoms/problems? PCP, Specialist, Home health nurse, Urgent Care, ED, 911 Yes   TCM call completed? Yes   Wrap up additional comments D/C DX: Acute UTI, Atrial fibrillation with RVR, Acute generalized weakness ** This nice lady not feeling well. States since having COVID & then flu she has not been able to \"bounce back\" these past few months. No HH ordered but states she has family help \"right now\". New rx's Ceftin, Lopressor in place. Pt sees CARDIO MD on 10/06 for 6 mth ov, & TCM APPT with PCP Dr Forte is 10/07/2022.          Chioma Love MA    10/3/2022, 10:14 EDT      "

## 2022-10-03 NOTE — TELEPHONE ENCOUNTER
Caller: Kaylin Ojeda    Relationship: Self    Best call back number: [phone]    Requested Prescriptions:   Requested Prescriptions     Pending Prescriptions Disp Refills   • oxyCODONE-acetaminophen (Percocet) 7.5-325 MG per tablet 90 tablet 0     Sig: Take 1 tablet by mouth Every 8 (Eight) Hours As Needed for Severe Pain.   • clonazePAM (KlonoPIN) 0.5 MG tablet 30 tablet 0     Sig: Take 1 tablet by mouth At Night As Needed for Anxiety.        Pharmacy where request should be sent[place] [facility] [place] - [address] [facility] [phone] [facility] [phone] FX     Additional details provided by patient:     Does the patient have less than a 3 day supply:  [x] Yes  [] No    Tristen Smith Rep   10/03/22 11:52 EDT

## 2022-10-04 NOTE — TELEPHONE ENCOUNTER
Rx Refill Note  Requested Prescriptions     Pending Prescriptions Disp Refills    oxyCODONE-acetaminophen (Percocet) 7.5-325 MG per tablet 90 tablet 0     Sig: Take 1 tablet by mouth Every 8 (Eight) Hours As Needed for Severe Pain.    clonazePAM (KlonoPIN) 0.5 MG tablet 30 tablet 0     Sig: Take 1 tablet by mouth At Night As Needed for Anxiety.      Last office visit with prescribing clinician: 7/26/2022      Next office visit with prescribing clinician: 10/7/2022            Lanie Hughes MA  10/04/22, 09:40 EDT

## 2022-10-04 NOTE — TELEPHONE ENCOUNTER
PATIENT STATES THAT SHE HAD CALLED UP TO THE PHARMACY AND THE PHARMACY STATED THAT THEY HAVEN'T RECEIVED ANYTHING.

## 2022-10-06 NOTE — OUTREACH NOTE
"Medical Week 2 Survey    Flowsheet Row Responses   [facility] patient discharged from? [place]   Does the patient have one of the following disease processes/diagnoses(primary or secondary)? Other   Week 2 attempt successful? Yes   Call start time 1633   Discharge diagnosis Acute UTI, Atrial fibrillation with RVR, Acute generalized weakness   Call end time 1638   Meds reviewed with patient/caregiver? Yes   Is the patient having any side effects they believe may be caused by any medication additions or changes? No   Does the patient have all medications ordered at discharge? Yes   Is the patient taking all medications as directed (includes completed medication regime)? Yes   Does the patient have a primary care provider?  Yes   Does the patient have an appointment with their PCP within 7 days of discharge? Yes   Has the patient kept scheduled appointments due by today? Yes   Has home health visited the patient within 72 hours of discharge? N/A   Psychosocial issues? No   Comments heart monitor placed for 48 hrs through 10/7/22   Did the patient receive a copy of their discharge instructions? Yes   Nursing interventions Reviewed instructions with patient   What is the patient's perception of their health status since discharge? Improving   Is the patient/caregiver able to teach back signs and symptoms related to disease process for when to call PCP? Yes   Is the patient/caregiver able to teach back signs and symptoms related to disease process for when to call 911? Yes   Is the patient/caregiver able to teach back the hierarchy of who to call/visit for symptoms/problems? PCP, Specialist, Home health nurse, Urgent Care, ED, 911 Yes   If the patient is a current smoker, are they able to teach back resources for cessation? --  [smokes a few cigs/day]   Additional teach back comments states appetite is low, states feels \"cold\" sometimes   Week 2 Call Completed? Yes          FCO LAWTON - Registered Nurse  "

## 2022-10-06 NOTE — PROGRESS NOTES
Date of Office Visit: 10/06/2022  Encounter Provider: LILI Lockhart  Place of Service: [facility] CARDIOLOGY  Patient Name: Kaylin Ojeda  :1940  Primary Cardiologist: Dr. Calero    CC:  6 month follow up    Dear Dr. Forte    HPI: Kaylin Ojeda is a pleasant 82 y.o. female who presents 10/06/2022 for cardiac follow up.  I reviewed her past medical records including notes, labs and testing in preparation for today's visit.     She is a chronically ill woman with previous history of refractory hypertension and severe peripheral vascular disease. In May 2015, she was found to have single-vessel coronary artery disease of the circumflex and had a stent placed. Over the next two years, we had to progressively cut back on her anti-hypertensives due to low blood pressure (presumably from weight loss).       In 2017 she developed anginal chest pressure and was found to have rapid atrial fibrillation.  She cardioverted on her own and her metoprolol dose was increased.  An echo showed normal LV systolic function, moderate TR, and mild AI/MR.  She ruled out for ACS.  She was placed on apixaban. Once she was on apixaban, she had to stop aspirin as it caused nosebleeds.     Dr. Calero saw her in May 2019 for palpitations and chest discomfort.  She was under a tremendous amount of stress and was in a very unfortunately living situation. An echo showed normal LVSF, EF 60%, mild-moderate AI, and severe MR/TR.  Dr. Calero did not feel she was a candidate for further evaluation due to her comorbidities.     In late , she requested to be evaluated by Dr Mai.  He ordered a cath; this showed a patent LCx stent, and moderate disease of the distal LAD.  He ordered a RICKY, which showed normal LVSF, moderate-severe AI, moderate MR, and mild-moderate TR.  PFTs showed severely reduced DLCO; the spirometry was reported as normal, but the loops were poor quality.  A previous CT of the chest  had reported severe emphysematous changes.  A CTA of the chest neck revealed left subclavian occlusion, severe disease of the ostial left vertebral artery, 53% stenosis of the right ICA, and 60% disease of the left ICA.       Ultimately, she decided against surgery, as she felt it would be too high risk.      I saw her in February/2022 and overall she was doing well.  She states she stopped the atorvastatin last fall secondary to nausea and vomiting.  She states she will stop several medications and really does not know which one was given in the trouble.  Evidently she did restart atorvastatin but then ran out and has not taken it.  She would really like to not take the medicine.  I will leave that up to you.  I am decreasing her Eliquis down to 2.5 mg twice daily as she meets 2 out of 3 criteria for the lower dose.  She denies any palpitations, shortness of breath, lower extremity edema.  She states rarely is she dizzy.  She denies any chest pain or chest pressure.  She states she has fatigue.  She states that she had the flu and then she had COVID last fall.  She continues to smoke.  Her blood pressure was stable.    She was admitted to the hospital from 9/28-9/29/22 with atrial fibrillation with RVR.  She was given boluses of diltiazem, and then digoxin and started on metoprolol tartrate 25 m BID.  She was instructed to take her Eliquis 2.5 mg BID as she was only taking it once a day.  Echo performed showed EF 62%, moderate to severe AR, mild to moderate TR, mild to moderate MR,  full report in epic.  She was treated for UTI with Rocephin and then changed to Ceftin 250 mg for a total of 4 additional days. Regarding her back pain, multiple CT scans were done without new acute findings.     She presents today in follow up.  She states she is ore aware of palpitations and has some SOA with exertion.  She has some intermittent dizziness but states that is unchange.  She denies any LE edema, chest pain or chest  pressure.  She continues to have leg weakness.  She states compliance with her medication.  Past Medical History:   Diagnosis Date   • Abdominal pain, epigastric     Chronic, unclear cause, improved   • Anorexia    • Anxiety    • Arthritis    • CAD (coronary artery disease)     Cath 5/2015: nonobstructive LAD/RCA disease, 90% mid LCx s/p 2.68s36wk Xience.  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[facility])    • Mononucleosis    • Occlusion and stenosis of carotid artery    • Onychomycosis    • Orbit fracture ([facility])    • PAF (paroxysmal atrial fibrillation) ([facility])    • Peripheral arterial disease ([facility])     Significant (carotid, subclavian, renal arteries, lower extremities), with claudication, medical therapy only   • Pernicious anemia    • Prediabetes    • Renal artery stenosis ([facility])     unilateral, with renal atrophy (no intervention required)   • Renal calculi    • Sinus bradycardia     mild, asymptomatic   • TIA (transient ischemic attack)    • UTI (urinary tract infection)    • Valvular heart disease     1/2021: mod-severe AI, mod MR, mild-mod TR   • Vision problem    • Vitamin B 12 deficiency        Past Surgical History:   Procedure Laterality Date   • APPENDECTOMY     • BREAST BIOPSY Left     20+ yrs ago   • BREAST SURGERY     • CARDIAC CATHETERIZATION  05/2015    nonobs LAD/RCA disease, 90% mid LCx s/p [id number] Xience   • CARDIAC CATHETERIZATION N/A 10/28/2020    Procedure: Right  and Left Heart Cath;  Surgeon: Opal Contreras MD;  Location:  [facility];  Service: Cardiovascular;  Laterality: N/A;  for cardiac testing prior to possible valve surgery per Dr. Mai   • CARDIAC CATHETERIZATION N/A 10/28/2020    Procedure: Coronary angiography;  Surgeon: Opal Contreras MD;  Location:  [facility];  Service: Cardiovascular;  Laterality: N/A;   • CERVICAL FUSION  1997   • CHOLECYSTECTOMY     • CORONARY STENT PLACEMENT     • HYSTERECTOMY  1995   • KNEE SURGERY Right 2005   • KNEE SURGERY Left 1998       Social History     Socioeconomic History   • Marital status:      Spouse name: Willie   • Number of children: 3   • Years of education: Some College   Tobacco Use   • Smoking status: Current Some Day Smoker     Packs/day: 0.20     Years: 50.00     Pack years: 10.00     Types: Cigarettes   • Smokeless tobacco: Never Used   • Tobacco comment: trying to quit, pt states she has basically quit but will still have one when she stressed   Vaping Use   • Vaping Use: Never used   Substance and Sexual Activity   • Alcohol use: Not Currently     Comment: OCCASIONAL/ TWICE A YEAR. // daily caffiene   • Drug use: No   • Sexual activity: Not Currently       Family History   Problem Relation Age of Onset   • Asthma Mother    • Emphysema Mother    • Graves' disease Mother    • Heart disease Mother    • Hypertension Mother    • Emphysema Father    • Hypertension Father    • Heart disease Father    • Kidney disease Sister    • Lupus Daughter         Systemic erythematosus   • Arthritis Other    • Crohn's disease Other    • Breast cancer Niece    • Breast cancer Maternal Cousin    • Breast cancer Maternal Cousin        The following portion of the patient's history were reviewed and updated as appropriate: past medical history, past surgical history, past social history, past family history, allergies, current medications, and problem list.    Review of Systems  "  Constitutional: Positive for malaise/fatigue. Negative for diaphoresis and fever.   HENT: Negative for congestion, hearing loss, hoarse voice, nosebleeds and sore throat.    Eyes: Negative for photophobia, vision loss in left eye, vision loss in right eye and visual disturbance.   Cardiovascular: Positive for palpitations. Negative for chest pain, dyspnea on exertion, irregular heartbeat, leg swelling, near-syncope, orthopnea, paroxysmal nocturnal dyspnea and syncope.   Respiratory: Positive for shortness of breath. Negative for cough, hemoptysis, sleep disturbances due to breathing, snoring, sputum production and wheezing.    Endocrine: Negative for cold intolerance, heat intolerance, polydipsia, polyphagia and polyuria.   Hematologic/Lymphatic: Negative for bleeding problem. Does not bruise/bleed easily.   Skin: Negative for color change, dry skin, poor wound healing, rash and suspicious lesions.   Musculoskeletal: Negative for arthritis, back pain, falls, gout, joint pain, joint swelling, muscle cramps, muscle weakness and myalgias.   Gastrointestinal: Negative for bloating, abdominal pain, constipation, diarrhea, dysphagia, melena, nausea and vomiting.   Neurological: Positive for dizziness (intermittent and unchanged) and weakness. Negative for excessive daytime sleepiness, headaches, light-headedness, loss of balance, numbness, paresthesias, seizures and vertigo.   Psychiatric/Behavioral: Negative for depression, memory loss and substance abuse. The patient is not nervous/anxious.        Allergies   Allergen Reactions   • Aleve [Naproxen Sodium] Shortness Of Breath   • Codeine Unknown - Low Severity     hyperactivity   • Ibuprofen Unknown - Low Severity     unk   • Sulfa Antibiotics Unknown - Low Severity     \"I can't remember\"         Current Outpatient Medications:   •  albuterol (PROVENTIL) (2.5 MG/3ML) 0.083% nebulizer solution, Take 2.5 mg by nebulization Every 4 (Four) Hours As Needed for Wheezing or " "Shortness of Air., Disp: 150 mL, Rfl: 2  •  apixaban (ELIQUIS) 2.5 MG tablet tablet, Take 1 tablet by mouth 2 (Two) Times a Day., Disp: 60 tablet, Rfl: 1  •  atorvastatin (Lipitor) 10 MG tablet, Take 1 tablet by mouth Daily., Disp: 90 tablet, Rfl: 1  •  budesonide-formoterol (SYMBICORT) 160-4.5 MCG/ACT inhaler, Inhale 2 puffs 2 (Two) Times a Day. (Patient taking differently: Inhale 2 puffs 2 (Two) Times a Day. prn), Disp: 1 inhaler, Rfl: 12  •  clonazePAM (KlonoPIN) 0.5 MG tablet, Take 1 tablet by mouth At Night As Needed for Anxiety., Disp: 30 tablet, Rfl: 0  •  FLUoxetine (PROzac) 20 MG capsule, TAKE 1 CAPSULE BY MOUTH DAILY., Disp: 30 capsule, Rfl: 0  •  fluticasone (FLONASE) 50 MCG/ACT nasal spray, SPRAY TWICE IN EACH NOSTRIL EVERY DAY FOR 30 DAYS. (Patient taking differently: As Needed. prn), Disp: 16 mL, Rfl: 6  •  metoprolol tartrate (LOPRESSOR) 25 MG tablet, Take 1 tablet by mouth Every 12 (Twelve) Hours., Disp: 60 tablet, Rfl: 1  •  Misc. Devices (Rollator Ultra-Light) misc, 1 each Take As Directed., Disp: 1 each, Rfl: 0  •  oxyCODONE-acetaminophen (Percocet) 7.5-325 MG per tablet, Take 1 tablet by mouth Every 8 (Eight) Hours As Needed for Severe Pain., Disp: 90 tablet, Rfl: 0  •  vitamin D (ERGOCALCIFEROL) 1.25 MG (25070 UT) capsule capsule, Take 1 capsule by mouth 1 (One) Time Per Week., Disp: 13 capsule, Rfl: 3    Current Facility-Administered Medications:   •  cyanocobalamin injection 1,000 mcg, 1,000 mcg, Intramuscular, Q28 Days, Donna Forte MD, 1,000 mcg at 04/19/22 1419        Objective:     Vitals:    10/06/22 1125   BP: 150/70   Pulse: (!) 49   Resp: 16   SpO2: 100%   Weight: 44.9 kg (99 lb)   Height: 152.4 cm (60\")     Body mass index is 19.33 kg/m².      Vitals reviewed.   Constitutional:       General: Not in acute distress.     Appearance: Well-developed. Frail.   Eyes:      General:         Right eye: No discharge.         Left eye: No discharge.      Conjunctiva/sclera: " Conjunctivae normal.   HENT:      Head: Normocephalic and atraumatic.      Right Ear: External ear normal.      Left Ear: External ear normal.      Nose: Nose normal.   Neck:      Thyroid: No thyromegaly.      Vascular: No JVD.      Trachea: No tracheal deviation.      Lymphadenopathy: No cervical adenopathy.   Pulmonary:      Effort: Pulmonary effort is normal. No respiratory distress.      Breath sounds: Normal breath sounds. No wheezing. No rales.   Chest:      Chest wall: Not tender to palpatation.   Cardiovascular:      Bradycardia present. Regular rhythm.      No gallop.   Pulses:     Intact distal pulses.   Edema:     Peripheral edema absent.   Abdominal:      General: There is no distension.      Palpations: Abdomen is soft.      Tenderness: There is no abdominal tenderness.   Musculoskeletal: Normal range of motion.         General: No tenderness or deformity.      Cervical back: Normal range of motion and neck supple. Skin:     General: Skin is warm and dry.      Findings: No erythema or rash.   Neurological:      Mental Status: Alert and oriented to person, place, and time.      Coordination: Coordination normal.   Psychiatric:         Attention and Perception: Attention normal.         Behavior: Behavior normal.         Thought Content: Thought content normal.         Judgment: Judgment normal.               ECG 12 Lead    Date/Time: 10/6/2022 11:31 AM  Performed by: Denise Platt APRN  Authorized by: Denise Platt APRN   Comparison: compared with previous ECG from 9/28/2022  Rhythm: sinus bradycardia  Rate: bradycardic  Conduction: conduction normal  ST Segments: ST segments normal  T Waves: T waves normal  QRS axis: normal  Other findings: left atrial abnormality    Clinical impression: abnormal EKG              Assessment:       Diagnosis Plan   1. PAF (paroxysmal atrial fibrillation) ([facility])     2. Coronary artery disease involving native coronary artery of native heart with angina pectoris ([facility])      3. Valvular heart disease     4. Peripheral arterial occlusive disease (H[facility])     5. 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 Essential hypertension     7. Hypercholesterolemia            Plan:       1.  Paroxysmal atrial fibrillation with rapid ventricular response-likely driven by UTI.  She converted with IV diltiazem and IV digoxin.  She was started on metoprolol tartrate 25 mg BID.  She was instructed to take her OAC BID and not once a day. Echo showed preserved ventricular ejection fraction.  2.  Urinary tract infection - resolved  3.  Multi valvular heart disease with moderate AI/MR, severe TR - has denied surgical intervention in the past  4.  Coronary artery disease with prior THELMA to left circumflex - denies chest pain  5.  Diffuse atherosclerotic arterial disease involving carotid, vertebral artery, subclavian artery, mesenteric arteries, renal artery-overall moderate severity  6.  COPD  7.  Ongoing tobacco use - currently states she is only smoking a few cigarettes a day  8.  Thoracic and abdominal aortic aneurysm  9.  Essential hypertension - controlled.  She had previously been on lisinopril but she stopped it at some point.  She is seeing PCP tomorrow and will address.     She is bradycardic today, will decrease metoprolol tartrate to 12. Mg BID and check 48 hour Holter.         As always, it has been a pleasure to participate in your patient's care. Thank you.       Sincerely,       LILI Lockhart      Current Outpatient Medications:   •  albuterol (PROVENTIL) (2.5 MG/3ML) 0.083% nebulizer solution, Take 2.5 mg by nebulization Every 4 (Four) Hours As Needed for Wheezing or Shortness of Air., Disp: 150 mL, Rfl: 2  •  apixaban (ELIQUIS) 2.5 MG tablet tablet, Take 1 tablet by mouth 2 (Two) Times a Day., Disp: 60 tablet, Rfl: 1  •  atorvastatin (Lipitor) 10 MG tablet, Take 1 tablet by mouth Daily., Disp: 90 tablet, Rfl: 1  •  budesonide-formoterol (SYMBICORT) 160-4.5 MCG/ACT inhaler, Inhale 2  puffs 2 (Two) Times a Day. (Patient taking differently: Inhale 2 puffs 2 (Two) Times a Day. prn), Disp: 1 inhaler, Rfl: 12  •  clonazePAM (KlonoPIN) 0.5 MG tablet, Take 1 tablet by mouth At Night As Needed for Anxiety., Disp: 30 tablet, Rfl: 0  •  FLUoxetine (PROzac) 20 MG capsule, TAKE 1 CAPSULE BY MOUTH DAILY., Disp: 30 capsule, Rfl: 0  •  fluticasone (FLONASE) 50 MCG/ACT nasal spray, SPRAY TWICE IN EACH NOSTRIL EVERY DAY FOR 30 DAYS. (Patient taking differently: As Needed. prn), Disp: 16 mL, Rfl: 6  •  metoprolol tartrate (LOPRESSOR) 25 MG tablet, Take 0.5 tablets by mouth Every 12 (Twelve) Hours., Disp: 60 tablet, Rfl: 1  •  Misc. Devices (Rollator Ultra-Light) misc, 1 each Take As Directed., Disp: 1 each, Rfl: 0  •  oxyCODONE-acetaminophen (Percocet) 7.5-325 MG per tablet, Take 1 tablet by mouth Every 8 (Eight) Hours As Needed for Severe Pain., Disp: 90 tablet, Rfl: 0  •  vitamin D (ERGOCALCIFEROL) 1.25 MG (21488 UT) capsule capsule, Take 1 capsule by mouth 1 (One) Time Per Week., Disp: 13 capsule, Rfl: 3    Current Facility-Administered Medications:   •  cyanocobalamin injection 1,000 mcg, 1,000 mcg, Intramuscular, Q28 Days, Donna Forte MD, 1,000 mcg at 04/19/22 1419      Dictated utilizing Dragon dictation

## 2022-10-07 NOTE — PROGRESS NOTES
Transitional Care Follow Up Visit  Subjective     Kaylin Ojeda is a 82 y.o. female who presents for a transitional care management visit.    Within 48 business hours after discharge our office contacted her via telephone to coordinate her care and needs.      I reviewed and discussed the details of that call along with the discharge summary, hospital problems, inpatient lab results, inpatient diagnostic studies, and consultation reports with Kaylin.     Current outpatient and discharge medications have been reconciled for the patient.  Reviewed by: Donna Forte MD      Date of TCM Phone Call 9/29/2022   [facility]   Date of Admission 9/28/2022   Date of Discharge 9/29/2022   Discharge Disposition Home or Self Care     Risk for Readmission (LACE) Score: 8 (9/29/2022  6:00 AM)      History of Present Illness   Course During Hospital Stay:  Pt here for f/u.  Since I saw her at the end of July she has had covid and hasnt felt the same since.  She was recently in the hospital and was found to be in A-fib with RVR.  She said she had been having some palpiations but didn't pay any attention to it.  She has been tired all the time.  She has a hx of a-fib but had been off her metoprolol bc of HR in the 60's.  Metoprolol restarted in the hospital and was decreased bc of HR in the 40's.  Pt's bp has been high at home but was normal in the office today.  She is on eliquis bid.  Pt currently wearing holter monitor.    She also had a UTI in the hospital as well.     She is having to use a rollator bc she cant walk a very far distance.       The following portions of the patient's history were reviewed and updated as appropriate: allergies, current medications, past family history, past medical history, past surgical history and problem list.    Review of Systems   Constitutional: Positive for fatigue.   HENT: Negative.    Eyes: Negative.    Respiratory: Negative.    Cardiovascular:  Negative.    Gastrointestinal: Negative.    Endocrine: Negative.    Genitourinary: Negative.    Musculoskeletal: Negative.    Skin: Negative.    Allergic/Immunologic: Negative.    Neurological: Negative.    Hematological: Negative.    Psychiatric/Behavioral: Negative.    All other systems reviewed and are negative.      Objective    Wt Readings from Last 3 Encounters:   10/07/22 45 kg (99 lb 3.2 oz)   10/06/22 44.9 kg (99 lb)   09/28/22 45 kg (99 lb 3.3 oz)     Temp Readings from Last 3 Encounters:   10/07/22 97.7 °F (36.5 °C) (Infrared)   09/29/22 97.6 °F (36.4 °C) (Oral)   07/26/22 97.7 °F (36.5 °C)     BP Readings from Last 3 Encounters:   10/07/22 110/56   10/06/22 150/70   09/29/22 172/88     Pulse Readings from Last 3 Encounters:   10/07/22 60   10/06/22 (!) 49   09/29/22 85     Physical Exam  Vitals and nursing note reviewed.   Constitutional:       General: She is not in acute distress.     Appearance: She is well-developed.      Comments: Thin, chronically ill appearing   HENT:      Head: Normocephalic and atraumatic.      Right Ear: External ear normal.      Left Ear: External ear normal.      Nose: Nose normal.   Eyes:      Conjunctiva/sclera: Conjunctivae normal.      Pupils: Pupils are equal, round, and reactive to light.   Cardiovascular:      Rate and Rhythm: Normal rate and regular rhythm.      Heart sounds: Normal heart sounds.   Pulmonary:      Effort: Pulmonary effort is normal. No respiratory distress.      Breath sounds: Normal breath sounds. No wheezing.   Musculoskeletal:         General: Normal range of motion.      Cervical back: Normal range of motion and neck supple.   Skin:     General: Skin is warm and dry.   Neurological:      Mental Status: She is alert and oriented to person, place, and time.   Psychiatric:         Behavior: Behavior normal.         Thought Content: Thought content normal.         Judgment: Judgment normal.         Assessment & Plan   Diagnoses and all orders for this  visit:    1. Chronic low back pain without sciatica, unspecified back pain laterality (Primary)  -     Ambulatory Referral to Home Health    2. Valvular heart disease  -     Ambulatory Referral to Home Health    3. Essential hypertension - bbp hads been labile.  Normal today but 199/104 recnelty per pt.  Will have HH check pt's cuff and help f/u with bp checks  -     Ambulatory Referral to Home Health    4. Paroxysmal atrial fibrillation (HCC)  -     Ambulatory Referral to Home Health    5. Paroxysmal atrial fibrillation with RVR (HCC) - cont eliquis and metoprolol with close monitoring.   -     Ambulatory Referral to Home Health    6. Fall in home, subsequent encounter - pt at high risk for complications from fall bc of osteoporosis and OAC use.   -     Ambulatory Referral to Home Health    Other orders  -     vitamin D (ERGOCALCIFEROL) 1.25 MG (79541 UT) capsule capsule; Take 1 capsule by mouth 1 (One) Time Per Week.  Dispense: 13 capsule; Refill: 3    7. OAC - hgb 14-> 11 in hosptial.  Will recheck cbc and cmp today

## 2022-10-07 NOTE — PROGRESS NOTES
Kaylin Ojeda is a 82 y.o. female, who presents with a chief complaint of   Chief Complaint   Patient presents with   • Hospital Follow Up Visit        {Problem List  Visit Diagnosis   Encounters  Notes  Medications  Labs  Result Review Imaging  Media :23}   HPI   Pt here for f/u.  Since I saw her at the end of July she has had covid and hasnt felt the same since.  She was recently in the hospital and was found to be in A-fib with RVR.  She said she had been having some palpiations but didn't pay any attention to it.  She has been tired all the time and   The following portions of the patient's history were reviewed and updated as appropriate: allergies, current medications, past family history, past medical history, past social history, past surgical history and problem list.    Allergies: Aleve [naproxen sodium], Codeine, Ibuprofen, and Sulfa antibiotics    Review of Systems          Wt Readings from Last 3 Encounters:   10/07/22 45 kg (99 lb 3.2 oz)   10/06/22 44.9 kg (99 lb)   09/28/22 45 kg (99 lb 3.3 oz)     Temp Readings from Last 3 Encounters:   10/07/22 97.7 °F (36.5 °C) (Infrared)   09/29/22 97.6 °F (36.4 °C) (Oral)   07/26/22 97.7 °F (36.5 °C)     BP Readings from Last 3 Encounters:   10/07/22 110/56   10/06/22 150/70   09/29/22 172/88     Pulse Readings from Last 3 Encounters:   10/07/22 60   10/06/22 (!) 49   09/29/22 85     Body mass index is 19.37 kg/m².  SpO2 Readings from Last 3 Encounters:   10/07/22 98%   10/06/22 100%   09/29/22 94%          Physical Exam    Results for orders placed or performed during the hospital encounter of 10/06/22   Holter Monitor - 48 Hour   Result Value Ref Range    Target HR (85%) 119 bpm    Max. Pred. HR (100%) 140 bpm     Result Review :{Labs  Result Review  Imaging  Med Tab  Media :23}   {The following data was reviewed by (Optional):94937}  {Ambulatory Labs (Optional):12744}  {Data reviewed (Optional):83876:::1}           Assessment and Plan  {CC Problem List  Visit Diagnosis  ROS  Review (Popup)  [facility]  BestPractice  Medications  SmartSets  SnapShot Encounters  Media :23}   There are no diagnoses linked to this encounter.     BMI is within normal parameters. No other follow-up for BMI required.       {Time Spent (Optional):37181}      Outpatient Medications Prior to Visit   Medication Sig Dispense Refill   • albuterol (PROVENTIL) (2.5 MG/3ML) 0.083% nebulizer solution Take 2.5 mg by nebulization Every 4 (Four) Hours As Needed for Wheezing or Shortness of Air. 150 mL 2   • apixaban (ELIQUIS) 2.5 MG tablet tablet Take 1 tablet by mouth 2 (Two) Times a Day. 60 tablet 1   • atorvastatin (Lipitor) 10 MG tablet Take 1 tablet by mouth Daily. 90 tablet 1   • budesonide-formoterol (SYMBICORT) 160-4.5 MCG/ACT inhaler Inhale 2 puffs 2 (Two) Times a Day. (Patient taking differently: Inhale 2 puffs 2 (Two) Times a Day. prn) 1 inhaler 12   • clonazePAM (KlonoPIN) 0.5 MG tablet Take 1 tablet by mouth At Night As Needed for Anxiety. 30 tablet 0   • FLUoxetine (PROzac) 20 MG capsule TAKE 1 CAPSULE BY MOUTH DAILY. 30 capsule 0   • fluticasone (FLONASE) 50 MCG/ACT nasal spray SPRAY TWICE IN EACH NOSTRIL EVERY DAY FOR 30 DAYS. (Patient taking differently: As Needed. prn) 16 mL 6   • metoprolol tartrate (LOPRESSOR) 25 MG tablet Take 0.5 tablets by mouth Every 12 (Twelve) Hours. 60 tablet 1   • Misc. Devices (Rollator Ultra-Light) misc 1 each Take As Directed. 1 each 0   • oxyCODONE-acetaminophen (Percocet) 7.5-325 MG per tablet Take 1 tablet by mouth Every 8 (Eight) Hours As Needed for Severe Pain. 90 tablet 0   • vitamin D (ERGOCALCIFEROL) 1.25 MG (20845 UT) capsule capsule Take 1 capsule by mouth 1 (One) Time Per Week. 13 capsule 3     Facility-Administered Medications Prior to Visit   Medication Dose Route Frequency Provider Last Rate Last Admin   • cyanocobalamin injection 1,000 mcg  1,000 mcg Intramuscular Q28 Days Donna Forte  MD Meng   1,000 mcg at 04/19/22 1419     New Medications Ordered This Visit   Medications   • vitamin D (ERGOCALCIFEROL) 1.25 MG (87923 UT) capsule capsule     Sig: Take 1 capsule by mouth 1 (One) Time Per Week.     Dispense:  13 capsule     Refill:  3     [unfilled]  Medications Discontinued During This Encounter   Medication Reason   • vitamin D (ERGOCALCIFEROL) 1.25 MG (97052 UT) capsule capsule Reorder         No follow-ups on file.    Patient was given instructions and counseling regarding her condition or for health maintenance advice. Please see specific information pulled into the AVS if appropriate.

## 2022-10-13 NOTE — PROGRESS NOTES
Spoke to patient with results.  She states she feels a little less fatigued with the lower dose of the metoprolol.  She really has not been bothered with the palpitations.  I told her we would just continue on the metoprolol tartrate 12.5 mg twice daily with no changes at this time.  To keep her appointment with Dr. Calero in March.  She will call with any changes.  She was agreeable to plan of care.

## 2022-10-14 NOTE — OUTREACH NOTE
Medical Week 3 Survey    Flowsheet Row Responses   [facility] facility patient discharged from? [place]   Does the patient have one of the following disease processes/diagnoses(primary or secondary)? Other   Week 3 attempt successful? No   Unsuccessful attempts Attempt 1          [facility]CO LAWTON - Registered Nurse

## 2022-10-19 NOTE — OUTREACH NOTE
Medical Week 3 Survey    Flowsheet Row Responses   [facility] facility patient discharged from? [place]   Does the patient have one of the following disease processes/diagnoses(primary or secondary)? Other   Week 3 attempt successful? No   Unsuccessful attempts Attempt 2          PAULINA SAHU - Registered Nurse

## 2022-11-02 NOTE — TELEPHONE ENCOUNTER
Caller: Kaylin Ojeda    Relationship: Self    Best call back number: [phone]    Requested Prescriptions:   Requested Prescriptions     Pending Prescriptions Disp Refills   • oxyCODONE-acetaminophen (Percocet) 7.5-325 MG per tablet 90 tablet 0     Sig: Take 1 tablet by mouth Every 8 (Eight) Hours As Needed for Severe Pain.   • clonazePAM (KlonoPIN) 0.5 MG tablet 30 tablet 0     Sig: Take 1 tablet by mouth At Night As Needed for Anxiety.   • metoprolol tartrate (LOPRESSOR) 25 MG tablet 60 tablet 1     Sig: Take 0.5 tablets by mouth Every 12 (Twelve) Hours.        [place] where [place] be sent[place] [facility] [place] [address] [facility] [phone] [facility] [phone] FX     Additional details provided by patient: PT HAS 2 DAYS LEFT OF MEDICATION.     Does the patient have less than a 3 day supply:  [x] Yes  [] No    Tristen De Oliveira Rep   11/02/22 11:14 EDT

## 2022-11-02 NOTE — TELEPHONE ENCOUNTER
Rx Refill Note  Requested Prescriptions     Pending Prescriptions Disp Refills    oxyCODONE-acetaminophen (Percocet) 7.5-325 MG per tablet 90 tablet 0     Sig: Take 1 tablet by mouth Every 8 (Eight) Hours As Needed for Severe Pain.    clonazePAM (KlonoPIN) 0.5 MG tablet 30 tablet 0     Sig: Take 1 tablet by mouth At Night As Needed for Anxiety.    metoprolol tartrate (LOPRESSOR) 25 MG tablet 60 tablet 1     Sig: Take 0.5 tablets by mouth Every 12 (Twelve) Hours.      Last office visit with prescribing clinician: 10/7/2022      Next office visit with prescribing clinician: Visit date not found            Carmela Bertrand MA  11/02/22, 14:08 EDT

## 2022-11-10 NOTE — TELEPHONE ENCOUNTER
Caller: Kaylin Ojeda    Relationship: Self    Best call back number: [phone]    Requested Prescriptions:   Requested Prescriptions     Pending Prescriptions Disp Refills   • FLUoxetine (PROzac) 20 MG capsule [Pharmacy Med Name: FLUOXETINE 20MG] 30 capsule 0     Sig: TAKE 1 CAPSULE BY MOUTH DAILY.   • metoprolol tartrate (LOPRESSOR) 25 MG tablet 60 tablet 1     Sig: Take 0.5 tablets by mouth Every 12 (Twelve) Hours.        Pharmacy where request should be sent: [facility] [place] - [address] [facility] [phone] [facility] [phone] FX     Additional details provided by patient: NEEDS FILLED     Does the patient have less than a 3 day supply:  [x] Yes  [] No    Batsheva Miles   11/10/22 13:24 EST

## 2022-12-09 PROBLEM — S52.021B: Status: ACTIVE | Noted: 2022-01-01

## 2022-12-09 NOTE — CONSULTS
" Orthopedic Consult      Patient: Kaylin Ojeda    Date of Admission: 12/9/2022 10:42 AM    YOB: 1940    Medical Record Number: [id number]    Attending Physician: Ramin Silvestre MD  Consulting Physician:       Chief Complaints: No admission diagnoses are documented for this encounter.      History of Present Illness: 82 y.o. female admitted to [facility] to services of Ramin Silvestre MD with No admission diagnoses are documented for this encounter.  Patient sustained a mechanical fall on Wednesday falling onto her right elbow.  She had significant pain and little bit of bleeding from her elbow she also states that she struck her face and her neck at that time.  She avoided coming to the hospital until today when she was seen by one of her neighbors and she brought her to the hospital.  Patient states she tried to avoid coming to the hospital.  She states that her right elbow pain has gotten worse and worse as the days have gone on.  Patient does take Eliquis and took her last dose today.  She also ate earlier this morning.    Allergies   Allergen Reactions   • Aleve [Naproxen Sodium] Shortness Of Breath   • Codeine Unknown - Low Severity     hyperactivity   • Ibuprofen Unknown - Low Severity     unk   • Sulfa Antibiotics Unknown - Low Severity     \"I can't remember\"       Medications:   Home Medications:  (Not in a hospital admission)      Current Medications:  Scheduled Meds:ceFAZolin, 2 g, Intravenous, Q8H  cyanocobalamin, 1,000 mcg, Intramuscular, Q28 Days      Continuous Infusions:   PRN Meds:.       Past Medical History:   Diagnosis Date   • A-fib ([facility])    • Abdominal pain, epigastric     Chronic, unclear cause, improved   • Anorexia    • Anxiety    • Arthritis    • CAD (coronary artery disease)     Cath 5/2015: nonobstructive LAD/RCA disease, 90% mid LCx s/p [id number] Xience.  Cath 1/2021: 50-60% prox LAD/70-80% distal LAD, patent Cx stent, 20% RCA   • Cellulitis of leg    • " Cervical disc disease    • Chronic fatigue    • Colitis    • COPD (chronic obstructive pulmonary disease) ([facility])    • Depression    • Erosive gastritis    • Fibromyalgia    • Frequency of urination 06/09/2017   • Gastritis    • Hypercholesterolemia 02/02/2016   • Hyperglycemia    • Hypertension     History of refractory hypertension which improved significantly   • Insomnia    • Leukocytes in urine    • Lower back pain    • Mediastinal lymphadenopathy    • 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HCC)    • Mononucleosis    • Occlusion and stenosis of carotid artery    • Onychomycosis    • Orbit fracture ([facility])    • PAF (paroxysmal atrial fibrillation) ([facility])    • Peripheral arterial disease (HCC)     Significant (carotid, subclavian, renal arteries, lower extremities), with claudication, medical therapy only   • Pernicious anemia    • Prediabetes    • Renal artery stenosis (HCC)     unilateral, with renal atrophy (no intervention required)   • Renal calculi    • Sinus bradycardia     mild, asymptomatic   • TIA (transient ischemic attack)    • UTI (urinary tract infection)    • Valvular heart disease     1/2021: mod-severe AI, mod MR, mild-mod TR   • Vision problem    • Vitamin B 12 deficiency         Past Surgical History:   Procedure Laterality Date   • APPENDECTOMY     • BREAST BIOPSY Left     20+ yrs ago   • BREAST SURGERY     • CARDIAC CATHETERIZATION  05/2015    nonobs LAD/RCA disease, 90% mid LCx s/p [id number] Xience   • CARDIAC CATHETERIZATION N/A 10/28/2020    Procedure: Right and Left Heart Cath;  Surgeon: Opal Contreras MD;  Location:  [facility];  Service: Cardiovascular;  Laterality: N/A;  for cardiac testing prior to possible valve surgery per Dr. Mai   • CARDIAC CATHETERIZATION N/A 10/28/2020    Procedure: Coronary angiography;  Surgeon: Opal Contreras MD;  Location:  [facility];  Service: Cardiovascular;  Laterality: N/A;   • CERVICAL FUSION  1997   • CHOLECYSTECTOMY     •  CORONARY STENT PLACEMENT     • HYSTERECTOMY  1995   • KNEE SURGERY Right 2005   • KNEE SURGERY Left 1998        Social History     Occupational History     Employer: RETIRED   Tobacco Use   • Smoking status: Some Days     Packs/day: 0.20     Years: 50.00     Pack years: 10.00     Types: Cigarettes   • Smokeless tobacco: Never   • Tobacco comments:     trying to quit, pt states she has basically quit but will still have one when she stressed   Vaping Use   • Vaping Use: Never used   Substance and Sexual Activity   • Alcohol use: Not Currently     Comment: OCCASIONAL/ TWICE A YEAR. // daily caffiene   • Drug use: No   • Sexual activity: Not Currently      Social History     Social History Narrative    Pt drinks 2-3 cups of coffee daily and smokes 3 cigarettes a day.          Family History   Problem Relation Age of Onset   • Asthma Mother    • Emphysema Mother    • Graves' disease Mother    • Heart disease Mother    • Hypertension Mother    • Emphysema Father    • Hypertension Father    • Heart disease Father    • Kidney disease Sister    • Lupus Daughter         Systemic erythematosus   • Arthritis Other    • Crohn's disease Other    • Breast cancer Niece    • Breast cancer Maternal Cousin    • Breast cancer Maternal Cousin          Review of Systems:   HEENT: Patient denies any headaches, vision changes, change in hearing, or tinnitus, Patient denies any rhinorrhea,epistaxis, sinus pain, mouth or dental problems, sore throat or hoarseness, or dysphagia  Pulmonary: Patient denies any cough, congestion, SOA, or wheezing  Cardiovascular: Patient denies any chest pain, dyspnea, palpitations, weakness, intolerance of exercise, varicosities, swelling of extremities, known murmur  Gastrointestinal:  Patient denies nausea, vomiting, diarrhea, constipation, loss  of appetite, change in appetite, dysphagia, gas, heartburn, melena, change in bowel habits, use of laxatives or other drugs to alter the function of the  gastrointestinal tract.  Genital/Urinary: Patient denies dysuria, change in color of urine, change in frequency of urination, pain with urgency, incontinence, retention, or nocturia.  Musculoskeletal: Patient denies increased warmth; redness; or swelling of joints; limitation of function; deformity; crepitation: notes pain in right elbow as documented above in HPI.  Denies pain in low back or neck.    Neurological: Patient denies dizziness, tremor, ataxia, difficulty in speaking, change in speech, paresthesia, loss of sensation, seizures, syncope, changes in memory.  Endocrine system: Patient denies tremors, palpitations, intolerance of heat or cold, polyuria, polydipsia, polyphagia, diaphoresis, exophthalmos, or goiter.  Psychological: Patient denies thoughts/plans of harming self or other; depression, insomnia, night terrors, trini, memory loss, disorientation.  Skin: Patient denies any bruising, rashes, discoloration, pruritus, wounds, ulcers, decubiti, changes in the hair or nails  Hematopoietic: Patient denies history of spontaneous or excessive bleeding, epistaxis, hematuria, melena, fatigue, enlarged or tender lymph nodes, pallor, history of anemia.    Physical Exam: 82 y.o. female  Vitals:    12/09/22 1315 12/09/22 1317 12/09/22 1425 12/09/22 1426   BP: (!) 186/87 168/78 166/92    Patient Position: Sitting Standing     Pulse: 69 85  72   Resp:       Temp:       SpO2:    99%   Weight:       Height:         General: No acute distress, significant facial bruising noted  Pulmonary: non labored breathing  Cardiovascular: regular rate and rhythm  Right upper extremity  Fingers warm and well-perfused with brisk capillary refill.  Radial pulse not palpable(patient states she had a stent placed in her right upper extremity a few years back) pulse oximetry in right hand is 99% on room air  Sensation normal in right hand  Right arm and forearm compartments soft and compressible no signs of DVT or compartment  syndrome  Mild abrasion over posterior lateral olecranon with small scab.  Patient is unable to flex or extend her elbow due to significant pain  Significant bruising noted throughout right upper extremity    Diagnostic Tests:  Admission on 12/09/2022   Component Date Value Ref Range Status   • Glucose 12/09/2022 92  65 - 99 mg/dL Final   • BUN 12/09/2022 20  8 - 23 mg/dL Final   • Creatinine 12/09/2022 0.83  0.57 - 1.00 mg/dL Final   • Sodium 12/09/2022 134 (L)  136 - 145 mmol/L Final   • Potassium 12/09/2022 4.5  3.5 - 5.2 mmol/L Final   • Chloride 12/09/2022 97 (L)  98 - 107 mmol/L Final   • CO2 12/09/2022 25.6  22.0 - 29.0 mmol/L Final   • Calcium 12/09/2022 9.1  8.6 - 10.5 mg/dL Final   • Total Protein 12/09/2022 7.1  6.0 - 8.5 g/dL Final   • Albumin 12/09/2022 4.30  3.50 - 5.20 g/dL Final   • ALT (SGPT) 12/09/2022 10  1 - 33 U/L Final   • AST (SGOT) 12/09/2022 20  1 - 32 U/L Final   • Alkaline Phosphatase 12/09/2022 98  39 - 117 U/L Final   • Total Bilirubin 12/09/2022 0.5  0.0 - 1.2 mg/dL Final   • Globulin 12/09/2022 2.8  gm/dL Final   • A/G Ratio 12/09/2022 1.5  g/dL Final   • BUN/Creatinine Ratio 12/09/2022 24.1  7.0 - 25.0 Final   • Anion Gap 12/09/2022 11.4  5.0 - 15.0 mmol/L Final   • eGFR 12/09/2022 71.4  >60.0 mL/min/1.73 Final    National Kidney Foundation and American Society of Nephrology (ASN) Task Force recommended calculation based on the Chronic Kidney Disease Epidemiology Collaboration (CKD-EPI) equation refit without adjustment for race.   • Protime 12/09/2022 14.3  12.1 - 15.0 Seconds Final   • INR 12/09/2022 1.10  0.90 - 1.10 Final   • PTT 12/09/2022 31.1  24.3 - 38.1 seconds Final   • WBC 12/09/2022 7.34  3.40 - 10.80 10*3/mm3 Final   • RBC 12/09/2022 3.73 (L)  3.77 - 5.28 10*6/mm3 Final   • Hemoglobin 12/09/2022 11.3 (L)  12.0 - 15.9 g/dL Final   • Hematocrit 12/09/2022 35.1  34.0 - 46.6 % Final   • MCV 12/09/2022 94.1  79.0 - 97.0 fL Final   • MCH 12/09/2022 30.3  26.6 - 33.0 pg Final    • MCHC 12/09/2022 32.2  31.5 - 35.7 g/dL Final   • RDW 12/09/2022 15.9 (H)  12.3 - 15.4 % Final   • RDW-SD 12/09/2022 54.5 (H)  37.0 - 54.0 fl Final   • MPV 12/09/2022 9.9  6.0 - 12.0 fL Final   • Platelets 12/09/2022 191  140 - 450 10*3/mm3 Final   • Neutrophil % 12/09/2022 61.7  42.7 - 76.0 % Final   • Lymphocyte % 12/09/2022 29.0  19.6 - 45.3 % Final   • Monocyte % 12/09/2022 7.5  5.0 - 12.0 % Final   • Eosinophil % 12/09/2022 0.5  0.3 - 6.2 % Final   • Basophil % 12/09/2022 0.8  0.0 - 1.5 % Final   • Immature Grans % 12/09/2022 0.5  0.0 - 0.5 % Final   • Neutrophils, Absolute 12/09/2022 4.52  1.70 - 7.00 10*3/mm3 Final   • Lymphocytes, Absolute 12/09/2022 2.13  0.70 - 3.10 10*3/mm3 Final   • Monocytes, Absolute 12/09/2022 0.55  0.10 - 0.90 10*3/mm3 Final   • Eosinophils, Absolute 12/09/2022 0.04  0.00 - 0.40 10*3/mm3 Final   • Basophils, Absolute 12/09/2022 0.06  0.00 - 0.20 10*3/mm3 Final   • Immature Grans, Absolute 12/09/2022 0.04  0.00 - 0.05 10*3/mm3 Final   • nRBC 12/09/2022 0.0  0.0 - 0.2 /100 WBC Final   • Troponin T 12/09/2022 <0.010  0.000 - 0.030 ng/mL Final   • QT Interval 12/09/2022 438  ms Preliminary     XR Elbow 3+ View Right    Result Date: 12/9/2022  Impression:  1. Comminuted complete and displaced fracture of the olecranon. Associated soft tissue swelling and subcutaneous gas suggestive of open fracture.  This report was finalized on 12/9/2022 12:09 PM by Dr. Rodolfo Chavez MD.      CT Head Without Contrast    Result Date: 12/9/2022  Impression: Impression: 1. No clearly acute intracranial pathology demonstrated. 2. Generalized cerebral atrophy and nonspecific periventricular hypodensities which likely represent white matter microvascular change. Scalp hematoma in the right frontal area. Signer Name: Jaime Cheng MD  Signed: 12/9/2022 12:01 PM  Workstation Name: [id number]  [facility]    CT Cervical Spine Without Contrast    Result Date: 12/9/2022  Impression: No  clearly acute radiographic findings. Evidence of prior fusion at C5-C6. 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 Clinical aspects of the case will determine if MR of the cervical spine could provide additional information. Signer Name: Jaime Cheng MD  Signed: 12/9/2022 12:08 PM  Workstation Name: [id number]  [facility]    CT Facial Bones Without Contrast    Result Date: 12/9/2022  Impression: No acute traumatic findings. Signer Name: Jaime Cheng MD  Signed: 12/9/2022 12:11 PM  Workstation Name: [id number]  [facility]    CT Upper Extremity Right Without Contrast    Result Date: 12/9/2022  Impression: Comminuted and displaced intra-articular fracture of the proximal ulna with disruption of the ulnar notch and proximal displacement of a large olecranon fragment with several additional fragments within the fracture defect. Extensive posterior elbow, distal upper arm and proximal forearm soft tissue swelling and edema with some areas of confluent edema and reticulation of subcutaneous tissues. This could represent soft tissue contusion with some areas of increased attenuation at the site of the fracture compatible with small areas of hematoma/hemorrhage. Extensive soft tissue gas predominantly within the distal upper arm along the posterior lateral aspect. This could be secondary to open fracture but a discrete break in the skin or overlying soft tissue is not visualized. Correlate for possible superimposed soft tissue infection. There is a moderate-sized joint effusion. No definite drainable soft tissue abscess or fluid collection The major tendons about the elbow appear intact. Signer Name: TAMIKA Flores MD  Signed: 12/9/2022 2:37 PM  Workstation Name: [id number]  [facility]      Assessment:  Patient Active Problem List   Diagnosis   • CAD (coronary artery disease)   • Valvular heart disease   • Essential hypertension   •  Hypercholesterolemia   • Anemia due to vitamin B12 deficiency   • Loss of appetite   • Anxiety   • Chronic obstructive pulmonary disease ([facility])   • Mixed anxiety depressive disorder   • Fall in home   • Insomnia   • 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[facility])   • Obstruction of carotid artery   • Peripheral arterial occlusive disease ([facility])   • Impaired glucose tolerance   • Difficulty sleeping   • Tobacco dependence syndrome   • Iron deficiency anemia   • Vitamin B12 deficiency anemia due to intrinsic factor deficiency   • Acquired hypothyroidism   • Thoracoabdominal aortic aneurysm   • Abdominal bloating   • Prediabetes   • Sleep difficulties   • Chronic constipation   • PAF (paroxysmal atrial fibrillation) ([facility])   • Moderate single current episode of major depressive disorder ([facility])   • Chronic low back pain without sciatica   • Urge incontinence   • Severe mitral regurgitation   • Severe tricuspid regurgitation   • Moderate aortic valve regurgitation   • Underweight   • DDD (degenerative disc disease), lumbar   • Atrial flutter with rapid ventricular response ([facility])   • Urinary tract infection without hematuria   • Malaise and fatigue   • Type I or II open fracture of right olecranon process           Plan:      Discussed with the patient that there is concern for possible open fracture of her right upper extremity.  I discussed with her that it is now already been almost 56 hours since her injury without antibiotics.  I discussed with her that this places her at a high risk for postoperative infection in her right upper extremity.  I also discussed with her that she has a comminuted olecranon fracture which we will plan to fix with irrigation debridement and open reduction internal fixation of her right elbow tomorrow morning.  Due to limitations in this hospital setting we will plan for tomorrow morning as implants will need to be brought in and sterilely processed and the patient needs cardiac clearance at the request of  anesthesia.  I discussed the risk benefits and alternatives of surgical fixation with the patient including but not limited to infection, nonunion, malunion, need for more surgery, elbow stiffness, and damage to arteries veins and nerves most particularly the ulnar nerve.  We will begin cefazolin 2 g every 8 hours until surgery.  ER will place her in a well-padded posterior long-arm splint with a Betadine dressing over her abrasion on her right elbow.  We will plan for surgical fixation tomorrow morning at 730.  Admit to hospitalist and plan for cardiology evaluation as well.  The case was discussed with anesthesia tonight.  Hold anticoagulants until postop.  Skeletal dynamics rep notified.    Hold anticoagulants  Hospitalist admit  Cardiology consult  N.p.o. at midnight  Cefazolin 2 g every 8 hours until surgery  Plan for I&D and ORIF right olecranon tomorrow morning at 7:30 AM        Lito Reeder MD      Dictated utilizing Dragon dictation

## 2022-12-09 NOTE — ED PROVIDER NOTES
Subjective   History of Present Illness  Patient presents complaining of a fall.  This occurred 2 days ago.  Patient was walking up to her apartment and tripped and fell onto the concrete.  Patient landed primarily on her right side and injured her right forehead, right face, right elbow.  Patient says she was able to get up and get back in the house and then she tripped again going through the doorway and hit her head on the windowsill.  This did cause a small abrasion on her forehead and a similar area that she had hit prior.  Patient denies any loss of consciousness and is otherwise been doing okay.  No headache, dizziness, near syncope, vision changes, ringing in her ears, and no sensorimotor changes.  Patient is on a blood thinner for A. fib.        Review of Systems   All other systems reviewed and are negative.      Past Medical History:   Diagnosis Date   • A-fib ([facility])    • Abdominal pain, epigastric     Chronic, unclear cause, improved   • Anorexia    • Anxiety    • Arthritis    • CAD (coronary artery disease)     Cath 5/2015: nonobstructive LAD/RCA disease, 90% mid LCx s/p [id number] Xience.  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[facility])    • Mononucleosis    • Occlusion and stenosis of carotid artery    • Onychomycosis    • Orbit fracture ([facility])    • PAF (paroxysmal atrial fibrillation) ([facility])    • Peripheral arterial disease ([facility])     Significant (carotid, subclavian,  "renal arteries, lower extremities), with claudication, medical therapy only   • Pernicious anemia    • Prediabetes    • Renal artery stenosis ([facility])     unilateral, with renal atrophy (no intervention required)   • Renal calculi    • Sinus bradycardia     mild, asymptomatic   • TIA (transient ischemic attack)    • UTI (urinary tract infection)    • Valvular heart disease     1/2021: mod-severe AI, mod MR, mild-mod TR   • Vision problem    • Vitamin B 12 deficiency        Allergies   Allergen Reactions   • Aleve [Naproxen Sodium] Shortness Of Breath   • Codeine Unknown - Low Severity     hyperactivity   • Ibuprofen Unknown - Low Severity     unk   • Sulfa Antibiotics Unknown - Low Severity     \"I can't remember\"       Past Surgical History:   Procedure Laterality Date   • APPENDECTOMY     • BREAST BIOPSY Left     20+ yrs ago   • BREAST SURGERY     • CARDIAC CATHETERIZATION  05/2015    nonobs LAD/RCA disease, 90% mid LCx s/p [id number] Xience   • CARDIAC CATHETERIZATION N/A 10/28/2020    Procedure: Right and Left Heart Cath;  Surgeon: Opal Contreras MD;  Location:  [facility];  Service: Cardiovascular;  Laterality: N/A;  for cardiac testing prior to possible valve surgery per Dr. Mai   • CARDIAC CATHETERIZATION N/A 10/28/2020    Procedure: Coronary angiography;  Surgeon: Opal Contreras MD;  Location:  [facility];  Service: Cardiovascular;  Laterality: N/A;   • CERVICAL FUSION  1997   • CHOLECYSTECTOMY     • CORONARY STENT PLACEMENT     • HYSTERECTOMY  1995   • KNEE SURGERY Right 2005   • KNEE SURGERY Left 1998       Family History   Problem Relation Age of Onset   • Asthma Mother    • Emphysema Mother    • Graves' disease Mother    • Heart disease Mother    • Hypertension Mother    • Emphysema Father    • Hypertension Father    • Heart disease Father    • Kidney disease Sister    • Lupus Daughter         Systemic erythematosus   • Arthritis Other    • Crohn's disease Other    • " Breast cancer Niece    • Breast cancer Maternal Cousin    • Breast cancer Maternal Cousin        Social History     Socioeconomic History   • Marital status:      Spouse name: Willie   • Number of children: 3   • Years of education: Some College   Tobacco Use   • Smoking status: Some Days     Packs/day: 0.20     Years: 50.00     Pack years: 10.00     Types: Cigarettes   • Smokeless tobacco: Never   • Tobacco comments:     trying to quit, pt states she has basically quit but will still have one when she stressed   Vaping Use   • Vaping Use: Never used   Substance and Sexual Activity   • Alcohol use: Not Currently     Comment: OCCASIONAL/ TWICE A YEAR. // daily caffiene   • Drug use: No   • Sexual activity: Not Currently           Objective   Physical Exam  Vitals and nursing note reviewed.   Constitutional:       Comments: Diffuse bruising and ecchymosis to patient's entire face primarily periorbitally bilaterally, on the forehead, bilateral maxillary region and right elbow and posterior forearm.   HENT:      Head: Normocephalic and atraumatic.      Comments: There is a 1 cm abrasion on patient's right forehead     Right Ear: Tympanic membrane, ear canal and external ear normal.      Left Ear: Tympanic membrane, ear canal and external ear normal.      Nose: Nose normal.      Mouth/Throat:      Mouth: Mucous membranes are moist.      Pharynx: Oropharynx is clear.   Eyes:      Extraocular Movements: Extraocular movements intact.      Conjunctiva/sclera: Conjunctivae normal.      Pupils: Pupils are equal, round, and reactive to light.   Cardiovascular:      Rate and Rhythm: Normal rate. Rhythm irregular.      Pulses:           Radial pulses are 2+ on the right side and 2+ on the left side.      Heart sounds: Normal heart sounds.   Pulmonary:      Effort: Pulmonary effort is normal.      Breath sounds: Normal breath sounds.   Abdominal:      General: Abdomen is flat.      Palpations: Abdomen is soft.    Musculoskeletal:      Cervical back: Normal range of motion and neck supple. No rigidity or tenderness.      Comments: Abrasion to right posterior elbow.  Diffuse swelling and tenderness to right elbow.  Limited range of motion with flexion/extension at the right elbow.  Right forearm is diffusely bruised on the posterior surface.   strength equal bilaterally.  Full range of motion at the bilateral wrists.  Sensory is grossly intact in bilateral upper extremities.   Skin:     General: Skin is warm and dry.      Capillary Refill: Capillary refill takes 2 to 3 seconds.   Neurological:      Mental Status: She is alert and oriented to person, place, and time.   Psychiatric:         Mood and Affect: Mood normal.         Behavior: Behavior normal.         ECG 12 Lead      Date/Time: 12/9/2022 3:32 PM  Performed by: Ramin Silvestre MD  Authorized by: Ramni Silvestre MD   Interpreted by physician  Comparison: compared with previous ECG from 10/6/2022  Similar to previous ECG  Comments: Rate of 74, A. fib, normal axis, no acute ST elevation or depression.                 ED Course  ED Course as of 12/09/22 1602   Fri Dec 09, 2022   1321 Hemoglobin(!): 11.3 [AW]      ED Course User Index  [AW] Ramin Silvestre MD                                           MDM  Number of Diagnoses or Management Options  Diagnosis management comments: ddx intracranial bleed, concussion, hematoma, fracture, sprain, strain, dislocation    XR Elbow 3+ View Right    Result Date: 12/9/2022   1. Comminuted complete and displaced fracture of the olecranon. Associated soft tissue swelling and subcutaneous gas suggestive of open fracture.  This report was finalized on 12/9/2022 12:09 PM by Dr. Rodolfo Chavez MD.      CT Head Without Contrast    Result Date: 12/9/2022  Impression: 1. No clearly acute intracranial pathology demonstrated. 2. Generalized cerebral atrophy and nonspecific periventricular hypodensities which likely represent white  matter microvascular change. Scalp hematoma in the right frontal area. Signer Name: Jaime Cheng MD  Signed: 12/9/2022 12:01 PM  Workstation Name: [id number]  [facility]    CT Cervical Spine Without Contrast    Result Date: 12/9/2022  No clearly acute radiographic findings. Evidence of prior fusion at C5-C6. 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 Clinical aspects of the case will determine if MR of the cervical spine could provide additional information. Signer Name: Jaime Cheng MD  Signed: 12/9/2022 12:08 PM  Workstation Name: [id number]  Psychiatric    CT Facial Bones Without Contrast    Result Date: 12/9/2022  No acute traumatic findings. Signer Name: Jaime Cheng MD  Signed: 12/9/2022 12:11 PM  Workstation Name: [id number]  Psychiatric    CT Upper Extremity Right Without Contrast    Result Date: 12/9/2022  Comminuted and displaced intra-articular fracture of the proximal ulna with disruption of the ulnar notch and proximal displacement of a large olecranon fragment with several additional fragments within the fracture defect. Extensive posterior elbow, distal upper arm and proximal forearm soft tissue swelling and edema with some areas of confluent edema and reticulation of subcutaneous tissues. This could represent soft tissue contusion with some areas of increased attenuation at the site of the fracture compatible with small areas of hematoma/hemorrhage. Extensive soft tissue gas predominantly within the distal upper arm along the posterior lateral aspect. This could be secondary to open fracture but a discrete break in the skin or overlying soft tissue is not visualized. Correlate for possible superimposed soft tissue infection. There is a moderate-sized joint effusion. No definite drainable soft tissue abscess or fluid collection The major tendons about the elbow appear intact. Signer Name: TAMIKA Flores MD  Signed:  12/9/2022 2:37 PM  Workstation Name: [id number]  [facility] [id number] orthopedics been paged for discussion regarding the elbow.    1320 Discussed pt with dr. cuello and he wanted a CT elbow and keep pt npo.  Will call back with results.  Patient is otherwise doing well.  CT scans of her head face and neck were all unremarkable.  Labs are also unremarkable.       Amount and/or Complexity of Data Reviewed  Clinical lab tests: reviewed and ordered  Tests in the radiology section of CPT®: reviewed and ordered        Final diagnoses:   Type I or II open fracture of olecranon process of right ulna, initial encounter   Closed head injury, initial encounter       ED Disposition  ED Disposition     ED Disposition   Decision to Admit    Condition   --    Comment   Level of Care: Med/Surg [1]   Diagnosis: Type I or II open fracture of olecranon process of right ulna, initial encounter [[id number]]   Admitting Physician: BIRDIE JOHNSON [[id number]]   Attending Physician: BIRDIE JOHNSON [[id number]]               No follow-up provider specified.       Medication List      Changed    fluticasone 50 MCG/ACT nasal spray  Commonly known as: FLONASE  SPRAY TWICE IN EACH NOSTRIL EVERY DAY FOR 30 DAYS.  What changed: See the new instructions.             Ramin Silvestre MD  12/09/22 [id number]

## 2022-12-09 NOTE — H&P
[facility] HOSPITALIST     Donna Forte MD    CHIEF COMPLAINT: right arm pain, fall    HISTORY OF PRESENT ILLNESS:  Patient is an 82-year-old female who presented to the emergency department secondary to accidental fall at home while bringing in groceries 2 days ago.  She notes 3 separate falls, 2 while still in garage and then 1 more when she got inside her house.  She does not remember feeling dizzy or having prior noticed that she would fall.  She also notes right lower rib area pain, bilateral knee pain and bruising. She reports that she had been ill approximately 1 week ago with nausea, vomiting, diarrhea and general malaise that she attributed to influenza that her entire family had.  She notes she has had very poor intake in the last week and a half with these symptoms and she chronically has low intake.  She is known to hospitalist service from last admission 9/28/2022 secondary to A. fib RVR on.  She has a known history of PAF on Eliquis, CAD/HLD s/p stent, moderate to severe AR, mild to moderate MR, mild-moderate TR, vasculopathy with PAD/mesenteric artery stenosis, thoracoabdominal aortic aneurysm, carotid artery stenosis, Multi valvular disease, hypertension, GERD, COPD, severe malnutrition, tobacco abuse, anxiety/depression.  ER provider contacted Dr. Reeder who requested hospitalist admission for tomorrow am surgical repair of comminuted and displaced intra-articular fracture of the proximal ulna, see full report in epic.  Troponin negative, hemoglobin 11.3, sodium 134, EKG shows A. Fib.    She reports last dose of Eliquis was this morning.  She continues to work on diminishing tobacco use.  She is on Percocet for chronic back pain that she notes is ineffective in controlling her pain.    She currently denies f/c/headache/rhinorrhea/nasal congestion/lightheadedness/syncopal sensation/cough/soa/n/v/d/chest pain/abdominal pain/recent illness/sick exposures/change in bowel  or bladder habits/no weight change/bloody emesis or bloody stools/change in medications or any other new concerns.    Past Medical History:   Diagnosis Date   • A-fib ([facility])    • Abdominal pain, epigastric     Chronic, unclear cause, improved   • Anorexia    • Anxiety    • Arthritis    • CAD (coronary artery disease)     Cath 5/2015: nonobstructive LAD/RCA disease, 90% mid LCx s/p 2.75w91dt Xience.  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[facility])    • Mononucleosis    • Occlusion and stenosis of carotid artery    • Onychomycosis    • Orbit fracture ([facility])    • PAF (paroxysmal atrial fibrillation) ([facility])    • Peripheral arterial disease ([facility])     Significant (carotid, subclavian, renal arteries, lower extremities), with claudication, medical therapy only   • Pernicious anemia    • Prediabetes    • Renal artery stenosis ([facility])     unilateral, with renal atrophy (no intervention required)   • Renal calculi    • Sinus bradycardia     mild, asymptomatic   • TIA (transient ischemic attack)    • UTI (urinary tract infection)    • Valvular heart disease     1/2021: mod-severe AI, mod MR, mild-mod TR   • Vision problem    • Vitamin B 12 deficiency      Past Surgical History:   Procedure Laterality Date   • APPENDECTOMY     • BREAST BIOPSY Left     20+ yrs ago   • BREAST SURGERY     • CARDIAC CATHETERIZATION  05/2015    nonobs LAD/RCA disease, 90% mid LCx s/p 2.52r24fl Xience   • CARDIAC  CATHETERIZATION N/A 10/28/2020    Procedure: Right and Left Heart Cath;  Surgeon: Opal Contreras MD;  Location:  [facility];  Service: Cardiovascular;  Laterality: N/A;  for cardiac testing prior to possible valve surgery per Dr. Mai   • CARDIAC CATHETERIZATION N/A 10/28/2020    Procedure: Coronary angiography;  Surgeon: Opal Contreras MD;  Location:  [facility];  Service: Cardiovascular;  Laterality: N/A;   • CERVICAL FUSION  1997   • CHOLECYSTECTOMY     • CORONARY STENT PLACEMENT     • HYSTERECTOMY  1995   • KNEE SURGERY Right 2005   • KNEE SURGERY Left 1998     Family History   Problem Relation Age of Onset   • Asthma Mother    • Emphysema Mother    • Graves' disease Mother    • Heart disease Mother    • Hypertension Mother    • Emphysema Father    • Hypertension Father    • Heart disease Father    • Kidney disease Sister    • Lupus Daughter         Systemic erythematosus   • Arthritis Other    • Crohn's disease Other    • Breast cancer Niece    • Breast cancer Maternal Cousin    • Breast cancer Maternal Cousin      Social History     Tobacco Use   • Smoking status: Some Days     Packs/day: 0.20     Years: 50.00     Pack years: 10.00     Types: Cigarettes   • Smokeless tobacco: Never   • Tobacco comments:     trying to quit, pt states she has basically quit but will still have one when she stressed   Vaping Use   • Vaping Use: Never used   Substance Use Topics   • Alcohol use: Not Currently     Comment: OCCASIONAL/ TWICE A YEAR. // daily caffiene   • Drug use: No     (Not in a hospital admission)    Allergies:  Aleve [naproxen sodium], Codeine, Ibuprofen, and Sulfa antibiotics    Immunization History   Administered Date(s) Administered   • COVID-19 (MODERNA) 1st, 2nd, 3rd Dose Only 02/03/2021, 03/04/2021   • COVID-19 (MODERNA) BOOSTER 01/21/2022   • Fluad Quad 65+ 09/02/2020   • Fluzone High Dose =>65 Years ([employer] ONLY) 10/15/2015, 10/30/2017, 08/29/2018, 09/10/2019   •  "Pneumococcal Conjugate 13-Valent (PCV13) 11/14/2018   • Pneumococcal Polysaccharide (PPSV23) 12/04/2019       REVIEW OF SYSTEMS:  Please see the above history of present illness for pertinent positives and negatives.  The remainder of the patient's systems have been reviewed and are negative.     Vital Signs  Temp:  [98 °F (36.7 °C)] 98 °F (36.7 °C)  Heart Rate:  [69-85] 78  Resp:  [16] 16  BP: (114-214)/(74-92) 166/92   Body mass index is 18.89 kg/m².    Flowsheet Rows    Flowsheet Row First Filed Value   Admission Height 154.9 cm (61\") Documented at 12/09/2022 1048   Admission Weight 45.4 kg (100 lb) Documented at 12/09/2022 1048             Physical Exam  Vitals reviewed.   Constitutional:       General: She is not in acute distress.     Comments: Elderly, thin   HENT:      Head:      Comments: Extensive facial, periorbital, forehead ecchymosis with scabbed area right forehead     Mouth/Throat:      Mouth: Mucous membranes are moist.   Eyes:      Extraocular Movements: Extraocular movements intact.      Pupils: Pupils are equal, round, and reactive to light.   Cardiovascular:      Rate and Rhythm: Normal rate. Rhythm irregular.   Pulmonary:      Effort: Pulmonary effort is normal. No respiratory distress.      Breath sounds: Normal breath sounds. No wheezing or rales.   Abdominal:      General: Abdomen is flat. Bowel sounds are normal. There is no distension.      Palpations: Abdomen is soft.      Tenderness: There is no abdominal tenderness. There is no guarding.   Musculoskeletal:      Comments: RU E in Ace and splint with CMS intact to right hand  Tender right lower rib cage with palpation   Skin:     General: Skin is warm and dry.      Capillary Refill: Capillary refill takes less than 2 seconds.      Findings: Bruising present. No erythema.      Comments: Extensive forehead, periorbital/facial ecchymosis, bilateral knee scabbed areas and ecchymosis   Neurological:      General: No focal deficit present.      " Mental Status: She is alert and oriented to person, place, and time.   Psychiatric:         Mood and Affect: Mood normal.         Behavior: Behavior normal.       Emotional Behavior:    Judgement and Insight: Good   Mental Status:  Alertness alert   Memory: Good   Mood and Affect:         Depression none               Anxiety none    Debilities:   Physical Weakness secondary to injuries   Handicaps unknown   Disabilities unknown   Agitation none     Results Review:    I reviewed the patient's new clinical results.  Lab Results (most recent)     Procedure Component Value Units Date/Time    Troponin [[id number]] Collected: 12/09/22 1203    Specimen: Blood Updated: 12/09/22 1518    Comprehensive Metabolic Panel [[id number]]  (Abnormal) Collected: 12/09/22 1203    Specimen: Blood Updated: 12/09/22 1225     Glucose 92 mg/dL      BUN 20 mg/dL      Creatinine 0.83 mg/dL      Sodium 134 mmol/L      Potassium 4.5 mmol/L      Chloride 97 mmol/L      CO2 25.6 mmol/L      Calcium 9.1 mg/dL      Total Protein 7.1 g/dL      Albumin 4.30 g/dL      ALT (SGPT) 10 U/L      AST (SGOT) 20 U/L      Alkaline Phosphatase 98 U/L      Total Bilirubin 0.5 mg/dL      Globulin 2.8 gm/dL      A/G Ratio 1.5 g/dL      BUN/Creatinine Ratio 24.1     Anion Gap 11.4 mmol/L      eGFR 71.4 mL/min/1.73      Comment: National Kidney Foundation and American Society of Nephrology (ASN) Task Force recommended calculation based on the Chronic Kidney Disease Epidemiology Collaboration (CKD-EPI) equation refit without adjustment for race.       Narrative:      GFR Normal >60  Chronic Kidney Disease <60  Kidney Failure <15    The GFR formula is only valid for adults with stable renal function between ages 18 and 70.    aPTT [[id number]]  (Normal) Collected: 12/09/22 1203    Specimen: Blood Updated: 12/09/22 1219     PTT 31.1 seconds     Narrative:      PTT = The equivalent PTT values for the therapeutic range of heparin levels at 0.1 to 0.7 U/ml are 53 to 110  seconds.      Protime-INR [[id number]]  (Normal) Collected: 12/09/22 1203    Specimen: Blood Updated: 12/09/22 1218     Protime 14.3 Seconds      INR 1.10    Narrative:      Therapeutic Ranges for INR: 2.0-3.0 (PT 20-30)                              2.5-3.5 (PT 25-34)    CBC & Differential [[id number]]  (Abnormal) Collected: 12/09/22 1203    Specimen: Blood Updated: 12/09/22 1208    Narrative:      The following orders were created for panel order CBC & Differential.  Procedure                               Abnormality         Status                     ---------                               -----------         ------                     CBC Auto Differential[[id number]]        Abnormal            Final result                 Please view results for these tests on the individual orders.    CBC Auto Differential [[id number]]  (Abnormal) Collected: 12/09/22 1203    Specimen: Blood Updated: 12/09/22 1208     WBC 7.34 10*3/mm3      RBC 3.73 10*6/mm3      Hemoglobin 11.3 g/dL      Hematocrit 35.1 %      MCV 94.1 fL      MCH 30.3 pg      MCHC 32.2 g/dL      RDW 15.9 %      RDW-SD 54.5 fl      MPV 9.9 fL      Platelets 191 10*3/mm3      Neutrophil % 61.7 %      Lymphocyte % 29.0 %      Monocyte % 7.5 %      Eosinophil % 0.5 %      Basophil % 0.8 %      Immature Grans % 0.5 %      Neutrophils, Absolute 4.52 10*3/mm3      Lymphocytes, Absolute 2.13 10*3/mm3      Monocytes, Absolute 0.55 10*3/mm3      Eosinophils, Absolute 0.04 10*3/mm3      Basophils, Absolute 0.06 10*3/mm3      Immature Grans, Absolute 0.04 10*3/mm3      nRBC 0.0 /100 WBC           Imaging Results (Most Recent)     Procedure Component Value Units Date/Time    CT Upper Extremity Right Without Contrast [[id number]] Collected: 12/09/22 1437     Updated: 12/09/22 1439    Narrative:      CT UE RT WO    INDICATION:    Fell 3 days ago with abnormal elbow x-ray.    TECHNIQUE:   CT of the right elbow without IV contrast. Coronal and sagittal reconstructions were  obtained.  Radiation dose reduction techniques included automated exposure control or exposure modulation based on body size. Count of known CT and cardiac nuc med  studies performed in previous 12 months: 0.      COMPARISON:    Right elbow series 12/9/2022    FINDINGS:  Moderately comminuted and displaced fracture of the proximal ulna and olecranon with fracture extending through the ulnar notch and up to 2.2 cm of proximal retraction of the olecranon with an apparently intact triceps tendon. 1.8 cm ossific fragment  within the gap. The coronoid process remains intact. The radius remains intact and normally articulating with the humerus. No evidence of distal humeral fracture.    Extensive soft tissue swelling and edema along the posterior elbow and posterior upper arm with some areas of increased attenuation compatible with hemorrhage and hematoma. Moderate amount of soft tissue gas primarily tracking along the posterior and  lateral aspect of the upper arm. This is predominantly within the deep subcutaneous tissues is also extensive interfascial. There is skin thickening with reticulation of the subcutaneous tissues. Large elbow effusion. Biceps and brachialis tendon  insertions appear intact. Common extensor and common flexor tendon origins appear to be grossly intact. Visualized musculature unremarkable. No definite breach of the skin surface though some punctate calcifications along the proximal ulna and olecranon  extending towards the skin surface and may reside within the olecranon bursa.      Impression:      Comminuted and displaced intra-articular fracture of the proximal ulna with disruption of the ulnar notch and proximal displacement of a large olecranon fragment with several additional fragments within the fracture defect.    Extensive posterior elbow, distal upper arm and proximal forearm soft tissue swelling and edema with some areas of confluent edema and reticulation of subcutaneous tissues. This  could represent soft tissue contusion with some areas of increased  attenuation at the site of the fracture compatible with small areas of hematoma/hemorrhage.    Extensive soft tissue gas predominantly within the distal upper arm along the posterior lateral aspect. This could be secondary to open fracture but a discrete break in the skin or overlying soft tissue is not visualized. Correlate for possible  superimposed soft tissue infection. There is a moderate-sized joint effusion. No definite drainable soft tissue abscess or fluid collection    The major tendons about the elbow appear intact.    Signer Name: TAMIKA Flores MD   Signed: 12/9/2022 2:37 PM   Workstation Name: [id number]    Radiology [facility]    CT Facial Bones Without Contrast [[id number]] Collected: 12/09/22 1211     Updated: 12/09/22 1213    Narrative:      CT Max Facial Area WO    INDICATION:   Fall 3 days ago with facial trauma    TECHNIQUE:   Maxillofacial CT without IV contrast. Coronal and sagittal reconstructions were obtained.  Radiation dose reduction techniques included automated exposure control or exposure modulation based on body size. Count of known CT and cardiac nuc med studies  performed in previous 12 months: 0.     COMPARISON:    No pertinent prior study    FINDINGS:  The mandible and the maxilla appear intact. No nasal bone fracture is seen.    The walls of the maxillary sinuses are intact. Zygomatic arches are intact.    No orbital wall fractures. The lamina papyracea are intact. The frontal sinus is not significantly developed in this patient.      Impression:      No acute traumatic findings.    Signer Name: Jaime Cheng MD   Signed: 12/9/2022 12:11 PM   Workstation Name: [id number]    Radiology [facility]    XR Elbow 3+ View Right [[id number]] Collected: 12/09/22 1206     Updated: 12/09/22 1211    Narrative:      XR ELBOW 3+ VW RIGHT-: 12/9/2022 12:00 PM     INDICATION:   Fell yesterday. Injury..      COMPARISON:   None available.     FINDINGS:   3 views of the right elbow.  There is a complete comminuted and  distracted fracture of the olecranon. At least 3 dominant fracture  fragments are present, with the largest fracture fragment measuring 2.1  cm and displaced posteriorly on the lateral projection a distance of at  least 1.8 cm. There is overlying soft tissue swelling. Radial head  appears intact. Probable subcutaneous gas related to open fracture  deformity. No bone erosion or destruction.  No foreign body.       Impression:         1. Comminuted complete and displaced fracture of the olecranon.  Associated soft tissue swelling and subcutaneous gas suggestive of open  fracture.     This report was finalized on 12/9/2022 12:09 PM by Dr. Rodolfo Chavez MD.       CT Cervical Spine Without Contrast [[id number]] Collected: 12/09/22 1208     Updated: 12/09/22 1210    Narrative:      CT Spine Cervical WO    INDICATION:   Fall 3 days ago with neck pain    TECHNIQUE:   CT of the cervical spine without IV contrast. Coronal and sagittal reconstructions were obtained.  Radiation dose reduction techniques included automated exposure control or exposure modulation based on body size. Count of known CT and cardiac nuc med  studies performed in previous 12 months: 3.     COMPARISON:  No pertinent prior study    FINDINGS:  The sagittal alignment is satisfactory. Prior cervical fusion at C5-C6. Vertebral body heights are maintained. Mild demineralization of osseous structures. Disc space narrowing at C3-C4, C4-C5 and C6-C7.    Facets are intact. Posterior elements show normal alignment. Prevertebral soft tissues are normal.    The odontoids intact. The ring of C1 is intact.    No clearly acute findings demonstrated.      Impression:      No clearly acute radiographic findings.    Evidence of prior fusion at C5-C6. 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    Clinical aspects of the case will determine if MR of  the cervical spine could provide additional information.    Signer Name: Jaime Cheng MD   Signed: 12/9/2022 12:08 PM   Workstation Name: [id number]    [facility]    CT Head Without Contrast [[id number]] Collected: 12/09/22 1201     Updated: 12/09/22 1203    Narrative:      CT Head WO    HISTORY:   Fall 3 days ago with head trauma.    TECHNIQUE:   Axial unenhanced head CT. Radiation dose reduction techniques included automated exposure control or exposure modulation based on body size. Count of known CT and cardiac nuc med studies performed in previous 12 months: 3.     Time of scan: 11:45 AM    COMPARISON:   9/28/2022    FINDINGS:     The ventricles are generous in size. Cortical sulci are correspondingly prominent. No extraaxial fluid collections are seen.    Scalp hematoma demonstrated in the right frontal region.    No parenchymal or subarachnoid hemorrhage is present. Periventricular hypodensities are demonstrated which are nonspecific but likely represent white matter microvascular change in a patient of this age. No CT evidence of mass or acute infarct.    The mastoid air cells are clear. The visualized paranasal sinuses are clear.  Orbital structures demonstrate no acute findings.      Impression:      Impression:  1. No clearly acute intracranial pathology demonstrated.    2. Generalized cerebral atrophy and nonspecific periventricular hypodensities which likely represent white matter microvascular change. Scalp hematoma in the right frontal area.    Signer Name: Jaime Cheng MD   Signed: 12/9/2022 12:01 PM   Workstation Name: [id number]    [facility]        reviewed    ECG/EMG Results (most recent)     Procedure Component Value Units Date/Time    ECG 12 Lead Pre-Op / Pre-Procedure [[id number]] Collected: 12/09/22 1532     Updated: 12/09/22 1533     QT Interval 438 ms     Narrative:      HEART RATE= 74  bpm  RR Interval= 812  ms  AK Interval=   ms  P Horizontal  Axis=   deg  P Front Axis=   deg  QRSD Interval= 84  ms  QT Interval= 438  ms  QRS Axis= 78  deg  T Wave Axis= 34  deg  - ABNORMAL ECG -  Atrial fibrillation  Consider left ventricular hypertrophy  Borderline prolonged QT interval  Electronically Signed By:   Date and Time of Study: 2022-12-09 15:32:21    ECG 12 Lead [[id number]] Resulted: 12/09/22 1602     Updated: 12/09/22 1602        reviewed    Assessment & Plan   Acute comminuted, displaced intra-articular fracture of the proximal ulna secondary to fall:  Questionable open fracture? Consult orthopedic surgery  On ancef per Dr. Reeder, continue  N.p.o. at midnight  Control pain and nausea with IV and oral medications  Falls precautions  Troponin, EKG, CK pending. Will consult cardiology for input considering this is a high-risk patient with non-modifiable risk factors but this surgery is needed  Monitor    Right lower rib pain: Check detail x-ray    Bilateral knee pain/ecchymosis: Check bilateral knee x-rays    PAF on Eliquis:  CAD/HLD, s/p stent:  Mod-severe AR/mild-mod MR, mild-mod TR:  HTN: Consult cardiology for pre-op clearance  Add home metoprolol, Lipitor  Holding Eliquis  Monitor on telemetry    PAD/vasculopathy:  H/O mesenteric artery stenosis, thoracoabdominal aortic aneurysm, carotid artery stenosis:  Continue statin    COPD: Nothing acute, add home Symbicort    GERD: Add Pepcid    Severe malnutrition:  Body mass index is 18.89 kg/m².   Consult dietician  Add boost supplements    Tobacco abuse: Add nicotine patch and cessation education    Anxiety/depression: Add home Prozac    Chronic pain with chronic narcotic dependence:  Check UDS  Add Percocet 10 mg every 4 hours as needed for pain as patient is opiate tolerant  When n.p.o. add Dilaudid 0.5 mg IV  Already made ambulatory referral into pain management at discharge  Monitor    Performs over 4 mets daily, lives alone.   Odom Class II risk for vascular, 1 point, 6% perioperative cardiac risk     I  "discussed the patient's findings and my recommendations with patient and staff.     Becky IZABELALuke Vincent, APRN  12/09/22  16:21 EST    \"Dictated utilizing Dragon dictation\"    Note disclaimer: At [facility], we believe that sharing information builds trust and better relationships. You are receiving this note because you recently visited [facility]. It is possible you will see health information before a provider has talked with you about it. This kind of information can be easy to misunderstand. To help you fully understand what it means for your health, we urge you to discuss this note with your provider.        "

## 2022-12-10 NOTE — PROGRESS NOTES
"DAILY PROGRESS NOTE  [facility]  0  ORTHOPEDIC SURGERY DAILY PROGRESS NOTE  [facility]  LENGTH OF STAY 0 DAYS      Patient Identification:  Name: Kaylin Ojeda  Age: 82 y.o.  Sex: female  :  1940  MRN: [id number]         Primary Care Physician: Donna Forte MD      Subjective:  Interval History: no issues overnight. Pain controlled. NPO.    Objective:    Scheduled Meds:atorvastatin, 10 mg, Oral, Daily  budesonide-formoterol, 2 puff, Inhalation, BID - RT  ceFAZolin, 2 g, Intravenous, Q8H  famotidine, 20 mg, Oral, BID AC  FLUoxetine, 20 mg, Oral, Daily  metoprolol tartrate, 12.5 mg, Oral, Q12H  nicotine, 1 patch, Transdermal, Q24H  senna-docusate sodium, 2 tablet, Oral, BID  sodium chloride, 10 mL, Intravenous, Q12H      Continuous Infusions:sodium chloride, 100 mL/hr, Last Rate: 100 mL/hr (12/10/22 0530)        Vital signs in last 24 hours:  Temp:  [97.1 °F (36.2 °C)-98.1 °F (36.7 °C)] 98 °F (36.7 °C)  Heart Rate:  [66-85] 66  Resp:  [16] 16  BP: (114-214)/() 158/82    Intake/Output:    Intake/Output Summary (Last 24 hours) at 12/10/2022 0732  Last data filed at 2022 2327  Gross per 24 hour   Intake 1290 ml   Output 300 ml   Net 990 ml       Exam:  /82 (BP Location: Left arm, Patient Position: Lying)   Pulse 66   Temp 98 °F (36.7 °C) (Oral)   Resp 16   Ht 152.4 cm (60\")   Wt 45.3 kg (99 lb 14.4 oz)   LMP  (LMP Unknown)   SpO2 94%   BMI 19.51 kg/m²   General: No acute distress, Aox3  Pulmonary: Non-labored breathing  Cardiovascular: Regular rate and rhythm   Right upper extremity  Fingers warm and well-perfused with brisk capillary refill.  Radial pulse not palpable(patient states she had a stent placed in her right upper extremity a few years back) pulse oximetry in right hand is 99% on room air  Sensation normal in right hand  Right arm and forearm compartments soft and compressible no signs of DVT or compartment syndrome  Splint clean dry " and intact              Data Review:  Lab Results   Component Value Date    CALCIUM 8.3 (L) 12/10/2022     Results from last 7 days   Lab Units 12/10/22  0420 12/09/22  1203   AST (SGOT) U/L  --  20   SODIUM mmol/L 137 134*   POTASSIUM mmol/L 3.6 4.5   CHLORIDE mmol/L 102 97*   CO2 mmol/L 24.0 25.6   BUN mg/dL 13 20   CREATININE mg/dL 0.72 0.83   GLUCOSE mg/dL 88 92   CALCIUM mg/dL 8.3* 9.1   WBC 10*3/mm3 5.97 7.34   HEMOGLOBIN g/dL 10.1* 11.3*   PLATELETS 10*3/mm3 180 191   ALT (SGPT) U/L  --  10     Lab Results   Component Value Date    CKTOTAL 170 12/09/2022    CKMB 2.84 06/05/2017    TROPONINT <0.010 12/09/2022     Estimated Creatinine Clearance: 44.6 mL/min (by C-G formula based on SCr of 0.72 mg/dL).  WEIGHTS:     Wt Readings from Last 1 Encounters:   12/09/22 1646 45.3 kg (99 lb 14.4 oz)   12/09/22 1048 45.4 kg (100 lb)         Assessment:    Type I or II open fracture of right olecranon process    Type I or II open fracture of olecranon process of right ulna, initial encounter      Plan:  82 yoF with R possible open comminuted olecranon fracture. I discussed the risk benefits and alternatives of surgical fixation with the patient including but not limited to infection, nonunion, malunion, need for more surgery, elbow stiffness, and damage to arteries veins and nerves most particularly the ulnar nerve.  She understands the risks and elected to proceed     Plan for disposition:Where: inpatient      Lito Reeder MD  12/10/2022  07:32 EST

## 2022-12-10 NOTE — ANESTHESIA PREPROCEDURE EVALUATION
Anesthesia Evaluation     Patient summary reviewed and Nursing notes reviewed   no history of anesthetic complications:  NPO Solid Status: > 8 hours  NPO Liquid Status: > 8 hours           Airway   Mallampati: II  TM distance: >3 FB  No difficulty expected  Dental    (+) upper dentures    Pulmonary     breath sounds clear to auscultation  (+) a smoker (one pack every 3 days) Current, COPD (uses inhalers as needed) moderate, sleep apnea (no machine), decreased breath sounds,     ROS comment: Oxygen saturations drop with sedation.  Cardiovascular   Exercise tolerance: poor (<4 METS)    ECG reviewed  PT is on anticoagulation therapy  Patient on routine beta blocker and Beta blocker given within 24 hours of surgery  Rhythm: irregular  Rate: normal    (+) hypertension well controlled less than 2 medications, valvular problems/murmurs MR, CAD, cardiac stents more than 12 months ago dysrhythmias Atrial Fib, PVD, hyperlipidemia,  carotid artery disease carotid bilateral    ROS comment: Comparison: compared with previous ECG from 10/6/2022   Similar to previous ECG   Comments: Rate of 74, A. fib, normal axis, no acute ST elevation or   depression.   Interpreted by Ramin Silvestre MD on 12/9/2022  4:02 PM EST           09/28/2022 ECHO  Calculated left ventricular EF = 62% Estimated left ventricular EF was in agreement with the calculated left ventricular EF. Left ventricular systolic function is normal.      Neuro/Psych  (+) TIA, syncope (3 falls on day admitted), psychiatric history Anxiety and Depression,    GI/Hepatic/Renal/Endo    (+)   renal disease (only one functioning kidney), thyroid problem (no replacement) hypothyroidism  GERD: occ.    Musculoskeletal     (+) back pain, chronic pain (on pain meds for neck and back), neck pain, radiculopathy Right lower extremity  Abdominal  - normal exam   Substance History - negative use     OB/GYN negative ob/gyn ROS         Other   arthritis (hands and knees),      ROS/Med  Hx Other: Vascular stent                Anesthesia Plan    ASA 4     general     (Risks discussed with patient, possible post-op ventilation and possible ICU admit post-op discussed with patient.)  intravenous induction     Anesthetic plan, risks, benefits, and alternatives have been provided, discussed and informed consent has been obtained with: patient.    Use of blood products discussed with patient  Consented to blood products.       CODE STATUS:    Level Of Support Discussed With: Patient  Code Status (Patient has no pulse and is not breathing): CPR (Attempt to Resuscitate)  Medical Interventions (Patient has pulse or is breathing): Full Support

## 2022-12-10 NOTE — CONSULTS
"[facility]        Patient Name: Kaylin Ojeda  Age/Sex: 82 y.o. female  : 1940  MRN: [id number]    Date of Admission: 2022  Date of Encounter Visit: 12/10/22  Encounter Provider: Sven Mathews MD  Referring Provider: Lito Reeder MD  Place of Service: [facility] CARDIOLOGY  Patient Care Team:  Donna Forte MD as PCP - General (Internal Medicine & Pediatrics)  Donna Forte MD as PCP - Family Medicine  Donna Forte MD as Referring Physician (Internal Medicine & Pediatrics)    Subjective:     Chief Complaint: Fall with right arm fracture    Reason for consult: Perioperative risk evaluation    History of Present Illness:  Kaylin Ojeda is a 82 y.o. female who follows Dr. Calero in our office.  Patient was admitted with mechanical fall.  We have been asked to be seen for preoperative risk evaluation.     Ms. Ojeda is a 82-year-old lady past medical history paroxysmal atrial fibrillation, previous refractory hypertension now better controlled, single-vessel CAD status post PCI , noted to be patent on recent left heart cath late , valvular cardiomyopathy with moderate to severe aortic insufficiency, moderate mitral regurgitation, mild to moderate tricuspid regurgitation.  She was offered surgical repair of her valvular heart issues by Dr. Crane but ultimately declined because she was felt to be too high risk for open heart surgery.  She since has done well with medical therapy.  She had a recent brief hospitalization in September for A. fib with RVR which was controlled with medications.  She since then has done well.    She had a mechanical fall 3 days ago and was admitted yesterday for sideration of surgical repair, right ORIF olecranon due to her fracture.  She does not recall the exact events of her fall, but she states that she remembers her \"legs getting weak\" and then does not recall the " events afterward.  She may have some retrograde amnesia.  Her last fall was more than 4 years ago where she fell down the stairs after losing her footing.  Cardiology was consulted for preoperative risk evaluation.    Prior Cardiac Testing:  Noted above in HPI      Past Medical History:  Past Medical History:   Diagnosis Date   • A-fib ([facility])    • Abdominal pain, epigastric     Chronic, unclear cause, improved   • Anorexia    • Anxiety    • Arthritis    • CAD (coronary artery disease)     Cath 5/2015: nonobstructive LAD/RCA disease, 90% mid LCx s/p [id number] Xience.  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[facility])    • Mononucleosis    • Occlusion and stenosis of carotid artery    • Onychomycosis    • Orbit fracture ([facility])    • PAF (paroxysmal atrial fibrillation) ([facility])    • Peripheral arterial disease ([facility])     Significant (carotid, subclavian, renal arteries, lower extremities), with claudication, medical therapy only   • Pernicious anemia    • Prediabetes    • Renal artery stenosis ([facility])     unilateral, with renal atrophy (no intervention required)   • Renal calculi    • Sinus bradycardia     mild, asymptomatic   • TIA (transient ischemic attack)    • UTI (urinary tract infection)    • Valvular heart disease     1/2021: mod-severe AI, mod MR, mild-mod TR   • Vision problem    • Vitamin B 12 deficiency        Past Surgical History:   Procedure Laterality Date   • APPENDECTOMY     •  BREAST BIOPSY Left     20+ yrs ago   • BREAST SURGERY     • CARDIAC CATHETERIZATION  05/2015    nonobs LAD/RCA disease, 90% mid LCx s/p [id number] Xience   • CARDIAC CATHETERIZATION N/A 10/28/2020    Procedure: Right and Left Heart Cath;  Surgeon: Opal Contreras MD;  Location:  [facility];  Service: Cardiovascular;  Laterality: N/A;  for cardiac testing prior to possible valve surgery per Dr. Mai   • CARDIAC CATHETERIZATION N/A 10/28/2020    Procedure: Coronary angiography;  Surgeon: Opal Contreras MD;  Location:  [facility];  Service: Cardiovascular;  Laterality: N/A;   • CERVICAL FUSION  1997   • CHOLECYSTECTOMY     • CORONARY STENT PLACEMENT     • HYSTERECTOMY  1995   • KNEE SURGERY Right 2005   • KNEE SURGERY Left 1998       Home Medications:   Facility-Administered Medications Prior to Admission   Medication Dose Route Frequency Provider Last Rate Last Admin   • cyanocobalamin injection 1,000 mcg  1,000 mcg Intramuscular Q28 Days Donna Forte MD   1,000 mcg at 04/19/22 1419     Medications Prior to Admission   Medication Sig Dispense Refill Last Dose   • apixaban (ELIQUIS) 2.5 MG tablet tablet Take 1 tablet by mouth 2 (Two) Times a Day. 60 tablet 0 12/9/2022 at 0830   • atorvastatin (Lipitor) 10 MG tablet Take 1 tablet by mouth Daily. 90 tablet 1 12/9/2022 at 0830   • budesonide-formoterol (SYMBICORT) 160-4.5 MCG/ACT inhaler Inhale 2 puffs 2 (Two) Times a Day. (Patient taking differently: Inhale 2 puffs Every 12 (Twelve) Hours As Needed. prn) 1 inhaler 12    • clonazePAM (KlonoPIN) 0.5 MG tablet Take 1 tablet by mouth At Night As Needed for Anxiety. 30 tablet 0 12/8/2022 at 0830   • FLUoxetine (PROzac) 20 MG capsule TAKE 1 CAPSULE BY MOUTH DAILY. 30 capsule 0 12/8/2022 at 2100   • metoprolol tartrate (LOPRESSOR) 25 MG tablet Take ONE-HALF tablet (12.5 mg) by mouth Every 12 (Twelve) Hours. 60 tablet 1 12/9/2022 at 0830   • Misc. Devices (Rollator Ultra-Light)  "misc 1 each Take As Directed. 1 each 0    • oxyCODONE-acetaminophen (Percocet) 7.5-325 MG per tablet Take 1 tablet by mouth Every 8 (Eight) Hours As Needed for Severe Pain. 90 tablet 0    • vitamin D (ERGOCALCIFEROL) 1.25 MG (77923 UT) capsule capsule Take 1 capsule by mouth 1 (One) Time Per Week. 13 capsule 3 12/4/2022 at 2100   • albuterol (PROVENTIL) (2.5 MG/3ML) 0.083% nebulizer solution Take 2.5 mg by nebulization Every 4 (Four) Hours As Needed for Wheezing or Shortness of Air. 150 mL 2 Unknown   • fluticasone (FLONASE) 50 MCG/ACT nasal spray SPRAY TWICE IN EACH NOSTRIL EVERY DAY FOR 30 DAYS. (Patient taking differently: As Needed. prn) 16 mL 6 Unknown       Allergies:  Allergies   Allergen Reactions   • Aleve [Naproxen Sodium] Shortness Of Breath   • Codeine Unknown - Low Severity     hyperactivity   • Ibuprofen Unknown - Low Severity     unk   • Sulfa Antibiotics Unknown - Low Severity     \"I can't remember\"       Past Social History:  Social History     Socioeconomic History   • Marital status:      Spouse name: Willie   • Number of children: 3   • Years of education: Some College   Tobacco Use   • Smoking status: Some Days     Packs/day: 0.20     Years: 50.00     Pack years: 10.00     Types: Cigarettes   • Smokeless tobacco: Never   • Tobacco comments:     trying to quit, pt states she has basically quit but will still have one when she stressed   Vaping Use   • Vaping Use: Never used   Substance and Sexual Activity   • Alcohol use: Not Currently     Comment: OCCASIONAL/ TWICE A YEAR. // daily caffiene   • Drug use: No   • Sexual activity: Defer       Past Family History:  Family History   Problem Relation Age of Onset   • Asthma Mother    • Emphysema Mother    • Graves' disease Mother    • Heart disease Mother    • Hypertension Mother    • Emphysema Father    • Hypertension Father    • Heart disease Father    • Kidney disease Sister    • Lupus Daughter         Systemic erythematosus   • Arthritis " Other    • Crohn's disease Other    • Breast cancer Niece    • Breast cancer Maternal Cousin    • Breast cancer Maternal Cousin        REVIEW OF SYSTEMS:   14 point ROS was performed and is negative except as outlined in HPI       Objective:   Temp:  [97.1 °F (36.2 °C)-98.1 °F (36.7 °C)] 98 °F (36.7 °C)  Heart Rate:  [66-85] 66  Resp:  [16] 16  BP: (114-214)/() 158/82     Intake/Output Summary (Last 24 hours) at 12/10/2022 0653  Last data filed at 12/9/2022 2327  Gross per 24 hour   Intake 1290 ml   Output 300 ml   Net 990 ml     Body mass index is 19.51 kg/m².      12/09/22  1048 12/09/22  1646   Weight: 45.4 kg (100 lb) 45.3 kg (99 lb 14.4 oz)     Weight change:     General Appearance:    Alert, cooperative, in no acute distress, bilateral ecchymotic changes at orbits, scattered across face.   Head:    Normocephalic, without obvious abnormality, mild induration area on right upper forehead with bruising noted above.   Eyes:            Lids and lashes normal, conjunctivae and sclerae normal, no   icterus, no pallor, corneas clear, PERRLA   Ears:    Ears appear intact with no abnormalities noted   Throat:   No oral lesions, no thrush, oral mucosa moist   Neck:   No adenopathy, supple, trachea midline, no thyromegaly, no   carotid bruit, no JVD   Back:     No kyphosis present, no scoliosis present, no skin lesions, erythema or scars, no tenderness to percussion or palpation, range of motion normal   Lungs:     Clear to auscultation,respirations regular, even and unlabored    Heart:    Regular rhythm and normal rate, normal S1 and S2, scant diastolic murmur noted at left lower sternal border, no gallop, no rub, no click   Chest Wall:    No abnormalities observed   Abdomen:     Normal bowel sounds, no masses, no organomegaly, soft, nontender, nondistended, no guarding, no rebound  tenderness   Extremities:  Right upper extremity in Ace bandage and brace.   Pulses:   Pulses palpable and equal bilaterally. Normal  radial, carotid, femoral, dorsalis pedis and posterior tibial pulses bilaterally. Normal abdominal aorta   Skin:  Psychiatric:   No bleeding, bruising or rash    Alert and oriented x 3, normal mood and affect     Lab Review:   Results from last 7 days   Lab Units 12/10/22  0420 12/09/22  1203   SODIUM mmol/L 137 134*   POTASSIUM mmol/L 3.6 4.5   CHLORIDE mmol/L 102 97*   CO2 mmol/L 24.0 25.6   BUN mg/dL 13 20   CREATININE mg/dL 0.72 0.83   GLUCOSE mg/dL 88 92   CALCIUM mg/dL 8.3* 9.1   AST (SGOT) U/L  --  20   ALT (SGPT) U/L  --  10     Results from last 7 days   Lab Units 12/09/22  1203   CK TOTAL U/L 170   TROPONIN T ng/mL <0.010     Results from last 7 days   Lab Units 12/10/22  0420 12/09/22  1203   WBC 10*3/mm3 5.97 7.34   HEMOGLOBIN g/dL 10.1* 11.3*   HEMATOCRIT % 31.6* 35.1   PLATELETS 10*3/mm3 180 191     Results from last 7 days   Lab Units 12/09/22  1203   INR  1.10   APTT seconds 31.1               Invalid input(s): LDLCALC            Echo EF Estimated  Lab Results   Component Value Date    ECHOEFEST 60 11/19/2020       EKG:   I personally viewed and interpreted the patient's EKG    EKG was reviewed from admission and notable for sinus rhythm with PAC, there was some artifact which is causing misinterpretation of the QT interval, QTC appears normal.  Overall, normal EKG    Imaging:  Imaging Results (Most Recent)     Procedure Component Value Units Date/Time    XR Ribs 2 View Right [[id number]] Collected: 12/09/22 2042     Updated: 12/09/22 2044    Narrative:      CR Ribs 2 Vws WO Chest RT    INDICATION:   Right lower rib pain after fall    COMPARISON:   NONE    FINDINGS:  PA view of the chest and oblique views of the right ribs. 4 total images. Minimally displaced fracture of the posterolateral right 11th rib. The adjacent lung is clear and fully expanded.      Impression:      Minimally displaced fracture of the posterior lateral right 11th rib.    Signer Name: Cornell Acosta MD   Signed: 12/9/2022 8:42 PM    Workstation Name: [id number]    [facility]    XR Knee 3 View Right [[id number]] Collected: 12/09/22 2037     Updated: 12/09/22 2039    Narrative:      CR Knee 3 Vws RT    INDICATION:   Knee pain after fall    COMPARISON:   None available.    FINDINGS:  AP, lateral, and sunrise view(s) of the right knee.      Right knee arthroplasty in anatomic alignment. No evidence of acute fracture or dislocation.      Impression:        Right knee arthroplasty in anatomic alignment. No evidence of acute fracture or dislocation.    Signer Name: Cornell Acosta MD   Signed: 12/9/2022 8:37 PM   Workstation Name: [id number]    [facility]    XR Knee 3 View Left [[id number]] Collected: 12/09/22 2008     Updated: 12/09/22 2010    Narrative:      CR Knee 3 Vws LT    INDICATION:   Knee pain after fall    COMPARISON:   None available.    FINDINGS:  3 view(s) of the left knee. Total knee replacement. Hardware appears appropriately aligned. No radiographic evidence of displaced fracture or dislocation..      Impression:      Total knee replacement. No convincing acute bony abnormality.    Signer Name: JONATHAN MIRANDA MD   Signed: 12/9/2022 8:08 PM   Workstation Name: [facility]    [facility]    CT Upper Extremity Right Without Contrast [[id number]] Collected: 12/09/22 1437     Updated: 12/09/22 1439    Narrative:      CT UE RT WO    INDICATION:    Fell 3 days ago with abnormal elbow x-ray.    TECHNIQUE:   CT of the right elbow without IV contrast. Coronal and sagittal reconstructions were obtained.  Radiation dose reduction techniques included automated exposure control or exposure modulation based on body size. Count of known CT and cardiac nuc med  studies performed in previous 12 months: 0.      COMPARISON:    Right elbow series 12/9/2022    FINDINGS:  Moderately comminuted and displaced fracture of the proximal ulna and olecranon with fracture extending through the ulnar  notch and up to 2.2 cm of proximal retraction of the olecranon with an apparently intact triceps tendon. 1.8 cm ossific fragment  within the gap. The coronoid process remains intact. The radius remains intact and normally articulating with the humerus. No evidence of distal humeral fracture.    Extensive soft tissue swelling and edema along the posterior elbow and posterior upper arm with some areas of increased attenuation compatible with hemorrhage and hematoma. Moderate amount of soft tissue gas primarily tracking along the posterior and  lateral aspect of the upper arm. This is predominantly within the deep subcutaneous tissues is also extensive interfascial. There is skin thickening with reticulation of the subcutaneous tissues. Large elbow effusion. Biceps and brachialis tendon  insertions appear intact. Common extensor and common flexor tendon origins appear to be grossly intact. Visualized musculature unremarkable. No definite breach of the skin surface though some punctate calcifications along the proximal ulna and olecranon  extending towards the skin surface and may reside within the olecranon bursa.      Impression:      Comminuted and displaced intra-articular fracture of the proximal ulna with disruption of the ulnar notch and proximal displacement of a large olecranon fragment with several additional fragments within the fracture defect.    Extensive posterior elbow, distal upper arm and proximal forearm soft tissue swelling and edema with some areas of confluent edema and reticulation of subcutaneous tissues. This could represent soft tissue contusion with some areas of increased  attenuation at the site of the fracture compatible with small areas of hematoma/hemorrhage.    Extensive soft tissue gas predominantly within the distal upper arm along the posterior lateral aspect. This could be secondary to open fracture but a discrete break in the skin or overlying soft tissue is not visualized.  Correlate for possible  superimposed soft tissue infection. There is a moderate-sized joint effusion. No definite drainable soft tissue abscess or fluid collection    The major tendons about the elbow appear intact.    Signer Name: TAMIKA Flores MD   Signed: 12/9/2022 2:37 PM   Workstation Name: [id number]    [facility]    CT Facial Bones Without Contrast [[id number]] Collected: 12/09/22 1211     Updated: 12/09/22 1213    Narrative:      CT Max Facial Area WO    INDICATION:   Fall 3 days ago with facial trauma    TECHNIQUE:   Maxillofacial CT without IV contrast. Coronal and sagittal reconstructions were obtained.  Radiation dose reduction techniques included automated exposure control or exposure modulation based on body size. Count of known CT and cardiac nuc med studies  performed in previous 12 months: 0.     COMPARISON:    No pertinent prior study    FINDINGS:  The mandible and the maxilla appear intact. No nasal bone fracture is seen.    The walls of the maxillary sinuses are intact. Zygomatic arches are intact.    No orbital wall fractures. The lamina papyracea are intact. The frontal sinus is not significantly developed in this patient.      Impression:      No acute traumatic findings.    Signer Name: Jaime Cheng MD   Signed: 12/9/2022 12:11 PM   Workstation Name: [id number]    [facility]    XR Elbow 3+ View Right [[id number]] Collected: 12/09/22 1206     Updated: 12/09/22 1211    Narrative:      XR ELBOW 3+ VW RIGHT-: 12/9/2022 12:00 PM     INDICATION:   Fell yesterday. Injury..     COMPARISON:   None available.     FINDINGS:   3 views of the right elbow.  There is a complete comminuted and  distracted fracture of the olecranon. At least 3 dominant fracture  fragments are present, with the largest fracture fragment measuring 2.1  cm and displaced posteriorly on the lateral projection a distance of at  least 1.8 cm. There is overlying soft tissue swelling.  Radial head  appears intact. Probable subcutaneous gas related to open fracture  deformity. No bone erosion or destruction.  No foreign body.       Impression:         1. Comminuted complete and displaced fracture of the olecranon.  Associated soft tissue swelling and subcutaneous gas suggestive of open  fracture.     This report was finalized on 12/9/2022 12:09 PM by Dr. Rodolfo Chavez MD.       CT Cervical Spine Without Contrast [[id number]] Collected: 12/09/22 1208     Updated: 12/09/22 1210    Narrative:      CT Spine Cervical WO    INDICATION:   Fall 3 days ago with neck pain    TECHNIQUE:   CT of the cervical spine without IV contrast. Coronal and sagittal reconstructions were obtained.  Radiation dose reduction techniques included automated exposure control or exposure modulation based on body size. Count of known CT and cardiac nuc med  studies performed in previous 12 months: 3.     COMPARISON:  No pertinent prior study    FINDINGS:  The sagittal alignment is satisfactory. Prior cervical fusion at C5-C6. Vertebral body heights are maintained. Mild demineralization of osseous structures. Disc space narrowing at C3-C4, C4-C5 and C6-C7.    Facets are intact. Posterior elements show normal alignment. Prevertebral soft tissues are normal.    The odontoids intact. The ring of C1 is intact.    No clearly acute findings demonstrated.      Impression:      No clearly acute radiographic findings.    Evidence of prior fusion at C5-C6. 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    Clinical aspects of the case will determine if MR of the cervical spine could provide additional information.    Signer Name: Jaime Cheng MD   Signed: 12/9/2022 12:08 PM   Workstation Name: [id number]    [facility]    CT Head Without Contrast [[id number]] Collected: 12/09/22 1201     Updated: 12/09/22 1203    Narrative:      CT Head WO    HISTORY:   Fall 3 days ago with head trauma.    TECHNIQUE:   Axial  unenhanced head CT. Radiation dose reduction techniques included automated exposure control or exposure modulation based on body size. Count of known CT and cardiac nuc med studies performed in previous 12 months: 3.     Time of scan: 11:45 AM    COMPARISON:   9/28/2022    FINDINGS:     The ventricles are generous in size. Cortical sulci are correspondingly prominent. No extraaxial fluid collections are seen.    Scalp hematoma demonstrated in the right frontal region.    No parenchymal or subarachnoid hemorrhage is present. Periventricular hypodensities are demonstrated which are nonspecific but likely represent white matter microvascular change in a patient of this age. No CT evidence of mass or acute infarct.    The mastoid air cells are clear. The visualized paranasal sinuses are clear.  Orbital structures demonstrate no acute findings.      Impression:      Impression:  1. No clearly acute intracranial pathology demonstrated.    2. Generalized cerebral atrophy and nonspecific periventricular hypodensities which likely represent white matter microvascular change. Scalp hematoma in the right frontal area.    Signer Name: Jaime Cheng MD   Signed: 12/9/2022 12:01 PM   Workstation Name: [id number]    [facility]              Assessment:     Active Hospital Problems    Diagnosis  POA   • **Type I or II open fracture of right olecranon process [S52.021B]  Unknown   • Type I or II open fracture of olecranon process of right ulna, initial encounter [S52.021B]  Yes      Resolved Hospital Problems   No resolved problems to display.          Plan:     1. Preoperative risk evaluation for upcoming low risk right olecranon ORIF: At this point, her cardiovascular risk is nonmodifiable, but her known valvular cardiomyopathy may complicate her operative course with intermediate risk..  She has had a pretty thorough work-up of her cardiovascular disease, she does not have any significant obstructive CAD on  a cath within the last 2 years, and has no new symptoms to suggest worsening.  She is not acutely decompensated from heart failure.  Her fall was mechanical.  I would continue her home cardiac medications including metoprolol.  And other cardiac medications including Lipitor should be continued.   - I would caution any aggressive IV maintenance fluid, but she currently appears  compensated.  2. Atrial fibrillation, paroxysmal: She is currently in sinus rhythm.  She is certainly at risk for postoperative atrial fibrillation, so would continue her oral metoprolol.  Can give IV metoprolol if needed if she develops A. fib.  Would resume her Eliquis when safe from surgical perspective.  It appears her fall was mechanical, if she continues to mechanical falls the need for anticoagulation to be readdressed.  3. Hyperlipidemia: Continue statin    Thank you for allowing me to participate in the care of Kaylin Ojeda. Feel free to contact me directly with any further questions or concerns.  Given the stability of her cardiac issues, cardiology will sign off at this time.  Would resume her Eliquis when safe from surgical perspective per above.    Sven Mathews MD  [facility]  12/10/22  06:53 EST      EMR Dragon/Transcription disclaimer:   Much of this encounter note is an electronic transcription/translation of spoken language to printed text. The electronic translation of spoken language may permit erroneous, or at times, nonsensical words or phrases to be inadvertently transcribed; Although I have reviewed the note for such errors, some may still exist.

## 2022-12-10 NOTE — CASE MANAGEMENT/SOCIAL WORK
Discharge Planning Assessment   Chrissy Lowe     Patient Name: Kayiln Ojead  MRN: [id number]  Today's Date: 12/10/2022    Admit Date: 12/9/2022    Plan: [facility] for STR vs Home with HH   Discharge Needs Assessment     Row Name 12/10/22 1310       Living Environment    People in Home alone    Current Living Arrangements apartment    Duration at Residence 2 to 3 Y    Potentially Unsafe Housing Conditions --  none    Primary Care Provided by self    Provides Primary Care For no one    Caregiving Concerns caring for self due to RUE limitation    Family Caregiver if Needed child(asya), adult    Family Caregiver Names Manish,, son and Francine daughter in law    Quality of Family Relationships helpful;involved;supportive    Able to Return to Prior Arrangements --  unsure at this time.    Living Arrangement Comments Son states patient lives alone in a first floor apartment with one step to gain entry       Resource/Environmental Concerns    Resource/Environmental Concerns none    Transportation Concerns none       Transition Planning    Patient/Family Anticipates Transition to home with help/services;inpatient rehabilitation facility    Patient/Family Anticipated Services at Transition ;durable medical equipment;outpatient care;skilled nursing    Transportation Anticipated family or friend will provide  son states that either he or his wife will be able to provide ride home at discharge       Discharge Needs Assessment    Readmission Within the Last 30 Days no previous admission in last 30 days    Current Outpatient/Agency/Support Group --  none    Equipment Currently Used at Home rollator;grab bar    Concerns to be Addressed adjustment to diagnosis/illness;discharge planning    Concerns Comments son states he thinks patient may need STR prior to returning home.    Anticipated Changes Related to Illness inability to care for self    Equipment Needed After Discharge --  unsure at this time     "Outpatient/Agency/Support Group Needs homecare agency    Discharge Facility/Level of Care Needs home with home health;nursing facility, skilled    Provided Post Acute Provider List? Yes    Post Acute Provider List Inpatient Rehab;Nursing Home    Provided Post Acute Provider Quality & Resource List? Yes    Post Acute Provider Quality and Resource List Inpatient Rehab;Nursing Home    Delivered To Support Person    Support Person Manish Rosenthal, son    Method of Delivery In person    Patient's Choice of Community Agency(s) none    Current Discharge Risk dependent with mobility/activities of daily living    Discharge Coordination/Progress Son states he is unsure at this time if patient will be able to return home and CM provided him with a list of SNF's for STR if needed. CM will follow back up with pt when more awake.               Discharge Plan     Row Name 12/10/22 2526       Plan    Plan SNF for STR vs Home with HH    Plan Comments Into room and introduced self and role of CM. Discussed discharge disposition with patient and her son Manish with permission. Patient is sleeping in bed and is hard to stay awake and has given CM permission to discuss discharge plans with her son. Son confirms that the info on her face sheet is correct and that she see's Dr. Donna Forte as PCP. He states that she uses [facility] in [place] and she has no problem paying for her meds stating \"her doctor gives her samples of her blood thinner because that is very expensive\" and that the pharmacy delivers the meds. He states that he is unsure if she has a living will and states that he is her POA and CM provided him with information regarding one. Son states that patient lives alone in a first floor apartment with one step to gain entry and usually has no issues entering the home or maneuvering inside. He states that she is normally independent with her ADL's and drives and that either he or his wife will be able to provide ride " "home at discharge. He also states that she has grab bar and uses a rollator and is unsure if she will need any other equipment at discharge. He also states that she has used home health in the past but is unsure is she will need this service at discharge. CM discussed with son the possibility of STR at discharge prior to returning home and he states \"yes she might need that\". CM informed him that we would wait to see what PT/OT recommends after they evaluate her tomorrow and he verbalized understanding. CM asked son if his mother had ever been to a facility or if he had a preference and she states no. CM provided son with a list of facilities to discuss with his mother when she is more awake. Son had no other questions or concerns regarding discharge plans. CM will follow back up with patient tomorrow once more awake to review discharge plan. Name and number placed on white board in room. CM will follow.              Continued Care and Services - Admitted Since 12/9/2022    Coordination has not been started for this encounter.          Demographic Summary     Row Name 12/10/22 1310       General Information    Admission Type observation    Arrived From home    Referral Source admission list    Reason for Consult discharge planning    Preferred Language English       Contact Information    Permission Granted to Share Info With                Functional Status    No documentation.                Psychosocial    No documentation.                Abuse/Neglect    No documentation.                Legal    No documentation.                Substance Abuse    No documentation.                Patient Forms    No documentation.                   Jeanette Kelly RN    "

## 2022-12-10 NOTE — SIGNIFICANT NOTE
12/10/22 1132   OTHER   Discipline physical therapist   Rehab Time/Intention   Session Not Performed other (see comments)  (Patient is surgery. Discussed in plan care meeting this moring. Will follow up with patient in the morning.)

## 2022-12-10 NOTE — PLAN OF CARE
Goal Outcome Evaluation:           Progress: no change  Outcome Evaluation: Had orif of right elbow today.Having periods of confusion post op. Moved closer to nursing station. Up to bathroom assist of one. Tolerating regular diet

## 2022-12-10 NOTE — OP NOTE
Date of surgery: 12/10/22    Preoperative diagnosis:  1.  Right open olecranon fracture    Postoperative diagnosis:  1.  Right open comminuted olecranon fracture    Procedure:  1.  Open reduction internal fixation right open olecranon fracture  2. Irrigation and debridement open olecranon fracture    Surgeon: Lito Reeder MD    Assistant.:  Assistant: Nery Hairston CSA was responsible for performing the following activities: Retraction, Suction and Irrigation and their skilled assistance was necessary for the success of this case.     Anesthesia: General    Estimated blood loss: 200 ccs    Urine output: Not recorded    Specimen: None    Drains: None    Complications: None    No Tourniquet Times Documented    Implants: [employer] Freefix olecranon plating system,     Indications for procedure: Patient is a pleasant 82 y.o. female who unfortunately fell on Wednesday sustaining a poke hole open comminuted olecranon fracture..  Given significant limitation and use of right upper extremity as well as associated pain and displacement of the fracture, patient wished to proceed with open reduction internal fixation right olecranon and all associated procedures.  Patient was explained details of the procedure as well as risks, benefits, and alternatives as documented on history and physical and had all questions answered prior to signing the operative consent form.  I especially emphasized the risk of damage or palsy to the ulnar nerve resulting in hand weakness and numbness.  No guarantees were given in regards to results of surgery.    Details of procedure: Patient was seen and evaluated and cleared for surgery by anesthesia.  Patient was met in the preoperative holding area, operative site was marked and consent was reviewed, history physical was updated, and preoperative labs were reviewed.  Patient was then taken operative room and placed in supine position on a regular OR table with hand table to the  operative side.  After appropriate sedation per anesthesia the patient was placed laterally on a bean bag and all blake prominences were appropriately padded.   The right arm was draped over a paint roller and sterilely prepped and draped in standard fashion.    Formal timeout was completed including confirmation of history physical, operative consent, surgical site, patient identification number, and preoperative antibiotic administration.    Procedure was begun with a standard 15 cm incision over the ulnar boarder extended proximally and laterally around the olecranon with a 15 blade with care to incorporate the poke hole incision.  Incision was carried through subcutaneous layer with tenotomy scissor.  An interval was then developed between the flexor and extensor wads.  At this point the fracture fragments were exposed and we extended our dissection 1 cm proximally to expose the triceps tendon.    We then turned our attention back to her fracture site.  The entire zone of injury was exposed. Systematic debridement was carried out. Devitalized skin edges were excised with a scalpel. The subcutaneous tissue was curetted, removing debris. The fascia and muscle in the area of injury was gently removed with rongeurs where unhealthy and damaged. Loose bone fragments stripped of all soft tissue were removed. The fracture edges were delivered from the wound with a bone hook and curetted gently. The wound was irrigated with 6 liters of normal saline. 2nd look debridement was performed to satisfaction. The wound was irrigated again.      A #2 FiberWire was then placed through the distal triceps in a looped fashion to aid in proximal fragment reduction. the fracture was then provisionally reduced with extension of the elbow and held in place with 2 K wires were then placed in order to provisionally hold the fracture reduced.  The plate was then placed and held in place with a K wire through the plate. AP and lateral  radiographs were then taken to confirm maintenance of reduction and adequate plate placement.  We then placed 1 cortical screw distally in the shaft and then turned our attention proximally to the homerun screw.  We then placed 2 proximal locking screws in a retrograde fashion into the proximal fragment.  We then placed 2 locking screws into the coronoid piece and then 2 more distal locking screws in the shaft.  We then tied the #2 FiberWire through the proximal suture holes in the plate and tightened them to help prevent superior escape of the proximal fragment.    At this point time final radiographs were taken and the plate was checked again to ensure that the ulnar nerve was protected and not underneath the plate.  The elbow was taken through a range of motion from flexion and extension and forearm pronation and supination to ensure there were no screws penetrating the radiocapitellar or ulnohumeral joint.  final radiographs confirmed anatomic reduction of the fracture fragments, adequate screw length, and accurate plate positioning.    The wound was then thoroughly irrigated with normal saline.  Hemostasis was obtained with Bovie electrocautery.  Attention was then turned to closure of the wound with 2-0 monocryl for subcutaneous layer and 3-0 nylon for the skin, 4 x 4 gauze, web roll, and anterior long arm splint.     At the end of procedure all needle and sponge counts were correct ×2.  Patient had brisk cap refill to all digits of the right upper extremity and compartments were soft and easily compressible at the end of the procedure.    Disposition: Patient was appropriately recovered from anesthesia and taken to the recovery room in stable condition.  The patient was evaluated by myself in the recovery room and the radial nerve was functioning.  The patient was able to extend her thumb 4 fingers and wrist.  A postoperative block was then placed by anesthesia we will plan for overnight observation for pain  control and plan for discharge on postoperative day #1.  Results of procedure were discussed immediately postoperatively with patient's family and they had all questions answered at that time.

## 2022-12-10 NOTE — SIGNIFICANT NOTE
12/10/22 1149   OTHER   Discipline occupational therapist   Rehab Time/Intention   Session Not Performed unable to evaluate, medical status change  (pt in surgery today. will follow up on OT on 12-11-22 to address any discharge needs.)

## 2022-12-10 NOTE — PROGRESS NOTES
"SERVICE: [facility] HOSPITALIST    CONSULTANTS: Orthopedic surgery, cardiology    CHIEF COMPLAINT: Olecranon fracture    SUBJECTIVE: No acute events overnight.  Patient denies any fever, chills, chest pain, nausea, vomiting.  She is being seen after her surgery and does state that her right elbow area hurts pretty bad.    OBJECTIVE:    Physical exam is largely unchanged from previous exam, except where documented below, examination is accurate as of 12/10/2022    Physical Exam:  General: Patient is an 82-year-old female, awake and alert.  Almost her whole face is covered in bruising with a contusion on the right frontal aspect of her head  HENT: Head with contusion. Hearing is grossly intact. Nose is without obvious congestion and appears patent. Neck is supple and trachea is midline.   Eyes: Vision is grossly intact. Pupils appear equal and round.   Cardiovascular: Heart has irregularly irregular rhythm with no murmurs, rubs or gallops noted.   Respiratory: Lungs are clear to ausculation without wheezes, rhonchi or rales.   Abdominal/GI: Soft, nontender, bowel sounds present. No rebound or guarding present.   Extremities: Right upper extremity in knee elbow/olecranon area wrapped in extensive amount of bandaging  Musculoskeletal: Spontaneous movement of bilateral upper and lower extremities against gravity noted.   Skin: Warm and dry.   Psych: Mood and affect are appropriate. Cooperative with exam.   Neuro: No facial asymmetry noted. No focal deficits noted, hearing and vision are grossly intact.     /86 (BP Location: Left arm, Patient Position: Lying)   Pulse 75   Temp 97.7 °F (36.5 °C) (Oral)   Resp 14   Ht 152.4 cm (60\")   Wt 45.3 kg (99 lb 14.4 oz)   LMP  (LMP Unknown)   SpO2 92%   BMI 19.51 kg/m²     MEDS/LABS REVIEWED AND ORDERED    atorvastatin, 10 mg, Oral, Daily  budesonide-formoterol, 2 puff, Inhalation, BID - RT  ceFAZolin, 2 g, Intravenous, Q8H  famotidine, 20 mg, Oral, " BID AC  FLUoxetine, 20 mg, Oral, Daily  metoprolol tartrate, 12.5 mg, Oral, Q12H  nicotine, 1 patch, Transdermal, Q24H  senna-docusate sodium, 2 tablet, Oral, BID  sodium chloride, 10 mL, Intravenous, Q12H          LAB/DIAGNOSTICS:    Lab Results (last 24 hours)     Procedure Component Value Units Date/Time    Basic Metabolic Panel [[id number]]  (Abnormal) Collected: 12/10/22 0420    Specimen: Blood Updated: 12/10/22 0534     Glucose 88 mg/dL      BUN 13 mg/dL      Creatinine 0.72 mg/dL      Sodium 137 mmol/L      Potassium 3.6 mmol/L      Chloride 102 mmol/L      CO2 24.0 mmol/L      Calcium 8.3 mg/dL      BUN/Creatinine Ratio 18.1     Anion Gap 11.0 mmol/L      eGFR 84.6 mL/min/1.73      Comment: National Kidney Foundation and American Society of Nephrology (ASN) Task Force recommended calculation based on the Chronic Kidney Disease Epidemiology Collaboration (CKD-EPI) equation refit without adjustment for race.       Narrative:      GFR Normal >60  Chronic Kidney Disease <60  Kidney Failure <15    The GFR formula is only valid for adults with stable renal function between ages 18 and 70.    CBC & Differential [[id number]]  (Abnormal) Collected: 12/10/22 0420    Specimen: Blood Updated: 12/10/22 0510    Narrative:      The following orders were created for panel order CBC & Differential.  Procedure                               Abnormality         Status                     ---------                               -----------         ------                     CBC Auto Differential[[id number]]        Abnormal            Final result                 Please view results for these tests on the individual orders.    CBC Auto Differential [[id number]]  (Abnormal) Collected: 12/10/22 0420    Specimen: Blood Updated: 12/10/22 0510     WBC 5.97 10*3/mm3      RBC 3.34 10*6/mm3      Hemoglobin 10.1 g/dL      Hematocrit 31.6 %      MCV 94.6 fL      MCH 30.2 pg      MCHC 32.0 g/dL      RDW 15.8 %      RDW-SD 55.3 fl      MPV 10.1  fL      Platelets 180 10*3/mm3      Neutrophil % 56.0 %      Lymphocyte % 32.3 %      Monocyte % 9.7 %      Eosinophil % 1.0 %      Basophil % 0.7 %      Immature Grans % 0.3 %      Neutrophils, Absolute 3.34 10*3/mm3      Lymphocytes, Absolute 1.93 10*3/mm3      Monocytes, Absolute 0.58 10*3/mm3      Eosinophils, Absolute 0.06 10*3/mm3      Basophils, Absolute 0.04 10*3/mm3      Immature Grans, Absolute 0.02 10*3/mm3     Urine Drug Screen - Urine, Clean Catch [[id number]]  (Abnormal) Collected: 12/09/22 2118    Specimen: Urine, Clean Catch Updated: 12/09/22 2153     THC, Screen, Urine Negative     Phencyclidine (PCP), Urine Negative     Cocaine Screen, Urine Negative     Methamphetamine, Ur Negative     Opiate Screen Negative     Amphetamine Screen, Urine Negative     Benzodiazepine Screen, Urine Negative     Tricyclic Antidepressants Screen Negative     Methadone Screen, Urine Negative     Barbiturates Screen, Urine Negative     Oxycodone Screen, Urine Positive     Propoxyphene Screen Negative     Buprenorphine, Screen, Urine Negative    Narrative:      Urine drug screen results are to be used for medical purposes only.  They are not to be used for legal purposes such as employment testing.  Negative results do not necessarily mean the complete absence of a subtance, but rather that the result is less than the cutoff for that substance.  Positive results are unconfirmed and considered Preliminary Positive.  [facility] does not automatically confirm Postitive Unconfirmed results.  The physician may request (order) an Unconfirmed Positive result to be sent out for confirmation.      Negative Thresholds for Drugs Screened:    THC screen, urine                          50 ng/ml  Phenycyclidine (PCP), urine                25 ng/ml  Cocaine screen, urine                     150 ng/ml  Methamphetamine, urine                    500 ng/ml  Opiate screen, urine                      100 ng/ml  Amphetamine  screen, urine                 500 ng/ml  Benzodiazepine screen, urine              150 ng/ml  Tricyclic Antidepressants screen, urine   300 ng/ml  Methadone screen, urine                   200 ng/ml  Barbiturates screen, urine                200 ng/ml  Oxycodone screen, urine                   100 ng/ml  Propoxyphene screen, urine                300 ng/ml  Buprenorphine screen, urine                10 ng/ml    Urinalysis With Culture If Indicated - Urine, Clean Catch [[id number]]  (Abnormal) Collected: 12/09/22 2117    Specimen: Urine, Clean Catch Updated: 12/09/22 2147     Color, UA Yellow     Appearance, UA Clear     pH, UA 7.0     Specific Gravity, UA 1.015     Glucose, UA Negative     Ketones, UA Negative     Bilirubin, UA Negative     Blood, UA Trace     Protein, UA Negative     Leuk Esterase, UA Small (1+)     Nitrite, UA Negative     Urobilinogen, UA 0.2 E.U./dL    Narrative:      In absence of clinical symptoms, the presence of pyuria, bacteria, and/or nitrites on the urinalysis result does not correlate with infection.    Urinalysis, Microscopic Only - Urine, Clean Catch [[id number]]  (Abnormal) Collected: 12/09/22 2117    Specimen: Urine, Clean Catch Updated: 12/09/22 2147     RBC, UA 0-2 /HPF      WBC, UA 0-2 /HPF      Comment: Urine culture not indicated.        Bacteria, UA None Seen /HPF      Squamous Epithelial Cells, UA 0-2 /HPF      Hyaline Casts, UA None Seen /LPF      Methodology Manual Light Microscopy    CK [[id number]]  (Normal) Collected: 12/09/22 1203    Specimen: Blood Updated: 12/09/22 1706     Creatine Kinase 170 U/L     Sedimentation Rate [[id number]]  (Normal) Collected: 12/09/22 1203    Specimen: Blood Updated: 12/09/22 1656     Sed Rate 19 mm/hr     Troponin [[id number]]  (Normal) Collected: 12/09/22 1203    Specimen: Blood Updated: 12/09/22 1536     Troponin T <0.010 ng/mL     Narrative:      Troponin T Reference Range:  <= 0.03 ng/mL-   Negative for AMI  >0.03 ng/mL-     Abnormal for  myocardial necrosis.  Clinicians would have to utilize clinical acumen, EKG, Troponin and serial changes to determine if it is an Acute Myocardial Infarction or myocardial injury due to an underlying chronic condition.       Results may be falsely decreased if patient taking Biotin.          ECG 12 Lead   ED Interpretation   Ramin Silvestre MD     12/9/2022  4:02 PM   ECG 12 Lead         Date/Time: 12/9/2022 3:32 PM   Performed by: Ramin Silvestre MD   Authorized by: Ramin Silvestre MD    Interpreted by physician   Comparison: compared with previous ECG from 10/6/2022   Similar to previous ECG   Comments: Rate of 74, A. fib, normal axis, no acute ST elevation or    depression.         ECG 12 Lead Pre-Op / Pre-Procedure   Preliminary Result   HEART RATE= 74  bpm   RR Interval= 812  ms   [place] Interval=   ms   P Horizontal Axis=   deg   P Front Axis=   deg   QRSD Interval= 84  ms   QT Interval= 438  ms   QRS Axis= 78  deg   T Wave Axis= 34  deg   - ABNORMAL ECG -   Atrial fibrillation   Consider left ventricular hypertrophy   Borderline prolonged QT interval   Electronically Signed By:    Date and Time of Study: 2022-12-09 15:32:21        Results for orders placed during the hospital encounter of 09/28/22    Adult Transthoracic Echo Complete w/ Color, Spectral and Contrast if Necessary Per Protocol    Interpretation Summary  · Estimated right ventricular systolic pressure from tricuspid regurgitation is normal (<35 mmHg). Calculated right ventricular systolic pressure from tricuspid regurgitation is 29 mmHg.  · Left ventricular wall thickness is consistent with mild concentric hypertrophy.  · Calculated left ventricular EF = 62% Estimated left ventricular EF was in agreement with the calculated left ventricular EF. Left ventricular systolic function is normal.  · Moderate to severe aortic valve regurgitation is present.  · Mild to moderate tricuspid valve regurgitation is present.  · Mild to moderate mitral  valve regurgitation is present.  · Left atrial volume is severely increased.  · The right atrial cavity is mildly dilated.  · Mild dilation of the ascending aorta is present.    XR Ribs 2 View Right    Result Date: 12/9/2022  Minimally displaced fracture of the posterior lateral right 11th rib. Signer Name: Cornell Acosta MD  Signed: 12/9/2022 8:42 PM  Workstation Name: [place] [id number]  Radiology [facility]    XR Elbow 2 View Right    Result Date: 12/10/2022  Post open reduction internal fixation comminuted fracture proximal ulna with improvement in fracture fragment alignment. No dislocation. Signer Name: Ida Logan MD  Signed: 12/10/2022 11:22 AM  Workstation Name: [facility]  Radiology [facility]    XR Elbow 2 View Right    Result Date: 12/10/2022  ORIF of right olecranon fracture in normal anatomic alignment. Signer Name: TAMIKA Flores MD  Signed: 12/10/2022 10:20 AM  Workstation Name: [facility]  Radiology [facility]    XR Elbow 3+ View Right    Result Date: 12/9/2022   1. Comminuted complete and displaced fracture of the olecranon. Associated soft tissue swelling and subcutaneous gas suggestive of open fracture.  This report was finalized on 12/9/2022 12:09 PM by Dr. Rodolfo Chavez MD.      XR Knee 3 View Left    Result Date: 12/9/2022  Total knee replacement. No convincing acute bony abnormality. Signer Name: JONATHAN MIRANDA MD  Signed: 12/9/2022 8:08 PM  Workstation Name: [facility]  Radiology [facility]    XR Knee 3 View Right    Result Date: 12/9/2022  Right knee arthroplasty in anatomic alignment. No evidence of acute fracture or dislocation. Signer Name: Cornell Acosta MD  Signed: 12/9/2022 8:37 PM  Workstation Name: [place] [id number]  Radiology [facility]    CT Head Without Contrast    Result Date: 12/9/2022  Impression: 1. No clearly acute intracranial pathology demonstrated. 2. Generalized cerebral atrophy and nonspecific periventricular  hypodensities which likely represent white matter microvascular change. Scalp hematoma in the right frontal area. Signer Name: Jaime Cheng MD  Signed: 12/9/2022 12:01 PM  Workstation Name: [id number]  Radiology [facility]    CT Cervical Spine Without Contrast    Result Date: 12/9/2022  No clearly acute radiographic findings. Evidence of prior fusion at C5-C6. 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 Clinical aspects of the case will determine if MR of the cervical spine could provide additional information. Signer Name: Jaime Cheng MD  Signed: 12/9/2022 12:08 PM  Workstation Name: [id number]  Radiology [facility]    CT Facial Bones Without Contrast    Result Date: 12/9/2022  No acute traumatic findings. Signer Name: Jaime Cheng MD  Signed: 12/9/2022 12:11 PM  Workstation Name: [id number]  Radiology [facility]    CT Upper Extremity Right Without Contrast    Result Date: 12/9/2022  Comminuted and displaced intra-articular fracture of the proximal ulna with disruption of the ulnar notch and proximal displacement of a large olecranon fragment with several additional fragments within the fracture defect. Extensive posterior elbow, distal upper arm and proximal forearm soft tissue swelling and edema with some areas of confluent edema and reticulation of subcutaneous tissues. This could represent soft tissue contusion with some areas of increased attenuation at the site of the fracture compatible with small areas of hematoma/hemorrhage. Extensive soft tissue gas predominantly within the distal upper arm along the posterior lateral aspect. This could be secondary to open fracture but a discrete break in the skin or overlying soft tissue is not visualized. Correlate for possible superimposed soft tissue infection. There is a moderate-sized joint effusion. No definite drainable soft tissue abscess or fluid collection The major tendons about the elbow appear intact.  Signer Name: TAMIKA Flores MD  Signed: 12/9/2022 2:37 PM  Workstation Name: [id number]  [facility]        ASSESSMENT/PLAN:  Please note portions of this assessment/plan may have been copied and pasted, but I have personally seen this patient and reviewed each line of this assessment and plan for accuracy and made updates to reflect my necessary changes on 12/10/2022    Acute comminuted, displaced intra-articular fracture of the proximal ulna secondary to fall:  Orthopedic surgery consulted, did an ORIF this a.m.  PT/OT consulted  Control pain and nausea with IV and oral medications-the patient is on large doses of opiates as an outpatient so have canceled the Norco that was ordered and ordered oxycodone which is what she takes at baseline  Fall precautions  Patient needs short-term rehab, discussing with case management and physical therapy/Occupational Therapy     Right lower rib pain: Patient with 11th rib fracture on the right, see rib x-ray     Bilateral knee pain/ecchymosis: Knee x-rays with no acute abnormalities    Delirium/suspected dementia: Patient has already experienced some intermittent confusion.  Short-term memory loss noted during exam     PAF on Eliquis:  CAD/HLD, s/p stent:  Mod-severe AR/mild-mod MR, mild-mod TR:  HTN:   Continue home metoprolol, Lipitor  Holding Eliquis, will restart when orthopedic surgery amenable  Monitor on telemetry     PAD/vasculopathy:  H/O mesenteric artery stenosis, thoracoabdominal aortic aneurysm, carotid artery stenosis:  Continue statin, will restart Eliquis when able as noted above     COPD: Nothing acute, add home Symbicort     GERD: Add Pepcid     Severe malnutrition:  Body mass index is 18.89 kg/m².   Consult dietician  Add boost supplements     Tobacco abuse: Add nicotine patch and cessation education     Anxiety/depression: Add home Prozac     Chronic pain with chronic narcotic dependence:  Check UDS  Add Percocet 10 mg every 4 hours as  "needed for pain as patient is opiate tolerant  When n.p.o. add Dilaudid 0.5 mg IV  Already made ambulatory referral into pain management at discharge  Monitor     Performs over 4 mets daily, lives alone.   Odom Class II risk for vascular, 1 point, 6% perioperative cardiac risk      I discussed the patient's findings and my recommendations with patient and staff.     PLAN FOR DISPOSITION: GARCÍA Love DO  Hospitalist, [facility]  12/10/22  14:58 EST    As of April 2021, as required by the Federal 21st Century Cures Act, medical records (including provider notes and laboratory/imaging results) are to be made available to patients and/or their designees as soon as the documents are signed/resulted. While the intention is to ensure transparency and to engage patients in their healthcare, this immediate access may create unintended consequences because this document uses language intended for communication between medical providers for interpretation with the entirety of the patient's clinical picture in mind. It is recommended that patients and/or their designees review all available information with their primary or specialist providers for explanation and to avoid misinterpretation of this information.    \"Dictated utilizing Dragon dictation\"  "

## 2022-12-10 NOTE — PLAN OF CARE
Goal Outcome Evaluation:  Plan of Care Reviewed With: patient        Progress: no change  Outcome Evaluation: r/a. IV fluids/antibiotics as ordered. NPO 0001. SR per telemetry. Urine per purewik. She does not remember when she had a bm last. Informed cardiology this am that they had not cleared yet and he is going for surgery in the early am. Also communicated this to Dr. Joaquin. Will inform Dr. Pagan around 0500 this am.

## 2022-12-10 NOTE — ANESTHESIA PROCEDURE NOTES
Airway  Date/Time: 12/10/2022 8:03 AM  Airway not difficult    General Information and Staff    Patient location during procedure: OR  CRNA/CAA: Phillip Bustos CRNA    Indications and Patient Condition  Indications for airway management: airway protection    Preoxygenated: yes  MILS maintained throughout  Mask difficulty assessment: 1 - vent by mask    Final Airway Details  Final airway type: endotracheal airway      Successful airway: ETT  Cuffed: yes   Successful intubation technique: direct laryngoscopy  Facilitating devices/methods: intubating stylet  Endotracheal tube insertion site: oral  Blade: Love  Blade size: 2  ETT size (mm): 7.0  Cormack-Lehane Classification: grade I - full view of glottis  Placement verified by: chest auscultation and capnometry   Cuff volume (mL): 8  Measured from: lips  ETT/EBT  to lips (cm): 21  Number of attempts at approach: 1  Assessment: lips, teeth, and gum same as pre-op and atraumatic intubation

## 2022-12-10 NOTE — ANESTHESIA POSTPROCEDURE EVALUATION
Patient: Kaylin Ojeda    Procedure Summary     Date: 12/10/22 Room / Location:  [facility] OR 3 /  [facility] OR    Anesthesia Start: 0801 Anesthesia Stop: 1026    Procedure: ELBOW OPEN REDUCTION INTERNAL FIXATION (Right: Arm Upper) Diagnosis:       Type I or II open fracture of olecranon process of right ulna, initial encounter      (Type I or II open fracture of olecranon process of right ulna, initial encounter [S52.021B])    Surgeons: Lito Reeder MD Provider: Phillip Bustos CRNA    Anesthesia Type: general ASA Status: 4          Anesthesia Type: general    Vitals  Vitals Value Taken Time   /82 12/10/22 1051   Temp 97.7 °F (36.5 °C) 12/10/22 1018   Pulse 68 12/10/22 1056   Resp 12 12/10/22 1050   SpO2 95 % 12/10/22 1056   Vitals shown include unvalidated device data.        Post Anesthesia Care and Evaluation    Patient location during evaluation: PACU  Patient participation: complete - patient participated  Level of consciousness: awake and alert  Pain score: 0  Pain management: satisfactory to patient    Airway patency: patent  Anesthetic complications: No anesthetic complications  PONV Status: none  Cardiovascular status: acceptable  Respiratory status: acceptable  Hydration status: acceptable

## 2022-12-10 NOTE — CASE MANAGEMENT/SOCIAL WORK
Continued Stay Note  CHERRIE Bennett     Patient Name: Kaylin Ojeda  MRN: [id number]  Today's Date: 12/10/2022    Admit Date: 12/9/2022        Discharge Plan     Row Name 12/10/22 0937       Plan    Plan Comments Attempted to see patient this morning, however she was already off floor in surgery. Son is at bedside and CM introduced self and role of CM and informed him I would come back when patient returned from surgery and he verbalized understanding. CM will follow.               Discharge Codes    No documentation.                     Jeanette Kelly RN

## 2022-12-11 NOTE — CASE MANAGEMENT/SOCIAL WORK
"Discharge Planning Assessment  [facility]     Patient Name: Kaylin Ojeda  MRN: [id number]  Today's Date: 12/11/2022    Admit Date: 12/9/2022    Plan: SNF for STR   Discharge Needs Assessment    No documentation.                Discharge Plan     Row Name 12/11/22 1111       Plan    Plan SNF for STR    Patient/Family in Agreement with Plan yes    Plan Comments CM followed up with patient today to review discharge plans. Patient is sitting up in the chair with no complaints. She states that she does not want her family to force to go anywhere she does not want to go. CM asked which family member that would be and she states her son and daughter in law. Patient is asking how long she would have to stay at a facility and I informed her that it would determine how well she did with PT. She asked if they can make her stay there forever and CM explained that she would be able to leave when ever she wanted. Patient's sister Darlene enters room and patient gives permission to discuss discharge plans with her also. Darlene states that she is concerned about patient returning back home since she did not have any help there. Patient states that her granddaughter in law April and her son in law Gilson both live in the same apartment complex and they might be samantha to help. Darlene states \"you need to tell them about those two\". Patient states that both April and Gilson both use drugs and she is concerned that they will come into her apartment and takes things. CM encouraged patient to file a police report if that does happen and patient states \"well you know how that is, they'll both loose their place to live\". Patient's sister states that patient really has no one who can help her at home and wants patient to go to STR which patient is now agreeable to. Patient states she would like a referral sent to [facility]. Patient's sister is also asking about the assisted living facility in [place] and CM informed her that her insurance " will not pay for that but she could call [facility] and discuss pricing with them and CM provided sister with information on the facility. CM called and spoke with Cristine at [facility] regarding referral and she states they do not accept patient's insurance. CM went back into patient's room and informed her of such and provided patient and sister with a [facility] magazine and a list of [facility] facilities and they states they would review the list and let CM know their next preference. CM will follow.              Continued Care and Services - Admitted Since 12/9/2022     Destination     Service Provider Request Status Selected Services Address Phone Fax Patient Preferred    [facility] Pending - Request Sent N/A [address] [place] [address] [phone] --                 Demographic Summary    No documentation.                Functional Status    No documentation.                Psychosocial    No documentation.                Abuse/Neglect    No documentation.                Legal    No documentation.                Substance Abuse    No documentation.                Patient Forms    No documentation.                   Jeanette Kelly RN

## 2022-12-11 NOTE — PROGRESS NOTES
"DAILY PROGRESS NOTE  [facility], operated by [facility]  0  ORTHOPEDIC SURGERY DAILY PROGRESS NOTE  [facility]  LENGTH OF STAY 0 DAYS      Patient Identification:  Name: Kaylin Ojeda  Age: 82 y.o.  Sex: female  :  1940  MRN: [id number]         Primary Care Physician: Donna Forte MD      Subjective:  Interval History: confused overnight, required bed alarm, was up out of bed to use the restroom multiple times. Combative with nursing.     Objective:    Scheduled Meds:atorvastatin, 10 mg, Oral, Daily  budesonide-formoterol, 2 puff, Inhalation, BID - RT  doxycycline, 100 mg, Oral, Q12H  famotidine, 20 mg, Oral, BID AC  FLUoxetine, 20 mg, Oral, Daily  metoprolol tartrate, 12.5 mg, Oral, Q12H  nicotine, 1 patch, Transdermal, Q24H  senna-docusate sodium, 2 tablet, Oral, BID  sodium chloride, 10 mL, Intravenous, Q12H      Continuous Infusions:lactated ringers, 9 mL/hr, Last Rate: 100 mL/hr (12/10/22 0801)  lactated ringers, 100 mL/hr  sodium chloride, 100 mL/hr, Last Rate: 100 mL/hr (12/10/22 0530)        Vital signs in last 24 hours:  Temp:  [97.4 °F (36.3 °C)-99.1 °F (37.3 °C)] 99.1 °F (37.3 °C)  Heart Rate:  [61-93] 76  Resp:  [12-20] 16  BP: (105-156)/() 123/75    Intake/Output:    Intake/Output Summary (Last 24 hours) at 2022 0711  Last data filed at 2022 0655  Gross per 24 hour   Intake 740 ml   Output 600 ml   Net 140 ml       Exam:  /75 (BP Location: Left arm, Patient Position: Lying)   Pulse 76   Temp 99.1 °F (37.3 °C) (Oral)   Resp 16   Ht 152.4 cm (60\")   Wt 45.3 kg (99 lb 14.4 oz)   LMP  (LMP Unknown)   SpO2 96%   BMI 19.51 kg/m²   General: No acute distress, Aox3  Pulmonary: Non-labored breathing  Cardiovascular: Regular rate and rhythm   RUE   fingers warm well perfused   sensation normal in right hand   able to flex and extend all fingers and thumb   anterior long arm splint at 45 degrees clean dry and intact   upper and lower arm compartments " soft and compressible              Data Review:  Lab Results   Component Value Date    CALCIUM 9.0 12/11/2022     Results from last 7 days   Lab Units 12/11/22  0425 12/10/22  0420 12/09/22  1203   AST (SGOT) U/L  --   --  20   SODIUM mmol/L 135* 137 134*   POTASSIUM mmol/L 3.4* 3.6 4.5   CHLORIDE mmol/L 97* 102 97*   CO2 mmol/L 27.8 24.0 25.6   BUN mg/dL 8 13 20   CREATININE mg/dL 0.73 0.72 0.83   GLUCOSE mg/dL 113* 88 92   CALCIUM mg/dL 9.0 8.3* 9.1   WBC 10*3/mm3 8.04 5.97 7.34   HEMOGLOBIN g/dL 9.0* 10.1* 11.3*   PLATELETS 10*3/mm3 147 180 191   ALT (SGPT) U/L  --   --  10     Lab Results   Component Value Date    CKTOTAL 170 12/09/2022    CKMB 2.84 06/05/2017    TROPONINT <0.010 12/09/2022     Estimated Creatinine Clearance: 44 mL/min (by C-G formula based on SCr of 0.73 mg/dL).  WEIGHTS:     Wt Readings from Last 1 Encounters:   12/09/22 1646 45.3 kg (99 lb 14.4 oz)   12/09/22 1048 45.4 kg (100 lb)         Assessment:    Type I or II open fracture of right olecranon process    Type I or II open fracture of olecranon process of right ulna, initial encounter      Plan:  82 yoF POD 1 R olecranon I&D and ORIF  -NWB RUE  -Pain control PRN  -3 doses of post op cefazolin  -doxy 100 for 14 weeks for open fracture ppx   -dispo per primary    Plan for disposition:Where: SNF when bed available       Lito Reeder MD  12/11/2022  07:11 EST

## 2022-12-11 NOTE — PLAN OF CARE
Goal Outcome Evaluation:  Plan of Care Reviewed With: patient        Progress: declining  Outcome Evaluation: Good CMS to RUE. Unable to palpate pulse secondary to dressing. Pt confused and paranoid at intervals this shift. Refusing oxygen, o2 sat, telemetry, scd's. Before telemetry removed by pt she was SR. She was also caught smoking in her room. She was very upset when cigarettes removed from her posession. They were placed in the med room lock box to be given back upon discharge. IV antibiotics as ordered. Given percocet and clonazepam as before surgery. Had no problems with mentation the night before surgery with medications. Up to restroom and voiding.

## 2022-12-11 NOTE — DISCHARGE PLACEMENT REQUEST
"Lillian Pizarro (82 y.o. Female)     Date of Birth   1940    Social Security Number       Address   [address] [place] [address]    Home Phone   [phone]    MRN   [id number]       [facility]    Marital Status                               Admission Date   12/9/22    Admission Type   Emergency    Admitting Provider   Marilee Love DO    Attending Provider   Marilee Love DO    Department, Room/Bed   [facility] MED SURG, 1407/1       Discharge Date       Discharge Disposition       Discharge Destination                               Attending Provider: Marilee Love DO    Allergies: Aleve [Naproxen Sodium], Codeine, Ibuprofen, Sulfa Antibiotics    Isolation: None   Infection: None   Code Status: CPR    Ht: 152.4 cm (60\")   Wt: 45.3 kg (99 lb 14.4 oz)    Admission Cmt: None   Principal Problem: Type I or II open fracture of right olecranon process [S52.021B]                 Active Insurance as of 12/9/2022     Primary Coverage     Payor Plan Insurance Group Employer/Plan Group    ANTHEM MEDICARE REPLACEMENT ANTHEM MEDICARE ADVANTAGE [id number]     Payor Plan Address Payor Plan Phone Number Payor Plan Fax Number Effective Dates    [address] [phone]  1/1/2022 - None Entered    [facility] [address]       Subscriber Name Subscriber Birth Date Member ID       LILLIAN PIZARRO 1940 [id number]                 Emergency Contacts      (Rel.) Home Phone Work Phone Mobile Phone    Francine Rosenthal (Relative) [phone] -- --    Manish Rosenthal (Son) -- -- [phone]    Jona Rosenthal (Son) -- -- [phone]              "

## 2022-12-11 NOTE — PLAN OF CARE
Problem: Adult Inpatient Plan of Care  Goal: Plan of Care Review  Recent Flowsheet Documentation  Taken 12/11/2022 0845 by Prateek Porter PT  Plan of Care Reviewed With: patient  Outcome Evaluation: PT Evaluation completed this morning: Pt continues NWBing right UE since ORIF og olecranon fracture on 12/11. She was very impulsive during evaluation this morning and needed verbal cues for functional mobility. She was able to go sit to/from stand with SBA. She uses a Rollator for ambulation at home but is NWBing right UE so we tried a straight cane, quad cane, and ebony walker for ambulation. Pt felt most comfortable using the ebony walker and she was able to ambulate 40' in room with SBA and verbal cues. Feel pt will benefit from therapy to work on functional mobility and ambulation while in hospital. Feel pt will need some type STR at discharge.   Goal Outcome Evaluation:  Plan of Care Reviewed With: patient           Outcome Evaluation: PT Evaluation completed this morning: Pt continues NWBing right UE since ORIF og olecranon fracture on 12/11. She was very impulsive during evaluation this morning and needed verbal cues for functional mobility. She was able to go sit to/from stand with SBA. She uses a Rollator for ambulation at home but is NWBing right UE so we tried a straight cane, quad cane, and ebony walker for ambulation. Pt felt most comfortable using the ebony walker and she was able to ambulate 40' in room with SBA and verbal cues. Feel pt will benefit from therapy to work on functional mobility and ambulation while in hospital. Feel pt will need some type STR at discharge.

## 2022-12-11 NOTE — PLAN OF CARE
Problem: Adult Inpatient Plan of Care  Goal: Plan of Care Review  Recent Flowsheet Documentation  Taken 12/11/2022 0837 by Ar Garcia OTR  Plan of Care Reviewed With: patient  Outcome Evaluation: OT evaluation. Pt fell at home resulting in an olecranon fx. Pt s/p ORIF of RUE on 12-11-22. Pt is NWB of RUE. Pt lives alone in an apartment. Pt was I with basic adl's prior to injury. Pt currently needs min assist for adl tasks due to NWB status of RUE. Pt is used to using a rollator for mobility prior to injury but that is not an option due to her NWB status of RUE. Education with multiple assistive devices (straight cane, quad cane and ebony walker). Pt was impulsive and eeded cues for each device for safety.Rec continued education for safety with mobility and management of basic daily tasks. Pt can benefit from STR upon discharge. Pt was agreeable.

## 2022-12-11 NOTE — THERAPY EVALUATION
Patient Name: Kaylin Ojeda  : 1940    MRN: [id number]                              Today's Date: 2022       Admit Date: 2022    Visit Dx:     ICD-10-CM ICD-9-CM   1. Type I or II open fracture of olecranon process of right ulna, initial encounter  S52.021B 813.11   2. Closed head injury, initial encounter  S09.90XA 959.01   3. 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[facility])   • Obstruction of carotid artery   • Peripheral arterial occlusive disease ([facility])   • Impaired glucose tolerance   • Difficulty sleeping   • Tobacco dependence syndrome   • Iron deficiency anemia   • Vitamin B12 deficiency anemia due to intrinsic factor deficiency   • Acquired hypothyroidism   • Thoracoabdominal aortic aneurysm   • Abdominal bloating   • Prediabetes   • Sleep difficulties   • Chronic constipation   • PAF (paroxysmal atrial fibrillation) ([facility])   • Moderate single current episode of major depressive disorder ([facility])   • Chronic low back pain without sciatica   • Urge incontinence   • Severe mitral regurgitation   • Severe tricuspid regurgitation   • Moderate aortic valve regurgitation   • Underweight   • DDD (degenerative disc disease), lumbar   • Atrial flutter with rapid ventricular response ([facility])   • Urinary tract infection without hematuria   • Malaise and fatigue   • Type I or II open fracture of right olecranon process   • Type I or II open fracture of olecranon process of right ulna, initial encounter     Past Medical History:   Diagnosis Date   • A-fib ([facility])    • Abdominal pain, epigastric     Chronic, unclear cause,  improved   • Anorexia    • Anxiety    • Arthritis    • CAD (coronary artery disease)     Cath 5/2015: nonobstructive LAD/RCA disease, 90% mid LCx s/p 2.96m56pg Xience.  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)    • Mononucleosis    • Occlusion and stenosis of carotid artery    • Onychomycosis    • Orbit fracture ([facility])    • PAF (paroxysmal atrial fibrillation) ([facility])    • Peripheral arterial disease ([facility])     Significant (carotid, subclavian, renal arteries, lower extremities), with claudication, medical therapy only   • Pernicious anemia    • Prediabetes    • Renal artery stenosis ([facility])     unilateral, with renal atrophy (no intervention required)   • Renal calculi    • Sinus bradycardia     mild, asymptomatic   • TIA (transient ischemic attack)    • UTI (urinary tract infection)    • Valvular heart disease     1/2021: mod-severe AI, mod MR, mild-mod TR   • Vision problem    • Vitamin B 12 deficiency      Past Surgical History:   Procedure Laterality Date   • APPENDECTOMY     • BREAST BIOPSY Left     20+ yrs ago   • BREAST SURGERY     • CARDIAC CATHETERIZATION  05/2015    nonobs LAD/RCA disease, 90% mid LCx s/p 2.00d14ec Xience   • CARDIAC CATHETERIZATION N/A 10/28/2020    Procedure: Right and Left Heart Cath;  Surgeon: Opal Contreras MD;  Location: [facility] CATH INVASIVE LOCATION;  Service: Cardiovascular;  Laterality: N/A;  for cardiac testing prior to possible valve  surgery per Dr. Mai   • CARDIAC CATHETERIZATION N/A 10/28/2020    Procedure: Coronary angiography;  Surgeon: Opal Contreras MD;  Location: [facility] INVASIVE LOCATION;  Service: Cardiovascular;  Laterality: N/A;   • CERVICAL FUSION  1997   • CHOLECYSTECTOMY     • CORONARY STENT PLACEMENT     • HYSTERECTOMY  1995   • KNEE SURGERY Right 2005   • KNEE SURGERY Left 1998      General Information     Row Name 12/11/22 08          Physical Therapy Time and Intention    Document Type evaluation  -     Mode of Treatment physical therapy  -     Row Name 12/11/22 08          General Information    Patient Profile Reviewed yes  -     Prior Level of Function independent:;all household mobility;community mobility;gait;ADL's  ues a rollator  -     Existing Precautions/Restrictions fall;weight bearing  pt is NWBing right UE  -     Barriers to Rehab --  pt very impulsive wtih movements and activties during eval  -     Row Name 12/11/22 0838          Living Environment    People in Home alone  -     Row Name 12/11/22 08          Home Main Entrance    Number of Stairs, Main Entrance one  -[facility] Name 12/11/22 0838          Stairs Within Home, Primary    Number of Stairs, Within Home, Primary none  -     Row Name 12/11/22 0838          Cognition    Orientation Status (Cognition) oriented x 3  -     Row Name 12/11/22 0838          Safety Issues, Functional Mobility    Safety Issues Affecting Function (Mobility) impulsivity  -           User Key  (r) = Recorded By, (t) = Taken By, (c) = Cosigned By    Initials Name Provider Type     Prateek Porter, PT Physical Therapist               Mobility     Row Name 12/11/22 0841          Bed Mobility    Comment, (Bed Mobility) deferred, pt up in chair  -     Row Name 12/11/22 0841          Sit-Stand Transfer    Sit-Stand [place] (Transfers) contact guard;verbal cues  -     Assistive Device (Sit-Stand Transfers) --  initially stood without assistive  device  -     Row Name 12/11/22 0841          Gait/Stairs (Locomotion)    [place] Level (Gait) contact guard;verbal cues  -     Assistive Device (Gait) walker, ebony  tried ambulation with straight cane, quad cane, and ebony walker-pt felt the ebony walker gave her the support she needed, pt needs verbal cues for proper use  -     Distance in Feet (Gait) 40'  -     Row Name 12/11/22 0841          Mobility    Extremity Weight-bearing Status right upper extremity  -     Right Upper Extremity (Weight-bearing Status) non weight-bearing (NWB)  -           User Key  (r) = Recorded By, (t) = Taken By, (c) = Cosigned By    Initials Name Provider Type     Prateek Porter, PT Physical Therapist               Obj/Interventions     Row Name 12/11/22 0844          Range of Motion Comprehensive    General Range of Motion bilateral lower extremity ROM WFL  -     Row Name 12/11/22 0844          Strength Comprehensive (MMT)    Comment, General Manual Muscle Testing (MMT) Assessment bilateral LE strength grossly WFL  -           User Key  (r) = Recorded By, (t) = Taken By, (c) = Cosigned By    Initials Name Provider Type     Prateek Porter, PT Physical Therapist               Goals/Plan     Row Name 12/11/22 0938          Bed Mobility Goal 1 (PT)    Activity/Assistive Device (Bed Mobility Goal 1, PT) sit to supine/supine to sit  -     [place] Level/Cues Needed (Bed Mobility Goal 1, PT) independent  -     Time Frame (Bed Mobility Goal 1, PT) 3 days  -     Progress/Outcomes (Bed Mobility Goal 1, PT) new goal  -     Row Name 12/11/22 0938          Transfer Goal 1 (PT)    Activity/Assistive Device (Transfer Goal 1, PT) sit-to-stand/stand-to-sit  -     [place] Level/Cues Needed (Transfer Goal 1, PT) independent  -     Time Frame (Transfer Goal 1, PT) 3 days  -     Progress/Outcome (Transfer Goal 1, PT) new goal  -     Row Name 12/11/22 0938          Gait Training Goal 1 (PT)     Activity/Assistive Device (Gait Training Goal 1, PT) gait (walking locomotion);assistive device use;walker, ebony  -GC     [place] Level (Gait Training Goal 1, PT) independent  -GC     Distance (Gait Training Goal 1, PT) 100'  -GC     Time Frame (Gait Training Goal 1, PT) 3 days  -     Progress/Outcome (Gait Training Goal 1, PT) new goal  -           User Key  (r) = Recorded By, (t) = Taken By, (c) = Cosigned By    Initials Name Provider Type     Prateek Porter, PT Physical Therapist               Clinical Impression     Row Name 12/11/22 0845          Pain    Pretreatment Pain Rating 8/10  -     Posttreatment Pain Rating 8/10  -GC     Pain Location - Side/Orientation Right  -     Pain Location upper  -     Pain Location - extremity  -     Pain Intervention(s) Repositioned  -     Row Name 12/11/22 0845          Plan of Care Review    Plan of Care Reviewed With patient  -     Outcome Evaluation PT Evaluation completed this morning: Pt continues NWBing right UE since ORIF og olecranon fracture on 12/11. She was very impulsive during evaluation this morning and needed verbal cues for functional mobility. She was able to go sit to/from stand with SBA. She uses a Rollator for ambulation at home but is NWBing right UE so we tried a straight cane, quad cane, and ebony walker for ambulation. Pt felt most comfortable using the ebony walker and she was able to ambulate 40' in room with SBA and verbal cues. Feel pt will benefit from therapy to work on functional mobility and ambulation while in hospital. Feel pt will need some type STR at discharge.  -     Row Name 12/11/22 0937          Therapy Assessment/Plan (PT)    Rehab Potential (PT) good, to achieve stated therapy goals  -     Therapy Frequency (PT) daily  -     Predicted Duration of Therapy Intervention (PT) 3 days  -     Row Name 12/11/22 0845          Positioning and Restraints    Pre-Treatment Position sitting in chair/recliner  -      Post Treatment Position chair  -GC     In Chair sitting;call light within reach;encouraged to call for assist  -GC           User Key  (r) = Recorded By, (t) = Taken By, (c) = Cosigned By    Initials Name Provider Type     Prateek Porter, PT Physical Therapist               Outcome Measures     Row Name 12/11/22 0939          How much help from another person do you currently need...    Turning from your back to your side while in flat bed without using bedrails? 3  -GC     Moving from lying on back to sitting on the side of a flat bed without bedrails? 3  -GC     Moving to and from a bed to a chair (including a wheelchair)? 3  -GC     Standing up from a chair using your arms (e.g., wheelchair, bedside chair)? 3  -GC     Climbing 3-5 steps with a railing? 3  -GC     To walk in hospital room? 3  -GC     AM-PAC 6 Clicks Score (PT) 18  -GC     Highest level of mobility 6 --> Walked 10 steps or more  -     Row Name 12/11/22 0939 12/11/22 0845       Functional Assessment    Outcome Measure Options AM-PAC 6 Clicks Basic Mobility (PT)  -GC AM-PAC 6 Clicks Daily Activity (OT)  -SD          User Key  (r) = Recorded By, (t) = Taken By, (c) = Cosigned By    Initials Name Provider Type     Prateek Porter, PT Physical Therapist    Ar Acharya, OTR Occupational Therapist                             Physical Therapy Education     Title: PT OT SLP Therapies (Done)     Topic: Physical Therapy (Done)     Point: Mobility training (Done)     Learning Progress Summary           Patient Acceptance, E,TB, VU by  at 12/11/2022 0940    Comment: educated pt on use of ebony walker                   Point: Precautions (Done)     Learning Progress Summary           Patient Acceptance, E,TB, VU by  at 12/11/2022 0940    Comment: educated pt on use of ebony walker                               User Key     Initials Effective Dates Name Provider Type [facility] 06/16/21 -  Prateek Porter, PT Physical Therapist PT               PT Recommendation and Plan     Plan of Care Reviewed With: patient  Outcome Evaluation: PT Evaluation completed this morning: Pt continues NWBing right UE since ORIF og olecranon fracture on 12/11. She was very impulsive during evaluation this morning and needed verbal cues for functional mobility. She was able to go sit to/from stand with SBA. She uses a Rollator for ambulation at home but is NWBing right UE so we tried a straight cane, quad cane, and ebony walker for ambulation. Pt felt most comfortable using the ebony walker and she was able to ambulate 40' in room with SBA and verbal cues. Feel pt will benefit from therapy to work on functional mobility and ambulation while in hospital. Feel pt will need some type STR at discharge.     Time Calculation:    PT Charges     Row Name 12/11/22 0943             Time Calculation    Start Time 0757  -GC      Stop Time 0814  -GC      Time Calculation (min) 17 min  -GC            User Key  (r) = Recorded By, (t) = Taken By, (c) = Cosigned By    Initials Name Provider Type     Prateek Porter, PT Physical Therapist              Therapy Charges for Today     Code Description Service Date Service Provider Modifiers Qty    [id number]  PT EVAL LOW COMPLEXITY 1 12/11/2022 Prateek Porter, PT GP 1          PT G-Codes  Outcome Measure Options: AM-PAC 6 Clicks Basic Mobility (PT)  AM-PAC 6 Clicks Score (PT): 18  AM-PAC 6 Clicks Score (OT): 18  PT Discharge Summary  Anticipated Discharge Disposition (PT): skilled nursing facility    Prateek Porter PT  12/11/2022

## 2022-12-11 NOTE — THERAPY EVALUATION
Patient Name: Kaylin Ojeda  : 1940    MRN: [id number]                              Today's Date: 2022       Admit Date: 2022    Visit Dx:     ICD-10-CM ICD-9-CM   1. Type I or II open fracture of olecranon process of right ulna, initial encounter  S52.021B 813.11   2. Closed head injury, initial encounter  S09.90XA 959.01   3. 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[facility])   • Obstruction of carotid artery   • Peripheral arterial occlusive disease ([facility])   • Impaired glucose tolerance   • Difficulty sleeping   • Tobacco dependence syndrome   • Iron deficiency anemia   • Vitamin B12 deficiency anemia due to intrinsic factor deficiency   • Acquired hypothyroidism   • Thoracoabdominal aortic aneurysm   • Abdominal bloating   • Prediabetes   • Sleep difficulties   • Chronic constipation   • PAF (paroxysmal atrial fibrillation) ([facility])   • Moderate single current episode of major depressive disorder ([facility])   • Chronic low back pain without sciatica   • Urge incontinence   • Severe mitral regurgitation   • Severe tricuspid regurgitation   • Moderate aortic valve regurgitation   • Underweight   • DDD (degenerative disc disease), lumbar   • Atrial flutter with rapid ventricular response ([facility])   • Urinary tract infection without hematuria   • Malaise and fatigue   • Type I or II open fracture of right olecranon process   • Type I or II open fracture of olecranon process of right ulna, initial encounter     Past Medical History:   Diagnosis Date   • A-fib ([facility])    • Abdominal pain, epigastric     Chronic, unclear cause,  improved   • Anorexia    • Anxiety    • Arthritis    • CAD (coronary artery disease)     Cath 5/2015: nonobstructive LAD/RCA disease, 90% mid LCx s/p [id number] Xience.  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[facility])    • Mononucleosis    • Occlusion and stenosis of carotid artery    • Onychomycosis    • Orbit fracture ([facility])    • PAF (paroxysmal atrial fibrillation) ([facility])    • Peripheral arterial disease ([facility])     Significant (carotid, subclavian, renal arteries, lower extremities), with claudication, medical therapy only   • Pernicious anemia    • Prediabetes    • Renal artery stenosis ([facility])     unilateral, with renal atrophy (no intervention required)   • Renal calculi    • Sinus bradycardia     mild, asymptomatic   • TIA (transient ischemic attack)    • UTI (urinary tract infection)    • Valvular heart disease     1/2021: mod-severe AI, mod MR, mild-mod TR   • Vision problem    • Vitamin B 12 deficiency      Past Surgical History:   Procedure Laterality Date   • APPENDECTOMY     • BREAST BIOPSY Left     20+ yrs ago   • BREAST SURGERY     • CARDIAC CATHETERIZATION  05/2015    nonobs LAD/RCA disease, 90% mid LCx s/p [id number] Xience   • CARDIAC CATHETERIZATION N/A 10/28/2020    Procedure: Right and Left Heart Cath;  Surgeon: Opal Contreras MD;  Location: [facility] CATH INVASIVE LOCATION;  Service: Cardiovascular;  Laterality: N/A;  for cardiac testing prior to possible valve  surgery per Dr. Mai   • CARDIAC CATHETERIZATION N/A 10/28/2020    Procedure: Coronary angiography;  Surgeon: Opal Contreras MD;  Location: [facility] CATH INVASIVE LOCATION;  Service: Cardiovascular;  Laterality: N/A;   • CERVICAL FUSION  1997   • CHOLECYSTECTOMY     • CORONARY STENT PLACEMENT     • HYSTERECTOMY  1995   • KNEE SURGERY Right 2005   • KNEE SURGERY Left 1998      General Information     Row Name 12/11/22 0827          OT Time and Intention    Document Type evaluation  -SD     Mode of Treatment occupational therapy  -SD     Row Name 12/11/22 0827          General Information    Patient Profile Reviewed yes  [place] resulting in right olecranon fx. Pt s/p ORIF on 12-10-22. Pt is NWB of RUE. Pt lives alone in an apartment. Pt reports using a rollotar for mobility at home.  -SD     Prior Level of Function independent:;ADL's;all household mobility;driving  pt uses a rollator at home  -SD     Existing Precautions/Restrictions fall;right;non-weight bearing  NWB RUE. Pt is an long posterior UE spling  -SD     Barriers to Rehab --  NWB status of RUE. Pt was impulsive during evaluation.  -SD     Row Name 12/11/22 0827          Occupational Profile    Reason for Services/Referral (Occupational Profile) decreased adl function after RUE sx  -SD     Successful Occupations (Occupational Profile) typicially I with adl's and mobility (using a rollator)  -SD     Environmental Supports and Barriers (Occupational Profile) Pt lives alone, but she states neighbors and family provide support as needed  -SD     Row Name 12/11/22 0827          Living Environment    People in Home alone  -SD     Row Name 12/11/22 0827          Home Main Entrance    Number of Stairs, Main Entrance none  -SD     Row Name 12/11/22 0827          Cognition    Orientation Status (Cognition) oriented x 3  -SD           User Key  (r) = Recorded By, (t) = Taken By, (c) = Cosigned By    Initials Name Provider Type    SD Ar Garcia OTIZABELA  Occupational Therapist                 Mobility/ADL's     Row Name 12/11/22 0831          Bed Mobility    Comment, (Bed Mobility) deferred. up in chair  -SD     Row Name 12/11/22 0831          Transfers    Transfers sit-stand transfer;stand-sit transfer  -SD     Row Name 12/11/22 0831          Sit-Stand Transfer    Sit-Stand [place] (Transfers) contact guard;verbal cues  -SD     Assistive Device (Sit-Stand Transfers) other (see comments)  stood without an assistive device. cues to maintain NWB status when standing  -SD     Row Name 12/11/22 0831          Stand-Sit Transfer    Stand-Sit [place] (Transfers) verbal cues;contact guard  -SD     Row Name 12/11/22 0831          Functional Mobility    Functional Mobility- Ind. Level contact guard assist;verbal cues required  -SD     Functional Mobility- Device walker, ebony;cane, quad;cane, straight  -SD     Functional Mobility-Distance (Feet) 40  -SD     Functional Mobility- Comment Pt is used to using a rollator for mobility, but that is not an option due to her NWB status of RUE. Education with multiple assistive devices (cane, quad cane and ebony walker). Pt needed cues for each device for safety. pt needs continued education.  -SD     Row Name 12/11/22 0831          Activities of Daily Living    BADL Assessment/Intervention bathing;upper body dressing;lower body dressing  -SD     Row Name 12/11/22 0831          Mobility    Extremity Weight-bearing Status right upper extremity  -SD     Right Upper Extremity (Weight-bearing Status) non weight-bearing (NWB)  -SD     Row Name 12/11/22 0831          Bathing Assessment/Intervention    [place] Level (Bathing) bathing skills;minimum assist (75% patient effort)  -SD     Comment, (Bathing) pt currently needs min assist with adl's due to NWB status of RUE.  -SD     Row Name 12/11/22 0831          Upper Body Dressing Assessment/Training    [place] Level (Upper Body Dressing) upper body dressing skills;minimum  assist (75% patient effort)  -SD     Comment, (Upper Body Dressing) Pt currently needs min assist with adl's due to NWB status of RUE.  -SD     Row Name 12/11/22 0831          Lower Body Dressing Assessment/Training    [place] Level (Lower Body Dressing) lower body dressing skills;minimum assist (75% patient effort)  -SD     Comment, (Lower Body Dressing) pt currently needs min assist with adl's due to NWB status of RUE.  -SD           User Key  (r) = Recorded By, (t) = Taken By, (c) = Cosigned By    Initials Name Provider Type    Ar Acharya OTR Occupational Therapist               Obj/Interventions     Row Name 12/11/22 0836          Sensory Assessment (Somatosensory)    Sensory Assessment (Somatosensory) --  pt reported intact sensation of RUE following sx  -SD     Row Name 12/11/22 0836          Range of Motion Comprehensive    Comment, General Range of Motion LUE wfl. RUE immobilized in a long arm posterior splint.  -SD     Row Name 12/11/22 0836          Strength Comprehensive (MMT)    Comment, General Manual Muscle Testing (MMT) Assessment LUE wfl  -SD     Row Name 12/11/22 0836          Balance    Comment, Balance CGA for balance.  -SD           User Key  (r) = Recorded By, (t) = Taken By, (c) = Cosigned By    Initials Name Provider Type    Ar Acharya OTR Occupational Therapist               Goals/Plan     Row Name 12/11/22 0865          Transfer Goal 1 (OT)    Activity/Assistive Device (Transfer Goal 1, OT) commode, 3-in-1  -SD     [place] Level/Cues Needed (Transfer Goal 1, OT) supervision required  -SD     Time Frame (Transfer Goal 1, OT) by discharge  -SD     Strategies/Barriers (Transfers Goal 1, OT) NWB status of RUE  -SD     Progress/Outcome (Transfer Goal 1, OT) new goal  -SD     Row Name 12/11/22 0843          Dressing Goal 1 (OT)    Activity/Device (Dressing Goal 1, OT) dressing skills, all  -SD     [place]/Cues Needed (Dressing Goal 1, OT) contact guard required   -SD     Time Frame (Dressing Goal 1, OT) by discharge  -SD     Strategies/Barriers (Dressing Goal 1, OT) education wtih compensatory strategies  -SD     Progress/Outcome (Dressing Goal 1, OT) new goal  -SD     Row Name 12/11/22 0843          ROM Goal 1 (OT)    ROM Goal 1 (OT) Pt to perform rom program of non-involved joints of RUE with cues.  -SD     Time Frame (ROM Goal 1, OT) by discharge  -SD     Progress/Outcome (ROM Goal 1, OT) new goal  -SD     Row Name 12/11/22 0843          Therapy Assessment/Plan (OT)    Planned Therapy Interventions (OT) patient/caregiver education/training;BADL retraining;transfer/mobility retraining;ROM/therapeutic exercise  -SD           User Key  (r) = Recorded By, (t) = Taken By, (c) = Cosigned By    Initials Name Provider Type    Ar Acharya, OTR Occupational Therapist               Clinical Impression     Row Name 12/11/22 0835          Pain Assessment    Pretreatment Pain Rating 8/10  -SD     Posttreatment Pain Rating 8/10  -SD     Pain Location - Side/Orientation Right  -SD     Pain Location upper  -SD     Pain Location - extremity  -SD     Pain Intervention(s) Repositioned;Ambulation/increased activity  -SD     Row Name 12/11/22 0801          Plan of Care Review    Plan of Care Reviewed With patient  -SD     Outcome Evaluation OT evaluation. Pt fell at home resulting in an olecranon fx. Pt s/p ORIF of RUE on 12-11-22. Pt is NWB of RUE. Pt lives alone in an apartment. Pt was I with basic adl's prior to injury. Pt currently needs min assist for adl tasks due to NWB status of RUE. Pt is used to using a rollator for mobility, but that is not an option due to her NWB status of RUE. Education with multiple assistive devices (straight cane, quad cane and ebony walker). Pt was impulsive and eeded cues for each device for safety.Rec continued education for safety with mobility and management of basic daily tasks. Pt can benefit from STR upon discharge. Pt was agreeable.  -SD      Row Name 12/11/22 0837          Therapy Assessment/Plan (OT)    Rehab Potential (OT) good, to achieve stated therapy goals  -SD     Criteria for Skilled Therapeutic Interventions Met (OT) yes;skilled treatment is necessary  -SD     Therapy Frequency (OT) 3 times/wk  -SD     Predicted Duration of Therapy Intervention (OT) anticipate discharge in 2-3 days from acute care  -SD     Row Name 12/11/22 0837          Therapy Plan Review/Discharge Plan (OT)    Anticipated Discharge Disposition (OT) skilled nursing facility  -SD     Row Name 12/11/22 08          Positioning and Restraints    Pre-Treatment Position sitting in chair/recliner  -SD     Post Treatment Position chair  -SD     In Chair sitting;call light within reach;encouraged to call for assist;exit alarm on  -SD           User Key  (r) = Recorded By, (t) = Taken By, (c) = Cosigned By    Initials Name Provider Type    Ar Acharya OTR Occupational Therapist               Outcome Measures     Row Name 12/11/22 0845          How much help from another is currently needed...    Putting on and taking off regular lower body clothing? 3  -SD     Bathing (including washing, rinsing, and drying) 3  -SD     Toileting (which includes using toilet bed pan or urinal) 3  -SD     Putting on and taking off regular upper body clothing 3  -SD     Taking care of personal grooming (such as brushing teeth) 3  -SD     Eating meals 3  -SD     AM-PAC 6 Clicks Score (OT) 18  -SD     Row Name 12/11/22 0845          Functional Assessment    Outcome Measure Options AM-PAC 6 Clicks Daily Activity (OT)  -SD           User Key  (r) = Recorded By, (t) = Taken By, (c) = Cosigned By    Initials Name Provider Type    Ar Acharya OTR Occupational Therapist                Occupational Therapy Education     Title: PT OT SLP Therapies (In Progress)     Topic: Occupational Therapy (Done)     Point: ADL training (Done)     Description:   Instruct learner(s) on proper safety  adaptation and remediation techniques during self care or transfers.   Instruct in proper use of assistive devices.              Learning Progress Summary           Patient Acceptance, E,TB,D, VU,NR by SD at 12/11/2022 0846    Comment: Education regarding NWB status and impact on daily tasks. Education regarding safety wtih tranfers/mobility. Pt was impulsive and needed cues for safety.                               User Key     Initials Effective Dates Name Provider Type Discipline    SD 06/16/21 -  Ar Garcia OTR Occupational Therapist OT              OT Recommendation and Plan  Planned Therapy Interventions (OT): patient/caregiver education/training, BADL retraining, transfer/mobility retraining, ROM/therapeutic exercise  Therapy Frequency (OT): 3 times/wk  Plan of Care Review  Plan of Care Reviewed With: patient  Outcome Evaluation: OT evaluation. Pt fell at home resulting in an olecranon fx. Pt s/p ORIF of RUE on 12-11-22. Pt is NWB of RUE. Pt lives alone in an apartment. Pt was I with basic adl's prior to injury. Pt currently needs min assist for adl tasks due to NWB status of RUE. Pt is used to using a rollator for mobility, but that is not an option due to her NWB status of RUE. Education with multiple assistive devices (straight cane, quad cane and ebony walker). Pt was impulsive and eeded cues for each device for safety.Rec continued education for safety with mobility and management of basic daily tasks. Pt can benefit from STR upon discharge. Pt was agreeable.     Time Calculation:    Time Calculation- OT     Row Name 12/11/22 0848             Time Calculation- OT    OT Start Time 0850  -SD         Untimed Charges    OT Eval/Re-eval Minutes 15  -SD         Total Minutes    Untimed Charges Total Minutes 15  -SD       Total Minutes 15  -SD            User Key  (r) = Recorded By, (t) = Taken By, (c) = Cosigned By    Initials Name Provider Type    SD Ar Garcia OTR Occupational Therapist               Therapy Charges for Today     Code Description Service Date Service Provider Modifiers Qty    [id number] HC OT EVAL LOW COMPLEXITY 1 12/11/2022 Ar Garcia, KIM GO 1               KIM Escoto  12/11/2022

## 2022-12-12 NOTE — THERAPY TREATMENT NOTE
Patient Name: Kaylin Ojeda  : 1940    MRN: [id number]                              Today's Date: 2022       Admit Date: 2022    Visit Dx:     ICD-10-CM ICD-9-CM   1. Type I or II open fracture of olecranon process of right ulna, initial encounter  S52.021B 813.11   2. Closed head injury, initial encounter  S09.90XA 959.01   3. 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[facility])   • Obstruction of carotid artery   • Peripheral arterial occlusive disease ([facility])   • Impaired glucose tolerance   • Difficulty sleeping   • Tobacco dependence syndrome   • Iron deficiency anemia   • Vitamin B12 deficiency anemia due to intrinsic factor deficiency   • Acquired hypothyroidism   • Thoracoabdominal aortic aneurysm   • Abdominal bloating   • Prediabetes   • Sleep difficulties   • Chronic constipation   • PAF (paroxysmal atrial fibrillation) ([facility])   • Moderate single current episode of major depressive disorder ([facility])   • Chronic low back pain without sciatica   • Urge incontinence   • Severe mitral regurgitation   • Severe tricuspid regurgitation   • Moderate aortic valve regurgitation   • Underweight   • DDD (degenerative disc disease), lumbar   • Atrial flutter with rapid ventricular response ([facility])   • Urinary tract infection without hematuria   • Malaise and fatigue   • Type I or II open fracture of right olecranon process   • Type I or II open fracture of olecranon process of right ulna, initial encounter     Past Medical History:   Diagnosis Date   • A-fib ([facility])    • Abdominal pain, epigastric     Chronic, unclear cause,  improved   • Anorexia    • Anxiety    • Arthritis    • CAD (coronary artery disease)     Cath 5/2015: nonobstructive LAD/RCA disease, 90% mid LCx s/p [id number] Xience.  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[facility])    • Mononucleosis    • Occlusion and stenosis of carotid artery    • Onychomycosis    • Orbit fracture ([facility])    • PAF (paroxysmal atrial fibrillation) ([facility])    • Peripheral arterial disease ([facility])     Significant (carotid, subclavian, renal arteries, lower extremities), with claudication, medical therapy only   • Pernicious anemia    • Prediabetes    • Renal artery stenosis ([facility])     unilateral, with renal atrophy (no intervention required)   • Renal calculi    • Sinus bradycardia     mild, asymptomatic   • TIA (transient ischemic attack)    • UTI (urinary tract infection)    • Valvular heart disease     1/2021: mod-severe AI, mod MR, mild-mod TR   • Vision problem    • Vitamin B 12 deficiency      Past Surgical History:   Procedure Laterality Date   • APPENDECTOMY     • BREAST BIOPSY Left     20+ yrs ago   • BREAST SURGERY     • CARDIAC CATHETERIZATION  05/2015    nonobs LAD/RCA disease, 90% mid LCx s/p [id number] Xience   • CARDIAC CATHETERIZATION N/A 10/28/2020    Procedure: Right and Left Heart Cath;  Surgeon: Opal Contreras MD;  Location: [facility] CATH INVASIVE LOCATION;  Service: Cardiovascular;  Laterality: N/A;  for cardiac testing prior to possible valve  surgery per Dr. Mai   • CARDIAC CATHETERIZATION N/A 10/28/2020    Procedure: Coronary angiography;  Surgeon: Opal Contreras MD;  Location: [facility] INVASIVE LOCATION;  Service: Cardiovascular;  Laterality: N/A;   • CERVICAL FUSION  1997   • CHOLECYSTECTOMY     • CORONARY STENT PLACEMENT     • HYSTERECTOMY  1995   • KNEE SURGERY Right 2005   • KNEE SURGERY Left 1998      General Information     Row Name 12/12/22 0948          OT Time and Intention    Document Type therapy note (daily note)  -SD     Mode of Treatment occupational therapy  -SD     Row Name 12/12/22 0948          General Information    Existing Precautions/Restrictions fall;weight bearing  NWB of RUE  -SD           User Key  (r) = Recorded By, (t) = Taken By, (c) = Cosigned By    Initials Name Provider Type    Ar Acharya, OTR Occupational Therapist                 Mobility/ADL's     Row Name 12/12/22 0949          Bed Mobility    Comment, (Bed Mobility) Pt decined bed mobility and transfer activity. Pt stated she just returned from the recliner to the bed with staff support. Pt states she would get up with staff later in am.  -SD     Row Name 12/12/22 0949          Bathing Assessment/Intervention    Comment, (Bathing) Education provided regarding NWB status/activity restrictions of RUE and impact on daily tasks. Pt verbalized understanding and states she realizes the need for STR at this time.  -SD           User Key  (r) = Recorded By, (t) = Taken By, (c) = Cosigned By    Initials Name Provider Type    Ar Acharya OTR Occupational Therapist               Obj/Interventions     Row Name 12/12/22 0973          Sensory Assessment (Somatosensory)    Sensory Assessment (Somatosensory) sensation intact  -SD     Row Name 12/12/22 0952          Shoulder (Therapeutic Exercise)    Shoulder (Therapeutic Exercise) AAROM (active assistive range of motion)  -SD     Shoulder AAROM (Therapeutic Exercise) right;flexion;aBduction;supine;10  repetitions;other (see comments)  gentle a/arom to prevent joint stiffness  -SD     Row Name 12/12/22 0951          Hand (Therapeutic Exercise)    Hand (Therapeutic Exercise) AROM (active range of motion)  -SD     Hand AROM/AAROM (Therapeutic Exercise) right;AROM (active range of motion);finger flexion;finger extension;finger aBduction;finger aDduction;thumb opposition;thumb flexion;thumb extension;10 repetitions  rec gentle arom of right hand to prevent joint stiffness and address edema  -SD     Row Name 12/12/22 0951          Motor Skills    Therapeutic Exercise shoulder;hand  -SD           User Key  (r) = Recorded By, (t) = Taken By, (c) = Cosigned By    Initials Name Provider Type    Ar Acharya OTR Occupational Therapist               Goals/Plan     Row Name 12/12/22 0958          Dressing Goal 1 (OT)    Activity/Device (Dressing Goal 1, OT) dressing skills, all  -SD     [place]/Cues Needed (Dressing Goal 1, OT) contact guard required  -SD     Time Frame (Dressing Goal 1, OT) by discharge  -SD     Progress/Outcome (Dressing Goal 1, OT) goal ongoing  -SD     Row Name 12/12/22 0958          ROM Goal 1 (OT)    ROM Goal 1 (OT) Pt to perform rom program of non-involved joints of RUE with cues.  -SD     Time Frame (ROM Goal 1, OT) by discharge  -SD     Progress/Outcome (ROM Goal 1, OT) good progress toward goal  -SD     Row Name 12/12/22 0958          Therapy Assessment/Plan (OT)    Planned Therapy Interventions (OT) patient/caregiver education/training;ROM/therapeutic exercise;BADL retraining;transfer/mobility retraining  -SD           User Key  (r) = Recorded By, (t) = Taken By, (c) = Cosigned By    Initials Name Provider Type    Ar Acharya OTR Occupational Therapist               Clinical Impression     Row Name 12/12/22 0955          Pain Assessment    Pretreatment Pain Rating 7/10  -SD     Posttreatment Pain Rating 7/10  -SD     Pain Location - Side/Orientation Right  -SD     Pain  Location upper  -SD     Pain Location - extremity  -SD     Pre/Posttreatment Pain Comment education with gentle arom and elevation of RUE (to prevent edema). Pt stated no pain intervention is needed. She had pain medication earlier this am.  -SD     Pain Intervention(s) Repositioned  -SD     Row Name 12/12/22 0955          Plan of Care Review    Plan of Care Reviewed With patient  -SD     Outcome Evaluation OT: Education provided regarding NWB status/activity restrictions of RUE and impact on daily tasks. Pt verbalized understanding and states she realizes the need for STR at this time.  Pt participated in gentle arom program of RUE for non-involved joints of RUE.  Education provided regarding edema management (elevation and arom of hand).  -SD     Row Name 12/12/22 0955          Therapy Plan Review/Discharge Plan (OT)    Anticipated Discharge Disposition (OT) skilled nursing facility  -SD     Row Name 12/12/22 0955          Positioning and Restraints    Pre-Treatment Position in bed  -SD     Post Treatment Position bed  -SD     In Bed supine;call light within reach;encouraged to call for assist;exit alarm on;with family/caregiver;RUE elevated  -SD           User Key  (r) = Recorded By, (t) = Taken By, (c) = Cosigned By    Initials Name Provider Type    Ar Acharya, OTR Occupational Therapist               Outcome Measures     Row Name 12/12/22 0959          How much help from another is currently needed...    Putting on and taking off regular lower body clothing? 3  -SD     Bathing (including washing, rinsing, and drying) 3  -SD     Toileting (which includes using toilet bed pan or urinal) 3  -SD     Putting on and taking off regular upper body clothing 3  -SD     Taking care of personal grooming (such as brushing teeth) 3  -SD     Eating meals 3  -SD     AM-PAC 6 Clicks Score (OT) 18  -SD     Row Name 12/12/22 0959          Functional Assessment    Outcome Measure Options AM-PAC 6 Clicks Daily Activity  (OT)  -SD           User Key  (r) = Recorded By, (t) = Taken By, (c) = Cosigned By    Initials Name Provider Type    SD Ar Garcia OTR Occupational Therapist                Occupational Therapy Education     Title: PT OT SLP Therapies (In Progress)     Topic: Occupational Therapy (In Progress)     Point: ADL training (Done)     Description:   Instruct learner(s) on proper safety adaptation and remediation techniques during self care or transfers.   Instruct in proper use of assistive devices.              Learning Progress Summary           Patient Acceptance, E, VU by SD at 12/12/2022 1000    Comment: Education provided regarding NWB status/activity restrictions of RUE and impact on daily tasks. Pt verbalized understanding and states she realizes the need for STR at this time.    Acceptance, E,TB,D, VU,NR by SD at 12/11/2022 0846    Comment: Education regarding NWB status and impact on daily tasks. Education regarding safety wtih tranfers/mobility. Pt was impulsive and needed cues for safety.                   Point: Home exercise program (In Progress)     Description:   Instruct learner(s) on appropriate technique for monitoring, assisting and/or progressing therapeutic exercises/activities.              Learning Progress Summary           Patient Acceptance, E,TB,D, NR by SD at 12/12/2022 1001    Comment: Education with gentle arom program of non-involved joints of RUE (hand and shoulder). Pt able perform arom of hand, yet she needs assistance for gentle shoulder rom due to weight of splint.                               User Key     Initials Effective Dates Name Provider Type Discipline    SD 06/16/21 -  Ar Garcia OTR Occupational Therapist OT              OT Recommendation and Plan  Planned Therapy Interventions (OT): patient/caregiver education/training, ROM/therapeutic exercise, BADL retraining, transfer/mobility retraining  Therapy Frequency (OT): 3 times/wk  Plan of Care Review  Plan of  Care Reviewed With: patient  Outcome Evaluation: OT: Education provided regarding NWB status/activity restrictions of RUE and impact on daily tasks. Pt verbalized understanding and states she realizes the need for STR at this time.  Pt participated in gentle arom program of RUE for non-involved joints of RUE.  Education provided regarding edema management (elevation and arom of hand).     Time Calculation:    Time Calculation- OT     Row Name 12/12/22 0920             Time Calculation- OT    OT Start Time 0855  -SD         Timed Charges    93323 - OT Therapeutic Activity Minutes 20  -SD         Total Minutes    Timed Charges Total Minutes 20  -SD       Total Minutes 20  -SD            User Key  (r) = Recorded By, (t) = Taken By, (c) = Cosigned By    Initials Name Provider Type    SD Ar Garcia OTR Occupational Therapist              Therapy Charges for Today     Code Description Service Date Service Provider Modifiers Qty    [id number]  OT THERAPEUTIC ACT EA 15 MIN 12/12/2022 Ar Garcia OTR GO 1               KIM Escoto  12/12/2022

## 2022-12-12 NOTE — PLAN OF CARE
Goal Outcome Evaluation:  Plan of Care Reviewed With: patient        Progress: no change  Outcome Evaluation: Patient rested off and on throughout the night. Patient has concerns about home life and what to do if she goes to [facility] or home. Patient hopes that she can go home today. Tolerating PRN pain meds for pain. Bed alarm remains in place. Remains Afib on telemetry. VSS.

## 2022-12-12 NOTE — PLAN OF CARE
Goal Outcome Evaluation:              Outcome Evaluation: PT: Patient reports feeling much better today.  Patient performs supine to/from sit with SBA and sit to stand with CGA.  Patient initially attempts gait x20 feet with hemiwalker, however displays significant difficulty with sequencing and mangement of device.  Subsequent gait attempted without use of device.  Patient performs gait x300 feet without device, CGA and displays much improved gait pattern and balance during mobility without device today.  Patient's responses to questions at times off topic throughout session and frequently changes topics mid-conversation.  While overall mobility is improved today, patient does not appear to be safe to return home alone and manage day to day tasks at this time.  Patient and family report they are hoping patient will go to short term rehab at discharge.

## 2022-12-12 NOTE — CASE MANAGEMENT/SOCIAL WORK
"Continued Stay Note  CHERRIE Bennett     Patient Name: Kaylin Ojeda  MRN: [id number]  Today's Date: 12/12/2022    Admit Date: 12/9/2022    Plan: STR referrals   Discharge Plan     Row Name 12/12/22 1609       Plan    Plan STR referrals    Plan Comments Spoke with patients daughter-in-law at bedside regarding STR referrals. [facility] has no beds available. The following [facility] facilities are also full- [place] [facility], [facility], [facility]. [place] and [place] are out of network with patient insurance. Family does not want referrals to [facility], [place], or [place] as they are \"too far away.\" Request referral to [facility], Spoke with [facility] liaison who will also check [facility] for availability. Referral via epic to [place].  Discussion regarding patient insurance requires a precert and patient may not qualify for STR. Patient lives alone and Francine- dtr-in-law states there is not family available to stay with patient 24/7.  [facility] magazine, HH listing and private pay in home care giver list provided for review. CM will continue to follow to [facility] with dc planning.               Discharge Codes    No documentation.                     Walter Moreno RN    "

## 2022-12-12 NOTE — THERAPY TREATMENT NOTE
Acute Care - Physical Therapy Treatment Note  CHERRIE Bennett     Patient Name: Kaylin Ojeda  : 1940  MRN: [id number]  Today's Date: 2022      Visit Dx:     ICD-10-CM ICD-9-CM   1. Type I or II open fracture of olecranon process of right ulna, initial encounter  S52.021B 813.11   2. Closed head injury, initial encounter  S09.90XA 959.01   3. 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[facility])   • Obstruction of carotid artery   • Peripheral arterial occlusive disease ([facility])   • Impaired glucose tolerance   • Difficulty sleeping   • Tobacco dependence syndrome   • Iron deficiency anemia   • Vitamin B12 deficiency anemia due to intrinsic factor deficiency   • Acquired hypothyroidism   • Thoracoabdominal aortic aneurysm   • Abdominal bloating   • Prediabetes   • Sleep difficulties   • Chronic constipation   • PAF (paroxysmal atrial fibrillation) ([facility])   • Moderate single current episode of major depressive disorder ([facility])   • Chronic low back pain without sciatica   • Urge incontinence   • Severe mitral regurgitation   • Severe tricuspid regurgitation   • Moderate aortic valve regurgitation   • Underweight   • DDD (degenerative disc disease), lumbar   • Atrial flutter with rapid ventricular response ([facility])   • Urinary tract infection without hematuria   • Malaise and fatigue   • Type I or II open fracture of right olecranon process   • Type I or II open fracture of olecranon process of right ulna, initial encounter     Past Medical History:   Diagnosis Date   • A-fib ([facility])    • Abdominal pain, epigastric     Chronic, unclear  cause, improved   • Anorexia    • Anxiety    • Arthritis    • CAD (coronary artery disease)     Cath 5/2015: nonobstructive LAD/RCA disease, 90% mid LCx s/p 2.14n05dc Xience.  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[facility])    • Mononucleosis    • Occlusion and stenosis of carotid artery    • Onychomycosis    • Orbit fracture ([facility])    • PAF (paroxysmal atrial fibrillation) ([facility])    • Peripheral arterial disease ([facility])     Significant (carotid, subclavian, renal arteries, lower extremities), with claudication, medical therapy only   • Pernicious anemia    • Prediabetes    • Renal artery stenosis ([facility])     unilateral, with renal atrophy (no intervention required)   • Renal calculi    • Sinus bradycardia     mild, asymptomatic   • TIA (transient ischemic attack)    • UTI (urinary tract infection)    • Valvular heart disease     1/2021: mod-severe AI, mod MR, mild-mod TR   • Vision problem    • Vitamin B 12 deficiency      Past Surgical History:   Procedure Laterality Date   • APPENDECTOMY     • BREAST BIOPSY Left     20+ yrs ago   • BREAST SURGERY     • CARDIAC CATHETERIZATION  05/2015    nonobs LAD/RCA disease, 90% mid LCx s/p 2.42e85hi Xience   • CARDIAC CATHETERIZATION N/A 10/28/2020    Procedure: Right and Left Heart Cath;  Surgeon: Opal Contreras MD;  Location: [facility] CATH INVASIVE LOCATION;  Service: Cardiovascular;  Laterality: N/A;  for cardiac testing prior to possible valve  surgery per Dr. Mai   • CARDIAC CATHETERIZATION N/A 10/28/2020    Procedure: Coronary angiography;  Surgeon: Opal Contreras MD;  Location: [facility] CATH INVASIVE LOCATION;  Service: Cardiovascular;  Laterality: N/A;   • CERVICAL FUSION  1997   • CHOLECYSTECTOMY     • CORONARY STENT PLACEMENT     • HYSTERECTOMY  1995   • KNEE SURGERY Right 2005   • KNEE SURGERY Left 1998     PT Assessment (last 12 hours)     PT Evaluation and Treatment     Row Name 12/12/22 1300          Physical Therapy Time and Intention    Subjective Information complains of;fatigue  -     Document Type therapy note (daily note)  -     Mode of Treatment physical therapy  -     Patient Effort good  -     Comment pt reports fatigue with activity  -     Row Name 12/12/22 1300          General Information    Patient Observations alert;cooperative;agree to therapy  -     Patient/Family/Caregiver Comments/Observations pt supine, family present  -     Existing Precautions/Restrictions fall  NWB right UE  -     Comment, General Information pt with occasional off topic responses within conversation  -     Row Name 12/12/22 1300          Pain    Pre/Posttreatment Pain Comment pt with no c/o pain, reports increased itchiness in right UE  -     Row Name 12/12/22 1300          Cognition    Personal Safety Interventions gait belt;nonskid shoes/slippers when out of bed  -     Row Name 12/12/22 1300          Mobility    Extremity Weight-bearing Status right upper extremity  -     Right Upper Extremity (Weight-bearing Status) non weight-bearing (NWB)  -     Row Name 12/12/22 1300          Bed Mobility    Bed Mobility supine-sit;sit-supine  -     Supine-Sit [place] (Bed Mobility) standby assist  -     Sit-Supine [place] (Bed Mobility) standby assist  -     Bed Mobility, Safety Issues decreased use of arms for pushing/pulling  -     Assistive Device (Bed Mobility) bed rails;head of bed elevated  -     Comment, (Bed  Mobility) increased time to complete with maximal independence  -     Row Name 12/12/22 1300          Transfers    Transfers sit-stand transfer;stand-sit transfer  -     Row Name 12/12/22 1300          Sit-Stand Transfer    Sit-Stand [place] (Transfers) contact guard;verbal cues  -     Assistive Device (Sit-Stand Transfers) walker, ebony  -DELANEY     Row Name 12/12/22 1300          Stand-Sit Transfer    Stand-Sit [place] (Transfers) contact guard;verbal cues  -     Row Name 12/12/22 1300          Gait/Stairs (Locomotion)    Comment, (Gait/Stairs) pt initially attempts gait x20 feet with hemiwalker, difficulty with management of device.  Subsequent gait x300 feet performed without use of device.  Patient with overall improved mobility/balance without use of device.  -     Row Name 12/12/22 1300          Balance    Comment, Balance CGA for standing balance  -     Row Name             Wound 12/10/22 0910 Right posterior elbow Incision    Wound - Properties Group Placement Date: 12/10/22  -KD Placement Time: 0910  -KD Side: Right  -KD Orientation: posterior  -KD Location: elbow  -KD Primary Wound Type: Incision  -KD    Retired Wound - Properties Group Placement Date: 12/10/22  -KD Placement Time: 0910  -KD Side: Right  -KD Orientation: posterior  -KD Location: elbow  -KD Primary Wound Type: Incision  -KD    Retired Wound - Properties Group Date first assessed: 12/10/22  -KD Time first assessed: 0910  -KD Side: Right  -KD Location: elbow  -KD Primary Wound Type: Incision  -KD    Row Name 12/12/22 1300          Plan of Care Review    Outcome Evaluation PT: Patient reports feeling much better today.  Patient performs supine to/from sit with SBA and sit to stand with CGA.  Patient initially attempts gait x20 feet with hemiwalker, however displays significant difficulty with sequencing and mangement of device.  Subsequent gait attempted without use of device.  Patient performs gait x300 feet without device,  CGA and displays much improved gait pattern and balance during mobility without device today.  Patient's responses to questions at times off topic throughout session and frequently changes topics mid-conversation.  While overall mobility is improved today, patient does not appear to be safe to return home alone and manage day to day tasks at this time.  Patient and family report they are hoping patient will go to short term rehab at discharge.  -     Row Name 12/12/22 1300          Positioning and Restraints    Pre-Treatment Position in bed  -     Post Treatment Position bed  -JW     In Bed supine;call light within reach;encouraged to call for assist;exit alarm on;with family/caregiver  -     Row Name 12/12/22 1300          Progress Summary (PT)    Progress Toward Functional Goals (PT) progress toward functional goals is good  -           User Key  (r) = Recorded By, (t) = Taken By, (c) = Cosigned By    Initials Name Provider Type    Elodia Strauss, RN Registered Nurse    Clementina Parham, PT Physical Therapist                Physical Therapy Education     Title: PT OT SLP Therapies (In Progress)     Topic: Physical Therapy (Done)     Point: Mobility training (Done)     Learning Progress Summary           Patient Acceptance, E,TB, VU by  at 12/12/2022 1341    Acceptance, E,TB, VU by  at 12/11/2022 0940    Comment: educated pt on use of ebony walker                   Point: Precautions (Done)     Learning Progress Summary           Patient Acceptance, E,TB, VU by  at 12/12/2022 1341    Acceptance, E,TB, VU by  at 12/11/2022 0940    Comment: educated pt on use of ebony walker                               User Key     Initials Effective Dates Name Provider Type Discipline     06/16/21 -  Prateek Porter, PT Physical Therapist PT     06/16/21 -  Clementina Ndiaye, JENNY Physical Therapist PT              PT Recommendation and Plan     Progress Summary (PT)  Progress Toward Functional Goals (PT): progress  toward functional goals is good  Outcome Evaluation: PT: Patient reports feeling much better today.  Patient performs supine to/from sit with SBA and sit to stand with CGA.  Patient initially attempts gait x20 feet with hemiwalker, however displays significant difficulty with sequencing and mangement of device.  Subsequent gait attempted without use of device.  Patient performs gait x300 feet without device, CGA and displays much improved gait pattern and balance during mobility without device today.  Patient's responses to questions at times off topic throughout session and frequently changes topics mid-conversation.  While overall mobility is improved today, patient does not appear to be safe to return home alone and manage day to day tasks at this time.  Patient and family report they are hoping patient will go to short term rehab at discharge.   Outcome Measures     Row Name 12/12/22 1300             How much help from another person do you currently need...    Turning from your back to your side while in flat bed without using bedrails? 3  -JW      Moving from lying on back to sitting on the side of a flat bed without bedrails? 3  -JW      Moving to and from a bed to a chair (including a wheelchair)? 3  -JW      Standing up from a chair using your arms (e.g., wheelchair, bedside chair)? 3  -JW      Climbing 3-5 steps with a railing? 3  -JW      To walk in hospital room? 3  -JW      AM-PAC 6 Clicks Score (PT) 18  -         Functional Assessment    Outcome Measure Options AM-PAC 6 Clicks Basic Mobility (PT)  -            User Key  (r) = Recorded By, (t) = Taken By, (c) = Cosigned By    Initials Name Provider Type    Clementina Parham, PT Physical Therapist                 Time Calculation:    PT Charges     Row Name 12/12/22 1341             Time Calculation    Start Time 1300  -      Stop Time 1320  -      Time Calculation (min) 20 min  -      PT Received On 12/12/22  -      PT - Next Appointment  12/13/22  -JW         Timed Charges    38646 - PT Therapeutic Exercise Minutes 20  -JW         Total Minutes    Timed Charges Total Minutes 20  -JW       Total Minutes 20  -JW            User Key  (r) = Recorded By, (t) = Taken By, (c) = Cosigned By    Initials Name Provider Type    Clementina Parham, PT Physical Therapist              Therapy Charges for Today     Code Description Service Date Service Provider Modifiers Qty    52164664571 HC PT THER PROC EA 15 MIN 12/12/2022 Clementina Ndiaye, PT GP 1          PT G-Codes  Outcome Measure Options: AM-PAC 6 Clicks Basic Mobility (PT)  AM-PAC 6 Clicks Score (PT): 18  AM-PAC 6 Clicks Score (OT): 18    Clementina Ndiaye PT  12/12/2022

## 2022-12-12 NOTE — PLAN OF CARE
Goal Outcome Evaluation:  Plan of Care Reviewed With: patient        Progress: no change  Outcome Evaluation: Patient stated she rolled out of bed, notified MD. Patient states she only hit her shoulder and states that she did not hit her head. New orders noted by MD, see chart for details. Bed alarm in place and patient was instructed to press the call light for assistance. Patient states she has no pain to non-operative side. VSS.

## 2022-12-12 NOTE — NURSING NOTE
Attempted to notify patients son Jona Rosenthal, to update. No answer, left voicemail. Awaiting return phone call.

## 2022-12-12 NOTE — PLAN OF CARE
Goal Outcome Evaluation:  Plan of Care Reviewed With: patient        Progress: improving  Outcome Evaluation: Patient alert & oriented but forgetful at times & very spontaneous when she needs to go to the bathroom or wants something. Had cognitive eval today & did not do well. Looking for patient to go to short term rehab at discharge. Pain has been better controlled today. Heart rate still elevated with increase & addition of medications.

## 2022-12-12 NOTE — PROGRESS NOTES
"DAILY PROGRESS NOTE  [facility]  0  ORTHOPEDIC SURGERY DAILY PROGRESS NOTE  [facility]  LENGTH OF STAY 0 DAYS      Patient Identification:  Name: Kaylin Ojeda  Age: 82 y.o.  Sex: female  :  1940  MRN: [id number]         Primary Care Physician: Donna Forte MD      Subjective:  Interval History: less confused overnight. Pain controlled. Difficulty sleeping    Objective:    Scheduled Meds:apixaban, 2.5 mg, Oral, BID  atorvastatin, 10 mg, Oral, Daily  budesonide-formoterol, 2 puff, Inhalation, BID - RT  doxycycline, 100 mg, Oral, Q12H  famotidine, 20 mg, Oral, BID AC  FLUoxetine, 20 mg, Oral, Daily  metoprolol tartrate, 25 mg, Oral, Q12H  nicotine, 1 patch, Transdermal, Q24H  senna-docusate sodium, 2 tablet, Oral, BID  sodium chloride, 10 mL, Intravenous, Q12H      Continuous Infusions:lactated ringers, 9 mL/hr, Last Rate: 100 mL/hr (12/10/22 0801)        Vital signs in last 24 hours:  Temp:  [97.5 °F (36.4 °C)-99 °F (37.2 °C)] 97.6 °F (36.4 °C)  Heart Rate:  [121-137] 133  Resp:  [16-18] 16  BP: ()/() 128/90    Intake/Output:    Intake/Output Summary (Last 24 hours) at 2022 0851  Last data filed at 2022 0548  Gross per 24 hour   Intake 120 ml   Output 300 ml   Net -180 ml       Exam:  /90 (BP Location: Left arm, Patient Position: Lying)   Pulse (!) 133   Temp 97.6 °F (36.4 °C) (Oral)   Resp 16   Ht 152.4 cm (60\")   Wt 45.3 kg (99 lb 14.4 oz)   LMP  (LMP Unknown)   SpO2 95%   BMI 19.51 kg/m²   General: No acute distress, Aox3  Pulmonary: Non-labored breathing  Cardiovascular: Regular rate and rhythm    RUE   fingers warm well perfused   sensation normal in right hand   able to flex and extend all fingers and thumb   anterior long arm splint at 45 degrees clean dry and intact   upper and lower arm compartments soft and compressible          Data Review:  Lab Results   Component Value Date    CALCIUM 8.6 2022    PHOS 3.4 " 12/11/2022     Results from last 7 days   Lab Units 12/12/22  0410 12/11/22  1412 12/11/22  0425 12/10/22  0420 12/09/22  1203   AST (SGOT) U/L  --   --   --   --  20   MAGNESIUM mg/dL 1.9 2.2  --   --   --    SODIUM mmol/L 136  --  135* 137 134*   POTASSIUM mmol/L 3.8  --  3.4* 3.6 4.5   CHLORIDE mmol/L 100  --  97* 102 97*   CO2 mmol/L 28.1  --  27.8 24.0 25.6   BUN mg/dL 9  --  8 13 20   CREATININE mg/dL 0.60  --  0.73 0.72 0.83   GLUCOSE mg/dL 112*  --  113* 88 92   CALCIUM mg/dL 8.6  --  9.0 8.3* 9.1   WBC 10*3/mm3 7.78  --  8.04 5.97 7.34   HEMOGLOBIN g/dL 8.8* 9.4* 9.0* 10.1* 11.3*   PLATELETS 10*3/mm3 163  --  147 180 191   ALT (SGPT) U/L  --   --   --   --  10     Lab Results   Component Value Date    CKTOTAL 170 12/09/2022    CKMB 2.84 06/05/2017    TROPONINT <0.010 12/09/2022     Estimated Creatinine Clearance: 53.5 mL/min (by C-G formula based on SCr of 0.6 mg/dL).  WEIGHTS:     Wt Readings from Last 1 Encounters:   12/09/22 1646 45.3 kg (99 lb 14.4 oz)   12/09/22 1048 45.4 kg (100 lb)         Assessment:    Type I or II open fracture of right olecranon process    Type I or II open fracture of olecranon process of right ulna, initial encounter      Plan:  82 yoF POD 2 R olecranon I&D and ORIF  -NWB RUE  -Pain control PRN  -doxy 100 for 14 weeks for open fracture ppx (order placed)  -dispo per     Plan for disposition:Where: [facility]      Lito Reeder MD  12/12/2022  08:51 EST

## 2022-12-12 NOTE — NURSING NOTE
Patient son Jona returned call and updated on patient status. Jona stated he understood and was grateful for all the information. No questions at this time.

## 2022-12-12 NOTE — PROGRESS NOTES
Adult Nutrition  Assessment/PES    Patient Name:  Kaylin Ojeda  YOB: 1940  MRN: [id number]  Admit Date:  12/9/2022    Assessment Date:  12/12/2022    Comments:  Agree with diet.   Recommend: Add Boost Plus with meals to supplement po  Will cont to follow and further assess nutrition status as able.      Reason for Assessment     Row Name 12/12/22 1448          Reason for Assessment    Reason For Assessment identified at risk by screening criteria  BMI/Age     Diagnosis trauma  s/p fall fx, hx dementia                Nutrition/Diet History     Row Name 12/12/22 1449          Nutrition/Diet History    Typical Intake (Food/Fluid/EN/PN) Pt in bathroom at visit, Confusion noted.                Labs/Tests/Procedures/Meds     Row Name 12/12/22 1449          Labs/Procedures/Meds    Lab Results Reviewed reviewed     Lab Results Comments glu 113        Diagnostic Tests/Procedures    Diagnostic Test/Procedure Reviewed reviewed        Medications    Pertinent Medications Reviewed reviewed                  Estimated/Assessed Needs - Anthropometrics     Row Name 12/12/22 1449          Anthropometrics    Weight --  99.9#        Estimated/Assessed Needs    Additional Documentation Estimated Calorie Needs (Group);Fluid Requirements (Group);Protein Requirements (Group)        Estimated Calorie Needs    Estimated Calorie Requirement (kcal/day) 6668-2455 (miflfin 1.25-1.4)     Estimated Calorie Need Method [place]        Protein Requirements    Est Protein Requirement Amount (gms/kg) 1.2 gm protein  54 gm pro        Fluid Requirements    Estimated Fluid Requirement Method RDA Method  9099-6439 ml                Nutrition Prescription Ordered     Row Name 12/12/22 1452          Nutrition Prescription PO    Current PO Diet Regular                Evaluation of Received Nutrient/Fluid Intake     Row Name 12/12/22 1452          Fluid Intake Evaluation    Oral Fluid (mL) 280  ave x 3, 27%        PO Evaluation     Number of Meals 4     % PO Intake 25                   Problem/Interventions:   Problem 1     Row Name 12/12/22 1455          Nutrition Diagnoses Problem 1    Problem 1 Inadequate Nutrient Intake     Etiology (related to) Medical Diagnosis;Factors Affecting Nutrition     Signs/Symptoms (evidenced by) Report/Observation                      Intervention Goal     Row Name 12/12/22 1458          Intervention Goal    General Meet nutritional needs for age/condition     PO Increase intake;PO intake (%)     PO Intake % 50 %  or greater     Weight Maintain weight                Nutrition Intervention     Row Name 12/12/22 1459          Nutrition Intervention    RD/Tech Action Encourage intake;Follow Tx progress;Recommend/ordered     Recommended/Ordered Supplement                Nutrition Prescription     Row Name 12/12/22 1459          Nutrition Prescription PO    PO Prescription Begin/change supplement     Supplement Boost Plus     Supplement Frequency 3 times a day                Education/Evaluation     Row Name 12/12/22 1459          Education    Education Education not appropriate at this time        Monitor/Evaluation    Monitor Per protocol;I&O;PO intake;Supplement intake;Pertinent labs;Weight;Symptoms                 Electronically signed by:  Natty Barajas RD  12/12/22 15:00 EST

## 2022-12-12 NOTE — PROGRESS NOTES
SERVICE: [facility] HOSPITALIST    CONSULTANTS: ortho    CHIEF COMPLAINT: right arm pain    SUBJECTIVE: Patient seen and examined at bedside. Patient reports pain in arm is currently well controlled she also reports bruising in her face continues to subside.  She reports a restless night, difficulty obtaining comfortable position.  She is concerned about her elevated heart rate we discussed need for increase metoprolol dosing.  Family member at bedside states that she had increased dose previously on discharge but became dizzy and had to reduce the dosing.  We then discussed risk and benefits of increased dose and mutually agreed to continue with increased dose to better control heart rate.  I counseled patient on need to hold onto better other device when for standing acquire her bearings and proceed cautiously.  Should she have any dizziness she has to sit down and rest.  Also advised patient to remove any loose rugs from porsha at the home and use plates to reach bathroom in the evening time.  She is agreeable to this and would like to proceed with increased dose of metoprolol.  We also discussed placement options and patient and loved 1 is agreeable to this.  No other acute issues reported  OBJECTIVE:    Physical exam is largely unchanged from previous exam, except where documented below, examination is accurate as of 12/12/2022    Physical Exam:  General: Patient is awake and alert.  Elderly female no acute distress noted.   HENT: Diffuse bruising across face. Hearing is grossly intact. Nose is without obvious congestion and appears patent. Neck is supple and trachea is midline.   Eyes: Vision is grossly intact. Pupils appear equal and round.   Cardiovascular: Irregularly irregular rhythm with no murmurs, rubs or gallops noted.   Respiratory: Lungs are clear to ausculation without wheezes, rhonchi or rales.   Abdominal/GI: Soft, nontender, bowel sounds present. No rebound or guarding  "present.   Extremities: No peripheral edema noted.   Musculoskeletal: Spontaneous movement of bilateral upper and lower extremities against gravity noted. No signs of injury or deformity noted.   Skin: Warm and dry.   Psych: Mood and affect are appropriate. Cooperative with exam.   Neuro: No facial asymmetry noted. No focal deficits noted, hearing and vision are grossly intact.     /90 (BP Location: Left arm, Patient Position: Lying)   Pulse (!) 133   Temp 97.6 °F (36.4 °C) (Oral)   Resp 16   Ht 152.4 cm (60\")   Wt 45.3 kg (99 lb 14.4 oz)   LMP  (LMP Unknown)   SpO2 95%   BMI 19.51 kg/m²     MEDS/LABS REVIEWED AND ORDERED    atorvastatin, 10 mg, Oral, Daily  budesonide-formoterol, 2 puff, Inhalation, BID - RT  doxycycline, 100 mg, Oral, Q12H  famotidine, 20 mg, Oral, BID AC  FLUoxetine, 20 mg, Oral, Daily  metoprolol tartrate, 25 mg, Oral, Q12H  nicotine, 1 patch, Transdermal, Q24H  senna-docusate sodium, 2 tablet, Oral, BID  sodium chloride, 10 mL, Intravenous, Q12H          LAB/DIAGNOSTICS:    Lab Results (last 24 hours)     Procedure Component Value Units Date/Time    Basic Metabolic Panel [[id number]]  (Abnormal) Collected: 12/12/22 0410    Specimen: Blood Updated: 12/12/22 0453     Glucose 112 mg/dL      BUN 9 mg/dL      Creatinine 0.60 mg/dL      Sodium 136 mmol/L      Potassium 3.8 mmol/L      Chloride 100 mmol/L      CO2 28.1 mmol/L      Calcium 8.6 mg/dL      BUN/Creatinine Ratio 15.0     Anion Gap 7.9 mmol/L      eGFR 90.9 mL/min/1.73      Comment: National Kidney Foundation and American Society of Nephrology (ASN) Task Force recommended calculation based on the Chronic Kidney Disease Epidemiology Collaboration (CKD-EPI) equation refit without adjustment for race.       Narrative:      GFR Normal >60  Chronic Kidney Disease <60  Kidney Failure <15    The GFR formula is only valid for adults with stable renal function between ages 18 and 70.    Magnesium [[id number]]  (Normal) Collected: " 12/12/22 0410    Specimen: Blood Updated: 12/12/22 0453     Magnesium 1.9 mg/dL     CBC & Differential [[id number]]  (Abnormal) Collected: 12/12/22 0410    Specimen: Blood Updated: 12/12/22 0434    Narrative:      The following orders were created for panel order CBC & Differential.  Procedure                               Abnormality         Status                     ---------                               -----------         ------                     CBC Auto Differential[[id number]]        Abnormal            Final result                 Please view results for these tests on the individual orders.    CBC Auto Differential [[id number]]  (Abnormal) Collected: 12/12/22 0410    Specimen: Blood Updated: 12/12/22 0434     WBC 7.78 10*3/mm3      RBC 2.85 10*6/mm3      Hemoglobin 8.8 g/dL      Hematocrit 27.5 %      MCV 96.5 fL      MCH 30.9 pg      MCHC 32.0 g/dL      RDW 16.0 %      RDW-SD 56.4 fl      MPV 10.3 fL      Platelets 163 10*3/mm3      Neutrophil % 68.8 %      Lymphocyte % 21.2 %      Monocyte % 8.4 %      Eosinophil % 0.6 %      Basophil % 0.6 %      Immature Grans % 0.4 %      Neutrophils, Absolute 5.35 10*3/mm3      Lymphocytes, Absolute 1.65 10*3/mm3      Monocytes, Absolute 0.65 10*3/mm3      Eosinophils, Absolute 0.05 10*3/mm3      Basophils, Absolute 0.05 10*3/mm3      Immature Grans, Absolute 0.03 10*3/mm3      nRBC 0.0 /100 WBC     Phosphorus [[id number]]  (Normal) Collected: 12/11/22 1412    Specimen: Blood Updated: 12/11/22 1431     Phosphorus 3.4 mg/dL     Magnesium [[id number]]  (Normal) Collected: 12/11/22 1412    Specimen: Blood Updated: 12/11/22 1431     Magnesium 2.2 mg/dL     Hemoglobin & Hematocrit, Blood [[id number]]  (Abnormal) Collected: 12/11/22 1412    Specimen: Blood Updated: 12/11/22 1416     Hemoglobin 9.4 g/dL      Hematocrit 28.8 %         ECG 12 Lead Rhythm Change   Final Result   HEART RATE= 132  bpm   RR Interval= 440  ms   OK Interval=   ms   P Horizontal Axis=   deg   P Front  Axis=   deg   QRSD Interval= 80  ms   QT Interval= 332  ms   QRS Axis= 72  deg   T Wave Axis= 40  deg   - ABNORMAL ECG -   Afib with rvr   Consider left ventricular hypertrophy   Prolonged QT interval   c/w prior ecg, a fib with rvr now seen   Electronically Signed By: Sasha Do ([facility]) 12-Dec-2022 07:02:29   Date and Time of Study: 2022-12-11 13:54:07      ECG 12 Lead   ED Interpretation   Ramin Silvestre MD     12/9/2022  4:02 PM   ECG 12 Lead         Date/Time: 12/9/2022 3:32 PM   Performed by: Ramin Silvestre MD   Authorized by: Ramin Silvestre MD    Interpreted by physician   Comparison: compared with previous ECG from 10/6/2022   Similar to previous ECG   Comments: Rate of 74, A. fib, normal axis, no acute ST elevation or    depression.         ECG 12 Lead Pre-Op / Pre-Procedure   Final Result   HEART RATE= 74  bpm   RR Interval= 812  ms   AK Interval=   ms   P Horizontal Axis=   deg   P Front Axis=   deg   QRSD Interval= 84  ms   QT Interval= 438  ms   QRS Axis= 78  deg   T Wave Axis= 34  deg   - BORDERLINE ECG -   Sinus rhythm   Consider left ventricular hypertrophy   c/w prior ECG, NSR has replaced SVT   Electronically Signed By: Sven Mathews ([facility]) 10-Dec-2022 21:45:23   Date and Time of Study: 2022-12-09 15:32:21        Results for orders placed during the hospital encounter of 09/28/22    Adult Transthoracic Echo Complete w/ Color, Spectral and Contrast if Necessary Per Protocol    Interpretation Summary  · Estimated right ventricular systolic pressure from tricuspid regurgitation is normal (<35 mmHg). Calculated right ventricular systolic pressure from tricuspid regurgitation is 29 mmHg.  · Left ventricular wall thickness is consistent with mild concentric hypertrophy.  · Calculated left ventricular EF = 62% Estimated left ventricular EF was in agreement with the calculated left ventricular EF. Left ventricular systolic function is normal.  · Moderate to severe aortic valve regurgitation  is present.  · Mild to moderate tricuspid valve regurgitation is present.  · Mild to moderate mitral valve regurgitation is present.  · Left atrial volume is severely increased.  · The right atrial cavity is mildly dilated.  · Mild dilation of the ascending aorta is present.    XR Shoulder 2+ View Left    Result Date: 12/11/2022  Degenerative changes of the left glenohumeral joint. No acute fracture or dislocation. Signer Name: Cornell Acosta MD  Signed: 12/11/2022 11:02 PM  Workstation Name: [id number]  [facility]    XR Elbow 2 View Right    Result Date: 12/10/2022  Post open reduction internal fixation comminuted fracture proximal ulna with improvement in fracture fragment alignment. No dislocation. Signer Name: Ida Logan MD  Signed: 12/10/2022 11:22 AM  [email or web address]  [facility]    XR Elbow 2 View Right    Result Date: 12/10/2022  ORIF of right olecranon fracture in normal anatomic alignment. Signer Name: TAMIKA Flores MD  Signed: 12/10/2022 10:20 AM  Workstation Name: [email or web address]  [facility]        ASSESSMENT/PLAN:  Please not portions of this assessment/plan may have been copied and pasted, but I have personally seen this patient and reviewed each line of this assessment and plan for accuracy and made updates to reflect my necessary changes on 12/12/2022     Acute comminuted displaced intra-articular fracture of the proximal ulna secondary to mechanical fall  -Status post ORIF by orthopedic surgery  -Orthopedic surgery recommending Doxy-100 milligrams for 14 weeks for open fracture prophylaxis  -Continue supportive care with pain medications and antiemetics  -She remains on fall precautions  -Case management working towards skilled nursing facility placement for short-term rehab.  Patient seems apprehensive about short-term rehab placement and is currently weighing her options.     Atrial fibrillation with RVR  -Heart  "rate remains elevated beyond goal, she was given one-time dose of digoxin yesterday we will give increased dose of 250 mcg 1 time today, .   -We will increase oral metoprolol dosing to 25 mg twice daily.   -Continue to monitor   -Resume home Eliquis.     Suspected dementia  -Patient demonstrating significant amount of short-term memory loss since time of admission, she reports significant issues with memory recently.     Right lower rib pain-patient with 11th rib fracture on the right, continue supportive care with pain medications.     Bilateral knee pain/ecchymosis-knee x-rays confirm no acute abnormality    CAD/hyperlipidemia status post stent with moderate severe AR/mild to moderate MR/mild to moderate TR and hypertension  -Continue home metoprolol with increased dosing as above.  Continue home Lipitor  -We will resume home Eliquis    COPD-no acute issues continue home Symbicort     GERD-no acute issues continue home Pepcid    Severe malnutrition-BMI is 18.89 kg per metered squared, nutrition following, boost supplements added.     Tobacco abuse- nicotine patch.  Cessation discussed.     Anxiety/depression-continue home Prozac without acute issue      PLAN FOR DISPOSITION: GARCÍA Joaquin DO  Hospitalist, [facility]  12/12/22  08:46 EST    At [facility], we believe that sharing information builds trust and better relationships. You are receiving this note because you recently visited [facility]. It is possible you will see health information before a provider has talked with you about it. This kind of information can be easy to misunderstand. To help you fully understand what it means for your health, we urge you to discuss this note with your provider.    \"Dictated utilizing Dragon dictation\"    "

## 2022-12-12 NOTE — PLAN OF CARE
Problem: Adult Inpatient Plan of Care  Goal: Plan of Care Review  Recent Flowsheet Documentation  Taken 12/12/2022 0955 by Ar Garcia OTR  Plan of Care Reviewed With: patient  Outcome Evaluation: OT: Education provided regarding NWB status/activity restrictions of RUE and impact on daily tasks. Pt verbalized understanding and states she realizes the need for STR at this time.  Pt participated in gentle arom program of RUE for non-involved joints of RUE.  Education provided regarding edema management (elevation and arom of hand).

## 2022-12-12 NOTE — PLAN OF CARE
Goal Outcome Evaluation:  Plan of Care Reviewed With: patient           Outcome Evaluation: SLP: Pt demonstrates at least moderate deficits in cognitive functioning. She demonstrates ability to follow 1 step commands, but not 2 step commands. Personal yes/no was WFL, working memory for rote repeating of numbers up to 6 digits and repeating words up to 5 WFL, but cannot mentally hold and manipulate for calculations or delayed recall. Limitations in problem solving noted as well with lack of detail and tangential responses at times. She is generally oriented and off by the date by 1-2 days. She reports her age as 84 but will not be 84 until next February 2023. Unable to complete math calcuations for simple addition and subtraction. Category naming revealed pt providing the names of her previous animals versus different types of animals, however she was able to name 16 different with 3 repetitions. Visual spatial and executive functions both w/deficits including recognition of shapes and sizes and clock drawing with 13 hour markers and hands of equal length. Pt with difficulty using her right hand, but was also unable to correctly state the long and short hand with equal hands being on the 10 and 12. Pt was unable to respond to any questions about a paragraph read to her. Immediate memory was relatively intact but even short delay of 1-3 minutes affects performance. Pt reports that she lives alone and cares for her 13 year old granddaugter at times. She reports her child and granddaughter live next door to her. She goes back and forth as to being able to manage all of her care herself to having difficulty with performance even prior to this admission and fall. Overall score on [facility] SLUMS is 8 out of 30 for a high school educated person placing her in the dementia category. Pt with many risk factors for further cognitive decline, including but not limited to, current hospitalization w/use of narcotics and chronic  narcotic dependence, hx of depression/anxiety, prior vascular disease reported, recent fall and prior falls, physical decline and decreased socialization. RECOMMEND: Further therapy while in hospital and at next level of care. Pt will need frequent supervision throughout the day and recommend others manage higher level household management including but not limited to finances, medications and traveling/appointments. Pt would benefit from further care at skilled nursing facility for both physical and cognitive problems. Pt would also benefit from further testing/diagnosis via neuropyschology or neurology.

## 2022-12-12 NOTE — THERAPY EVALUATION
Acute Care - Speech Language Pathology Initial Evaluation   [place]     Patient Name: Kaylin Ojeda  : 1940  MRN: [id number]  Today's Date: 2022               Admit Date: 2022     Visit Dx:    ICD-10-CM ICD-9-CM   1. Type I or II open fracture of olecranon process of right ulna, initial encounter  S52.021B 813.11   2. Closed head injury, initial encounter  S09.90XA 959.01   3. Other chronic back pain  M54.9 724.5    G89.29 338.29   4. 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[facility])   • Obstruction of carotid artery   • Peripheral arterial occlusive disease ([facility])   • Impaired glucose tolerance   • Difficulty sleeping   • Tobacco dependence syndrome   • Iron deficiency anemia   • Vitamin B12 deficiency anemia due to intrinsic factor deficiency   • Acquired hypothyroidism   • Thoracoabdominal aortic aneurysm   • Abdominal bloating   • Prediabetes   • Sleep difficulties   • Chronic constipation   • PAF (paroxysmal atrial fibrillation) ([facility])   • Moderate single current episode of major depressive disorder ([facility])   • Chronic low back pain without sciatica   • Urge incontinence   • Severe mitral regurgitation   • Severe tricuspid regurgitation   • Moderate aortic valve regurgitation   • Underweight   • DDD (degenerative disc disease), lumbar   • Atrial flutter with rapid ventricular response ([facility])   • Urinary tract infection without hematuria   • Malaise and fatigue   • Type I or II open fracture of right olecranon process   • Type I or II open fracture of olecranon process of right ulna, initial encounter     Past  Medical History:   Diagnosis Date   • A-fib ([facility])    • Abdominal pain, epigastric     Chronic, unclear cause, improved   • Anorexia    • Anxiety    • Arthritis    • CAD (coronary artery disease)     Cath 5/2015: nonobstructive LAD/RCA disease, 90% mid LCx s/p [id number] Xience.  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[facility])    • Mononucleosis    • Occlusion and stenosis of carotid artery    • Onychomycosis    • Orbit fracture ([facility])    • PAF (paroxysmal atrial fibrillation) ([facility])    • Peripheral arterial disease ([facility])     Significant (carotid, subclavian, renal arteries, lower extremities), with claudication, medical therapy only   • Pernicious anemia    • Prediabetes    • Renal artery stenosis ([facility])     unilateral, with renal atrophy (no intervention required)   • Renal calculi    • Sinus bradycardia     mild, asymptomatic   • TIA (transient ischemic attack)    • UTI (urinary tract infection)    • Valvular heart disease     1/2021: mod-severe AI, mod MR, mild-mod TR   • Vision problem    • Vitamin B 12 deficiency      Past Surgical History:   Procedure Laterality Date   • APPENDECTOMY     • BREAST BIOPSY Left     20+ yrs ago   • BREAST SURGERY     • CARDIAC CATHETERIZATION  05/2015    nonobs LAD/RCA disease, 90% mid LCx s/p [id number] Xience   • CARDIAC CATHETERIZATION N/A 10/28/2020    Procedure: Right and Left Heart Cath;  Surgeon: Opal Contreras MD;  Location: [facility]  INVASIVE LOCATION;  Service: Cardiovascular;  Laterality: N/A;  for cardiac testing prior to possible valve surgery per Dr. 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 Pt also with report of chronic cognitive decline in past  along with physical decline in the past year. (12/12/22 1620)        [facility] Criteria for Skilled Therapy Interventions Met: yes (12/12/22 1620)  Anticipated Discharge Disposition (SLP): skilled nursing facility, anticipate therapy at next level of care (12/12/22 1620)  Demonstrates Need for Referral to Another Service: neuropsychology services, neurology (12/12/22 1620)     Predicted Duration Therapy Intervention (Days): 3 days (12/12/22 1620)  [facility] Diagnostic Follow-Up Needed: cognitive-linguistic (12/12/22 1620)        Patient/Family Concerns, Anticipated Discharge Disposition (SLP): Pt does report concerns with being able to manage her care at home and the care for her granddaughter as well due to both physical and cognitive concerns. (12/12/22 1620)  Communication Strategy Suggestions: eliminate distractions when speaking with patient, avoid multi-step instructions (12/12/22 1620)           Plan of Care Reviewed With: patient (12/12/22 1620)  Outcome Evaluation: SLP: Pt demonstrates at least moderate deficits in cognitive functioning. She demonstrates ability to follow 1 step commands, but not 2 step commands. Personal yes/no was  WFL, working memory for rote repeating of numbers up to 6 digits and repeating words up to 5 WFL, but cannot mentally hold and manipulate for calculations or delayed recall. Limitations in problem solving noted as well with lack of detail and tangential responses at times. She is generally oriented and off by the date by 1-2 days. She reports her age as 84 but will not be 84 until next February 2023. Unable to complete math calcuations for simple addition and subtraction. Category naming revealed pt providing the names of her previous animals versus different types of animals, however she was able to name 16 different with 3 repetitions. Visual spatial and executive functions both w/deficits including recognition of shapes and sizes and clock drawing with 13 hour markers and hands of equal length. Pt with difficulty using her right hand, but was also unable to correctly state the long and short hand with equal hands being on the 10 and 12. Pt was unable to respond to any questions about a paragraph read to her. Immediate memory was relatively intact but even short delay of 1-3 minutes affects performance. Pt reports that she lives alone and cares for her 13 year old granddaugter at times. She reports her child and granddaughter live next door to her. She goes back and forth as to being able to manage all of her care herself to having difficulty with performance even prior to this admission and fall. Overall score on [facility] SLUMS is 8 out of 30 for a high school educated person placing her in the dementia category. Pt with many risk factors for further cognitive decline, including but not limited to, current hospitalization w/use of narcotics and chronic narcotic dependence, hx of depression/anxiety, prior vascular disease reported, recent fall and prior falls, physical decline and decreased socialization. RECOMMEND: Further therapy while in hospital and at next level of care. Pt will need frequent supervision  throughout the day and recommend others manage higher level household management including but not limited to finances, medications and traveling/appointments. Pt would benefit from further care at skilled nursing facility for both physical and cognitive problems. Pt would also benefit from further testing/diagnosis via neuropyschology or neurology. (12/12/22 4319)      SLP EVALUATION (last 72 hours)     SLP SLC Evaluation     Row Name 12/12/22 6007                   Communication Assessment/Intervention    Document Type evaluation  -AD           General Information    Pertinent History Of Current Problem Cognitive eval ordered due to pt report of memory issues and concerns per hospitalist. Hx from admission per Dr. Love as follows: Patient is an 82-year-old female who presented to the emergency department secondary to accidental fall at home while bringing in groceries 2 days ago.  She notes 3 separate falls, 2 while still in garage and then 1 more when she got inside her house.  She does not remember feeling dizzy or having prior noticed that she would fall.  She also notes right lower rib area pain, bilateral knee pain and bruising. She reports that she had been ill approximately 1 week ago with nausea, vomiting, diarrhea and general malaise that she attributed to influenza that her entire family had.  She notes she has had very poor intake in the last week and a half with these symptoms and she chronically has low intake.  She is known to hospitalist service from last admission 9/28/2022 secondary to A. fib RVR on.  She has a known history of PAF on Eliquis, CAD/HLD s/p stent, moderate to severe AR, mild to moderate MR, mild-moderate TR, vasculopathy with PAD/mesenteric artery stenosis, thoracoabdominal aortic aneurysm, carotid artery stenosis, Multi valvular disease, hypertension, GERD, COPD, severe malnutrition, tobacco abuse, anxiety/depression.  ER provider contacted Dr. Reeder who requested hospitalist  admission for tomorrow am surgical repair of comminuted and displaced intra-articular fracture of the proximal ulna, see full report in epic.  Troponin negative, hemoglobin 11.3, sodium 134, EKG shows A. Fib.  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  -AD        Prior Level of Function-Communication cognitive-linguistic impairment  reported per pt, no level or prior testing available  -AD        Plans/Goals Discussed with patient;agreed upon  -AD        Barriers to Rehab previous functional deficit;cognitive status;physical barrier  -AD        Patient's Goals for Discharge other (see comments)  states would like to go home but understanding if she needs rehab before returning home.  -AD        Standardized Assessment Used SLUMS;other (see comments)  informal measures as well  -AD           Pain    Additional Documentation --  no pain was reported by this pt. Appears uncomfortable with movement at times.  -AD           Comprehension Assessment/Intervention    Comprehension Assessment/Intervention Auditory Comprehension  -AD           Auditory Comprehension Assessment/Intervention    Auditory Comprehension (Communication) mild impairment  -AD        Able to Identify Objects/Pictures (Communication) body part;familiar objects;WFL  -AD        Answers Questions (Communication) yes/no;wh questions;personal;simple;WFL  -AD        Able to Follow Commands (Communication) 1-step;WFL;2-step;mild impairment  1/2 for 2 step commands  -AD        Narrative Discourse simple paragraph level;severe impairment  -AD           Expression Assessment/Intervention    Expression Assessment/Intervention verbal expression  -AD           Verbal Expression Assessment/Intervention    Verbal Expression  mild impairment  -AD        Automatic Speech (Communication) response to greeting;WFL  -AD        Repetition words;WFL  -AD        Spontaneous/Functional Words simple;WFL;complex;mild impairment;moderate impairment;delayed responses;circumlocution  -AD        Conversational Discourse/Fluency mild impairment;moderate impairment;delayed responses;circumlocution  -AD           Oral Motor Structure and Function    Oral Motor Structure and Function WFL  -AD        Dentition Assessment natural, present and adequate  -AD        Mucosal Quality moist, healthy  -AD           Oral Musculature and Cranial Nerve Assessment    Oral Motor General Assessment WFL  -AD           Motor Speech Assessment/Intervention    Motor Speech Function WFL  -AD        Conversational Speech (Communication) simple;moderate complexity;WFL  -AD        Motor Speech, Comment Precise articulation at the conversational level. No issues w/phonation, respiration or voice noted.  -AD           Cursory Voice Assessment/Intervention    Quality and Resonance (Voice) WFL  -AD           Cognitive Assessment Intervention- SLP    Cognitive Function (Cognition) moderate impairment  at least moderate  -AD        Orientation Status (Cognition) awareness of basic personal information;person;place;time;situation;WFL;mild impairment  Able to provide name, , but off on age by one year. Able to state city and state, but needed prompt for being in the hospital.  Aware of her current situation and generally to the time. Knows it is  but states it is the 13 or 14.  -AD        Memory (Cognitive) WFL;simple;immediate;mod-complex;functional;short-term;delayed;related;unrelated;mental manipulation;moderate impairment  -AD        Attention (Cognitive) selective;sustained;WFL;alternating;divided;attention to detail;quiet environment;moderate impairment  -AD        Thought Organization (Cognitive) concrete divergent;mild impairment;mental  manipulation;moderate impairment;severe impairment  -AD        Problem Solving (Cognitive) simple;mild impairment  -AD        Functional Math (Cognitive) simple;money calculation;severe impairment  -AD        Executive Function (Cognition) realistic goal setting;deficit awareness;judgement;self-monitoring/correction;home management activities;moderate impairment;severe impairment  -AD        Pragmatics (Communication) affect;initiation;eye contact;turn taking;WFL;topic maintenance;mild impairment  -AD           Standardized Tests    Cognitive/Memory Tests SLUMS: [facility] Status Examination  -AD           SLUMS: [facility] Status Examination    SLUMS Score 8  -AD        SLUMS Range 1-20: Dementia (High school education or higher)  -AD        SLUMS Comments Pt demonstrates at least moderate deficits in cognitive functioning. She demonstrates ability to follow 1 step commands, but not 2 step commands. Personal yes/no was WFL, working memory for rote repeating of numbers up to 6 digits and repeating words up to 5 WFL, but cannot mentally hold and manipulate for calculations or delayed recall. Limitations in problem solving noted as well with lack of detail and tangential responses at times. She is generally oriented and off by the date by 1-2 days. She reports her age as 84 but will not be 84 until next February 2023. Unable to complete math calcuations for simple addition and subtraction. Category naming revealed pt providing the names of her previous animals versus different types of animals, however she was able to name 16 different with 3 repetitions. Visual spatial and executive functions both w/deficits including recognition of shapes and sizes and clock drawing with 13 hour markers and hands of equal length. Pt with difficulty using her right hand, but was also unable to correctly state the long and short hand with equal hands being on the 10 and 12. Pt was unable to respond to  any questions about a paragraph read to her. Immediate memory was relatively intact but even short delay of 1-3 minutes affects performance. Pt reports that she lives alone and cares for her 13 year old granddaugter at times. She reports her child and granddaughter live next door to her. She goes back and forth as to being able to manage all of her care herself to having difficulty with performance even prior to this admission and fall. Overall score on [facility] SLUMS is 8 out of 30 for a high school educated person placing her in the dementia category. Pt with many risk factors for further cognitive decline, including but not limited to, current hospitalization w/use of narcotics and chronic narcotic dependence, hx of depression/anxiety, prior vascular disease reported, recent fall and prior falls, physical decline and decreased socialization. RECOMMEND: Further therapy while in hospital and at next level of care. Pt will need frequent supervision throughout the day and recommend others manage higher level household management including but not limited to finances, medications and traveling/appointments. Pt would benefit from further care at skilled nursing facility for both physical and cognitive problems. Pt would also benefit from further testing/diagnosis via neuropyschology or neurology.  -AD           SLP Evaluation Clinical Impressions    SLP Diagnosis moderate;cognitive-linguistic disorder  at least moderate  -AD        SLP Diagnosis Comments Pt with concerns for medication effects and depression currently compounding her current cognitive concerns. Pt also with report of chronic cognitive decline in past  along with physical decline in the past year.  -AD        Rehab Potential/Prognosis fair  -AD        [facility] Criteria for Skilled Therapy Interventions Met yes  -AD        Functional Impact functional impact in ADLs;need frequent supervision;needs 24 hour supervision;difficulty communicating in an  emergency;difficulty in expressing complex messages;restrictions in personal and social life;decreased ability to respond to situations safely;difficulty completing home management task  -AD           Recommendations    Therapy Frequency (SLP SLC) 3 days per week  -AD        Predicted Duration Therapy Intervention (Days) 3 days  -AD        Anticipated Discharge Disposition (SLP) skilled nursing facility;anticipate therapy at next level of care  -AD        Patient/Family Concerns, Anticipated Discharge Disposition (SLP) Pt does report concerns with being able to manage her care at home and the care for her granddaughter as well due to both physical and cognitive concerns.  -AD        Demonstrates Need for Referral to Another Service neuropsychology services;neurology  -AD        Communication Strategy Suggestions eliminate distractions when speaking with patient;avoid multi-step instructions  -AD        [facility] Diagnostic Follow-Up Needed cognitive-linguistic  -AD           Communication Treatment Objective and Progress Goals (SLP)    SLC LTGs Communication Long-Term Goal  -AD        Cognitive Linguistic Treatment Objectives Cognitive Linguistic Treatment Objectives (Group)  -AD           Communication Long-Term Goal     [place] with moderate cues  -AD        Time frame by discharge  3 days  -AD        Barriers fatigue;cognitive status;advanced age  -AD        Progress/Outcomes new goal  -AD           Cognitive Linguistic Treatment Objectives    Attention Selection attention, SLP goal 1  -AD        Memory Skills Selection memory skills, SLP goal 1  -AD        Problem Solving Selection problem solving, SLP goal 1  -AD        Functional Math Skills Selection functional math skills, SLP goal 1  -AD           Attention Goal 1 (SLP)    Improve Attention by Goal 1 (SLP) complete sustained attention task;70%;with moderate cues (50-74%)  -AD        Time Frame (Attention Goal 1, SLP) short term goal (STG);3 days;by discharge   -AD        Barriers (Attention Goal 1, SLP) fatigue;cognitive status;advanced age;  -AD        Progress (Attention Goal 1, SLP) other (comment)  -AD        Progress/Outcomes (Attention Goal 1, SLP) new goal  -AD           Memory Skills Goal 1 (SLP)    Improve Memory Skills Through Goal 1 (SLP) listen to a paragraph and answer questions;use memory strategies;70%;with moderate cues (50-74%)  -AD        Time Frame (Memory Skills Goal 1, SLP) short term goal (STG);3 days;by discharge  -AD        Barriers (Memory Skills Goal 1, SLP) fatigue;cognitive status;advanced age;  -AD        Progress (Memory Skills Goal 1, SLP) other (comment)  -AD        Progress/Outcomes (Memory Skills Goal 1, SLP) new goal  -AD           Functional Problem Solving Skills Goal 1 (SLP)    Improve Problem Solving Through Goal 1 (SLP) determine solutions to simple ADL/safety problems;70%;with moderate cues (50-74%)  -AD        Time Frame (Problem Solving Goal 1, SLP) short term goal (STG);3 days;by discharge  -AD        Barriers (Problem Solving Goal 1, SLP) fatigue;cognitive status;advanced age;  -AD        Progress (Problem Solving Goal 1, SLP) other (comment)  -AD        Progress/Outcomes (Problem Solving Goal 1, SLP) new goal  -AD           Functional Math Skills Goal 1 (SLP)    Improve Functional Math Skills Through Goal 1 (SLP) complete functional math task;calculator assisted;70%;with moderate cues (50-74%)  -AD        Time Frame (Functional Math Skills Goal 1, SLP) short term goal (STG);3 days;by discharge  -AD        Barriers (Functional Math Skills Goal 1, SLP) fatigue;cognitive status;advanced age;  -AD        Progress (Functional Math Skills Goal 1, SLP) other (comment)  -AD        Progress/Outcomes (Functional Math Skills Goal 1, SLP) new goal  -AD              User Key  (r) = Recorded By, (t) = Taken By, (c) = Cosigned By    Initials Name Effective Dates    Ina Wray MS CCC-SLP 06/16/21 -                     EDUCATION  The patient has been educated in the following areas:     Cognitive Impairment Communication Impairment. Pt verbalizes understanding of results and in agreement with plan. Reinforcement needed due to cognitive deficits.           SLP GOALS     Row Name 12/12/22 3360             Attention Goal 1 (SLP)    Improve Attention by Goal 1 (SLP) complete sustained attention task;70%;with moderate cues (50-74%)  -AD      Time Frame (Attention Goal 1, SLP) short term goal (STG);3 days;by discharge  -AD      Barriers (Attention Goal 1, SLP) fatigue;cognitive status;advanced age;  -AD      Progress (Attention Goal 1, SLP) other (comment)  -AD      Progress/Outcomes (Attention Goal 1, SLP) new goal  -AD         Memory Skills Goal 1 (SLP)    Improve Memory Skills Through Goal 1 (SLP) listen to a paragraph and answer questions;use memory strategies;70%;with moderate cues (50-74%)  -AD      Time Frame (Memory Skills Goal 1, SLP) short term goal (STG);3 days;by discharge  -AD      Barriers (Memory Skills Goal 1, SLP) fatigue;cognitive status;advanced age;  -AD      Progress (Memory Skills Goal 1, SLP) other (comment)  -AD      Progress/Outcomes (Memory Skills Goal 1, SLP) new goal  -AD         Functional Problem Solving Skills Goal 1 (SLP)    Improve Problem Solving Through Goal 1 (SLP) determine solutions to simple ADL/safety problems;70%;with moderate cues (50-74%)  -AD      Time Frame (Problem Solving Goal 1, SLP) short term goal (STG);3 days;by discharge  -AD      Barriers (Problem Solving Goal 1, SLP) fatigue;cognitive status;advanced age;  -AD      Progress (Problem Solving Goal 1, SLP) other (comment)  -AD      Progress/Outcomes (Problem Solving Goal 1, SLP) new goal  -AD         Functional Math Skills Goal 1 (SLP)    Improve Functional Math Skills Through Goal 1 (SLP) complete functional math task;calculator assisted;70%;with moderate cues (50-74%)  -AD      Time Frame (Functional Math Skills Goal 1, SLP)  short term goal (STG);3 days;by discharge  -AD      Barriers (Functional Math Skills Goal 1, SLP) fatigue;cognitive status;advanced age;  -AD      Progress (Functional Math Skills Goal 1, SLP) other (comment)  -AD      Progress/Outcomes (Functional Math Skills Goal 1, SLP) new goal  -AD         Communication Long-Term Goal     [place] with moderate cues  -AD      Time frame by discharge  3 days  -AD      Barriers fatigue;cognitive status;advanced age  -AD      Progress/Outcomes new goal  -AD            User Key  (r) = Recorded By, (t) = Taken By, (c) = Cosigned By    Initials Name Provider Type    Ina Wray MS CCC-SLP Speech and Language Pathologist                        Time Calculation:      Time Calculation- SLP     Row Name 12/12/22 1734             Time Calculation- SLP    SLP Start Time 1525  -AD      SLP Stop Time 1620  -AD      SLP Time Calculation (min) 55 min  -AD      Total Timed Code Minutes- SLP 0 minute(s)  -AD      SLP Non-Billable Time (min) 0 min  -AD      SLP Received On 12/12/22  -AD      SLP - Next Appointment 12/13/22  -AD         Untimed Charges    SLP Eval/Re-eval  ST Eval Speech and Production w/ Language - 38166  -AD      81405-OF Eval Speech and Production w/ Language Minutes 55  -AD         Total Minutes    Untimed Charges Total Minutes 55  -AD       Total Minutes 55  -AD            User Key  (r) = Recorded By, (t) = Taken By, (c) = Cosigned By    Initials Name Provider Type    Ina Wray, MS CCC-SLP Speech and Language Pathologist                Therapy Charges for Today     Code Description Service Date Service Provider Modifiers Qty    [id number]  ST EVAL SPEECH AND PROD W LANG  4 12/12/2022 Ina Garcia MS CCC-SLP GN 1                     Ina Garcia MS CCC-SLP  12/12/2022

## 2022-12-13 PROBLEM — E43 SEVERE MALNUTRITION (HCC): Status: ACTIVE | Noted: 2022-01-01

## 2022-12-13 NOTE — THERAPY TREATMENT NOTE
Patient Name: Kaylin Ojeda  : 1940    MRN: [id number]                              Today's Date: 2022       Admit Date: 2022    Visit Dx:     ICD-10-CM ICD-9-CM   1. Type I or II open fracture of olecranon process of right ulna, initial encounter  S52.021B 813.11   2. Closed head injury, initial encounter  S09.90XA 959.01   3. Other chronic back pain  M54.9 724.5    G89.29 338.29   4. 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[facility])   • Obstruction of carotid artery   • Peripheral arterial occlusive disease ([facility])   • Impaired glucose tolerance   • Difficulty sleeping   • Tobacco dependence syndrome   • Iron deficiency anemia   • Vitamin B12 deficiency anemia due to intrinsic factor deficiency   • Acquired hypothyroidism   • Thoracoabdominal aortic aneurysm   • Abdominal bloating   • Prediabetes   • Sleep difficulties   • Chronic constipation   • PAF (paroxysmal atrial fibrillation) ([facility])   • Moderate single current episode of major depressive disorder ([facility])   • Chronic low back pain without sciatica   • Urge incontinence   • Severe mitral regurgitation   • Severe tricuspid regurgitation   • Moderate aortic valve regurgitation   • Underweight   • DDD (degenerative disc disease), lumbar   • Atrial flutter with rapid ventricular response (HCC)   • Urinary tract infection without hematuria   • Malaise and fatigue   • Type I or II open fracture of right olecranon process   • Type I or II open fracture of olecranon process of right ulna, initial encounter   • Severe malnutrition ([facility])     Past Medical History:   Diagnosis  Date   • A-fib ([facility])    • Abdominal pain, epigastric     Chronic, unclear cause, improved   • Anorexia    • Anxiety    • Arthritis    • CAD (coronary artery disease)     Cath 5/2015: nonobstructive LAD/RCA disease, 90% mid LCx s/p [id number] Xience.  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[facility])    • Mononucleosis    • Occlusion and stenosis of carotid artery    • Onychomycosis    • Orbit fracture ([facility])    • PAF (paroxysmal atrial fibrillation) ([facility])    • Peripheral arterial disease ([facility])     Significant (carotid, subclavian, renal arteries, lower extremities), with claudication, medical therapy only   • Pernicious anemia    • Prediabetes    • Renal artery stenosis ([facility])     unilateral, with renal atrophy (no intervention required)   • Renal calculi    • Sinus bradycardia     mild, asymptomatic   • TIA (transient ischemic attack)    • UTI (urinary tract infection)    • Valvular heart disease     1/2021: mod-severe AI, mod MR, mild-mod TR   • Vision problem    • Vitamin B 12 deficiency      Past Surgical History:   Procedure Laterality Date   • APPENDECTOMY     • BREAST BIOPSY Left     20+ yrs ago   • BREAST SURGERY     • CARDIAC CATHETERIZATION  05/2015    nonobs LAD/RCA disease, 90% mid LCx s/p [id number] Xience   • CARDIAC CATHETERIZATION N/A 10/28/2020    Procedure: Right and Left Heart Cath;  Surgeon: Opal Contreras MD;  Location: [facility] CATH INVASIVE LOCATION;  Service:  Cardiovascular;  Laterality: N/A;  for cardiac testing prior to possible valve surgery per Dr. Mai   • CARDIAC CATHETERIZATION N/A 10/28/2020    Procedure: Coronary angiography;  Surgeon: Opal Contreras MD;  Location: [facility] INVASIVE LOCATION;  Service: Cardiovascular;  Laterality: N/A;   • CERVICAL FUSION  1997   • CHOLECYSTECTOMY     • CORONARY STENT PLACEMENT     • HYSTERECTOMY  1995   • KNEE SURGERY Right 2005   • KNEE SURGERY Left 1998      General Information     Row Name 12/13/22 1124          OT Time and Intention    Document Type therapy note (daily note)  -SD     Mode of Treatment occupational therapy  -SD     Row Name 12/13/22 1124          General Information    Existing Precautions/Restrictions fall;other (see comments)  cognition  -SD     Row Name 12/13/22 1124          Safety Issues, Functional Mobility    Safety Issues Affecting Function (Mobility) impulsivity  -SD     Impairments Affecting Function (Mobility) balance;cognition  -SD     Comment, Safety Issues/Impairments (Mobility) pt has a history of falls. She is impulsive.  -SD           User Key  (r) = Recorded By, (t) = Taken By, (c) = Cosigned By    Initials Name Provider Type    SD Ar Garcia OTR Occupational Therapist                 Mobility/ADL's     Row Name 12/13/22 4715          Bed Mobility    Bed Mobility supine-sit  -SD     Supine-Sit [place] (Bed Mobility) standby assist  -SD     Bed Mobility, Safety Issues decreased use of arms for pushing/pulling  -SD     Assistive Device (Bed Mobility) head of bed elevated  -SD     Comment, (Bed Mobility) pt required cue to limit use of RUE due to NWB status  -SD     Row Name 12/13/22 9519          Transfers    Transfers sit-stand transfer;stand-sit transfer  -SD     Row Name 12/13/22 133          Sit-Stand Transfer    Sit-Stand [place] (Transfers) contact guard;verbal cues  -SD     Assistive Device (Sit-Stand Transfers) --  no devices  -SD     Row Name 12/13/22 3682           Stand-Sit Transfer    Stand-Sit [place] (Transfers) contact guard;verbal cues  -SD     Row Name 12/13/22 1334          Functional Mobility    Functional Mobility- Ind. Level contact guard assist;verbal cues required  -SD     Functional Mobility-Distance (Feet) 40  in room mobility  -SD     Row Name 12/13/22 1334          Activities of Daily Living    BADL Assessment/Intervention lower body dressing  -SD     [facility] Name 12/13/22 1334          Mobility    Extremity Weight-bearing Status right upper extremity  -SD     Right Upper Extremity (Weight-bearing Status) non weight-bearing (NWB)  -SD     Row Name 12/13/22 1334          Lower Body Dressing Assessment/Training    [place] Level (Lower Body Dressing) lower body dressing skills;doff;socks;independent;don;minimum assist (75% patient effort)  -SD     Position (Lower Body Dressing) edge of bed sitting  -SD           User Key  (r) = Recorded By, (t) = Taken By, (c) = Cosigned By    Initials Name Provider Type    SD Ar Garcia, OTR Occupational Therapist               Obj/Interventions     Row Name 12/13/22 1342          Shoulder (Therapeutic Exercise)    Shoulder AAROM (Therapeutic Exercise) right;flexion;aBduction;supine;10 repetitions  pt able to achieve shoulder rom slightly >90  -SD     Row Name 12/13/22 1342          Hand (Therapeutic Exercise)    Hand (Therapeutic Exercise) AROM (active range of motion)  -SD     Hand AROM/AAROM (Therapeutic Exercise) right;finger flexion;finger extension;finger aBduction;finger aDduction;thumb opposition;thumb flexion;thumb extension;5 repetitions  -SD     Row Name 12/13/22 1342          Motor Skills    Therapeutic Exercise shoulder;hand  -SD     Row Name 12/13/22 1342          Balance    Comment, Balance SBA for sitting balance and CGA for standing balance  -SD           User Key  (r) = Recorded By, (t) = Taken By, (c) = Cosigned By    Initials Name Provider Type    SD Ar Garcia, OTR Occupational  Therapist               Goals/Plan     Row Name 12/13/22 0537          Transfer Goal 1 (OT)    Activity/Assistive Device (Transfer Goal 1, OT) commode, 3-in-1  -SD     [place] Level/Cues Needed (Transfer Goal 1, OT) supervision required  -SD     Time Frame (Transfer Goal 1, OT) by discharge  -SD     Strategies/Barriers (Transfers Goal 1, OT) NWB status of RUE  -SD     Progress/Outcome (Transfer Goal 1, OT) goal ongoing  CGA for transfers  -SD     Row Name 12/13/22 1350          Dressing Goal 1 (OT)    Activity/Device (Dressing Goal 1, OT) dressing skills, all  -SD     [place]/Cues Needed (Dressing Goal 1, OT) contact guard required  -SD     Strategies/Barriers (Dressing Goal 1, OT) education wtih compensatory strategies  -SD     Progress/Outcome (Dressing Goal 1, OT) goal ongoing  -SD     [facility] Name 12/13/22 8370          ROM Goal 1 (OT)    ROM Goal 1 (OT) Pt to perform rom program of non-involved joints of RUE with cues.  -SD     Time Frame (ROM Goal 1, OT) by discharge  -SD     Progress/Outcome (ROM Goal 1, OT) good progress toward goal  needs cues  -SD     [facility] Name 12/13/22 1294          Therapy Assessment/Plan (OT)    Planned Therapy Interventions (OT) patient/caregiver education/training;ROM/therapeutic exercise;BADL retraining;transfer/mobility retraining  -SD           User Key  (r) = Recorded By, (t) = Taken By, (c) = Cosigned By    Initials Name Provider Type    SD Ar Garcia OTR Occupational Therapist               Clinical Impression     Row Name 12/13/22 6801          Pain Assessment    Pre/Posttreatment Pain Comment pt reported chronic pain (but did not rate pain level)  -SD     Pain Intervention(s) Repositioned;Ambulation/increased activity  -SD     Row Name 12/13/22 4751          Plan of Care Review    Plan of Care Reviewed With patient  -SD     Outcome Evaluation OT: Pt participated in RUE arom program for her hand and gentle a/arom for her right shoulder. Pt needs assistance for  right shoulder rom due to the weight of the RUE splint. Pt requires min assist for lower body adl activity due to limited use of RUE following sx. Pt was able to doff her socks while sitting at EOB, but she needed min assist to don her sock. Pt managed bed mobility with SBA and transfers/mobility in room with CGA. Pt needs verbal cues to limit use and prevent weight bearing of RUE when transitioning from supine to sitting and sit to stand transfers. Rec STR initally upon discharge for pt safety. If pt does not meet criteria for STR, rec supervision at home for safety due to her cognitive status and limited functional use of RUE after sx.  -SD     Row Name 12/13/22 1344          Therapy Plan Review/Discharge Plan (OT)    Anticipated Discharge Disposition (OT) skilled nursing facility;home with assist  -SD     Row Name 12/13/22 1344          Positioning and Restraints    Pre-Treatment Position in bed  -SD     Post Treatment Position chair  -SD     In Chair sitting;call light within reach;encouraged to call for assist;exit alarm on;with other staff  -SD           User Key  (r) = Recorded By, (t) = Taken By, (c) = Cosigned By    Initials Name Provider Type    SD Ar Garcia, OTR Occupational Therapist               Outcome Measures     Row Name 12/13/22 1351          How much help from another is currently needed...    Putting on and taking off regular lower body clothing? 3  -SD     Bathing (including washing, rinsing, and drying) 3  -SD     Toileting (which includes using toilet bed pan or urinal) 3  -SD     Putting on and taking off regular upper body clothing 3  -SD     Taking care of personal grooming (such as brushing teeth) 3  -SD     Eating meals 3  -SD     AM-PAC 6 Clicks Score (OT) 18  -SD     Row Name 12/13/22 1100          How much help from another person do you currently need...    Turning from your back to your side while in flat bed without using bedrails? 3  -BP     Moving from lying on back to  sitting on the side of a flat bed without bedrails? 3  -BP     Moving to and from a bed to a chair (including a wheelchair)? 3  -BP     Standing up from a chair using your arms (e.g., wheelchair, bedside chair)? 3  -BP     Climbing 3-5 steps with a railing? 3  -BP     To walk in hospital room? 3  -BP     AM-PAC 6 Clicks Score (PT) 18  -BP     Highest level of mobility 6 --> Walked 10 steps or more  -BP     Row Name 12/13/22 1351 12/13/22 1100       Functional Assessment    Outcome Measure Options AM-PAC 6 Clicks Daily Activity (OT)  -SD AM-PAC 6 Clicks Basic Mobility (PT)  -BP          User Key  (r) = Recorded By, (t) = Taken By, (c) = Cosigned By    Initials Name Provider Type    Ar Acharya OTR Occupational Therapist    BP Scott Daniel, PT Physical Therapist                Occupational Therapy Education     Title: PT OT SLP Therapies (In Progress)     Topic: Occupational Therapy (In Progress)     Point: ADL training (In Progress)     Description:   Instruct learner(s) on proper safety adaptation and remediation techniques during self care or transfers.   Instruct in proper use of assistive devices.              Learning Progress Summary           Patient Acceptance, E,TB,D, NR by SD at 12/13/2022 1352    Comment: Education with compensatory strategies for adl management (one handed)    Acceptance, E, VU by SD at 12/12/2022 1000    Comment: Education provided regarding NWB status/activity restrictions of RUE and impact on daily tasks. Pt verbalized understanding and states she realizes the need for STR at this time.    Acceptance, E,TB,D, VU,NR by SD at 12/11/2022 0846    Comment: Education regarding NWB status and impact on daily tasks. Education regarding safety wtih tranfers/mobility. Pt was impulsive and needed cues for safety.                   Point: Home exercise program (In Progress)     Description:   Instruct learner(s) on appropriate technique for monitoring, assisting and/or progressing  therapeutic exercises/activities.              Learning Progress Summary           Patient Acceptance, E,TB,D, NR by SD at 12/13/2022 1351    Comment: Education with rom program of RUE. Pt needs assistance for gentle a/rom of shoulder due to weakness/weight of the splint. Pt independent with hand rom.    Acceptance, E,TB,D, NR by SD at 12/13/2022 1123    Comment: Education with RUE rom program of non-involved joints to address edema and joint stiffness. Pt I with rom of hand. She needs assistance for rom of shoulder due to the weight of the splint.    Acceptance, E,TB,D, NR by SD at 12/12/2022 1001    Comment: Education with gentle arom program of non-involved joints of RUE (hand and shoulder). Pt able perform arom of hand, yet she needs assistance for gentle shoulder rom due to weight of splint.                               User Key     Initials Effective Dates Name Provider Type Discipline    SD 06/16/21 -  Ar Garcia OTR Occupational Therapist OT              OT Recommendation and Plan  Planned Therapy Interventions (OT): patient/caregiver education/training, ROM/therapeutic exercise, BADL retraining, transfer/mobility retraining  Therapy Frequency (OT): 3 times/wk  Plan of Care Review  Plan of Care Reviewed With: patient  Outcome Evaluation: OT: Pt participated in RUE arom program for her hand and gentle a/arom for her right shoulder. Pt needs assistance for right shoulder rom due to the weight of the RUE splint. Pt requires min assist for lower body adl activity due to limited use of RUE following sx. Pt was able to doff her socks while sitting at EOB, but she needed min assist to don her sock. Pt managed bed mobility with SBA and transfers/mobility in room with CGA. Pt needs verbal cues to limit use and prevent weight bearing of RUE when transitioning from supine to sitting and sit to stand transfers. Rec STR initally upon discharge for pt safety. If pt does not meet criteria for STR, rec supervision  at home for safety due to her cognitive status and limited functional use of RUE after sx.     Time Calculation:    Time Calculation- OT     Row Name 12/13/22 1246 12/13/22 1122          Time Calculation- OT    OT Start Time -- 1012  -SD        Timed Charges    06378 - Gait Training Minutes  13  -BP --     51038 - OT Therapeutic Activity Minutes -- 23  -SD        Total Minutes    Timed Charges Total Minutes 13  -BP 23  -SD      Total Minutes 13  -BP 23  -SD           User Key  (r) = Recorded By, (t) = Taken By, (c) = Cosigned By    Initials Name Provider Type    SD Ar Garcia OTR Occupational Therapist    BP Scott Daniel, PT Physical Therapist              Therapy Charges for Today     Code Description Service Date Service Provider Modifiers Qty    [id number]  OT THERAPEUTIC ACT EA 15 MIN 12/13/2022 Ar Garcia OTR GO 2               KIM Escoto  12/13/2022

## 2022-12-13 NOTE — PLAN OF CARE
Goal Outcome Evaluation:  Plan of Care Reviewed With: patient        Progress: improving  Outcome Evaluation: Patient pod # 2 for left elbow fracture. Patient irritated this morning thinking we had locked her in her room & that the night shift aide had climbed in the window to change her diaper. Heart rate still elevated this am, gave IV dose of diltiazem that seemed to help bring the HR to 80's, changing PO metoprolol to PO diltiazem tonight. Patient anxious about not getting to go home at discharge.

## 2022-12-13 NOTE — PROGRESS NOTES
Heart rate now 70-90 after diltiazem, change to oral diltiazem 30 mg every 8 hours, discontinue metoprolol  Monitor

## 2022-12-13 NOTE — THERAPY TREATMENT NOTE
Acute Care - Physical Therapy Treatment Note  CHERRIE Bennett     Patient Name: Kaylin Ojeda  : 1940  MRN: [id number]  Today's Date: 2022      Visit Dx:     ICD-10-CM ICD-9-CM   1. Type I or II open fracture of olecranon process of right ulna, initial encounter  S52.021B 813.11   2. Closed head injury, initial encounter  S09.90XA 959.01   3. Other chronic back pain  M54.9 724.5    G89.29 338.29   4. 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[facility])   • Obstruction of carotid artery   • Peripheral arterial occlusive disease ([facility])   • Impaired glucose tolerance   • Difficulty sleeping   • Tobacco dependence syndrome   • Iron deficiency anemia   • Vitamin B12 deficiency anemia due to intrinsic factor deficiency   • Acquired hypothyroidism   • Thoracoabdominal aortic aneurysm   • Abdominal bloating   • Prediabetes   • Sleep difficulties   • Chronic constipation   • PAF (paroxysmal atrial fibrillation) ([facility])   • Moderate single current episode of major depressive disorder ([facility])   • Chronic low back pain without sciatica   • Urge incontinence   • Severe mitral regurgitation   • Severe tricuspid regurgitation   • Moderate aortic valve regurgitation   • Underweight   • DDD (degenerative disc disease), lumbar   • Atrial flutter with rapid ventricular response ([facility])   • Urinary tract infection without hematuria   • Malaise and fatigue   • Type I or II open fracture of right olecranon process   • Type I or II open fracture of olecranon process of right ulna, initial encounter     Past Medical History:   Diagnosis Date   • A-fib ([facility])     • Abdominal pain, epigastric     Chronic, unclear cause, improved   • Anorexia    • Anxiety    • Arthritis    • CAD (coronary artery disease)     Cath 5/2015: nonobstructive LAD/RCA disease, 90% mid LCx s/p 2.48e14vd Xience.  Cath 1/2021: 50-60% prox LAD/70-80% distal LAD, patent Cx stent, 20% RCA   • Cellulitis of leg    • Cervical disc disease    • Chronic fatigue    • Colitis    • COPD (chronic obstructive pulmonary disease) (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)    • Mononucleosis    • Occlusion and stenosis of carotid artery    • Onychomycosis    • Orbit fracture ([facility])    • PAF (paroxysmal atrial fibrillation) ([facility])    • Peripheral arterial disease ([facility])     Significant (carotid, subclavian, renal arteries, lower extremities), with claudication, medical therapy only   • Pernicious anemia    • Prediabetes    • Renal artery stenosis ([facility])     unilateral, with renal atrophy (no intervention required)   • Renal calculi    • Sinus bradycardia     mild, asymptomatic   • TIA (transient ischemic attack)    • UTI (urinary tract infection)    • Valvular heart disease     1/2021: mod-severe AI, mod MR, mild-mod TR   • Vision problem    • Vitamin B 12 deficiency      Past Surgical History:   Procedure Laterality Date   • APPENDECTOMY     • BREAST BIOPSY Left     20+ yrs ago   • BREAST SURGERY     • CARDIAC CATHETERIZATION  05/2015    nonobs LAD/RCA disease, 90% mid LCx s/p 2.05v46jt Xience   • CARDIAC CATHETERIZATION N/A 10/28/2020    Procedure: Right and Left Heart Cath;  Surgeon: Opal Contreras MD;  Location: [facility] CATH INVASIVE LOCATION;  Service: Cardiovascular;   "Laterality: N/A;  for cardiac testing prior to possible valve surgery per Dr. Mai   • CARDIAC CATHETERIZATION N/A 10/28/2020    Procedure: Coronary angiography;  Surgeon: Opal Contreras MD;  Location: [facility] INVASIVE LOCATION;  Service: Cardiovascular;  Laterality: N/A;   • CERVICAL FUSION  1997   • CHOLECYSTECTOMY     • CORONARY STENT PLACEMENT     • HYSTERECTOMY  1995   • KNEE SURGERY Right 2005   • KNEE SURGERY Left 1998     PT Assessment (last 12 hours)     PT Evaluation and Treatment     Row Name 12/13/22 1100          Physical Therapy Time and Intention    Subjective Information no complaints  -BP     Document Type therapy note (daily note)  -BP     Mode of Treatment physical therapy  -BP     Patient Effort good  -BP     Symptoms Noted During/After Treatment fatigue  -BP     Comment Patient states during gait training \"I just need to lay down.\"  -BP     Row Name 12/13/22 1100          General Information    Patient/Family/Caregiver Comments/Observations Patient supine in bed with HOB elevated. Patient agreeable to PT treatment.  -BP     Existing Precautions/Restrictions fall  confusion  -BP     Risks Reviewed patient:;LOB;increased discomfort  -BP     Benefits Reviewed patient:;improve function;increase independence;increase strength  -BP     Barriers to Rehab cognitive status  -BP     Row Name 12/13/22 1100          Pain    Pre/Posttreatment Pain Comment Patient does not complain of pain  -BP     Row Name 12/13/22 1100          Cognition    Cognitive Status confused within conversation  -BP     Personal Safety Interventions gait belt;nonskid shoes/slippers when out of bed  -BP     Row Name 12/13/22 1100          Bed Mobility    Bed Mobility supine-sit;sit-supine  -BP     Supine-Sit [place] (Bed Mobility) standby assist  -BP     Sit-Supine [place] (Bed Mobility) standby assist  -BP     Bed Mobility, Safety Issues decreased use of arms for pushing/pulling  -BP     Assistive Device (Bed " Mobility) bed rails;head of bed elevated  -BP     Row Name 12/13/22 1100          Transfers    Transfers sit-stand transfer;stand-sit transfer  -BP     Comment, (Transfers) performed witihout device. Requires CGA  -BP     Row Name 12/13/22 1100          Sit-Stand Transfer    Sit-Stand [place] (Transfers) contact guard;verbal cues  -BP     Assistive Device (Sit-Stand Transfers) --  no device  -BP     Row Name 12/13/22 1100          Stand-Sit Transfer    Stand-Sit [place] (Transfers) contact guard;verbal cues  -BP     Assistive Device (Stand-Sit Transfers) --  no device  -BP     Row Name 12/13/22 1100          Gait/Stairs (Locomotion)    [place] Level (Gait) contact guard;minimum assist (75% patient effort);verbal cues  -BP     Assistive Device (Gait) --  no device/hand held from therapist  -BP     Distance in Feet (Gait) 328  -BP     Pattern (Gait) swing-through  -BP     Deviations/Abnormal Patterns (Gait) base of support, narrow;gait speed decreased;stride length decreased  -BP     Comment, (Gait/Stairs) Patient with intermittent lateral devations. Patient performed gait without use of a device however utilized hand held assist. As patient fatigues, patient requires min A through hand and with balance.  -     Row Name 12/13/22 1100          Safety Issues, Functional Mobility    Comment, Safety Issues/Impairments (Mobility) Patient requires cues for safety with transfers and mobility throughout. Patient with frequent falls at baseline with cognitivie deficits.  -     Row Name 12/13/22 1100          Balance    Comment, Balance static sitting balance-SBA. Static standing balance-CGA without use of a device  -     Row Name             Wound 12/10/22 0910 Right posterior elbow Incision    Wound - Properties Group Placement Date: 12/10/22  -KD Placement Time: 0910 -KD Side: Right  -KD Orientation: posterior  -KD Location: elbow  -KD Primary Wound Type: Incision  -KD    Retired Wound - Properties  Group Placement Date: 12/10/22  -KD Placement Time: 0910 -KD Side: Right  -KD Orientation: posterior  -KD Location: elbow  -KD Primary Wound Type: Incision  -KD    Retired Wound - Properties Group Date first assessed: 12/10/22  -KD Time first assessed: 0910 -KD Side: Right  -KD Location: elbow  -KD Primary Wound Type: Incision  -KD    Row Name 12/13/22 1100          Plan of Care Review    Plan of Care Reviewed With patient  -BP     Outcome Evaluation PT: Patient performs supine to/from sit transfers with SBA, sit to/from stand transfers with CGA, and gait x 328 feet with CGA/min A without use of an AD. Patient utiizes hand held assist from therapist. As patient fatigues, patient requires min A with intermittent lateral deviations with external distractions. Patient is pleasantly confused within conversation. Due to cognitive status and frequency of falls at baseline, it appears patient would not be safe to return home alone. Recommend 24/7 care if patient return homes however patient and family are hoping for discharge to SNF.  -BP     Row Name 12/13/22 1100          Positioning and Restraints    Pre-Treatment Position in bed  -BP     Post Treatment Position bed  -BP     In Bed notified nsg;supine;call light within reach;encouraged to call for assist;exit alarm on  -BP     Row Name 12/13/22 1100          Progress Summary (PT)    Progress Toward Functional Goals (PT) progress toward functional goals is gradual  -BP     Row Name 12/13/22 1100          Therapy Plan Review/Discharge Plan (PT)    Therapy Plan Review (PT) patient;risks/benefits reviewed;participants included  -BP           User Key  (r) = Recorded By, (t) = Taken By, (c) = Cosigned By    Initials Name Provider Type    Elodia Strauss, RN Registered Nurse    Scott Beal, PT Physical Therapist                Physical Therapy Education     Title: PT OT SLP Therapies (In Progress)     Topic: Physical Therapy (Done)     Point: Mobility training  (Done)     Learning Progress Summary           Patient Acceptance, E,TB, VU by  at 12/13/2022 1240    Acceptance, E,TB, VU by  at 12/12/2022 1341    Acceptance, E,TB, VU by  at 12/11/2022 0940    Comment: educated pt on use of ebony walker                   Point: Precautions (Done)     Learning Progress Summary           Patient Acceptance, E,TB, VU by  at 12/12/2022 1341    Acceptance, E,TB, VU by  at 12/11/2022 0940    Comment: educated pt on use of ebony walker                               User Key     Initials Effective Dates Name Provider Type Discipline     06/16/21 -  Prateek Porter, PT Physical Therapist PT     06/16/21 -  Veronica Daniel-Meño, PT Physical Therapist PT     06/16/21 -  Clementina Ndiaye, PT Physical Therapist PT              PT Recommendation and Plan  Anticipated Discharge Disposition (PT): skilled nursing facility  Progress Summary (PT)  Progress Toward Functional Goals (PT): progress toward functional goals is gradual  Plan of Care Reviewed With: patient  Outcome Evaluation: PT: Patient performs supine to/from sit transfers with SBA, sit to/from stand transfers with CGA, and gait x 328 feet with CGA/min A without use of an AD. Patient utiizes hand held assist from therapist. As patient fatigues, patient requires min A with intermittent lateral deviations with external distractions. Patient is pleasantly confused within conversation. Due to cognitive status and frequency of falls at baseline, it appears patient would not be safe to return home alone. Recommend 24/7 care if patient return homes however patient and family are hoping for discharge to SNF.   Outcome Measures     Row Name 12/13/22 1100 12/12/22 1300          How much help from another person do you currently need...    Turning from your back to your side while in flat bed without using bedrails? 3  -BP 3  -JW     Moving from lying on back to sitting on the side of a flat bed without bedrails? 3  -BP 3  -JW     Moving  to and from a bed to a chair (including a wheelchair)? 3  -BP 3  -JW     Standing up from a chair using your arms (e.g., wheelchair, bedside chair)? 3  -BP 3  -JW     Climbing 3-5 steps with a railing? 3  -BP 3  -JW     To walk in hospital room? 3  -BP 3  -JW     AM-PAC 6 Clicks Score (PT) 18  -BP 18  -JW        Functional Assessment    Outcome Measure Options AM-PAC 6 Clicks Basic Mobility (PT)  -BP AM-PAC 6 Clicks Basic Mobility (PT)  -JW           User Key  (r) = Recorded By, (t) = Taken By, (c) = Cosigned By    Initials Name Provider Type    Scott Beal, PT Physical Therapist    Clementina Parham, PT Physical Therapist                 Time Calculation:    PT Charges     Row Name 12/13/22 1246             Time Calculation    Start Time 1100  -BP      Stop Time 1113  -BP      Time Calculation (min) 13 min  -BP      PT Received On 12/13/22  -BP      PT - Next Appointment 12/14/22  -BP         Timed Charges    78721 - Gait Training Minutes  13  -BP         Total Minutes    Timed Charges Total Minutes 13  -BP       Total Minutes 13  -BP            User Key  (r) = Recorded By, (t) = Taken By, (c) = Cosigned By    Initials Name Provider Type    Scott Beal, PT Physical Therapist              Therapy Charges for Today     Code Description Service Date Service Provider Modifiers Qty    [id number] HC GAIT TRAINING EA 15 MIN 12/13/2022 Scott Daniel, PT GP 1          PT G-Codes  Outcome Measure Options: AM-PAC 6 Clicks Basic Mobility (PT)  AM-PAC 6 Clicks Score (PT): 18  AM-PAC 6 Clicks Score (OT): 18    Scott Daniel PT  12/13/2022

## 2022-12-13 NOTE — PROGRESS NOTES
"SERVICE: [facility] HOSPITALIST    CONSULTANTS: cardiology, ortho surgery    CHIEF COMPLAINT: f/u right open olecranon fracture, s/p ORIF 12/10/2022    SUBJECTIVE: Patient reports she is doing well, pain is well controlled.  She reports every time she moves it alarms go off and finds it difficult to reach certain objects.  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  Patient notes some unsteadiness with ambulation.  Heart rate still varying from 110s to 150.    OBJECTIVE:    BP (!) 132/102 (BP Location: Right leg, Patient Position: Lying)   Pulse 120   Temp 97.4 °F (36.3 °C) (Oral)   Resp 20   Ht 152.4 cm (60\")   Wt 45.3 kg (99 lb 14.4 oz)   LMP  (LMP Unknown)   SpO2 94%   BMI 19.51 kg/m²     MEDS/LABS REVIEWED AND ORDERED    apixaban, 2.5 mg, Oral, BID  atorvastatin, 10 mg, Oral, Daily  budesonide-formoterol, 2 puff, Inhalation, BID - RT  dilTIAZem, 10 mg, Intravenous, Once  doxycycline, 100 mg, Oral, Q12H  famotidine, 20 mg, Oral, BID AC  FLUoxetine, 20 mg, Oral, Daily  hydrOXYzine, 25 mg, Oral, TID  metoprolol tartrate, 50 mg, Oral, Q12H  nicotine, 1 patch, Transdermal, Q24H  senna-docusate sodium, 2 tablet, Oral, BID  sodium chloride, 10 mL, Intravenous, Q12H      Physical Exam  Vitals reviewed.   Constitutional:       Comments: Thin, elderly, frail   HENT:      Head: Normocephalic and atraumatic.      Mouth/Throat:      Mouth: Mucous membranes are moist.   Eyes:      Extraocular Movements: Extraocular movements intact.      Pupils: Pupils are equal, round, and reactive to light.   Cardiovascular:      Rate and Rhythm: Tachycardia present. Rhythm irregular.   Pulmonary:      Effort: Pulmonary effort is normal. No respiratory distress.      Breath sounds: Normal breath sounds. No wheezing or rales.   Abdominal:      General: Abdomen is flat. Bowel sounds are normal. There is no distension.      Palpations: " Abdomen is soft.      Tenderness: There is no abdominal tenderness. There is no guarding.   Musculoskeletal:         General: No swelling.   Skin:     General: Skin is warm and dry.      Capillary Refill: Capillary refill takes less than 2 seconds.      Findings: Bruising present. No erythema.      Comments: Facial ecchymosis and healing stage, right hand fingers ecchymosis healing, CMS to right hand intact.  Ace and immobilizer in place RU E   Neurological:      General: No focal deficit present.      Mental Status: She is alert and oriented to person, place, and time.   Psychiatric:         Mood and Affect: Mood normal.         Behavior: Behavior normal.       LAB/DIAGNOSTICS:    Lab Results (last 24 hours)     ** No results found for the last 24 hours. **        ECG 12 Lead Rhythm Change   Final Result   HEART RATE= 132  bpm   RR Interval= 440  ms   MT Interval=   ms   P Horizontal Axis=   deg   P Front Axis=   deg   QRSD Interval= 80  ms   QT Interval= 332  ms   QRS Axis= 72  deg   T Wave Axis= 40  deg   - ABNORMAL ECG -   Afib with rvr   Consider left ventricular hypertrophy   Prolonged QT interval   c/w prior ecg, a fib with rvr now seen   Electronically Signed By: Sasha Do ([facility]) 12-Dec-2022 07:02:29   Date and Time of Study: 2022-12-11 13:54:07      ECG 12 Lead   ED Interpretation   Ramin Silvestre MD     12/9/2022  4:02 PM   ECG 12 Lead         Date/Time: 12/9/2022 3:32 PM   Performed by: Ramin Silvestre MD   Authorized by: Ramin Silvestre MD    Interpreted by physician   Comparison: compared with previous ECG from 10/6/2022   Similar to previous ECG   Comments: Rate of 74, A. fib, normal axis, no acute ST elevation or    depression.         ECG 12 Lead Pre-Op / Pre-Procedure   Final Result   HEART RATE= 74  bpm   RR Interval= 812  ms   MT Interval=   ms   P Horizontal Axis=   deg   P Front Axis=   deg   QRSD Interval= 84  ms   QT Interval= 438  ms   QRS Axis= 78  deg   T Wave Axis= 34   deg   - BORDERLINE ECG -   Sinus rhythm   Consider left ventricular hypertrophy   c/w prior ECG, NSR has replaced SVT   Electronically Signed By: Sven Mathews ([facility]) 10-Dec-2022 21:45:23   Date and Time of Study: 2022-12-09 15:32:21        Results for orders placed during the hospital encounter of 09/28/22    Adult Transthoracic Echo Complete w/ Color, Spectral and Contrast if Necessary Per Protocol    Interpretation Summary  · Estimated right ventricular systolic pressure from tricuspid regurgitation is normal (<35 mmHg). Calculated right ventricular systolic pressure from tricuspid regurgitation is 29 mmHg.  · Left ventricular wall thickness is consistent with mild concentric hypertrophy.  · Calculated left ventricular EF = 62% Estimated left ventricular EF was in agreement with the calculated left ventricular EF. Left ventricular systolic function is normal.  · Moderate to severe aortic valve regurgitation is present.  · Mild to moderate tricuspid valve regurgitation is present.  · Mild to moderate mitral valve regurgitation is present.  · Left atrial volume is severely increased.  · The right atrial cavity is mildly dilated.  · Mild dilation of the ascending aorta is present.    XR Shoulder 2+ View Left    Result Date: 12/11/2022  Degenerative changes of the left glenohumeral joint. No acute fracture or dislocation. Signer Name: Cornell Acosta MD  Signed: 12/11/2022 11:02 PM  Workstation Name: [id number]  [facility]    ASSESSMENT/PLAN:  Acute open, comminuted, displaced intra-articular fracture of the proximal ulna secondary to fall, s/p ORIF 12/10/2022: Therapeutic surgery following  Doxycycline oral x14 days  Discontinue IV Dilaudid, continue Percocet  Continue falls precautions  Working with PT/OT/SLP  Slums score 8 of 13, see below    Atrial fibrillation with RVR:  PAF:  CAD/HLD, s/p stent:  Hypertension:  Moderate to severe AR, mild to moderate MR, mild to moderate TR: Cardiology followed  "and signed off  Now back on Eliquis 2.5 mg twice daily  Continue metoprolol tartrate 50 mg every 12 hours, give single dose IV diltiazem now  No effect yesterday with digoxin  Continue home Lipitor  Monitor on telemetry    Suspected moderate cognitive decline with recommendation for further SLP monitoring and outpatient neuropsych versus neurology testing  Recommendation by therapist is for 24-hour supervision which is not available at home as patient lives alone     Right 11th rib fracture: Supportive care     Bilateral knee pain/ecchymosis: Bilateral knee x-rays negative for acute findings     PAD/vasculopathy:  H/O mesenteric artery stenosis, thoracoabdominal aortic aneurysm, carotid artery stenosis:  Continue statin     COPD: Nothing acute, continue home Symbicort     GERD:  Continue Pepcid     Severe malnutrition: Dietitian following  Body mass index is 19.51 kg/m².  Continue boost supplements     Tobacco abuse: continue nicotine patch and cessation education     Anxiety/depression: continue home Prozac, add clonazepam as needed every 8 hours     Chronic pain with chronic narcotic dependence:  Continue Percocet as above  Pain management referral made on admission for after discharge.    PLAN FOR DISPOSITION: TBD, recommend SNF placement with recommendations for 24 hour supervision for cognitive and extended RUE immobility    LILI Rubio  Hospitalist, [facility]  12/13/22  08:37 EST    Note Disclaimer: At [facility], we believe that sharing information builds trust and better relationships. You are receiving this note because you recently visited [facility]. It is possible you will see health information before a provider has talked with you about it. This kind of information can be easy to misunderstand. To help you fully understand what it means for your health, we urge you to discuss this note with your provider.     \"Dictated utilizing Dragon dictation\"          "

## 2022-12-13 NOTE — THERAPY TREATMENT NOTE
Acute Care - Speech Language Pathology Treatment Note  CHERRIE Bennett     Patient Name: Kaylin Ojeda  : 1940  MRN: [id number]  Today's Date: 2022               Admit Date: 2022     Visit Dx:    ICD-10-CM ICD-9-CM   1. Type I or II open fracture of olecranon process of right ulna, initial encounter  S52.021B 813.11   2. Closed head injury, initial encounter  S09.90XA 959.01   3. Other chronic back pain  M54.9 724.5    G89.29 338.29   4. 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[facility])   • Obstruction of carotid artery   • Peripheral arterial occlusive disease ([facility])   • Impaired glucose tolerance   • Difficulty sleeping   • Tobacco dependence syndrome   • Iron deficiency anemia   • Vitamin B12 deficiency anemia due to intrinsic factor deficiency   • Acquired hypothyroidism   • Thoracoabdominal aortic aneurysm   • Abdominal bloating   • Prediabetes   • Sleep difficulties   • Chronic constipation   • PAF (paroxysmal atrial fibrillation) ([facility])   • Moderate single current episode of major depressive disorder ([facility])   • Chronic low back pain without sciatica   • Urge incontinence   • Severe mitral regurgitation   • Severe tricuspid regurgitation   • Moderate aortic valve regurgitation   • Underweight   • DDD (degenerative disc disease), lumbar   • Atrial flutter with rapid ventricular response ([facility])   • Urinary tract infection without hematuria   • Malaise and fatigue   • Type I or II open fracture of right olecranon process   • Type I or II open fracture of olecranon process of right ulna, initial encounter   • Severe  malnutrition (HCC)     Past Medical History:   Diagnosis Date   • A-fib ([facility])    • Abdominal pain, epigastric     Chronic, unclear cause, improved   • Anorexia    • Anxiety    • Arthritis    • CAD (coronary artery disease)     Cath 5/2015: nonobstructive LAD/RCA disease, 90% mid LCx s/p 2.03d77ys Xience.  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[facility])    • Mononucleosis    • Occlusion and stenosis of carotid artery    • Onychomycosis    • Orbit fracture ([facility])    • PAF (paroxysmal atrial fibrillation) ([facility])    • Peripheral arterial disease ([facility])     Significant (carotid, subclavian, renal arteries, lower extremities), with claudication, medical therapy only   • Pernicious anemia    • Prediabetes    • Renal artery stenosis ([facility])     unilateral, with renal atrophy (no intervention required)   • Renal calculi    • Sinus bradycardia     mild, asymptomatic   • TIA (transient ischemic attack)    • UTI (urinary tract infection)    • Valvular heart disease     1/2021: mod-severe AI, mod MR, mild-mod TR   • Vision problem    • Vitamin B 12 deficiency      Past Surgical History:   Procedure Laterality Date   • APPENDECTOMY     • BREAST BIOPSY Left     20+ yrs ago   • BREAST SURGERY     • CARDIAC CATHETERIZATION  05/2015    nonobs LAD/RCA disease, 90% mid LCx s/p 2.90b03ic Xience   • CARDIAC CATHETERIZATION N/A 10/28/2020    Procedure: Right and Left Heart Cath;  Surgeon: Opal Contreras MD;   Location:  [facility];  Service: Cardiovascular;  Laterality: N/A;  for cardiac testing prior to possible valve surgery per Dr. Mai   • CARDIAC CATHETERIZATION N/A 10/28/2020    Procedure: Coronary angiography;  Surgeon: Opal Contreras MD;  Location:  [facility];  Service: Cardiovascular;  Laterality: N/A;   • CERVICAL FUSION  1997   • CHOLECYSTECTOMY     • CORONARY STENT PLACEMENT     • HYSTERECTOMY  1995   • KNEE SURGERY Right 2005   • KNEE SURGERY Left 1998   • ORIF HUMERUS FRACTURE Right 12/10/2022    Procedure: ELBOW OPEN REDUCTION INTERNAL FIXATION;  Surgeon: Lito Reeder MD;  Location:  [facility];  Service: Orthopedics;  Laterality: Right;       SLP Recommendation and Plan                 Anticipated Discharge Disposition (SLP): skilled nursing facility, anticipate therapy at next level of care (12/13/22 0930)  Demonstrates Need for Referral to Another Service: neuropsychology services, neurology (12/13/22 0930)     Predicted Duration Therapy Intervention (Days): 3 days (12/13/22 0930)  SLC Diagnostic Follow-Up Needed: cognitive-linguistic (12/13/22 0930)  Daily Summary of Progress (SLP): progress toward functional goals is gradual (12/13/22 0930)     Patient/Family Concerns, Anticipated Discharge Disposition (SLP): No concerns reported at this time. Pt just stating she wants to know where she will be discharged. SLP explaining case management is working on this but will need to hear from her insurance prior to finalizing any discharge plan. (12/13/22 0930)  Communication Strategy Suggestions: eliminate distractions when speaking with patient, avoid multi-step instructions (12/13/22 0930)  Treatment Assessment (SLP): continued, moderate, cognitive-linguistic disorder (12/13/22 0930)  Treatment Assessment Comments (SLP): Pt tolerating treatment and demonstrates limited awareness of impact her cognitive deficits have on her daily care needs. Pt with some  demonstration of mild paranoia regarding what is going on with her care in the hospital and reports that her son and daughter in law are too controlling when she tried to live with them. She demonstrates fair judgement with regards to simple problem solving activities but deficits with more complex problem solving. She can verbalize the need for more assistance at home, but not sure how to go about getting it at this time. (12/13/22 0930)  Plan for Continued Treatment (SLP): continue treatment per plan of care (12/13/22 0930)         SLP EVALUATION (last 72 hours)     SLP SLC Evaluation     Row Name 12/13/22 0930 12/12/22 [id number]                Communication Assessment/Intervention    Document Type therapy note (daily note)  -AD evaluation  -AD       Subjective Information no complaints  -AD --       Patient Observations alert;cooperative;agree to therapy  -AD --       Patient/Family/Caregiver Comments/Observations Pt seen reclined in bed for part of session and sitting on side of bed for part of the session. Alert, cooperative  -AD --       Patient Effort good  -AD --       Symptoms Noted During/After Treatment none  -AD --          General Information    Patient Profile Reviewed yes  -AD --       Pertinent History Of Current Problem -- Cognitive eval ordered due to pt report of memory issues and concerns per hospitalist. Hx from admission per Dr. Love as follows: Patient is an 82-year-old female who presented to the emergency department secondary to accidental fall at home while bringing in groceries 2 days ago.  She notes 3 separate falls, 2 while still in garage and then 1 more when she got inside her house.  She does not remember feeling dizzy or having prior noticed that she would fall.  She also notes right lower rib area pain, bilateral knee pain and bruising. She reports that she had been ill approximately 1 week ago with nausea, vomiting, diarrhea and general malaise that she attributed to influenza that  her entire family had.  She notes she has had very poor intake in the last week and a half with these symptoms and she chronically has low intake.  She is known to hospitalist service from last admission 9/28/2022 secondary to A. fib RVR on.  She has a known history of PAF on Eliquis, CAD/HLD s/p stent, moderate to severe AR, mild to moderate MR, mild-moderate TR, vasculopathy with PAD/mesenteric artery stenosis, thoracoabdominal aortic aneurysm, carotid artery stenosis, Multi valvular disease, hypertension, GERD, COPD, severe malnutrition, tobacco abuse, anxiety/depression.  ER provider contacted Dr. Reeder who requested hospitalist admission for tomorrow am surgical repair of comminuted and displaced intra-articular fracture of the proximal ulna, see full report in epic.  Troponin negative, hemoglobin 11.3, sodium 134, EKG shows A. Fib.  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  -AD       Prior Level of Function-Communication -- cognitive-linguistic impairment  reported per pt, no level or prior testing available  -AD       Plans/Goals Discussed with -- patient;agreed upon  -AD       Barriers to Rehab -- previous functional deficit;cognitive status;physical barrier  -AD       Patient's Goals for Discharge -- other (see comments)  states would like to go home but understanding if she needs rehab before returning home.  -AD       Standardized Assessment Used -- SLUMS;other (see comments)  informal measures as well  -AD          Pain    Additional Documentation --  no current pain reported  -AD --  no pain was reported by this pt. Appears uncomfortable with movement at times.  -AD          Comprehension Assessment/Intervention    Comprehension Assessment/Intervention  -- Auditory Comprehension  -AD          Auditory Comprehension Assessment/Intervention    Auditory Comprehension (Communication) -- mild impairment  -AD       Able to Identify Objects/Pictures (Communication) -- body part;familiar objects;[facility]       Answers Questions (Communication) -- yes/no;wh questions;personal;simple;[facility]  -AD       Able to Follow Commands (Communication) -- 1-step;WFL;2-step;mild impairment  1/2 for 2 step commands  -AD       Narrative Discourse -- simple paragraph level;severe impairment  -AD          Expression Assessment/Intervention    Expression Assessment/Intervention -- verbal expression  -AD          Verbal Expression Assessment/Intervention    Verbal Expression -- mild impairment  -AD       Automatic Speech (Communication) -- response to greeting;[facility]       Repetition -- words;[facility]       Spontaneous/Functional Words -- simple;WFL;complex;mild impairment;moderate impairment;delayed responses;circumlocution  -       Conversational Discourse/Fluency -- mild impairment;moderate impairment;delayed responses;circumlocution  -AD          Oral Motor Structure and Function    Oral Motor Structure and Function -- [facility]       Dentition Assessment -- natural, present and adequate  -       Mucosal Quality -- moist, healthy  -AD          Oral Musculature and Cranial Nerve Assessment    Oral Motor General Assessment -- [facility]  -          Motor Speech Assessment/Intervention    Motor Speech Function -- [facility]       Conversational Speech (Communication) -- simple;moderate complexity;[facility]       Motor Speech, Comment -- Precise articulation at the conversational level. No issues w/phonation, respiration or voice noted.  -AD          Cursory Voice Assessment/Intervention    Quality and Resonance (Voice) -- [facility]  -AD          Cognitive Assessment Intervention- SLP    Cognitive Function (Cognition) -- moderate impairment  at least moderate  -AD       Orientation Status (Cognition) --  awareness of basic personal information;person;place;time;situation;WFL;mild impairment  Able to provide name, , but off on age by one year. Able to state city and state, but needed prompt for being in the hospital.  Aware of her current situation and generally to the time. Knows it is  but states it is the 13 or 14.  -AD       Memory (Cognitive) -- WFL;simple;immediate;mod-complex;functional;short-term;delayed;related;unrelated;mental manipulation;moderate impairment  -AD       Attention (Cognitive) -- selective;sustained;WFL;alternating;divided;attention to detail;quiet environment;moderate impairment  -AD       Thought Organization (Cognitive) -- concrete divergent;mild impairment;mental manipulation;moderate impairment;severe impairment  -AD       Problem Solving (Cognitive) -- simple;mild impairment  -AD       Functional Math (Cognitive) -- simple;money calculation;severe impairment  -AD       Executive Function (Cognition) -- realistic goal setting;deficit awareness;judgement;self-monitoring/correction;home management activities;moderate impairment;severe impairment  -AD       Pragmatics (Communication) -- affect;initiation;eye contact;turn taking;WFL;topic maintenance;mild impairment  -AD          Standardized Tests    Cognitive/Memory Tests -- SLUMS: [facility] Mental Status Examination  -AD          SLUMS: [facility] Mental Status Examination    SLUMS Score -- 8  -AD       SLUMS Range -- 1-20: Dementia (High school education or higher)  -AD       SLUMS Comments -- Pt demonstrates at least moderate deficits in cognitive functioning. She demonstrates ability to follow 1 step commands, but not 2 step commands. Personal yes/no was WFL, working memory for rote repeating of numbers up to 6 digits and repeating words up to 5 WFL, but cannot mentally hold and manipulate for calculations or delayed recall. Limitations in problem solving noted as well with lack of detail  and tangential responses at times. She is generally oriented and off by the date by 1-2 days. She reports her age as 84 but will not be 84 until next February 2023. Unable to complete math calcuations for simple addition and subtraction. Category naming revealed pt providing the names of her previous animals versus different types of animals, however she was able to name 16 different with 3 repetitions. Visual spatial and executive functions both w/deficits including recognition of shapes and sizes and clock drawing with 13 hour markers and hands of equal length. Pt with difficulty using her right hand, but was also unable to correctly state the long and short hand with equal hands being on the 10 and 12. Pt was unable to respond to any questions about a paragraph read to her. Immediate memory was relatively intact but even short delay of 1-3 minutes affects performance. Pt reports that she lives alone and cares for her 13 year old granddaugter at times. She reports her child and granddaughter live next door to her. She goes back and forth as to being able to manage all of her care herself to having difficulty with performance even prior to this admission and fall. Overall score on [facility] SLUMS is 8 out of 30 for a high school educated person placing her in the dementia category. Pt with many risk factors for further cognitive decline, including but not limited to, current hospitalization w/use of narcotics and chronic narcotic dependence, hx of depression/anxiety, prior vascular disease reported, recent fall and prior falls, physical decline and decreased socialization. RECOMMEND: Further therapy while in hospital and at next level of care. Pt will need frequent supervision throughout the day and recommend others manage higher level household management including but not limited to finances, medications and traveling/appointments. Pt would benefit from further care at skilled nursing facility for both physical and  cognitive problems. Pt would also benefit from further testing/diagnosis via neuropyschology or neurology.  -AD          SLP Evaluation Clinical Impressions    SLP Diagnosis -- moderate;cognitive-linguistic disorder  at least moderate  -AD       SLP Diagnosis Comments -- Pt with concerns for medication effects and depression currently compounding her current cognitive concerns. Pt also with report of chronic cognitive decline in past  along with physical decline in the past year.  -AD       Rehab Potential/Prognosis -- fair  -AD       SLC Criteria for Skilled Therapy Interventions Met -- yes  -AD       Functional Impact -- functional impact in ADLs;need frequent supervision;needs 24 hour supervision;difficulty communicating in an emergency;difficulty in expressing complex messages;restrictions in personal and social life;decreased ability to respond to situations safely;difficulty completing home management task  -AD          SLP Treatment Clinical Impressions    Treatment Assessment (SLP) continued;moderate;cognitive-linguistic disorder  -AD --       Treatment Assessment Comments (SLP) Pt tolerating treatment and demonstrates limited awareness of impact her cognitive deficits have on her daily care needs. Pt with some demonstration of mild paranoia regarding what is going on with her care in the hospital and reports that her son and daughter in law are too controlling when she tried to live with them. She demonstrates fair judgement with regards to simple problem solving activities but deficits with more complex problem solving. She can verbalize the need for more assistance at home, but not sure how to go about getting it at this time.  -AD --       Daily Summary of Progress (SLP) progress toward functional goals is gradual  -AD --       Barriers to Overall Progress (SLP) Baseline deficits  -AD --       Plan for Continued Treatment (SLP) continue treatment per plan of care  -AD --       Care Plan Review  evaluation/treatment results reviewed;care plan/treatment goals reviewed;risks/benefits reviewed;current/potential barriers reviewed;patient/other agree to care plan  -AD --          Recommendations    Therapy Frequency (SLP SLC) 3 days per week  -AD 3 days per week  -AD       Predicted Duration Therapy Intervention (Days) 3 days  -AD 3 days  -AD       Anticipated Discharge Disposition (SLP) skilled nursing facility;anticipate therapy at next level of care  -AD skilled nursing facility;anticipate therapy at next level of care  -AD       Patient/Family Concerns, Anticipated Discharge Disposition (SLP) No concerns reported at this time. Pt just stating she wants to know where she will be discharged. SLP explaining case management is working on this but will need to hear from her insurance prior to finalizing any discharge plan.  -AD Pt does report concerns with being able to manage her care at home and the care for her granddaughter as well due to both physical and cognitive concerns.  -AD       Demonstrates Need for Referral to Another Service neuropsychology services;neurology  -AD neuropsychology services;neurology  -AD       Communication Strategy Suggestions eliminate distractions when speaking with patient;avoid multi-step instructions  -AD eliminate distractions when speaking with patient;avoid multi-step instructions  -AD       SLC Diagnostic Follow-Up Needed cognitive-linguistic  -AD cognitive-linguistic  -AD          Communication Treatment Objective and Progress Goals (SLP)    SLC LTGs -- Communication Long-Term Goal  -AD       Cognitive Linguistic Treatment Objectives -- Cognitive Linguistic Treatment Objectives (Group)  -AD          Communication Long-Term Goal     Day with moderate cues  -AD with moderate cues  -AD       Time frame by discharge  3 days  -AD by discharge  3 days  -AD       Barriers fatigue;cognitive status;advanced age  -AD fatigue;cognitive status;advanced age  -AD        Progress/Outcomes progress slower than expected;goal ongoing  -AD new goal  -AD          Cognitive Linguistic Treatment Objectives    Attention Selection -- attention, SLP goal 1  -AD       Memory Skills Selection -- memory skills, SLP goal 1  -AD       Problem Solving Selection -- problem solving, SLP goal 1  -AD       Functional Math Skills Selection -- functional math skills, SLP goal 1  -AD          Attention Goal 1 (SLP)    Improve Attention by Goal 1 (SLP) complete sustained attention task;70%;with moderate cues (50-74%)  -AD complete sustained attention task;70%;with moderate cues (50-74%)  -AD       Time Frame (Attention Goal 1, SLP) short term goal (STG);3 days;by discharge  -AD short term goal (STG);3 days;by discharge  -AD       Barriers (Attention Goal 1, SLP) fatigue;cognitive status;advanced age;  -AD fatigue;cognitive status;advanced age;  -AD       Progress (Attention Goal 1, SLP) other (comment)  -AD other (comment)  -AD       Progress/Outcomes (Attention Goal 1, SLP) new goal  -AD new goal  -AD       Comment (Attention Goal 1, SLP) Not targeted this session.  -AD --          Memory Skills Goal 1 (SLP)    Improve Memory Skills Through Goal 1 (SLP) listen to a paragraph and answer questions;use memory strategies;70%;with moderate cues (50-74%)  -AD listen to a paragraph and answer questions;use memory strategies;70%;with moderate cues (50-74%)  -AD       Time Frame (Memory Skills Goal 1, SLP) short term goal (STG);3 days;by discharge  -AD short term goal (STG);3 days;by discharge  -AD       Barriers (Memory Skills Goal 1, SLP) fatigue;cognitive status;advanced age;  -AD fatigue;cognitive status;advanced age;  -AD       Progress (Memory Skills Goal 1, SLP) other (comment)  -AD other (comment)  -AD       Progress/Outcomes (Memory Skills Goal 1, SLP) new goal  -AD new goal  -AD       Comment (Memory Skills Goal 1, SLP) Goal not targeted during this session.  -AD --          Functional Problem Solving  Skills Goal 1 (SLP)    Improve Problem Solving Through Goal 1 (SLP) determine solutions to simple ADL/safety problems;70%;with moderate cues (50-74%)  -AD determine solutions to simple ADL/safety problems;70%;with moderate cues (50-74%)  -AD       Time Frame (Problem Solving Goal 1, SLP) short term goal (STG);3 days;by discharge  -AD short term goal (STG);3 days;by discharge  -AD       Barriers (Problem Solving Goal 1, SLP) fatigue;cognitive status;advanced age;  -AD fatigue;cognitive status;advanced age;  -AD       Progress (Problem Solving Goal 1, SLP) 60%;with moderate cues (50-74%)  -AD other (comment)  -AD       Progress/Outcomes (Problem Solving Goal 1, SLP) progress slower than expected;goal ongoing  -AD new goal  -AD       Comment (Problem Solving Goal 1, SLP) Pt able to provide verbal solutions to simple ADL problems with 60%. Lack of detail or planning noted to provide complete and accurate solutions.  -AD --          Functional Math Skills Goal 1 (SLP)    Improve Functional Math Skills Through Goal 1 (SLP) complete functional math task;calculator assisted;70%;with moderate cues (50-74%)  -AD complete functional math task;calculator assisted;70%;with moderate cues (50-74%)  -AD       Time Frame (Functional Math Skills Goal 1, SLP) short term goal (STG);3 days;by discharge  -AD short term goal (STG);3 days;by discharge  -AD       Barriers (Functional Math Skills Goal 1, SLP) fatigue;cognitive status;advanced age;  -AD fatigue;cognitive status;advanced age;  -AD       Progress (Functional Math Skills Goal 1, SLP) other (comment)  -AD other (comment)  -AD       Progress/Outcomes (Functional Math Skills Goal 1, SLP) new goal  -AD new goal  -AD       Comment (Functional Math Skills Goal 1, SLP) Goal not targeted this session. Results of SLUMS provided and pt in agreement that she will need some help with managing her finances in the future.  -AD --             User Key  (r) = Recorded By, (t) = Taken By, (c) =  Cosigned By    Initials Name Effective Dates    AD Ina Garcia, MS CCC-SLP 06/16/21 -                    EDUCATION  The patient has been educated in the following areas:     Cognitive Impairment Communication Impairment.Limited understanding by pt. Continued reinforcement needed.            SLP GOALS     Row Name 12/13/22 0930 12/12/22 5004          Attention Goal 1 (SLP)    Improve Attention by Goal 1 (SLP) complete sustained attention task;70%;with moderate cues (50-74%)  -AD complete sustained attention task;70%;with moderate cues (50-74%)  -AD     Time Frame (Attention Goal 1, SLP) short term goal (STG);3 days;by discharge  -AD short term goal (STG);3 days;by discharge  -AD     Barriers (Attention Goal 1, SLP) fatigue;cognitive status;advanced age;  -AD fatigue;cognitive status;advanced age;  -AD     Progress (Attention Goal 1, SLP) other (comment)  -AD other (comment)  -AD     Progress/Outcomes (Attention Goal 1, SLP) new goal  -AD new goal  -AD     Comment (Attention Goal 1, SLP) Not targeted this session.  -AD --        Memory Skills Goal 1 (SLP)    Improve Memory Skills Through Goal 1 (SLP) listen to a paragraph and answer questions;use memory strategies;70%;with moderate cues (50-74%)  -AD listen to a paragraph and answer questions;use memory strategies;70%;with moderate cues (50-74%)  -AD     Time Frame (Memory Skills Goal 1, SLP) short term goal (STG);3 days;by discharge  -AD short term goal (STG);3 days;by discharge  -AD     Barriers (Memory Skills Goal 1, SLP) fatigue;cognitive status;advanced age;  -AD fatigue;cognitive status;advanced age;  -AD     Progress (Memory Skills Goal 1, SLP) other (comment)  -AD other (comment)  -AD     Progress/Outcomes (Memory Skills Goal 1, SLP) new goal  -AD new goal  -AD     Comment (Memory Skills Goal 1, SLP) Goal not targeted during this session.  -AD --        Functional Problem Solving Skills Goal 1 (SLP)    Improve Problem Solving Through Goal 1 (SLP)  determine solutions to simple ADL/safety problems;70%;with moderate cues (50-74%)  -AD determine solutions to simple ADL/safety problems;70%;with moderate cues (50-74%)  -AD     Time Frame (Problem Solving Goal 1, SLP) short term goal (STG);3 days;by discharge  -AD short term goal (STG);3 days;by discharge  -AD     Barriers (Problem Solving Goal 1, SLP) fatigue;cognitive status;advanced age;  -AD fatigue;cognitive status;advanced age;  -AD     Progress (Problem Solving Goal 1, SLP) 60%;with moderate cues (50-74%)  -AD other (comment)  -AD     Progress/Outcomes (Problem Solving Goal 1, SLP) progress slower than expected;goal ongoing  -AD new goal  -AD     Comment (Problem Solving Goal 1, SLP) Pt able to provide verbal solutions to simple ADL problems with 60%. Lack of detail or planning noted to provide complete and accurate solutions.  -AD --        Functional Math Skills Goal 1 (SLP)    Improve Functional Math Skills Through Goal 1 (SLP) complete functional math task;calculator assisted;70%;with moderate cues (50-74%)  -AD complete functional math task;calculator assisted;70%;with moderate cues (50-74%)  -AD     Time Frame (Functional Math Skills Goal 1, SLP) short term goal (STG);3 days;by discharge  -AD short term goal (STG);3 days;by discharge  -AD     Barriers (Functional Math Skills Goal 1, SLP) fatigue;cognitive status;advanced age;  -AD fatigue;cognitive status;advanced age;  -AD     Progress (Functional Math Skills Goal 1, SLP) other (comment)  -AD other (comment)  -AD     Progress/Outcomes (Functional Math Skills Goal 1, SLP) new goal  -AD new goal  -AD     Comment (Functional Math Skills Goal 1, SLP) Goal not targeted this session. Results of SLUMS provided and pt in agreement that she will need some help with managing her finances in the future.  -AD --        Communication Long-Term Goal     Juana Diaz with moderate cues  -AD with moderate cues  -AD     Time frame by discharge  3 days  -AD by  discharge  3 days  -AD     Barriers fatigue;cognitive status;advanced age  -AD fatigue;cognitive status;advanced age  -AD     Progress/Outcomes progress slower than expected;goal ongoing  -AD new goal  -AD           User Key  (r) = Recorded By, (t) = Taken By, (c) = Cosigned By    Initials Name Provider Type    Ina Wray MS CCC-SLP Speech and Language Pathologist                        Time Calculation:      Time Calculation- SLP     Row Name 12/13/22 1636             Time Calculation- SLP    SLP Start Time 0830  -AD      SLP Stop Time 0857  -AD      SLP Time Calculation (min) 27 min  -AD      Total Timed Code Minutes- SLP 0 minute(s)  -AD      SLP Non-Billable Time (min) 0 min  -AD      SLP Received On 12/13/22  -AD      SLP - Next Appointment 12/14/22  -AD         Untimed Charges    56280-QH Treatment/ST Modification Prosth Aug Alter  27  -AD         Total Minutes    Untimed Charges Total Minutes 27  -AD       Total Minutes 27  -AD            User Key  (r) = Recorded By, (t) = Taken By, (c) = Cosigned By    Initials Name Provider Type    Ina Wray MS CCC-SLP Speech and Language Pathologist                Therapy Charges for Today     Code Description Service Date Service Provider Modifiers Qty    [id number]  ST EVAL SPEECH AND PROD W LANG  4 12/12/2022 Ina Garcia MS CCC-SLP GN 1    [id number]  ST TREATMENT SPEECH 2 12/13/2022 Ina Garcia MS CCC-SLP GN 1                     Ina Garcia MS CCC-SLP  12/13/2022

## 2022-12-13 NOTE — PROGRESS NOTES
Adult Nutrition  Assessment/PES    Patient Name:  Kaylin Ojeda  YOB: 1940  MRN: [id number]  Admit Date:  12/9/2022    Assessment Date:  12/13/2022    Comments:  Pt meets criteria for severe malnutrition on exam with reduced appetite, muscle wasting, fat loss on exam.  Agree with Regular diet and Boost Plus.  Will cont to follow and monitor.      Reason for Assessment     Row Name 12/13/22 1146          Reason for Assessment    Reason For Assessment follow-up protocol  nutrition exam     Diagnosis trauma  s/p fall, fx                Nutrition/Diet History     Row Name 12/13/22 1147          Nutrition/Diet History    Typical Intake (Food/Fluid/EN/PN) Pt up in chair finishing with OT at visit. reports likes boost and is drinking. pt agreeable to nutrition exam                  Physical Findings     Row Name 12/13/22 1149          Physical Findings    Overall Physical Appearance overall muscle and fat wasting on exam     Exam Agreement consented                  Nutrition Prescription Ordered     Row Name 12/13/22 1147          Nutrition Prescription PO    Current PO Diet Regular     Supplement Boost Plus (Ensure Enlive, Ensure Plus)     Supplement Frequency 3 times a day                  Malnutrition Severity Assessment     Row Name 12/13/22 1140          Malnutrition Severity Assessment    Malnutrition Type Chronic Disease - Related Malnutrition        Insufficient Energy Intake     Insufficient Energy Intake  <75% of est. energy requirement for > or equal to 1 month        Muscle Loss    Temple Region Moderate - slight depression     Clavicle Bone Region Severe - protruding prominent bone     Dorsal Hand Region Severe - prominent depression     Patellar Region Severe - prominent bone, square looking, very little muscle definition     Posterior Calf Region Severe - thin with very little definition/firmness        Fat Loss    Orbital Region  Moderate -  somewhat hollowness, slightly dark circles      Upper Arm Region Severe - mostly skin, very little space between folds, fingers touch        Criteria Met (Must meet criteria for severity in at least 2 of these categories: M Wasting, Fat Loss, Fluid, Secondary Signs, Wt. Status, Intake)    Patient meets criteria for  Severe Malnutrition                 Problem/Interventions:     Problem 2     Row Name 12/13/22 1149          Nutrition Diagnoses Problem 2    Problem 2 Malnutrition     Etiology (related to) Medical Diagnosis;Factors Affecting Nutrition     Signs/Symptoms (evidenced by) Report/Observation  MSA                    Intervention Goal     Row Name 12/13/22 1149          Intervention Goal    General Meet nutritional needs for age/condition     PO Increase intake;PO intake (%)     PO Intake % 55 %  or greater                Nutrition Intervention     Row Name 12/13/22 1150          Nutrition Intervention    RD/Tech Action Interview for preference;Encourage intake;Follow Tx progress                  Education/Evaluation     Row Name 12/13/22 1150          Education    Education Other (comment)  spoke w pt about nutrition exam for muscle wasting and fat loss , spoke to pt about adequate pro intake & eating smaller amounts more often as well as use of oral supplement.        Monitor/Evaluation    Monitor Per protocol;I&O;PO intake;Supplement intake;Pertinent labs;Weight;Symptoms     Education Follow-up Other (comment)  verbalized understanding                 Electronically signed by:  Natty Barajas RD  12/13/22 11:51 EST

## 2022-12-13 NOTE — PLAN OF CARE
Goal Outcome Evaluation:  Plan of Care Reviewed With: patient           Outcome Evaluation: OT: Pt participated in RUE arom program for her hand and gentle a/arom for her right shoulder. Pt needs assistance for right shoulder rom due to the weight of the RUE splint. Pt requires min assist for lower body adl activity due to limited use of RUE following sx. Pt was able to doff her socks while sitting at EOB, but she needed min assist to don her sock. Pt managed bed mobility with SBA and transfers/mobility in room with CGA. Pt needs verbal cues to limit use and prevent weight bearing of RUE when transitioning from supine to sitting and sit to stand transfers. Rec STR initally upon discharge for pt safety. If pt does not meet criteria for STR, rec supervision at home for safety due to her cognitive status and limited functional use of RUE after sx.

## 2022-12-14 NOTE — THERAPY TREATMENT NOTE
Injectable Influenza Immunization Documentation    1.  Is the person to be vaccinated sick today?   No    2. Does the person to be vaccinated have an allergy to a component   of the vaccine?   No  Egg Allergy Algorithm Link    3. Has the person to be vaccinated ever had a serious reaction   to influenza vaccine in the past?   No    4. Has the person to be vaccinated ever had Guillain-Barré syndrome?   No    Form completed by Luz Stiles CMA           Acute Care - Physical Therapy Treatment Note  [facility]     Patient Name: Kaylin Ojeda  : 1940  MRN: [id number]  Today's Date: 2022      Visit Dx:     ICD-10-CM ICD-9-CM   1. Type I or II open fracture of olecranon process of right ulna, initial encounter  S52.021B 813.11   2. Closed head injury, initial encounter  S09.90XA 959.01   3. Other chronic back pain  M54.9 724.5    G89.29 338.29   4. 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[facility])   • Obstruction of carotid artery   • Peripheral arterial occlusive disease ([facility])   • Impaired glucose tolerance   • Difficulty sleeping   • Tobacco dependence syndrome   • Iron deficiency anemia   • Vitamin B12 deficiency anemia due to intrinsic factor deficiency   • Acquired hypothyroidism   • Thoracoabdominal aortic aneurysm   • Abdominal bloating   • Prediabetes   • Sleep difficulties   • Chronic constipation   • PAF (paroxysmal atrial fibrillation) ([facility])   • Moderate single current episode of major depressive disorder ([facility])   • Chronic low back pain without sciatica   • Urge incontinence   • Severe mitral regurgitation   • Severe tricuspid regurgitation   • Moderate aortic valve regurgitation   • Underweight   • DDD (degenerative disc disease), lumbar   • Atrial flutter with rapid ventricular response (HCC)   • Urinary tract infection without hematuria   • Malaise and fatigue   • Type I or II open fracture of right olecranon process   • Type I or II open fracture of olecranon process of right ulna, initial encounter   • Severe malnutrition (HCC)     Past Medical History:    Diagnosis Date   • A-fib ([facility])    • Abdominal pain, epigastric     Chronic, unclear cause, improved   • Anorexia    • Anxiety    • Arthritis    • CAD (coronary artery disease)     Cath 5/2015: nonobstructive LAD/RCA disease, 90% mid LCx s/p [id number] Xience.  Cath 1/2021: 50-60% prox LAD/70-80% distal LAD, patent Cx stent, 20% RCA   • Cellulitis of leg    • Cervical disc disease    • Chronic fatigue    • Colitis    • COPD (chronic obstructive pulmonary disease) ([facility])    • Depression    • Erosive gastritis    • Fibromyalgia    • Frequency of urination 06/09/2017   • Gastritis    • Hypercholesterolemia 02/02/2016   • Hyperglycemia    • Hypertension     History of refractory hypertension which improved significantly   • Insomnia    • Leukocytes in urine    • Lower back pain    • Mediastinal lymphadenopathy    • 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[facility])    • Mononucleosis    • Occlusion and stenosis of carotid artery    • Onychomycosis    • Orbit fracture ([facility])    • PAF (paroxysmal atrial fibrillation) ([facility])    • Peripheral arterial disease ([facility])     Significant (carotid, subclavian, renal arteries, lower extremities), with claudication, medical therapy only   • Pernicious anemia    • Prediabetes    • Renal artery stenosis ([facility])     unilateral, with renal atrophy (no intervention required)   • Renal calculi    • Sinus bradycardia     mild, asymptomatic   • TIA (transient ischemic attack)    • UTI (urinary tract infection)    • Valvular heart disease     1/2021: mod-severe AI, mod MR, mild-mod TR   • Vision problem    • Vitamin B 12 deficiency      Past Surgical History:   Procedure Laterality Date   • APPENDECTOMY     • BREAST BIOPSY Left     20+ yrs ago   • BREAST SURGERY     • CARDIAC CATHETERIZATION  05/2015    nonobs LAD/RCA disease, 90% mid LCx s/p [id number] Xience   • CARDIAC CATHETERIZATION N/A 10/28/2020    Procedure: Right and Left Heart Cath;  Surgeon: Opal Contreras MD;  Location: [facility] CATH INVASIVE LOC[facility];   Service: Cardiovascular;  Laterality: N/A;  for cardiac testing prior to possible valve surgery per Dr. Mai   • CARDIAC CATHETERIZATION N/A 10/28/2020    Procedure: Coronary angiography;  Surgeon: Opal Contreras MD;  Location:  [facility] CATH INVASIVE LOCATION;  Service: Cardiovascular;  Laterality: N/A;   • CERVICAL FUSION  1997   • CHOLECYSTECTOMY     • CORONARY STENT PLACEMENT     • HYSTERECTOMY  1995   • KNEE SURGERY Right 2005   • KNEE SURGERY Left 1998   • ORIF HUMERUS FRACTURE Right 12/10/2022    Procedure: ELBOW OPEN REDUCTION INTERNAL FIXATION;  Surgeon: Lito Reeder MD;  Location:  [facility] OR;  Service: Orthopedics;  Laterality: Right;     PT Assessment (last 12 hours)     PT Evaluation and Treatment     Row Name 12/14/22 1115          Physical Therapy Time and Intention    Subjective Information no complaints  -BP     Document Type therapy note (daily note)  -BP     Mode of Treatment physical therapy  -BP     Patient Effort good  -BP     Symptoms Noted During/After Treatment none  -BP     Row Name 12/14/22 1115          General Information    Patient/Family/Caregiver Comments/Observations Patient sitting in chair. OT finishing with treatment. Patient agreeable to PT treatment  -BP     Existing Precautions/Restrictions fall  NWB R UE. Confusion  -BP     Limitations/Impairments safety/cognitive  -BP     Risks Reviewed patient:;LOB;increased discomfort  -BP     Benefits Reviewed patient:;improve function;increase independence;increase strength  -BP     Row Name 12/14/22 1115          Pain    Pre/Posttreatment Pain Comment Patient does not complain of pain  -BP     Row Name 12/14/22 1115          Cognition    Personal Safety Interventions gait belt;nonskid shoes/slippers when out of bed  -BP     Row Name 12/14/22 1115          Bed Mobility    Comment, (Bed Mobility) deferred, patient up in chair  -BP     Row Name 12/14/22 1115          Transfers    Transfers sit-stand transfer;stand-sit transfer  -BP      Comment, (Transfers) no device  -BP     Row Name 12/14/22 1115          Sit-Stand Transfer    Sit-Stand Bowie (Transfers) contact guard  -BP     Assistive Device (Sit-Stand Transfers) --  no device  -BP     Row Name 12/14/22 1115          Stand-Sit Transfer    Stand-Sit [place] (Transfers) contact guard  -BP     Assistive Device (Stand-Sit Transfers) --  no device  -BP     Row Name 12/14/22 1115          Gait/Stairs (Locomotion)    Bowie Level (Gait) contact guard  -BP     Assistive Device (Gait) --  no device  -BP     Distance in Feet (Gait) 350  -BP     Pattern (Gait) swing-through  -BP     Deviations/Abnormal Patterns (Gait) base of support, narrow;gait speed decreased;stride length decreased  -BP     Comment, (Gait/Stairs) Patient performs gait without need for an AD. Patient does not require hand held assist from therapist. Patient continues to demonstrate intermittent lateral drifting with external distractions.  -     Row Name 12/14/22 1115          Safety Issues, Functional Mobility    Safety Issues Affecting Function (Mobility) insight into deficits/self-awareness;awareness of need for assistance;safety precaution awareness;safety precautions follow-through/compliance;problem-solving  -     Comment, Safety Issues/Impairments (Mobility) Patient demonstrates poor insight into current deficits and functional limitations. Patient requires cues to adhere to NWB status with transfers and mobility.  -     Row Name 12/14/22 1115          Balance    Comment, Balance sitting balance-SBA. Standing balance without a device-CGA  -     Row Name 12/14/22 1115          Motor Skills    Therapeutic Exercise --  B LE LAQ, hip marching, hip ABD/ADD and heel slides x 10  -     Row Name             Wound 12/10/22 0910 Right posterior elbow Incision    Wound - Properties Group Placement Date: 12/10/22  -KD Placement Time: 0910  -KD Side: Right  -KD Orientation: posterior  -KD Location: elbow  -KD  Primary Wound Type: Incision  -KD    Retired Wound - Properties Group Placement Date: 12/10/22  -KD Placement Time: 0910 -KD Side: Right  -KD Orientation: posterior  -KD Location: elbow  -KD Primary Wound Type: Incision  -KD    Retired Wound - Properties Group Date first assessed: 12/10/22  -KD Time first assessed: 0910 -KD Side: Right  -KD Location: elbow  -KD Primary Wound Type: Incision  -KD    Row Name 12/14/22 1115          Plan of Care Review    Plan of Care Reviewed With patient  -BP     Outcome Evaluation PT: Patient performs sit to/from stand transfers with CGA and gait x 350 feet with CGA without use of an AD. Patient demonstrates improved overall balance with gait training, does not require hand held assist from therapist today. Patient does demonstrates intermittent lateral deviations with external distractions. Patient able to self correct without assistance. Patient demonstrates poor insight into current functional limitations and requires cues to adhere to NWB status of R UE. Continue to recommend 24/7 supervision or SNF at discharge due to cognitive status coupled with NWB status of R UE.  -BP     Row Name 12/14/22 1115          Positioning and Restraints    Pre-Treatment Position sitting in chair/recliner  -BP     Post Treatment Position chair  -BP     In Chair notified nsg;reclined;call light within reach;encouraged to call for assist  exit alarm not in use when initiating treatment  -BP     Row Name 12/14/22 1115          Progress Summary (PT)    Progress Toward Functional Goals (PT) progress toward functional goals is gradual  -BP     Row Name 12/14/22 1115          Therapy Plan Review/Discharge Plan (PT)    Therapy Plan Review (PT) patient;risks/benefits reviewed;participants included  -BP           User Key  (r) = Recorded By, (t) = Taken By, (c) = Cosigned By    Initials Name Provider Type    Eldoia Strauss, RN Registered Nurse    BP Scott Daniel, PT Physical Therapist                 Physical Therapy Education     Title: PT OT SLP Therapies (In Progress)     Topic: Physical Therapy (In Progress)     Point: Mobility training (In Progress)     Learning Progress Summary           Patient Acceptance, E,TB, NR by  at 12/14/2022 1319    Acceptance, E,TB, VU by  at 12/13/2022 1240    Acceptance, E,TB, VU by  at 12/12/2022 1341    Acceptance, E,TB, VU by  at 12/11/2022 0940    Comment: educated pt on use of ebony walker                   Point: Precautions (In Progress)     Learning Progress Summary           Patient Acceptance, E,TB, NR by  at 12/14/2022 1319    Acceptance, E,TB, VU by  at 12/12/2022 1341    Acceptance, E,TB, VU by  at 12/11/2022 0940    Comment: educated pt on use of ebony walker                               User Key     Initials Effective Dates Name Provider Type Discipline     06/16/21 -  Prateek Porter, PT Physical Therapist PT     06/16/21 -  Veronica Daniel-Meño, PT Physical Therapist PT     06/16/21 -  Clementina Ndiaye, PT Physical Therapist PT              PT Recommendation and Plan  Anticipated Discharge Disposition (PT): skilled nursing facility, home with 24/7 care  Progress Summary (PT)  Progress Toward Functional Goals (PT): progress toward functional goals is gradual  Plan of Care Reviewed With: patient  Outcome Evaluation: PT: Patient performs sit to/from stand transfers with CGA and gait x 350 feet with CGA without use of an AD. Patient demonstrates improved overall balance with gait training, does not require hand held assist from therapist today. Patient does demonstrates intermittent lateral deviations with external distractions. Patient able to self correct without assistance. Patient demonstrates poor insight into current functional limitations and requires cues to adhere to NWB status of R UE. Continue to recommend 24/7 supervision or SNF at discharge due to cognitive status coupled with NWB status of R UE.   Outcome Measures     Row Name  12/14/22 1115 12/13/22 1100 12/12/22 1300       How much help from another person do you currently need...    Turning from your back to your side while in flat bed without using bedrails? 3  -BP 3  -BP 3  -JW    Moving from lying on back to sitting on the side of a flat bed without bedrails? 3  -BP 3  -BP 3  -JW    Moving to and from a bed to a chair (including a wheelchair)? 3  -BP 3  -BP 3  -JW    Standing up from a chair using your arms (e.g., wheelchair, bedside chair)? 3  -BP 3  -BP 3  -JW    Climbing 3-5 steps with a railing? 3  -BP 3  -BP 3  -JW    To walk in hospital room? 3  -BP 3  -BP 3  -JW    AM-PAC 6 Clicks Score (PT) 18  -BP 18  -BP 18  -JW       Functional Assessment    Outcome Measure Options AM-PAC 6 Clicks Basic Mobility (PT)  -BP AM-PAC 6 Clicks Basic Mobility (PT)  -BP AM-PAC 6 Clicks Basic Mobility (PT)  -JW          User Key  (r) = Recorded By, (t) = Taken By, (c) = Cosigned By    Initials Name Provider Type    BP Scott Daniel, PT Physical Therapist    JW Clementina Ndiaye PT Physical Therapist                 Time Calculation:    PT Charges     Row Name 12/14/22 1320             Time Calculation    Start Time 1115  -BP      Stop Time 1125  -BP      Time Calculation (min) 10 min  -BP      PT Received On 12/14/22  -BP      PT - Next Appointment 12/15/22  -BP         Timed Charges    77606 - Gait Training Minutes  10  -BP         Total Minutes    Timed Charges Total Minutes 10  -BP       Total Minutes 10  -BP            User Key  (r) = Recorded By, (t) = Taken By, (c) = Cosigned By    Initials Name Provider Type    BP Scott Daniel, PT Physical Therapist              Therapy Charges for Today     Code Description Service Date Service Provider Modifiers Qty    41971256358 HC GAIT TRAINING EA 15 MIN 12/13/2022 Scott Daniel, PT GP 1    04592625435 HC GAIT TRAINING EA 15 MIN 12/14/2022 Scott Daniel, PT GP 1          PT G-Codes  Outcome Measure Options: AM-PAC 6 Clicks Basic Mobility  (PT)  AM-PAC 6 Clicks Score (PT): 18  AM-PAC 6 Clicks Score (OT): 18    Scott Daniel, PT  12/14/2022

## 2022-12-14 NOTE — PLAN OF CARE
Goal Outcome Evaluation:  Plan of Care Reviewed With: patient           Outcome Evaluation: PT: Patient performs sit to/from stand transfers with CGA and gait x 350 feet with CGA without use of an AD. Patient demonstrates improved overall balance with gait training, does not require hand held assist from therapist today. Patient does demonstrates intermittent lateral deviations with external distractions. Patient able to self correct without assistance. Patient demonstrates poor insight into current functional limitations and requires cues to adhere to NWB status of R UE. Continue to recommend 24/7 supervision or SNF at discharge due to cognitive status coupled with NWB status of R UE.

## 2022-12-14 NOTE — CASE MANAGEMENT/SOCIAL WORK
Continued Stay Note  [facility]     Patient Name: Kaylin Ojeda  MRN: [id number]  Today's Date: 12/14/2022    Admit Date: 12/9/2022    Plan: [facility]- pending precert   Discharge Plan     Row Name 12/14/22 0903       Plan    Plan [facility]- pending precert    Plan Comments Per Ina/Jose liaison, precert still pending. CM will cotninue to follow.               Discharge Codes    No documentation.                     Walter Moreno RN

## 2022-12-14 NOTE — PROGRESS NOTES
"SERVICE: [facility] HOSPITALIST    CONSULTANTS: Orthopedic surgery, cardiology    CHIEF COMPLAINT: Follow up right olecranon fracture, s/p ORIF 12/10/2022, A. fib RVR    SUBJECTIVE: HR varies 80s to 110s with occasional bursts to 150s but generally improved. Awaiting rehab, pain controlled, no new concerns overnight.    OBJECTIVE:    /83 (BP Location: Left arm, Patient Position: Lying)   Pulse 93   Temp 98.2 °F (36.8 °C) (Oral)   Resp 20   Ht 152.4 cm (60\")   Wt 45.3 kg (99 lb 14.4 oz)   LMP  (LMP Unknown)   SpO2 97%   BMI 19.51 kg/m²     MEDS/LABS REVIEWED AND ORDERED    apixaban, 2.5 mg, Oral, BID  atorvastatin, 10 mg, Oral, Daily  budesonide-formoterol, 2 puff, Inhalation, BID - RT  dilTIAZem, 60 mg, Oral, Q6H  doxycycline, 100 mg, Oral, Q12H  famotidine, 20 mg, Oral, BID AC  FLUoxetine, 20 mg, Oral, Daily  hydrOXYzine, 25 mg, Oral, TID  nicotine, 1 patch, Transdermal, Q24H  oxyCODONE-acetaminophen, 1 tablet, Oral, Q8H  senna-docusate sodium, 2 tablet, Oral, BID  sodium chloride, 10 mL, Intravenous, Q12H      Physical Exam  Vitals reviewed.   Constitutional:       Comments: Seen in, chronically ill appearance, elderly, frail   HENT:      Head: Normocephalic and atraumatic.      Mouth/Throat:      Mouth: Mucous membranes are moist.   Eyes:      Extraocular Movements: Extraocular movements intact.      Pupils: Pupils are equal, round, and reactive to light.   Cardiovascular:      Rate and Rhythm: Tachycardia present. Rhythm irregular.   Pulmonary:      Effort: Pulmonary effort is normal. No respiratory distress.      Breath sounds: Normal breath sounds. No wheezing or rales.   Abdominal:      General: Abdomen is flat. Bowel sounds are normal. There is no distension.      Palpations: Abdomen is soft.      Tenderness: There is no abdominal tenderness. There is no guarding.   Musculoskeletal:      Comments: RUE in sling/ace with CMS intact to right hand  Healing facial ecchymosis "   Skin:     General: Skin is warm and dry.      Capillary Refill: Capillary refill takes less than 2 seconds.      Findings: Bruising present. No erythema.   Neurological:      General: No focal deficit present.      Mental Status: She is alert and oriented to person, place, and time.   Psychiatric:         Mood and Affect: Mood normal.         Behavior: Behavior normal.       LAB/DIAGNOSTICS:    Lab Results (last 24 hours)     Procedure Component Value Units Date/Time    Basic Metabolic Panel [[id number]]  (Abnormal) Collected: 12/14/22 1024    Specimen: Blood Updated: 12/14/22 1052     Glucose 221 mg/dL      BUN 20 mg/dL      Creatinine 0.73 mg/dL      Sodium 138 mmol/L      Potassium 3.4 mmol/L      Chloride 100 mmol/L      CO2 26.1 mmol/L      Calcium 9.2 mg/dL      BUN/Creatinine Ratio 27.4     Anion Gap 11.9 mmol/L      eGFR 83.3 mL/min/1.73      Comment: National Kidney Foundation and American Society of Nephrology (ASN) Task Force recommended calculation based on the Chronic Kidney Disease Epidemiology Collaboration (CKD-EPI) equation refit without adjustment for race.       Narrative:      GFR Normal >60  Chronic Kidney Disease <60  Kidney Failure <15    The GFR formula is only valid for adults with stable renal function between ages 18 and 70.    CBC & Differential [[id number]]  (Abnormal) Collected: 12/14/22 1024    Specimen: Blood Updated: 12/14/22 1033    Narrative:      The following orders were created for panel order CBC & Differential.  Procedure                               Abnormality         Status                     ---------                               -----------         ------                     CBC Auto Differential[[id number]]        Abnormal            Final result                 Please view results for these tests on the individual orders.    CBC Auto Differential [[id number]]  (Abnormal) Collected: 12/14/22 1024    Specimen: Blood Updated: 12/14/22 1033     WBC 8.01 10*3/mm3      RBC  3.24 10*6/mm3      Hemoglobin 9.8 g/dL      Hematocrit 31.8 %      MCV 98.1 fL      MCH 30.2 pg      MCHC 30.8 g/dL      RDW 16.3 %      RDW-SD 60.1 fl      MPV 9.7 fL      Platelets 251 10*3/mm3      Neutrophil % 77.5 %      Lymphocyte % 14.2 %      Monocyte % 6.1 %      Eosinophil % 1.6 %      Basophil % 0.5 %      Immature Grans % 0.1 %      Neutrophils, Absolute 6.20 10*3/mm3      Lymphocytes, Absolute 1.14 10*3/mm3      Monocytes, Absolute 0.49 10*3/mm3      Eosinophils, Absolute 0.13 10*3/mm3      Basophils, Absolute 0.04 10*3/mm3      Immature Grans, Absolute 0.01 10*3/mm3         ECG 12 Lead Rhythm Change   Final Result   HEART RATE= 132  bpm   RR Interval= 440  ms   AR Interval=   ms   P Horizontal Axis=   deg   P Front Axis=   deg   QRSD Interval= 80  ms   QT Interval= 332  ms   QRS Axis= 72  deg   T Wave Axis= 40  deg   - ABNORMAL ECG -   Afib with rvr   Consider left ventricular hypertrophy   Prolonged QT interval   c/w prior ecg, a fib with rvr now seen   Electronically Signed By: Sasha Do ([facility]) 12-Dec-2022 07:02:29   Date and Time of Study: 2022-12-11 13:54:07      ECG 12 Lead   ED Interpretation   Ramin Silvestre MD     12/9/2022  4:02 PM   ECG 12 Lead         Date/Time: 12/9/2022 3:32 PM   Performed by: Ramin Silvestre MD   Authorized by: Ramin Silvestre MD    Interpreted by physician   Comparison: compared with previous ECG from 10/6/2022   Similar to previous ECG   Comments: Rate of 74, A. fib, normal axis, no acute ST elevation or    depression.         ECG 12 Lead Pre-Op / Pre-Procedure   Final Result   HEART RATE= 74  bpm   RR Interval= 812  ms   AR Interval=   ms   P Horizontal Axis=   deg   P Front Axis=   deg   QRSD Interval= 84  ms   QT Interval= 438  ms   QRS Axis= 78  deg   T Wave Axis= 34  deg   - BORDERLINE ECG -   Sinus rhythm   Consider left ventricular hypertrophy   c/w prior ECG, NSR has replaced SVT   Electronically Signed By: Sven Mathews ([facility]) 10-Dec-2022  21:45:23   Date and Time of Study: 2022-12-09 15:32:21        Results for orders placed during the hospital encounter of 09/28/22    Adult Transthoracic Echo Complete w/ Color, Spectral and Contrast if Necessary Per Protocol    Interpretation Summary  · Estimated right ventricular systolic pressure from tricuspid regurgitation is normal (<35 mmHg). Calculated right ventricular systolic pressure from tricuspid regurgitation is 29 mmHg.  · Left ventricular wall thickness is consistent with mild concentric hypertrophy.  · Calculated left ventricular EF = 62% Estimated left ventricular EF was in agreement with the calculated left ventricular EF. Left ventricular systolic function is normal.  · Moderate to severe aortic valve regurgitation is present.  · Mild to moderate tricuspid valve regurgitation is present.  · Mild to moderate mitral valve regurgitation is present.  · Left atrial volume is severely increased.  · The right atrial cavity is mildly dilated.  · Mild dilation of the ascending aorta is present.    No radiology results for the last day    ASSESSMENT/PLAN:  Acute open, comminuted, displaced intra-articular fracture of the proximal ulna secondary to fall, s/p ORIF 12/10/2022: Therapeutic surgery following  Continues doxycycline oral x14 days  Continues percocet  Continue falls precautions  Awaiting SNF for rehab placement     Atrial fibrillation with RVR with h/o PAF:  CAD/HLD, s/p stent:  Hypertension:  Moderate to severe AR, mild to moderate MR, mild to moderate TR: Cardiology followed and signed off  HR improved with change to cardizem, increase to 60 mg every 6 hours with bursts to 150s  Continue eliquis, lipitor  Digoxin did not improve, holding home metoprolol  Monitor on telemetry    Stable diagnoses, reviewed, unchanged:      Suspected moderate cognitive decline with recommendation for further SLP monitoring and outpatient neuropsych versus neurology testing  Unchanged today  Recommendation by  "therapist is for 24-hour supervision which is not available at home as patient lives alone     Right 11th rib fracture: continue supportive care      Bilateral knee pain/ecchymosis: Bilateral knee x-rays negative for acute findings     PAD/vasculopathy:  H/O mesenteric artery stenosis, thoracoabdominal aortic aneurysm, carotid artery stenosis:  Continue statin     COPD: Nothing acute, continue home Symbicort     GERD:  Continue Pepcid     Severe malnutrition: Dietitian following  Body mass index is 19.51 kg/m².  Continue boost supplements/regular diet     Tobacco abuse: continue nicotine patch and cessation education     Anxiety/depression: continue home Prozac, add clonazepam as needed every 8 hours     Chronic pain with chronic narcotic dependence:  Change percocet to home dose of 7.5mg scheduled three times daily  Pain management referral made on admission for after discharge.    PLAN FOR DISPOSITION: awaiting rehab    LILI Rubio  Hospitalist, [facility]  12/14/22  08:36 EST    Note Disclaimer: At [facility], we believe that sharing information builds trust and better relationships. You are receiving this note because you recently visited [facility]. It is possible you will see health information before a provider has talked with you about it. This kind of information can be easy to misunderstand. To help you fully understand what it means for your health, we urge you to discuss this note with your provider.     \"Dictated utilizing Dragon dictation\"      "

## 2022-12-14 NOTE — PLAN OF CARE
Goal Outcome Evaluation:  Plan of Care Reviewed With: patient           Outcome Evaluation: OT: pt required CGA with verbal cues to neel/doff slipper socks from chair level. pt required CGA for functional transfers from recliner chair and elevated commode seat.. pt required CGA and hand held assist for functional mobility x 30 feet in room. pt required cues throughout treatment session for safety and to adhere to NWB status of R UE. continue to rec 24/7 assist/supervision upon discharge 2 decreased safety awareness and cognition

## 2022-12-14 NOTE — CASE MANAGEMENT/SOCIAL WORK
Continued Stay Note  CHERRIE Bennett     Patient Name: Kaylin Ojeda  MRN: [id number]  Today's Date: 12/14/2022    Admit Date: 12/9/2022    Plan: [facility] - await precert   Discharge Plan     Row Name 12/14/22 1544       Plan    Plan [facility] - await precert    Plan Comments Multiple calls to In[facility] liaison regarding precert - still pending at this time. Call to [employer]/ [employer] rep - unable to see if precert approved. Discussed with Shantelle/ - she is reaching out to follow up on precert status. Arrived to patient room, she states family has just left. Updated that precert has not been obtained. Asked if there is a plan in place if insurance denies STR. She states her grandson who lives in [place] is able to 'work from home' and can come and stay with her for a while at dc. She states she has a neighbor [place] the [place] her that can assist her. MINI has provided information to her daughter-in-law, Francine, for private pay in home care givers/ HH listing if needed. MINI will continue to follow to assist with dc planning.               Discharge Codes    No documentation.                     Walter Moreno RN

## 2022-12-14 NOTE — THERAPY TREATMENT NOTE
Acute Care - Occupational Therapy Treatment Note  CHERRIE Bennett     Patient Name: Kaylin Ojeda  : 1940  MRN: [id number]  Today's Date: 2022             Admit Date: 2022       ICD-10-CM ICD-9-CM   1. Type I or II open fracture of olecranon process of right ulna, initial encounter  S52.021B 813.11   2. Closed head injury, initial encounter  S09.90XA 959.01   3. Other chronic back pain  M54.9 724.5    G89.29 338.29   4. 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[facility])   • Obstruction of carotid artery   • Peripheral arterial occlusive disease ([facility])   • Impaired glucose tolerance   • Difficulty sleeping   • Tobacco dependence syndrome   • Iron deficiency anemia   • Vitamin B12 deficiency anemia due to intrinsic factor deficiency   • Acquired hypothyroidism   • Thoracoabdominal aortic aneurysm   • Abdominal bloating   • Prediabetes   • Sleep difficulties   • Chronic constipation   • PAF (paroxysmal atrial fibrillation) ([facility])   • Moderate single current episode of major depressive disorder ([facility])   • Chronic low back pain without sciatica   • Urge incontinence   • Severe mitral regurgitation   • Severe tricuspid regurgitation   • Moderate aortic valve regurgitation   • Underweight   • DDD (degenerative disc disease), lumbar   • Atrial flutter with rapid ventricular response ([facility])   • Urinary tract infection without hematuria   • Malaise and fatigue   • Type I or II open fracture of right olecranon process   • Type I or II open fracture of olecranon process of right ulna, initial encounter   • Severe malnutrition ([facility])      Past Medical History:   Diagnosis Date   • A-fib ([facility])    • Abdominal pain, epigastric     Chronic, unclear cause, improved   • Anorexia    • Anxiety    • Arthritis    • CAD (coronary artery disease)     Cath 5/2015: nonobstructive LAD/RCA disease, 90% mid LCx s/p [id number] Xience.  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[facility])    • Mononucleosis    • Occlusion and stenosis of carotid artery    • Onychomycosis    • Orbit fracture ([facility])    • PAF (paroxysmal atrial fibrillation) ([facility])    • Peripheral arterial disease ([facility])     Significant (carotid, subclavian, renal arteries, lower extremities), with claudication, medical therapy only   • Pernicious anemia    • Prediabetes    • Renal artery stenosis ([facility])     unilateral, with renal atrophy (no intervention required)   • Renal calculi    • Sinus bradycardia     mild, asymptomatic   • TIA (transient ischemic attack)    • UTI (urinary tract infection)    • Valvular heart disease     1/2021: mod-severe AI, mod MR, mild-mod TR   • Vision problem    • Vitamin B 12 deficiency      Past Surgical History:   Procedure Laterality Date   • APPENDECTOMY     • BREAST BIOPSY Left     20+ yrs ago   • BREAST SURGERY     • CARDIAC CATHETERIZATION  05/2015    nonobs LAD/RCA disease, 90% mid LCx s/p [id number] Xience   • CARDIAC CATHETERIZATION N/A 10/28/2020    Procedure: Right and Left Heart Cath;  Surgeon: Opal Contreras MD;  Location: [facility]  CATH INVASIVE LOCATION;  Service: Cardiovascular;  Laterality: N/A;  for cardiac testing prior to possible valve surgery per Dr. Mai   • CARDIAC CATHETERIZATION N/A 10/28/2020    Procedure: Coronary angiography;  Surgeon: Opal Contreras MD;  Location: [facility] CATH INVASIVE LOCATION;  Service: Cardiovascular;  Laterality: N/A;   • CERVICAL FUSION  1997   • CHOLECYSTECTOMY     • CORONARY STENT PLACEMENT     • HYSTERECTOMY  1995   • KNEE SURGERY Right 2005   • KNEE SURGERY Left 1998   • ORIF HUMERUS FRACTURE Right 12/10/2022    Procedure: ELBOW OPEN REDUCTION INTERNAL FIXATION;  Surgeon: Lito Reeder MD;  Location:  [facility];  Service: Orthopedics;  Laterality: Right;         OT ASSESSMENT FLOWSHEET (last 12 hours)     OT Evaluation and Treatment     Row Name 12/14/22 1105                   OT Time and Intention    Subjective Information no complaints  -        Document Type therapy note (daily note)  -        Mode of Treatment occupational therapy  -J        Patient Effort good  -           General Information    Patient/Family/Caregiver Comments/Observations pt sitting in recliner chair, agreed to treatment  -        Existing Precautions/Restrictions fall  NWB R UE, confusion  -J        Limitations/Impairments safety/cognitive  -           Pain Assessment    Pre/Posttreatment Pain Comment pt with no co pain today  -           Cognition    Personal Safety Interventions gait belt;nonskid shoes/slippers when out of bed  -           Lower Body Dressing Assessment/Training    Comment, (Lower Body Dressing) pt doffed and donned slipper socks with CGA and min cues for weight bearing status of R UE  -JJ           Bed Mobility    Comment, (Bed Mobility) deferred pt up in chair  -J           Functional Mobility    Functional Mobility- Ind. Level contact guard assist;verbal cues required  -        Functional Mobility- Device --  hand held assist  -        Functional Mobility-Distance (Feet) 30  in  room to bathroom and back to recliner chair  -        Functional Mobility-Maintain WBing --  NWB of R UE  -J        Functional Mobility- Comment pt required cues for safety throughout mobility  -           Transfer Assessment/Treatment    Transfers sit-stand transfer;stand-sit transfer  -        Comment, (Transfers) no assistive device  -J           Sit-Stand Transfer    Sit-Stand Nobles (Transfers) contact guard  -        Assistive Device (Sit-Stand Transfers) --  no device  -J           Stand-Sit Transfer    Stand-Sit Nobles (Transfers) contact guard  -        Assistive Device (Stand-Sit Transfers) --  no device  -           Safety Issues, Functional Mobility    Safety Issues Affecting Function (Mobility) insight into deficits/self-awareness;judgment;safety precaution awareness;safety precautions follow-through/compliance;awareness of need for assistance  -        Impairments Affecting Function (Mobility) balance;cognition  -           Balance    Comment, Balance CGA for static standing balance with hand hels assist  -J           Wound 12/10/22 0910 Right posterior elbow Incision    Wound - Properties Group Placement Date: 12/10/22  -KD Placement Time: 0910 -KD Side: Right  -KD Orientation: posterior  -KD Location: elbow  -KD Primary Wound Type: Incision  -KD    Retired Wound - Properties Group Placement Date: 12/10/22  -KD Placement Time: 0910  -KD Side: Right  -KD Orientation: posterior  -KD Location: elbow  -KD Primary Wound Type: Incision  -KD    Retired Wound - Properties Group Date first assessed: 12/10/22  -KD Time first assessed: 0910 -KD Side: Right  -KD Location: elbow  -KD Primary Wound Type: Incision  -KD       Plan of Care Review    Plan of Care Reviewed With patient  -JJ        Outcome Evaluation OT: pt required CGA with verbal cues to neel/doff slipper socks from chair level. pt required CGA for functional transfers from recliner chair and elevated commode seat.. pt  required CGA and hand held assist for functional mobility x 30 feet in room. pt required cues throughout treatment session for safety and to adhere to NWB status of R UE. continue to rec 24/7 assist/supervision upon discharge 2 decreased safety awareness and cognition  -JJ           Positioning and Restraints    Pre-Treatment Position sitting in chair/recliner  -JJ        Post Treatment Position chair  -JJ        In Chair sitting;call light within reach;with PT;encouraged to call for assist  -JJ           Progress Summary (OT)    Progress Toward Functional Goals (OT) progress toward functional goals as expected  -JJ        Daily Progress Summary (OT) cont poc  -JJ           Therapy Plan Review/Discharge Plan (OT)    Anticipated Discharge Disposition (OT) skilled nursing facility;home with 24/7 care  -JJ              User Key  (r) = Recorded By, (t) = Taken By, (c) = Cosigned By    Initials Name Effective Dates    Elodia Strauss, RN 04/10/20 -     Lashonda Cavazos, OTR 06/16/21 -                  Occupational Therapy Education     Title: PT OT SLP Therapies (In Progress)     Topic: Occupational Therapy (In Progress)     Point: ADL training (Done)     Description:   Instruct learner(s) on proper safety adaptation and remediation techniques during self care or transfers.   Instruct in proper use of assistive devices.              Learning Progress Summary           Patient Acceptance, E,TB, VU,NR by YNES at 12/14/2022 1419    Comment: pt educated on adls and safety with functional transfers and mobility    Acceptance, E,TB,D, NR by SD at 12/13/2022 1352    Comment: Education with compensatory strategies for adl management (one handed)    Acceptance, E, VU by SD at 12/12/2022 1000    Comment: Education provided regarding NWB status/activity restrictions of RUE and impact on daily tasks. Pt verbalized understanding and states she realizes the need for STR at this time.    Acceptance, E,TB,D, VU,NR by SD at  12/11/2022 0846    Comment: Education regarding NWB status and impact on daily tasks. Education regarding safety wtih tranfers/mobility. Pt was impulsive and needed cues for safety.                   Point: Home exercise program (In Progress)     Description:   Instruct learner(s) on appropriate technique for monitoring, assisting and/or progressing therapeutic exercises/activities.              Learning Progress Summary           Patient Acceptance, E,TB,D, NR by SD at 12/13/2022 1351    Comment: Education with rom program of RUE. Pt needs assistance for gentle a/rom of shoulder due to weakness/weight of the splint. Pt independent with hand rom.    Acceptance, E,TB,D, NR by SD at 12/13/2022 1123    Comment: Education with RUE rom program of non-involved joints to address edema and joint stiffness. Pt I with rom of hand. She needs assistance for rom of shoulder due to the weight of the splint.    Acceptance, E,TB,D, NR by SD at 12/12/2022 1001    Comment: Education with gentle arom program of non-involved joints of RUE (hand and shoulder). Pt able perform arom of hand, yet she needs assistance for gentle shoulder rom due to weight of splint.                               User Key     Initials Effective Dates Name Provider Type Discipline    SD 06/16/21 -  Ar Garcia, OTR Occupational Therapist OT    J 06/16/21 -  Lashonda Youssef OTR Occupational Therapist OT                  OT Recommendation and Plan     Progress Toward Functional Goals (OT): progress toward functional goals as expected  Plan of Care Review  Plan of Care Reviewed With: patient  Outcome Evaluation: OT: pt required CGA with verbal cues to neel/doff slipper socks from chair level. pt required CGA for functional transfers from recliner chair and elevated commode seat.. pt required CGA and hand held assist for functional mobility x 30 feet in room. pt required cues throughout treatment session for safety and to adhere to NWB status of R  UE. continue to rec 24/7 assist/supervision upon discharge 2 decreased safety awareness and cognition  Plan of Care Reviewed With: patient  Outcome Evaluation: OT: pt required CGA with verbal cues to neel/doff slipper socks from chair level. pt required CGA for functional transfers from recliner chair and elevated commode seat.. pt required CGA and hand held assist for functional mobility x 30 feet in room. pt required cues throughout treatment session for safety and to adhere to NWB status of R UE. continue to rec 24/7 assist/supervision upon discharge 2 decreased safety awareness and cognition     Outcome Measures     Row Name 12/14/22 1115 12/14/22 1105 12/13/22 1100       How much help from another person do you currently need...    Turning from your back to your side while in flat bed without using bedrails? 3  -BP -- 3  -BP    Moving from lying on back to sitting on the side of a flat bed without bedrails? 3  -BP -- 3  -BP    Moving to and from a bed to a chair (including a wheelchair)? 3  -BP -- 3  -BP    Standing up from a chair using your arms (e.g., wheelchair, bedside chair)? 3  -BP -- 3  -BP    Climbing 3-5 steps with a railing? 3  -BP -- 3  -BP    To walk in hospital room? 3  -BP -- 3  -BP    AM-PAC 6 Clicks Score (PT) 18  -BP -- 18  -BP       How much help from another is currently needed...    Putting on and taking off regular lower body clothing? -- 3  -JJ --    Bathing (including washing, rinsing, and drying) -- 3  -JJ --    Toileting (which includes using toilet bed pan or urinal) -- 3  -JJ --    Putting on and taking off regular upper body clothing -- 3  -JJ --    Taking care of personal grooming (such as brushing teeth) -- 3  -JJ --    Eating meals -- 3  -JJ --    AM-PAC 6 Clicks Score (OT) -- 18  -JJ --       Functional Assessment    Outcome Measure Options AM-PAC 6 Clicks Basic Mobility (PT)  -BP -- AM-PAC 6 Clicks Basic Mobility (PT)  -BP    Row Name 12/12/22 1300             How much help  from another person do you currently need...    Turning from your back to your side while in flat bed without using bedrails? 3  -JW      Moving from lying on back to sitting on the side of a flat bed without bedrails? 3  -JW      Moving to and from a bed to a chair (including a wheelchair)? 3  -JW      Standing up from a chair using your arms (e.g., wheelchair, bedside chair)? 3  -JW      Climbing 3-5 steps with a railing? 3  -JW      To walk in hospital room? 3  -JW      AM-PAC 6 Clicks Score (PT) 18  -JW         Functional Assessment    Outcome Measure Options AM-PAC 6 Clicks Basic Mobility (PT)  -JW            User Key  (r) = Recorded By, (t) = Taken By, (c) = Cosigned By    Initials Name Provider Type    Lashonda Cavazos, OTR Occupational Therapist    Scott Beal, PT Physical Therapist    Clementina Parham, PT Physical Therapist                Time Calculation:    Time Calculation- OT     Row Name 12/14/22 1419 12/14/22 1320          Time Calculation- OT    OT Start Time 1100  -JJ --     OT Stop Time 1114  -JJ --     OT Time Calculation (min) 14 min  -JJ --        Timed Charges    96523 - Gait Training Minutes  -- 10  -BP        Total Minutes    Timed Charges Total Minutes -- 10  -BP      Total Minutes -- 10  -BP           User Key  (r) = Recorded By, (t) = Taken By, (c) = Cosigned By    Initials Name Provider Type    Lashonda Cavazos OTR Occupational Therapist    Scott Beal, PT Physical Therapist              Therapy Charges for Today     Code Description Service Date Service Provider Modifiers Qty    [id number]  OT SELF CARE/MGMT/TRAIN EA 15 MIN 12/14/2022 Lashonda Youssef OTR GO 1               KIM Hinton  12/14/2022

## 2022-12-14 NOTE — PLAN OF CARE
Goal Outcome Evaluation:  Plan of Care Reviewed With: patient        Progress: improving  Outcome Evaluation: POD # 4 for left elbow ORIF, c/o pain and po medication given. RUE elevated. Tele: Zena (110-150's) extra po dose of cardizem given. plan for patient to go to rehab facility.

## 2022-12-14 NOTE — THERAPY TREATMENT NOTE
Acute Care - Speech Language Pathology Treatment Note  CHERRIE Bennett     Patient Name: Kaylin Ojeda  : 1940  MRN: [id number]  Today's Date: 2022               Admit Date: 2022     Visit Dx:    ICD-10-CM ICD-9-CM   1. Type I or II open fracture of olecranon process of right ulna, initial encounter  S52.021B 813.11   2. Closed head injury, initial encounter  S09.90XA 959.01   3. Other chronic back pain  M54.9 724.5    G89.29 338.29   4. 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[facility])   • Obstruction of carotid artery   • Peripheral arterial occlusive disease ([facility])   • Impaired glucose tolerance   • Difficulty sleeping   • Tobacco dependence syndrome   • Iron deficiency anemia   • Vitamin B12 deficiency anemia due to intrinsic factor deficiency   • Acquired hypothyroidism   • Thoracoabdominal aortic aneurysm   • Abdominal bloating   • Prediabetes   • Sleep difficulties   • Chronic constipation   • PAF (paroxysmal atrial fibrillation) ([facility])   • Moderate single current episode of major depressive disorder ([facility])   • Chronic low back pain without sciatica   • Urge incontinence   • Severe mitral regurgitation   • Severe tricuspid regurgitation   • Moderate aortic valve regurgitation   • Underweight   • DDD (degenerative disc disease), lumbar   • Atrial flutter with rapid ventricular response ([facility])   • Urinary tract infection without hematuria   • Malaise and fatigue   • Type I or II open fracture of right olecranon process   • Type I or II open fracture of olecranon process of right ulna, initial encounter   • Severe  malnutrition (HCC)     Past Medical History:   Diagnosis Date   • A-fib (formerly [facility])    • Abdominal pain, epigastric     Chronic, unclear cause, improved   • Anorexia    • Anxiety    • Arthritis    • CAD (coronary artery disease)     Cath 5/2015: nonobstructive LAD/RCA disease, 90% mid LCx s/p [id number] Xience.  Cath 1/2021: 50-60% prox LAD/70-80% distal LAD, patent Cx stent, 20% RCA   • Cellulitis of leg    • Cervical disc disease    • Chronic fatigue    • Colitis    • COPD (chronic obstructive pulmonary disease) (formerly [facility])    • Depression    • Erosive gastritis    • Fibromyalgia    • Frequency of urination 06/09/2017   • Gastritis    • Hypercholesterolemia 02/02/2016   • Hyperglycemia    • Hypertension     History of refractory hypertension which improved significantly   • Insomnia    • Leukocytes in urine    • Lower back pain    • Mediastinal lymphadenopathy    • 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formerly [facility])    • Mononucleosis    • Occlusion and stenosis of carotid artery    • Onychomycosis    • Orbit fracture (formerly [facility])    • PAF (paroxysmal atrial fibrillation) (formerly [facility])    • Peripheral arterial disease (formerly [facility])     Significant (carotid, subclavian, renal arteries, lower extremities), with claudication, medical therapy only   • Pernicious anemia    • Prediabetes    • Renal artery stenosis (formerly [facility])     unilateral, with renal atrophy (no intervention required)   • Renal calculi    • Sinus bradycardia     mild, asymptomatic   • TIA (transient ischemic attack)    • UTI (urinary tract infection)    • Valvular heart disease     1/2021: mod-severe AI, mod MR, mild-mod TR   • Vision problem    • Vitamin B 12 deficiency      Past Surgical History:   Procedure Laterality Date   • APPENDECTOMY     • BREAST BIOPSY Left     20+ yrs ago   • BREAST SURGERY     • CARDIAC CATHETERIZATION  05/2015    nonobs LAD/RCA disease, 90% mid LCx s/p [id number] Xience   • CARDIAC CATHETERIZATION N/A 10/28/2020    Procedure: Right and Left Heart Cath;  Surgeon: Opal Contreras MD;   Location:  [facility];  Service: Cardiovascular;  Laterality: N/A;  for cardiac testing prior to possible valve surgery per Dr. Mai   • CARDIAC CATHETERIZATION N/A 10/28/2020    Procedure: Coronary angiography;  Surgeon: Opal Contreras MD;  Location:  [facility];  Service: Cardiovascular;  Laterality: N/A;   • CERVICAL FUSION  1997   • CHOLECYSTECTOMY     • CORONARY STENT PLACEMENT     • HYSTERECTOMY  1995   • KNEE SURGERY Right 2005   • KNEE SURGERY Left 1998   • ORIF HUMERUS FRACTURE Right 12/10/2022    Procedure: ELBOW OPEN REDUCTION INTERNAL FIXATION;  Surgeon: Lito Reeder MD;  Location:  [facility];  Service: Orthopedics;  Laterality: Right;       SLP Recommendation and Plan                 Anticipated Discharge Disposition (SLP): skilled nursing facility, anticipate therapy at next level of care (12/14/22 1130)  Demonstrates Need for Referral to Another Service: neuropsychology services, neurology (12/14/22 1130)     Predicted Duration Therapy Intervention (Days): 3 days (12/14/22 1130)  SLC Diagnostic Follow-Up Needed: cognitive-linguistic (12/14/22 1130)  Daily Summary of Progress (SLP): progress towards functional goals is fair (12/14/22 1130)     Patient/Family Concerns, Anticipated Discharge Disposition (SLP): Pt continues to report that she feels she will be okay to return home with home health. (12/14/22 1130)  Communication Strategy Suggestions: eliminate distractions when speaking with patient, avoid multi-step instructions (12/14/22 1130)  Treatment Assessment (SLP): improved, moderate, cognitive-linguistic disorder (12/14/22 1130)  Treatment Assessment Comments (SLP): Pt demonstrates improvement in sustained attention and verbal problem solving. She continues to report strange things at night that do not appear to be fully accurate/her perception of what is happening is not accurate. Concerns for sundowning and confusion during night increasing safety  risks. Continue to recommend SNF at discharge for continued therapy. (12/14/22 1130)  Plan for Continued Treatment (SLP): continue treatment per plan of care (12/14/22 1130)         SLP EVALUATION (last 72 hours)     SLP SLC Evaluation     Row Name 12/14/22 1130 12/13/22 0996 12/12/22 3280             Communication Assessment/Intervention    Document Type therapy note (daily note)  -AD therapy note (daily note)  -AD evaluation  -AD      Subjective Information no complaints  -AD no complaints  -AD --      Patient Observations alert;cooperative;agree to therapy  -AD alert;cooperative;agree to therapy  -AD --      Patient/Family/Caregiver Comments/Observations Pr seen reclined in recliner.  -AD Pt seen reclined in bed for part of session and sitting on side of bed for part of the session. Alert, cooperative  -AD --      Patient Effort good  -AD good  -AD --      Symptoms Noted During/After Treatment none  -AD none  -AD --         General Information    Patient Profile Reviewed yes  -AD yes  -AD --      Pertinent History Of Current Problem -- -- Cognitive eval ordered due to pt report of memory issues and concerns per hospitalist. Hx from admission per Dr. Love as follows: Patient is an 82-year-old female who presented to the emergency department secondary to accidental fall at home while bringing in groceries 2 days ago.  She notes 3 separate falls, 2 while still in garage and then 1 more when she got inside her house.  She does not remember feeling dizzy or having prior noticed that she would fall.  She also notes right lower rib area pain, bilateral knee pain and bruising. She reports that she had been ill approximately 1 week ago with nausea, vomiting, diarrhea and general malaise that she attributed to influenza that her entire family had.  She notes she has had very poor intake in the last week and a half with these symptoms and she chronically has low intake.  She is known to hospitalist service from last  admission 9/28/2022 secondary to A. fib RVR on.  She has a known history of PAF on Eliquis, CAD/HLD s/p stent, moderate to severe AR, mild to moderate MR, mild-moderate TR, vasculopathy with PAD/mesenteric artery stenosis, thoracoabdominal aortic aneurysm, carotid artery stenosis, Multi valvular disease, hypertension, GERD, COPD, severe malnutrition, tobacco abuse, anxiety/depression.  ER provider contacted Dr. Reeder who requested hospitalist admission for tomorrow am surgical repair of comminuted and displaced intra-articular fracture of the proximal ulna, see full report in epic.  Troponin negative, hemoglobin 11.3, sodium 134, EKG shows A. Fib.  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  -AD      Prior Level of Function-Communication -- -- cognitive-linguistic impairment  reported per pt, no level or prior testing available  -AD      Plans/Goals Discussed with -- -- patient;agreed upon  -AD      Barriers to Rehab -- -- previous functional deficit;cognitive status;physical barrier  -AD      Patient's Goals for Discharge -- -- other (see comments)  states would like to go home but understanding if she needs rehab before returning home.  -AD      Standardized Assessment Used -- -- SLUMS;other (see comments)  informal measures as well  -AD         Pain    Additional Documentation --  no pain reported, but states not feeling as good today as yesterday.  -AD --  no current pain reported  -AD --  no pain was reported by this pt. Appears uncomfortable with movement at times.  -AD         Comprehension Assessment/Intervention    Comprehension Assessment/Intervention -- -- Auditory Comprehension  -AD         Auditory Comprehension Assessment/Intervention    Auditory  Comprehension (Communication) -- -- mild impairment  -AD      Able to Identify Objects/Pictures (Communication) -- -- body part;familiar objects;[facility]      Answers Questions (Communication) -- -- yes/no;wh questions;personal;simple;[facility]  -AD      Able to Follow Commands (Communication) -- -- 1-step;WFL;2-step;mild impairment  1/2 for 2 step commands  -AD      Narrative Discourse -- -- simple paragraph level;severe impairment  -AD         Expression Assessment/Intervention    Expression Assessment/Intervention -- -- verbal expression  -AD         Verbal Expression Assessment/Intervention    Verbal Expression -- -- mild impairment  -AD      Automatic Speech (Communication) -- -- response to greeting;[facility]  -      Repetition -- -- words;[facility]      Spontaneous/Functional Words -- -- simple;WFL;complex;mild impairment;moderate impairment;delayed responses;circumlocution  -AD      Conversational Discourse/Fluency -- -- mild impairment;moderate impairment;delayed responses;circumlocution  -AD         Oral Motor Structure and Function    Oral Motor Structure and Function -- -- [facility]      Dentition Assessment -- -- natural, present and adequate  -AD      Mucosal Quality -- -- moist, healthy  -AD         Oral Musculature and Cranial Nerve Assessment    Oral Motor General Assessment -- -- [facility]  -         Motor Speech Assessment/Intervention    Motor Speech Function -- -- [facility]  -      Conversational Speech (Communication) -- -- simple;moderate complexity;[facility]  -      Motor Speech, Comment -- -- Precise articulation at the conversational level. No issues w/phonation, respiration or voice noted.  -AD         Cursory Voice Assessment/Intervention    Quality and Resonance (Voice) -- -- [facility]  -AD         Cognitive Assessment Intervention- SLP    Cognitive Function (Cognition) -- -- moderate impairment  at least moderate  -AD      Orientation Status (Cognition) -- -- awareness of basic personal  information;person;place;time;situation;WFL;mild impairment  Able to provide name, , but off on age by one year. Able to state city and state, but needed prompt for being in the hospital.  Aware of her current situation and generally to the time. Knows it is  but states it is the 13 or 14.  -AD      Memory (Cognitive) -- -- WFL;simple;immediate;mod-complex;functional;short-term;delayed;related;unrelated;mental manipulation;moderate impairment  -AD      Attention (Cognitive) -- -- selective;sustained;WFL;alternating;divided;attention to detail;quiet environment;moderate impairment  -AD      Thought Organization (Cognitive) -- -- concrete divergent;mild impairment;mental manipulation;moderate impairment;severe impairment  -AD      Problem Solving (Cognitive) -- -- simple;mild impairment  -AD      Functional Math (Cognitive) -- -- simple;money calculation;severe impairment  -AD      Executive Function (Cognition) -- -- realistic goal setting;deficit awareness;judgement;self-monitoring/correction;home management activities;moderate impairment;severe impairment  -AD      Pragmatics (Communication) -- -- affect;initiation;eye contact;turn taking;WFL;topic maintenance;mild impairment  -AD         Standardized Tests    Cognitive/Memory Tests -- -- SLUMS: [facility] Mental Status Examination  -AD         SLUMS: [facility] Mental Status Examination    SLUMS Score -- -- 8  -AD      SLUMS Range -- -- 1-20: Dementia (High school education or higher)  -AD      SLUMS Comments -- -- Pt demonstrates at least moderate deficits in cognitive functioning. She demonstrates ability to follow 1 step commands, but not 2 step commands. Personal yes/no was WFL, working memory for rote repeating of numbers up to 6 digits and repeating words up to 5 WFL, but cannot mentally hold and manipulate for calculations or delayed recall. Limitations in problem solving noted as well with lack of detail and  tangential responses at times. She is generally oriented and off by the date by 1-2 days. She reports her age as 84 but will not be 84 until next February 2023. Unable to complete math calcuations for simple addition and subtraction. Category naming revealed pt providing the names of her previous animals versus different types of animals, however she was able to name 16 different with 3 repetitions. Visual spatial and executive functions both w/deficits including recognition of shapes and sizes and clock drawing with 13 hour markers and hands of equal length. Pt with difficulty using her right hand, but was also unable to correctly state the long and short hand with equal hands being on the 10 and 12. Pt was unable to respond to any questions about a paragraph read to her. Immediate memory was relatively intact but even short delay of 1-3 minutes affects performance. Pt reports that she lives alone and cares for her 13 year old granddaugter at times. She reports her child and granddaughter live next door to her. She goes back and forth as to being able to manage all of her care herself to having difficulty with performance even prior to this admission and fall. Overall score on [facility] SLUMS is 8 out of 30 for a high school educated person placing her in the dementia category. Pt with many risk factors for further cognitive decline, including but not limited to, current hospitalization w/use of narcotics and chronic narcotic dependence, hx of depression/anxiety, prior vascular disease reported, recent fall and prior falls, physical decline and decreased socialization. RECOMMEND: Further therapy while in hospital and at next level of care. Pt will need frequent supervision throughout the day and recommend others manage higher level household management including but not limited to finances, medications and traveling/appointments. Pt would benefit from further care at skilled nursing facility for both physical and  cognitive problems. Pt would also benefit from further testing/diagnosis via neuropyschology or neurology.  -AD         SLP Evaluation Clinical Impressions    SLP Diagnosis -- -- moderate;cognitive-linguistic disorder  at least moderate  -AD      SLP Diagnosis Comments -- -- Pt with concerns for medication effects and depression currently compounding her current cognitive concerns. Pt also with report of chronic cognitive decline in past  along with physical decline in the past year.  -AD      Rehab Potential/Prognosis -- -- fair  -AD      SLC Criteria for Skilled Therapy Interventions Met -- -- yes  -AD      Functional Impact -- -- functional impact in ADLs;need frequent supervision;needs 24 hour supervision;difficulty communicating in an emergency;difficulty in expressing complex messages;restrictions in personal and social life;decreased ability to respond to situations safely;difficulty completing home management task  -AD         SLP Treatment Clinical Impressions    Treatment Assessment (SLP) improved;moderate;cognitive-linguistic disorder  -AD continued;moderate;cognitive-linguistic disorder  -AD --      Treatment Assessment Comments (SLP) Pt demonstrates improvement in sustained attention and verbal problem solving. She continues to report strange things at night that do not appear to be fully accurate/her perception of what is happening is not accurate. Concerns for sundowning and confusion during night increasing safety risks. Continue to recommend SNF at discharge for continued therapy.  -AD Pt tolerating treatment and demonstrates limited awareness of impact her cognitive deficits have on her daily care needs. Pt with some demonstration of mild paranoia regarding what is going on with her care in the hospital and reports that her son and daughter in law are too controlling when she tried to live with them. She demonstrates fair judgement with regards to simple problem solving activities but deficits with  more complex problem solving. She can verbalize the need for more assistance at home, but not sure how to go about getting it at this time.  -AD --      Daily Summary of Progress (SLP) progress towards functional goals is fair  -AD progress toward functional goals is gradual  -AD --      Barriers to Overall Progress (SLP) Baseline deficits  -AD Baseline deficits  -AD --      Plan for Continued Treatment (SLP) continue treatment per plan of care  -AD continue treatment per plan of care  -AD --      Care Plan Review care plan/treatment goals reviewed;risks/benefits reviewed;current/potential barriers reviewed;patient/other agree to care plan  -AD evaluation/treatment results reviewed;care plan/treatment goals reviewed;risks/benefits reviewed;current/potential barriers reviewed;patient/other agree to care plan  -AD --         Recommendations    Therapy Frequency (SLP SLC) 3 days per week  -AD 3 days per week  -AD 3 days per week  -AD      Predicted Duration Therapy Intervention (Days) 3 days  -AD 3 days  -AD 3 days  -AD      Anticipated Discharge Disposition (SLP) skilled nursing facility;anticipate therapy at next level of care  -AD skilled nursing facility;anticipate therapy at next level of care  -AD skilled nursing facility;anticipate therapy at next level of care  -AD      Patient/Family Concerns, Anticipated Discharge Disposition (SLP) Pt continues to report that she feels she will be okay to return home with home health.  -AD No concerns reported at this time. Pt just stating she wants to know where she will be discharged. SLP explaining case management is working on this but will need to hear from her insurance prior to finalizing any discharge plan.  -AD Pt does report concerns with being able to manage her care at home and the care for her granddaughter as well due to both physical and cognitive concerns.  -AD      Demonstrates Need for Referral to Another Service neuropsychology services;neurology  -AD  neuropsychology services;neurology  -AD neuropsychology services;neurology  -AD      Communication Strategy Suggestions eliminate distractions when speaking with patient;avoid multi-step instructions  -AD eliminate distractions when speaking with patient;avoid multi-step instructions  -AD eliminate distractions when speaking with patient;avoid multi-step instructions  -AD      SLC Diagnostic Follow-Up Needed cognitive-linguistic  -AD cognitive-linguistic  -AD cognitive-linguistic  -AD         Communication Treatment Objective and Progress Goals (SLP)    SLC LTGs -- -- Communication Long-Term Goal  -AD      Cognitive Linguistic Treatment Objectives -- -- Cognitive Linguistic Treatment Objectives (Group)  -AD         Communication Long-Term Goal     [place] with moderate cues  -AD with moderate cues  -AD with moderate cues  -AD      Time frame by discharge  3 days  -AD by discharge  3 days  -AD by discharge  3 days  -AD      Barriers fatigue;cognitive status;advanced age  -AD fatigue;cognitive status;advanced age  -AD fatigue;cognitive status;advanced age  -AD      Progress/Outcomes continuing progress toward goal;goal ongoing  -AD progress slower than expected;goal ongoing  -AD new goal  -AD         Cognitive Linguistic Treatment Objectives    Attention Selection -- -- attention, SLP goal 1  -AD      Memory Skills Selection -- -- memory skills, SLP goal 1  -AD      Problem Solving Selection -- -- problem solving, SLP goal 1  -AD      Functional Math Skills Selection -- -- functional math skills, SLP goal 1  -AD         Attention Goal 1 (SLP)    Improve Attention by Goal 1 (SLP) complete sustained attention task;70%;with moderate cues (50-74%)  -AD complete sustained attention task;70%;with moderate cues (50-74%)  -AD complete sustained attention task;70%;with moderate cues (50-74%)  -AD      Time Frame (Attention Goal 1, SLP) short term goal (STG);3 days;by discharge  -AD short term goal (STG);3 days;by  discharge  -AD short term goal (STG);3 days;by discharge  -AD      Barriers (Attention Goal 1, SLP) fatigue;cognitive status;advanced age;  -AD fatigue;cognitive status;advanced age;  -AD fatigue;cognitive status;advanced age;  -AD      Progress (Attention Goal 1, SLP) 70%;with moderate cues (50-74%)  -AD other (comment)  -AD other (comment)  -AD      Progress/Outcomes (Attention Goal 1, SLP) continuing progress toward goal;goal ongoing  -AD new goal  -AD new goal  -AD      Comment (Attention Goal 1, SLP) -- Not targeted this session.  -AD --         Memory Skills Goal 1 (SLP)    Improve Memory Skills Through Goal 1 (SLP) listen to a paragraph and answer questions;use memory strategies;70%;with moderate cues (50-74%)  -AD listen to a paragraph and answer questions;use memory strategies;70%;with moderate cues (50-74%)  -AD listen to a paragraph and answer questions;use memory strategies;70%;with moderate cues (50-74%)  -AD      Time Frame (Memory Skills Goal 1, SLP) short term goal (STG);3 days;by discharge  -AD short term goal (STG);3 days;by discharge  -AD short term goal (STG);3 days;by discharge  -AD      Barriers (Memory Skills Goal 1, SLP) fatigue;cognitive status;advanced age;  -AD fatigue;cognitive status;advanced age;  -AD fatigue;cognitive status;advanced age;  -AD      Progress (Memory Skills Goal 1, SLP) other (comment)  -AD other (comment)  -AD other (comment)  -AD      Progress/Outcomes (Memory Skills Goal 1, SLP) new goal  -AD new goal  -AD new goal  -AD      Comment (Memory Skills Goal 1, SLP) Goal not targeted during this session.  -AD Goal not targeted during this session.  -AD --         Functional Problem Solving Skills Goal 1 (SLP)    Improve Problem Solving Through Goal 1 (SLP) determine solutions to simple ADL/safety problems;70%;with moderate cues (50-74%)  -AD determine solutions to simple ADL/safety problems;70%;with moderate cues (50-74%)  -AD determine solutions to simple ADL/safety  problems;70%;with moderate cues (50-74%)  -AD      Time Frame (Problem Solving Goal 1, SLP) short term goal (STG);3 days;by discharge  -AD short term goal (STG);3 days;by discharge  -AD short term goal (STG);3 days;by discharge  -AD      Barriers (Problem Solving Goal 1, SLP) fatigue;cognitive status;advanced age;  -AD fatigue;cognitive status;advanced age;  -AD fatigue;cognitive status;advanced age;  -AD      Progress (Problem Solving Goal 1, SLP) 70%;80%;with minimal cues (75-90%)  75%  -AD 60%;with moderate cues (50-74%)  -AD other (comment)  -AD      Progress/Outcomes (Problem Solving Goal 1, SLP) continuing progress toward goal;goal ongoing  -AD progress slower than expected;goal ongoing  -AD new goal  -AD      Comment (Problem Solving Goal 1, SLP) -- Pt able to provide verbal solutions to simple ADL problems with 60%. Lack of detail or planning noted to provide complete and accurate solutions.  -AD --         Functional Math Skills Goal 1 (SLP)    Improve Functional Math Skills Through Goal 1 (SLP) complete functional math task;calculator assisted;70%;with moderate cues (50-74%)  -AD complete functional math task;calculator assisted;70%;with moderate cues (50-74%)  -AD complete functional math task;calculator assisted;70%;with moderate cues (50-74%)  -AD      Time Frame (Functional Math Skills Goal 1, SLP) short term goal (STG);3 days;by discharge  -AD short term goal (STG);3 days;by discharge  -AD short term goal (STG);3 days;by discharge  -AD      Barriers (Functional Math Skills Goal 1, SLP) fatigue;cognitive status;advanced age;  -AD fatigue;cognitive status;advanced age;  -AD fatigue;cognitive status;advanced age;  -AD      Progress (Functional Math Skills Goal 1, SLP) other (comment)  -AD other (comment)  -AD other (comment)  -AD      Progress/Outcomes (Functional Math Skills Goal 1, SLP) new goal  -AD new goal  -AD new goal  -AD      Comment (Functional Math Skills Goal 1, SLP) Goal not targeted this  session.  -AD Goal not targeted this session. Results of SLUMS provided and pt in agreement that she will need some help with managing her finances in the future.  -AD --            User Key  (r) = Recorded By, (t) = Taken By, (c) = Cosigned By    Initials Name Effective Dates    Ina Wray, MS CCC-SLP 06/16/21 -                    EDUCATION  The patient has been educated in the following areas:     Cognitive Impairment Communication Impairment. Pt verbalizes understanding. Continued reinforcement needed for full insight into deficits/needs.            SLP GOALS     Row Name 12/14/22 1130 12/13/22 0930 12/12/22 1620       Attention Goal 1 (SLP)    Improve Attention by Goal 1 (SLP) complete sustained attention task;70%;with moderate cues (50-74%)  -AD complete sustained attention task;70%;with moderate cues (50-74%)  -AD complete sustained attention task;70%;with moderate cues (50-74%)  -AD    Time Frame (Attention Goal 1, SLP) short term goal (STG);3 days;by discharge  -AD short term goal (STG);3 days;by discharge  -AD short term goal (STG);3 days;by discharge  -AD    Barriers (Attention Goal 1, SLP) fatigue;cognitive status;advanced age;  -AD fatigue;cognitive status;advanced age;  -AD fatigue;cognitive status;advanced age;  -AD    Progress (Attention Goal 1, SLP) 70%;with moderate cues (50-74%)  -AD other (comment)  -AD other (comment)  -AD    Progress/Outcomes (Attention Goal 1, SLP) continuing progress toward goal;goal ongoing  -AD new goal  -AD new goal  -AD    Comment (Attention Goal 1, SLP) -- Not targeted this session.  -AD --       Memory Skills Goal 1 (SLP)    Improve Memory Skills Through Goal 1 (SLP) listen to a paragraph and answer questions;use memory strategies;70%;with moderate cues (50-74%)  -AD listen to a paragraph and answer questions;use memory strategies;70%;with moderate cues (50-74%)  -AD listen to a paragraph and answer questions;use memory strategies;70%;with moderate cues  (50-74%)  -AD    Time Frame (Memory Skills Goal 1, SLP) short term goal (STG);3 days;by discharge  -AD short term goal (STG);3 days;by discharge  -AD short term goal (STG);3 days;by discharge  -AD    Barriers (Memory Skills Goal 1, SLP) fatigue;cognitive status;advanced age;  -AD fatigue;cognitive status;advanced age;  -AD fatigue;cognitive status;advanced age;  -AD    Progress (Memory Skills Goal 1, SLP) other (comment)  -AD other (comment)  -AD other (comment)  -AD    Progress/Outcomes (Memory Skills Goal 1, SLP) new goal  -AD new goal  -AD new goal  -AD    Comment (Memory Skills Goal 1, SLP) Goal not targeted during this session.  -AD Goal not targeted during this session.  -AD --       Functional Problem Solving Skills Goal 1 (SLP)    Improve Problem Solving Through Goal 1 (SLP) determine solutions to simple ADL/safety problems;70%;with moderate cues (50-74%)  -AD determine solutions to simple ADL/safety problems;70%;with moderate cues (50-74%)  -AD determine solutions to simple ADL/safety problems;70%;with moderate cues (50-74%)  -AD    Time Frame (Problem Solving Goal 1, SLP) short term goal (STG);3 days;by discharge  -AD short term goal (STG);3 days;by discharge  -AD short term goal (STG);3 days;by discharge  -AD    Barriers (Problem Solving Goal 1, SLP) fatigue;cognitive status;advanced age;  -AD fatigue;cognitive status;advanced age;  -AD fatigue;cognitive status;advanced age;  -AD    Progress (Problem Solving Goal 1, SLP) 70%;80%;with minimal cues (75-90%)  75%  -AD 60%;with moderate cues (50-74%)  -AD other (comment)  -AD    Progress/Outcomes (Problem Solving Goal 1, SLP) continuing progress toward goal;goal ongoing  -AD progress slower than expected;goal ongoing  -AD new goal  -AD    Comment (Problem Solving Goal 1, SLP) -- Pt able to provide verbal solutions to simple ADL problems with 60%. Lack of detail or planning noted to provide complete and accurate solutions.  -AD --       Functional Math Skills  Goal 1 (SLP)    Improve Functional Math Skills Through Goal 1 (SLP) complete functional math task;calculator assisted;70%;with moderate cues (50-74%)  -AD complete functional math task;calculator assisted;70%;with moderate cues (50-74%)  -AD complete functional math task;calculator assisted;70%;with moderate cues (50-74%)  -AD    Time Frame (Functional Math Skills Goal 1, SLP) short term goal (STG);3 days;by discharge  -AD short term goal (STG);3 days;by discharge  -AD short term goal (STG);3 days;by discharge  -AD    Barriers (Functional Math Skills Goal 1, SLP) fatigue;cognitive status;advanced age;  -AD fatigue;cognitive status;advanced age;  -AD fatigue;cognitive status;advanced age;  -AD    Progress (Functional Math Skills Goal 1, SLP) other (comment)  -AD other (comment)  -AD other (comment)  -AD    Progress/Outcomes (Functional Math Skills Goal 1, SLP) new goal  -AD new goal  -AD new goal  -AD    Comment (Functional Math Skills Goal 1, SLP) Goal not targeted this session.  -AD Goal not targeted this session. Results of SLUMS provided and pt in agreement that she will need some help with managing her finances in the future.  -AD --       Communication Long-Term Goal     [place] with moderate cues  -AD with moderate cues  -AD with moderate cues  -AD    Time frame by discharge  3 days  -AD by discharge  3 days  -AD by discharge  3 days  -AD    Barriers fatigue;cognitive status;advanced age  -AD fatigue;cognitive status;advanced age  -AD fatigue;cognitive status;advanced age  -AD    Progress/Outcomes continuing progress toward goal;goal ongoing  -AD progress slower than expected;goal ongoing  -AD new goal  -AD          User Key  (r) = Recorded By, (t) = Taken By, (c) = Cosigned By    Initials Name Provider Type    AD Ina Garcia MS CCC-SLP Speech and Language Pathologist                        Time Calculation:      Time Calculation- SLP     Row Name 12/14/22 3515             Time Calculation- SLP     SLP Start Time 1130  -AD      SLP Stop Time 1159  -AD      SLP Time Calculation (min) 29 min  -AD      Total Timed Code Minutes- SLP 0 minute(s)  -AD      SLP Non-Billable Time (min) 0 min  -AD      SLP Received On 12/14/22  -AD         Untimed Charges    85257-KG Treatment/ST Modification Prosth Aug Alter  29  -AD         Total Minutes    Untimed Charges Total Minutes 29  -AD       Total Minutes 29  -AD            User Key  (r) = Recorded By, (t) = Taken By, (c) = Cosigned By    Initials Name Provider Type    AD Ina Garcia, MS CCC-SLP Speech and Language Pathologist                Therapy Charges for Today     Code Description Service Date Service Provider Modifiers Qty    65836461671 HC ST TREATMENT SPEECH 2 12/13/2022 Ina Garcia MS CCC-SLP GN 1    29948586797 HC ST TREATMENT SPEECH 2 12/14/2022 Ina Garcia MS CCC-SLP GN 1                     Ina Garcia MS CCC-SLP  12/14/2022

## 2022-12-15 NOTE — TELEPHONE ENCOUNTER
Rx Refill Note  Requested Prescriptions     Pending Prescriptions Disp Refills    FLUoxetine (PROzac) 20 MG capsule [Pharmacy Med Name: FLUOXETINE 20MG] 30 capsule 0     Sig: TAKE 1 CAPSULE BY MOUTH DAILY.      Last office visit with prescribing clinician: 10/7/2022   Last telemedicine visit with prescribing clinician: Visit date not found   Next office visit with prescribing clinician: Visit date not found                         Would you like a call back once the refill request has been completed: [] Yes [] No    If the office needs to give you a call back, can they leave a voicemail: [] Yes [] No    Lanie Hughes MA  12/15/22, 14:49 EST

## 2022-12-15 NOTE — CASE MANAGEMENT/SOCIAL WORK
CM called and spoke with Marilee at [facility] to see if they have obtained pre cert for patient and she states they check at 8:00 this morning and it is still pending. CM received call from patient's daughter in law Francine Rosenthal asking about pre cert and I informed her that the facility has not heard back from insurance and she verbalized understanding. CM will follow.Continued Stay Note  CHERRIE Bennett     Patient Name: Kaylin Ojeda  MRN: [id number]  Today's Date: 12/15/2022    Admit Date: 12/9/2022    Plan: STR vs home   Discharge Plan    No documentation.                Discharge Codes    No documentation.                     Jeanette Kelly, RN

## 2022-12-15 NOTE — PLAN OF CARE
Goal Outcome Evaluation:  Plan of Care Reviewed With: patient        Progress: improving  Outcome Evaluation: POD#5 right elbow ORIF, RUE elevated. ace wrap C/D/I. scheuled pain medication given. plan for [facility] to go to [facility].

## 2022-12-15 NOTE — THERAPY TREATMENT NOTE
Patient Name: Kaylin Ojeda  : 1940    MRN: [id number]                              Today's Date: 12/15/2022       Admit Date: 2022    Visit Dx:     ICD-10-CM ICD-9-CM   1. Type I or II open fracture of olecranon process of right ulna, initial encounter  S52.021B 813.11   2. Closed head injury, initial encounter  S09.90XA 959.01   3. Other chronic back pain  M54.9 724.5    G89.29 338.29   4. 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[facility])   • Obstruction of carotid artery   • Peripheral arterial occlusive disease ([facility])   • Impaired glucose tolerance   • Difficulty sleeping   • Tobacco dependence syndrome   • Iron deficiency anemia   • Vitamin B12 deficiency anemia due to intrinsic factor deficiency   • Acquired hypothyroidism   • Thoracoabdominal aortic aneurysm   • Abdominal bloating   • Prediabetes   • Sleep difficulties   • Chronic constipation   • PAF (paroxysmal atrial fibrillation) ([facility])   • Moderate single current episode of major depressive disorder ([facility])   • Chronic low back pain without sciatica   • Urge incontinence   • Severe mitral regurgitation   • Severe tricuspid regurgitation   • Moderate aortic valve regurgitation   • Underweight   • DDD (degenerative disc disease), lumbar   • Atrial flutter with rapid ventricular response (HCC)   • Urinary tract infection without hematuria   • Malaise and fatigue   • Type I or II open fracture of right olecranon process   • Type I or II open fracture of olecranon process of right ulna, initial encounter   • Severe malnutrition ([facility])     Past Medical History:   Diagnosis  Date   • A-fib ([facility])    • Abdominal pain, epigastric     Chronic, unclear cause, improved   • Anorexia    • Anxiety    • Arthritis    • CAD (coronary artery disease)     Cath 5/2015: nonobstructive LAD/RCA disease, 90% mid LCx s/p [id number] Xience.  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[facility])    • Mononucleosis    • Occlusion and stenosis of carotid artery    • Onychomycosis    • Orbit fracture ([facility])    • PAF (paroxysmal atrial fibrillation) ([facility])    • Peripheral arterial disease ([facility])     Significant (carotid, subclavian, renal arteries, lower extremities), with claudication, medical therapy only   • Pernicious anemia    • Prediabetes    • Renal artery stenosis ([facility])     unilateral, with renal atrophy (no intervention required)   • Renal calculi    • Sinus bradycardia     mild, asymptomatic   • TIA (transient ischemic attack)    • UTI (urinary tract infection)    • Valvular heart disease     1/2021: mod-severe AI, mod MR, mild-mod TR   • Vision problem    • Vitamin B 12 deficiency      Past Surgical History:   Procedure Laterality Date   • APPENDECTOMY     • BREAST BIOPSY Left     20+ yrs ago   • BREAST SURGERY     • CARDIAC CATHETERIZATION  05/2015    nonobs LAD/RCA disease, 90% mid LCx s/p 2.64p64nk Xience   • CARDIAC CATHETERIZATION N/A 10/28/2020    Procedure: Right and Left Heart Cath;  Surgeon: Opal Contreras MD;  Location: [facility] CATH INVASIVE LOCATION;  Service:  Cardiovascular;  Laterality: N/A;  for cardiac testing prior to possible valve surgery per Dr. Mai   • CARDIAC CATHETERIZATION N/A 10/28/2020    Procedure: Coronary angiography;  Surgeon: Opal Contreras MD;  Location:  [facility] CATH INVASIVE LOCATION;  Service: Cardiovascular;  Laterality: N/A;   • CERVICAL FUSION  1997   • CHOLECYSTECTOMY     • CORONARY STENT PLACEMENT     • HYSTERECTOMY  1995   • KNEE SURGERY Right 2005   • KNEE SURGERY Left 1998   • ORIF HUMERUS FRACTURE Right 12/10/2022    Procedure: ELBOW OPEN REDUCTION INTERNAL FIXATION;  Surgeon: Lito Reeder MD;  Location:  [facility] OR;  Service: Orthopedics;  Laterality: Right;      General Information     Row Name 12/15/22 1205          OT Time and Intention    Document Type therapy note (daily note)  -SD     Mode of Treatment occupational therapy  -SD     Row Name 12/15/22 1205          General Information    Existing Precautions/Restrictions non-weight bearing;fall  NWB of RUE  -SD     Barriers to Rehab cognitive status  -SD           User Key  (r) = Recorded By, (t) = Taken By, (c) = Cosigned By    Initials Name Provider Type    Ar Acharya OTR Occupational Therapist                 Mobility/ADL's     Row Name 12/15/22 1206          Bed Mobility    Bed Mobility supine-sit  -SD     Supine-Sit [place] (Bed Mobility) supervision  -SD     Row Name 12/15/22 1206          Mobility    Extremity Weight-bearing Status right upper extremity  -SD     Right Upper Extremity (Weight-bearing Status) non weight-bearing (NWB)  -SD     Row Name 12/15/22 1206          Bathing Assessment/Intervention    Comment, (Bathing) Education provided regarding NWB status/activity restrictions of RUE and impact on daily tasks. Pt verbalizes understanding that she needs assist for IADL activity (laundry, HH management, cooking)  -SD           User Key  (r) = Recorded By, (t) = Taken By, (c) = Cosigned By    Initials Name Provider Type    Ar Acharya OTR  Occupational Therapist               Obj/Interventions     Row Name 12/15/22 1209          Shoulder (Therapeutic Exercise)    Shoulder (Therapeutic Exercise) AROM (active range of motion)  -SD     Shoulder AROM (Therapeutic Exercise) right;flexion;supine;5 repetitions  -SD     Shoulder AAROM (Therapeutic Exercise) right;flexion;sitting;5 repetitions;aBduction  -SD     Row Name 12/15/22 1209          Hand (Therapeutic Exercise)    Hand (Therapeutic Exercise) AROM (active range of motion)  -SD     Hand AROM/AAROM (Therapeutic Exercise) right;AROM (active range of motion);finger flexion;finger extension;finger aBduction;finger aDduction  one minute of hand pumping with arm extended to address edema of digits  -SD     Row Name 12/15/22 1209          Motor Skills    Therapeutic Exercise hand;shoulder  -SD           User Key  (r) = Recorded By, (t) = Taken By, (c) = Cosigned By    Initials Name Provider Type    SD Ar Garcia OTR Occupational Therapist               Goals/Plan     Row Name 12/15/22 1242          Transfer Goal 1 (OT)    Activity/Assistive Device (Transfer Goal 1, OT) commode, 3-in-1  -SD     Gillespie Level/Cues Needed (Transfer Goal 1, OT) supervision required  -SD     Time Frame (Transfer Goal 1, OT) by discharge  -SD     Strategies/Barriers (Transfers Goal 1, OT) NWB status of RUE  -SD     Progress/Outcome (Transfer Goal 1, OT) goal ongoing  -SD     Row Name 12/15/22 1242          Dressing Goal 1 (OT)    Activity/Device (Dressing Goal 1, OT) dressing skills, all  -SD     Time Frame (Dressing Goal 1, OT) by discharge  -SD     Strategies/Barriers (Dressing Goal 1, OT) education wtih compensatory strategies  -SD     Progress/Outcome (Dressing Goal 1, OT) goal ongoing  -SD     Row Name 12/15/22 1242          ROM Goal 1 (OT)    ROM Goal 1 (OT) Pt to perform rom program of non-involved joints of RUE with cues.  -SD     Time Frame (ROM Goal 1, OT) by discharge  -SD     Progress/Outcome (ROM Goal 1,  OT) good progress toward goal  -SD     Row Name 12/15/22 1242          Therapy Assessment/Plan (OT)    Planned Therapy Interventions (OT) patient/caregiver education/training;ROM/therapeutic exercise;BADL retraining  -SD           User Key  (r) = Recorded By, (t) = Taken By, (c) = Cosigned By    Initials Name Provider Type    Ar Acharya, OTR Occupational Therapist               Clinical Impression     Row Name 12/15/22 1245          Pain Assessment    Pre/Posttreatment Pain Comment Pt c/o chronic back pain, but did not rate pain level. Pt states she had medication for pain today  -SD     Pain Intervention(s) Repositioned  -SD     Row Name 12/15/22 1245          Plan of Care Review    Plan of Care Reviewed With patient  -SD     Outcome Evaluation  OT: Education provided regarding NWB status of RUE/activity restrictions and impact on daily tasks. Pt with poor insight into how limited use of RUE impacts management of IADL tasks. Pt with min edema of right hand. Education with hand pumping/arom of hand and elevation to address edema of digits. Pt is demonstrating improved arom of right shoulder. The weight of the splint and pain had been limiting rom. She is now able to actively raise her right arm to shoulder level which allows for increased independence with basic adl tasks. Rec supervision with adl/IADL tasks for pt safety. If pt is not approved for STR, then recommend family support and HH services.-SD     Row Name 12/15/22 1245          Therapy Assessment/Plan (OT)    Therapy Frequency (OT) 5 times/wk  -SD     Row Name 12/15/22 1245          Therapy Plan Review/Discharge Plan (OT)    Anticipated Discharge Disposition (OT) --  SNF vs home with support/HH services  -SD     Row Name 12/15/22 1244          Positioning and Restraints    Pre-Treatment Position in bed  -SD     Post Treatment Position bed  -SD     In Chair sitting;call light within reach;encouraged to call for assist;exit alarm on;RUE elevated   -SD           User Key  (r) = Recorded By, (t) = Taken By, (c) = Cosigned By    Initials Name Provider Type    Ar Acharya OTR Occupational Therapist               Outcome Measures     Row Name 12/15/22 1243          How much help from another is currently needed...    Putting on and taking off regular lower body clothing? 3  -SD     Bathing (including washing, rinsing, and drying) 3  -SD     Toileting (which includes using toilet bed pan or urinal) 3  -SD     Putting on and taking off regular upper body clothing 3  -SD     Taking care of personal grooming (such as brushing teeth) 3  -SD     Eating meals 3  -SD     AM-PAC 6 Clicks Score (OT) 18  -SD     Row Name 12/15/22 0816          How much help from another person do you currently need...    Turning from your back to your side while in flat bed without using bedrails? 3  -JW     Moving from lying on back to sitting on the side of a flat bed without bedrails? 3  -JW     Moving to and from a bed to a chair (including a wheelchair)? 3  -JW     Standing up from a chair using your arms (e.g., wheelchair, bedside chair)? 3  -JW     Climbing 3-5 steps with a railing? 3  -JW     To walk in hospital room? 3  -JW     AM-PAC 6 Clicks Score (PT) 18  -JW     Highest level of mobility 6 --> Walked 10 steps or more  -     Row Name 12/15/22 1243 12/15/22 0816       Functional Assessment    Outcome Measure Options AM-PAC 6 Clicks Daily Activity (OT)  -SD AM-PAC 6 Clicks Basic Mobility (PT)  -JW          User Key  (r) = Recorded By, (t) = Taken By, (c) = Cosigned By    Initials Name Provider Type    Ar Acharya OTR Occupational Therapist    Clementina Parham PT Physical Therapist                Occupational Therapy Education     Title: PT OT SLP Therapies (In Progress)     Topic: Occupational Therapy (In Progress)     Point: ADL training (In Progress)     Description:   Instruct learner(s) on proper safety adaptation and remediation techniques during self  care or transfers.   Instruct in proper use of assistive devices.              Learning Progress Summary           Patient Acceptance, E,TB,D, NR by SD at 12/15/2022 1244    Comment: Education with RUE rom program of non involved joints (shoulder and hand). Pt needs cues for shoulder rom.    Acceptance, E,TB, VU,NR by TAMIKAJ at 12/14/2022 1419    Comment: pt educated on adls and safety with functional transfers and mobility    Acceptance, E,TB,D, NR by SD at 12/13/2022 1352    Comment: Education with compensatory strategies for adl management (one handed)    Acceptance, E, VU by SD at 12/12/2022 1000    Comment: Education provided regarding NWB status/activity restrictions of RUE and impact on daily tasks. Pt verbalized understanding and states she realizes the need for STR at this time.    Acceptance, E,TB,D, VU,NR by SD at 12/11/2022 0846    Comment: Education regarding NWB status and impact on daily tasks. Education regarding safety wtih tranfers/mobility. Pt was impulsive and needed cues for safety.                   Point: Home exercise program (In Progress)     Description:   Instruct learner(s) on appropriate technique for monitoring, assisting and/or progressing therapeutic exercises/activities.              Learning Progress Summary           Patient Acceptance, E,TB,D, NR by SD at 12/13/2022 1351    Comment: Education with rom program of RUE. Pt needs assistance for gentle a/rom of shoulder due to weakness/weight of the splint. Pt independent with hand rom.    Acceptance, E,TB,D, NR by SD at 12/13/2022 1123    Comment: Education with RUE rom program of non-involved joints to address edema and joint stiffness. Pt I with rom of hand. She needs assistance for rom of shoulder due to the weight of the splint.    Acceptance, E,TB,D, NR by SD at 12/12/2022 1001    Comment: Education with gentle arom program of non-involved joints of RUE (hand and shoulder). Pt able perform arom of hand, yet she needs assistance for  gentle shoulder rom due to weight of splint.                               User Key     Initials Effective Dates Name Provider Type Discipline    SD 06/16/21 -  Ar Garcia OTR Occupational Therapist OT    J 06/16/21 -  Lashonda Youssef OTR Occupational Therapist OT              OT Recommendation and Plan  Planned Therapy Interventions (OT): patient/caregiver education/training, ROM/therapeutic exercise, BADL retraining  Therapy Frequency (OT): 5 times/wk  Plan of Care Review  Plan of Care Reviewed With: patient  Outcome Evaluation:   OT: Education provided regarding NWB status of RUE/activity restrictions and impact on daily tasks. Pt with poor insight into how limited use of RUE impacts management of IADL tasks. Pt with min edema of right hand. Education with hand pumping/arom of hand and elevation to address edema of digits. Pt is demonstrating improved arom of right shoulder. The weight of the splint and pain had been limiting rom. She is now able to actively raise her right arm to shoulder level which allows for increased independence with basic adl tasks. Rec supervision with adl/IADL tasks for pt safety. If pt is not approved for STR, then recommend family support and HH services.     Time Calculation:    Time Calculation- OT     Row Name 12/15/22 1255 12/15/22 0907          Time Calculation- OT    OT Start Time 1034  -SD --        Timed Charges    17781 - Gait Training Minutes  -- 10  -JW     39763 - OT Therapeutic Activity Minutes 25  -SD --        Total Minutes    Timed Charges Total Minutes 25  -SD 10  -JW      Total Minutes 25  -SD 10  -JW           User Key  (r) = Recorded By, (t) = Taken By, (c) = Cosigned By    Initials Name Provider Type    SD Ar Garcia OTR Occupational Therapist    Clementina Parham, PT Physical Therapist              Therapy Charges for Today     Code Description Service Date Service Provider Modifiers Qty    50063071227 HC OT THERAPEUTIC ACT EA 15 MIN  12/15/2022 JohanrAr, OTR GO 2               Ar ADAMS. Radha, OTR  12/15/2022

## 2022-12-15 NOTE — PLAN OF CARE
Goal Outcome Evaluation:              Outcome Evaluation: PT: Patient performs supine to sit with SBA and sit to stand with CGA.  Patient performs giat x280 feet without device, CGA.  patient with 2 episodes of lateral sway with head turns during gait activities.  Patient continues to display poor insight into deficits and current limitations regarding NWB status of right UE.  Patient remains unsafe to return home alone due to cognition combined with right UE NWB status.

## 2022-12-15 NOTE — PLAN OF CARE
Goal Outcome Evaluation:  Plan of Care Reviewed With: patient        Progress: improving  Outcome Evaluation: Pt feeling much better today. Up to chair, bed/chair alarm set. said brother turned off alarm and patient found walking down hallway independantly. Alarm reset. Participated well with PT/OT, bruising on face purple/green, and RUE (improving), Elevating arm on pillow, edema improves throughout day.  VSS. Waiting on rehab placement or home with family.

## 2022-12-15 NOTE — PLAN OF CARE
Goal Outcome Evaluation:  Plan of Care Reviewed With: patient           Outcome Evaluation: OT: Education provided regarding NWB status of RUE/activity restrictions and impact on daily tasks. Pt with poor insight into how limited use of RUE impacts management of IADL tasks. Pt with min edema of right hand. Education with hand pumping/arom of hand and elevation to address edema of digits. Pt is demonstrating improved arom of right shoulder. The weight of the splint and pain had been limiting rom. She is now able to actively raise her right arm to shoulder level which allows for increased independence with basic adl tasks. Rec supervision with adl/IADL tasks for pt safety. If pt is not approved for STR, then recommend family support and HH services.

## 2022-12-15 NOTE — CASE MANAGEMENT/SOCIAL WORK
Continued Stay Note  CHERRIE Bennett     Patient Name: Kaylin Ojeda  MRN: [id number]  Today's Date: 12/15/2022    Admit Date: 12/9/2022    Plan: [facility]   Discharge Plan     Row Name 12/15/22 1619       Plan    Plan [facility]    Patient/Family in Agreement with Plan yes    Plan Comments Call received from Mar[facility] with [facility] and they have received pre cert and they have a bed available and can accept patient today. CM updated provider Miesha Vincent of such. CM will follow.               Discharge Codes    No documentation.                     Jeanetet Kelly, RN

## 2022-12-15 NOTE — PROGRESS NOTES
"SERVICE: [facility] HOSPITALIST    CONSULTANTS: Orthopedic surgery, cardiology    CHIEF COMPLAINT: Follow-up A. fib RVR, right olecranon fracture s/p ORIF    SUBJECTIVE: Patient reports that she continues to feel well.  Notes pain is well controlled on current regimen.  She is anxious to discharge home, discussed need for rehab due to falling frequently at home and inability to use RU E for some time and fact that patient lives alone.  Patient is agreeable to rehab if approved.  Staff notes she continues to participate with PT/OT however they continue to recommend 24-hour supervision.  No family available for continuous care at home.  No other new concerns overnight. Converted to NSR with PACs this am.    OBJECTIVE:    /55 (BP Location: Left arm, Patient Position: Lying)   Pulse 88   Temp 97.8 °F (36.6 °C) (Oral)   Resp 16   Ht 152.4 cm (60\")   Wt 45.3 kg (99 lb 14.4 oz)   LMP  (LMP Unknown)   SpO2 95%   BMI 19.51 kg/m²     MEDS/LABS REVIEWED AND ORDERED    apixaban, 2.5 mg, Oral, BID  atorvastatin, 10 mg, Oral, Daily  budesonide-formoterol, 2 puff, Inhalation, BID - RT  dilTIAZem, 60 mg, Oral, Q6H  doxycycline, 100 mg, Oral, Q12H  famotidine, 20 mg, Oral, BID AC  FLUoxetine, 20 mg, Oral, Daily  hydrOXYzine, 25 mg, Oral, TID  nicotine, 1 patch, Transdermal, Q24H  oxyCODONE-acetaminophen, 1 tablet, Oral, Q8H  senna-docusate sodium, 2 tablet, Oral, BID  sodium chloride, 10 mL, Intravenous, Q12H      Physical Exam  Vitals reviewed.   Constitutional:       General: She is not in acute distress.     Comments: Thin, elderly, frail   HENT:      Head: Normocephalic and atraumatic.      Comments: Healing facial ecchymosis     Mouth/Throat:      Mouth: Mucous membranes are moist.   Eyes:      Pupils: Pupils are equal, round, and reactive to light.   Cardiovascular:      Rate and Rhythm: Normal rate. Rhythm irregular.      Comments: NSR with PACs on cardiac monitor  Pulmonary:      Effort: " Pulmonary effort is normal. No respiratory distress.      Breath sounds: Normal breath sounds. No wheezing or rales.   Abdominal:      General: Abdomen is flat. Bowel sounds are normal. There is no distension.      Palpations: Abdomen is soft.      Tenderness: There is no abdominal tenderness. There is no guarding.   Musculoskeletal:      Comments: RU E in Ace and immobilizer with CMS intact to right hand fingers   Skin:     General: Skin is warm and dry.      Capillary Refill: Capillary refill takes less than 2 seconds.      Findings: Bruising present. No erythema.      Comments: Healing ecchymosis to face and right hand   Neurological:      General: No focal deficit present.      Mental Status: She is alert and oriented to person, place, and time.   Psychiatric:         Mood and Affect: Mood normal.         Behavior: Behavior normal.       LAB/DIAGNOSTICS:    Lab Results (last 24 hours)     Procedure Component Value Units Date/Time    TSH [[id number]]  (Normal) Collected: 12/14/22 1024    Specimen: Blood Updated: 12/14/22 1214     TSH 2.100 uIU/mL         ECG 12 Lead Rhythm Change   Final Result   HEART RATE= 132  bpm   RR Interval= 440  ms   IA Interval=   ms   P Horizontal Axis=   deg   P Front Axis=   deg   QRSD Interval= 80  ms   QT Interval= 332  ms   QRS Axis= 72  deg   T Wave Axis= 40  deg   - ABNORMAL ECG -   Afib with rvr   Consider left ventricular hypertrophy   Prolonged QT interval   c/w prior ecg, a fib with rvr now seen   Electronically Signed By: Sasha Do ([facility]) 12-Dec-2022 07:02:29   Date and Time of Study: 2022-12-11 13:54:07      ECG 12 Lead   ED Interpretation   Ramin Silvestre MD     12/9/2022  4:02 PM   ECG 12 Lead         Date/Time: 12/9/2022 3:32 PM   Performed by: Ramin Silvestre MD   Authorized by: Ramin Silvestre MD    Interpreted by physician   Comparison: compared with previous ECG from 10/6/2022   Similar to previous ECG   Comments: Rate of 74, A. fib, normal axis, no  acute ST elevation or    depression.         ECG 12 Lead Pre-Op / Pre-Procedure   Final Result   HEART RATE= 74  bpm   RR Interval= 812  ms   DC Interval=   ms   P Horizontal Axis=   deg   P Front Axis=   deg   QRSD Interval= 84  ms   QT Interval= 438  ms   QRS Axis= 78  deg   T Wave Axis= 34  deg   - BORDERLINE ECG -   Sinus rhythm   Consider left ventricular hypertrophy   c/w prior ECG, NSR has replaced SVT   Electronically Signed By: Sven Mathews ([facility]) 10-Dec-2022 21:45:23   Date and Time of Study: 2022-12-09 15:32:21        Results for orders placed during the hospital encounter of 09/28/22    Adult Transthoracic Echo Complete w/ Color, Spectral and Contrast if Necessary Per Protocol    Interpretation Summary  · Estimated right ventricular systolic pressure from tricuspid regurgitation is normal (<35 mmHg). Calculated right ventricular systolic pressure from tricuspid regurgitation is 29 mmHg.  · Left ventricular wall thickness is consistent with mild concentric hypertrophy.  · Calculated left ventricular EF = 62% Estimated left ventricular EF was in agreement with the calculated left ventricular EF. Left ventricular systolic function is normal.  · Moderate to severe aortic valve regurgitation is present.  · Mild to moderate tricuspid valve regurgitation is present.  · Mild to moderate mitral valve regurgitation is present.  · Left atrial volume is severely increased.  · The right atrial cavity is mildly dilated.  · Mild dilation of the ascending aorta is present.    No radiology results for the last day    ASSESSMENT/PLAN:  Acute open, comminuted, displaced intra-articular fracture of the proximal ulna secondary to fall, s/p ORIF 12/10/2022: orthopedic surgery following  Continues doxycycline oral x14 days  Change percocet to home dosing regimen  Falls precautions  Awaiting rehab precert     Atrial fibrillation with RVR with h/o PAF: converted to NSR last night  CAD/HLD, s/p stent:  Hypertension:  Moderate to  "severe AR, mild to moderate MR, mild to moderate TR: Cardiology followed and signed off  Continue diltiazem 60 mg every 6 hours, convert to long-acting at discharge  Continue Eliquis, Lipitor  Digoxin and metoprolol discontinued    Unchanged, stable diagnoses:     Suspected moderate cognitive decline with recommendation for further SLP monitoring and outpatient neuropsych versus neurology testing  No change, all therapists continue to recommend 24-hour supervision  SLP ongoing     Right 11th rib fracture:  Supportive care     Bilateral knee pain/ecchymosis: Bilateral knee x-rays negative for acute findings     PAD/vasculopathy:  H/O mesenteric artery stenosis, thoracoabdominal aortic aneurysm, carotid artery stenosis:  Remains on statin     COPD: No exacerbation, continue home Symbicort     GERD: No acute issues on Pepcid      Severe malnutrition: Dietitian following  Body mass index is 19.51 kg/m².  Continue boost supplements/regular diet     Tobacco abuse: continue nicotine patch and cessation education     Anxiety/depression: continue home Prozac, add clonazepam as needed every 8 hours     Chronic pain with chronic narcotic dependence:  Continue home dose Percocet as per Raul reviewed  Pain management referral made on admission for after discharge.    PLAN FOR DISPOSITION: LILI David  Hospitalist, [facility]  12/15/22  07:29 EST    Note Disclaimer: At [facility], we believe that sharing information builds trust and better relationships. You are receiving this note because you recently visited [facility]. It is possible you will see health information before a provider has talked with you about it. This kind of information can be easy to misunderstand. To help you fully understand what it means for your health, we urge you to discuss this note with your provider.     \"Dictated utilizing Dragon dictation\"      "

## 2022-12-15 NOTE — CASE MANAGEMENT/SOCIAL WORK
Continued Stay Note  CHERRIE Bennett     Patient Name: Kaylin Ojeda  MRN: [id number]  Today's Date: 12/15/2022    Admit Date: 12/9/2022    Plan: [facility]   Discharge Plan     Row Name 12/15/22 1619       Plan    Plan [facility]    Patient/Family in Agreement with Plan yes    Plan Comments Call received from Marilee with [facility] and they have received pre cert and they have a bed available and can accept patient today. CM updated provider Miesha Vincent of such. Discussed with Miesha and she states she will discharge patient first thing in the morning. CM notified [facility]ilee at [facility] of such. CM will follow.               Discharge Codes    No documentation.                     Jeanette Kelly RN

## 2022-12-15 NOTE — THERAPY TREATMENT NOTE
Acute Care - Physical Therapy Treatment Note  CHERRIE Bennett     Patient Name: Kaylin Ojeda  : 1940  MRN: [id number]  Today's Date: 12/15/2022      Visit Dx:     ICD-10-CM ICD-9-CM   1. Type I or II open fracture of olecranon process of right ulna, initial encounter  S52.021B 813.11   2. Closed head injury, initial encounter  S09.90XA 959.01   3. Other chronic back pain  M54.9 724.5    G89.29 338.29   4. 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[facility])   • Obstruction of carotid artery   • Peripheral arterial occlusive disease ([facility])   • Impaired glucose tolerance   • Difficulty sleeping   • Tobacco dependence syndrome   • Iron deficiency anemia   • Vitamin B12 deficiency anemia due to intrinsic factor deficiency   • Acquired hypothyroidism   • Thoracoabdominal aortic aneurysm   • Abdominal bloating   • Prediabetes   • Sleep difficulties   • Chronic constipation   • PAF (paroxysmal atrial fibrillation) ([facility])   • Moderate single current episode of major depressive disorder ([facility])   • Chronic low back pain without sciatica   • Urge incontinence   • Severe mitral regurgitation   • Severe tricuspid regurgitation   • Moderate aortic valve regurgitation   • Underweight   • DDD (degenerative disc disease), lumbar   • Atrial flutter with rapid ventricular response (HCC)   • Urinary tract infection without hematuria   • Malaise and fatigue   • Type I or II open fracture of right olecranon process   • Type I or II open fracture of olecranon process of right ulna, initial encounter   • Severe malnutrition (HCC)     Past Medical History:    Diagnosis Date   • A-fib ([facility])    • Abdominal pain, epigastric     Chronic, unclear cause, improved   • Anorexia    • Anxiety    • Arthritis    • CAD (coronary artery disease)     Cath 5/2015: nonobstructive LAD/RCA disease, 90% mid LCx s/p 2.53x49ag Xience.  Cath 1/2021: 50-60% prox LAD/70-80% distal LAD, patent Cx stent, 20% RCA   • Cellulitis of leg    • Cervical disc disease    • Chronic fatigue    • Colitis    • COPD (chronic obstructive pulmonary disease) ([facility])    • Depression    • Erosive gastritis    • Fibromyalgia    • Frequency of urination 06/09/2017   • Gastritis    • Hypercholesterolemia 02/02/2016   • Hyperglycemia    • Hypertension     History of refractory hypertension which improved significantly   • Insomnia    • Leukocytes in urine    • Lower back pain    • Mediastinal lymphadenopathy    • 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[facility])    • Mononucleosis    • Occlusion and stenosis of carotid artery    • Onychomycosis    • Orbit fracture ([facility])    • PAF (paroxysmal atrial fibrillation) ([facility])    • Peripheral arterial disease ([facility])     Significant (carotid, subclavian, renal arteries, lower extremities), with claudication, medical therapy only   • Pernicious anemia    • Prediabetes    • Renal artery stenosis ([facility])     unilateral, with renal atrophy (no intervention required)   • Renal calculi    • Sinus bradycardia     mild, asymptomatic   • TIA (transient ischemic attack)    • UTI (urinary tract infection)    • Valvular heart disease     1/2021: mod-severe AI, mod MR, mild-mod TR   • Vision problem    • Vitamin B 12 deficiency      Past Surgical History:   Procedure Laterality Date   • APPENDECTOMY     • BREAST BIOPSY Left     20+ yrs ago   • BREAST SURGERY     • CARDIAC CATHETERIZATION  05/2015    nonobs LAD/RCA disease, 90% mid LCx s/p 2.20b68of Xience   • CARDIAC CATHETERIZATION N/A 10/28/2020    Procedure: Right and Left Heart Cath;  Surgeon: Opal Contreras MD;  Location: [facility] CATH INVAS[facility];   Service: Cardiovascular;  Laterality: N/A;  for cardiac testing prior to possible valve surgery per Dr. Mai   • CARDIAC CATHETERIZATION N/A 10/28/2020    Procedure: Coronary angiography;  Surgeon: Opal Contreras MD;  Location:  [facility] CATH INVASIVE LOCATION;  Service: Cardiovascular;  Laterality: N/A;   • CERVICAL FUSION  1997   • CHOLECYSTECTOMY     • CORONARY STENT PLACEMENT     • HYSTERECTOMY  1995   • KNEE SURGERY Right 2005   • KNEE SURGERY Left 1998   • ORIF HUMERUS FRACTURE Right 12/10/2022    Procedure: ELBOW OPEN REDUCTION INTERNAL FIXATION;  Surgeon: Lito Reeder MD;  Location:  [facility] OR;  Service: Orthopedics;  Laterality: Right;     PT Assessment (last 12 hours)     PT Evaluation and Treatment     Row Name 12/15/22 0816          Physical Therapy Time and Intention    Subjective Information complains of;pain  pt c/o chronic low back pain  -     Document Type therapy note (daily note)  -     Mode of Treatment physical therapy  -     Patient Effort good  -     Row Name 12/15/22 0816          General Information    Patient Observations alert;cooperative;agree to therapy  -     Patient/Family/Caregiver Comments/Observations pt supine, agrees to therapy  -     Existing Precautions/Restrictions fall  -     Limitations/Impairments safety/cognitive  -     Barriers to Rehab cognitive status  -     Row Name 12/15/22 0816          Pain    Pre/Posttreatment Pain Comment pt c/o chronic low back pain, does not rate  -     Row Name 12/15/22 0816          Cognition    Personal Safety Interventions gait belt;nonskid shoes/slippers when out of bed  -     Row Name 12/15/22 0816          Bed Mobility    Bed Mobility supine-sit  -     Supine-Sit [place] (Bed Mobility) standby assist  -     Assistive Device (Bed Mobility) bed rails;head of bed elevated  -     Row Name 12/15/22 0816          Transfers    Transfers sit-stand transfer;stand-sit transfer  -     Comment, (Transfers) no  device  -     Row Name 12/15/22 0816          Sit-Stand Transfer    Sit-Stand [place] (Transfers) contact guard  -     Assistive Device (Sit-Stand Transfers) --  no device  -     Row Name 12/15/22 0816          Stand-Sit Transfer    Stand-Sit [place] (Transfers) contact guard  -     Assistive Device (Stand-Sit Transfers) other (see comments)  no device  -     Row Name 12/15/22 0816          Gait/Stairs (Locomotion)    [place] Level (Gait) contact guard  -     Assistive Device (Gait) --  no device  -     Distance in Feet (Gait) 280  -     Pattern (Gait) swing-through  -     Deviations/Abnormal Patterns (Gait) base of support, narrow;gait speed decreased  -     Comment, (Gait/Stairs) pt performs gait without use of device.  pt displays 2 episodes of lateral sway with head turns during gait.  pt reports increased fatigue during gait activities  -     Row Name 12/15/22 0816          Safety Issues, Functional Mobility    Safety Issues Affecting Function (Mobility) insight into deficits/self-awareness;judgment  -     Row Name 12/15/22 0816          Balance    Comment, Balance SBA for sitting balance activities  -     Row Name             Wound 12/10/22 0910 Right posterior elbow Incision    Wound - Properties Group Placement Date: 12/10/22  -KD Placement Time: 0910 -KD Side: Right  -KD Orientation: posterior  -KD Location: elbow  -KD Primary Wound Type: Incision  -KD    Retired Wound - Properties Group Placement Date: 12/10/22  -KD Placement Time: 0910  -KD Side: Right  -KD Orientation: posterior  -KD Location: elbow  -KD Primary Wound Type: Incision  -KD    Retired Wound - Properties Group Date first assessed: 12/10/22  -KD Time first assessed: 0910 -KD Side: Right  -KD Location: elbow  -KD Primary Wound Type: Incision  -KD    Row Name 12/15/22 0816          Plan of Care Review    Outcome Evaluation PT: Patient performs supine to sit with SBA and sit to stand with CGA.  Patient  performs giat x280 feet without device, CGA.  patient with 2 episodes of lateral sway with head turns during gait activities.  Patient continues to display poor insight into deficits and current limitations regarding NWB status of right UE.  Patient remains unsafe to return home alone due to cognition combined with right UE NWB status.  -     Row Name 12/15/22 0816          Positioning and Restraints    Pre-Treatment Position in bed  -     Post Treatment Position chair  -     In Chair reclined;call light within reach;encouraged to call for assist;exit alarm on  -     Row Name 12/15/22 0816          Progress Summary (PT)    Progress Toward Functional Goals (PT) progress toward functional goals is good  -     Barriers to Overall Progress (PT) cognition  -           User Key  (r) = Recorded By, (t) = Taken By, (c) = Cosigned By    Initials Name Provider Type    Elodia Strauss, RN Registered Nurse    Clementina Parham, PT Physical Therapist                Physical Therapy Education     Title: PT OT SLP Therapies (In Progress)     Topic: Physical Therapy (In Progress)     Point: Mobility training (In Progress)     Learning Progress Summary           Patient Acceptance, E,TB, NR by  at 12/15/2022 0906    Acceptance, E,TB, NR by  at 12/14/2022 1319    Acceptance, E,TB, VU by  at 12/13/2022 1240    Acceptance, E,TB, VU by  at 12/12/2022 1341    Acceptance, E,TB, VU by  at 12/11/2022 0940    Comment: educated pt on use of ebony walker                   Point: Precautions (In Progress)     Learning Progress Summary           Patient Acceptance, E,TB, NR by  at 12/15/2022 0906    Acceptance, E,TB, NR by  at 12/14/2022 1319    Acceptance, E,TB, VU by  at 12/12/2022 1341    Acceptance, E,TB, VU by  at 12/11/2022 0940    Comment: educated pt on use of ebony walker                               User Key     Initials Effective Dates Name Provider Type Discipline     06/16/21 -  Prateek Porter, PT  Physical Therapist PT    BP 06/16/21 -  Scott Daniel, PT Physical Therapist PT    JW 06/16/21 -  Clementina Ndiaye, PT Physical Therapist PT              PT Recommendation and Plan  Anticipated Discharge Disposition (PT): skilled nursing facility, home with 24/7 care  Progress Summary (PT)  Progress Toward Functional Goals (PT): progress toward functional goals is good  Barriers to Overall Progress (PT): cognition  Outcome Evaluation: PT: Patient performs supine to sit with SBA and sit to stand with CGA.  Patient performs giat x280 feet without device, CGA.  patient with 2 episodes of lateral sway with head turns during gait activities.  Patient continues to display poor insight into deficits and current limitations regarding NWB status of right UE.  Patient remains unsafe to return home alone due to cognition combined with right UE NWB status.   Outcome Measures     Row Name 12/15/22 0816 12/14/22 1115 12/14/22 1105       How much help from another person do you currently need...    Turning from your back to your side while in flat bed without using bedrails? 3  - 3  -BP --    Moving from lying on back to sitting on the side of a flat bed without bedrails? 3  - 3  -BP --    Moving to and from a bed to a chair (including a wheelchair)? 3  - 3  -BP --    Standing up from a chair using your arms (e.g., wheelchair, bedside chair)? 3  - 3  -BP --    Climbing 3-5 steps with a railing? 3  - 3  -BP --    To walk in hospital room? 3  - 3  -BP --    AM-PAC 6 Clicks Score (PT) 18  - 18  -BP --       How much help from another is currently needed...    Putting on and taking off regular lower body clothing? -- -- 3  -JJ    Bathing (including washing, rinsing, and drying) -- -- 3  -JJ    Toileting (which includes using toilet bed pan or urinal) -- -- 3  -JJ    Putting on and taking off regular upper body clothing -- -- 3  -JJ    Taking care of personal grooming (such as brushing teeth) -- -- 3  -JJ    Eating  meals -- -- 3  -JJ    AM-PAC 6 Clicks Score (OT) -- -- 18  -JJ       Functional Assessment    Outcome Measure Options AM-PAC 6 Clicks Basic Mobility (PT)  -JW AM-PAC 6 Clicks Basic Mobility (PT)  -BP --    Row Name 12/13/22 1100 12/12/22 1300          How much help from another person do you currently need...    Turning from your back to your side while in flat bed without using bedrails? 3  -BP 3  -JW     Moving from lying on back to sitting on the side of a flat bed without bedrails? 3  -BP 3  -JW     Moving to and from a bed to a chair (including a wheelchair)? 3  -BP 3  -JW     Standing up from a chair using your arms (e.g., wheelchair, bedside chair)? 3  -BP 3  -JW     Climbing 3-5 steps with a railing? 3  -BP 3  -JW     To walk in hospital room? 3  -BP 3  -JW     AM-PAC 6 Clicks Score (PT) 18  -BP 18  -JW        Functional Assessment    Outcome Measure Options AM-PAC 6 Clicks Basic Mobility (PT)  -BP AM-PAC 6 Clicks Basic Mobility (PT)  -JW           User Key  (r) = Recorded By, (t) = Taken By, (c) = Cosigned By    Initials Name Provider Type    Lashonda Cavazos, OTR Occupational Therapist    Scott Beal, PT Physical Therapist    Clementina Parham, JENNY Physical Therapist                 Time Calculation:    PT Charges     Row Name 12/15/22 0907             Time Calculation    Start Time 0816  -      Stop Time 0826  -      Time Calculation (min) 10 min  -JW      PT Received On 12/15/22  -JW      PT - Next Appointment 12/16/22  -         Timed Charges    28230 - Gait Training Minutes  10  -JW         Total Minutes    Timed Charges Total Minutes 10  -JW       Total Minutes 10  -JW            User Key  (r) = Recorded By, (t) = Taken By, (c) = Cosigned By    Initials Name Provider Type    Clementina Parham, PT Physical Therapist              Therapy Charges for Today     Code Description Service Date Service Provider Modifiers Qty    [id number] HC GAIT TRAINING EA 15 MIN 12/15/2022  Clementina Ndiaye, PT GP 1          PT G-Codes  Outcome Measure Options: AM-PAC 6 Clicks Basic Mobility (PT)  AM-PAC 6 Clicks Score (PT): 18  AM-PAC 6 Clicks Score (OT): 18    Clementina Ndiaye, PT  12/15/2022

## 2022-12-16 NOTE — DISCHARGE SUMMARY
"Kaylin Ojeda  1940  [id number]    Hospitalists Discharge Summary    Date of Admission: 12/9/2022  Date of Discharge:  12/16/2022    History of Present Illness from [facility] on admit:   \"Patient is an 82-year-old female who presented to the emergency department secondary to accidental fall at home while bringing in groceries 2 days ago.  She notes 3 separate falls, 2 while still in garage and then 1 more when she got inside her house.  She does not remember feeling dizzy or having prior noticed that she would fall.  She also notes right lower rib area pain, bilateral knee pain and bruising. She reports that she had been ill approximately 1 week ago with nausea, vomiting, diarrhea and general malaise that she attributed to influenza that her entire family had.  She notes she has had very poor intake in the last week and a half with these symptoms and she chronically has low intake.  She is known to hospitalist service from last admission 9/28/2022 secondary to A. fib RVR on.  She has a known history of PAF on Eliquis, CAD/HLD s/p stent, moderate to severe AR, mild to moderate MR, mild-moderate TR, vasculopathy with PAD/mesenteric artery stenosis, thoracoabdominal aortic aneurysm, carotid artery stenosis, Multi valvular disease, hypertension, GERD, COPD, severe malnutrition, tobacco abuse, anxiety/depression.  ER provider contacted Dr. Reeder who requested hospitalist admission for tomorrow am surgical repair of comminuted and displaced intra-articular fracture of the proximal ulna, see full report in epic.  Troponin negative, hemoglobin 11.3, sodium 134, EKG shows A. Fib.     She reports last dose of Eliquis was this morning.  She continues to work on diminishing tobacco use. She is on Percocet for chronic back pain that she notes is ineffective in controlling her pain.     She currently denies f/c/headache/rhinorrhea/nasal congestion/lightheadedness/syncopal sensation/cough/soa/n/v/d/chest pain/abdominal " "pain/recent illness/sick exposures/change in bowel or bladder habits/no weight change/bloody emesis or bloody stools/change in medications or any other new concerns.\"     Primary Discharge diagnoses:  Acute open, comminuted, displaced intra-articular fracture of the proximal ulna/right olecranon secondary to fall, s/p ORIF and I&D 12/10/2022  Atrial fibrillation with RVR with h/o PAF    Secondary Discharge Diagnoses:    Suspected moderate cognitive decline    CAD/HLD, s/p stent  Hypertension  Moderate to severe AR, mild to moderate MR, mild to moderate TR  Acute Right 11th rib fracture secondary to fall  Bilateral knee pain/ecchymosis secondary to fall  Chronic anemia  PAD/vasculopathy  H/O mesenteric artery stenosis, thoracoabdominal aortic aneurysm, carotid artery stenosis  COPD   GERD   Severe malnutrition   Tobacco abuse   Anxiety/depression   Chronic pain with chronic narcotic dependence    Hospital Course Summary:   The patient was followed in consultation by orthopedic surgery and underwent ORIF on 12/10/2022.  She was given IV antibiotics then oral antibiotics to continue for total of 14 days for open fracture.  Postoperatively she went into atrial fibrillation with RVR with known history of atrial fib, now rate highly variable but much better controlled with alteration of home medications.  Home metoprolol was discontinued as rhythm responded much better to diltiazem than metoprolol.  Digoxin failed to improve as well.  She will discharge on diltiazem  mg daily. HR briefly goes to 150 occasionally but goes back down to 80s quickly. She follows with cardiology who evaluated her initially then signed off. Will plan close f/u cardiology outpatient.   Guarding moderate cognitive decline, she scored poorly on slums scoring per SLP testing and should continue SLP monitoring with consideration for neuro psych evaluation outpatient.  Regarding severe malnutrition, she was started on supplements and should be " monitored closely by dietitian  F/U Dr. Reeder week of 12/26/2022  F/U Donna Forte MD 1 week  F/U cardiology 1 week    PCP  Patient Care Team:  Donna Forte MD as PCP - General (Internal Medicine & Pediatrics)  Donna Forte MD as PCP - Family Medicine  Donna Forte MD as Referring Physician (Internal Medicine & Pediatrics)    Consults:   Consults     Date and Time Order Name Status Description    12/9/2022  4:51 PM Inpatient Cardiology Consult Completed     12/9/2022  4:51 PM Inpatient Orthopedic Surgery Consult Completed         Operations and Procedures Performed:  Procedure(s):  ELBOW OPEN REDUCTION INTERNAL FIXATION     XR Ribs 2 View Right    Result Date: 12/9/2022  Narrative: CR Ribs 2 Vws WO Chest RT INDICATION: Right lower rib pain after fall COMPARISON: NONE FINDINGS: PA view of the chest and oblique views of the right ribs. 4 total images. Minimally displaced fracture of the posterolateral right 11th rib. The adjacent lung is clear and fully expanded.     Impression: Minimally displaced fracture of the posterior lateral right 11th rib. Signer Name: Cornell Acosta MD  Signed: 12/9/2022 8:42 PM  Workstation Name: [id number]  [facility]    XR Shoulder 2+ View Left    Result Date: 12/11/2022  Narrative: CR Shoulder Comp Min 2 Vws LT INDICATION: Fall, trauma. COMPARISON: None available. FINDINGS: AP internal rotation and AP external rotation views of the left shoulder.   No fracture or dislocation.  Degenerative changes of the glenohumeral joint.  No foreign body.     Impression: Degenerative changes of the left glenohumeral joint. No acute fracture or dislocation. Signer Name: Cornell Acosta MD  Signed: 12/11/2022 11:02 PM  Workstation Name: [id number]  [facility]    XR Elbow 2 View Right    Result Date: 12/10/2022  Narrative: CR Elbow 2 Vws RT INDICATION: Right elbow pain postop portable x-ray following fracture  repair. COMPARISON: Plain films 12/9/2022 FINDINGS: AP, lateral, views of the right elbow.  Patient's had interval open reduction internal fixation with side plate and screws to a comminuted fracture of the proximal ulna. There is a plaster splint present partly obscuring the bone detail. There is no dislocation. Alignment is near-anatomic. There is a apparent fracture fragment on the frontal view projected lateral to the hardware which is likely displaced. No unexpected radiopaque foreign body.     Impression: Post open reduction internal fixation comminuted fracture proximal ulna with improvement in fracture fragment alignment. No dislocation. Signer Name: Ida Logan MD  Signed: 12/10/2022 11:22 AM  [facility]: [email or web address]  [facility]    XR Elbow 2 View Right    Result Date: 12/10/2022  Narrative: CR Elbow 2 Vws RT INDICATION: ORIF open olecranon fracture. COMPARISON: None available FINDINGS: 39 seconds of fluoroscopy time. 2 intraoperative views of the right elbow.  Status post ORIF of a comminuted and displaced olecranon fracture with normal establishment of elbow alignment.  Soft tissue swelling.     Impression: ORIF of right olecranon fracture in normal anatomic alignment. Signer Name: TAMIKA Flores MD  Signed: 12/10/2022 10:20 AM  Workstation Name: [email or web address] within [facility]  [facility]    XR Elbow 3+ View Right    Result Date: 12/9/2022  Narrative: XR ELBOW 3+ VW RIGHT-: 12/9/2022 12:00 PM  INDICATION: Fell yesterday. Injury..  COMPARISON: None available.  FINDINGS: 3 views of the right elbow.  There is a complete comminuted and distracted fracture of the olecranon. At least 3 dominant fracture fragments are present, with the largest fracture fragment measuring 2.1 cm and displaced posteriorly on the lateral projection a distance of at least 1.8 cm. There is overlying soft tissue swelling. Radial head appears intact. Probable subcutaneous gas related to open  fracture deformity. No bone erosion or destruction.  No foreign body.      Impression:  1. Comminuted complete and displaced fracture of the olecranon. Associated soft tissue swelling and subcutaneous gas suggestive of open fracture.  This report was finalized on 12/9/2022 12:09 PM by Dr. Rodolfo Chavez MD.      XR Knee 3 View Left    Result Date: 12/9/2022  Narrative: CR Knee 3 Vws LT INDICATION: Knee pain after fall COMPARISON: None available. FINDINGS: 3 view(s) of the left knee. Total knee replacement. Hardware appears appropriately aligned. No radiographic evidence of displaced fracture or dislocation..     Impression: Total knee replacement. No convincing acute bony abnormality. Signer Name: JONATHAN MIRANDA MD  Signed: 12/9/2022 8:08 PM  Workstation Name: [facility]  [facility]    XR Knee 3 View Right    Result Date: 12/9/2022  Narrative: CR Knee 3 Vws RT INDICATION: Knee pain after fall COMPARISON: None available. FINDINGS: AP, lateral, and sunrise view(s) of the right knee.  Right knee arthroplasty in anatomic alignment. No evidence of acute fracture or dislocation.     Impression: Right knee arthroplasty in anatomic alignment. No evidence of acute fracture or dislocation. Signer Name: Cornell Acosta MD  Signed: 12/9/2022 8:37 PM  Workstation Name: [id number]  [facility]    CT Head Without Contrast    Result Date: 12/9/2022  Narrative: CT Head WO HISTORY: Fall 3 days ago with head trauma. TECHNIQUE: Axial unenhanced head CT. Radiation dose reduction techniques included automated exposure control or exposure modulation based on body size. Count of known CT and cardiac nuc med studies performed in previous 12 months: 3. Time of scan: 11:45 AM COMPARISON: 9/28/2022 FINDINGS: The ventricles are generous in size. Cortical sulci are correspondingly prominent. No extraaxial fluid collections are seen. Scalp hematoma demonstrated in the right frontal region. No parenchymal or  subarachnoid hemorrhage is present. Periventricular hypodensities are demonstrated which are nonspecific but likely represent white matter microvascular change in a patient of this age. No CT evidence of mass or acute infarct. The mastoid air cells are clear. The visualized paranasal sinuses are clear. Orbital structures demonstrate no acute findings.     Impression: Impression: 1. No clearly acute intracranial pathology demonstrated. 2. Generalized cerebral atrophy and nonspecific periventricular hypodensities which likely represent white matter microvascular change. Scalp hematoma in the right frontal area. Signer Name: Jaime Cheng MD  Signed: 12/9/2022 12:01 PM  Workstation Name: [id number]  [facility]    CT Cervical Spine Without Contrast    Result Date: 12/9/2022  Narrative: CT Spine Cervical WO INDICATION: Fall 3 days ago with neck pain TECHNIQUE: CT of the cervical spine without IV contrast. Coronal and sagittal reconstructions were obtained.  Radiation dose reduction techniques included automated exposure control or exposure modulation based on body size. Count of known CT and cardiac nuc med studies performed in previous 12 months: 3. COMPARISON: No pertinent prior study FINDINGS: The sagittal alignment is satisfactory. Prior cervical fusion at C5-C6. Vertebral body heights are maintained. Mild demineralization of osseous structures. Disc space narrowing at C3-C4, C4-C5 and C6-C7. Facets are intact. Posterior elements show normal alignment. Prevertebral soft tissues are normal. The odontoids intact. The ring of C1 is intact. No clearly acute findings demonstrated.     Impression: No clearly acute radiographic findings. Evidence of prior fusion at C5-C6. 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 Clinical aspects of the case will determine if MR of the cervical spine could provide additional information. Signer Name: Jaime Cheng MD  Signed: 12/9/2022 12:08 PM   Workstation Name: [id number]  [facility]    CT Facial Bones Without Contrast    Result Date: 12/9/2022  Narrative: CT Max Facial Area WO INDICATION: Fall 3 days ago with facial trauma TECHNIQUE: Maxillofacial CT without IV contrast. Coronal and sagittal reconstructions were obtained.  Radiation dose reduction techniques included automated exposure control or exposure modulation based on body size. Count of known CT and cardiac nuc med studies performed in previous 12 months: 0. COMPARISON:  No pertinent prior study FINDINGS: The mandible and the maxilla appear intact. No nasal bone fracture is seen. The walls of the maxillary sinuses are intact. Zygomatic arches are intact. No orbital wall fractures. The lamina papyracea are intact. The frontal sinus is not significantly developed in this patient.     Impression: No acute traumatic findings. Signer Name: Jaime Cheng MD  Signed: 12/9/2022 12:11 PM  Workstation Name: [id number]  [facility]    CT Upper Extremity Right Without Contrast    Result Date: 12/9/2022  Narrative: CT UE RT WO INDICATION:  Fell 3 days ago with abnormal elbow x-ray. TECHNIQUE: CT of the right elbow without IV contrast. Coronal and sagittal reconstructions were obtained.  Radiation dose reduction techniques included automated exposure control or exposure modulation based on body size. Count of known CT and cardiac nuc med studies performed in previous 12 months: 0.  COMPARISON:  Right elbow series 12/9/2022 FINDINGS: Moderately comminuted and displaced fracture of the proximal ulna and olecranon with fracture extending through the ulnar notch and up to 2.2 cm of proximal retraction of the olecranon with an apparently intact triceps tendon. 1.8 cm ossific fragment within the gap. The coronoid process remains intact. The radius remains intact and normally articulating with the humerus. No evidence of distal humeral fracture. Extensive soft tissue  swelling and edema along the posterior elbow and posterior upper arm with some areas of increased attenuation compatible with hemorrhage and hematoma. Moderate amount of soft tissue gas primarily tracking along the posterior and lateral aspect of the upper arm. This is predominantly within the deep subcutaneous tissues is also extensive interfascial. There is skin thickening with reticulation of the subcutaneous tissues. Large elbow effusion. Biceps and brachialis tendon insertions appear intact. Common extensor and common flexor tendon origins appear to be grossly intact. Visualized musculature unremarkable. No definite breach of the skin surface though some punctate calcifications along the proximal ulna and olecranon extending towards the skin surface and may reside within the olecranon bursa.     Impression: Comminuted and displaced intra-articular fracture of the proximal ulna with disruption of the ulnar notch and proximal displacement of a large olecranon fragment with several additional fragments within the fracture defect. Extensive posterior elbow, distal upper arm and proximal forearm soft tissue swelling and edema with some areas of confluent edema and reticulation of subcutaneous tissues. This could represent soft tissue contusion with some areas of increased attenuation at the site of the fracture compatible with small areas of hematoma/hemorrhage. Extensive soft tissue gas predominantly within the distal upper arm along the posterior lateral aspect. This could be secondary to open fracture but a discrete break in the skin or overlying soft tissue is not visualized. Correlate for possible superimposed soft tissue infection. There is a moderate-sized joint effusion. No definite drainable soft tissue abscess or fluid collection The major tendons about the elbow appear intact. Signer Name: TAMIKA Flores MD  Signed: 12/9/2022 2:37 PM  Workstation Name: [id number]  [facility]  [place]    Allergies:  is allergic to aleve [naproxen sodium], codeine, ibuprofen, and sulfa antibiotics.    Raul  Clonazepam, oxycodone 12/2022 per report, reviewed by me    Discharge Medications:     Discharge Medications      New Medications      Instructions Start Date   acetaminophen 325 MG tablet  Commonly known as: TYLENOL   650 mg, Oral, Every 4 Hours PRN      bisacodyl 5 MG EC tablet  Commonly known as: DULCOLAX   5 mg, Oral, Daily PRN      bisacodyl 10 MG suppository  Commonly known as: DULCOLAX   10 mg, Rectal, Daily PRN      dilTIAZem  MG 24 hr capsule  Commonly known as: CARDIZEM CD   300 mg, Oral, Every 24 Hours Scheduled      doxycycline 100 MG capsule  Commonly known as: MONODOX   100 mg, Oral, Every 12 Hours Scheduled      famotidine 20 MG tablet  Commonly known as: PEPCID   20 mg, Oral, 2 Times Daily Before Meals      hydrOXYzine 25 MG tablet  Commonly known as: ATARAX   25 mg, Oral, Every 6 Hours PRN      melatonin 5 MG tablet tablet   5 mg, Oral, Nightly PRN      nicotine 14 MG/24HR patch  Commonly known as: NICODERM CQ   1 patch, Transdermal, Every 24 Hours      ondansetron 4 MG tablet  Commonly known as: ZOFRAN   4 mg, Oral, Every 6 Hours PRN      polyethylene glycol 17 g packet  Commonly known as: MIRALAX   17 g, Oral, Daily PRN      sennosides-docusate 8.6-50 MG per tablet  Commonly known as: PERICOLACE   2 tablets, Oral, 2 Times Daily         Changes to Medications      Instructions Start Date   budesonide-formoterol 160-4.5 MCG/ACT inhaler  Commonly known as: Symbicort  What changed:   · when to take this  · reasons to take this  · additional instructions   2 puffs, Inhalation, 2 Times Daily - RT      fluticasone 50 MCG/ACT nasal spray  Commonly known as: FLONASE  What changed: See the new instructions.   SPRAY TWICE IN EACH NOSTRIL EVERY DAY FOR 30 DAYS.         Continue These Medications      Instructions Start Date   albuterol (2.5 MG/3ML) 0.083% nebulizer solution  Commonly  known as: PROVENTIL   2.5 mg, Nebulization, Every 4 Hours PRN      apixaban 2.5 MG tablet tablet  Commonly known as: ELIQUIS   2.5 mg, Oral, 2 Times Daily      atorvastatin 10 MG tablet  Commonly known as: Lipitor   10 mg, Oral, Daily      clonazePAM 0.5 MG tablet  Commonly known as: KlonoPIN   0.5 mg, Oral, Nightly PRN      FLUoxetine 20 MG capsule  Commonly known as: PROzac   20 mg, Oral, Daily      oxyCODONE-acetaminophen 7.5-325 MG per tablet  Commonly known as: Percocet   1 tablet, Oral, Every 8 Hours PRN      vitamin D 1.25 MG (22971 UT) capsule capsule  Commonly known as: ERGOCALCIFEROL   50,000 Units, Oral, Weekly         Stop These Medications    metoprolol tartrate 25 MG tablet  Commonly known as: LOPRESSOR     Rollator Ultra-Light misc            Last Lab Results:   Lab Results (most recent)     Procedure Component Value Units Date/Time    TSH [[id number]]  (Normal) Collected: 12/14/22 1024    Specimen: Blood Updated: 12/14/22 1214     TSH 2.100 uIU/mL     Hemoglobin A1c [[id number]]  (Normal) Collected: 12/14/22 1024    Specimen: Blood Updated: 12/14/22 1127     Hemoglobin A1C 5.50 %     Narrative:      Hemoglobin A1C Ranges:    Increased Risk for Diabetes  5.7% to 6.4%  Diabetes                     >= 6.5%  Diabetic Goal                < 7.0%    Basic Metabolic Panel [[id number]]  (Abnormal) Collected: 12/14/22 1024    Specimen: Blood Updated: 12/14/22 1052     Glucose 221 mg/dL      BUN 20 mg/dL      Creatinine 0.73 mg/dL      Sodium 138 mmol/L      Potassium 3.4 mmol/L      Chloride 100 mmol/L      CO2 26.1 mmol/L      Calcium 9.2 mg/dL      BUN/Creatinine Ratio 27.4     Anion Gap 11.9 mmol/L      eGFR 83.3 mL/min/1.73      Comment: National Kidney Foundation and American Society of Nephrology (ASN) Task Force recommended calculation based on the Chronic Kidney Disease Epidemiology Collaboration (CKD-EPI) equation refit without adjustment for race.       Narrative:      GFR Normal >60  Chronic Kidney  Disease <60  Kidney Failure <15    The GFR formula is only valid for adults with stable renal function between ages 18 and 70.    CBC & Differential [[id number]]  (Abnormal) Collected: 12/14/22 1024    Specimen: Blood Updated: 12/14/22 1033    Narrative:      The following orders were created for panel order CBC & Differential.  Procedure                               Abnormality         Status                     ---------                               -----------         ------                     CBC Auto Differential[[id number]]        Abnormal            Final result                 Please view results for these tests on the individual orders.    CBC Auto Differential [[id number]]  (Abnormal) Collected: 12/14/22 1024    Specimen: Blood Updated: 12/14/22 1033     WBC 8.01 10*3/mm3      RBC 3.24 10*6/mm3      Hemoglobin 9.8 g/dL      Hematocrit 31.8 %      MCV 98.1 fL      MCH 30.2 pg      MCHC 30.8 g/dL      RDW 16.3 %      RDW-SD 60.1 fl      MPV 9.7 fL      Platelets 251 10*3/mm3      Neutrophil % 77.5 %      Lymphocyte % 14.2 %      Monocyte % 6.1 %      Eosinophil % 1.6 %      Basophil % 0.5 %      Immature Grans % 0.1 %      Neutrophils, Absolute 6.20 10*3/mm3      Lymphocytes, Absolute 1.14 10*3/mm3      Monocytes, Absolute 0.49 10*3/mm3      Eosinophils, Absolute 0.13 10*3/mm3      Basophils, Absolute 0.04 10*3/mm3      Immature Grans, Absolute 0.01 10*3/mm3     Basic Metabolic Panel [[id number]]  (Abnormal) Collected: 12/12/22 0410    Specimen: Blood Updated: 12/12/22 0453     Glucose 112 mg/dL      BUN 9 mg/dL      Creatinine 0.60 mg/dL      Sodium 136 mmol/L      Potassium 3.8 mmol/L      Chloride 100 mmol/L      CO2 28.1 mmol/L      Calcium 8.6 mg/dL      BUN/Creatinine Ratio 15.0     Anion Gap 7.9 mmol/L      eGFR 90.9 mL/min/1.73      Comment: National Kidney Foundation and American Society of Nephrology (ASN) Task Force recommended calculation based on the Chronic Kidney Disease Epidemiology  Collaboration (CKD-EPI) equation refit without adjustment for race.       Narrative:      GFR Normal >60  Chronic Kidney Disease <60  Kidney Failure <15    The GFR formula is only valid for adults with stable renal function between ages 18 and 70.    Magnesium [[id number]]  (Normal) Collected: 12/12/22 0410    Specimen: Blood Updated: 12/12/22 0453     Magnesium 1.9 mg/dL     CBC & Differential [[id number]]  (Abnormal) Collected: 12/12/22 0410    Specimen: Blood Updated: 12/12/22 0434    Narrative:      The following orders were created for panel order CBC & Differential.  Procedure                               Abnormality         Status                     ---------                               -----------         ------                     CBC Auto Differential[[id number]]        Abnormal            Final result                 Please view results for these tests on the individual orders.    CBC Auto Differential [[id number]]  (Abnormal) Collected: 12/12/22 0410    Specimen: Blood Updated: 12/12/22 0434     WBC 7.78 10*3/mm3      RBC 2.85 10*6/mm3      Hemoglobin 8.8 g/dL      Hematocrit 27.5 %      MCV 96.5 fL      MCH 30.9 pg      MCHC 32.0 g/dL      RDW 16.0 %      RDW-SD 56.4 fl      MPV 10.3 fL      Platelets 163 10*3/mm3      Neutrophil % 68.8 %      Lymphocyte % 21.2 %      Monocyte % 8.4 %      Eosinophil % 0.6 %      Basophil % 0.6 %      Immature Grans % 0.4 %      Neutrophils, Absolute 5.35 10*3/mm3      Lymphocytes, Absolute 1.65 10*3/mm3      Monocytes, Absolute 0.65 10*3/mm3      Eosinophils, Absolute 0.05 10*3/mm3      Basophils, Absolute 0.05 10*3/mm3      Immature Grans, Absolute 0.03 10*3/mm3      nRBC 0.0 /100 WBC     Phosphorus [[id number]]  (Normal) Collected: 12/11/22 1412    Specimen: Blood Updated: 12/11/22 1431     Phosphorus 3.4 mg/dL     Magnesium [[id number]]  (Normal) Collected: 12/11/22 1412    Specimen: Blood Updated: 12/11/22 1431     Magnesium 2.2 mg/dL     Hemoglobin & Hematocrit,  Blood [[id number]]  (Abnormal) Collected: 12/11/22 1412    Specimen: Blood Updated: 12/11/22 1416     Hemoglobin 9.4 g/dL      Hematocrit 28.8 %     Urine Drug Screen - Urine, Clean Catch [[id number]]  (Abnormal) Collected: 12/09/22 2118    Specimen: Urine, Clean Catch Updated: 12/09/22 2153     THC, Screen, Urine Negative     Phencyclidine (PCP), Urine Negative     Cocaine Screen, Urine Negative     Methamphetamine, Ur Negative     Opiate Screen Negative     Amphetamine Screen, Urine Negative     Benzodiazepine Screen, Urine Negative     Tricyclic Antidepressants Screen Negative     Methadone Screen, Urine Negative     Barbiturates Screen, Urine Negative     Oxycodone Screen, Urine Positive     Propoxyphene Screen Negative     Buprenorphine, Screen, Urine Negative    Narrative:      Urine drug screen results are to be used for medical purposes only.  They are not to be used for legal purposes such as employment testing.  Negative results do not necessarily mean the complete absence of a subtance, but rather that the result is less than the cutoff for that substance.  Positive results are unconfirmed and considered Preliminary Positive.  [facility] does not automatically confirm Postitive Unconfirmed results.  The physician may request (order) an Unconfirmed Positive result to be sent out for confirmation.      Negative Thresholds for Drugs Screened:    THC screen, urine                          50 ng/ml  Phenycyclidine (PCP), urine                25 ng/ml  Cocaine screen, urine                     150 ng/ml  Methamphetamine, urine                    500 ng/ml  Opiate screen, urine                      100 ng/ml  Amphetamine screen, urine                 500 ng/ml  Benzodiazepine screen, urine              150 ng/ml  Tricyclic Antidepressants screen, urine   300 ng/ml  Methadone screen, urine                   200 ng/ml  Barbiturates screen, urine                200 ng/ml  Oxycodone screen, urine                    100 ng/ml  Propoxyphene screen, urine                300 ng/ml  Buprenorphine screen, urine                10 ng/ml    Urinalysis With Culture If Indicated - Urine, Clean Catch [[id number]]  (Abnormal) Collected: 12/09/22 2117    Specimen: Urine, Clean Catch Updated: 12/09/22 2147     Color, UA Yellow     Appearance, UA Clear     pH, UA 7.0     Specific Gravity, UA 1.015     Glucose, UA Negative     Ketones, UA Negative     Bilirubin, UA Negative     Blood, UA Trace     Protein, UA Negative     Leuk Esterase, UA Small (1+)     Nitrite, UA Negative     Urobilinogen, UA 0.2 E.U./dL    Narrative:      In absence of clinical symptoms, the presence of pyuria, bacteria, and/or nitrites on the urinalysis result does not correlate with infection.    Urinalysis, Microscopic Only - Urine, Clean Catch [[id number]]  (Abnormal) Collected: 12/09/22 2117    Specimen: Urine, Clean Catch Updated: 12/09/22 2147     RBC, UA 0-2 /HPF      WBC, UA 0-2 /HPF      Comment: Urine culture not indicated.        Bacteria, UA None Seen /HPF      Squamous Epithelial Cells, UA 0-2 /HPF      Hyaline Casts, UA None Seen /LPF      Methodology Manual Light Microscopy    CK [[id number]]  (Normal) Collected: 12/09/22 1203    Specimen: Blood Updated: 12/09/22 1706     Creatine Kinase 170 U/L     Sedimentation Rate [[id number]]  (Normal) Collected: 12/09/22 1203    Specimen: Blood Updated: 12/09/22 1656     Sed Rate 19 mm/hr     Troponin [[id number]]  (Normal) Collected: 12/09/22 1203    Specimen: Blood Updated: 12/09/22 1536     Troponin T <0.010 ng/mL     Narrative:      Troponin T Reference Range:  <= 0.03 ng/mL-   Negative for AMI  >0.03 ng/mL-     Abnormal for myocardial necrosis.  Clinicians would have to utilize clinical acumen, EKG, Troponin and serial changes to determine if it is an Acute Myocardial Infarction or myocardial injury due to an underlying chronic condition.       Results may be falsely decreased if patient taking Biotin.       Comprehensive Metabolic Panel [[id number]]  (Abnormal) Collected: 12/09/22 1203    Specimen: Blood Updated: 12/09/22 1225     Glucose 92 mg/dL      BUN 20 mg/dL      Creatinine 0.83 mg/dL      Sodium 134 mmol/L      Potassium 4.5 mmol/L      Chloride 97 mmol/L      CO2 25.6 mmol/L      Calcium 9.1 mg/dL      Total Protein 7.1 g/dL      Albumin 4.30 g/dL      ALT (SGPT) 10 U/L      AST (SGOT) 20 U/L      Alkaline Phosphatase 98 U/L      Total Bilirubin 0.5 mg/dL      Globulin 2.8 gm/dL      A/G Ratio 1.5 g/dL      BUN/Creatinine Ratio 24.1     Anion Gap 11.4 mmol/L      eGFR 71.4 mL/min/1.73      Comment: National Kidney Foundation and American Society of Nephrology (ASN) Task Force recommended calculation based on the Chronic Kidney Disease Epidemiology Collaboration (CKD-EPI) equation refit without adjustment for race.       Narrative:      GFR Normal >60  Chronic Kidney Disease <60  Kidney Failure <15    The GFR formula is only valid for adults with stable renal function between ages 18 and 70.    aPTT [[id number]]  (Normal) Collected: 12/09/22 1203    Specimen: Blood Updated: 12/09/22 1219     PTT 31.1 seconds     Narrative:      PTT = The equivalent PTT values for the therapeutic range of heparin levels at 0.1 to 0.7 U/ml are 53 to 110 seconds.      Protime-INR [[id number]]  (Normal) Collected: 12/09/22 1203    Specimen: Blood Updated: 12/09/22 1218     Protime 14.3 Seconds      INR 1.10    Narrative:      Therapeutic Ranges for INR: 2.0-3.0 (PT 20-30)                              2.5-3.5 (PT 25-34)        Imaging Results (Most Recent)     Procedure Component Value Units Date/Time    FL C Arm During Surgery [[id number]] Resulted: 12/12/22 1118     Updated: 12/12/22 1118    XR Shoulder 2+ View Left [[id number]] Collected: 12/11/22 2302     Updated: 12/11/22 2304    Narrative:      CR Shoulder Comp Min 2 Vws LT    INDICATION:   Fall, trauma.    COMPARISON:   None available.    FINDINGS:   AP internal rotation and AP  external rotation views of the left shoulder.   No fracture or dislocation.  Degenerative changes of the glenohumeral joint.  No foreign body.      Impression:      Degenerative changes of the left glenohumeral joint. No acute fracture or dislocation.    Signer Name: Cornell Acosta MD   Signed: 12/11/2022 11:02 PM   Workstation Name: [id number]    [facility]    XR Elbow 2 View Right [[id number]] Collected: 12/10/22 1122     Updated: 12/10/22 1124    Narrative:      CR Elbow 2 Vws RT    INDICATION:   Right elbow pain postop portable x-ray following fracture repair.    COMPARISON:   Plain films 12/9/2022    FINDINGS:   AP, lateral, views of the right elbow.  Patient's had interval open reduction internal fixation with side plate and screws to a comminuted fracture of the proximal ulna. There is a plaster splint present partly obscuring the bone detail. There is no  dislocation. Alignment is near-anatomic. There is a apparent fracture fragment on the frontal view projected lateral to the hardware which is likely displaced. No unexpected radiopaque foreign body.      Impression:      Post open reduction internal fixation comminuted fracture proximal ulna with improvement in fracture fragment alignment. No dislocation.    Signer Name: Ida Logan MD   Signed: 12/10/2022 11:22 AM   Workstation Name: [id number]    [facility]    XR Elbow 2 View Right [[id number]] Collected: 12/10/22 1020     Updated: 12/10/22 1022    Narrative:      CR Elbow 2 Vws RT    INDICATION:   ORIF open olecranon fracture.    COMPARISON:   None available    FINDINGS: 39 seconds of fluoroscopy time.  2 intraoperative views of the right elbow.  Status post ORIF of a comminuted and displaced olecranon fracture with normal establishment of elbow alignment.  Soft tissue swelling.      Impression:      ORIF of right olecranon fracture in normal anatomic alignment.    Signer Name: TAMIKA Flores MD   Signed:  12/10/2022 10:20 AM   Workstation Name: [id number]    [facility]    XR Ribs 2 View Right [[id number]] Collected: 12/09/22 2042     Updated: 12/09/22 2044    Narrative:      CR Ribs 2 Vws WO Chest RT    INDICATION:   Right lower rib pain after fall    COMPARISON:   NONE    FINDINGS:  PA view of the chest and oblique views of the right ribs. 4 total images. Minimally displaced fracture of the posterolateral right 11th rib. The adjacent lung is clear and fully expanded.      Impression:      Minimally displaced fracture of the posterior lateral right 11th rib.    Signer Name: Cornell Acosta MD   Signed: 12/9/2022 8:42 PM   Workstation Name: [id number]    [facility]    XR Knee 3 View Right [[id number]] Collected: 12/09/22 2037     Updated: 12/09/22 2039    Narrative:      CR Knee 3 Vws RT    INDICATION:   Knee pain after fall    COMPARISON:   None available.    FINDINGS:  AP, lateral, and sunrise view(s) of the right knee.      Right knee arthroplasty in anatomic alignment. No evidence of acute fracture or dislocation.      Impression:        Right knee arthroplasty in anatomic alignment. No evidence of acute fracture or dislocation.    Signer Name: Cornell Acosta MD   Signed: 12/9/2022 8:37 PM   Workstation Name: [id number]    [facility]    XR Knee 3 View Left [[id number]] Collected: 12/09/22 2008     Updated: 12/09/22 2010    Narrative:      CR Knee 3 Vws LT    INDICATION:   Knee pain after fall    COMPARISON:   None available.    FINDINGS:  3 view(s) of the left knee. Total knee replacement. Hardware appears appropriately aligned. No radiographic evidence of displaced fracture or dislocation..      Impression:      Total knee replacement. No convincing acute bony abnormality.    Signer Name: JONATHAN MIRANDA MD   Signed: 12/9/2022 8:08 PM   Workstation Name: [facility]    [facility]    CT Upper Extremity Right Without Contrast  [[id number]] Collected: 12/09/22 1437     Updated: 12/09/22 1439    Narrative:      CT UE RT WO    INDICATION:    Fell 3 days ago with abnormal elbow x-ray.    TECHNIQUE:   CT of the right elbow without IV contrast. Coronal and sagittal reconstructions were obtained.  Radiation dose reduction techniques included automated exposure control or exposure modulation based on body size. Count of known CT and cardiac nuc med  studies performed in previous 12 months: 0.      COMPARISON:    Right elbow series 12/9/2022    FINDINGS:  Moderately comminuted and displaced fracture of the proximal ulna and olecranon with fracture extending through the ulnar notch and up to 2.2 cm of proximal retraction of the olecranon with an apparently intact triceps tendon. 1.8 cm ossific fragment  within the gap. The coronoid process remains intact. The radius remains intact and normally articulating with the humerus. No evidence of distal humeral fracture.    Extensive soft tissue swelling and edema along the posterior elbow and posterior upper arm with some areas of increased attenuation compatible with hemorrhage and hematoma. Moderate amount of soft tissue gas primarily tracking along the posterior and  lateral aspect of the upper arm. This is predominantly within the deep subcutaneous tissues is also extensive interfascial. There is skin thickening with reticulation of the subcutaneous tissues. Large elbow effusion. Biceps and brachialis tendon  insertions appear intact. Common extensor and common flexor tendon origins appear to be grossly intact. Visualized musculature unremarkable. No definite breach of the skin surface though some punctate calcifications along the proximal ulna and olecranon  extending towards the skin surface and may reside within the olecranon bursa.      Impression:      Comminuted and displaced intra-articular fracture of the proximal ulna with disruption of the ulnar notch and proximal displacement of a large  olecranon fragment with several additional fragments within the fracture defect.    Extensive posterior elbow, distal upper arm and proximal forearm soft tissue swelling and edema with some areas of confluent edema and reticulation of subcutaneous tissues. This could represent soft tissue contusion with some areas of increased  attenuation at the site of the fracture compatible with small areas of hematoma/hemorrhage.    Extensive soft tissue gas predominantly within the distal upper arm along the posterior lateral aspect. This could be secondary to open fracture but a discrete break in the skin or overlying soft tissue is not visualized. Correlate for possible  superimposed soft tissue infection. There is a moderate-sized joint effusion. No definite drainable soft tissue abscess or fluid collection    The major tendons about the elbow appear intact.    Signer Name: TAMIKA Flores MD   Signed: 12/9/2022 2:37 PM   Workstation Name: [id number]    [facility]    CT Facial Bones Without Contrast [[id number]] Collected: 12/09/22 1211     Updated: 12/09/22 1213    Narrative:      CT Max Facial Area WO    INDICATION:   Fall 3 days ago with facial trauma    TECHNIQUE:   Maxillofacial CT without IV contrast. Coronal and sagittal reconstructions were obtained.  Radiation dose reduction techniques included automated exposure control or exposure modulation based on body size. Count of known CT and cardiac nuc med studies  performed in previous 12 months: 0.     COMPARISON:    No pertinent prior study    FINDINGS:  The mandible and the maxilla appear intact. No nasal bone fracture is seen.    The walls of the maxillary sinuses are intact. Zygomatic arches are intact.    No orbital wall fractures. The lamina papyracea are intact. The frontal sinus is not significantly developed in this patient.      Impression:      No acute traumatic findings.    Signer Name: Jaime Cheng MD   Signed: 12/9/2022 12:11 PM    Workstation Name: [id number]    [facility]    XR Elbow 3+ View Right [[id number]] Collected: 12/09/22 1206     Updated: 12/09/22 1211    Narrative:      XR ELBOW 3+ VW RIGHT-: 12/9/2022 12:00 PM     INDICATION:   Fell yesterday. Injury..     COMPARISON:   None available.     FINDINGS:   3 views of the right elbow.  There is a complete comminuted and  distracted fracture of the olecranon. At least 3 dominant fracture  fragments are present, with the largest fracture fragment measuring 2.1  cm and displaced posteriorly on the lateral projection a distance of at  least 1.8 cm. There is overlying soft tissue swelling. Radial head  appears intact. Probable subcutaneous gas related to open fracture  deformity. No bone erosion or destruction.  No foreign body.       Impression:         1. Comminuted complete and displaced fracture of the olecranon.  Associated soft tissue swelling and subcutaneous gas suggestive of open  fracture.     This report was finalized on 12/9/2022 12:09 PM by Dr. Rodolfo Chavez MD.       CT Cervical Spine Without Contrast [[id number]] Collected: 12/09/22 1208     Updated: 12/09/22 1210    Narrative:      CT Spine Cervical WO    INDICATION:   Fall 3 days ago with neck pain    TECHNIQUE:   CT of the cervical spine without IV contrast. Coronal and sagittal reconstructions were obtained.  Radiation dose reduction techniques included automated exposure control or exposure modulation based on body size. Count of known CT and cardiac nuc med  studies performed in previous 12 months: 3.     COMPARISON:  No pertinent prior study    FINDINGS:  The sagittal alignment is satisfactory. Prior cervical fusion at C5-C6. Vertebral body heights are maintained. Mild demineralization of osseous structures. Disc space narrowing at C3-C4, C4-C5 and C6-C7.    Facets are intact. Posterior elements show normal alignment. Prevertebral soft tissues are normal.    The odontoids intact. The ring of C1 is  intact.    No clearly acute findings demonstrated.      Impression:      No clearly acute radiographic findings.    Evidence of prior fusion at C5-C6. 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    Clinical aspects of the case will determine if MR of the cervical spine could provide additional information.    Signer Name: Jaime Cheng MD   Signed: 12/9/2022 12:08 PM   Workstation Name: [id number]    [facility]    CT Head Without Contrast [[id number]] Collected: 12/09/22 1201     Updated: 12/09/22 1203    Narrative:      CT Head WO    HISTORY:   Fall 3 days ago with head trauma.    TECHNIQUE:   Axial unenhanced head CT. Radiation dose reduction techniques included automated exposure control or exposure modulation based on body size. Count of known CT and cardiac nuc med studies performed in previous 12 months: 3.     Time of scan: 11:45 AM    COMPARISON:   9/28/2022    FINDINGS:     The ventricles are generous in size. Cortical sulci are correspondingly prominent. No extraaxial fluid collections are seen.    Scalp hematoma demonstrated in the right frontal region.    No parenchymal or subarachnoid hemorrhage is present. Periventricular hypodensities are demonstrated which are nonspecific but likely represent white matter microvascular change in a patient of this age. No CT evidence of mass or acute infarct.    The mastoid air cells are clear. The visualized paranasal sinuses are clear.  Orbital structures demonstrate no acute findings.      Impression:      Impression:  1. No clearly acute intracranial pathology demonstrated.    2. Generalized cerebral atrophy and nonspecific periventricular hypodensities which likely represent white matter microvascular change. Scalp hematoma in the right frontal area.    Signer Name: Jaime Cheng MD   Signed: 12/9/2022 12:01 PM   Workstation Name: [id number]    [facility]        PROCEDURES  Procedure(s):  ELBOW OPEN  REDUCTION INTERNAL FIXATION    Condition on Discharge: Stable    Physical Exam at Discharge  Vital Signs  Temp:  [97.5 °F (36.4 °C)-98.9 °F (37.2 °C)] 98.9 °F (37.2 °C)  Heart Rate:  [] 108  Resp:  [2-16] 16  BP: (103-134)/(55-84) 103/79   Body mass index is 19.51 kg/m².    Physical Exam  Vitals reviewed.   Constitutional:       General: She is not in acute distress.     Comments: Elderly, frail   HENT:      Head: Normocephalic and atraumatic.      Mouth/Throat:      Mouth: Mucous membranes are moist.   Eyes:      Extraocular Movements: Extraocular movements intact.      Pupils: Pupils are equal, round, and reactive to light.   Cardiovascular:      Rate and Rhythm: Tachycardia present. Rhythm irregular.   Pulmonary:      Effort: Pulmonary effort is normal. No respiratory distress.      Breath sounds: Normal breath sounds. No wheezing or rales.   Abdominal:      General: Abdomen is flat. Bowel sounds are normal. There is no distension.      Palpations: Abdomen is soft.      Tenderness: There is no abdominal tenderness. There is no guarding.   Musculoskeletal:         General: No swelling.   Skin:     General: Skin is warm and dry.      Capillary Refill: Capillary refill takes less than 2 seconds.      Comments: Right hand healing ecchymosis  Face/forehead extensive healing ecchymosis   Neurological:      General: No focal deficit present.      Mental Status: She is alert and oriented to person, place, and time.   Psychiatric:         Mood and Affect: Mood normal.         Behavior: Behavior normal.       Discharge Disposition  [facility]    Visiting Nurse:    As per facility    PT/OT:  Yes     Safety Evaluation:  Yes     DME  TBD    Discharge Diet:      Dietary Orders (From admission, onward)     Start     Ordered    12/12/22 1800  Dietary Nutrition Supplements Boost Plus (Ensure Enlive, Ensure Plus)  Daily With Breakfast, Lunch & Dinner      Question:  Select Supplement:  Answer:  Boost Plus (Ensure  "Enlive, Ensure Plus)    12/12/22 1502    12/10/22 1208  Diet: Regular/House Diet; Texture: Regular Texture (IDDSI 7); Fluid Consistency: Thin (IDDSI 0)  Diet Effective Now        References:    Diet Order [place]   Question Answer Comment   Diets: Regular/House Diet    Texture: Regular Texture (IDDSI 7)    Fluid Consistency: Thin (IDDSI 0)        12/10/22 1207                Activity at Discharge:  As tolerated with assistance, no driving    Pre-discharge education  Smoking, Cardiac, Wound Care, medications, follow-up    Follow-up Appointments  Future Appointments   Date Time Provider Department Center   12/27/2022  9:45 AM Lito Reeder MD [facility]   3/7/2023 11:45 AM Mo Calero MD [facility]     Additional Instructions for the Follow-ups that You Need to Schedule     Discharge Follow-up with PCP   As directed       Currently Documented PCP:    Donna Forte MD    PCP Phone Number:    [phone]     Follow Up Details: 1 week         Discharge Follow-up with Specified Provider: Cardiology; 1 Week   As directed      To: Cardiology    Follow Up: 1 Week    Follow Up Details: f/u A-fib RVR with medication changes         Discharge Follow-up with Specified Provider: Dr. Reeder   As directed      To: Dr. Reeder    Follow Up Details: week of Dec 26th             Test Results Pending at Discharge: None     Becky Vincent, LILI  12/16/22  10:37 EST    Time: Discharge Over 30 min (if over 30 minutes give explanation as to why it took greater than 30 minutes)  Secondary to:   Coordination of care/follow up  Medication reconciliation  D/W patient and case management    \"Dictated utilizing Dragon dictation\"      "

## 2022-12-16 NOTE — PROGRESS NOTES
Adult Nutrition  Assessment/PES    Patient Name:  Kaylin Ojeda  YOB: 1940  MRN: [id number]  Admit Date:  12/9/2022    Assessment Date:  12/16/2022    Comments:  Agree with Regular diet with Boost Plus, continue at discharge, noted today.  Encouraged pro with her meals.   Will remain available.      Reason for Assessment     Row Name 12/16/22 1151          Reason for Assessment    Reason For Assessment follow-up protocol     Diagnosis trauma  s/p fall, fx                Nutrition/Diet History     Row Name 12/16/22 1151          Nutrition/Diet History    Typical Intake (Food/Fluid/EN/PN) Pt in bed, reports doing ok w with eating and drinking some Boost. just more than she usual eats. Noted facical bruising with some improvement.                Labs/Tests/Procedures/Meds     Row Name 12/16/22 1152          Labs/Procedures/Meds    Lab Results Reviewed reviewed     Lab Results Comments K 3.4 L, Glu 221 H        Diagnostic Tests/Procedures    Diagnostic Test/Procedure Reviewed reviewed        Medications    Pertinent Medications Reviewed reviewed                    Nutrition Prescription Ordered     Row Name 12/16/22 1152          Nutrition Prescription PO    Current PO Diet Regular     Supplement Boost Plus (Ensure Enlive, Ensure Plus)     Supplement Frequency 3 times a day                Evaluation of Received Nutrient/Fluid Intake     Row Name 12/16/22 1152          Fluid Intake Evaluation    Oral Fluid (mL) 393  ave x 3, 37%        PO Evaluation    Number of Meals 7     % PO Intake 46                   Problem/Interventions:   Problem 1     Row Name 12/16/22 1153          Nutrition Diagnoses Problem 1    Problem 1 Inadequate Nutrient Intake     Etiology (related to) Medical Diagnosis;Factors Affecting Nutrition     Signs/Symptoms (evidenced by) Report/Observation                Problem 2     Row Name 12/16/22 1153          Nutrition Diagnoses Problem 2    Problem 2 Malnutrition     Etiology  (related to) Medical Diagnosis;Factors Affecting Nutrition     Signs/Symptoms (evidenced by) Report/Observation                    Intervention Goal     Row Name 12/16/22 1153          Intervention Goal    General Meet nutritional needs for age/condition     PO Continue positive trend;PO intake (%)     PO Intake % 55 %  or greater                Nutrition Intervention     Row Name 12/16/22 1153          Nutrition Intervention    RD/Tech Action Interview for preference;Encourage intake;Follow Tx progress                  Education/Evaluation     Row Name 12/16/22 1153          Education    Education Other (comment)  encouraged pt to eather pro each meal and cont Boost        Monitor/Evaluation    Monitor Per protocol;I&O;PO intake;Supplement intake;Pertinent labs;Weight;Symptoms                 Electronically signed by:  Natty Barajas RD  12/16/22 11:54 EST

## 2022-12-16 NOTE — CASE MANAGEMENT/SOCIAL WORK
Continued Stay Note  CHERRIE Bennett     Patient Name: Kaylin Ojeda  MRN: [id number]  Today's Date: 12/16/2022    Admit Date: 12/9/2022    Plan: [facility]   Discharge Plan     Row Name 12/16/22 2967       Plan    Plan [facility]    Plan Comments Follow up visit with pateint regarding concerns of cost of rehab and ability to pay rent. Informed that STR is covered by her insurnace after a hospital stay, insurance will determine how many days are covered depending on her progress. She appears relieved and will manage her rent as usualy. Support given - anticipate dc to [facility]  today, transport by family. CM will conitnue to follow.               Discharge Codes    No documentation.               Expected Discharge Date and Time     Expected Discharge Date Expected Discharge Time    Dec 16, 2022             Walter Moreno RN

## 2022-12-16 NOTE — PLAN OF CARE
Goal Outcome Evaluation:  Plan of Care Reviewed With: patient        Progress: improving  Outcome Evaluation: POD # 6 OROF right elbow, RUE elevated, percocet scheduled every 8 hours, bed alarm on , possible discharge to [facility] today.

## 2022-12-16 NOTE — CASE MANAGEMENT/SOCIAL WORK
Continued Stay Note  CHERRIE Bennett     Patient Name: Kaylin Ojeda  MRN: [id number]  Today's Date: 12/16/2022    Admit Date: 12/9/2022    [facility]   Discharge Plan     Row Name 12/16/22 0903       Plan    Plan [facility] STR    Plan Comments Spoke with Marilee/Jose [facility] - precert obtained, can accept patient today. CALL REPORT [phone]. Message to update Clemente PEARSON and Dr Joaquin. CM will continue to follow.               Discharge Codes    No documentation.                     Walter Moreno RN

## 2022-12-16 NOTE — PROGRESS NOTES
"DAILY PROGRESS NOTE  [facility]  0  ORTHOPEDIC SURGERY DAILY PROGRESS NOTE  [facility]  LENGTH OF STAY 0 DAYS      Patient Identification:  Name: Kaylin Ojeda  Age: 82 y.o.  Sex: female  :  1940  MRN: [id number]         Primary Care Physician: Donna Forte MD      Subjective:  Interval History: Pain well controlled.  No issues.  Eager to have her dressing changed today.    Objective:    Scheduled Meds:apixaban, 2.5 mg, Oral, BID  atorvastatin, 10 mg, Oral, Daily  budesonide-formoterol, 2 puff, Inhalation, BID - RT  dilTIAZem, 60 mg, Oral, Q6H  doxycycline, 100 mg, Oral, Q12H  famotidine, 20 mg, Oral, BID AC  FLUoxetine, 20 mg, Oral, Daily  hydrOXYzine, 25 mg, Oral, TID  nicotine, 1 patch, Transdermal, Q24H  oxyCODONE-acetaminophen, 1 tablet, Oral, Q8H  senna-docusate sodium, 2 tablet, Oral, BID  sodium chloride, 10 mL, Intravenous, Q12H      Continuous Infusions:lactated ringers, 9 mL/hr, Last Rate: 100 mL/hr (12/10/22 0801)        Vital signs in last 24 hours:  Temp:  [97.5 °F (36.4 °C)-98.9 °F (37.2 °C)] 98.9 °F (37.2 °C)  Heart Rate:  [] 104  Resp:  [2-20] 16  BP: (103-134)/(55-84) 103/79    Intake/Output:    Intake/Output Summary (Last 24 hours) at 2022 0843  Last data filed at 2022 0420  Gross per 24 hour   Intake 600 ml   Output 1200 ml   Net -600 ml       Exam:  /79   Pulse 104   Temp 98.9 °F (37.2 °C)   Resp 16   Ht 152.4 cm (60\")   Wt 45.3 kg (99 lb 14.4 oz)   LMP  (LMP Unknown)   SpO2 95%   BMI 19.51 kg/m²   General: No acute distress, Aox3  Pulmonary: Non-labored breathing  Cardiovascular: Regular rate and rhythm   RUE  Nonpalpable radial pulse  Fingers warm well-perfused with brisk capillary refill  Neurovascularly intact in right hand in all distributions  And station normal to light touch in right hand  Forearm and upper arm compartments soft compressible no signs of compartment syndrome or DVT  Incision healing with " no signs of infection.  Prior skin tear granulating in                Data Review:  Lab Results   Component Value Date    CALCIUM 9.2 12/14/2022    PHOS 3.4 12/11/2022     Results from last 7 days   Lab Units 12/14/22  1024 12/12/22  0410 12/11/22  1412 12/11/22  0425 12/10/22  0420 12/09/22  1203   AST (SGOT) U/L  --   --   --   --   --  20   MAGNESIUM mg/dL  --  1.9 2.2  --   --   --    SODIUM mmol/L 138 136  --  135*   < > 134*   POTASSIUM mmol/L 3.4* 3.8  --  3.4*   < > 4.5   CHLORIDE mmol/L 100 100  --  97*   < > 97*   CO2 mmol/L 26.1 28.1  --  27.8   < > 25.6   BUN mg/dL 20 9  --  8   < > 20   CREATININE mg/dL 0.73 0.60  --  0.73   < > 0.83   GLUCOSE mg/dL 221* 112*  --  113*   < > 92   CALCIUM mg/dL 9.2 8.6  --  9.0   < > 9.1   WBC 10*3/mm3 8.01 7.78  --  8.04   < > 7.34   HEMOGLOBIN g/dL 9.8* 8.8* 9.4* 9.0*   < > 11.3*   PLATELETS 10*3/mm3 251 163  --  147   < > 191   ALT (SGPT) U/L  --   --   --   --   --  10    < > = values in this interval not displayed.     Lab Results   Component Value Date    CKTOTAL 170 12/09/2022    CKMB 2.84 06/05/2017    TROPONINT <0.010 12/09/2022     Estimated Creatinine Clearance: 44 mL/min (by C-G formula based on SCr of 0.73 mg/dL).  WEIGHTS:     Wt Readings from Last 1 Encounters:   12/09/22 1646 45.3 kg (99 lb 14.4 oz)   12/09/22 1048 45.4 kg (100 lb)         Assessment:    Type I or II open fracture of right olecranon process    Type I or II open fracture of olecranon process of right ulna, initial encounter    Severe malnutrition (HCC)      Plan:  82-year-old female postop day 6 ORIF and I&D right olecranon  Nonweightbearing right upper extremity  PT/OT  Dressing and splint changed this morning by myself  Recommend 2 weeks total of doxycycline 100 mg twice daily for surgical prophylaxis due to prolonged treatment of open fracture  Follow-up in the office with me the week of December 26      Plan for disposition:Where: [facility]      Lito Reeder MD  12/16/2022  08:43  EST

## 2022-12-27 NOTE — PROGRESS NOTES
Subjective:     Patient ID: Kaylin Ojeda is a 82 y.o. female.    Chief Complaint:    History of Present Illness  Kaylin Ojeda returns to clinic today for evaluation of right open olecranon fracture with late presentation roughly 3 days after the injury.  She has beendischarged from her nursing facility and is back home.  She states her pain is bad relatively well controlled and she is happy with result.  She presents today in a splint that I changed 1 week following surgery.     Social History     Occupational History     Employer: RETIRED   Tobacco Use   • Smoking status: Some Days     Packs/day: 0.20     Years: 50.00     Pack years: 10.00     Types: Cigarettes   • Smokeless tobacco: Never   • Tobacco comments:     trying to quit, pt states she has basically quit but will still have one when she stressed   Vaping Use   • Vaping Use: Never used   Substance and Sexual Activity   • Alcohol use: Not Currently     Comment: OCCASIONAL/ TWICE A YEAR. // daily caffiene   • Drug use: No   • Sexual activity: Defer      Past Medical History:   Diagnosis Date   • A-fib ([facility])    • Abdominal pain, epigastric     Chronic, unclear cause, improved   • Anorexia    • Anxiety    • Arthritis    • CAD (coronary artery disease)     Cath 5/2015: nonobstructive LAD/RCA disease, 90% mid LCx s/p [id number] Xience.  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[facility])    • Mononucleosis    • Occlusion  and stenosis of carotid artery    • Onychomycosis    • Orbit fracture (HCC)    • PAF (paroxysmal atrial fibrillation) (HCC)    • Peripheral arterial disease (HCC)     Significant (carotid, subclavian, renal arteries, lower extremities), with claudication, medical therapy only   • Pernicious anemia    • Prediabetes    • Renal artery stenosis (HCC)     unilateral, with renal atrophy (no intervention required)   • Renal calculi    • Sinus bradycardia     mild, asymptomatic   • TIA (transient ischemic attack)    • UTI (urinary tract infection)    • Valvular heart disease     1/2021: mod-severe AI, mod MR, mild-mod TR   • Vision problem    • Vitamin B 12 deficiency      Past Surgical History:   Procedure Laterality Date   • APPENDECTOMY     • BREAST BIOPSY Left     20+ yrs ago   • BREAST SURGERY     • CARDIAC CATHETERIZATION  05/2015    nonobs LAD/RCA disease, 90% mid LCx s/p [id number] Xience   • CARDIAC CATHETERIZATION N/A 10/28/2020    Procedure: Right and Left Heart Cath;  Surgeon: Opal Contreras MD;  Location:  [facility];  Service: Cardiovascular;  Laterality: N/A;  for cardiac testing prior to possible valve surgery per Dr. Mai   • CARDIAC CATHETERIZATION N/A 10/28/2020    Procedure: Coronary angiography;  Surgeon: Opal Contreras MD;  Location:  [facility];  Service: Cardiovascular;  Laterality: N/A;   • CERVICAL FUSION  1997   • CHOLECYSTECTOMY     • CORONARY STENT PLACEMENT     • HYSTERECTOMY  1995   • KNEE SURGERY Right 2005   • KNEE SURGERY Left 1998   • ORIF HUMERUS FRACTURE Right 12/10/2022    Procedure: ELBOW OPEN REDUCTION INTERNAL FIXATION;  Surgeon: Lito Reeder MD;  Location: [facility];  Service: Orthopedics;  Laterality: Right;       Family History   Problem Relation Age of Onset   • Asthma Mother    • Emphysema Mother    • Graves' disease Mother    • Heart disease Mother    • Hypertension Mother    • Emphysema Father    • Hypertension Father    • Heart  "disease Father    • Kidney disease Sister    • Lupus Daughter         Systemic erythematosus   • Arthritis Other    • Crohn's disease Other    • Breast cancer Niece    • Breast cancer Maternal Cousin    • Breast cancer Maternal Cousin                  Objective:  Vitals:    12/27/22 0945   Weight: 44.9 kg (99 lb)   Height: 152.4 cm (60\")         12/27/22 0945   Weight: 44.9 kg (99 lb)     Body mass index is 19.33 kg/m².        Right Elbow Exam     Tenderness   Right elbow tenderness location: Global.     Range of Motion   Right elbow extension: 30.   Right elbow flexion: 110.   Pronation: abnormal   Supination: abnormal     Muscle Strength   Pronation:  4/5   Supination:  4/5     Other   Erythema: absent  Sensation: normal  Pulse: present    Comments:  Incision healing with no signs of infection.  There is a large scab anterior laterally over radiocapitellar joint from prior skin tear from the injury.               Imaging: Views of the right elbow were ordered and reviewed myself in the office today  Indication: Right olecranon fracture status post ORIF  Findings: X-rays demonstrate a right olecranon fracture with implants in expected position with no signs of fracture migration or failure of fixation.  Radiocapitellar and ulnohumeral joints appear concentric and reduced.  Comparative studies: Immediate postoperative    Assessment:      No diagnosis found.       Plan:          1. Discussed treatment options at length with patient at today's visit.  I discussed with the patient that today we will remove her sutures will need to keep a close eye on her incision and skin tear scab to make sure that she does not develop signs of infection due to the fact that her olecranon fracture was open for roughly 3 days due to delayed presentation to the emergency department.  The patient was placed in a hinged elbow brace from 0 to 90 degrees today and locked at 90 degrees.  I will refer the patient to physical therapy for " range of motion exercises.  The patient was given a postoperative protocol pamphlet that I want her to take with her to physical therapy.  She voiced understanding the importance of taking a protocol with her.  She should wear the brace at all times except while bathing.  2. Follow up: 4 weeks with repeat 2 views of the right elbow      Kaylin Ojeda was in agreement with plan and had all questions answered.     Medications:  No orders of the defined types were placed in this encounter.      Followup:  No follow-ups on file.    There are no diagnoses linked to this encounter.      Dictated utilizing Dragon dictation

## 2023-01-01 ENCOUNTER — ANESTHESIA (OUTPATIENT)
Dept: PERIOP | Facility: HOSPITAL | Age: 83
DRG: 510 | End: 2023-01-01
Payer: MEDICARE

## 2023-01-01 ENCOUNTER — READMISSION MANAGEMENT (OUTPATIENT)
Dept: CALL CENTER | Facility: HOSPITAL | Age: 83
End: 2023-01-01
Payer: MEDICARE

## 2023-01-01 ENCOUNTER — TELEPHONE (OUTPATIENT)
Dept: INTERNAL MEDICINE | Facility: CLINIC | Age: 83
End: 2023-01-01

## 2023-01-01 ENCOUNTER — HOME HEALTH ADMISSION (OUTPATIENT)
Dept: HOME HEALTH SERVICES | Facility: HOME HEALTHCARE | Age: 83
End: 2023-01-01
Payer: MEDICARE

## 2023-01-01 ENCOUNTER — ANESTHESIA EVENT (OUTPATIENT)
Dept: PERIOP | Facility: HOSPITAL | Age: 83
DRG: 510 | End: 2023-01-01
Payer: MEDICARE

## 2023-01-01 ENCOUNTER — TRANSITIONAL CARE MANAGEMENT TELEPHONE ENCOUNTER (OUTPATIENT)
Dept: CALL CENTER | Facility: HOSPITAL | Age: 83
End: 2023-01-01
Payer: MEDICARE

## 2023-01-01 ENCOUNTER — OFFICE VISIT (OUTPATIENT)
Dept: INTERNAL MEDICINE | Facility: CLINIC | Age: 83
End: 2023-01-01
Payer: MEDICARE

## 2023-01-01 ENCOUNTER — HOSPITAL ENCOUNTER (INPATIENT)
Facility: HOSPITAL | Age: 83
LOS: 1 days | DRG: 510 | End: 2023-03-22
Attending: EMERGENCY MEDICINE | Admitting: INTERNAL MEDICINE
Payer: MEDICARE

## 2023-01-01 ENCOUNTER — APPOINTMENT (OUTPATIENT)
Dept: GENERAL RADIOLOGY | Facility: HOSPITAL | Age: 83
DRG: 510 | End: 2023-01-01
Payer: MEDICARE

## 2023-01-01 ENCOUNTER — HOSPITAL ENCOUNTER (INPATIENT)
Facility: HOSPITAL | Age: 83
LOS: 1 days | Discharge: HOME HEALTH | DRG: 291 | End: 2023-03-15
Attending: EMERGENCY MEDICINE | Admitting: INTERNAL MEDICINE
Payer: MEDICARE

## 2023-01-01 ENCOUNTER — APPOINTMENT (OUTPATIENT)
Dept: GENERAL RADIOLOGY | Facility: HOSPITAL | Age: 83
DRG: 291 | End: 2023-01-01
Payer: MEDICARE

## 2023-01-01 ENCOUNTER — TELEPHONE (OUTPATIENT)
Dept: CARDIOLOGY | Facility: CLINIC | Age: 83
End: 2023-01-01
Payer: MEDICARE

## 2023-01-01 ENCOUNTER — OFFICE VISIT (OUTPATIENT)
Dept: ORTHOPEDIC SURGERY | Facility: CLINIC | Age: 83
End: 2023-01-01
Payer: MEDICARE

## 2023-01-01 ENCOUNTER — TELEPHONE (OUTPATIENT)
Dept: CARDIOLOGY | Facility: CLINIC | Age: 83
End: 2023-01-01

## 2023-01-01 ENCOUNTER — APPOINTMENT (OUTPATIENT)
Dept: CT IMAGING | Facility: HOSPITAL | Age: 83
DRG: 291 | End: 2023-01-01
Payer: MEDICARE

## 2023-01-01 ENCOUNTER — APPOINTMENT (OUTPATIENT)
Dept: ULTRASOUND IMAGING | Facility: HOSPITAL | Age: 83
DRG: 291 | End: 2023-01-01
Payer: MEDICARE

## 2023-01-01 ENCOUNTER — TELEPHONE (OUTPATIENT)
Dept: INTERNAL MEDICINE | Facility: CLINIC | Age: 83
End: 2023-01-01
Payer: MEDICARE

## 2023-01-01 ENCOUNTER — OUTSIDE FACILITY SERVICE (OUTPATIENT)
Dept: INTERNAL MEDICINE | Facility: CLINIC | Age: 83
End: 2023-01-01
Payer: MEDICARE

## 2023-01-01 ENCOUNTER — APPOINTMENT (OUTPATIENT)
Dept: CARDIOLOGY | Facility: HOSPITAL | Age: 83
DRG: 291 | End: 2023-01-01
Payer: MEDICARE

## 2023-01-01 VITALS
DIASTOLIC BLOOD PRESSURE: 80 MMHG | SYSTOLIC BLOOD PRESSURE: 98 MMHG | TEMPERATURE: 98.7 F | WEIGHT: 91.4 LBS | HEART RATE: 135 BPM | HEIGHT: 60 IN | BODY MASS INDEX: 17.94 KG/M2 | OXYGEN SATURATION: 96 %

## 2023-01-01 VITALS
WEIGHT: 102.8 LBS | OXYGEN SATURATION: 82 % | TEMPERATURE: 97.8 F | SYSTOLIC BLOOD PRESSURE: 117 MMHG | HEIGHT: 60 IN | BODY MASS INDEX: 20.18 KG/M2 | DIASTOLIC BLOOD PRESSURE: 97 MMHG

## 2023-01-01 VITALS — HEIGHT: 60 IN | WEIGHT: 99 LBS | BODY MASS INDEX: 19.44 KG/M2

## 2023-01-01 VITALS
SYSTOLIC BLOOD PRESSURE: 134 MMHG | BODY MASS INDEX: 17.71 KG/M2 | OXYGEN SATURATION: 96 % | WEIGHT: 90.2 LBS | HEIGHT: 60 IN | TEMPERATURE: 96.9 F | DIASTOLIC BLOOD PRESSURE: 71 MMHG | HEART RATE: 76 BPM | RESPIRATION RATE: 20 BRPM

## 2023-01-01 DIAGNOSIS — F32.A ANXIETY AND DEPRESSION: ICD-10-CM

## 2023-01-01 DIAGNOSIS — E63.9 POOR NUTRITION: ICD-10-CM

## 2023-01-01 DIAGNOSIS — E78.00 HYPERCHOLESTEROLEMIA: ICD-10-CM

## 2023-01-01 DIAGNOSIS — I10 ESSENTIAL HYPERTENSION: ICD-10-CM

## 2023-01-01 DIAGNOSIS — M71.121 INFECTION OF RIGHT OLECRANON BURSA: Primary | ICD-10-CM

## 2023-01-01 DIAGNOSIS — M54.50 LUMBAR BACK PAIN: ICD-10-CM

## 2023-01-01 DIAGNOSIS — M54.50 CHRONIC LOW BACK PAIN WITHOUT SCIATICA, UNSPECIFIED BACK PAIN LATERALITY: ICD-10-CM

## 2023-01-01 DIAGNOSIS — I48.0 PAROXYSMAL ATRIAL FIBRILLATION: ICD-10-CM

## 2023-01-01 DIAGNOSIS — I48.0 PAF (PAROXYSMAL ATRIAL FIBRILLATION): ICD-10-CM
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
## 2023-01-01 DIAGNOSIS — S52.021E OLECRANON FRACTURE, RIGHT, OPEN TYPE I OR II, WITH ROUTINE HEALING, SUBSEQUENT ENCOUNTER: ICD-10-CM

## 2023-01-01 DIAGNOSIS — I25.119 CORONARY ARTERY DISEASE INVOLVING NATIVE CORONARY ARTERY OF NATIVE HEART WITH ANGINA PECTORIS: ICD-10-CM

## 2023-01-01 DIAGNOSIS — F41.9 ANXIETY AND DEPRESSION: ICD-10-CM

## 2023-01-01 DIAGNOSIS — J43.9 PULMONARY EMPHYSEMA, UNSPECIFIED EMPHYSEMA TYPE: Primary | ICD-10-CM

## 2023-01-01 DIAGNOSIS — S52.021E OLECRANON FRACTURE, RIGHT, OPEN TYPE I OR II, WITH ROUTINE HEALING, SUBSEQUENT ENCOUNTER: Primary | ICD-10-CM

## 2023-01-01 DIAGNOSIS — G89.29 CHRONIC LOW BACK PAIN WITHOUT SCIATICA, UNSPECIFIED BACK PAIN LATERALITY: ICD-10-CM

## 2023-01-01 DIAGNOSIS — I50.9 OTHER CONGESTIVE HEART FAILURE: ICD-10-CM

## 2023-01-01 DIAGNOSIS — L03.113 CELLULITIS OF RIGHT ELBOW: ICD-10-CM

## 2023-01-01 DIAGNOSIS — E43 UNSPECIFIED SEVERE PROTEIN-CALORIE MALNUTRITION: ICD-10-CM

## 2023-01-01 DIAGNOSIS — R09.02 HYPOXIA: ICD-10-CM
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
## 2023-01-01 LAB
ALBUMIN SERPL-MCNC: 3.1 G/DL (ref 3.5–5.2)
ALBUMIN SERPL-MCNC: 3.3 G/DL (ref 3.5–5.2)
ALBUMIN SERPL-MCNC: 3.6 G/DL (ref 3.5–5.2)
ALBUMIN/GLOB SERPL: 1.1 G/DL
ALP SERPL-CCNC: 127 U/L (ref 39–117)
ALP SERPL-CCNC: 136 U/L (ref 39–117)
ALP SERPL-CCNC: 172 U/L (ref 39–117)
ALT SERPL W P-5'-P-CCNC: 2269 U/L (ref 1–33)
ALT SERPL W P-5'-P-CCNC: 53 U/L (ref 1–33)
ALT SERPL W P-5'-P-CCNC: 883 U/L (ref 1–33)
ANION GAP SERPL CALCULATED.3IONS-SCNC: 13.1 MMOL/L (ref 5–15)
ANION GAP SERPL CALCULATED.3IONS-SCNC: 13.2 MMOL/L (ref 5–15)
ANION GAP SERPL CALCULATED.3IONS-SCNC: 13.8 MMOL/L (ref 5–15)
ANION GAP SERPL CALCULATED.3IONS-SCNC: 16 MMOL/L (ref 5–15)
ANION GAP SERPL CALCULATED.3IONS-SCNC: 19.4 MMOL/L (ref 5–15)
ANION GAP SERPL CALCULATED.3IONS-SCNC: 21.3 MMOL/L (ref 5–15)
ANION GAP SERPL CALCULATED.3IONS-SCNC: 30.1 MMOL/L (ref 5–15)
AORTIC DIMENSIONLESS INDEX: 0.7 (DI)
AST SERPL-CCNC: 2341 U/L (ref 1–32)
AST SERPL-CCNC: 409 U/L (ref 1–32)
AST SERPL-CCNC: 54 U/L (ref 1–32)
B PARAPERT DNA SPEC QL NAA+PROBE: NOT DETECTED
B PERT DNA SPEC QL NAA+PROBE: NOT DETECTED
BASOPHILS # BLD AUTO: 0.01 10*3/MM3 (ref 0–0.2)
BASOPHILS # BLD AUTO: 0.04 10*3/MM3 (ref 0–0.2)
BASOPHILS # BLD AUTO: 0.04 10*3/MM3 (ref 0–0.2)
BASOPHILS # BLD AUTO: 0.06 10*3/MM3 (ref 0–0.2)
BASOPHILS NFR BLD AUTO: 0.1 % (ref 0–1.5)
BASOPHILS NFR BLD AUTO: 0.3 % (ref 0–1.5)
BASOPHILS NFR BLD AUTO: 0.4 % (ref 0–1.5)
BASOPHILS NFR BLD AUTO: 0.5 % (ref 0–1.5)
BH CV ECHO MEAS - ACS: 1.34 CM
BH CV ECHO MEAS - AI P1/2T: 482.1 MSEC
BH CV ECHO MEAS - AO MAX PG: 15.6 MMHG
BH CV ECHO MEAS - AO MEAN PG: 8 MMHG
BH CV ECHO MEAS - AO ROOT DIAM: 2.47 CM
BH CV ECHO MEAS - AO V2 MAX: 197.3 CM/SEC
BH CV ECHO MEAS - AO V2 VTI: 38.6 CM
BH CV ECHO MEAS - AVA(I,D): 1.6 CM2
BH CV ECHO MEAS - EDV(CUBED): 32.8 ML
BH CV ECHO MEAS - EDV(MOD-SP2): 33 ML
BH CV ECHO MEAS - EDV(MOD-SP4): 36 ML
BH CV ECHO MEAS - EF(MOD-BP): 65.6 %
BH CV ECHO MEAS - EF(MOD-SP2): 63.6 %
BH CV ECHO MEAS - EF(MOD-SP4): 66.7 %
BH CV ECHO MEAS - ESV(CUBED): 9.7 ML
BH CV ECHO MEAS - ESV(MOD-SP2): 12 ML
BH CV ECHO MEAS - ESV(MOD-SP4): 12 ML
BH CV ECHO MEAS - FS: 33.5 %
BH CV ECHO MEAS - IVS/LVPW: 0.9 CM
BH CV ECHO MEAS - IVSD: 0.9 CM
BH CV ECHO MEAS - LAT PEAK E' VEL: 12.4 CM/SEC
BH CV ECHO MEAS - LV DIASTOLIC VOL/BSA (35-75): 27.1 CM2
BH CV ECHO MEAS - LV MASS(C)D: 83.7 GRAMS
BH CV ECHO MEAS - LV MAX PG: 5.1 MMHG
BH CV ECHO MEAS - LV MEAN PG: 2 MMHG
BH CV ECHO MEAS - LV SYSTOLIC VOL/BSA (12-30): 9 CM2
BH CV ECHO MEAS - LV V1 MAX: 113.4 CM/SEC
BH CV ECHO MEAS - LV V1 VTI: 20.6 CM
BH CV ECHO MEAS - LVIDD: 3.2 CM
BH CV ECHO MEAS - LVIDS: 2.13 CM
BH CV ECHO MEAS - LVOT AREA: 3 CM2
BH CV ECHO MEAS - LVOT DIAM: 1.96 CM
BH CV ECHO MEAS - LVPWD: 1 CM
BH CV ECHO MEAS - MED PEAK E' VEL: 9.2 CM/SEC
BH CV ECHO MEAS - MR MAX PG: 110.1 MMHG
BH CV ECHO MEAS - MR MAX VEL: 524.6 CM/SEC
BH CV ECHO MEAS - MV DEC SLOPE: 1261 CM/SEC2
BH CV ECHO MEAS - MV DEC TIME: 0.15 MSEC
BH CV ECHO MEAS - MV E MAX VEL: 158.2 CM/SEC
BH CV ECHO MEAS - MV MAX PG: 14.4 MMHG
BH CV ECHO MEAS - MV MEAN PG: 5.6 MMHG
BH CV ECHO MEAS - MV P1/2T: 47 MSEC
BH CV ECHO MEAS - MV V2 VTI: 26 CM
BH CV ECHO MEAS - MVA(P1/2T): 4.7 CM2
BH CV ECHO MEAS - MVA(VTI): 2.38 CM2
BH CV ECHO MEAS - PA V2 MAX: 94.6 CM/SEC
BH CV ECHO MEAS - QP/QS: 0.31
BH CV ECHO MEAS - RAP SYSTOLE: 15 MMHG
BH CV ECHO MEAS - RV MAX PG: 0.72 MMHG
BH CV ECHO MEAS - RV V1 MAX: 42.4 CM/SEC
BH CV ECHO MEAS - RV V1 VTI: 6.7 CM
BH CV ECHO MEAS - RVOT DIAM: 1.92 CM
BH CV ECHO MEAS - RVSP: 36 MMHG
BH CV ECHO MEAS - SI(MOD-SP2): 15.8 ML/M2
BH CV ECHO MEAS - SI(MOD-SP4): 18.1 ML/M2
BH CV ECHO MEAS - SV(LVOT): 61.9 ML
BH CV ECHO MEAS - SV(MOD-SP2): 21 ML
BH CV ECHO MEAS - SV(MOD-SP4): 24 ML
BH CV ECHO MEAS - SV(RVOT): 19.4 ML
BH CV ECHO MEAS - TR MAX PG: 20.8 MMHG
BH CV ECHO MEAS - TR MAX VEL: 227.8 CM/SEC
BH CV ECHO MEASUREMENTS AVERAGE E/E' RATIO: 14.65
BH CV XLRA - TDI S': 11.1 CM/SEC
BILIRUB SERPL-MCNC: 0.5 MG/DL (ref 0–1.2)
BILIRUB SERPL-MCNC: 0.7 MG/DL (ref 0–1.2)
BILIRUB SERPL-MCNC: 1.7 MG/DL (ref 0–1.2)
BUN SERPL-MCNC: 15 MG/DL (ref 8–23)
BUN SERPL-MCNC: 18 MG/DL (ref 8–23)
BUN SERPL-MCNC: 25 MG/DL (ref 8–23)
BUN SERPL-MCNC: 25 MG/DL (ref 8–23)
BUN SERPL-MCNC: 28 MG/DL (ref 8–23)
BUN SERPL-MCNC: 39 MG/DL (ref 8–23)
BUN SERPL-MCNC: 39 MG/DL (ref 8–23)
BUN/CREAT SERPL: 18.3 (ref 7–25)
BUN/CREAT SERPL: 20.9 (ref 7–25)
BUN/CREAT SERPL: 21.7 (ref 7–25)
BUN/CREAT SERPL: 24.3 (ref 7–25)
BUN/CREAT SERPL: 32.5 (ref 7–25)
BUN/CREAT SERPL: 34.2 (ref 7–25)
BUN/CREAT SERPL: 40.6 (ref 7–25)
C PNEUM DNA NPH QL NAA+NON-PROBE: NOT DETECTED
CALCIUM SPEC-SCNC: 8 MG/DL (ref 8.6–10.5)
CALCIUM SPEC-SCNC: 8.3 MG/DL (ref 8.6–10.5)
CALCIUM SPEC-SCNC: 8.9 MG/DL (ref 8.6–10.5)
CALCIUM SPEC-SCNC: 9.1 MG/DL (ref 8.6–10.5)
CALCIUM SPEC-SCNC: 9.2 MG/DL (ref 8.6–10.5)
CHLORIDE SERPL-SCNC: 91 MMOL/L (ref 98–107)
CHLORIDE SERPL-SCNC: 92 MMOL/L (ref 98–107)
CHLORIDE SERPL-SCNC: 93 MMOL/L (ref 98–107)
CHLORIDE SERPL-SCNC: 95 MMOL/L (ref 98–107)
CHLORIDE SERPL-SCNC: 96 MMOL/L (ref 98–107)
CHLORIDE SERPL-SCNC: 97 MMOL/L (ref 98–107)
CHLORIDE SERPL-SCNC: 98 MMOL/L (ref 98–107)
CO2 SERPL-SCNC: 14.7 MMOL/L (ref 22–29)
CO2 SERPL-SCNC: 18.6 MMOL/L (ref 22–29)
CO2 SERPL-SCNC: 21 MMOL/L (ref 22–29)
CO2 SERPL-SCNC: 25.9 MMOL/L (ref 22–29)
CO2 SERPL-SCNC: 28.2 MMOL/L (ref 22–29)
CO2 SERPL-SCNC: 28.8 MMOL/L (ref 22–29)
CO2 SERPL-SCNC: 7.9 MMOL/L (ref 22–29)
CREAT SERPL-MCNC: 0.73 MG/DL (ref 0.57–1)
CREAT SERPL-MCNC: 0.82 MG/DL (ref 0.57–1)
CREAT SERPL-MCNC: 0.83 MG/DL (ref 0.57–1)
CREAT SERPL-MCNC: 0.96 MG/DL (ref 0.57–1)
CREAT SERPL-MCNC: 1.03 MG/DL (ref 0.57–1)
CREAT SERPL-MCNC: 1.2 MG/DL (ref 0.57–1)
CREAT SERPL-MCNC: 1.34 MG/DL (ref 0.57–1)
CRP SERPL-MCNC: 10.43 MG/DL (ref 0–0.5)
CRP SERPL-MCNC: 10.66 MG/DL (ref 0–0.5)
D DIMER PPP FEU-MCNC: 3.14 MCGFEU/ML (ref 0–0.81)
DEPRECATED RDW RBC AUTO: 48.9 FL (ref 37–54)
DEPRECATED RDW RBC AUTO: 49.8 FL (ref 37–54)
DEPRECATED RDW RBC AUTO: 51.2 FL (ref 37–54)
DEPRECATED RDW RBC AUTO: 54.1 FL (ref 37–54)
DEPRECATED RDW RBC AUTO: 57 FL (ref 37–54)
EGFRCR SERPLBLD CKD-EPI 2021: 39.9 ML/MIN/1.73
EGFRCR SERPLBLD CKD-EPI 2021: 45.6 ML/MIN/1.73
EGFRCR SERPLBLD CKD-EPI 2021: 54.7 ML/MIN/1.73
EGFRCR SERPLBLD CKD-EPI 2021: 59.6 ML/MIN/1.73
EGFRCR SERPLBLD CKD-EPI 2021: 70.9 ML/MIN/1.73
EGFRCR SERPLBLD CKD-EPI 2021: 72 ML/MIN/1.73
EGFRCR SERPLBLD CKD-EPI 2021: 82.7 ML/MIN/1.73
EOSINOPHIL # BLD AUTO: 0 10*3/MM3 (ref 0–0.4)
EOSINOPHIL # BLD AUTO: 0 10*3/MM3 (ref 0–0.4)
EOSINOPHIL # BLD AUTO: 0.01 10*3/MM3 (ref 0–0.4)
EOSINOPHIL # BLD AUTO: 0.04 10*3/MM3 (ref 0–0.4)
EOSINOPHIL NFR BLD AUTO: 0 % (ref 0.3–6.2)
EOSINOPHIL NFR BLD AUTO: 0 % (ref 0.3–6.2)
EOSINOPHIL NFR BLD AUTO: 0.1 % (ref 0.3–6.2)
EOSINOPHIL NFR BLD AUTO: 0.3 % (ref 0.3–6.2)
ERYTHROCYTE [DISTWIDTH] IN BLOOD BY AUTOMATED COUNT: 16 % (ref 12.3–15.4)
ERYTHROCYTE [DISTWIDTH] IN BLOOD BY AUTOMATED COUNT: 16 % (ref 12.3–15.4)
ERYTHROCYTE [DISTWIDTH] IN BLOOD BY AUTOMATED COUNT: 16.1 % (ref 12.3–15.4)
ERYTHROCYTE [DISTWIDTH] IN BLOOD BY AUTOMATED COUNT: 16.7 % (ref 12.3–15.4)
ERYTHROCYTE [DISTWIDTH] IN BLOOD BY AUTOMATED COUNT: 17 % (ref 12.3–15.4)
ERYTHROCYTE [SEDIMENTATION RATE] IN BLOOD: 27 MM/HR (ref 0–30)
ERYTHROCYTE [SEDIMENTATION RATE] IN BLOOD: 30 MM/HR (ref 0–30)
FLUAV RNA RESP QL NAA+PROBE: NOT DETECTED
FLUAV SUBTYP SPEC NAA+PROBE: NOT DETECTED
FLUBV RNA ISLT QL NAA+PROBE: NOT DETECTED
FLUBV RNA RESP QL NAA+PROBE: NOT DETECTED
GLOBULIN UR ELPH-MCNC: 2.8 GM/DL
GLOBULIN UR ELPH-MCNC: 2.9 GM/DL
GLOBULIN UR ELPH-MCNC: 3.3 GM/DL
GLUCOSE BLDC GLUCOMTR-MCNC: 134 MG/DL (ref 70–130)
GLUCOSE SERPL-MCNC: 127 MG/DL (ref 65–99)
GLUCOSE SERPL-MCNC: 130 MG/DL (ref 65–99)
GLUCOSE SERPL-MCNC: 140 MG/DL (ref 65–99)
GLUCOSE SERPL-MCNC: 141 MG/DL (ref 65–99)
GLUCOSE SERPL-MCNC: 168 MG/DL (ref 65–99)
GLUCOSE SERPL-MCNC: 188 MG/DL (ref 65–99)
GLUCOSE SERPL-MCNC: 86 MG/DL (ref 65–99)
HADV DNA SPEC NAA+PROBE: NOT DETECTED
HCOV 229E RNA SPEC QL NAA+PROBE: NOT DETECTED
HCOV HKU1 RNA SPEC QL NAA+PROBE: NOT DETECTED
HCOV NL63 RNA SPEC QL NAA+PROBE: NOT DETECTED
HCOV OC43 RNA SPEC QL NAA+PROBE: NOT DETECTED
HCT VFR BLD AUTO: 27.5 % (ref 34–46.6)
HCT VFR BLD AUTO: 28.9 % (ref 34–46.6)
HCT VFR BLD AUTO: 29.1 % (ref 34–46.6)
HCT VFR BLD AUTO: 31 % (ref 34–46.6)
HCT VFR BLD AUTO: 31.1 % (ref 34–46.6)
HGB BLD-MCNC: 10 G/DL (ref 12–15.9)
HGB BLD-MCNC: 8.9 G/DL (ref 12–15.9)
HGB BLD-MCNC: 9 G/DL (ref 12–15.9)
HGB BLD-MCNC: 9.2 G/DL (ref 12–15.9)
HGB BLD-MCNC: 9.5 G/DL (ref 12–15.9)
HMPV RNA NPH QL NAA+NON-PROBE: NOT DETECTED
HPIV1 RNA ISLT QL NAA+PROBE: NOT DETECTED
HPIV2 RNA SPEC QL NAA+PROBE: NOT DETECTED
HPIV3 RNA NPH QL NAA+PROBE: NOT DETECTED
HPIV4 P GENE NPH QL NAA+PROBE: NOT DETECTED
IMM GRANULOCYTES # BLD AUTO: 0.07 10*3/MM3 (ref 0–0.05)
IMM GRANULOCYTES # BLD AUTO: 0.11 10*3/MM3 (ref 0–0.05)
IMM GRANULOCYTES # BLD AUTO: 0.37 10*3/MM3 (ref 0–0.05)
IMM GRANULOCYTES # BLD AUTO: 1.39 10*3/MM3 (ref 0–0.05)
IMM GRANULOCYTES NFR BLD AUTO: 0.6 % (ref 0–0.5)
IMM GRANULOCYTES NFR BLD AUTO: 0.9 % (ref 0–0.5)
IMM GRANULOCYTES NFR BLD AUTO: 10.1 % (ref 0–0.5)
IMM GRANULOCYTES NFR BLD AUTO: 3.4 % (ref 0–0.5)
LYMPHOCYTES # BLD AUTO: 0.68 10*3/MM3 (ref 0.7–3.1)
LYMPHOCYTES # BLD AUTO: 1.63 10*3/MM3 (ref 0.7–3.1)
LYMPHOCYTES # BLD AUTO: 1.66 10*3/MM3 (ref 0.7–3.1)
LYMPHOCYTES # BLD AUTO: 2.05 10*3/MM3 (ref 0.7–3.1)
LYMPHOCYTES NFR BLD AUTO: 11.8 % (ref 19.6–45.3)
LYMPHOCYTES NFR BLD AUTO: 13.9 % (ref 19.6–45.3)
LYMPHOCYTES NFR BLD AUTO: 18.8 % (ref 19.6–45.3)
LYMPHOCYTES NFR BLD AUTO: 5.5 % (ref 19.6–45.3)
M PNEUMO IGG SER IA-ACNC: NOT DETECTED
MAGNESIUM SERPL-MCNC: 2.2 MG/DL (ref 1.6–2.4)
MAGNESIUM SERPL-MCNC: 2.3 MG/DL (ref 1.6–2.4)
MAGNESIUM SERPL-MCNC: 2.6 MG/DL (ref 1.6–2.4)
MAXIMAL PREDICTED HEART RATE: 139 BPM
MCH RBC QN AUTO: 27.2 PG (ref 26.6–33)
MCH RBC QN AUTO: 27.3 PG (ref 26.6–33)
MCH RBC QN AUTO: 27.7 PG (ref 26.6–33)
MCH RBC QN AUTO: 27.8 PG (ref 26.6–33)
MCH RBC QN AUTO: 28 PG (ref 26.6–33)
MCHC RBC AUTO-ENTMCNC: 29.7 G/DL (ref 31.5–35.7)
MCHC RBC AUTO-ENTMCNC: 30.8 G/DL (ref 31.5–35.7)
MCHC RBC AUTO-ENTMCNC: 32.2 G/DL (ref 31.5–35.7)
MCHC RBC AUTO-ENTMCNC: 32.6 G/DL (ref 31.5–35.7)
MCHC RBC AUTO-ENTMCNC: 32.7 G/DL (ref 31.5–35.7)
MCV RBC AUTO: 84.6 FL (ref 79–97)
MCV RBC AUTO: 85.1 FL (ref 79–97)
MCV RBC AUTO: 87.1 FL (ref 79–97)
MCV RBC AUTO: 88.4 FL (ref 79–97)
MCV RBC AUTO: 92 FL (ref 79–97)
MONOCYTES # BLD AUTO: 0.47 10*3/MM3 (ref 0.1–0.9)
MONOCYTES # BLD AUTO: 0.88 10*3/MM3 (ref 0.1–0.9)
MONOCYTES # BLD AUTO: 0.93 10*3/MM3 (ref 0.1–0.9)
MONOCYTES # BLD AUTO: 1.31 10*3/MM3 (ref 0.1–0.9)
MONOCYTES NFR BLD AUTO: 12 % (ref 5–12)
MONOCYTES NFR BLD AUTO: 3.8 % (ref 5–12)
MONOCYTES NFR BLD AUTO: 6.4 % (ref 5–12)
MONOCYTES NFR BLD AUTO: 7.8 % (ref 5–12)
NEUTROPHILS NFR BLD AUTO: 11.17 10*3/MM3 (ref 1.7–7)
NEUTROPHILS NFR BLD AUTO: 65.3 % (ref 42.7–76)
NEUTROPHILS NFR BLD AUTO: 7.11 10*3/MM3 (ref 1.7–7)
NEUTROPHILS NFR BLD AUTO: 71.4 % (ref 42.7–76)
NEUTROPHILS NFR BLD AUTO: 76.9 % (ref 42.7–76)
NEUTROPHILS NFR BLD AUTO: 89.7 % (ref 42.7–76)
NEUTROPHILS NFR BLD AUTO: 9.2 10*3/MM3 (ref 1.7–7)
NEUTROPHILS NFR BLD AUTO: 9.82 10*3/MM3 (ref 1.7–7)
NRBC BLD AUTO-RTO: 0 /100 WBC (ref 0–0.2)
NRBC BLD AUTO-RTO: 0 /100 WBC (ref 0–0.2)
NRBC BLD AUTO-RTO: 2.8 /100 WBC (ref 0–0.2)
NT-PROBNP SERPL-MCNC: 3053 PG/ML (ref 0–1800)
PLATELET # BLD AUTO: 268 10*3/MM3 (ref 140–450)
PLATELET # BLD AUTO: 286 10*3/MM3 (ref 140–450)
PLATELET # BLD AUTO: 292 10*3/MM3 (ref 140–450)
PLATELET # BLD AUTO: 311 10*3/MM3 (ref 140–450)
PLATELET # BLD AUTO: 346 10*3/MM3 (ref 140–450)
PMV BLD AUTO: 10.1 FL (ref 6–12)
PMV BLD AUTO: 10.1 FL (ref 6–12)
PMV BLD AUTO: 10.5 FL (ref 6–12)
PMV BLD AUTO: 10.9 FL (ref 6–12)
PMV BLD AUTO: 9.9 FL (ref 6–12)
POTASSIUM SERPL-SCNC: 2.4 MMOL/L (ref 3.5–5.2)
POTASSIUM SERPL-SCNC: 3.5 MMOL/L (ref 3.5–5.2)
POTASSIUM SERPL-SCNC: 3.8 MMOL/L (ref 3.5–5.2)
POTASSIUM SERPL-SCNC: 4.3 MMOL/L (ref 3.5–5.2)
POTASSIUM SERPL-SCNC: 4.4 MMOL/L (ref 3.5–5.2)
POTASSIUM SERPL-SCNC: 5.2 MMOL/L (ref 3.5–5.2)
POTASSIUM SERPL-SCNC: 5.7 MMOL/L (ref 3.5–5.2)
PROCALCITONIN SERPL-MCNC: 0.07 NG/ML (ref 0–0.25)
PROCALCITONIN SERPL-MCNC: 0.08 NG/ML (ref 0–0.25)
PROCALCITONIN SERPL-MCNC: 0.1 NG/ML (ref 0–0.25)
PROT SERPL-MCNC: 5.9 G/DL (ref 6–8.5)
PROT SERPL-MCNC: 6.2 G/DL (ref 6–8.5)
PROT SERPL-MCNC: 6.9 G/DL (ref 6–8.5)
QT INTERVAL: 453 MS
QT INTERVAL: 526 MS
QT INTERVAL: 531 MS
RBC # BLD AUTO: 3.25 10*6/MM3 (ref 3.77–5.28)
RBC # BLD AUTO: 3.27 10*6/MM3 (ref 3.77–5.28)
RBC # BLD AUTO: 3.37 10*6/MM3 (ref 3.77–5.28)
RBC # BLD AUTO: 3.42 10*6/MM3 (ref 3.77–5.28)
RBC # BLD AUTO: 3.57 10*6/MM3 (ref 3.77–5.28)
RHINOVIRUS RNA SPEC NAA+PROBE: NOT DETECTED
RSV RNA NPH QL NAA+NON-PROBE: NOT DETECTED
SARS-COV-2 RNA NPH QL NAA+NON-PROBE: NOT DETECTED
SARS-COV-2 RNA RESP QL NAA+PROBE: NOT DETECTED
SINUS: 2.6 CM
SODIUM SERPL-SCNC: 129 MMOL/L (ref 136–145)
SODIUM SERPL-SCNC: 133 MMOL/L (ref 136–145)
SODIUM SERPL-SCNC: 133 MMOL/L (ref 136–145)
SODIUM SERPL-SCNC: 134 MMOL/L (ref 136–145)
SODIUM SERPL-SCNC: 135 MMOL/L (ref 136–145)
STRESS TARGET HR: 118 BPM
TROPONIN T SERPL HS-MCNC: 50 NG/L
WBC NRBC COR # BLD: 10.89 10*3/MM3 (ref 3.4–10.8)
WBC NRBC COR # BLD: 11.96 10*3/MM3 (ref 3.4–10.8)
WBC NRBC COR # BLD: 12.44 10*3/MM3 (ref 3.4–10.8)
WBC NRBC COR # BLD: 13.76 10*3/MM3 (ref 3.4–10.8)
WBC NRBC COR # BLD: 6.83 10*3/MM3 (ref 3.4–10.8)

## 2023-01-01 PROCEDURE — G0180 MD CERTIFICATION HHA PATIENT: HCPCS | Performed by: INTERNAL MEDICINE

## 2023-01-01 PROCEDURE — 25010000002 CALCIUM GLUCONATE-NACL 1-0.8 GM/100ML-% SOLUTION: Performed by: STUDENT IN AN ORGANIZED HEALTH CARE EDUCATION/TRAINING PROGRAM

## 2023-01-01 PROCEDURE — 97530 THERAPEUTIC ACTIVITIES: CPT

## 2023-01-01 PROCEDURE — 84145 PROCALCITONIN (PCT): CPT | Performed by: INTERNAL MEDICINE

## 2023-01-01 PROCEDURE — 99232 SBSQ HOSP IP/OBS MODERATE 35: CPT | Performed by: STUDENT IN AN ORGANIZED HEALTH CARE EDUCATION/TRAINING PROGRAM

## 2023-01-01 PROCEDURE — 25010000002 VANCOMYCIN 1 G RECONSTITUTED SOLUTION

## 2023-01-01 PROCEDURE — 63710000001 INSULIN REGULAR HUMAN PER 5 UNITS: Performed by: STUDENT IN AN ORGANIZED HEALTH CARE EDUCATION/TRAINING PROGRAM

## 2023-01-01 PROCEDURE — G0378 HOSPITAL OBSERVATION PER HR: HCPCS

## 2023-01-01 PROCEDURE — 0RBL0ZZ EXCISION OF RIGHT ELBOW JOINT, OPEN APPROACH: ICD-10-PCS | Performed by: INTERNAL MEDICINE

## 2023-01-01 PROCEDURE — 97535 SELF CARE MNGMENT TRAINING: CPT

## 2023-01-01 PROCEDURE — 94799 UNLISTED PULMONARY SVC/PX: CPT

## 2023-01-01 PROCEDURE — 93005 ELECTROCARDIOGRAM TRACING: CPT | Performed by: EMERGENCY MEDICINE

## 2023-01-01 PROCEDURE — 99223 1ST HOSP IP/OBS HIGH 75: CPT | Performed by: INTERNAL MEDICINE

## 2023-01-01 PROCEDURE — 97161 PT EVAL LOW COMPLEX 20 MIN: CPT

## 2023-01-01 PROCEDURE — 71045 X-RAY EXAM CHEST 1 VIEW: CPT

## 2023-01-01 PROCEDURE — 99284 EMERGENCY DEPT VISIT MOD MDM: CPT

## 2023-01-01 PROCEDURE — 94761 N-INVAS EAR/PLS OXIMETRY MLT: CPT

## 2023-01-01 PROCEDURE — 99232 SBSQ HOSP IP/OBS MODERATE 35: CPT | Performed by: INTERNAL MEDICINE

## 2023-01-01 PROCEDURE — 25010000002 ONDANSETRON PER 1 MG: Performed by: EMERGENCY MEDICINE

## 2023-01-01 PROCEDURE — 84145 PROCALCITONIN (PCT): CPT | Performed by: EMERGENCY MEDICINE

## 2023-01-01 PROCEDURE — 99238 HOSP IP/OBS DSCHRG MGMT 30/<: CPT | Performed by: INTERNAL MEDICINE

## 2023-01-01 PROCEDURE — 87205 SMEAR GRAM STAIN: CPT | Performed by: INTERNAL MEDICINE

## 2023-01-01 PROCEDURE — 83880 ASSAY OF NATRIURETIC PEPTIDE: CPT | Performed by: EMERGENCY MEDICINE

## 2023-01-01 PROCEDURE — 80053 COMPREHEN METABOLIC PANEL: CPT | Performed by: STUDENT IN AN ORGANIZED HEALTH CARE EDUCATION/TRAINING PROGRAM

## 2023-01-01 PROCEDURE — 80053 COMPREHEN METABOLIC PANEL: CPT | Performed by: EMERGENCY MEDICINE

## 2023-01-01 PROCEDURE — 25010000002 ATROPINE SULFATE

## 2023-01-01 PROCEDURE — 36415 COLL VENOUS BLD VENIPUNCTURE: CPT

## 2023-01-01 PROCEDURE — 25010000002 PROPOFOL 200 MG/20ML EMULSION: Performed by: NURSE ANESTHETIST, CERTIFIED REGISTERED

## 2023-01-01 PROCEDURE — 87070 CULTURE OTHR SPECIMN AEROBIC: CPT | Performed by: INTERNAL MEDICINE

## 2023-01-01 PROCEDURE — 0202U NFCT DS 22 TRGT SARS-COV-2: CPT | Performed by: INTERNAL MEDICINE

## 2023-01-01 PROCEDURE — 93010 ELECTROCARDIOGRAM REPORT: CPT | Performed by: INTERNAL MEDICINE

## 2023-01-01 PROCEDURE — 63710000001 PREDNISONE PER 5 MG: Performed by: INTERNAL MEDICINE

## 2023-01-01 PROCEDURE — 25010000002 DIGOXIN PER 500 MCG: Performed by: INTERNAL MEDICINE

## 2023-01-01 PROCEDURE — 25010000002 PHENYLEPHRINE 10 MG/ML SOLUTION: Performed by: NURSE ANESTHETIST, CERTIFIED REGISTERED

## 2023-01-01 PROCEDURE — 99222 1ST HOSP IP/OBS MODERATE 55: CPT | Performed by: STUDENT IN AN ORGANIZED HEALTH CARE EDUCATION/TRAINING PROGRAM

## 2023-01-01 PROCEDURE — 24102 ARTHRT ELBOW W/SYNOVECTOMY: CPT | Performed by: INTERNAL MEDICINE

## 2023-01-01 PROCEDURE — 73080 X-RAY EXAM OF ELBOW: CPT

## 2023-01-01 PROCEDURE — 94664 DEMO&/EVAL PT USE INHALER: CPT

## 2023-01-01 PROCEDURE — 86140 C-REACTIVE PROTEIN: CPT | Performed by: INTERNAL MEDICINE

## 2023-01-01 PROCEDURE — 85379 FIBRIN DEGRADATION QUANT: CPT | Performed by: EMERGENCY MEDICINE

## 2023-01-01 PROCEDURE — 25010000002 FENTANYL CITRATE (PF) 50 MCG/ML SOLUTION: Performed by: NURSE ANESTHETIST, CERTIFIED REGISTERED

## 2023-01-01 PROCEDURE — 80048 BASIC METABOLIC PNL TOTAL CA: CPT | Performed by: INTERNAL MEDICINE

## 2023-01-01 PROCEDURE — 99024 POSTOP FOLLOW-UP VISIT: CPT | Performed by: INTERNAL MEDICINE

## 2023-01-01 PROCEDURE — 93306 TTE W/DOPPLER COMPLETE: CPT

## 2023-01-01 PROCEDURE — 25010000002 MORPHINE PER 10 MG: Performed by: NURSE PRACTITIONER

## 2023-01-01 PROCEDURE — 71275 CT ANGIOGRAPHY CHEST: CPT

## 2023-01-01 PROCEDURE — 25010000002 DEXAMETHASONE PER 1 MG: Performed by: NURSE ANESTHETIST, CERTIFIED REGISTERED

## 2023-01-01 PROCEDURE — 87147 CULTURE TYPE IMMUNOLOGIC: CPT | Performed by: INTERNAL MEDICINE

## 2023-01-01 PROCEDURE — 97116 GAIT TRAINING THERAPY: CPT

## 2023-01-01 PROCEDURE — 99214 OFFICE O/P EST MOD 30 MIN: CPT | Performed by: INTERNAL MEDICINE

## 2023-01-01 PROCEDURE — 25010000002 MORPHINE PER 10 MG: Performed by: INTERNAL MEDICINE

## 2023-01-01 PROCEDURE — 97165 OT EVAL LOW COMPLEX 30 MIN: CPT

## 2023-01-01 PROCEDURE — 85027 COMPLETE CBC AUTOMATED: CPT | Performed by: INTERNAL MEDICINE

## 2023-01-01 PROCEDURE — 87186 SC STD MICRODIL/AGAR DIL: CPT | Performed by: INTERNAL MEDICINE

## 2023-01-01 PROCEDURE — 84145 PROCALCITONIN (PCT): CPT | Performed by: STUDENT IN AN ORGANIZED HEALTH CARE EDUCATION/TRAINING PROGRAM

## 2023-01-01 PROCEDURE — 25510000001 IOPAMIDOL PER 1 ML: Performed by: INTERNAL MEDICINE

## 2023-01-01 PROCEDURE — 99292 CRITICAL CARE ADDL 30 MIN: CPT | Performed by: STUDENT IN AN ORGANIZED HEALTH CARE EDUCATION/TRAINING PROGRAM

## 2023-01-01 PROCEDURE — 87040 BLOOD CULTURE FOR BACTERIA: CPT | Performed by: STUDENT IN AN ORGANIZED HEALTH CARE EDUCATION/TRAINING PROGRAM

## 2023-01-01 PROCEDURE — 85652 RBC SED RATE AUTOMATED: CPT | Performed by: INTERNAL MEDICINE

## 2023-01-01 PROCEDURE — 99285 EMERGENCY DEPT VISIT HI MDM: CPT

## 2023-01-01 PROCEDURE — 73070 X-RAY EXAM OF ELBOW: CPT | Performed by: INTERNAL MEDICINE

## 2023-01-01 PROCEDURE — 25010000002 CEFEPIME-DEXTROSE 2-5 GM-%(50ML) RECONSTITUTED SOLUTION: Performed by: STUDENT IN AN ORGANIZED HEALTH CARE EDUCATION/TRAINING PROGRAM

## 2023-01-01 PROCEDURE — 99239 HOSP IP/OBS DSCHRG MGMT >30: CPT | Performed by: NURSE PRACTITIONER

## 2023-01-01 PROCEDURE — 83735 ASSAY OF MAGNESIUM: CPT | Performed by: INTERNAL MEDICINE

## 2023-01-01 PROCEDURE — 25010000002 METHYLPREDNISOLONE PER 125 MG: Performed by: EMERGENCY MEDICINE

## 2023-01-01 PROCEDURE — 93005 ELECTROCARDIOGRAM TRACING: CPT | Performed by: HOSPITALIST

## 2023-01-01 PROCEDURE — 93970 EXTREMITY STUDY: CPT

## 2023-01-01 PROCEDURE — 25010000002 FENTANYL CITRATE (PF) 100 MCG/2ML SOLUTION: Performed by: NURSE ANESTHETIST, CERTIFIED REGISTERED

## 2023-01-01 PROCEDURE — 85652 RBC SED RATE AUTOMATED: CPT | Performed by: STUDENT IN AN ORGANIZED HEALTH CARE EDUCATION/TRAINING PROGRAM

## 2023-01-01 PROCEDURE — 99291 CRITICAL CARE FIRST HOUR: CPT | Performed by: STUDENT IN AN ORGANIZED HEALTH CARE EDUCATION/TRAINING PROGRAM

## 2023-01-01 PROCEDURE — 93306 TTE W/DOPPLER COMPLETE: CPT | Performed by: INTERNAL MEDICINE

## 2023-01-01 PROCEDURE — 85025 COMPLETE CBC W/AUTO DIFF WBC: CPT | Performed by: EMERGENCY MEDICINE

## 2023-01-01 PROCEDURE — 87636 SARSCOV2 & INF A&B AMP PRB: CPT | Performed by: EMERGENCY MEDICINE

## 2023-01-01 PROCEDURE — 24102 ARTHRT ELBOW W/SYNOVECTOMY: CPT | Performed by: SPECIALIST/TECHNOLOGIST, OTHER

## 2023-01-01 PROCEDURE — 83735 ASSAY OF MAGNESIUM: CPT | Performed by: STUDENT IN AN ORGANIZED HEALTH CARE EDUCATION/TRAINING PROGRAM

## 2023-01-01 PROCEDURE — 85025 COMPLETE CBC W/AUTO DIFF WBC: CPT | Performed by: STUDENT IN AN ORGANIZED HEALTH CARE EDUCATION/TRAINING PROGRAM

## 2023-01-01 PROCEDURE — 25010000002 MORPHINE PER 10 MG: Performed by: STUDENT IN AN ORGANIZED HEALTH CARE EDUCATION/TRAINING PROGRAM

## 2023-01-01 PROCEDURE — 25010000002 METHYLPREDNISOLONE PER 40 MG: Performed by: INTERNAL MEDICINE

## 2023-01-01 PROCEDURE — 86140 C-REACTIVE PROTEIN: CPT | Performed by: STUDENT IN AN ORGANIZED HEALTH CARE EDUCATION/TRAINING PROGRAM

## 2023-01-01 PROCEDURE — 94640 AIRWAY INHALATION TREATMENT: CPT

## 2023-01-01 PROCEDURE — 63710000001 ONDANSETRON PER 8 MG: Performed by: INTERNAL MEDICINE

## 2023-01-01 PROCEDURE — 85025 COMPLETE CBC W/AUTO DIFF WBC: CPT | Performed by: INTERNAL MEDICINE

## 2023-01-01 PROCEDURE — 84484 ASSAY OF TROPONIN QUANT: CPT | Performed by: STUDENT IN AN ORGANIZED HEALTH CARE EDUCATION/TRAINING PROGRAM

## 2023-01-01 PROCEDURE — 25010000002 CEFAZOLIN PER 500 MG: Performed by: NURSE ANESTHETIST, CERTIFIED REGISTERED

## 2023-01-01 PROCEDURE — 25010000002 FUROSEMIDE PER 20 MG: Performed by: EMERGENCY MEDICINE

## 2023-01-01 PROCEDURE — 25010000002 ONDANSETRON PER 1 MG: Performed by: INTERNAL MEDICINE

## 2023-01-01 PROCEDURE — 82962 GLUCOSE BLOOD TEST: CPT

## 2023-01-01 PROCEDURE — 99222 1ST HOSP IP/OBS MODERATE 55: CPT | Performed by: INTERNAL MEDICINE

## 2023-01-01 PROCEDURE — 25010000002 VANCOMYCIN 1 G RECONSTITUTED SOLUTION 1 EACH VIAL: Performed by: STUDENT IN AN ORGANIZED HEALTH CARE EDUCATION/TRAINING PROGRAM

## 2023-01-01 PROCEDURE — 80048 BASIC METABOLIC PNL TOTAL CA: CPT | Performed by: STUDENT IN AN ORGANIZED HEALTH CARE EDUCATION/TRAINING PROGRAM

## 2023-01-01 RX ORDER — SODIUM CHLORIDE 9 MG/ML
INJECTION, SOLUTION INTRAVENOUS
Status: DISPENSED
Start: 2023-01-01 | End: 2023-01-01

## 2023-01-01 RX ORDER — POLYVINYL ALCOHOL 14 MG/ML
1 SOLUTION/ DROPS OPHTHALMIC
Status: DISCONTINUED | OUTPATIENT
Start: 2023-01-01 | End: 2023-01-01 | Stop reason: HOSPADM

## 2023-01-01 RX ORDER — SODIUM CHLORIDE 0.9 % (FLUSH) 0.9 %
10 SYRINGE (ML) INJECTION EVERY 12 HOURS SCHEDULED
Status: DISCONTINUED | OUTPATIENT
Start: 2023-01-01 | End: 2023-01-01 | Stop reason: HOSPADM

## 2023-01-01 RX ORDER — MORPHINE SULFATE 10 MG/.5ML
20 SOLUTION ORAL
Status: DISCONTINUED | OUTPATIENT
Start: 2023-01-01 | End: 2023-01-01 | Stop reason: HOSPADM

## 2023-01-01 RX ORDER — POTASSIUM CHLORIDE 7.45 MG/ML
10 INJECTION INTRAVENOUS
Status: DISCONTINUED | OUTPATIENT
Start: 2023-01-01 | End: 2023-01-01 | Stop reason: HOSPADM

## 2023-01-01 RX ORDER — BISACODYL 10 MG
10 SUPPOSITORY, RECTAL RECTAL DAILY PRN
Status: DISCONTINUED | OUTPATIENT
Start: 2023-01-01 | End: 2023-01-01 | Stop reason: HOSPADM

## 2023-01-01 RX ORDER — CEFAZOLIN SODIUM 2 G/50ML
2 SOLUTION INTRAVENOUS EVERY 8 HOURS
Status: DISCONTINUED | OUTPATIENT
Start: 2023-01-01 | End: 2023-01-01

## 2023-01-01 RX ORDER — LORAZEPAM 2 MG/ML
2 INJECTION INTRAMUSCULAR
Status: DISCONTINUED | OUTPATIENT
Start: 2023-01-01 | End: 2023-01-01 | Stop reason: HOSPADM

## 2023-01-01 RX ORDER — OXYCODONE AND ACETAMINOPHEN 7.5; 325 MG/1; MG/1
1 TABLET ORAL EVERY 8 HOURS PRN
Qty: 90 TABLET | Refills: 0 | Status: SHIPPED | OUTPATIENT
Start: 2023-01-01 | End: 2023-01-01 | Stop reason: SDUPTHER

## 2023-01-01 RX ORDER — METHYLPREDNISOLONE SODIUM SUCCINATE 125 MG/2ML
125 INJECTION, POWDER, LYOPHILIZED, FOR SOLUTION INTRAMUSCULAR; INTRAVENOUS ONCE
Status: COMPLETED | OUTPATIENT
Start: 2023-01-01 | End: 2023-01-01

## 2023-01-01 RX ORDER — POTASSIUM CHLORIDE 20 MEQ/1
40 TABLET, EXTENDED RELEASE ORAL AS NEEDED
Status: DISCONTINUED | OUTPATIENT
Start: 2023-01-01 | End: 2023-01-01 | Stop reason: HOSPADM

## 2023-01-01 RX ORDER — MAGNESIUM SULFATE HEPTAHYDRATE 40 MG/ML
4 INJECTION, SOLUTION INTRAVENOUS AS NEEDED
Status: DISCONTINUED | OUTPATIENT
Start: 2023-01-01 | End: 2023-01-01 | Stop reason: HOSPADM

## 2023-01-01 RX ORDER — SIMETHICONE 80 MG
80 TABLET,CHEWABLE ORAL 4 TIMES DAILY PRN
Status: DISCONTINUED | OUTPATIENT
Start: 2023-01-01 | End: 2023-01-01 | Stop reason: HOSPADM

## 2023-01-01 RX ORDER — FUROSEMIDE 40 MG/1
40 TABLET ORAL 2 TIMES DAILY
Status: DISCONTINUED | OUTPATIENT
Start: 2023-01-01 | End: 2023-01-01

## 2023-01-01 RX ORDER — LORAZEPAM 2 MG/ML
0.5 INJECTION INTRAMUSCULAR
Status: DISCONTINUED | OUTPATIENT
Start: 2023-01-01 | End: 2023-01-01 | Stop reason: HOSPADM

## 2023-01-01 RX ORDER — CEFEPIME HYDROCHLORIDE 2 G/50ML
2 INJECTION, SOLUTION INTRAVENOUS EVERY 24 HOURS
Status: DISCONTINUED | OUTPATIENT
Start: 2023-01-01 | End: 2023-01-01

## 2023-01-01 RX ORDER — LORAZEPAM 2 MG/ML
2 CONCENTRATE ORAL
Status: DISCONTINUED | OUTPATIENT
Start: 2023-01-01 | End: 2023-01-01 | Stop reason: HOSPADM

## 2023-01-01 RX ORDER — CLONAZEPAM 0.5 MG/1
0.5 TABLET ORAL NIGHTLY PRN
Qty: 30 TABLET | Refills: 0 | OUTPATIENT
Start: 2023-01-01 | End: 2023-01-01

## 2023-01-01 RX ORDER — NOREPINEPHRINE BITARTRATE/D5W 8 MG/250ML
.02-.3 PLASTIC BAG, INJECTION (ML) INTRAVENOUS
Status: DISCONTINUED | OUTPATIENT
Start: 2023-01-01 | End: 2023-01-01

## 2023-01-01 RX ORDER — NICOTINE 21 MG/24HR
1 PATCH, TRANSDERMAL 24 HOURS TRANSDERMAL DAILY PRN
Status: DISCONTINUED | OUTPATIENT
Start: 2023-01-01 | End: 2023-01-01

## 2023-01-01 RX ORDER — IPRATROPIUM BROMIDE AND ALBUTEROL SULFATE 2.5; .5 MG/3ML; MG/3ML
3 SOLUTION RESPIRATORY (INHALATION) EVERY 6 HOURS PRN
Status: DISCONTINUED | OUTPATIENT
Start: 2023-01-01 | End: 2023-01-01 | Stop reason: HOSPADM

## 2023-01-01 RX ORDER — FENTANYL CITRATE 50 UG/ML
INJECTION, SOLUTION INTRAMUSCULAR; INTRAVENOUS AS NEEDED
Status: DISCONTINUED | OUTPATIENT
Start: 2023-01-01 | End: 2023-01-01 | Stop reason: SURG

## 2023-01-01 RX ORDER — CLONAZEPAM 0.5 MG/1
0.5 TABLET ORAL NIGHTLY PRN
Qty: 30 TABLET | Refills: 0 | Status: SHIPPED | OUTPATIENT
Start: 2023-01-01

## 2023-01-01 RX ORDER — LACTOSE-REDUCED FOOD
1 LIQUID (ML) ORAL 2 TIMES DAILY
Qty: 14220 ML | Refills: 2 | Status: SHIPPED | OUTPATIENT
Start: 2023-01-01

## 2023-01-01 RX ORDER — HYDROCODONE BITARTRATE AND ACETAMINOPHEN 7.5; 325 MG/1; MG/1
2 TABLET ORAL EVERY 4 HOURS PRN
Status: DISCONTINUED | OUTPATIENT
Start: 2023-01-01 | End: 2023-01-01

## 2023-01-01 RX ORDER — CLONAZEPAM 0.5 MG/1
0.5 TABLET ORAL NIGHTLY PRN
Qty: 30 TABLET | Refills: 0 | OUTPATIENT
Start: 2023-01-01

## 2023-01-01 RX ORDER — ONDANSETRON 4 MG/1
4 TABLET, FILM COATED ORAL EVERY 6 HOURS PRN
Status: DISCONTINUED | OUTPATIENT
Start: 2023-01-01 | End: 2023-01-01 | Stop reason: HOSPADM

## 2023-01-01 RX ORDER — ONDANSETRON 2 MG/ML
4 INJECTION INTRAMUSCULAR; INTRAVENOUS EVERY 6 HOURS PRN
Status: DISCONTINUED | OUTPATIENT
Start: 2023-01-01 | End: 2023-01-01 | Stop reason: HOSPADM

## 2023-01-01 RX ORDER — POTASSIUM CHLORIDE 20 MEQ/1
40 TABLET, EXTENDED RELEASE ORAL ONCE
Status: COMPLETED | OUTPATIENT
Start: 2023-01-01 | End: 2023-01-01

## 2023-01-01 RX ORDER — PREDNISONE 1 MG/1
5 TABLET ORAL
Status: DISCONTINUED | OUTPATIENT
Start: 2023-01-01 | End: 2023-01-01 | Stop reason: HOSPADM

## 2023-01-01 RX ORDER — LIDOCAINE HYDROCHLORIDE 20 MG/ML
INJECTION, SOLUTION INFILTRATION; PERINEURAL AS NEEDED
Status: DISCONTINUED | OUTPATIENT
Start: 2023-01-01 | End: 2023-01-01 | Stop reason: SURG

## 2023-01-01 RX ORDER — IPRATROPIUM BROMIDE AND ALBUTEROL SULFATE 2.5; .5 MG/3ML; MG/3ML
3 SOLUTION RESPIRATORY (INHALATION) EVERY 4 HOURS PRN
Status: DISCONTINUED | OUTPATIENT
Start: 2023-01-01 | End: 2023-01-01

## 2023-01-01 RX ORDER — DILTIAZEM HYDROCHLORIDE 300 MG/1
300 CAPSULE, COATED, EXTENDED RELEASE ORAL
Qty: 90 CAPSULE | Refills: 1 | Status: SHIPPED | OUTPATIENT
Start: 2023-01-01 | End: 2023-01-01 | Stop reason: SDUPTHER

## 2023-01-01 RX ORDER — SODIUM CHLORIDE 0.9 % (FLUSH) 0.9 %
10 SYRINGE (ML) INJECTION AS NEEDED
Status: DISCONTINUED | OUTPATIENT
Start: 2023-01-01 | End: 2023-01-01 | Stop reason: HOSPADM

## 2023-01-01 RX ORDER — ONDANSETRON 2 MG/ML
4 INJECTION INTRAMUSCULAR; INTRAVENOUS ONCE
Status: COMPLETED | OUTPATIENT
Start: 2023-01-01 | End: 2023-01-01

## 2023-01-01 RX ORDER — CEFAZOLIN SODIUM 1 G/3ML
INJECTION, POWDER, FOR SOLUTION INTRAMUSCULAR; INTRAVENOUS AS NEEDED
Status: DISCONTINUED | OUTPATIENT
Start: 2023-01-01 | End: 2023-01-01 | Stop reason: SURG

## 2023-01-01 RX ORDER — NOREPINEPHRINE BITARTRATE 1 MG/ML
INJECTION, SOLUTION INTRAVENOUS
Status: DISPENSED
Start: 2023-01-01 | End: 2023-01-01

## 2023-01-01 RX ORDER — LORAZEPAM 2 MG/ML
1 INJECTION INTRAMUSCULAR
Status: DISCONTINUED | OUTPATIENT
Start: 2023-01-01 | End: 2023-01-01 | Stop reason: HOSPADM

## 2023-01-01 RX ORDER — FLUOXETINE HYDROCHLORIDE 20 MG/1
20 CAPSULE ORAL DAILY
Status: DISCONTINUED | OUTPATIENT
Start: 2023-01-01 | End: 2023-01-01

## 2023-01-01 RX ORDER — BISACODYL 5 MG/1
5 TABLET, DELAYED RELEASE ORAL DAILY PRN
Status: DISCONTINUED | OUTPATIENT
Start: 2023-01-01 | End: 2023-01-01 | Stop reason: HOSPADM

## 2023-01-01 RX ORDER — CALCIUM GLUCONATE 10 MG/ML
1 INJECTION, SOLUTION INTRAVENOUS ONCE
Status: COMPLETED | OUTPATIENT
Start: 2023-01-01 | End: 2023-01-01

## 2023-01-01 RX ORDER — ACETAMINOPHEN 160 MG/5ML
650 SOLUTION ORAL EVERY 4 HOURS PRN
Status: DISCONTINUED | OUTPATIENT
Start: 2023-01-01 | End: 2023-01-01 | Stop reason: HOSPADM

## 2023-01-01 RX ORDER — FLUOXETINE HYDROCHLORIDE 20 MG/1
20 CAPSULE ORAL DAILY
Qty: 30 CAPSULE | Refills: 0 | Status: SHIPPED | OUTPATIENT
Start: 2023-01-01

## 2023-01-01 RX ORDER — ALBUTEROL SULFATE 2.5 MG/3ML
2.5 SOLUTION RESPIRATORY (INHALATION) EVERY 4 HOURS PRN
Status: DISCONTINUED | OUTPATIENT
Start: 2023-01-01 | End: 2023-01-01

## 2023-01-01 RX ORDER — CHOLECALCIFEROL (VITAMIN D3) 125 MCG
5 CAPSULE ORAL NIGHTLY PRN
Status: DISCONTINUED | OUTPATIENT
Start: 2023-01-01 | End: 2023-01-01 | Stop reason: HOSPADM

## 2023-01-01 RX ORDER — DEXTROSE MONOHYDRATE 50 MG/ML
INJECTION, SOLUTION INTRAVENOUS
Status: COMPLETED
Start: 2023-01-01 | End: 2023-01-01

## 2023-01-01 RX ORDER — HYDROCODONE BITARTRATE AND ACETAMINOPHEN 5; 325 MG/1; MG/1
1 TABLET ORAL EVERY 4 HOURS PRN
Qty: 40 TABLET | Refills: 0 | OUTPATIENT
Start: 2023-01-01

## 2023-01-01 RX ORDER — OXYCODONE AND ACETAMINOPHEN 7.5; 325 MG/1; MG/1
1 TABLET ORAL EVERY 8 HOURS PRN
Qty: 90 TABLET | Refills: 0 | OUTPATIENT
Start: 2023-01-01

## 2023-01-01 RX ORDER — ACETAMINOPHEN 650 MG/1
650 SUPPOSITORY RECTAL EVERY 4 HOURS PRN
Status: DISCONTINUED | OUTPATIENT
Start: 2023-01-01 | End: 2023-01-01 | Stop reason: HOSPADM

## 2023-01-01 RX ORDER — CLONAZEPAM 0.5 MG/1
0.5 TABLET ORAL NIGHTLY PRN
Status: DISCONTINUED | OUTPATIENT
Start: 2023-01-01 | End: 2023-01-01 | Stop reason: HOSPADM

## 2023-01-01 RX ORDER — MAGNESIUM SULFATE 1 G/100ML
1 INJECTION INTRAVENOUS AS NEEDED
Status: DISCONTINUED | OUTPATIENT
Start: 2023-01-01 | End: 2023-01-01 | Stop reason: HOSPADM

## 2023-01-01 RX ORDER — DEXTROSE MONOHYDRATE 50 MG/ML
INJECTION, SOLUTION INTRAVENOUS
Status: DISCONTINUED
Start: 2023-01-01 | End: 2023-01-01 | Stop reason: HOSPADM

## 2023-01-01 RX ORDER — HYDROCODONE BITARTRATE AND ACETAMINOPHEN 7.5; 325 MG/1; MG/1
1 TABLET ORAL EVERY 4 HOURS PRN
Status: DISCONTINUED | OUTPATIENT
Start: 2023-01-01 | End: 2023-01-01

## 2023-01-01 RX ORDER — DIPHENOXYLATE HYDROCHLORIDE AND ATROPINE SULFATE 2.5; .025 MG/1; MG/1
1 TABLET ORAL
Status: DISCONTINUED | OUTPATIENT
Start: 2023-01-01 | End: 2023-01-01 | Stop reason: HOSPADM

## 2023-01-01 RX ORDER — LORAZEPAM 1 MG/1
2 TABLET ORAL
Status: DISCONTINUED | OUTPATIENT
Start: 2023-01-01 | End: 2023-01-01 | Stop reason: HOSPADM

## 2023-01-01 RX ORDER — SODIUM CHLORIDE 9 MG/ML
100 INJECTION, SOLUTION INTRAVENOUS CONTINUOUS
Status: DISCONTINUED | OUTPATIENT
Start: 2023-01-01 | End: 2023-01-01

## 2023-01-01 RX ORDER — FAMOTIDINE 10 MG/ML
20 INJECTION, SOLUTION INTRAVENOUS
Status: COMPLETED | OUTPATIENT
Start: 2023-01-01 | End: 2023-01-01

## 2023-01-01 RX ORDER — IPRATROPIUM BROMIDE AND ALBUTEROL SULFATE 2.5; .5 MG/3ML; MG/3ML
3 SOLUTION RESPIRATORY (INHALATION)
Status: DISCONTINUED | OUTPATIENT
Start: 2023-01-01 | End: 2023-01-01

## 2023-01-01 RX ORDER — LORAZEPAM 2 MG/ML
1 CONCENTRATE ORAL
Status: DISCONTINUED | OUTPATIENT
Start: 2023-01-01 | End: 2023-01-01 | Stop reason: HOSPADM

## 2023-01-01 RX ORDER — LORAZEPAM 2 MG/ML
0.5 CONCENTRATE ORAL
Status: DISCONTINUED | OUTPATIENT
Start: 2023-01-01 | End: 2023-01-01 | Stop reason: HOSPADM

## 2023-01-01 RX ORDER — BUDESONIDE AND FORMOTEROL FUMARATE DIHYDRATE 160; 4.5 UG/1; UG/1
2 AEROSOL RESPIRATORY (INHALATION)
Status: DISCONTINUED | OUTPATIENT
Start: 2023-01-01 | End: 2023-01-01

## 2023-01-01 RX ORDER — CALCIUM GLUCONATE 10 MG/ML
INJECTION, SOLUTION INTRAVENOUS
Status: DISCONTINUED
Start: 2023-01-01 | End: 2023-01-01 | Stop reason: HOSPADM

## 2023-01-01 RX ORDER — LIDOCAINE HYDROCHLORIDE 10 MG/ML
0.5 INJECTION, SOLUTION EPIDURAL; INFILTRATION; INTRACAUDAL; PERINEURAL ONCE AS NEEDED
Status: DISCONTINUED | OUTPATIENT
Start: 2023-01-01 | End: 2023-01-01 | Stop reason: HOSPADM

## 2023-01-01 RX ORDER — EPHEDRINE SULFATE 50 MG/ML
INJECTION INTRAVENOUS AS NEEDED
Status: DISCONTINUED | OUTPATIENT
Start: 2023-01-01 | End: 2023-01-01 | Stop reason: SURG

## 2023-01-01 RX ORDER — CLONAZEPAM 0.5 MG/1
0.5 TABLET ORAL NIGHTLY PRN
Qty: 30 TABLET | Refills: 0 | Status: SHIPPED | OUTPATIENT
Start: 2023-01-01 | End: 2023-01-01

## 2023-01-01 RX ORDER — PHENYLEPHRINE HYDROCHLORIDE 10 MG/ML
INJECTION INTRAVENOUS AS NEEDED
Status: DISCONTINUED | OUTPATIENT
Start: 2023-01-01 | End: 2023-01-01 | Stop reason: SURG

## 2023-01-01 RX ORDER — MORPHINE SULFATE 2 MG/ML
2 INJECTION, SOLUTION INTRAMUSCULAR; INTRAVENOUS
Status: DISCONTINUED | OUTPATIENT
Start: 2023-01-01 | End: 2023-01-01

## 2023-01-01 RX ORDER — DILTIAZEM HYDROCHLORIDE 5 MG/ML
10 INJECTION INTRAVENOUS ONCE
Status: COMPLETED | OUTPATIENT
Start: 2023-01-01 | End: 2023-01-01

## 2023-01-01 RX ORDER — DILTIAZEM HYDROCHLORIDE 300 MG/1
300 CAPSULE, COATED, EXTENDED RELEASE ORAL
Qty: 90 CAPSULE | Refills: 1 | Status: SHIPPED | OUTPATIENT
Start: 2023-01-01 | End: 2023-01-01 | Stop reason: HOSPADM

## 2023-01-01 RX ORDER — DEXTROSE MONOHYDRATE 25 G/50ML
50 INJECTION, SOLUTION INTRAVENOUS
Status: DISCONTINUED | OUTPATIENT
Start: 2023-01-01 | End: 2023-01-01

## 2023-01-01 RX ORDER — DIGOXIN 0.25 MG/ML
250 INJECTION INTRAMUSCULAR; INTRAVENOUS ONCE
Status: COMPLETED | OUTPATIENT
Start: 2023-01-01 | End: 2023-01-01

## 2023-01-01 RX ORDER — OXYCODONE AND ACETAMINOPHEN 7.5; 325 MG/1; MG/1
1 TABLET ORAL EVERY 8 HOURS PRN
Status: DISCONTINUED | OUTPATIENT
Start: 2023-01-01 | End: 2023-01-01 | Stop reason: HOSPADM

## 2023-01-01 RX ORDER — HYDROXYZINE HYDROCHLORIDE 10 MG/1
25 TABLET, FILM COATED ORAL EVERY 6 HOURS PRN
Status: DISCONTINUED | OUTPATIENT
Start: 2023-01-01 | End: 2023-01-01

## 2023-01-01 RX ORDER — CEFEPIME HYDROCHLORIDE 2 G/50ML
2 INJECTION, SOLUTION INTRAVENOUS EVERY 12 HOURS SCHEDULED
Status: DISCONTINUED | OUTPATIENT
Start: 2023-01-01 | End: 2023-01-01

## 2023-01-01 RX ORDER — ATORVASTATIN CALCIUM 10 MG/1
10 TABLET, FILM COATED ORAL DAILY
Status: DISCONTINUED | OUTPATIENT
Start: 2023-01-01 | End: 2023-01-01

## 2023-01-01 RX ORDER — FLUOXETINE HYDROCHLORIDE 20 MG/1
20 CAPSULE ORAL DAILY
Qty: 30 CAPSULE | Refills: 0 | Status: SHIPPED | OUTPATIENT
Start: 2023-01-01 | End: 2023-01-01

## 2023-01-01 RX ORDER — ACETAMINOPHEN 325 MG/1
650 TABLET ORAL EVERY 4 HOURS PRN
Status: DISCONTINUED | OUTPATIENT
Start: 2023-01-01 | End: 2023-01-01 | Stop reason: HOSPADM

## 2023-01-01 RX ORDER — SODIUM CHLORIDE 9 MG/ML
40 INJECTION, SOLUTION INTRAVENOUS AS NEEDED
Status: DISCONTINUED | OUTPATIENT
Start: 2023-01-01 | End: 2023-01-01 | Stop reason: HOSPADM

## 2023-01-01 RX ORDER — LORAZEPAM 1 MG/1
1 TABLET ORAL
Status: DISCONTINUED | OUTPATIENT
Start: 2023-01-01 | End: 2023-01-01 | Stop reason: HOSPADM

## 2023-01-01 RX ORDER — SODIUM CHLORIDE, SODIUM LACTATE, POTASSIUM CHLORIDE, CALCIUM CHLORIDE 600; 310; 30; 20 MG/100ML; MG/100ML; MG/100ML; MG/100ML
100 INJECTION, SOLUTION INTRAVENOUS CONTINUOUS
Status: DISCONTINUED | OUTPATIENT
Start: 2023-01-01 | End: 2023-01-01

## 2023-01-01 RX ORDER — FLUOXETINE HYDROCHLORIDE 20 MG/1
20 CAPSULE ORAL NIGHTLY
Status: DISCONTINUED | OUTPATIENT
Start: 2023-01-01 | End: 2023-01-01 | Stop reason: HOSPADM

## 2023-01-01 RX ORDER — IPRATROPIUM BROMIDE AND ALBUTEROL SULFATE 2.5; .5 MG/3ML; MG/3ML
3 SOLUTION RESPIRATORY (INHALATION) ONCE
Status: COMPLETED | OUTPATIENT
Start: 2023-01-01 | End: 2023-01-01

## 2023-01-01 RX ORDER — MORPHINE SULFATE 2 MG/ML
2 INJECTION, SOLUTION INTRAMUSCULAR; INTRAVENOUS EVERY 4 HOURS PRN
Status: DISCONTINUED | OUTPATIENT
Start: 2023-01-01 | End: 2023-01-01

## 2023-01-01 RX ORDER — CLONAZEPAM 0.5 MG/1
0.5 TABLET ORAL NIGHTLY PRN
Qty: 30 TABLET | Refills: 0 | Status: SHIPPED | OUTPATIENT
Start: 2023-01-01 | End: 2023-01-01 | Stop reason: SDUPTHER

## 2023-01-01 RX ORDER — LORAZEPAM 0.5 MG/1
0.5 TABLET ORAL
Status: DISCONTINUED | OUTPATIENT
Start: 2023-01-01 | End: 2023-01-01 | Stop reason: HOSPADM

## 2023-01-01 RX ORDER — HYDROCODONE BITARTRATE AND ACETAMINOPHEN 5; 325 MG/1; MG/1
1 TABLET ORAL EVERY 4 HOURS PRN
Qty: 40 TABLET | Refills: 0 | Status: SHIPPED | OUTPATIENT
Start: 2023-01-01 | End: 2023-01-01 | Stop reason: SDUPTHER

## 2023-01-01 RX ORDER — METHYLPREDNISOLONE SODIUM SUCCINATE 40 MG/ML
40 INJECTION, POWDER, LYOPHILIZED, FOR SOLUTION INTRAMUSCULAR; INTRAVENOUS EVERY 12 HOURS
Status: DISCONTINUED | OUTPATIENT
Start: 2023-01-01 | End: 2023-01-01

## 2023-01-01 RX ORDER — SODIUM CHLORIDE, SODIUM LACTATE, POTASSIUM CHLORIDE, CALCIUM CHLORIDE 600; 310; 30; 20 MG/100ML; MG/100ML; MG/100ML; MG/100ML
125 INJECTION, SOLUTION INTRAVENOUS CONTINUOUS
Status: DISCONTINUED | OUTPATIENT
Start: 2023-01-01 | End: 2023-01-01

## 2023-01-01 RX ORDER — FAMOTIDINE 20 MG/1
20 TABLET, FILM COATED ORAL
Status: COMPLETED | OUTPATIENT
Start: 2023-01-01 | End: 2023-01-01

## 2023-01-01 RX ORDER — ATORVASTATIN CALCIUM 10 MG/1
10 TABLET, FILM COATED ORAL NIGHTLY
Status: DISCONTINUED | OUTPATIENT
Start: 2023-01-01 | End: 2023-01-01 | Stop reason: HOSPADM

## 2023-01-01 RX ORDER — POTASSIUM CHLORIDE 1.5 G/1.77G
40 POWDER, FOR SOLUTION ORAL AS NEEDED
Status: DISCONTINUED | OUTPATIENT
Start: 2023-01-01 | End: 2023-01-01 | Stop reason: HOSPADM

## 2023-01-01 RX ORDER — FUROSEMIDE 20 MG/1
20 TABLET ORAL 2 TIMES DAILY
Status: DISCONTINUED | OUTPATIENT
Start: 2023-01-01 | End: 2023-01-01

## 2023-01-01 RX ORDER — MAGNESIUM SULFATE HEPTAHYDRATE 40 MG/ML
2 INJECTION, SOLUTION INTRAVENOUS AS NEEDED
Status: DISCONTINUED | OUTPATIENT
Start: 2023-01-01 | End: 2023-01-01 | Stop reason: HOSPADM

## 2023-01-01 RX ORDER — SIMETHICONE 80 MG
TABLET,CHEWABLE ORAL
Status: DISPENSED
Start: 2023-01-01 | End: 2023-01-01

## 2023-01-01 RX ORDER — AMOXICILLIN 250 MG
2 CAPSULE ORAL 2 TIMES DAILY
Status: DISCONTINUED | OUTPATIENT
Start: 2023-01-01 | End: 2023-01-01 | Stop reason: HOSPADM

## 2023-01-01 RX ORDER — ATORVASTATIN CALCIUM 10 MG/1
10 TABLET, FILM COATED ORAL DAILY
Qty: 90 TABLET | Refills: 0 | Status: SHIPPED | OUTPATIENT
Start: 2023-01-01

## 2023-01-01 RX ORDER — MORPHINE SULFATE 2 MG/ML
INJECTION, SOLUTION INTRAMUSCULAR; INTRAVENOUS
Status: DISCONTINUED
Start: 2023-01-01 | End: 2023-01-01 | Stop reason: WASHOUT

## 2023-01-01 RX ORDER — HYDROCODONE BITARTRATE AND ACETAMINOPHEN 5; 325 MG/1; MG/1
1 TABLET ORAL EVERY 4 HOURS PRN
Qty: 40 TABLET | Refills: 0 | Status: SHIPPED | OUTPATIENT
Start: 2023-01-01 | End: 2023-01-01

## 2023-01-01 RX ORDER — DILTIAZEM HYDROCHLORIDE 360 MG/1
360 CAPSULE, EXTENDED RELEASE ORAL
Qty: 30 CAPSULE | Refills: 0 | Status: SHIPPED | OUTPATIENT
Start: 2023-01-01

## 2023-01-01 RX ORDER — PROPOFOL 10 MG/ML
INJECTION, EMULSION INTRAVENOUS AS NEEDED
Status: DISCONTINUED | OUTPATIENT
Start: 2023-01-01 | End: 2023-01-01 | Stop reason: SURG

## 2023-01-01 RX ORDER — BUDESONIDE 0.5 MG/2ML
0.5 INHALANT ORAL
Status: DISCONTINUED | OUTPATIENT
Start: 2023-01-01 | End: 2023-01-01 | Stop reason: HOSPADM

## 2023-01-01 RX ORDER — ONDANSETRON 2 MG/ML
4 INJECTION INTRAMUSCULAR; INTRAVENOUS ONCE AS NEEDED
Status: DISCONTINUED | OUTPATIENT
Start: 2023-01-01 | End: 2023-01-01 | Stop reason: HOSPADM

## 2023-01-01 RX ORDER — FAMOTIDINE 20 MG/1
20 TABLET, FILM COATED ORAL
Status: DISCONTINUED | OUTPATIENT
Start: 2023-01-01 | End: 2023-01-01 | Stop reason: HOSPADM

## 2023-01-01 RX ORDER — VANCOMYCIN HYDROCHLORIDE 1 G/20ML
INJECTION, POWDER, LYOPHILIZED, FOR SOLUTION INTRAVENOUS
Status: COMPLETED
Start: 2023-01-01 | End: 2023-01-01

## 2023-01-01 RX ORDER — DILTIAZEM HYDROCHLORIDE 180 MG/1
360 CAPSULE, COATED, EXTENDED RELEASE ORAL
Status: DISCONTINUED | OUTPATIENT
Start: 2023-01-01 | End: 2023-01-01 | Stop reason: HOSPADM

## 2023-01-01 RX ORDER — FLUOXETINE 10 MG/1
20 CAPSULE ORAL DAILY
Status: DISCONTINUED | OUTPATIENT
Start: 2023-01-01 | End: 2023-01-01

## 2023-01-01 RX ORDER — DEXAMETHASONE SODIUM PHOSPHATE 4 MG/ML
4 INJECTION, SOLUTION INTRA-ARTICULAR; INTRALESIONAL; INTRAMUSCULAR; INTRAVENOUS; SOFT TISSUE ONCE AS NEEDED
Status: COMPLETED | OUTPATIENT
Start: 2023-01-01 | End: 2023-01-01

## 2023-01-01 RX ORDER — SODIUM CHLORIDE 9 MG/ML
INJECTION, SOLUTION INTRAVENOUS
Status: COMPLETED
Start: 2023-01-01 | End: 2023-01-01

## 2023-01-01 RX ORDER — OXYCODONE AND ACETAMINOPHEN 7.5; 325 MG/1; MG/1
1 TABLET ORAL EVERY 8 HOURS PRN
Qty: 90 TABLET | Refills: 0 | Status: SHIPPED | OUTPATIENT
Start: 2023-01-01

## 2023-01-01 RX ORDER — FUROSEMIDE 10 MG/ML
40 INJECTION INTRAMUSCULAR; INTRAVENOUS ONCE
Status: COMPLETED | OUTPATIENT
Start: 2023-01-01 | End: 2023-01-01

## 2023-01-01 RX ORDER — FENTANYL CITRATE 50 UG/ML
25 INJECTION, SOLUTION INTRAMUSCULAR; INTRAVENOUS
Status: COMPLETED | OUTPATIENT
Start: 2023-01-01 | End: 2023-01-01

## 2023-01-01 RX ORDER — SODIUM CHLORIDE, SODIUM LACTATE, POTASSIUM CHLORIDE, CALCIUM CHLORIDE 600; 310; 30; 20 MG/100ML; MG/100ML; MG/100ML; MG/100ML
9 INJECTION, SOLUTION INTRAVENOUS CONTINUOUS
Status: DISCONTINUED | OUTPATIENT
Start: 2023-01-01 | End: 2023-01-01

## 2023-01-01 RX ORDER — ATROPINE SULFATE 0.1 MG/ML
1 INJECTION INTRAVENOUS ONCE
Status: COMPLETED | OUTPATIENT
Start: 2023-01-01 | End: 2023-01-01

## 2023-01-01 RX ORDER — CLONAZEPAM 0.5 MG/1
0.5 TABLET ORAL NIGHTLY PRN
Status: DISCONTINUED | OUTPATIENT
Start: 2023-01-01 | End: 2023-01-01

## 2023-01-01 RX ORDER — NALOXONE HCL 0.4 MG/ML
0.4 VIAL (ML) INJECTION
Status: DISCONTINUED | OUTPATIENT
Start: 2023-01-01 | End: 2023-01-01 | Stop reason: HOSPADM

## 2023-01-01 RX ORDER — GLYCOPYRROLATE 0.2 MG/ML
INJECTION INTRAMUSCULAR; INTRAVENOUS AS NEEDED
Status: DISCONTINUED | OUTPATIENT
Start: 2023-01-01 | End: 2023-01-01 | Stop reason: SURG

## 2023-01-01 RX ORDER — DILTIAZEM HYDROCHLORIDE 300 MG/1
300 CAPSULE, COATED, EXTENDED RELEASE ORAL
Qty: 90 CAPSULE | Refills: 0
Start: 2023-01-01 | End: 2023-01-01 | Stop reason: SDUPTHER

## 2023-01-01 RX ORDER — POLYETHYLENE GLYCOL 3350 17 G/17G
17 POWDER, FOR SOLUTION ORAL DAILY PRN
Status: DISCONTINUED | OUTPATIENT
Start: 2023-01-01 | End: 2023-01-01 | Stop reason: HOSPADM

## 2023-01-01 RX ORDER — SCOLOPAMINE TRANSDERMAL SYSTEM 1 MG/1
1 PATCH, EXTENDED RELEASE TRANSDERMAL
Status: DISCONTINUED | OUTPATIENT
Start: 2023-01-01 | End: 2023-01-01 | Stop reason: HOSPADM

## 2023-01-01 RX ADMIN — PREDNISONE 5 MG: 5 TABLET ORAL at 08:00

## 2023-01-01 RX ADMIN — ONDANSETRON HYDROCHLORIDE 4 MG: 4 TABLET, FILM COATED ORAL at 22:55

## 2023-01-01 RX ADMIN — POLYETHYLENE GLYCOL 3350 17 G: 17 POWDER, FOR SOLUTION ORAL at 14:51

## 2023-01-01 RX ADMIN — OXYCODONE AND ACETAMINOPHEN 1 TABLET: 7.5; 325 TABLET ORAL at 20:43

## 2023-01-01 RX ADMIN — EMPAGLIFLOZIN 10 MG: 10 TABLET, FILM COATED ORAL at 08:20

## 2023-01-01 RX ADMIN — METOPROLOL TARTRATE 5 MG: 5 INJECTION INTRAVENOUS at 10:55

## 2023-01-01 RX ADMIN — FENTANYL CITRATE 25 MCG: 50 INJECTION, SOLUTION INTRAMUSCULAR; INTRAVENOUS at 18:23

## 2023-01-01 RX ADMIN — CLONAZEPAM 0.5 MG: 0.5 TABLET ORAL at 00:29

## 2023-01-01 RX ADMIN — ATROPINE SULFATE 1 MG: 0.1 INJECTION INTRAVENOUS at 03:36

## 2023-01-01 RX ADMIN — Medication 10 ML: at 08:21

## 2023-01-01 RX ADMIN — SODIUM CHLORIDE, POTASSIUM CHLORIDE, SODIUM LACTATE AND CALCIUM CHLORIDE 9 ML/HR: 600; 310; 30; 20 INJECTION, SOLUTION INTRAVENOUS at 16:00

## 2023-01-01 RX ADMIN — OXYCODONE AND ACETAMINOPHEN 1 TABLET: 7.5; 325 TABLET ORAL at 00:29

## 2023-01-01 RX ADMIN — CLONAZEPAM 0.5 MG: 0.5 TABLET ORAL at 20:43

## 2023-01-01 RX ADMIN — Medication 10 ML: at 08:52

## 2023-01-01 RX ADMIN — DILTIAZEM HYDROCHLORIDE 360 MG: 180 CAPSULE, COATED, EXTENDED RELEASE ORAL at 09:54

## 2023-01-01 RX ADMIN — FAMOTIDINE 20 MG: 10 INJECTION, SOLUTION INTRAVENOUS at 16:15

## 2023-01-01 RX ADMIN — POTASSIUM CHLORIDE 40 MEQ: 1500 TABLET, EXTENDED RELEASE ORAL at 05:53

## 2023-01-01 RX ADMIN — SENNOSIDES AND DOCUSATE SODIUM 2 TABLET: 50; 8.6 TABLET ORAL at 08:20

## 2023-01-01 RX ADMIN — METOPROLOL TARTRATE 12.5 MG: 25 TABLET, FILM COATED ORAL at 08:47

## 2023-01-01 RX ADMIN — METOPROLOL TARTRATE 5 MG: 5 INJECTION INTRAVENOUS at 10:26

## 2023-01-01 RX ADMIN — FUROSEMIDE 20 MG: 20 TABLET ORAL at 09:55

## 2023-01-01 RX ADMIN — FUROSEMIDE 40 MG: 10 INJECTION, SOLUTION INTRAMUSCULAR; INTRAVENOUS at 14:14

## 2023-01-01 RX ADMIN — WHITE PETROLATUM 41 % TOPICAL OINTMENT: OINTMENT at 10:55

## 2023-01-01 RX ADMIN — BUDESONIDE 0.5 MG: 0.5 SUSPENSION RESPIRATORY (INHALATION) at 19:17

## 2023-01-01 RX ADMIN — VANCOMYCIN HYDROCHLORIDE 1000 MG: 1 INJECTION, POWDER, LYOPHILIZED, FOR SOLUTION INTRAVENOUS at 22:49

## 2023-01-01 RX ADMIN — DEXTROSE MONOHYDRATE 1000 ML: 50 INJECTION, SOLUTION INTRAVENOUS at 03:51

## 2023-01-01 RX ADMIN — EPHEDRINE SULFATE 10 MG: 50 INJECTION INTRAVENOUS at 16:45

## 2023-01-01 RX ADMIN — DIGOXIN 250 MCG: 0.25 INJECTION INTRAMUSCULAR; INTRAVENOUS at 19:04

## 2023-01-01 RX ADMIN — IPRATROPIUM BROMIDE AND ALBUTEROL SULFATE 3 ML: .5; 3 SOLUTION RESPIRATORY (INHALATION) at 12:29

## 2023-01-01 RX ADMIN — SENNOSIDES AND DOCUSATE SODIUM 2 TABLET: 50; 8.6 TABLET ORAL at 20:30

## 2023-01-01 RX ADMIN — MELATONIN TAB 5 MG 5 MG: 5 TAB at 20:24

## 2023-01-01 RX ADMIN — PROPOFOL INJECTABLE EMULSION 100 MG: 10 INJECTION, EMULSION INTRAVENOUS at 16:43

## 2023-01-01 RX ADMIN — POTASSIUM CHLORIDE 40 MEQ: 1.5 POWDER, FOR SOLUTION ORAL at 10:20

## 2023-01-01 RX ADMIN — FLUOXETINE 20 MG: 20 CAPSULE ORAL at 20:30

## 2023-01-01 RX ADMIN — CALCIUM GLUCONATE 1 G: 10 INJECTION, SOLUTION INTRAVENOUS at 03:30

## 2023-01-01 RX ADMIN — METOPROLOL TARTRATE 12.5 MG: 25 TABLET, FILM COATED ORAL at 09:54

## 2023-01-01 RX ADMIN — BUDESONIDE 0.5 MG: 0.5 SUSPENSION RESPIRATORY (INHALATION) at 20:17

## 2023-01-01 RX ADMIN — IPRATROPIUM BROMIDE AND ALBUTEROL SULFATE 3 ML: .5; 3 SOLUTION RESPIRATORY (INHALATION) at 08:03

## 2023-01-01 RX ADMIN — OXYCODONE AND ACETAMINOPHEN 1 TABLET: 7.5; 325 TABLET ORAL at 09:55

## 2023-01-01 RX ADMIN — FAMOTIDINE 20 MG: 20 TABLET ORAL at 18:02

## 2023-01-01 RX ADMIN — CEFEPIME HYDROCHLORIDE 2 G: 2 INJECTION, SOLUTION INTRAVENOUS at 21:55

## 2023-01-01 RX ADMIN — APIXABAN 2.5 MG: 2.5 TABLET, FILM COATED ORAL at 08:46

## 2023-01-01 RX ADMIN — BUDESONIDE 0.5 MG: 0.5 SUSPENSION RESPIRATORY (INHALATION) at 08:03

## 2023-01-01 RX ADMIN — Medication 10 ML: at 16:19

## 2023-01-01 RX ADMIN — HYDROCODONE BITARTRATE AND ACETAMINOPHEN 2 TABLET: 7.5; 325 TABLET ORAL at 21:57

## 2023-01-01 RX ADMIN — APIXABAN 2.5 MG: 2.5 TABLET, FILM COATED ORAL at 20:30

## 2023-01-01 RX ADMIN — DILTIAZEM HYDROCHLORIDE 10 MG: 5 INJECTION INTRAVENOUS at 08:16

## 2023-01-01 RX ADMIN — Medication 10 ML: at 20:21

## 2023-01-01 RX ADMIN — SODIUM BICARBONATE 150 MEQ: 84 INJECTION INTRAVENOUS at 03:50

## 2023-01-01 RX ADMIN — HUMAN INSULIN 10 UNITS: 100 INJECTION, SOLUTION SUBCUTANEOUS at 03:52

## 2023-01-01 RX ADMIN — METOPROLOL TARTRATE 12.5 MG: 25 TABLET, FILM COATED ORAL at 20:38

## 2023-01-01 RX ADMIN — METOPROLOL TARTRATE 5 MG: 5 INJECTION INTRAVENOUS at 11:04

## 2023-01-01 RX ADMIN — WHITE PETROLATUM 41 % TOPICAL OINTMENT: OINTMENT at 08:53

## 2023-01-01 RX ADMIN — PHENYLEPHRINE HYDROCHLORIDE 100 MCG: 10 INJECTION INTRAVENOUS at 16:45

## 2023-01-01 RX ADMIN — EMPAGLIFLOZIN 10 MG: 10 TABLET, FILM COATED ORAL at 09:55

## 2023-01-01 RX ADMIN — FENTANYL CITRATE 25 MCG: 50 INJECTION, SOLUTION INTRAMUSCULAR; INTRAVENOUS at 17:10

## 2023-01-01 RX ADMIN — GLYCOPYRROLATE 0.1 MG: 0.2 INJECTION INTRAMUSCULAR; INTRAVENOUS at 16:41

## 2023-01-01 RX ADMIN — FUROSEMIDE 40 MG: 40 TABLET ORAL at 08:20

## 2023-01-01 RX ADMIN — SENNOSIDES AND DOCUSATE SODIUM 2 TABLET: 50; 8.6 TABLET ORAL at 08:47

## 2023-01-01 RX ADMIN — DILTIAZEM HYDROCHLORIDE 300 MG: 120 CAPSULE, COATED, EXTENDED RELEASE ORAL at 08:24

## 2023-01-01 RX ADMIN — WHITE PETROLATUM 41 % TOPICAL OINTMENT: OINTMENT at 09:56

## 2023-01-01 RX ADMIN — METOPROLOL TARTRATE 5 MG: 5 INJECTION INTRAVENOUS at 10:31

## 2023-01-01 RX ADMIN — SENNOSIDES AND DOCUSATE SODIUM 2 TABLET: 50; 8.6 TABLET ORAL at 20:41

## 2023-01-01 RX ADMIN — OXYCODONE AND ACETAMINOPHEN 1 TABLET: 7.5; 325 TABLET ORAL at 08:20

## 2023-01-01 RX ADMIN — PREDNISONE 5 MG: 5 TABLET ORAL at 08:47

## 2023-01-01 RX ADMIN — Medication 10 ML: at 20:30

## 2023-01-01 RX ADMIN — MORPHINE SULFATE 2 MG: 2 INJECTION, SOLUTION INTRAMUSCULAR; INTRAVENOUS at 09:57

## 2023-01-01 RX ADMIN — DEXAMETHASONE SODIUM PHOSPHATE 4 MG: 4 INJECTION, SOLUTION INTRAMUSCULAR; INTRAVENOUS at 16:16

## 2023-01-01 RX ADMIN — CEFAZOLIN 1 G: 1 INJECTION, POWDER, FOR SOLUTION INTRAMUSCULAR; INTRAVENOUS at 17:35

## 2023-01-01 RX ADMIN — FAMOTIDINE 20 MG: 20 TABLET ORAL at 07:00

## 2023-01-01 RX ADMIN — FLUOXETINE 20 MG: 20 CAPSULE ORAL at 20:21

## 2023-01-01 RX ADMIN — APIXABAN 2.5 MG: 2.5 TABLET, FILM COATED ORAL at 09:54

## 2023-01-01 RX ADMIN — Medication 10 ML: at 20:41

## 2023-01-01 RX ADMIN — FAMOTIDINE 20 MG: 20 TABLET ORAL at 08:20

## 2023-01-01 RX ADMIN — ATORVASTATIN CALCIUM 10 MG: 10 TABLET, FILM COATED ORAL at 20:30

## 2023-01-01 RX ADMIN — DIGOXIN 250 MCG: 0.25 INJECTION INTRAMUSCULAR; INTRAVENOUS at 09:29

## 2023-01-01 RX ADMIN — IOPAMIDOL 100 ML: 755 INJECTION, SOLUTION INTRAVENOUS at 15:43

## 2023-01-01 RX ADMIN — BUDESONIDE 0.5 MG: 0.5 SUSPENSION RESPIRATORY (INHALATION) at 21:10

## 2023-01-01 RX ADMIN — POTASSIUM CHLORIDE 40 MEQ: 1500 TABLET, EXTENDED RELEASE ORAL at 14:51

## 2023-01-01 RX ADMIN — SIMETHICONE 80 MG: 80 TABLET, CHEWABLE ORAL at 01:38

## 2023-01-01 RX ADMIN — MORPHINE SULFATE 2 MG: 2 INJECTION, SOLUTION INTRAMUSCULAR; INTRAVENOUS at 06:52

## 2023-01-01 RX ADMIN — IPRATROPIUM BROMIDE AND ALBUTEROL SULFATE 3 ML: .5; 3 SOLUTION RESPIRATORY (INHALATION) at 20:16

## 2023-01-01 RX ADMIN — ATORVASTATIN CALCIUM 10 MG: 10 TABLET, FILM COATED ORAL at 20:21

## 2023-01-01 RX ADMIN — MORPHINE SULFATE 2 MG: 2 INJECTION, SOLUTION INTRAMUSCULAR; INTRAVENOUS at 20:36

## 2023-01-01 RX ADMIN — MORPHINE SULFATE 2 MG: 2 INJECTION, SOLUTION INTRAMUSCULAR; INTRAVENOUS at 09:16

## 2023-01-01 RX ADMIN — APIXABAN 2.5 MG: 2.5 TABLET, FILM COATED ORAL at 20:41

## 2023-01-01 RX ADMIN — WHITE PETROLATUM 41 % TOPICAL OINTMENT: OINTMENT at 20:33

## 2023-01-01 RX ADMIN — FAMOTIDINE 20 MG: 20 TABLET ORAL at 17:00

## 2023-01-01 RX ADMIN — ACETAMINOPHEN 650 MG: 325 TABLET ORAL at 02:41

## 2023-01-01 RX ADMIN — CLONAZEPAM 0.5 MG: 0.5 TABLET ORAL at 03:55

## 2023-01-01 RX ADMIN — Medication 10 ML: at 09:56

## 2023-01-01 RX ADMIN — MELATONIN TAB 5 MG 5 MG: 5 TAB at 20:38

## 2023-01-01 RX ADMIN — SENNOSIDES AND DOCUSATE SODIUM 2 TABLET: 50; 8.6 TABLET ORAL at 09:54

## 2023-01-01 RX ADMIN — APIXABAN 2.5 MG: 2.5 TABLET, FILM COATED ORAL at 20:21

## 2023-01-01 RX ADMIN — SODIUM CHLORIDE 250 ML: 9 INJECTION, SOLUTION INTRAVENOUS at 22:49

## 2023-01-01 RX ADMIN — CALCIUM GLUCONATE 1 G: 10 INJECTION, SOLUTION INTRAVENOUS at 04:00

## 2023-01-01 RX ADMIN — MELATONIN TAB 5 MG 5 MG: 5 TAB at 00:34

## 2023-01-01 RX ADMIN — LIDOCAINE HYDROCHLORIDE 80 MG: 20 INJECTION, SOLUTION INFILTRATION; PERINEURAL at 16:43

## 2023-01-01 RX ADMIN — MORPHINE SULFATE 2 MG: 2 INJECTION, SOLUTION INTRAMUSCULAR; INTRAVENOUS at 13:11

## 2023-01-01 RX ADMIN — FENTANYL CITRATE 25 MCG: 50 INJECTION, SOLUTION INTRAMUSCULAR; INTRAVENOUS at 18:12

## 2023-01-01 RX ADMIN — METHYLPREDNISOLONE SODIUM SUCCINATE 40 MG: 40 INJECTION, POWDER, FOR SOLUTION INTRAMUSCULAR; INTRAVENOUS at 23:31

## 2023-01-01 RX ADMIN — WHITE PETROLATUM 41 % TOPICAL OINTMENT: OINTMENT at 20:20

## 2023-01-01 RX ADMIN — OXYCODONE AND ACETAMINOPHEN 1 TABLET: 7.5; 325 TABLET ORAL at 20:37

## 2023-01-01 RX ADMIN — FUROSEMIDE 40 MG: 40 TABLET ORAL at 20:21

## 2023-01-01 RX ADMIN — DILTIAZEM HYDROCHLORIDE 360 MG: 180 CAPSULE, COATED, EXTENDED RELEASE ORAL at 08:46

## 2023-01-01 RX ADMIN — SODIUM CHLORIDE 100 ML/HR: 9 INJECTION, SOLUTION INTRAVENOUS at 20:25

## 2023-01-01 RX ADMIN — SENNOSIDES AND DOCUSATE SODIUM 2 TABLET: 50; 8.6 TABLET ORAL at 20:20

## 2023-01-01 RX ADMIN — ONDANSETRON 4 MG: 2 INJECTION INTRAMUSCULAR; INTRAVENOUS at 15:33

## 2023-01-01 RX ADMIN — ONDANSETRON 4 MG: 2 INJECTION INTRAMUSCULAR; INTRAVENOUS at 00:31

## 2023-01-01 RX ADMIN — FAMOTIDINE 20 MG: 20 TABLET ORAL at 08:00

## 2023-01-01 RX ADMIN — CLONAZEPAM 0.5 MG: 0.5 TABLET ORAL at 23:53

## 2023-01-01 RX ADMIN — APIXABAN 2.5 MG: 2.5 TABLET, FILM COATED ORAL at 08:20

## 2023-01-01 RX ADMIN — BUDESONIDE 0.5 MG: 0.5 SUSPENSION RESPIRATORY (INHALATION) at 07:14

## 2023-01-01 RX ADMIN — SODIUM BICARBONATE 150 MEQ: 84 INJECTION INTRAVENOUS at 08:10

## 2023-01-01 RX ADMIN — FAMOTIDINE 20 MG: 20 TABLET ORAL at 17:56

## 2023-01-01 RX ADMIN — BUDESONIDE 0.5 MG: 0.5 SUSPENSION RESPIRATORY (INHALATION) at 08:29

## 2023-01-01 RX ADMIN — METHYLPREDNISOLONE SODIUM SUCCINATE 40 MG: 40 INJECTION, POWDER, FOR SOLUTION INTRAMUSCULAR; INTRAVENOUS at 10:55

## 2023-01-01 RX ADMIN — METHYLPREDNISOLONE SODIUM SUCCINATE 125 MG: 125 INJECTION, POWDER, FOR SOLUTION INTRAMUSCULAR; INTRAVENOUS at 12:14

## 2023-01-01 RX ADMIN — METHYLPREDNISOLONE SODIUM SUCCINATE 40 MG: 40 INJECTION, POWDER, FOR SOLUTION INTRAMUSCULAR; INTRAVENOUS at 00:31

## 2023-01-01 RX ADMIN — NOREPINEPHRINE BITARTRATE 0.06 MCG/KG/MIN: 8 INJECTION, SOLUTION INTRAVENOUS at 02:45

## 2023-01-01 RX ADMIN — ACETAMINOPHEN 650 MG: 325 TABLET ORAL at 15:55

## 2023-01-01 RX ADMIN — VANCOMYCIN HYDROCHLORIDE 1000 MG: 1 INJECTION, POWDER, LYOPHILIZED, FOR SOLUTION INTRAVENOUS at 22:48

## 2023-01-01 RX ADMIN — IPRATROPIUM BROMIDE AND ALBUTEROL SULFATE 3 ML: .5; 3 SOLUTION RESPIRATORY (INHALATION) at 13:24

## 2023-01-03 NOTE — TELEPHONE ENCOUNTER
Caller: Kaylin Ojeda    Relationship: Self    Best call back number: [phone]    Requested Prescriptions:   Requested Prescriptions     Pending Prescriptions Disp Refills   • HYDROcodone-acetaminophen (NORCO) 5-325 MG per tablet 40 tablet 0     Sig: Take 1 tablet by mouth Every 4 (Four) Hours As Needed for Severe Pain.   • clonazePAM (KlonoPIN) 0.5 MG tablet 30 tablet 0     Sig: Take 1 tablet by mouth At Night As Needed for Anxiety for up to 3 days.        [place] where [place] [facility] [place] [address] [facility] [phone] [facility] [phone] FX     Additional details provided by patient: THE PATIENT IS CURRENTLY OUT OF THE ABOVE MEDICATIONS.    Does the patient have less than a 3 day supply:  [x] Yes  [] No    Would you like a call back once the refill request has been completed: [x] Yes [] No    If the office needs to give you a call back, can they leave a voicemail: [x] Yes [] No    Tristen Talbert Rep   01/03/23 10:03 EST

## 2023-01-04 NOTE — TELEPHONE ENCOUNTER
DAUGHTER CALLED CONCERNED ABOUT A MED THAT SHE WAS PUT ON WHILE IN THE HOSPITAL (DILTIAZEM) AND WOULD LIKE A CALL BACK FROM THE MEDICAL STAFF.

## 2023-01-05 NOTE — TELEPHONE ENCOUNTER
Rx Refill Note  Requested Prescriptions     Signed Prescriptions Disp Refills   • dilTIAZem CD (CARDIZEM CD) 300 MG 24 hr capsule 90 capsule 0     Sig: Take 1 capsule by mouth Daily.     Authorizing Provider: ESTELA DOHERTY      Last office visit with prescribing clinician: 10/6/2022   Last telemedicine visit with prescribing clinician: Visit date not found   Next office visit with prescribing clinician: Visit date not found                         Would you like a call back once the refill request has been completed: [] Yes [] No    If the office needs to give you a call back, can they leave a voicemail: [] Yes [] No    Katerina Hammond MA  01/05/23, 10:26 EST

## 2023-01-09 NOTE — TELEPHONE ENCOUNTER
Caller: Kaylin Ojeda    Relationship: Self    Best call back number: [phone]      What is the best time to reach you: ANYTIME    Who are you requesting to speak with (clinical staff, provider,  specific staff member): MA        What was the call regarding: PATIENT IS CALLING IN SHE WOULD LIKE TO TALK TO SOMEONE ABOUT GETTING SOME HOME HEALTH CARE.    Do you require a callback: YES

## 2023-01-10 NOTE — TELEPHONE ENCOUNTER
Caller: Kaylin Ojeda    Relationship: Self    Best call back number: [phone]    Requested Prescriptions:   Requested Prescriptions     Pending Prescriptions Disp Refills   • clonazePAM (KlonoPIN) 0.5 MG tablet 30 tablet 0     Sig: Take 1 tablet by mouth At Night As Needed for Anxiety for up to 3 days.   • HYDROcodone-acetaminophen (NORCO) 5-325 MG per tablet 40 tablet 0     Sig: Take 1 tablet by mouth Every 4 (Four) Hours As Needed for Severe Pain.        Pharmacy where request should be sent: [facility] [place] - [address] [facility] [phone] [facility] [phone] FX     Additional details provided by patient: PATIENT CALLING STATING THAT SHE RECENTLY ON HER ARM AND ELBOW SHE STATED SHE IS OUT OF MEDICATION SHE DOESN'T SEE THE SURGEON UNTIL February.    Does the patient have less than a 3 day supply:  [x] Yes  [] No    Would you like a call back once the refill request has been completed: [x] Yes [] No    If the office needs to give you a call back, can they leave a voicemail: [x] Yes [] No    Tristen Ervin Rep   01/10/23 10:18 EST

## 2023-01-10 NOTE — TELEPHONE ENCOUNTER
To be able to get HH she would have had to have an appointment in the last 90 days due to her having medicare correct?

## 2023-01-11 NOTE — TELEPHONE ENCOUNTER
Patient broke her arm and is needing this medication ASAP, can you please send this in for her? I am scheduling her an appointment to be seen to discuss HH but she has been out for 4 days and she has called several times trying to get this medication but it doesn't look like you ever got the refill requests.

## 2023-01-11 NOTE — TELEPHONE ENCOUNTER
Rx Refill Note  Requested Prescriptions     Pending Prescriptions Disp Refills    HYDROcodone-acetaminophen (NORCO) 5-325 MG per tablet [Pharmacy Med Name: HYDROCO/APAP 5-325MG] 40 tablet 0     Sig: Take 1 tablet by mouth Every 4 (Four) Hours As Needed for Severe Pain.      Last office visit with prescribing clinician: 12/27/2022      Next office visit with prescribing clinician: 1/24/2023   Last Filled:12.27.2022    Encounter Diagnosis   Name Primary?    Olecranon fracture, right, open type I or II, with routine healing, subsequent encounter           Melissa Curry MA  01/11/23, 11:39 EST    {TIP  Encounters:    {TIP  Please add Last Relevant Lab Date if appropriate:  {TIP  Is Refill Pharmacy correct?:

## 2023-01-12 NOTE — TELEPHONE ENCOUNTER
I called to inform pt of samples that will be at [facility], but no answer and no vm is full.  I will leave the samples at the .

## 2023-01-13 NOTE — TELEPHONE ENCOUNTER
Patient called back and said daughter will be picking up the samples today.    Altagracia Veras RN  [place] [facility]

## 2023-01-23 NOTE — TELEPHONE ENCOUNTER
The [facility] made Kaylin a 1/24/22 apt today.  Per the H[facility] Lucille the friend said that Kaylin seems confused since her surgery.  I spoke with Lucille and told her to have Ms. Sevillaaugustina go to the ER maybe not sure what else to tell her.

## 2023-01-24 NOTE — PROGRESS NOTES
Subjective:     Patient ID: Kaylin Ojeda is a 82 y.o. female.    Chief Complaint:    History of Present Illness  Kaylin Ojeda returns to clinic today for evaluation of right open olecranon fracture that was complicated by delayed presentation to the ER roughly 2-1/2 days after injury.  The patient underwent irrigation and debridement and open reduction internal fixation right olecranon roughly 8 weeks ago.  She is done quite well and is thrilled with the result.  She has been in occupational therapy working on range of motion and is achieved near full range of motion.  The patient denies any pain drainage swelling or any other issues.  She states that she has been falling more than usual and she is not quite sure why.  She also states that she is angry that she has not been able to get her longtime narcotic pain medication.  She has an appoint with Dr. Forte later on today to hopefully get this refilled.     Social History     Occupational History     Employer: RETIRED   Tobacco Use   • Smoking status: Some Days     Packs/day: 0.20     Years: 50.00     Pack years: 10.00     Types: Cigarettes   • Smokeless tobacco: Never   • Tobacco comments:     trying to quit, pt states she has basically quit but will still have one when she stressed   Vaping Use   • Vaping Use: Never used   Substance and Sexual Activity   • Alcohol use: Not Currently     Comment: OCCASIONAL/ TWICE A YEAR. // daily caffiene   • Drug use: No   • Sexual activity: Defer      Past Medical History:   Diagnosis Date   • A-fib ([facility])    • Abdominal pain, epigastric     Chronic, unclear cause, improved   • Anorexia    • Anxiety    • Arthritis    • CAD (coronary artery disease)     Cath 5/2015: nonobstructive LAD/RCA disease, 90% mid LCx s/p 2.12f50cm Xience.  Cath 1/2021: 50-60% prox LAD/70-80% distal LAD, patent Cx stent, 20% RCA   • Cellulitis of leg    • Cervical disc disease    • Chronic fatigue    • Colitis    • COPD (chronic obstructive  pulmonary disease) ([facility])    • Depression    • Erosive gastritis    • Fibromyalgia    • Frequency of urination 06/09/2017   • Gastritis    • Hypercholesterolemia 02/02/2016   • Hyperglycemia    • Hypertension     History of refractory hypertension which improved significantly   • Insomnia    • Leukocytes in urine    • Lower back pain    • Mediastinal lymphadenopathy    • 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HCC)    • Mononucleosis    • Occlusion and stenosis of carotid artery    • Onychomycosis    • Orbit fracture (HCC)    • PAF (paroxysmal atrial fibrillation) ([facility])    • Peripheral arterial disease ([facility])     Significant (carotid, subclavian, renal arteries, lower extremities), with claudication, medical therapy only   • Pernicious anemia    • Prediabetes    • Renal artery stenosis ([facility])     unilateral, with renal atrophy (no intervention required)   • Renal calculi    • Sinus bradycardia     mild, asymptomatic   • TIA (transient ischemic attack)    • UTI (urinary tract infection)    • Valvular heart disease     1/2021: mod-severe AI, mod MR, mild-mod TR   • Vision problem    • Vitamin B 12 deficiency      Past Surgical History:   Procedure Laterality Date   • APPENDECTOMY     • BREAST BIOPSY Left     20+ yrs ago   • BREAST SURGERY     • CARDIAC CATHETERIZATION  05/2015    nonobs LAD/RCA disease, 90% mid LCx s/p 2.28h27xx Xience   • CARDIAC CATHETERIZATION N/A 10/28/2020    Procedure: Right and Left Heart Cath;  Surgeon: Opal Contreras MD;  Location:  [facility];  Service: Cardiovascular;  Laterality: N/A;  for cardiac testing prior to possible valve surgery per Dr. Mai   • CARDIAC CATHETERIZATION N/A 10/28/2020    Procedure: Coronary angiography;  Surgeon: Opal Contreras MD;  Location:  [facility];  Service: Cardiovascular;  Laterality: N/A;   • CERVICAL FUSION  1997   • CHOLECYSTECTOMY     • CORONARY STENT PLACEMENT     • HYSTERECTOMY  1995   • KNEE SURGERY Right 2005   • KNEE  "SURGERY Left 1998   • ORIF HUMERUS FRACTURE Right 12/10/2022    Procedure: ELBOW OPEN REDUCTION INTERNAL FIXATION;  Surgeon: Lito Reeder MD;  Location: [facility];  Service: Orthopedics;  Laterality: Right;       Family History   Problem Relation Age of Onset   • Asthma Mother    • Emphysema Mother    • Graves' disease Mother    • Heart disease Mother    • Hypertension Mother    • Emphysema Father    • Hypertension Father    • Heart disease Father    • Kidney disease Sister    • Lupus Daughter         Systemic erythematosus   • Arthritis Other    • Crohn's disease Other    • Breast cancer Niece    • Breast cancer Maternal Cousin    • Breast cancer Maternal Cousin                  Objective:  Vitals:    01/24/23 1028   Weight: 44.9 kg (99 lb)   Height: 152.4 cm (60\")         01/24/23  1028   Weight: 44.9 kg (99 lb)     Body mass index is 19.33 kg/m².        Right Elbow Exam     Tenderness   The patient is experiencing no tenderness.     Range of Motion   Right elbow extension: 5.   Flexion: 120   Pronation: normal   Supination: normal     Muscle Strength   Pronation:  5/5   Supination:  5/5     Tests   Varus: negative  Valgus: negative    Other   Erythema: absent  Scars: present  Sensation: normal  Pulse: present    Comments:  Incision healed with no signs of infection. Full painless elbow ROM               Imaging: AP and lateral of the right elbow was ordered and reviewed myself in the office today  Indication: Right elbow injury  Findings: X-rays demonstrate a healed right olecranon fracture with implants in expected position with no signs of component migration loosening or fragment escape.  No new acute fracture dislocation or subluxation appreciated ulnohumeral joint appears concentric  Comparative studies: X-rays on 12/27/2022    Assessment:        1. Olecranon fracture, right, open type I or II, with routine healing, subsequent encounter           Plan:          1. Discussed treatment options at length " with patient at today's visit.  At this point time the patient is made a near full recovery.  She has near full painless range of motion with no signs of instability or infection.  I discussed with the patient that she should follow-up with her PCP to get put back on her long-term narcotic pain medication or should explore other options for her back pain.  She may discontinue the use of the brace and no longer needs to go to occupational therapy.  I discussed with the patient that she does not need to schedule follow-up with me at this point time but I am happy to see her back at any point if issues or concerns arise  2. Follow up: As needed      Kaylin Ojeda was in agreement with plan and had all questions answered.     Medications:  No orders of the defined types were placed in this encounter.      Followup:  No follow-ups on file.    Diagnoses and all orders for this visit:    1. Olecranon fracture, right, open type I or II, with routine healing, subsequent encounter (Primary)  -     XR Elbow 2 View Right          Dictated utilizing Dragon dictation

## 2023-01-24 NOTE — PROGRESS NOTES
"      Kaylin Ojeda is a 82 y.o. female, who presents with a chief complaint of   Chief Complaint   Patient presents with   • Back Pain   • Leg Pain   • Med Refill     Pt very unsure what she is taking (DIL fills her med container up) or should be taking and feels some meds she needs refills for   • Hospital Follow Up Visit     Family/pt thinks would benefit from home health care           HPI     Loss of balance lead to fall in December leading to hospital admission where she underwent ORIF on 12/10. She cannot recall why or how she fell. All she can recall is having just recovered from the flu, possible dehydration? Has not had Norco or Klonipin since discharge from the hospital. Feels she is in withdrawal with nausea, fatigue, pain. Has had increased confusion Has not had home health arranged ever even after discharge from the hospital. Did participate in rehab for 2 weeks. Daughter in law fills pill box for her and she is able to take them on her own. Patient is hurt as she feels son and daughter in law want to place her in a home. Neighbor assists her with transport and by checking in on her 3x/day. Regarding balance she feels it is worse on the right side. Leg \"gives out\" after a few steps. Denies vertigo. Unsure if she is taking B12. Gait is described as \"waddling\", recent urinary incontence (recalls previously being on pill).    - consider drawing H/H and B12 levels   - may benefit from HomeHealth vs outpatient therapy for balance issues     Was started on Diltiazem at the hospital, previously metoprolol for A-fibb. Unsure if she is taking as daugther in law fixes pill box. Unable to see Cardiology after discharge until March but thinks she is getting refills from their office.      Diet is poor. Does not think she ate dinner and has not eaten breakfast or lunch. Tried oral supplements but was not financially sustainable.     Interested in getting shower chair as well as bathing is difficult.      a-fib " - on eliquis and diltiazem.  HR going up and down.  It was 135 on first check and 76 on recheck.       The following portions of the patient's history were reviewed and updated as appropriate: allergies, current medications, past family history, past medical history, past social history, past surgical history and problem list.    Allergies: Aleve [naproxen sodium], Codeine, Ibuprofen, and Sulfa antibiotics    Review of Systems   Constitutional: Negative.    HENT: Negative.    Eyes: Negative.    Respiratory: Negative.    Cardiovascular: Negative.    Gastrointestinal: Negative.    Endocrine: Negative.    Genitourinary: Negative.    Musculoskeletal: Negative.    Skin: Negative.    Allergic/Immunologic: Negative.    Neurological: Negative.    Hematological: Negative.    Psychiatric/Behavioral: Negative.    All other systems reviewed and are negative.            Wt Readings from Last 3 Encounters:   01/24/23 41.5 kg (91 lb 6.4 oz)   01/24/23 44.9 kg (99 lb)   12/27/22 44.9 kg (99 lb)     Temp Readings from Last 3 Encounters:   01/24/23 98.7 °F (37.1 °C)   12/16/22 98.9 °F (37.2 °C)   10/07/22 97.7 °F (36.5 °C) (Infrared)     BP Readings from Last 3 Encounters:   01/24/23 98/80   12/27/22 100/71   12/16/22 103/79     Pulse Readings from Last 3 Encounters:   01/24/23 (!) 135   12/27/22 102   12/16/22 108     Body mass index is 17.85 kg/m².  SpO2 Readings from Last 3 Encounters:   01/24/23 96%   12/16/22 96%   10/07/22 98%          Physical Exam  Vitals and nursing note reviewed.   Constitutional:       Appearance: She is well-developed.      Comments: Patient appears very thin and frail.  She is chronically ill-appearing.   HENT:      Head: Normocephalic and atraumatic.      Right Ear: External ear normal.      Left Ear: External ear normal.      Nose: Nose normal.   Eyes:      Conjunctiva/sclera: Conjunctivae normal.      Pupils: Pupils are equal, round, and reactive to light.   Cardiovascular:      Rate and Rhythm:  Normal rate and regular rhythm.      Heart sounds: Normal heart sounds.   Pulmonary:      Effort: Pulmonary effort is normal. No respiratory distress.      Breath sounds: Normal breath sounds. No wheezing.   Musculoskeletal:         General: Normal range of motion.      Cervical back: Normal range of motion and neck supple.      Comments: Normal gait   Skin:     General: Skin is warm and dry.   Neurological:      Mental Status: She is alert and oriented to person, place, and time.   Psychiatric:         Behavior: Behavior normal.         Thought Content: Thought content normal.         Judgment: Judgment normal.         Results for orders placed or performed during the hospital encounter of 12/09/22   Comprehensive Metabolic Panel    Specimen: Blood   Result Value Ref Range    Glucose 92 65 - 99 mg/dL    BUN 20 8 - 23 mg/dL    Creatinine 0.83 0.57 - 1.00 mg/dL    Sodium 134 (L) 136 - 145 mmol/L    Potassium 4.5 3.5 - 5.2 mmol/L    Chloride 97 (L) 98 - 107 mmol/L    CO2 25.6 22.0 - 29.0 mmol/L    Calcium 9.1 8.6 - 10.5 mg/dL    Total Protein 7.1 6.0 - 8.5 g/dL    Albumin 4.30 3.50 - 5.20 g/dL    ALT (SGPT) 10 1 - 33 U/L    AST (SGOT) 20 1 - 32 U/L    Alkaline Phosphatase 98 39 - 117 U/L    Total Bilirubin 0.5 0.0 - 1.2 mg/dL    Globulin 2.8 gm/dL    A/G Ratio 1.5 g/dL    BUN/Creatinine Ratio 24.1 7.0 - 25.0    Anion Gap 11.4 5.0 - 15.0 mmol/L    eGFR 71.4 >60.0 mL/min/1.73   Protime-INR    Specimen: Blood   Result Value Ref Range    Protime 14.3 12.1 - 15.0 Seconds    INR 1.10 0.90 - 1.10   aPTT    Specimen: Blood   Result Value Ref Range    PTT 31.1 24.3 - 38.1 seconds   CBC Auto Differential    Specimen: Blood   Result Value Ref Range    WBC 7.34 3.40 - 10.80 10*3/mm3    RBC 3.73 (L) 3.77 - 5.28 10*6/mm3    Hemoglobin 11.3 (L) 12.0 - 15.9 g/dL    Hematocrit 35.1 34.0 - 46.6 %    MCV 94.1 79.0 - 97.0 fL    MCH 30.3 26.6 - 33.0 pg    MCHC 32.2 31.5 - 35.7 g/dL    RDW 15.9 (H) 12.3 - 15.4 %    RDW-SD 54.5 (H) 37.0 -  54.0 fl    MPV 9.9 6.0 - 12.0 fL    Platelets 191 140 - 450 10*3/mm3    Neutrophil % 61.7 42.7 - 76.0 %    Lymphocyte % 29.0 19.6 - 45.3 %    Monocyte % 7.5 5.0 - 12.0 %    Eosinophil % 0.5 0.3 - 6.2 %    Basophil % 0.8 0.0 - 1.5 %    Immature Grans % 0.5 0.0 - 0.5 %    Neutrophils, Absolute 4.52 1.70 - 7.00 10*3/mm3    Lymphocytes, Absolute 2.13 0.70 - 3.10 10*3/mm3    Monocytes, Absolute 0.55 0.10 - 0.90 10*3/mm3    Eosinophils, Absolute 0.04 0.00 - 0.40 10*3/mm3    Basophils, Absolute 0.06 0.00 - 0.20 10*3/mm3    Immature Grans, Absolute 0.04 0.00 - 0.05 10*3/mm3    nRBC 0.0 0.0 - 0.2 /100 WBC   Troponin    Specimen: Blood   Result Value Ref Range    Troponin T <0.010 0.000 - 0.030 ng/mL   Urine Drug Screen - Urine, Clean Catch    Specimen: Urine, Clean Catch   Result Value Ref Range    THC, Screen, Urine Negative Negative    Phencyclidine (PCP), Urine Negative Negative    Cocaine Screen, Urine Negative Negative    Methamphetamine, Ur Negative Negative    Opiate Screen Negative Negative    Amphetamine Screen, Urine Negative Negative    Benzodiazepine Screen, Urine Negative Negative    Tricyclic Antidepressants Screen Negative Negative    Methadone Screen, Urine Negative Negative    Barbiturates Screen, Urine Negative Negative    Oxycodone Screen, Urine Positive (A) Negative    Propoxyphene Screen Negative Negative    Buprenorphine, Screen, Urine Negative Negative   Urinalysis With Culture If Indicated - Urine, Clean Catch    Specimen: Urine, Clean Catch   Result Value Ref Range    Color, UA Yellow Yellow, Straw    Appearance, UA Clear Clear    pH, UA 7.0 4.5 - 8.0    Specific Gravity, UA 1.015 1.003 - 1.030    Glucose, UA Negative Negative    Ketones, UA Negative Negative    Bilirubin, UA Negative Negative    Blood, UA Trace (A) Negative    Protein, UA Negative Negative    Leuk Esterase, UA Small (1+) (A) Negative    Nitrite, UA Negative Negative    Urobilinogen, UA 0.2 E.U./dL 0.2 - 1.0 E.U./dL   CK    Specimen:  Blood   Result Value Ref Range    Creatine Kinase 170 20 - 180 U/L   Sedimentation Rate    Specimen: Blood   Result Value Ref Range    Sed Rate 19 0 - 30 mm/hr   Urinalysis, Microscopic Only - Urine, Clean Catch    Specimen: Urine, Clean Catch   Result Value Ref Range    RBC, UA 0-2 (A) None Seen /HPF    WBC, UA 0-2 (A) None Seen /HPF    Bacteria, UA None Seen None Seen /HPF    Squamous Epithelial Cells, UA 0-2 None Seen, 0-2 /HPF    Hyaline Casts, UA None Seen None Seen /LPF    Methodology Manual Light Microscopy    Basic Metabolic Panel    Specimen: Blood   Result Value Ref Range    Glucose 88 65 - 99 mg/dL    BUN 13 8 - 23 mg/dL    Creatinine 0.72 0.57 - 1.00 mg/dL    Sodium 137 136 - 145 mmol/L    Potassium 3.6 3.5 - 5.2 mmol/L    Chloride 102 98 - 107 mmol/L    CO2 24.0 22.0 - 29.0 mmol/L    Calcium 8.3 (L) 8.6 - 10.5 mg/dL    BUN/Creatinine Ratio 18.1 7.0 - 25.0    Anion Gap 11.0 5.0 - 15.0 mmol/L    eGFR 84.6 >60.0 mL/min/1.73   CBC Auto Differential    Specimen: Blood   Result Value Ref Range    WBC 5.97 3.40 - 10.80 10*3/mm3    RBC 3.34 (L) 3.77 - 5.28 10*6/mm3    Hemoglobin 10.1 (L) 12.0 - 15.9 g/dL    Hematocrit 31.6 (L) 34.0 - 46.6 %    MCV 94.6 79.0 - 97.0 fL    MCH 30.2 26.6 - 33.0 pg    MCHC 32.0 31.5 - 35.7 g/dL    RDW 15.8 (H) 12.3 - 15.4 %    RDW-SD 55.3 (H) 37.0 - 54.0 fl    MPV 10.1 6.0 - 12.0 fL    Platelets 180 140 - 450 10*3/mm3    Neutrophil % 56.0 42.7 - 76.0 %    Lymphocyte % 32.3 19.6 - 45.3 %    Monocyte % 9.7 5.0 - 12.0 %    Eosinophil % 1.0 0.3 - 6.2 %    Basophil % 0.7 0.0 - 1.5 %    Immature Grans % 0.3 0.0 - 0.5 %    Neutrophils, Absolute 3.34 1.70 - 7.00 10*3/mm3    Lymphocytes, Absolute 1.93 0.70 - 3.10 10*3/mm3    Monocytes, Absolute 0.58 0.10 - 0.90 10*3/mm3    Eosinophils, Absolute 0.06 0.00 - 0.40 10*3/mm3    Basophils, Absolute 0.04 0.00 - 0.20 10*3/mm3    Immature Grans, Absolute 0.02 0.00 - 0.05 10*3/mm3   Basic Metabolic Panel    Specimen: Blood   Result Value Ref Range     Glucose 113 (H) 65 - 99 mg/dL    BUN 8 8 - 23 mg/dL    Creatinine 0.73 0.57 - 1.00 mg/dL    Sodium 135 (L) 136 - 145 mmol/L    Potassium 3.4 (L) 3.5 - 5.2 mmol/L    Chloride 97 (L) 98 - 107 mmol/L    CO2 27.8 22.0 - 29.0 mmol/L    Calcium 9.0 8.6 - 10.5 mg/dL    BUN/Creatinine Ratio 11.0 7.0 - 25.0    Anion Gap 10.2 5.0 - 15.0 mmol/L    eGFR 83.3 >60.0 mL/min/1.73   CBC Auto Differential    Specimen: Blood   Result Value Ref Range    WBC 8.04 3.40 - 10.80 10*3/mm3    RBC 2.96 (L) 3.77 - 5.28 10*6/mm3    Hemoglobin 9.0 (L) 12.0 - 15.9 g/dL    Hematocrit 28.3 (L) 34.0 - 46.6 %    MCV 95.6 79.0 - 97.0 fL    MCH 30.4 26.6 - 33.0 pg    MCHC 31.8 31.5 - 35.7 g/dL    RDW 15.9 (H) 12.3 - 15.4 %    RDW-SD 55.2 (H) 37.0 - 54.0 fl    MPV 10.4 6.0 - 12.0 fL    Platelets 147 140 - 450 10*3/mm3    Neutrophil % 74.1 42.7 - 76.0 %    Lymphocyte % 17.0 (L) 19.6 - 45.3 %    Monocyte % 7.5 5.0 - 12.0 %    Eosinophil % 0.4 0.3 - 6.2 %    Basophil % 0.5 0.0 - 1.5 %    Immature Grans % 0.5 0.0 - 0.5 %    Neutrophils, Absolute 5.96 1.70 - 7.00 10*3/mm3    Lymphocytes, Absolute 1.37 0.70 - 3.10 10*3/mm3    Monocytes, Absolute 0.60 0.10 - 0.90 10*3/mm3    Eosinophils, Absolute 0.03 0.00 - 0.40 10*3/mm3    Basophils, Absolute 0.04 0.00 - 0.20 10*3/mm3    Immature Grans, Absolute 0.04 0.00 - 0.05 10*3/mm3    nRBC 0.0 0.0 - 0.2 /100 WBC   Magnesium    Specimen: Blood   Result Value Ref Range    Magnesium 2.2 1.6 - 2.4 mg/dL   Phosphorus    Specimen: Blood   Result Value Ref Range    Phosphorus 3.4 2.5 - 4.5 mg/dL   Hemoglobin & Hematocrit, Blood    Specimen: Blood   Result Value Ref Range    Hemoglobin 9.4 (L) 12.0 - 15.9 g/dL    Hematocrit 28.8 (L) 34.0 - 46.6 %   Basic Metabolic Panel    Specimen: Blood   Result Value Ref Range    Glucose 112 (H) 65 - 99 mg/dL    BUN 9 8 - 23 mg/dL    Creatinine 0.60 0.57 - 1.00 mg/dL    Sodium 136 136 - 145 mmol/L    Potassium 3.8 3.5 - 5.2 mmol/L    Chloride 100 98 - 107 mmol/L    CO2 28.1 22.0 - 29.0  mmol/L    Calcium 8.6 8.6 - 10.5 mg/dL    BUN/Creatinine Ratio 15.0 7.0 - 25.0    Anion Gap 7.9 5.0 - 15.0 mmol/L    eGFR 90.9 >60.0 mL/min/1.73   CBC Auto Differential    Specimen: Blood   Result Value Ref Range    WBC 7.78 3.40 - 10.80 10*3/mm3    RBC 2.85 (L) 3.77 - 5.28 10*6/mm3    Hemoglobin 8.8 (L) 12.0 - 15.9 g/dL    Hematocrit 27.5 (L) 34.0 - 46.6 %    MCV 96.5 79.0 - 97.0 fL    MCH 30.9 26.6 - 33.0 pg    MCHC 32.0 31.5 - 35.7 g/dL    RDW 16.0 (H) 12.3 - 15.4 %    RDW-SD 56.4 (H) 37.0 - 54.0 fl    MPV 10.3 6.0 - 12.0 fL    Platelets 163 140 - 450 10*3/mm3    Neutrophil % 68.8 42.7 - 76.0 %    Lymphocyte % 21.2 19.6 - 45.3 %    Monocyte % 8.4 5.0 - 12.0 %    Eosinophil % 0.6 0.3 - 6.2 %    Basophil % 0.6 0.0 - 1.5 %    Immature Grans % 0.4 0.0 - 0.5 %    Neutrophils, Absolute 5.35 1.70 - 7.00 10*3/mm3    Lymphocytes, Absolute 1.65 0.70 - 3.10 10*3/mm3    Monocytes, Absolute 0.65 0.10 - 0.90 10*3/mm3    Eosinophils, Absolute 0.05 0.00 - 0.40 10*3/mm3    Basophils, Absolute 0.05 0.00 - 0.20 10*3/mm3    Immature Grans, Absolute 0.03 0.00 - 0.05 10*3/mm3    nRBC 0.0 0.0 - 0.2 /100 WBC   Magnesium    Specimen: Blood   Result Value Ref Range    Magnesium 1.9 1.6 - 2.4 mg/dL   Basic Metabolic Panel    Specimen: Blood   Result Value Ref Range    Glucose 221 (H) 65 - 99 mg/dL    BUN 20 8 - 23 mg/dL    Creatinine 0.73 0.57 - 1.00 mg/dL    Sodium 138 136 - 145 mmol/L    Potassium 3.4 (L) 3.5 - 5.2 mmol/L    Chloride 100 98 - 107 mmol/L    CO2 26.1 22.0 - 29.0 mmol/L    Calcium 9.2 8.6 - 10.5 mg/dL    BUN/Creatinine Ratio 27.4 (H) 7.0 - 25.0    Anion Gap 11.9 5.0 - 15.0 mmol/L    eGFR 83.3 >60.0 mL/min/1.73   CBC Auto Differential    Specimen: Blood   Result Value Ref Range    WBC 8.01 3.40 - 10.80 10*3/mm3    RBC 3.24 (L) 3.77 - 5.28 10*6/mm3    Hemoglobin 9.8 (L) 12.0 - 15.9 g/dL    Hematocrit 31.8 (L) 34.0 - 46.6 %    MCV 98.1 (H) 79.0 - 97.0 fL    MCH 30.2 26.6 - 33.0 pg    MCHC 30.8 (L) 31.5 - 35.7 g/dL    RDW 16.3  (H) 12.3 - 15.4 %    RDW-SD 60.1 (H) 37.0 - 54.0 fl    MPV 9.7 6.0 - 12.0 fL    Platelets 251 140 - 450 10*3/mm3    Neutrophil % 77.5 (H) 42.7 - 76.0 %    Lymphocyte % 14.2 (L) 19.6 - 45.3 %    Monocyte % 6.1 5.0 - 12.0 %    Eosinophil % 1.6 0.3 - 6.2 %    Basophil % 0.5 0.0 - 1.5 %    Immature Grans % 0.1 0.0 - 0.5 %    Neutrophils, Absolute 6.20 1.70 - 7.00 10*3/mm3    Lymphocytes, Absolute 1.14 0.70 - 3.10 10*3/mm3    Monocytes, Absolute 0.49 0.10 - 0.90 10*3/mm3    Eosinophils, Absolute 0.13 0.00 - 0.40 10*3/mm3    Basophils, Absolute 0.04 0.00 - 0.20 10*3/mm3    Immature Grans, Absolute 0.01 0.00 - 0.05 10*3/mm3   Hemoglobin A1c    Specimen: Blood   Result Value Ref Range    Hemoglobin A1C 5.50 4.80 - 5.60 %   TSH    Specimen: Blood   Result Value Ref Range    TSH 2.100 0.270 - 4.200 uIU/mL   ECG 12 Lead Pre-Op / Pre-Procedure   Result Value Ref Range    QT Interval 438 ms   ECG 12 Lead Rhythm Change   Result Value Ref Range    QT Interval 332 ms     Result Review :                  Assessment and Plan    Diagnoses and all orders for this visit:    1. Olecranon fracture, right, open type I or II, with routine healing, subsequent encounter (Primary)  -     Ambulatory Referral to Home Health  -     oxyCODONE-acetaminophen (Percocet) 7.5-325 MG per tablet; Take 1 tablet by mouth Every 8 (Eight) Hours As Needed for Severe Pain.  Dispense: 90 tablet; Refill: 0  -     Misc. Devices (Bath/Shower Seat) misc; 1 each As Needed (for showering).  Dispense: 1 each; Refill: 0    2. Anxiety and depression  -     Ambulatory Referral to Home Health  -     clonazePAM (KlonoPIN) 0.5 MG tablet; Take 1 tablet by mouth At Night As Needed for Anxiety for up to 3 days.  Dispense: 30 tablet; Refill: 0  -     Misc. Devices (Bath/Shower Seat) misc; 1 each As Needed (for showering).  Dispense: 1 each; Refill: 0    3. Chronic low back pain without sciatica, unspecified back pain laterality  -     Ambulatory Referral to Home Health  -      oxyCODONE-acetaminophen (Percocet) 7.5-325 MG per tablet; Take 1 tablet by mouth Every 8 (Eight) Hours As Needed for Severe Pain.  Dispense: 90 tablet; Refill: 0  -     Misc. Devices (Bath/Shower Seat) misc; 1 each As Needed (for showering).  Dispense: 1 each; Refill: 0    4. Lumbar back pain  -     Ambulatory Referral to Home Health  -     oxyCODONE-acetaminophen (Percocet) 7.5-325 MG per tablet; Take 1 tablet by mouth Every 8 (Eight) Hours As Needed for Severe Pain.  Dispense: 90 tablet; Refill: 0  -     Misc. Devices (Bath/Shower Seat) misc; 1 each As Needed (for showering).  Dispense: 1 each; Refill: 0    5. Essential hypertension  -     Ambulatory Referral to Home Health  -     Misc. Devices (Bath/Shower Seat) misc; 1 each As Needed (for showering).  Dispense: 1 each; Refill: 0    6. Unspecified severe protein-calorie malnutrition (HCC)  -     Nutritional Supplements (Ensure Nutrition Shake) liquid; Take 1 bottle by mouth 2 (Two) Times a Day.  Dispense: 93521 mL; Refill: 2  -     Misc. Devices (Bath/Shower Seat) misc; 1 each As Needed (for showering).  Dispense: 1 each; Refill: 0    7. Fall in home, subsequent encounter  -     Misc. Devices (Bath/Shower Seat) misc; 1 each As Needed (for showering).  Dispense: 1 each; Refill: 0    8. Paroxysmal atrial fibrillation (HCC)  -     Misc. Devices (Bath/Shower Seat) misc; 1 each As Needed (for showering).  Dispense: 1 each; Refill: 0    9. Poor nutrition -Ensure samples given today.       I have seen and examined the patient independently.  Reviewed past hospital notes extensively with patient.  I have reviewed her medications.  She is due for refill of her pain medication.  She has been out of her clonazepam for some time.  She has been on these medications for a long time and has been very difficult to wean her down.  She reports that she was released from orthopedics this morning.  She has not had anyone coming to check on her with home health.  She has no real  family support and her neighbor is with her today.  I will order home health to see if we can get her stronger.  Overall the patient appears very frail.  Her weight is down to 91 pounds.  Have discussed nutrition with her extensively and gave her Ensure samples today.  I have reviewed the resident's findings as noted above and added to the note where appropriate.  Agree with above.    Donna Forte MD  Attending Physician  Internal Medicine and Pediatrics          Outpatient Medications Prior to Visit   Medication Sig Dispense Refill   • acetaminophen (TYLENOL) 325 MG tablet Take 2 tablets by mouth Every 4 (Four) Hours As Needed for Mild Pain.     • albuterol (PROVENTIL) (2.5 MG/3ML) 0.083% nebulizer solution Take 2.5 mg by nebulization Every 4 (Four) Hours As Needed for Wheezing or Shortness of Air. 150 mL 2   • apixaban (ELIQUIS) 2.5 MG tablet tablet Take 1 tablet by mouth 2 (Two) Times a Day. 60 tablet 0   • budesonide-formoterol (Symbicort) 160-4.5 MCG/ACT inhaler Inhale 2 puffs 2 (Two) Times a Day.  12   • FLUoxetine (PROzac) 20 MG capsule TAKE 1 CAPSULE BY MOUTH DAILY. 30 capsule 0   • melatonin 5 MG tablet tablet Take 1 tablet by mouth At Night As Needed (insomnia).     • ondansetron (ZOFRAN) 4 MG tablet Take 1 tablet by mouth Every 6 (Six) Hours As Needed for Nausea or Vomiting.     • vitamin D (ERGOCALCIFEROL) 1.25 MG (64462 UT) capsule capsule Take 1 capsule by mouth 1 (One) Time Per Week. 13 capsule 3   • atorvastatin (Lipitor) 10 MG tablet Take 1 tablet by mouth Daily. 90 tablet 1   • bisacodyl (DULCOLAX) 10 MG suppository Insert 1 suppository into the rectum Daily As Needed for Constipation (Use if bisacodyl oral is ineffective).     • bisacodyl (DULCOLAX) 5 MG EC tablet Take 1 tablet by mouth Daily As Needed for Constipation (Use if polyethylene glycol is ineffective).     • dilTIAZem CD (CARDIZEM CD) 300 MG 24 hr capsule Take 1 capsule by mouth Daily. 90 capsule 1   • famotidine (PEPCID) 20 MG  tablet Take 1 tablet by mouth 2 (Two) Times a Day Before Meals.     • fluticasone (FLONASE) 50 MCG/ACT nasal spray SPRAY TWICE IN EACH NOSTRIL EVERY DAY FOR 30 DAYS. (Patient taking differently: As Needed. prn) 16 mL 6   • hydrOXYzine (ATARAX) 25 MG tablet Take 1 tablet by mouth Every 6 (Six) Hours As Needed for Itching, Allergies or Anxiety.     • nicotine (NICODERM CQ) 14 MG/24HR patch Place 1 patch on the skin as directed by provider Daily.     • polyethylene glycol (MIRALAX) 17 g packet Take 17 g by mouth Daily As Needed (Use if senna-docusate is ineffective).     • sennosides-docusate (PERICOLACE) 8.6-50 MG per tablet Take 2 tablets by mouth 2 (Two) Times a Day.     • clonazePAM (KlonoPIN) 0.5 MG tablet Take 1 tablet by mouth At Night As Needed for Anxiety for up to 3 days. 30 tablet 0   • HYDROcodone-acetaminophen (NORCO) 5-325 MG per tablet TAKE 1 TABLET BY MOUTH EVERY 4 (FOUR) HOURS AS NEEDED FOR SEVERE PAIN. 40 tablet 0   • HYDROcodone-acetaminophen (NORCO) 5-325 MG per tablet Take 1 tablet by mouth Every 4 (Four) Hours As Needed for Severe Pain. 40 tablet 0     Facility-Administered Medications Prior to Visit   Medication Dose Route Frequency Provider Last Rate Last Admin   • cyanocobalamin injection 1,000 mcg  1,000 mcg Intramuscular Q28 Days Donna Forte MD   1,000 mcg at 04/19/22 1419     New Medications Ordered This Visit   Medications   • clonazePAM (KlonoPIN) 0.5 MG tablet     Sig: Take 1 tablet by mouth At Night As Needed for Anxiety for up to 3 days.     Dispense:  30 tablet     Refill:  0     07/29/2022 10:27:00 AM   • oxyCODONE-acetaminophen (Percocet) 7.5-325 MG per tablet     Sig: Take 1 tablet by mouth Every 8 (Eight) Hours As Needed for Severe Pain.     Dispense:  90 tablet     Refill:  0   • Nutritional Supplements (Ensure Nutrition Shake) liquid     Sig: Take 1 bottle by mouth 2 (Two) Times a Day.     Dispense:  36166 mL     Refill:  2     Disp max available if covered by  insurance   • Misc. Devices (Bath/Shower Seat) misc     Si each As Needed (for showering).     Dispense:  1 each     Refill:  0     [unfilled]  Medications Discontinued During This Encounter   Medication Reason   • HYDROcodone-acetaminophen (NORCO) 5-325 MG per tablet Duplicate order   • HYDROcodone-acetaminophen (NORCO) 5-325 MG per tablet *Therapy completed   • clonazePAM (KlonoPIN) 0.5 MG tablet Reorder         No follow-ups on file.    Patient was given instructions and counseling regarding her condition or for health maintenance advice. Please see specific information pulled into the AVS if appropriate.

## 2023-02-08 NOTE — TELEPHONE ENCOUNTER
Caller: ANUSHA RINCON    Relationship: [facility] [employer]    Best call back number: [phone]    What was the call regarding: ANUSHA IS A PHYSICAL THERAPIST WITH [employer] AND STATES THAT THE PATIENT IS REFUSING ANY FURTHER PHYSICAL THERAPY. ANUSHA STATED THAT THE PATIENT IS DISMISSED. ANUSHA STATED THAT THE PATIENT TOLD HIM SHE DID NOT WANT PHYSICAL THERAPY, SHE WANTS SOMEONE TO COME OUT TO HER HOUSE TO DO THINGS FOR HER SUCH AS CHECK HER MAIL AND OTHER TASKS & ERRANDS.    Do you require a callback: NO

## 2023-02-09 NOTE — TELEPHONE ENCOUNTER
Is this something that one of you could advise about or would this be something that would have to await Dr. Forte's return?

## 2023-02-16 NOTE — TELEPHONE ENCOUNTER
Caller: Kaylin Ojeda    Relationship: Self    Best call back number:     Requested Prescriptions:   Requested Prescriptions     Pending Prescriptions Disp Refills   • dilTIAZem CD (CARDIZEM CD) 300 MG 24 hr capsule 90 capsule 1     Sig: Take 1 capsule by mouth Daily.   • oxyCODONE-acetaminophen (Percocet) 7.5-325 MG per tablet 90 tablet 0     Sig: Take 1 tablet by mouth Every 8 (Eight) Hours As Needed for Severe Pain.        Pharmacy where request should be sent:  [place] [phone]    Additional details provided by patient:     Does the patient have less than a 3 day supply:  [x] Yes  [] No    Would you like a call back once the refill request has been completed: [x] Yes [] No    If the office needs to give you a call back, can they leave a voicemail: [x] Yes [] No    Tristen Mathur Rep   02/16/23 09:59 EST

## 2023-02-17 NOTE — TELEPHONE ENCOUNTER
Rx Refill Note  Requested Prescriptions     Pending Prescriptions Disp Refills    dilTIAZem CD (CARDIZEM CD) 300 MG 24 hr capsule 90 capsule 1     Sig: Take 1 capsule by mouth Daily.    oxyCODONE-acetaminophen (Percocet) 7.5-325 MG per tablet 90 tablet 0     Sig: Take 1 tablet by mouth Every 8 (Eight) Hours As Needed for Severe Pain.      Last office visit with prescribing clinician: 1/24/2023   Last telemedicine visit with prescribing clinician: 3/13/2023   Next office visit with prescribing clinician: 3/13/2023                         Would you like a call back once the refill request has been completed: [] Yes [] No    If the office needs to give you a call back, can they leave a voicemail: [] Yes [] No    Robby Ruiz, PCT  02/17/23, 11:02 EST

## 2023-02-21 NOTE — TELEPHONE ENCOUNTER
Rx Refill Note  Requested Prescriptions     Pending Prescriptions Disp Refills    clonazePAM (KlonoPIN) 0.5 MG tablet [Pharmacy Med Name: CLONAZEPAM 0.5MG] 30 tablet 0     Sig: TAKE 1 TABLET BY MOUTH AT NIGHT AS NEEDED FOR ANXIETY FOR UP TO 3 DAYS.     Refused Prescriptions Disp Refills    oxyCODONE-acetaminophen (PERCOCET) 7.5-325 MG per tablet [Pharmacy Med Name: OXYCOD/APAP 7.5-325] 90 tablet 0     Sig: TAKE 1 TABLET BY MOUTH EVERY 8 (EIGHT) HOURS AS NEEDED FOR SEVERE PAIN.     Refused By: SAKSHI SMITH     Reason for Refusal: Duplicate renewal request      Last office visit with prescribing clinician: 1/24/2023   Last telemedicine visit with prescribing clinician: 3/13/2023   Next office visit with prescribing clinician: 3/13/2023                         Would you like a call back once the refill request has been completed: [] Yes [] No    If the office needs to give you a call back, can they leave a voicemail: [] Yes [] No    Sakshi Smith MA  02/21/23, 10:16 EST

## 2023-02-23 NOTE — TELEPHONE ENCOUNTER
Caller: Kaylin Ojeda    Relationship: Self    Best call back number:    Requested Prescriptions:   Requested Prescriptions     Pending Prescriptions Disp Refills   • oxyCODONE-acetaminophen (Percocet) 7.5-325 MG per tablet 90 tablet 0     Sig: Take 1 tablet by mouth Every 8 (Eight) Hours As Needed for Severe Pain.   • clonazePAM (KlonoPIN) 0.5 MG tablet 30 tablet 0     Sig: Take 1 tablet by mouth At Night As Needed for Anxiety.        Pharmacy where request should be sent:  [place] [phone]    Additional details provided by patient:     Does the patient have less than a 3 day supply:  [x] Yes  [] No    Would you like a call back once the refill request has been completed: [x] Yes [] No    If the office needs to give you a call back, can they leave a voicemail: [x] Yes [] No    Tristen Mathur Rep   02/23/23 09:56 EST

## 2023-03-12 PROBLEM — J43.9 PULMONARY EMPHYSEMA, UNSPECIFIED EMPHYSEMA TYPE: Status: ACTIVE | Noted: 2023-01-01

## 2023-03-12 NOTE — H&P
[facility] HOSPITALIST     Donna Forte MD    CHIEF COMPLAINT:     GAUTAM, shortness of breath low O2    HISTORY OF PRESENT ILLNESS:    82 yo chronically ill female came to ER for shortness of breath. Reports decline in breathing /functional status over several months but this episode has been worse over the last couple of days with GAUTAM, cough, some mild yellow sputum production. Reports the GAUTAM making it difficult to ambulate through her house. No reported orthopnea. Came to Er O2 sat noted 88 while there. Was placed on O2, CXR noted possible edema, given breathing tx and lasix and admission was requested. Denies chest pain or fever      Past Medical History:   Diagnosis Date   • A-fib (Formerly [facility])    • Abdominal pain, epigastric     Chronic, unclear cause, improved   • Anorexia    • Anxiety    • Arthritis    • CAD (coronary artery disease)     Cath 5/2015: nonobstructive LAD/RCA disease, 90% mid LCx s/p 2.12h70ks Xience.  Cath 1/2021: 50-60% prox LAD/70-80% distal LAD, patent Cx stent, 20% RCA   • Cellulitis of leg    • Cervical disc disease    • Chronic fatigue    • Colitis    • COPD (chronic obstructive pulmonary disease) (Formerly [facility])    • Depression    • Erosive gastritis    • Fibromyalgia    • Frequency of urination 06/09/2017   • Gastritis    • Hypercholesterolemia 02/02/2016   • Hyperglycemia    • Hypertension     History of refractory hypertension which improved significantly   • Insomnia    • Leukocytes in urine    • Lower back pain    • Mediastinal lymphadenopathy    • 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[facility])    • Mononucleosis    • Occlusion and stenosis of carotid artery    • Onychomycosis    • Orbit fracture (Formerly [facility])    • PAF (paroxysmal atrial fibrillation) (Formerly [facility])    • Peripheral arterial disease (Formerly [facility])     Significant (carotid, subclavian, renal arteries, lower extremities), with claudication, medical therapy only   • Pernicious anemia    • Prediabetes    • Renal artery stenosis (Formerly [facility])      unilateral, with renal atrophy (no intervention required)   • Renal calculi    • Sinus bradycardia     mild, asymptomatic   • TIA (transient ischemic attack)    • UTI (urinary tract infection)    • Valvular heart disease     1/2021: mod-severe AI, mod MR, mild-mod TR   • Vision problem    • Vitamin B 12 deficiency      Past Surgical History:   Procedure Laterality Date   • APPENDECTOMY     • BREAST BIOPSY Left     20+ yrs ago   • BREAST SURGERY     • CARDIAC CATHETERIZATION  05/2015    nonobs LAD/RCA disease, 90% mid LCx s/p 2.55e27tp Xience   • CARDIAC CATHETERIZATION N/A 10/28/2020    Procedure: Right and Left Heart Cath;  Surgeon: Opal Contreras MD;  Location:  [facility];  Service: Cardiovascular;  Laterality: N/A;  for cardiac testing prior to possible valve surgery per Dr. Mai   • CARDIAC CATHETERIZATION N/A 10/28/2020    Procedure: Coronary angiography;  Surgeon: Opal Contreras MD;  Location:  [facility];  Service: Cardiovascular;  Laterality: N/A;   • CERVICAL FUSION  1997   • CHOLECYSTECTOMY     • CORONARY STENT PLACEMENT     • HYSTERECTOMY  1995   • KNEE SURGERY Right 2005   • KNEE SURGERY Left 1998   • ORIF HUMERUS FRACTURE Right 12/10/2022    Procedure: ELBOW OPEN REDUCTION INTERNAL FIXATION;  Surgeon: Lito Reeder MD;  Location: [facility];  Service: Orthopedics;  Laterality: Right;     Family History   Problem Relation Age of Onset   • Asthma Mother    • Emphysema Mother    • Graves' disease Mother    • Heart disease Mother    • Hypertension Mother    • Emphysema Father    • Hypertension Father    • Heart disease Father    • Kidney disease Sister    • Lupus Daughter         Systemic erythematosus   • Arthritis Other    • Crohn's disease Other    • Breast cancer Niece    • Breast cancer Maternal Cousin    • Breast cancer Maternal Cousin      Social History     Tobacco Use   • Smoking status: Some Days     Packs/day: 0.20     Years: 50.00     Pack years:  "10.00     Types: Cigarettes   • Smokeless tobacco: Never   • Tobacco comments:     trying to quit, pt states she has basically quit but will still have one when she stressed   Vaping Use   • Vaping Use: Never used   Substance Use Topics   • Alcohol use: Not Currently     Comment: OCCASIONAL/ TWICE A YEAR. // daily caffiene   • Drug use: No     (Not in a hospital admission)    Allergies:  Aleve [naproxen sodium], Codeine, Ibuprofen, and Sulfa antibiotics    Immunization History   Administered Date(s) Administered   • COVID-19 (MODERNA) 1st, 2nd, 3rd Dose Only 02/03/2021, 03/04/2021   • COVID-19 (MODERNA) BOOSTER 01/21/2022   • Fluad Quad 65+ 09/02/2020   • Fluzone High Dose =>65 Years ([employer] ONLY) 10/15/2015, 10/30/2017, 08/29/2018, 09/10/2019   • Pneumococcal Conjugate 13-Valent (PCV13) 11/14/2018   • Pneumococcal Polysaccharide (PPSV23) 12/04/2019           REVIEW OF SYSTEMS:  Please see the above history of present illness for pertinent positives and negatives.     Objective     Vital Signs  Temp:  [98.7 °F (37.1 °C)] 98.7 °F (37.1 °C)  Heart Rate:  [60-67] 62  Resp:  [18-20] 20  BP: (121-174)/(76-81) 129/79    Flowsheet Rows    Flowsheet Row First Filed Value   Admission Height 152.4 cm (60\") Documented at 03/12/2023 1128   Admission Weight 41 kg (90 lb 6.2 oz) Documented at 03/12/2023 1128           Physical Exam:  Physical Exam  Vitals reviewed.   Constitutional:       Appearance: She is ill-appearing.   Cardiovascular:      Rate and Rhythm: Normal rate.   Pulmonary:      Effort: No respiratory distress.      Breath sounds: Examination of the right-lower field reveals decreased breath sounds. Examination of the left-lower field reveals decreased breath sounds. Decreased breath sounds present.   Musculoskeletal:      Right lower leg: No edema.      Left lower leg: No edema.   Skin:     General: Skin is warm.   Neurological:      Mental Status: She is alert.         Emotional Behavior:      Mood and Affect:     " "    Depression  none               Anxiety  non    Debilities:     Agitation  non    Results Review:    I reviewed the patient's new clinical results.  Lab Results (most recent)     Procedure Component Value Units Date/Time    Procalcitonin [[id number]]  (Normal) Collected: 03/12/23 1138    Specimen: Blood Updated: 03/12/23 1404     Procalcitonin 0.08 ng/mL     Narrative:      As a Marker for Sepsis (Non-Neonates):    1. <0.5 ng/mL represents a low risk of severe sepsis and/or septic shock.  2. >2 ng/mL represents a high risk of severe sepsis and/or septic shock.    As a Marker for Lower Respiratory Tract Infections that require antibiotic therapy:    PCT on Admission    Antibiotic Therapy       6-12 Hrs later    >0.5                Strongly Recommended  >0.25 - <0.5        Recommended   0.1 - 0.25          Discouraged              Remeasure/reassess PCT  <0.1                Strongly Discouraged     Remeasure/reassess PCT    As 28 day mortality risk marker: \"Change in Procalcitonin Result\" (>80% or <=80%) if Day 0 (or Day 1) and Day 4 values are available. Refer to http://www.[employer]-pct-calculator.com    Change in PCT <=80%  A decrease of PCT levels below or equal to 80% defines a positive change in PCT test result representing a higher risk for 28-day all-cause mortality of patients diagnosed with severe sepsis for septic shock.    Change in PCT >80%  A decrease of PCT levels of more than 80% defines a negative change in PCT result representing a lower risk for 28-day all-cause mortality of patients diagnosed with severe sepsis or septic shock.       COVID-19 and FLU A/B PCR - Swab, Nasopharynx [[id number]]  (Normal) Collected: 03/12/23 1219    Specimen: Swab from Nasopharynx Updated: 03/12/23 1246     COVID19 Not Detected     Influenza A PCR Not Detected     Influenza B PCR Not Detected    Narrative:      Fact sheet for providers: https://www.fda.gov/media/175355/download    Fact sheet for patients: " "https://www.fda.gov/media/939146/download    Test performed by PCR.    BNP [[id number]]  (Abnormal) Collected: 03/12/23 1138    Specimen: Blood Updated: 03/12/23 1238     proBNP 3,053.0 pg/mL     Narrative:      Among patients with dyspnea, NT-proBNP is highly sensitive for the detection of acute congestive heart failure. In addition NT-proBNP of <300 pg/ml effectively rules out acute congestive heart failure with 99% negative predictive value.    Results may be falsely decreased if patient taking Biotin.      D-dimer, Quantitative [[id number]]  (Abnormal) Collected: 03/12/23 1138    Specimen: Blood Updated: 03/12/23 1222     D-Dimer, Quantitative 3.14 MCGFEU/mL     Narrative:      According to the assay 's published package insert, a normal (<0.50 MCGFEU/mL) D-dimer result in conjunction with a non-high clinical probability assessment, excludes deep vein thrombosis (DVT) and pulmonary embolism (PE) with high sensitivity.    D-dimer values increase with age and this can make VTE exclusion of an older population difficult. To address this, the [employer], based on best available evidence and recent guidelines, recommends that clinicians use age-adjusted D-dimer thresholds in patients greater than 50 years of age with: a) a low probability of PE who do not meet all Pulmonary Embolism Rule Out Criteria, or b) in those with intermediate probability of PE.   The formula for an age-adjusted D-dimer cut-off is \"age/100\".  For example, a 60 year old patient would have an age-adjusted cut-off of 0.60 MCGFEU/mL and an 80 year old 0.80 MCGFEU/mL.    Comprehensive Metabolic Panel [[id number]]  (Abnormal) Collected: 03/12/23 1138    Specimen: Blood Updated: 03/12/23 1158     Glucose 141 mg/dL      BUN 18 mg/dL      Creatinine 0.83 mg/dL      Sodium 129 mmol/L      Potassium 4.3 mmol/L      Chloride 92 mmol/L      CO2 21.0 mmol/L      Calcium 8.9 mg/dL      Total Protein 6.9 g/dL      Albumin 3.6 g/dL "      ALT (SGPT) 53 U/L      AST (SGOT) 54 U/L      Alkaline Phosphatase 172 U/L      Total Bilirubin 0.5 mg/dL      Globulin 3.3 gm/dL      A/G Ratio 1.1 g/dL      BUN/Creatinine Ratio 21.7     Anion Gap 16.0 mmol/L      eGFR 70.9 mL/min/1.73     Narrative:      GFR Normal >60  Chronic Kidney Disease <60  Kidney Failure <15    The GFR formula is only valid for adults with stable renal function between ages 18 and 70.    CBC & Differential [[id number]]  (Abnormal) Collected: 03/12/23 1138    Specimen: Blood Updated: 03/12/23 1142    Narrative:      The following orders were created for panel order CBC & Differential.  Procedure                               Abnormality         Status                     ---------                               -----------         ------                     CBC Auto Differential[[id number]]        Abnormal            Final result                 Please view results for these tests on the individual orders.    CBC Auto Differential [[id number]]  (Abnormal) Collected: 03/12/23 1138    Specimen: Blood Updated: 03/12/23 1142     WBC 11.96 10*3/mm3      RBC 3.57 10*6/mm3      Hemoglobin 10.0 g/dL      Hematocrit 31.1 %      MCV 87.1 fL      MCH 28.0 pg      MCHC 32.2 g/dL      RDW 16.1 %      RDW-SD 51.2 fl      MPV 10.1 fL      Platelets 346 10*3/mm3      Neutrophil % 76.9 %      Lymphocyte % 13.9 %      Monocyte % 7.8 %      Eosinophil % 0.3 %      Basophil % 0.5 %      Immature Grans % 0.6 %      Neutrophils, Absolute 9.20 10*3/mm3      Lymphocytes, Absolute 1.66 10*3/mm3      Monocytes, Absolute 0.93 10*3/mm3      Eosinophils, Absolute 0.04 10*3/mm3      Basophils, Absolute 0.06 10*3/mm3      Immature Grans, Absolute 0.07 10*3/mm3      nRBC 0.0 /100 WBC           Imaging Results (Most Recent)     Procedure Component Value Units Date/Time    XR Chest 1 View [[id number]] Collected: 03/12/23 1327     Updated: 03/12/23 1332    Narrative:        INDICATION:   Short of air    TECHNIQUE: 1 views  of the chest    COMPARISON:   Chest radiograph 9/28/2022    FINDINGS:    Lungs/Pleura: Increased lung markings. Kerley B-lines seen at the bases. Ill-defined basilar lung opacities. Blunting costophrenic angles. Increased lung volumes.    Mediastinum: Enlarged cardiac silhouette.    Other: None      Impression:        1. Cardiomegaly with interstitial pulmonary edema.  2. Obstructive airways disease.  3. Small ill-defined basilar lung opacities. Possible subsegmental atelectasis or scarring.  4. Suspect small pleural effusions    Signer Name: Phillip Hi MD   Signed: 3/12/2023 1:27 PM   Workstation Name: [id number]    [facility]        Reviewed personally with possible volume overload and effisions with also changes of emphysema     ECG/EMG Results (most recent)     Procedure Component Value Units Date/Time    ECG 12 Lead Dyspnea [[id number]] Collected: 03/12/23 1147     Updated: 03/12/23 1148     QT Interval 453 ms     Narrative:      HEART RATE= 64  bpm  RR Interval= 936  ms  MO Interval=   ms  P Horizontal Axis=   deg  P Front Axis=   deg  QRSD Interval= 93  ms  QT Interval= 453  ms  QRS Axis= 80  deg  T Wave Axis= 71  deg  - ABNORMAL ECG -  Junctional rhythm  Electronically Signed By:   Date and Time of Study: 2023-03-12 11:47:08        Reviewed personally but overall showed lots of artifact       Assessment & Plan     Active Hospital Problems:  Active Hospital Problems    Diagnosis    • **Pulmonary emphysema, unspecified emphysema type (HCC)      Plan:     Acute hypoxic resp failure multifactoral to AeCOPD and acute on chronic RHF  -CXR reviewed  -procal negative, BNP 3000  -repeat echo, most recent sept 2021 mild RV dilation and EF 60%  -check RVP  -given IV lasix in ER  -f/u in am for further diuresis  -IV solumedrol, nebs, pulmonary toilet   -on 2L NC, wean as able    Elevated d-dimer  -check CTA as (per med rec said pt noted she was ran out of [employer] so presumably not  taking)  -restart eliquis      CAD/HLD, s/p stent, HTN, PAD/vasculopathy, PAF  -chonic  -rate stable  -continue cardizem  -continue statin  -AC as above    Chronic anemia  -Hgb 10 and better than previous reading  -CBC in am     GERD   -chronic  -continue pepcid    Tobacco abuse   Anxiety/depression   Chronic pain with chronic narcotic dependence  -chronic issues   -continue prozac  -continue prn pain meds   -counseled on smoking cessation, nicotine patch of needed     High risk   DVT ppx-eliquis      I discussed the patient's findings and my recommendations with patient and son.     Electronically signed by Meño Aguayo DO, 03/12/23, 14:35 EDT.

## 2023-03-12 NOTE — ED NOTES
Called pt son Bryant Rosenthal per pt request at [phone] and left a message to call this RN back

## 2023-03-12 NOTE — PLAN OF CARE
Goal Outcome Evaluation:   New admit from the ED this afternoon. A&Ox4. 2L via NC, complaining of shortness of air at times. Telemetry running afib. No complaints of pain. IV R AC, IV solu-medrol. Patient wears depends at home, purewick in place. Cardiac diet. CTA chest negative for PE. Respiratory panel pending. Will continue to monitor.

## 2023-03-12 NOTE — ED PROVIDER NOTES
Subjective   History of Present Illness  History of Present Illness    Chief complaint: Shortness of air    Location: Home    Quality/Severity: Low sat    Timing/Onset/Duration: Worse over the last couple of    Modifying Factors: Worse with exertion    Associated Symptoms: No headache.  No fever or chills.  Patient's had a cough.  No sore throat earache or nasal congestion.  Patient complains of some chest pain with cough.  No abdominal pain.  No diarrhea or burning when she urinates.  No nausea or vomiting    Narrative: This 83-year-old white female with a history of COPD, not on home oxygen, presents with shortness of breath.    PCP:Donna Forte MD  Review of Systems   Constitutional: Negative for chills.   HENT: Negative for congestion, ear pain and sore throat.    Respiratory: Positive for cough and shortness of breath.    Cardiovascular: Positive for chest pain.   Gastrointestinal: Negative for abdominal pain, diarrhea, nausea and vomiting.   Genitourinary: Negative for dysuria.   Musculoskeletal: Negative for back pain.   Skin: Negative for rash.   Neurological: Negative for headaches.        Medication List      CONTINUE taking these medications    Bath/Shower Seat misc  1 each As Needed (for showering).        ASK your doctor about these medications    acetaminophen 325 MG tablet  Commonly known as: TYLENOL  Take 2 tablets by mouth Every 4 (Four) Hours As Needed for Mild Pain.     albuterol (2.5 MG/3ML) 0.083% nebulizer solution  Commonly known as: PROVENTIL  Take 2.5 mg by nebulization Every 4 (Four) Hours As Needed for Wheezing or Shortness of Air.     apixaban 2.5 MG tablet tablet  Commonly known as: ELIQUIS  Take 1 tablet by mouth 2 (Two) Times a Day.     atorvastatin 10 MG tablet  Commonly known as: LIPITOR  TAKE 1 TABLET BY MOUTH DAILY.     * bisacodyl 5 MG EC tablet  Commonly known as: DULCOLAX  Take 1 tablet by mouth Daily As Needed for Constipation (Use if polyethylene glycol is  ineffective).     * bisacodyl 10 MG suppository  Commonly known as: DULCOLAX  Insert 1 suppository into the rectum Daily As Needed for Constipation (Use if bisacodyl oral is ineffective).     budesonide-formoterol 160-4.5 MCG/ACT inhaler  Commonly known as: Symbicort  Inhale 2 puffs 2 (Two) Times a Day.     clonazePAM 0.5 MG tablet  Commonly known as: KlonoPIN  Take 1 tablet by mouth At Night As Needed for Anxiety.     dilTIAZem  MG 24 hr capsule  Commonly known as: CARDIZEM CD  Take 1 capsule by mouth Daily.     Ensure Nutrition Shake liquid  Take 1 bottle by mouth 2 (Two) Times a Day.     famotidine 20 MG tablet  Commonly known as: PEPCID  Take 1 tablet by mouth 2 (Two) Times a Day Before Meals.     FLUoxetine 20 MG capsule  Commonly known as: PROzac  TAKE 1 CAPSULE BY MOUTH DAILY.     fluticasone 50 MCG/ACT nasal spray  Commonly known as: FLONASE  SPRAY TWICE IN EACH NOSTRIL EVERY DAY FOR 30 DAYS.     hydrOXYzine 25 MG tablet  Commonly known as: ATARAX  Take 1 tablet by mouth Every 6 (Six) Hours As Needed for Itching, Allergies or Anxiety.     melatonin 5 MG tablet tablet  Take 1 tablet by mouth At Night As Needed (insomnia).     nicotine 14 MG/24HR patch  Commonly known as: NICODERM CQ  Place 1 patch on the skin as directed by provider Daily.     ondansetron 4 MG tablet  Commonly known as: ZOFRAN  Take 1 tablet by mouth Every 6 (Six) Hours As Needed for Nausea or Vomiting.     oxyCODONE-acetaminophen 7.5-325 MG per tablet  Commonly known as: Percocet  Take 1 tablet by mouth Every 8 (Eight) Hours As Needed for Severe Pain.     polyethylene glycol 17 g packet  Commonly known as: MIRALAX  Take 17 g by mouth Daily As Needed (Use if senna-docusate is ineffective).     sennosides-docusate 8.6-50 MG per tablet  Commonly known as: PERICOLACE  Take 2 tablets by mouth 2 (Two) Times a Day.     vitamin D 1.25 MG (29939 UT) capsule capsule  Commonly known as: ERGOCALCIFEROL  Take 1 capsule by mouth 1 (One) Time Per  Week.         * This list has 2 medication(s) that are the same as other medications prescribed for you. Read the directions carefully, and ask your doctor or other care provider to review them with you.                Past Medical History:   Diagnosis Date   • A-fib ([facility])    • Abdominal pain, epigastric     Chronic, unclear cause, improved   • Anorexia    • Anxiety    • Arthritis    • CAD (coronary artery disease)     Cath 5/2015: nonobstructive LAD/RCA disease, 90% mid LCx s/p [id number] Xience.  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[facility])    • Mononucleosis    • Occlusion and stenosis of carotid artery    • Onychomycosis    • Orbit fracture ([facility])    • PAF (paroxysmal atrial fibrillation) ([facility])    • Peripheral arterial disease ([facility])     Significant (carotid, subclavian, renal arteries, lower extremities), with claudication, medical therapy only   • Pernicious anemia    • Prediabetes    • Renal artery stenosis ([facility])     unilateral, with renal atrophy (no intervention required)   • Renal calculi    • Sinus bradycardia     mild, asymptomatic   • TIA (transient ischemic attack)    • UTI (urinary tract infection)    • Valvular heart disease     1/2021: mod-severe AI, mod MR, mild-mod TR   • Vision problem    • Vitamin B 12 deficiency        Allergies   Allergen Reactions   • Aleve [Naproxen Sodium] Shortness Of Breath   • Codeine Unknown - Low Severity     hyperactivity   •  "Ibuprofen Unknown - Low Severity     unk   • Sulfa Antibiotics Unknown - Low Severity     \"I can't remember\"       Past Surgical History:   Procedure Laterality Date   • APPENDECTOMY     • BREAST BIOPSY Left     20+ yrs ago   • BREAST SURGERY     • CARDIAC CATHETERIZATION  05/2015    nonobs LAD/RCA disease, 90% mid LCx s/p [id number] Xience   • CARDIAC CATHETERIZATION N/A 10/28/2020    Procedure: Right and Left Heart Cath;  Surgeon: Opal Contreras MD;  Location:  [facility];  Service: Cardiovascular;  Laterality: N/A;  for cardiac testing prior to possible valve surgery per Dr. Mai   • CARDIAC CATHETERIZATION N/A 10/28/2020    Procedure: Coronary angiography;  Surgeon: Opal Contreras MD;  Location:  [facility];  Service: Cardiovascular;  Laterality: N/A;   • CERVICAL FUSION  1997   • CHOLECYSTECTOMY     • CORONARY STENT PLACEMENT     • HYSTERECTOMY  1995   • KNEE SURGERY Right 2005   • KNEE SURGERY Left 1998   • ORIF HUMERUS FRACTURE Right 12/10/2022    Procedure: ELBOW OPEN REDUCTION INTERNAL FIXATION;  Surgeon: Lito Reeder MD;  Location: [facility] OR;  Service: Orthopedics;  Laterality: Right;       Family History   Problem Relation Age of Onset   • Asthma Mother    • Emphysema Mother    • Graves' disease Mother    • Heart disease Mother    • Hypertension Mother    • Emphysema Father    • Hypertension Father    • Heart disease Father    • Kidney disease Sister    • Lupus Daughter         Systemic erythematosus   • Arthritis Other    • Crohn's disease Other    • Breast cancer Niece    • Breast cancer Maternal Cousin    • Breast cancer Maternal Cousin        Social History     Socioeconomic History   • Marital status:      Spouse name: Willie   • Number of children: 3   • Years of education: Some College   Tobacco Use   • Smoking status: Some Days     Packs/day: 0.20     Years: 50.00     Pack years: 10.00     Types: Cigarettes   • Smokeless tobacco: Never   • " Tobacco comments:     trying to quit, pt states she has basically quit but will still have one when she stressed   Vaping Use   • Vaping Use: Never used   Substance and Sexual Activity   • Alcohol use: Not Currently     Comment: OCCASIONAL/ TWICE A YEAR. // daily caffiene   • Drug use: No   • Sexual activity: Defer           Objective   Physical Exam  Vitals (The temperature is 98.7 °F, pulse 67, respirations 18, /77, room air pulse ox 95%) and nursing note reviewed.   Constitutional:       Appearance: She is well-developed.   HENT:      Head: Normocephalic and atraumatic.   Cardiovascular:      Rate and Rhythm: Normal rate and regular rhythm.      Heart sounds: No murmur heard.    No friction rub. No gallop.   Pulmonary:      Breath sounds: Decreased breath sounds and rales (Posterior inferior lung fields) present.   Abdominal:      General: Bowel sounds are normal.      Palpations: Abdomen is soft. There is no mass.      Tenderness: There is no abdominal tenderness. There is no guarding or rebound.   Musculoskeletal:         General: Normal range of motion.      Cervical back: Normal range of motion and neck supple.      Right lower leg: No tenderness. No edema.      Left lower leg: No tenderness. No edema.   Skin:     General: Skin is warm and dry.   Neurological:      General: No focal deficit present.      Mental Status: She is alert and oriented to person, place, and time.         Procedures           ED Course  ED Course as of 03/12/23 1335   Sun Mar 12, 2023   1330 The COVID flu is negative.    The proBNP is 2053 and elevated.    The glucose is 141, sodium 129, chloride 92, CO2 21, ALT 53, AST 54, alk phos 172, GFR of 70.    The D-dimer is elevated at 3.14.    The white blood cell count is 11.9.  The hemoglobin is 10 and hematocrit is 31.  The platelet count 346,000.  There is 76% neutrophils, absolute neutrophil count of 9.2.  The CBC is otherwise unremarkable [RC]   1331 On 9/28/2022 the BMP was 6500  [RC]   1331 On 1214 and 22 the hemoglobin was 9.8. [RC]   1332 On 9/28/2022 that D-dimer was 3.14 [RC]      ED Course User Index  [RC] vAila Mota MD      11:54 EDT, 03/12/23:  The EKG was obtained at 1147 and read by me at 1149.  EKG shows a regular rhythm with a rate of 64.  The QRS and QT intervals are unremarkable.  There is no ectopy.  There is no acute ST elevation or depression.  There is artifact in multiple leads        13:35 EDT, 03/12/23:  The patient was reassessed.  She feels better.  Her vital signs were reviewed and are stable.  Lung exam: Rales in the posterior inferior lung fields, no wheezing.  The patient saturations are 96% on 2 L.  I have turned the patient to room air to see what her sats do     14:08 EDT, 03/12/23:  On room air the patient sats dropped to 88%.    14:08 EDT, 03/12/23:  The patient's diagnosis of CHF exacerbation and COPD with hypoxia was discussed with her.  The patient will be admitted for diuresis, bronchodilators, and further work-up and evaluation.  All the patient questions were answered she will be admitted in stable condition.  14:11 EDT, 03/12/23:  Dr. Aguayo, on-call for the hospitalist, has been paged out.  He returned call and will bring the patient in for observation                             MDM    Final diagnoses:   Pulmonary emphysema, unspecified emphysema type (HCC)   Other congestive heart failure (HCC)   Hypoxia       ED Disposition  ED Disposition     None          No follow-up provider specified.       Medication List      No changes were made to your prescriptions during this visit.          Avila Mota MD  03/12/23 3786

## 2023-03-13 NOTE — PROGRESS NOTES
SERVICE: [facility] HOSPITALIST    CONSULTANTS: None     CHIEF COMPLAINT: Shortness of breath    SUBJECTIVE: Patient seen and examined at bedside. Patient reports feeling less short of breath since arrival.  She reports large urinary output overnight.  She believes this is helped her breathing significantly.  She otherwise has no acute complaints.  She expressed that she has had trouble walking recently, and was recently discharged from a rehab facility that she does not wish to return to.  Ideally she says that she would return home with assistance at home.  However she is open to the idea of rehab if absolutely necessary and she agrees to work with physical therapy.  No other acute issues reported.     OBJECTIVE:    Physical exam is largely unchanged from previous exam, except where documented below, examination is accurate as of 3/13/2023    Physical Exam:  General: Patient is awake and alert.  Frail-appearing elderly female. No acute distress noted.   HENT: Head is atraumatic, normocephalic. Hearing is grossly intact. Nose is without obvious congestion and appears patent. Neck is supple and trachea is midline.   Eyes: Vision is grossly intact. Pupils appear equal and round.   Cardiovascular: Irregularly irregular rhythm with no murmurs, rubs or gallops noted.   Respiratory: Lungs are clear to ausculation without wheezes, rhonchi or rales.   Abdominal/GI: Soft, nontender, bowel sounds present. No rebound or guarding present.   Extremities: No peripheral edema noted.   Musculoskeletal: Spontaneous movement of bilateral upper and lower extremities against gravity noted. No signs of injury or deformity noted.   Skin: Warm and dry.   Psych: Mood and affect are appropriate. Cooperative with exam.   Neuro: No facial asymmetry noted. No focal deficits noted, hearing and vision are grossly intact.     /82 (BP Location: Left arm, Patient Position: Lying)   Pulse (!) 133   Temp 97.6 °F (36.4 °C)  "(Oral)   Resp 20   Ht 152.4 cm (60\")   Wt 42.6 kg (93 lb 14.7 oz)   LMP  (LMP Unknown)   SpO2 91%   BMI 18.34 kg/m²     MEDS/LABS REVIEWED AND ORDERED    apixaban, 2.5 mg, Oral, BID  atorvastatin, 10 mg, Oral, Nightly  budesonide, 0.5 mg, Nebulization, BID - RT  dilTIAZem, 10 mg, Intravenous, Once  dilTIAZem CD, 300 mg, Oral, Q24H  empagliflozin, 10 mg, Oral, Daily  famotidine, 20 mg, Oral, BID AC  FLUoxetine, 20 mg, Oral, Nightly  furosemide, 40 mg, Oral, BID  ipratropium-albuterol, 3 mL, Nebulization, Q6H While Awake - RT  methylPREDNISolone sodium succinate, 40 mg, Intravenous, Q12H  senna-docusate sodium, 2 tablet, Oral, BID  sodium chloride, 10 mL, Intravenous, Q12H          LAB/DIAGNOSTICS:    Lab Results (last 24 hours)     Procedure Component Value Units Date/Time    Procalcitonin [[id number]]  (Normal) Collected: 03/13/23 0404    Specimen: Blood Updated: 03/13/23 0550     Procalcitonin 0.07 ng/mL     Narrative:      As a Marker for Sepsis (Non-Neonates):    1. <0.5 ng/mL represents a low risk of severe sepsis and/or septic shock.  2. >2 ng/mL represents a high risk of severe sepsis and/or septic shock.    As a Marker for Lower Respiratory Tract Infections that require antibiotic therapy:    PCT on Admission    Antibiotic Therapy       6-12 Hrs later    >0.5                Strongly Recommended  >0.25 - <0.5        Recommended   0.1 - 0.25          Discouraged              Remeasure/reassess PCT  <0.1                Strongly Discouraged     Remeasure/reassess PCT    As 28 day mortality risk marker: \"Change in Procalcitonin Result\" (>80% or <=80%) if Day 0 (or Day 1) and Day 4 values are available. Refer to http://www.[facility]-pct-calculator.com    Change in PCT <=80%  A decrease of PCT levels below or equal to 80% defines a positive change in PCT test result representing a higher risk for 28-day all-cause mortality of patients diagnosed with severe sepsis for septic shock.    Change in PCT >80%  A decrease " of PCT levels of more than 80% defines a negative change in PCT result representing a lower risk for 28-day all-cause mortality of patients diagnosed with severe sepsis or septic shock.       Basic Metabolic Panel [[id number]]  (Abnormal) Collected: 03/13/23 0404    Specimen: Blood Updated: 03/13/23 0501     Glucose 188 mg/dL      BUN 15 mg/dL      Creatinine 0.82 mg/dL      Sodium 135 mmol/L      Potassium 3.5 mmol/L      Chloride 93 mmol/L      CO2 28.2 mmol/L      Calcium 8.9 mg/dL      BUN/Creatinine Ratio 18.3     Anion Gap 13.8 mmol/L      eGFR 72.0 mL/min/1.73     Narrative:      GFR Normal >60  Chronic Kidney Disease <60  Kidney Failure <15    The GFR formula is only valid for adults with stable renal function between ages 18 and 70.    Magnesium [[id number]]  (Normal) Collected: 03/13/23 0404    Specimen: Blood Updated: 03/13/23 0501     Magnesium 2.3 mg/dL     CBC (No Diff) [[id number]]  (Abnormal) Collected: 03/13/23 0404    Specimen: Blood Updated: 03/13/23 0449     WBC 6.83 10*3/mm3      RBC 3.42 10*6/mm3      Hemoglobin 9.5 g/dL      Hematocrit 29.1 %      MCV 85.1 fL      MCH 27.8 pg      MCHC 32.6 g/dL      RDW 16.0 %      RDW-SD 49.8 fl      MPV 10.1 fL      Platelets 292 10*3/mm3     Respiratory Panel PCR w/COVID-19(SARS-CoV-2) EDMOND/NISHANT/FLORECITA/PAD/COR/MAD/KENROY In-House, NP Swab in [facility], 3-4 HR TAT - Swab, Nasopharynx [[id number]] Collected: 03/12/23 1650    Specimen: Swab from Nasopharynx Updated: 03/12/23 1650    Procalcitonin [[id number]]  (Normal) Collected: 03/12/23 1138    Specimen: Blood Updated: 03/12/23 1404     Procalcitonin 0.08 ng/mL     Narrative:      As a Marker for Sepsis (Non-Neonates):    1. <0.5 ng/mL represents a low risk of severe sepsis and/or septic shock.  2. >2 ng/mL represents a high risk of severe sepsis and/or septic shock.    As a Marker for Lower Respiratory Tract Infections that require antibiotic therapy:    PCT on Admission    Antibiotic Therapy       6-12 Hrs later    >0.5  "               Strongly Recommended  >0.25 - <0.5        Recommended   0.1 - 0.25          Discouraged              Remeasure/reassess PCT  <0.1                Strongly Discouraged     Remeasure/reassess PCT    As 28 day mortality risk marker: \"Change in Procalcitonin Result\" (>80% or <=80%) if Day 0 (or Day 1) and Day 4 values are available. Refer to http://[email or web address] [email or web address]pct[facility]    Change in PCT <=80%  A decrease of PCT levels below or equal to 80% defines a positive change in PCT test result representing a higher risk for 28-day all-cause mortality of patients diagnosed with severe sepsis for septic shock.    Change in PCT >80%  A decrease of PCT levels of more than 80% defines a negative change in PCT result representing a lower risk for 28-day all-cause mortality of patients diagnosed with severe sepsis or septic shock.       COVID-19 and FLU A/B PCR - Swab, Nasopharynx [[id number]]  (Normal) Collected: 03/12/23 1219    Specimen: Swab from Nasopharynx Updated: 03/12/23 1246     COVID19 Not Detected     Influenza A PCR Not Detected     Influenza B PCR Not Detected    Narrative:      Fact sheet for providers: https://www.fda.gov/media/523879/download    Fact sheet for patients: https://www.fda.gov/media/164902/download    Test performed by PCR.    BNP [[id number]]  (Abnormal) Collected: 03/12/23 1138    Specimen: Blood Updated: 03/12/23 1238     proBNP 3,053.0 pg/mL     Narrative:      Among patients with dyspnea, NT-proBNP is highly sensitive for the detection of acute congestive heart failure. In addition NT-proBNP of <300 pg/ml effectively rules out acute congestive heart failure with 99% negative predictive value.    Results may be falsely decreased if patient taking Biotin.      D-dimer, Quantitative [[id number]]  (Abnormal) Collected: 03/12/23 1138    Specimen: Blood Updated: 03/12/23 1222     D-Dimer, Quantitative 3.14 MCGFEU/mL     Narrative:      According to the assay 's published " "package insert, a normal (<0.50 MCGFEU/mL) D-dimer result in conjunction with a non-high clinical probability assessment, excludes deep vein thrombosis (DVT) and pulmonary embolism (PE) with high sensitivity.    D-dimer values increase with age and this can make VTE exclusion of an older population difficult. To address this, the [employer], based on best available evidence and recent guidelines, recommends that clinicians use age-adjusted D-dimer thresholds in patients greater than 50 years of age with: a) a low probability of PE who do not meet all Pulmonary Embolism Rule Out Criteria, or b) in those with intermediate probability of PE.   The formula for an age-adjusted D-dimer cut-off is \"age/100\".  For example, a 60 year old patient would have an age-adjusted cut-off of 0.60 MCGFEU/mL and an 80 year old 0.80 MCGFEU/mL.    Comprehensive Metabolic Panel [[id number]]  (Abnormal) Collected: 03/12/23 1138    Specimen: Blood Updated: 03/12/23 1158     Glucose 141 mg/dL      BUN 18 mg/dL      Creatinine 0.83 mg/dL      Sodium 129 mmol/L      Potassium 4.3 mmol/L      Chloride 92 mmol/L      CO2 21.0 mmol/L      Calcium 8.9 mg/dL      Total Protein 6.9 g/dL      Albumin 3.6 g/dL      ALT (SGPT) 53 U/L      AST (SGOT) 54 U/L      Alkaline Phosphatase 172 U/L      Total Bilirubin 0.5 mg/dL      Globulin 3.3 gm/dL      A/G Ratio 1.1 g/dL      BUN/Creatinine Ratio 21.7     Anion Gap 16.0 mmol/L      eGFR 70.9 mL/min/1.73     Narrative:      GFR Normal >60  Chronic Kidney Disease <60  Kidney Failure <15    The GFR formula is only valid for adults with stable renal function between ages 18 and 70.    CBC & Differential [[id number]]  (Abnormal) Collected: 03/12/23 1138    Specimen: Blood Updated: 03/12/23 1142    Narrative:      The following orders were created for panel order CBC & Differential.  Procedure                               Abnormality         Status                     ---------                 "               -----------         ------                     CBC Auto Differential[[id number]]        Abnormal            Final result                 Please view results for these tests on the individual orders.    CBC Auto Differential [[id number]]  (Abnormal) Collected: 03/12/23 1138    Specimen: Blood Updated: 03/12/23 1142     WBC 11.96 10*3/mm3      RBC 3.57 10*6/mm3      Hemoglobin 10.0 g/dL      Hematocrit 31.1 %      MCV 87.1 fL      MCH 28.0 pg      MCHC 32.2 g/dL      RDW 16.1 %      RDW-SD 51.2 fl      MPV 10.1 fL      Platelets 346 10*3/mm3      Neutrophil % 76.9 %      Lymphocyte % 13.9 %      Monocyte % 7.8 %      Eosinophil % 0.3 %      Basophil % 0.5 %      Immature Grans % 0.6 %      Neutrophils, Absolute 9.20 10*3/mm3      Lymphocytes, Absolute 1.66 10*3/mm3      Monocytes, Absolute 0.93 10*3/mm3      Eosinophils, Absolute 0.04 10*3/mm3      Basophils, Absolute 0.06 10*3/mm3      Immature Grans, Absolute 0.07 10*3/mm3      nRBC 0.0 /100 WBC         ECG 12 Lead Dyspnea   Final Result   HEART RATE= 64  bpm   RR Interval= 936  ms   ID Interval=   ms   P Horizontal Axis=   deg   P Front Axis=   deg   QRSD Interval= 93  ms   QT Interval= 453  ms   QRS Axis= 80  deg   T Wave Axis= 71  deg   - ABNORMAL ECG -   Likely sinus rhythm but atrifact limits rhythm assessment   When compared with ECG of 11-Dec-2022 13:54:07,   Likely sinus rhythm has replaced atrial fibrillation significant artifact    limit rhyth intrepretation would repeat.   Electronically Signed By: Baljeet Iverson ([facility]) 12-Mar-2023 21:29:47   Date and Time of Study: 2023-03-12 11:47:08        Results for orders placed during the hospital encounter of 09/28/22    Adult Transthoracic Echo Complete w/ Color, Spectral and Contrast if Necessary Per Protocol    Interpretation Summary  · Estimated right ventricular systolic pressure from tricuspid regurgitation is normal (<35 mmHg). Calculated right ventricular systolic pressure from tricuspid  regurgitation is 29 mmHg.  · Left ventricular wall thickness is consistent with mild concentric hypertrophy.  · Calculated left ventricular EF = 62% Estimated left ventricular EF was in agreement with the calculated left ventricular EF. Left ventricular systolic function is normal.  · Moderate to severe aortic valve regurgitation is present.  · Mild to moderate tricuspid valve regurgitation is present.  · Mild to moderate mitral valve regurgitation is present.  · Left atrial volume is severely increased.  · The right atrial cavity is mildly dilated.  · Mild dilation of the ascending aorta is present.    CT Angiogram Chest    Result Date: 3/12/2023  1. No evidence of pulmonary embolism 2. Small bilateral pleural effusions, new since the previous exam in September. Bibasilar atelectasis is also new since the previous exam. 3. 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 Aneurysmal dilatation of the descending thoracic aorta unchanged. Emphysema unchanged. Signer Name: Benoit Mondragon MD  Signed: 3/12/2023 4:10 PM  Workstation Name: [id number]  [facility]    XR Chest 1 View    Result Date: 3/12/2023  1. Cardiomegaly with interstitial pulmonary edema. 2. Obstructive airways disease. 3. Small ill-defined basilar lung opacities. Possible subsegmental atelectasis or scarring. 4. Suspect small pleural effusions Signer Name: Phillip Hi MD  Signed: 3/12/2023 1:27 PM  Workstation Name: [id number]  [facility]        ASSESSMENT/PLAN:  Please note portions of this assessment/plan may have been copied and pasted, but I have personally seen this patient and reviewed each line of this assessment and plan for accuracy and made updates to reflect my necessary changes on 3/13/2023     Acute hypoxic respiratory failure secondary to acute exacerbation of congestive heart failure with preserved ejection fraction  -Previous echo 9/29/2022 showed normal EF, severe  aortic valve regurg, moderate tricuspid valve regurg, moderate mitral valve regurg, right atrial cavity dilated.   -Repeat echo pending  -BNP elevated, CT chest showed no evidence of PE with small bilateral pleural effusion  -Patient received IV Lasix in the ED, will place on heart failure pathway, add Jardiance and twice daily oral Lasix therapy.   -Mild hyponatremia improved with diuretic therapy, continue to monitor.   -Monitor I's and O's Daily weights.   -Wean oxygen as able, currently on 1L via nasal cannula.     Acute exacerbation of COPD  -continue duonebs and solumedrol.   -suspect this is contributing to hypoxia less than heart failure, but still a factor.     Weakness and deconditioning secondary to above  -Consult PT and OT for assistance    Elevated D-dimer  -CTA chest shows no evidence of PE  -We will check DVT ultrasound of bilateral lower extremities  -Continue home Eliquis, resumed after patient had run out of this medication    CAD/hyperlipidemia status post stent placement with hypertension PAD/vasculopathy and PAF  -Patient with tachycardia, will provide one-time dose of IV Cardizem and continue oral Cardizem regimen  -Continue home statin therapy  -Continue Eliquis as per above    Chronic anemia  -Slight decrease in hemoglobin from previous, monitor carefully as patient has been resumed on Eliquis.   -No signs of bleeding currently.     GERD-continue home Pepcid    Tobacco abuse  -Counseled on need for cessation    Anxiety/depression-continue Prozac      PLAN FOR DISPOSITION: GARCÍA Joaquin DO  Hospitalist, [facility]  03/13/23  07:52 EDT    At [facility], we believe that sharing information builds trust and better relationships. You are receiving this note because you recently visited [facility]. It is possible you will see health information before a provider has talked with you about it. This kind of information can be easy to misunderstand. To help you fully  "understand what it means for your health, we urge you to discuss this note with your provider.    \"Dictated utilizing Dragon dictation\"    "

## 2023-03-13 NOTE — PLAN OF CARE
Goal Outcome Evaluation: Patient       Pt had c/o of h/a and back pain today. PRN pain medication given and medication effective. Remains Afib with runs of Tachy. Patient is pleasant. Floating heels and Aquaphor applied to help with skin, very dry.

## 2023-03-13 NOTE — THERAPY EVALUATION
Patient Name: Kaylin Ojeda  : 1940    MRN: [id number]                              Today's Date: 3/13/2023       Admit Date: 3/12/2023    Visit Dx:     ICD-10-CM ICD-9-CM   1. Pulmonary emphysema, unspecified emphysema type ([facility])  J43.9 492.8   2. Other congestive heart failure ([facility])  I50.9 428.0   3. 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[facility])   • Obstruction of carotid artery   • Peripheral arterial occlusive disease ([facility])   • Impaired glucose tolerance   • Difficulty sleeping   • Tobacco dependence syndrome   • Iron deficiency anemia   • Vitamin B12 deficiency anemia due to intrinsic factor deficiency   • Acquired hypothyroidism   • Thoracoabdominal aortic aneurysm   • Abdominal bloating   • Prediabetes   • Sleep difficulties   • Chronic constipation   • PAF (paroxysmal atrial fibrillation) ([facility])   • Moderate single current episode of major depressive disorder ([facility])   • Chronic low back pain without sciatica   • Urge incontinence   • Severe mitral regurgitation   • Severe tricuspid regurgitation   • Moderate aortic valve regurgitation   • Underweight   • DDD (degenerative disc disease), lumbar   • Atrial flutter with rapid ventricular response ([facility])   • Urinary tract infection without hematuria   • Malaise and fatigue   • Type I or II open fracture of right olecranon process   • Type I or II open fracture of olecranon process of right ulna, initial encounter   • Severe malnutrition ([facility])   • Pulmonary emphysema, unspecified emphysema type ([facility])     Past Medical History:   Diagnosis Date   • A-fib ([facility])    • Abdominal pain, epigastric      Chronic, unclear cause, improved   • Anorexia    • Anxiety    • Arthritis    • CAD (coronary artery disease)     Cath 5/2015: nonobstructive LAD/RCA disease, 90% mid LCx s/p [id number] Xience.  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[facility])    • Mononucleosis    • Occlusion and stenosis of carotid artery    • Onychomycosis    • Orbit fracture ([facility])    • PAF (paroxysmal atrial fibrillation) ([facility])    • Peripheral arterial disease ([facility])     Significant (carotid, subclavian, renal arteries, lower extremities), with claudication, medical therapy only   • Pernicious anemia    • Prediabetes    • Renal artery stenosis ([facility])     unilateral, with renal atrophy (no intervention required)   • Renal calculi    • Sinus bradycardia     mild, asymptomatic   • TIA (transient ischemic attack)    • UTI (urinary tract infection)    • Valvular heart disease     1/2021: mod-severe AI, mod MR, mild-mod TR   • Vision problem    • Vitamin B 12 deficiency      Past Surgical History:   Procedure Laterality Date   • APPENDECTOMY     • BREAST BIOPSY Left     20+ yrs ago   • BREAST SURGERY     • CARDIAC CATHETERIZATION  05/2015    nonobs LAD/RCA disease, 90% mid LCx s/p 2.66n60oz Xience   • CARDIAC CATHETERIZATION N/A 10/28/2020    Procedure: Right and Left Heart Cath;  Surgeon: Opal Contreras MD;  [facility];  Service: Cardiovascular;  Laterality: N/A;  for cardiac testing prior  to possible valve surgery per Dr. Mai   • CARDIAC CATHETERIZATION N/A 10/28/2020    Procedure: Coronary angiography;  Surgeon: Opal Contreras MD;  Location:  [facility] CATH INVASIVE LOCATION;  Service: Cardiovascular;  Laterality: N/A;   • CERVICAL FUSION  1997   • CHOLECYSTECTOMY     • CORONARY STENT PLACEMENT     • HYSTERECTOMY  1995   • KNEE SURGERY Right 2005   • KNEE SURGERY Left 1998   • ORIF HUMERUS FRACTURE Right 12/10/2022    Procedure: ELBOW OPEN REDUCTION INTERNAL FIXATION;  Surgeon: Lito Reeder MD;  Location:  [facility] OR;  Service: Orthopedics;  Laterality: Right;      General Information     Row Name 03/13/23 1045          OT Time and Intention    Document Type evaluation  -EN     Mode of Treatment occupational therapy  -EN     Row Name 03/13/23 1045          General Information    Patient Profile Reviewed --  Presented to ED with SOA and reported decline in functional status over the past very months.  -EN     Prior Level of Function independent:;all household mobility;ADL's  -EN     Existing Precautions/Restrictions fall;other (see comments)  impulsive  -EN     Row Name 03/13/23 1045          Living Environment    People in Home alone;other (see comments)  reports family check on several times per day  -EN     Row Name 03/13/23 1045          Home Main Entrance    Number of Stairs, Main Entrance none  -EN     Row Name 03/13/23 1045          Stairs Within Home, Primary    Stairs, Within Home, Primary Lives in an apartment with no stairs.  -EN     Row Name 03/13/23 1045          Cognition    Orientation Status (Cognition) oriented x 3  -EN     Row Name 03/13/23 1045          Safety Issues, Functional Mobility    Safety Issues Affecting Function (Mobility) impulsivity;positioning of assistive device  -EN           User Key  (r) = Recorded By, (t) = Taken By, (c) = Cosigned By    Initials Name Provider Type    EN Harriet Triana OTR Occupational Therapist                 Mobility/ADL's     Row  Name 03/13/23 1055          Bed Mobility    Comment, (Bed Mobility) seated EOB, had just returned from CT  -EN     Row Name 03/13/23 1055          Transfers    Transfers sit-stand transfer;toilet transfer;stand-sit transfer  -EN     Comment, (Transfers) v/cs for hand placement  -EN     Row Name 03/13/23 1055          Sit-Stand Transfer    Sit-Stand Morton (Transfers) contact guard;verbal cues  -EN     Assistive Device (Sit-Stand Transfers) walker, front-wheeled  -EN     Row Name 03/13/23 1055          Stand-Sit Transfer    Stand-Sit Morton (Transfers) contact guard;verbal cues  -EN     Assistive Device (Stand-Sit Transfers) walker, front-wheeled  -EN     Row Name 03/13/23 1055          Toilet Transfer    Type (Toilet Transfer) stand pivot/stand step  -EN     Morton Level (Toilet Transfer) verbal cues;contact guard  -EN     Row Name 03/13/23 1055          Functional Mobility    Functional Mobility- Ind. Level verbal cues required;contact guard assist  -EN     Functional Mobility- Device walker, front-wheeled  -EN     Functional Mobility-Distance (Feet) 15  5 ft to bathroom, 15 ft to recliner  -EN     Functional Mobility- Comment cues for safety  -EN     Row Name 03/13/23 1055          Activities of Daily Living    BADL Assessment/Intervention lower body dressing;toileting  -EN     Row Name 03/13/23 1055          Lower Body Dressing Assessment/Training    Morton Level (Lower Body Dressing) don;socks;contact guard assist  -EN     Position (Lower Body Dressing) other (see comments)  seated on BSC  -EN     Row Name 03/13/23 1055          Toileting Assessment/Training    Morton Level (Toileting) toileting skills;perform perineal hygiene;contact guard assist  -EN     Position (Toileting) supported standing  -EN           User Key  (r) = Recorded By, (t) = Taken By, (c) = Cosigned By    Initials Name Provider Type    Harriet Olsen OTR Occupational Therapist                Obj/Interventions     Row Name 03/13/23 1058          Range of Motion Comprehensive    Comment, General Range of Motion Did not formally assess due to high heart rate following mobility to chair. Apperared WFL  -EN     Row Name 03/13/23 1058          Balance    Comment, Balance CGA for dynamic standing balance, CGA for dynamic sitting balance  -EN           User Key  (r) = Recorded By, (t) = Taken By, (c) = Cosigned By    Initials Name Provider Type    EN Harriet Triana OTR Occupational Therapist               Goals/Plan     Row Name 03/13/23 1105          Transfer Goal 1 (OT)    Activity/Assistive Device (Transfer Goal 1, OT) sit-to-stand/stand-to-sit;commode, 3-in-1;walker, rolling  -EN     Frio Level/Cues Needed (Transfer Goal 1, OT) supervision required  -EN     Time Frame (Transfer Goal 1, OT) 1 week  -EN     Progress/Outcome (Transfer Goal 1, OT) new goal  -EN     Row Name 03/13/23 1105          Dressing Goal 1 (OT)    Activity/Device (Dressing Goal 1, OT) lower body dressing  LH AE as needed for EC/WS/safety  -EN     Frio/Cues Needed (Dressing Goal 1, OT) modified independence  -EN     Time Frame (Dressing Goal 1, OT) 1 week  -EN     Progress/Outcome (Dressing Goal 1, OT) new goal  -EN     Row Name 03/13/23 1105          Problem Specific Goal 1 (OT)    Problem Specific Goal 1 (OT) Patient to perform dynamic standing activity with FWW and supervision without LOB for 1-2 minutes for improved safety during ADL/IADL routine.  -EN     Time Frame (Problem Specific Goal 1, OT) 1 week  -EN     Progress/Outcome (Problem Specific Goal 1, OT) new goal  -EN     Row Name 03/13/23 1105          Therapy Assessment/Plan (OT)    Planned Therapy Interventions (OT) activity tolerance training;adaptive equipment training;BADL retraining;functional balance retraining;transfer/mobility retraining;ROM/therapeutic exercise  -EN           User Key  (r) = Recorded By, (t) = Taken By, (c) = Cosigned By     Initials Name Provider Type    EN Harriet Triana OTR Occupational Therapist               Clinical Impression     Row Name 03/13/23 1101          Pain Assessment    Pretreatment Pain Rating 0/10 - no pain  -EN     Posttreatment Pain Rating 0/10 - no pain  -EN     Pain Intervention(s) Repositioned  -EN     Row Name 03/13/23 1101          Plan of Care Review    Plan of Care Reviewed With patient  -EN     Outcome Evaluation OT evaluation completed. Patient oriented X 3 however impulsive during evaluation requiring cues for safety. Patient performed functional transfers with FWW and CGA and donned LB clothing with CGA.  Patient performed in room mobility with FWW and CGA approximatley 15 ft and heart rate in the 160's following mobility. RN notified. OT will folllow while in the hospital and continue to assess discharge needs.  -EN     Row Name 03/13/23 1101          Therapy Assessment/Plan (OT)    Rehab Potential (OT) good, to achieve stated therapy goals  -EN     Criteria for Skilled Therapeutic Interventions Met (OT) yes;skilled treatment is necessary  -EN     Therapy Frequency (OT) 5 times/wk  -EN     Predicted Duration of Therapy Intervention (OT) one week  -EN     Row Name 03/13/23 1101          Therapy Plan Review/Discharge Plan (OT)    Equipment Needs Upon Discharge (OT) walker, rolling  Pt reports she has a cane and a short walker. Per chart review pt has a rollator.  -EN     Anticipated Discharge Disposition (OT) other (see comments)  will continue to assess, anticipate home with home health and assist of family  -EN     Row Name 03/13/23 1101          Positioning and Restraints    Pre-Treatment Position other (comment)  w/c following return from CT  -EN     Post Treatment Position chair  -EN     In Chair reclined;call light within reach;encouraged to call for assist;exit alarm on;with nsg  CNA in room  -EN           User Key  (r) = Recorded By, (t) = Taken By, (c) = Cosigned By    Initials Name  Provider Type    Harriet Olsen OTR Occupational Therapist               Outcome Measures     Row Name 03/13/23 1107          How much help from another is currently needed...    Putting on and taking off regular lower body clothing? 3  -EN     Bathing (including washing, rinsing, and drying) 3  -EN     Toileting (which includes using toilet bed pan or urinal) 3  -EN     Putting on and taking off regular upper body clothing 4  -EN     Taking care of personal grooming (such as brushing teeth) 4  -EN     Eating meals 4  -EN     AM-PAC 6 Clicks Score (OT) 21  -EN     Row Name 03/13/23 1107          Functional Assessment    Outcome Measure Options AM-PAC 6 Clicks Daily Activity (OT)  -EN           User Key  (r) = Recorded By, (t) = Taken By, (c) = Cosigned By    Initials Name Provider Type    Harriet Olsen OTR Occupational Therapist                Occupational Therapy Education     Title: PT OT SLP Therapies (In Progress)     Topic: Occupational Therapy (In Progress)     Point: ADL training (Done)     Description:   Instruct learner(s) on proper safety adaptation and remediation techniques during self care or transfers.   Instruct in proper use of assistive devices.              Learning Progress Summary           Patient Acceptance, E, VU by VIOLA at 3/13/2023 1107    Comment: safety during functional transfers/mobility                   Point: Home exercise program (Not Started)     Description:   Instruct learner(s) on appropriate technique for monitoring, assisting and/or progressing therapeutic exercises/activities.              Learner Progress:  Not documented in this visit.                      User Key     Initials Effective Dates Name Provider Type Discipline    EN 06/16/21 -  Harriet Triana OTR Occupational Therapist OT              OT Recommendation and Plan  Planned Therapy Interventions (OT): activity tolerance training, adaptive equipment training, BADL retraining, functional  balance retraining, transfer/mobility retraining, ROM/therapeutic exercise  Therapy Frequency (OT): 5 times/wk  Plan of Care Review  Plan of Care Reviewed With: patient  Outcome Evaluation: OT evaluation completed. Patient oriented X 3 however impulsive during evaluation requiring cues for safety. Patient performed functional transfers with FWW and CGA and donned LB clothing with CGA.  Patient performed in room mobility with FWW and CGA approximatley 15 ft and heart rate in the 160's following mobility. RN notified. OT will folllow while in the hospital and continue to assess discharge needs.     Time Calculation:    Time Calculation- OT     Row Name 03/13/23 1108             Time Calculation- OT    OT Start Time 1020  -EN      OT Stop Time 1043  -EN      OT Time Calculation (min) 23 min  -EN            User Key  (r) = Recorded By, (t) = Taken By, (c) = Cosigned By    Initials Name Provider Type    EN Harriet Triana OTR Occupational Therapist              Therapy Charges for Today     Code Description Service Date Service Provider Modifiers Qty    [id number]  OT EVAL LOW COMPLEXITY 2 3/13/2023 Harriet Triana OTR GO 1               KIM García  3/13/2023

## 2023-03-13 NOTE — PLAN OF CARE
Goal Outcome Evaluation:  Plan of Care Reviewed With: patient           Outcome Evaluation: PT Evaluation Complete: Patient performs sit to/from stand transfers with CGA and gait x 20 feet with CGA with use of FWW. Patient noted to be impulsive throughout, requires significant cues for safety with all mobility and when completing functional tasks. Patient fatigues quickly with complaints of SOA. Discontinued moblity. HR noted to be 130-160s. Patient would benefit from physical therapy to address deficits in functional mobility. Unable to fully assess functional status today due to elevated HR. Will continue to assess discharge needs.

## 2023-03-13 NOTE — PLAN OF CARE
Goal Outcome Evaluation:                 Daily Care Plan Summary: Heart Failure    Diuretic in use (IV or PO):   PO        Daily weight (up or down):    loss: 12 ounces      Output > Intake (yes/no):Yes      O2 Requirements (current, any change?): room air      Symptoms noted with Activity (Respiratory Tolerance, functional state):     Feels out of breath      Anticipated Discharge Plans:     Unknown          Pt had c/o of h/a and back pain today. PRN pain medication given and medication effective. Remains Afib with runs of Tachy. Patient is pleasant. Floating heels and Aquaphor applied to help with skin, very dry.

## 2023-03-13 NOTE — CASE MANAGEMENT/SOCIAL WORK
Discharge Planning Assessment  CHERRIE Bennett     Patient Name: Kaylin Ojeda  MRN: [id number]  Today's Date: 3/13/2023    Admit Date: 3/12/2023    Plan: Home with home health if needed   Discharge Needs Assessment     Row Name 03/13/23 1029       Living Environment    People in Home alone    Unique Family Situation pt states her great granddaughter October who is 13 stays with her each evening until around 10:00pm    Current Living Arrangements apartment    Duration at Residence 3 Y    Potentially Unsafe Housing Conditions --  none    Primary Care Provided by self    Provides Primary Care For no one    Caregiving Concerns caring for self due to weakness in legs    Family Caregiver if Needed child(asya), adult;grandchild(asya), adult    Family Caregiver Names Jona, son and great granddaughter October    Quality of Family Relationships unable to assess    Able to Return to Prior Arrangements yes    Living Arrangement Comments Pt states she lives alone in a first floor apartment with no steps to gain entry       Resource/Environmental Concerns    Resource/Environmental Concerns financial  provided utility assistance info and med assist cards    Financial Concerns medicine, unable to afford;other (see comments)  utilities    Transportation Concerns none       Food Insecurity    Within the past 12 months, you worried that your food would run out before you got the money to buy more. Sometimes  provided food bank info    Within the past 12 months, the food you bought just didn't last and you didn't have money to get more. Sometimes  provided food bank info       Transition Planning    Patient/Family Anticipates Transition to home;home with help/services    Patient/Family Anticipated Services at Transition ;home health care    Transportation Anticipated family or friend will provide  pt states her son will be able to provide ride home at discharge       Discharge Needs Assessment    Readmission Within the  "Last 30 Days no previous admission in last 30 days    Current Outpatient/Agency/Support Group --  none    Equipment Currently Used at Home nebulizer;rollator    Concerns to be Addressed adjustment to diagnosis/illness;discharge planning    Concerns Comments pt states she may need home health for PT at discharge due to weakness in legs    Anticipated Changes Related to Illness --  unsure at this time until eval by PT    Equipment Needed After Discharge none    Outpatient/Agency/Support Group Needs home care agency    Discharge Facility/Level of Care Needs home with home health    Provided Post Acute Provider List? Yes    Post Acute Provider List Home Health    Provided Post Acute Provider Quality & Resource List? Yes    Post Acute Provider Quality and Resource List Home Health    Delivered To Patient    Method of Delivery In person    Patient's Choice of Community Agency(s) pt states she has used HH in the past but is unsure which agency it was    Current Discharge Risk chronically ill    Discharge Coordination/Progress Patient states she plans on returning home at discharge with her great granddaughter and son to help if needed and possible home health for PT.               Discharge Plan     Row Name 03/13/23 1047       Plan    Plan Home with home health if needed    Patient/Family in Agreement with Plan yes    Plan Comments Into room and introduced self and role of CM. Discussed discharge disposition with patient at bedside. Patient is currently resting in bed and has finished with breakfast and has no complaints. Patient confirms that the info on her face sheet is correct and that she see's Dr. Donna Forte as PCP. She states that she uses [facility] in [place] and sometimes has problem paying for her med's since they are so expensive and she has to drive to  her narcotics but the pharmacy delivers her other med's. CM asked what medicine she has problem paying for and she states \"my Eliquis is " "very expensive but the heart doctor provides me with samples but I hate to ask for them\". CM encouraged him to continue to ask for the samples since Eliquis is a very expensive and is a medicine she needs and she verbalized understanding. She also states \"they have back backed me down on the dose of my pain pills and I sometimes run out of them before they are due\". CM encouraged her to discuss this issue with the doctor who prescribes this med to see if the dose can be changed and she verbalized understanding. CM also provided patient with several discount cards to help with the cost of med's as needed. Patient states she does not have a living will and would like to talk with the hannah regarding one. CM entered referral into [facility] and  left for Laura regarding such. Patient states she lives alone in a first floor apartment with no steps to gain entry and currently has no issues entering the home or maneuvering inside using a rollator. She states her 13 yr old great granddaughter stays with her most of he time and is there until 10:00pm nightly and helps.She states she states that she is independent with her ADL's and still drives and her son or daughter in law will be able to provide ride home at discharge. She also states that she uses a rollator and nebulizer at home and is unsure if she will need any other equipment at discharge. Patient states she has used home health in the past but is unsure which agency it was and states she would like for them to follow her again since her legs have become weak for PT if needed and CM provided her a list of agencies for her preference. CM also offered community resources such as STR and LTC but is unsure if she will need these services at discharge. Patient states she plans on returning home at discharge with her great granddaughter, son and daughter in law to help as needed. Name and number placed on white board in room. CM will follow.              Continued Care and " Services - Admitted Since 3/12/2023    Coordination has not been started for this encounter.     Selected Continued Care - Prior Encounters Includes continued care and service providers with selected services from prior encounters from 12/12/2022 to 3/13/2023    Discharged on 12/16/2022 Admission date: 12/9/2022 - Discharge disposition: Rehab Facility or Unit (DC - External)    Destination     Service Provider Selected Services Address Phone Fax Patient Preferred    [facility] [address] [place], [place] [id number] [phone] --                       Demographic Summary     Row Name 03/13/23 1029       General Information    Admission Type observation    Arrived From home    Required Notices Provided Observation Status Notice    Referral Source admission list    Reason for Consult discharge planning    Preferred Language English       Contact Information    Permission Granted to Share Info With                Functional Status    No documentation.                Psychosocial    No documentation.                Abuse/Neglect    No documentation.                Legal    No documentation.                Substance Abuse    No documentation.                Patient Forms    No documentation.                   Jeanette Kelly RN

## 2023-03-13 NOTE — PLAN OF CARE
Goal Outcome Evaluation:  Plan of Care Reviewed With: patient           Outcome Evaluation: OT evaluation completed. Patient oriented X 3 however impulsive during evaluation and required cues for safety. Patient performed functional transfers with FWW and CGA and donned LB clothing with CGA.  Patient performed in room mobility with FWW and CGA approximatley 15 ft and heart rate in the 160's following mobility. RN notified. OT will folllow while in the hospital and continue to assess discharge needs.

## 2023-03-13 NOTE — THERAPY EVALUATION
Patient Name: Kaylin Ojeda  : 1940    MRN: [id number]                              Today's Date: 3/13/2023       Admit Date: 3/12/2023    Visit Dx:     ICD-10-CM ICD-9-CM   1. Pulmonary emphysema, unspecified emphysema type ([facility])  J43.9 492.8   2. Other congestive heart failure ([facility])  I50.9 428.0   3. 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[facility])   • Obstruction of carotid artery   • Peripheral arterial occlusive disease ([facility])   • Impaired glucose tolerance   • Difficulty sleeping   • Tobacco dependence syndrome   • Iron deficiency anemia   • Vitamin B12 deficiency anemia due to intrinsic factor deficiency   • Acquired hypothyroidism   • Thoracoabdominal aortic aneurysm   • Abdominal bloating   • Prediabetes   • Sleep difficulties   • Chronic constipation   • PAF (paroxysmal atrial fibrillation) ([facility])   • Moderate single current episode of major depressive disorder ([facility])   • Chronic low back pain without sciatica   • Urge incontinence   • Severe mitral regurgitation   • Severe tricuspid regurgitation   • Moderate aortic valve regurgitation   • Underweight   • DDD (degenerative disc disease), lumbar   • Atrial flutter with rapid ventricular response ([facility])   • Urinary tract infection without hematuria   • Malaise and fatigue   • Type I or II open fracture of right olecranon process   • Type I or II open fracture of olecranon process of right ulna, initial encounter   • Severe malnutrition ([facility])   • Pulmonary emphysema, unspecified emphysema type ([facility])     Past Medical History:   Diagnosis Date   • A-fib ([facility])    • Abdominal pain, epigastric      Chronic, unclear cause, improved   • Anorexia    • Anxiety    • Arthritis    • CAD (coronary artery disease)     Cath 5/2015: nonobstructive LAD/RCA disease, 90% mid LCx s/p 2.65h01le Xience.  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[facility])    • Mononucleosis    • Occlusion and stenosis of carotid artery    • Onychomycosis    • Orbit fracture ([facility])    • PAF (paroxysmal atrial fibrillation) ([facility])    • Peripheral arterial disease ([facility])     Significant (carotid, subclavian, renal arteries, lower extremities), with claudication, medical therapy only   • Pernicious anemia    • Prediabetes    • Renal artery stenosis ([facility])     unilateral, with renal atrophy (no intervention required)   • Renal calculi    • Sinus bradycardia     mild, asymptomatic   • TIA (transient ischemic attack)    • UTI (urinary tract infection)    • Valvular heart disease     1/2021: mod-severe AI, mod MR, mild-mod TR   • Vision problem    • Vitamin B 12 deficiency      Past Surgical History:   Procedure Laterality Date   • APPENDECTOMY     • BREAST BIOPSY Left     20+ yrs ago   • BREAST SURGERY     • CARDIAC CATHETERIZATION  05/2015    nonobs LAD/RCA disease, 90% mid LCx s/p 2.24i80cg Xience   • CARDIAC CATHETERIZATION N/A 10/28/2020    Procedure: Right and Left Heart Cath;  Surgeon: Opal Contreras MD;  Location: [facility] CATH INVASIVE LOC[facility];  Service: Cardiovascular;  Laterality: N/A;  for cardiac testing prior  to possible valve surgery per Dr. Mai   • CARDIAC CATHETERIZATION N/A 10/28/2020    Procedure: Coronary angiography;  Surgeon: Opal Contreras MD;  Location:  [facility] CATH INVASIVE LOCATION;  Service: Cardiovascular;  Laterality: N/A;   • CERVICAL FUSION  1997   • CHOLECYSTECTOMY     • CORONARY STENT PLACEMENT     • HYSTERECTOMY  1995   • KNEE SURGERY Right 2005   • KNEE SURGERY Left 1998   • ORIF HUMERUS FRACTURE Right 12/10/2022    Procedure: ELBOW OPEN REDUCTION INTERNAL FIXATION;  Surgeon: Lito Reeder MD;  Location:  [facility] OR;  Service: Orthopedics;  Laterality: Right;      General Information     Row Name 03/13/23 1030          Physical Therapy Time and Intention    Document Type evaluation  -BP     Mode of Treatment physical therapy  -BP     Row Name 03/13/23 1030          General Information    Patient Profile Reviewed yes  Patient presents due to SOA, dypsnea on exertion. Pt admitted for acute hypoxic respiratory failure, AcCOPD. Pt with elevated heart rate. Negative for PE.  -BP     Prior Level of Function --  Pt reports independence with mobility and ADL's with intermittent use of cane. Pt reports recently increased fatigue and minimal activity due to SOA. Reports family checks in throughout the day.  -BP     Existing Precautions/Restrictions fall;other (see comments)  very impulsive  -BP     Barriers to Rehab previous functional deficit  impulsive  -BP     Row Name 03/13/23 1030          Living Environment    People in Home alone  reports family checks in throughout the day  -BP     Row Name 03/13/23 1030          Cognition    Orientation Status (Cognition) oriented x 3  -BP     Row Name 03/13/23 1030          Safety Issues, Functional Mobility    Safety Issues Affecting Function (Mobility) judgment;impulsivity;at risk behavior observed  -BP     Comment, Safety Issues/Impairments (Mobility) Patient very impulsive throughout. Requires cues for safety with all mobility and functional tasks  -BP            User Key  (r) = Recorded By, (t) = Taken By, (c) = Cosigned By    Initials Name Provider Type    Scott Beal, PT Physical Therapist               Mobility     Row Name 03/13/23 1111          Bed Mobility    Comment, (Bed Mobility) deferred, patient at EOB-just returned from [place]  -BP     Row Name 03/13/23 1111          Transfers    Comment, (Transfers) Verbal cues for hand placement  -     Row Name 03/13/23 1111          Sit-Stand Transfer    Sit-Stand [place] (Transfers) contact guard;verbal cues  -BP     Assistive Device (Sit-Stand Transfers) walker, front-wheeled  -BP     Row Name 03/13/23 1111          Gait/Stairs (Locomotion)    [place] Level (Gait) contact guard;verbal cues  -BP     Assistive Device (Gait) walker, front-wheeled  -BP     Distance in Feet (Gait) 20  -BP     Deviations/Abnormal Patterns (Gait) gait speed decreased;stride length decreased;base of support, narrow  -BP     Bilateral Gait Deviations forward flexed posture  -BP     Comment, (Gait/Stairs) No loss of balance noted. Patient with elevated heart rate-discontinued further mobility due to heart noted to be 130-160 following gait  -BP           User Key  (r) = Recorded By, (t) = Taken By, (c) = Cosigned By    Initials Name Provider Type    Scott Beal PT Physical Therapist               Obj/Interventions     Row Name 03/13/23 1112          Range of Motion Comprehensive    Comment, General Range of Motion B LE AROM WFL  -     Row Name 03/13/23 1112          Strength Comprehensive (MMT)    Comment, General Manual Muscle Testing (MMT) Assessment B LE strength functional. No knee buckling noted during gait.  -BP     Row Name 03/13/23 1112          Balance    Comment, Balance CGA-dynamic standing balance. CGA-standing with device  -     Row Name 03/13/23 1112          Sensory Assessment (Somatosensory)    Sensory Assessment (Somatosensory) sensation intact  -           User Key  (r) = Recorded By, (t) =  Taken By, (c) = Cosigned By    Initials Name Provider Type    Scott Beal, PT Physical Therapist               Goals/Plan     Row Name 03/13/23 1231          Bed Mobility Goal 1 (PT)    Activity/Assistive Device (Bed Mobility Goal 1, PT) supine to sit  -BP     [place] Level/Cues Needed (Bed Mobility Goal 1, PT) supervision required  -BP     Time Frame (Bed Mobility Goal 1, PT) 3 days  -BP     Progress/Outcomes (Bed Mobility Goal 1, PT) new goal  -BP     Row Name 03/13/23 1231          Transfer Goal 1 (PT)    Activity/Assistive Device (Transfer Goal 1, PT) transfers, all;walker, rolling  -BP     [place] Level/Cues Needed (Transfer Goal 1, PT) supervision required  -BP     Time Frame (Transfer Goal 1, PT) 3 days  -BP     Progress/Outcome (Transfer Goal 1, PT) new goal  -BP     Row Name 03/13/23 1231          Gait Training Goal 1 (PT)    Activity/Assistive Device (Gait Training Goal 1, PT) gait (walking locomotion);walker, rolling  -BP     [place] Level (Gait Training Goal 1, PT) supervision required  -BP     Time Frame (Gait Training Goal 1, PT) 3 days  -BP     Progress/Outcome (Gait Training Goal 1, PT) new goal  -BP     Row Name 03/13/23 1231          Therapy Assessment/Plan (PT)    Planned Therapy Interventions (PT) bed mobility training;gait training;home exercise program;patient/family education;strengthening;transfer training  -BP           User Key  (r) = Recorded By, (t) = Taken By, (c) = Cosigned By    Initials Name Provider Type    Scott Beal, PT Physical Therapist               Clinical Impression     Row Name 03/13/23 1122          Pain    Pretreatment Pain Rating 0/10 - no pain  -BP     Posttreatment Pain Rating 0/10 - no pain  -BP     Row Name 03/13/23 1122          Plan of Care Review    Plan of Care Reviewed With patient  -BP     Outcome Evaluation PT Evaluation Complete: Patient performs sit to/from stand transfers with CGA and gait x 20 feet with CGA with use of FWW.  Patient noted to be impulsive throughout, requires significant cues for safety with all mobility and when completing functional tasks. Patient fatigues quickly with complaints of SOA. Discontinued moblity. HR noted to be 130-160s. Patient would benefit from physical therapy to address deficits in functional mobility. Unable to fully assess functional status today due to elevated HR. Will continue to assess discharge needs.  -BP     Row Name 03/13/23 1122          Therapy Assessment/Plan (PT)    Rehab Potential (PT) good, to achieve stated therapy goals  -BP     Criteria for Skilled Interventions Met (PT) yes;skilled treatment is necessary  -BP     Therapy Frequency (PT) 6 times/wk  -BP     Predicted Duration of Therapy Intervention (PT) 5 days  -BP           User Key  (r) = Recorded By, (t) = Taken By, (c) = Cosigned By    Initials Name Provider Type    Scott Beal, PT Physical Therapist               Outcome Measures     Row Name 03/13/23 1242          How much help from another person do you currently need...    Turning from your back to your side while in flat bed without using bedrails? 3  -BP     Moving from lying on back to sitting on the side of a flat bed without bedrails? 3  -BP     Moving to and from a bed to a chair (including a wheelchair)? 3  -BP     Standing up from a chair using your arms (e.g., wheelchair, bedside chair)? 3  -BP     Climbing 3-5 steps with a railing? 3  -BP     To walk in hospital room? 3  -BP     AM-PAC 6 Clicks Score (PT) 18  -BP     Highest level of mobility 6 --> Walked 10 steps or more  -BP     Row Name 03/13/23 1242 03/13/23 1107       Functional Assessment    Outcome Measure Options AM-PAC 6 Clicks Basic Mobility (PT)  -BP AM-PAC 6 Clicks Daily Activity (OT)  -EN          User Key  (r) = Recorded By, (t) = Taken By, (c) = Cosigned By    Initials Name Provider Type    Harriet Olsen OTR Occupational Therapist    Scott Beal, PT Physical Therapist                              Physical Therapy Education     Title: PT OT SLP Therapies (In Progress)     Topic: Physical Therapy (In Progress)     Point: Mobility training (Done)     Learning Progress Summary           Patient Acceptance, E,TB, VU by  at 3/13/2023 1249                   Point: Home exercise program (Not Started)     Learner Progress:  Not documented in this visit.                      User Key     Initials Effective Dates Name Provider Type Discipline     06/16/21 -  Scott Daniel PT Physical Therapist PT              PT Recommendation and Plan  Planned Therapy Interventions (PT): bed mobility training, gait training, home exercise program, patient/family education, strengthening, transfer training  Plan of Care Reviewed With: patient  Outcome Evaluation: PT Evaluation Complete: Patient performs sit to/from stand transfers with CGA and gait x 20 feet with CGA with use of FWW. Patient noted to be impulsive throughout, requires significant cues for safety with all mobility and when completing functional tasks. Patient fatigues quickly with complaints of SOA. Discontinued moblity. HR noted to be 130-160s. Patient would benefit from physical therapy to address deficits in functional mobility. Unable to fully assess functional status today due to elevated HR. Will continue to assess discharge needs.     Time Calculation:    PT Charges     Row Name 03/13/23 1258             Time Calculation    Start Time 1020  -BP      Stop Time 1043  -BP      Time Calculation (min) 23 min  -BP      PT Received On 03/13/23  -BP      PT - Next Appointment 03/14/23  -BP            User Key  (r) = Recorded By, (t) = Taken By, (c) = Cosigned By    Initials Name Provider Type    BP Scott Daniel, PT Physical Therapist              Therapy Charges for Today     Code Description Service Date Service Provider Modifiers Qty    06059301268 HC PT EVAL LOW COMPLEXITY 2 3/13/2023 Scott Daniel, PT GP 1          PT  G-Codes  Outcome Measure Options: AM-PAC 6 Clicks Basic Mobility (PT)  AM-PAC 6 Clicks Score (PT): 18  AM-PAC 6 Clicks Score (OT): 21  PT Discharge Summary  Anticipated Discharge Disposition (PT):  (will continue to assess)    Scott Daniel, PT  3/13/2023

## 2023-03-13 NOTE — PROGRESS NOTES
"Adult Nutrition  Assessment/PES    Patient Name:  Kaylin Ojeda  YOB: 1940  MRN: [id number]  Admit Date:  3/12/2023    Assessment Date:  3/13/2023    Comments:  Severely underweight    Recommend: Add Boost Plus with all meals  Pt likely meets for malnutrition, unable to complete nutrition exam on visit today.  Will cont to follow and monitor, further assess nutrition status as able     Reason for Assessment     Row Name 03/13/23 1411          Reason for Assessment    Reason For Assessment identified at risk by screening criteria  BMI, HF     Diagnosis pulmonary disease  AHRF , A/CHF, AE COPD hx CAD                Nutrition/Diet History     Row Name 03/13/23 1411          Nutrition/Diet History    Typical Intake (Food/Fluid/EN/PN) Pt with CNA, then with  , and then receivng care on 3rd visit.                Labs/Tests/Procedures/Meds     Row Name 03/13/23 1412          Labs/Procedures/Meds    Lab Results Reviewed reviewed     Lab Results Comments glu 188        Diagnostic Tests/Procedures    Diagnostic Test/Procedure Reviewed reviewed        Medications    Pertinent Medications Reviewed reviewed     Pertinent Medications Comments lasix, solumedrol                  Estimated/Assessed Needs - Anthropometrics     Row Name 03/13/23 1412 03/13/23 1246       Anthropometrics    Height -- 152.4 cm (60\")    Weight -- 40.8 kg (90 lb)    Weight for Calculation 40.8 kg (90 lb) --       Estimated/Assessed Needs    Additional Documentation Estimated Calorie Needs (Group);Fluid Requirements (Group);Protein Requirements (Group) --       Estimated Calorie Needs    Estimated Calorie Requirement (kcal/day) 1428 kcal( 35 kcal/kg) --    Estimated Calorie Need Method kcal/kg --       Protein Requirements    Est Protein Requirement Amount (gms/kg) 1.5 gm protein  61 gm pro --       Fluid Requirements    Estimated Fluid Requirement Method RDA Method  1428 ml --    Row Name 03/13/23 0821          Anthropometrics "    Weight 41.2 kg (90 lb 12.8 oz)                Nutrition Prescription Ordered     Row Name 03/13/23 1413          Nutrition Prescription PO    Common Modifiers Cardiac                Evaluation of Received Nutrient/Fluid Intake     Row Name 03/13/23 1413          Fluid Intake Evaluation    Oral Fluid (mL) 183  ave x 2, 13%        PO Evaluation    Number of Meals 2     % PO Intake 50                   Problem/Interventions:   Problem 1     Row Name 03/13/23 1413          Nutrition Diagnoses Problem 1    Problem 1 Underweight     Etiology (related to) Medical Diagnosis;Factors Affecting Nutrition     Signs/Symptoms (evidenced by) Report/Observation;BMI     BMI 17 - 17.9                      Intervention Goal     Row Name 03/13/23 1414          Intervention Goal    General Meet nutritional needs for age/condition     PO PO intake (%)     PO Intake % 75 %  or greater     Weight Maintain weight                Nutrition Intervention     Row Name 03/13/23 1414          Nutrition Intervention    RD/Tech Action Encourage intake;Follow Tx progress;Recommend/ordered     Recommended/Ordered Supplement                Nutrition Prescription     Row Name 03/13/23 1414          Nutrition Prescription PO    PO Prescription Begin/change supplement     Supplement Boost Plus     Supplement Frequency 3 times a day                Education/Evaluation     Row Name 03/13/23 1415          Education    Education Education not appropriate at this time        Monitor/Evaluation    Monitor Per protocol;I&O;PO intake;Supplement intake;Pertinent labs;Weight                 Electronically signed by:  Natty Barajas RD  03/13/23 14:15 EDT

## 2023-03-14 PROBLEM — R14.0 ABDOMINAL BLOATING: Status: RESOLVED | Noted: 2017-02-24 | Resolved: 2023-01-01

## 2023-03-14 PROBLEM — I48.92 ATRIAL FLUTTER WITH RAPID VENTRICULAR RESPONSE (HCC): Status: RESOLVED | Noted: 2022-01-01 | Resolved: 2023-01-01

## 2023-03-14 PROBLEM — I50.32 CHRONIC DIASTOLIC (CONGESTIVE) HEART FAILURE: Status: ACTIVE | Noted: 2023-01-01

## 2023-03-14 NOTE — PLAN OF CARE
Goal Outcome Evaluation:  Plan of Care Reviewed With: patient        Progress: improving  Outcome Evaluation: OT- Patient performed bed mobilty with modified independence. Patient issued reacher and educated on use during ADL/IADL routine. Patient performed functional mobility with FWW and SBA. HR per telemetry monitor was 118-120 following mobility. Patient reports she needs a tub bench due to hitting her legs on the side of the tub and difficulty getting into the tub. OT department demo tub bench provided and patient educated on use. Patient reports she plans to return home with assist from family at discharge. Would benefit from home health OT.

## 2023-03-14 NOTE — SIGNIFICANT NOTE
03/14/23 1130   OTHER   Discipline occupational therapist   Rehab Time/Intention   Session Not Performed other (see comments)  (on hold this AM due to elevated heart rate, will check on in PM)

## 2023-03-14 NOTE — CASE MANAGEMENT/SOCIAL WORK
Continued Stay Note  CHERRIE Bennett     Patient Name: Kaylin Ojeda  MRN: [id number]  Today's Date: 3/14/2023    Admit Date: 3/12/2023    Plan: Home   Discharge Plan     Row Name 03/14/23 1536       Plan    Plan Home    Patient/Family in Agreement with Plan yes    Plan Comments CM followed up with patient today and provided patient with information on how to apply for Medicaid. She states she has been told to do this but just has not. She did take the info and states she will check into it. She states she plans on returning home at discharge and use the resources that CM gave her yesterday to help with needs. CM will follow.               Discharge Codes    No documentation.               Expected Discharge Date and Time     Expected Discharge Date Expected Discharge Time    Mar 14, 2023             Jeanette Kelly RN

## 2023-03-14 NOTE — CONSULTS
Chaplain Briseno completed AD with patient. She referred patient to me for a follow-up spiritual care visit.

## 2023-03-14 NOTE — THERAPY TREATMENT NOTE
Acute Care - Occupational Therapy Treatment Note  CHERRIE Bennett     Patient Name: Kaylin Ojeda  : 1940  MRN: [id number]  Today's Date: 3/14/2023             Admit Date: 3/12/2023       ICD-10-CM ICD-9-CM   1. Pulmonary emphysema, unspecified emphysema type (HCC)  J43.9 492.8   2. Other congestive heart failure ([facility])  I50.9 428.0   3. 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[facility])   • Obstruction of carotid artery   • Peripheral arterial occlusive disease ([facility])   • Impaired glucose tolerance   • Difficulty sleeping   • Tobacco dependence syndrome   • Iron deficiency anemia   • Vitamin B12 deficiency anemia due to intrinsic factor deficiency   • Acquired hypothyroidism   • Thoracoabdominal aortic aneurysm   • Prediabetes   • Sleep difficulties   • Chronic constipation   • PAF (paroxysmal atrial fibrillation) ([facility])   • Moderate single current episode of major depressive disorder ([facility])   • Chronic low back pain without sciatica   • Urge incontinence   • Underweight   • DDD (degenerative disc disease), lumbar   • Urinary tract infection without hematuria   • Malaise and fatigue   • Type I or II open fracture of right olecranon process   • Type I or II open fracture of olecranon process of right ulna, initial encounter   • Severe malnutrition ([facility])   • Pulmonary emphysema, unspecified emphysema type ([facility])   • Chronic diastolic (congestive) heart failure ([facility])     Past Medical History:   Diagnosis Date   • Abdominal pain, epigastric     Chronic, unclear cause, improved   • Anorexia    • Anxiety    • Arthritis    • CAD (coronary artery disease)     Cath  5/2015: nonobstructive LAD/RCA disease, 90% mid LCx s/p [id number] Xience.  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)    • Mononucleosis    • Occlusion and stenosis of carotid artery    • Onychomycosis    • Orbit fracture ([facility])    • PAF (paroxysmal atrial fibrillation) ([facility])    • Peripheral arterial disease ([facility])     Significant (carotid, subclavian, renal arteries, lower extremities), with claudication, medical therapy only   • Pernicious anemia    • Prediabetes    • Renal artery stenosis ([facility])     unilateral, with renal atrophy (no intervention required)   • Renal calculi    • Sinus bradycardia     mild, asymptomatic   • TIA (transient ischemic attack)    • UTI (urinary tract infection)    • Valvular heart disease     1/2021: mod-severe AI, mod MR, mild-mod TR   • Vision problem    • Vitamin B 12 deficiency      Past Surgical History:   Procedure Laterality Date   • APPENDECTOMY     • BREAST BIOPSY Left     20+ yrs ago   • BREAST SURGERY     • CARDIAC CATHETERIZATION  05/2015    nonobs LAD/RCA disease, 90% mid LCx s/p [id number] Xience   • CARDIAC CATHETERIZATION N/A 10/28/2020    Procedure: Right and Left Heart Cath;  Surgeon: Opal Contreras MD;  Location: [facility] INVASIVE LOCATION;  Service: Cardiovascular;  Laterality: N/A;  for cardiac testing prior to possible valve surgery per Dr. Mai   • CARDIAC CATHETERIZATION N/A 10/28/2020    Procedure: Coronary angiography;  Surgeon: Opal Contreras MD;   Location:  [facility] INVASIVE LOCATION;  Service: Cardiovascular;  Laterality: N/A;   • CERVICAL FUSION  1997   • CHOLECYSTECTOMY     • CORONARY STENT PLACEMENT     • HYSTERECTOMY  1995   • KNEE SURGERY Right 2005   • KNEE SURGERY Left 1998   • ORIF HUMERUS FRACTURE Right 12/10/2022    Procedure: ELBOW OPEN REDUCTION INTERNAL FIXATION;  Surgeon: Lito Reeder MD;  Location:  [facility] OR;  Service: Orthopedics;  Laterality: Right;         OT ASSESSMENT FLOWSHEET (last 12 hours)     OT Evaluation and Treatment     Row Name 03/14/23 1300                   OT Time and Intention    Document Type therapy note (daily note)  -EN        Mode of Treatment occupational therapy  -EN        Patient Effort good  -EN        Comment Patient reports she has difficulty getting into her tub and has banged her legs several times. Patient educated on tub transfer bench. Patient very interested however unable to afford. OT department had demo transfer tub bench no longer needed and was donated to patient. Patient very appreciative.  -EN           Pain Assessment    Pretreatment Pain Rating 0/10 - no pain  -EN           Cognition    Personal Safety Interventions gait belt;nonskid shoes/slippers when out of bed  -EN           Activities of Daily Living    BADL Assessment/Intervention lower body dressing  -EN           Lower Body Dressing Assessment/Training    Comment, (Lower Body Dressing) issued reacher and educated on use during ADL/IADL routine  -EN           Bed Mobility    Bed Mobility supine-sit;sit-supine  -EN        Supine-Sit Motley (Bed Mobility) modified independence  -EN        Sit-Supine Motley (Bed Mobility) modified independence  -EN        Assistive Device (Bed Mobility) bed rails;head of bed elevated  -EN           Functional Mobility    Functional Mobility- Ind. Level standby assist  -EN        Functional Mobility- Device walker, front-wheeled  -EN        Functional Mobility-Distance (Feet) 180  -EN         Functional Mobility- Comment heart rate following mobility was 119  -EN           Transfer Assessment/Treatment    Transfers sit-stand transfer  -EN           Sit-Stand Transfer    Sit-Stand Russell (Transfers) standby assist  -EN        Assistive Device (Sit-Stand Transfers) walker, front-wheeled  -EN           Balance    Comment, Balance Patient sat EOB with supervision, SBA for standing balance with FWW  -EN           Plan of Care Review    Plan of Care Reviewed With patient  -EN        Progress improving  -EN        Outcome Evaluation OT- Patient performed bed mobilty with modified independence. Patient issued reacher and educated on use during ADL/IADL routine. Patient performed functional mobility with FWW and SBA. HR per telemetry monitor was 118-120 following mobility. Patient reports she needs a tub bench due to hitting her legs on the side of the tub and difficulty getting into the tub. OT department demo tub bench provided and patient educated on use. Patient reports she plans to return home with assist from family at discharge. Would benefit from home health OT.  -EN           Positioning and Restraints    Pre-Treatment Position in bed  -EN        Post Treatment Position bed  -EN        In Bed supine;sitting EOB;call light within reach;encouraged to call for assist;exit alarm on;with nsg  -EN           Progress Summary (OT)    Progress Toward Functional Goals (OT) progress toward functional goals is good  -EN        Daily Progress Summary (OT) improving  -EN              User Key  (r) = Recorded By, (t) = Taken By, (c) = Cosigned By    Initials Name Effective Dates    VIOLA Triana Harriet FRAN, OTR 06/16/21 -                  Occupational Therapy Education     Title: PT OT SLP Therapies (In Progress)     Topic: Occupational Therapy (In Progress)     Point: ADL training (Done)     Description:   Instruct learner(s) on proper safety adaptation and remediation techniques during self care or transfers.    Instruct in proper use of assistive devices.              Learning Progress Summary           Patient Acceptance, E, VU by EN at 3/14/2023 1350    Comment: tub transfer bench, ADL retraining, safety during functional transfers    Acceptance, E, VU by EN at 3/13/2023 1107    Comment: safety during functional transfers/mobility                   Point: Home exercise program (Not Started)     Description:   Instruct learner(s) on appropriate technique for monitoring, assisting and/or progressing therapeutic exercises/activities.              Learner Progress:  Not documented in this visit.                      User Key     Initials Effective Dates Name Provider Type Discipline    EN 06/16/21 -  Harriet Triana OTR Occupational Therapist OT                  OT Recommendation and Plan  Planned Therapy Interventions (OT): activity tolerance training, adaptive equipment training, BADL retraining, functional balance retraining, transfer/mobility retraining, ROM/therapeutic exercise  Therapy Frequency (OT): 5 times/wk  Progress Toward Functional Goals (OT): progress toward functional goals is good  Plan of Care Review  Plan of Care Reviewed With: patient  Progress: improving  Outcome Evaluation: OT- Patient performed bed mobilty with modified independence. Patient issued reacher and educated on use during ADL/IADL routine. Patient performed functional mobility with FWW and SBA. HR per telemetry monitor was 118-120 following mobility. Patient reports she needs a tub bench due to hitting her legs on the side of the tub and difficulty getting into the tub. OT department demo tub bench provided and patient educated on use. Patient reports she plans to return home with assist from family at discharge. Would benefit from home health OT.     Outcome Measures     Row Name 03/14/23 1305 03/14/23 1300          How much help from another person do you currently need...    Turning from your back to your side while in flat bed  without using bedrails? 4  -JW --     Moving from lying on back to sitting on the side of a flat bed without bedrails? 4  -JW --     Moving to and from a bed to a chair (including a wheelchair)? 3  -JW --     Standing up from a chair using your arms (e.g., wheelchair, bedside chair)? 3  -JW --     Climbing 3-5 steps with a railing? 3  -JW --     To walk in hospital room? 3  -JW --     AM-PAC 6 Clicks Score (PT) 20  -JW --        How much help from another is currently needed...    Putting on and taking off regular lower body clothing? -- 3  -EN     Bathing (including washing, rinsing, and drying) -- 3  -EN     Toileting (which includes using toilet bed pan or urinal) -- 3  -EN     Putting on and taking off regular upper body clothing -- 4  -EN     Taking care of personal grooming (such as brushing teeth) -- 4  -EN     Eating meals -- 4  -EN     AM-PAC 6 Clicks Score (OT) -- 21  -EN        Functional Assessment    Outcome Measure Options AM-PAC 6 Clicks Basic Mobility (PT)  -JW --           User Key  (r) = Recorded By, (t) = Taken By, (c) = Cosigned By    Initials Name Provider Type    Harriet Olsen OTR Occupational Therapist    Clementina Parham, PT Physical Therapist                Time Calculation:    Time Calculation- OT     Row Name 03/14/23 1352 03/14/23 1343          Time Calculation- OT    OT Start Time 1300  -EN --     OT Stop Time 1326  -EN --     OT Time Calculation (min) 26 min  -EN --        Timed Charges    40315 - Gait Training Minutes  -- 20  -JW        Total Minutes    Timed Charges Total Minutes -- 20  -JW      Total Minutes -- 20  -JW           User Key  (r) = Recorded By, (t) = Taken By, (c) = Cosigned By    Initials Name Provider Type    Harriet Olsen OTR Occupational Therapist    Clementina Parham, PT Physical Therapist              Therapy Charges for Today     Code Description Service Date Service Provider Modifiers Qty    99771213964 HC OT EVAL LOW COMPLEXITY 2  3/13/2023 Harriet Triana OTR GO 1    52625821650 HC OT SELF CARE/MGMT/TRAIN EA 15 MIN 3/14/2023 Harriet Triana OTR GO 2               KIM García  3/14/2023

## 2023-03-14 NOTE — ACP (ADVANCE CARE PLANNING)
Advance Care Planning      Education and discussion concerning ACP. Patient signed and completed Living Will.    Patient also asked that CPR not be done.  Patient completed and signed EMS/DNR.  Copies scanned to chart.    Chaplain Sin

## 2023-03-14 NOTE — PROGRESS NOTES
Adult Nutrition  Assessment/PES    Patient Name:  Kaylin Ojeda  YOB: 1940  MRN: [id number]  Admit Date:  3/12/2023    Assessment Date:  3/14/2023    Comments:  Pt meets criteria for severe malnutrition with severe muscle wasting, fat loss, poor intake, and wt loss.   Recommend Boost Breeze 2 per day  Will cont to follow and monitor.      Reason for Assessment     Row Name 03/14/23 1300          Reason for Assessment    Reason For Assessment follow-up protocol  NFPE                Nutrition/Diet History     Row Name 03/14/23 1300          Nutrition/Diet History    Typical Intake (Food/Fluid/EN/PN) Spoke w pt in room, agrees to nutrition exam. Reports poor intake since Dec fall. Little cooking uses soup, frozen meals. Refuses Boost Plus but agrees to try Breeze.                  Physical Findings     Row Name 03/14/23 1301          Physical Findings    Overall Physical Appearance overall severe muscle wasting and fat loss on exam     Exam Agreement consented                Estimated/Assessed Needs - Anthropometrics     Row Name 03/14/23 0748          Anthropometrics    Weight 40.9 kg (90 lb 3.2 oz)                Nutrition Prescription Ordered     Row Name 03/14/23 1301          Nutrition Prescription PO    Common Modifiers Cardiac                  Malnutrition Severity Assessment     Row Name 03/14/23 1301          Malnutrition Severity Assessment    Malnutrition Type Chronic Disease - Related Malnutrition        Insufficient Energy Intake     Insufficient Energy Intake  <75% of est. energy requirement for > or equal to 3 months        Unintentional Weight Loss     Unintentional Weight Loss  Weight loss greater than 7.5% in three months        Muscle Loss    [place] Severe - deep hollowing/scooping, lack of muscle to touch, facial bones well defined     Clavicle Bone Region Severe - protruding prominent bone     Acromion Bone Region Severe - squared shoulders, bones, and acromion process  protrusion prominent     Patellar Region Severe - prominent bone, square looking, very little muscle definition     Anterior Thigh Region Severe - line/depression along thigh, obviously thin     Posterior Calf Region Severe - thin with very little definition/firmness        Fat Loss    Orbital Region  Severe - pronounced hollowness/depression, dark circles, loose saggy skin     Upper Arm Region Severe - mostly skin, very little space between folds, fingers touch        Criteria Met (Must meet criteria for severity in at least 2 of these categories: M Wasting, Fat Loss, Fluid, Secondary Signs, Wt. Status, Intake)    Patient meets criteria for  Severe Malnutrition                 Problem/Interventions:     Problem 2     Row Name 03/14/23 1302          Nutrition Diagnoses Problem 2    Problem 2 Malnutrition     Etiology (related to) Medical Diagnosis;Factors Affecting Nutrition     Signs/Symptoms (evidenced by) Report/Observation  MSA                    Intervention Goal     Row Name 03/14/23 1302          Intervention Goal    General Meet nutritional needs for age/condition     PO Increase intake;PO intake (%)     PO Intake % 50 %  or greater                Nutrition Intervention     Row Name 03/14/23 1302          Nutrition Intervention    RD/Tech Action Encourage intake;Recommend/ordered;Supplement provided;Follow Tx progress     Recommended/Ordered Supplement                Nutrition Prescription     Row Name 03/14/23 1302          Nutrition Prescription PO    PO Prescription Begin/change supplement     Supplement Boost Breeze     Supplement Frequency 2 times a day                Education/Evaluation     Row Name 03/14/23 1302          Education    Education Other (comment);Education topics  Edu on nutriton exam for muscle and fat loss. Edu on need for overall increase pro intake and adequate nutrition. Eat smaller amounts more often & pro each time.Edu need try Boost Breeze     Education Topics Protein         Monitor/Evaluation    Monitor Per protocol;I&O;PO intake;Supplement intake;Pertinent labs;Weight;Symptoms     Education Follow-up Other (comment)  pt hesitantly agree to Boost Breeze                 Electronically signed by:  Natty Barajas RD  03/14/23 13:04 EDT

## 2023-03-14 NOTE — PLAN OF CARE
Goal Outcome Evaluation:              Outcome Evaluation: PT: Patient performs supine to/from sit with modified independence.  Patient performs sit to stand with SBA and gait x180 feet with rolling walker, SBA.  Patient able to manage direction changes without balance loss.  Patient's HR monitored via telemetry at [facility] station.  -120 during activity.  Patient reports she has no concerns related to mobility and hopes to return home at discharge.  Patient may benefit from additional supervision from family upon return home, recommend home health PT.

## 2023-03-14 NOTE — THERAPY DISCHARGE NOTE
Acute Care - Physical Therapy Treatment Note/Discharge  CHERRIE Bennett     Patient Name: Kaylin Ojeda  : 1940  MRN: [id number]  Today's Date: 3/14/2023                Admit Date: 3/12/2023    Visit Dx:    ICD-10-CM ICD-9-CM   1. Pulmonary emphysema, unspecified emphysema type ([facility])  J43.9 492.8   2. Other congestive heart failure ([facility])  I50.9 428.0   3. 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)   • Obstruction of carotid artery   • Peripheral arterial occlusive disease ([facility])   • Impaired glucose tolerance   • Difficulty sleeping   • Tobacco dependence syndrome   • Iron deficiency anemia   • Vitamin B12 deficiency anemia due to intrinsic factor deficiency   • Acquired hypothyroidism   • Thoracoabdominal aortic aneurysm   • Prediabetes   • Sleep difficulties   • Chronic constipation   • PAF (paroxysmal atrial fibrillation) ([facility])   • Moderate single current episode of major depressive disorder ([facility])   • Chronic low back pain without sciatica   • Urge incontinence   • Underweight   • DDD (degenerative disc disease), lumbar   • Urinary tract infection without hematuria   • Malaise and fatigue   • Type I or II open fracture of right olecranon process   • Type I or II open fracture of olecranon process of right ulna, initial encounter   • Severe malnutrition ([facility])   • Pulmonary emphysema, unspecified emphysema type ([facility])   • Chronic diastolic (congestive) heart failure ([facility])     Past Medical History:   Diagnosis Date   • Abdominal pain, epigastric     Chronic, unclear cause, improved   • Anorexia    • Anxiety    • Arthritis    • CAD (coronary artery  disease)     Cath 5/2015: nonobstructive LAD/RCA disease, 90% mid LCx s/p [id number] Xience.  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[facility])    • Mononucleosis    • Occlusion and stenosis of carotid artery    • Onychomycosis    • Orbit fracture ([facility])    • PAF (paroxysmal atrial fibrillation) ([facility])    • Peripheral arterial disease ([facility])     Significant (carotid, subclavian, renal arteries, lower extremities), with claudication, medical therapy only   • Pernicious anemia    • Prediabetes    • Renal artery stenosis ([facility])     unilateral, with renal atrophy (no intervention required)   • Renal calculi    • Sinus bradycardia     mild, asymptomatic   • TIA (transient ischemic attack)    • UTI (urinary tract infection)    • Valvular heart disease     1/2021: mod-severe AI, mod MR, mild-mod TR   • Vision problem    • Vitamin B 12 deficiency      Past Surgical History:   Procedure Laterality Date   • APPENDECTOMY     • BREAST BIOPSY Left     20+ yrs ago   • BREAST SURGERY     • CARDIAC CATHETERIZATION  05/2015    nonobs LAD/RCA disease, 90% mid LCx s/p [id number] Xience   • CARDIAC CATHETERIZATION N/A 10/28/2020    Procedure: Right and Left Heart Cath;  Surgeon: Opal Contreras MD;  Location: [facility] CATH INVASIVE LOCATION;  Service: Cardiovascular;  Laterality: N/A;  for cardiac testing prior to possible valve surgery per Dr. Mai   • CARDIAC CATHETERIZATION N/A 10/28/2020    Procedure: Coronary angiography;  Surgeon: Diane  MD Opal;  Location:  [facility] CATH INVASIVE LOCATION;  Service: Cardiovascular;  Laterality: N/A;   • CERVICAL FUSION  1997   • CHOLECYSTECTOMY     • CORONARY STENT PLACEMENT     • HYSTERECTOMY  1995   • KNEE SURGERY Right 2005   • KNEE SURGERY Left 1998   • ORIF HUMERUS FRACTURE Right 12/10/2022    Procedure: ELBOW OPEN REDUCTION INTERNAL FIXATION;  Surgeon: Lito Reeder MD;  Location:  [facility] OR;  Service: Orthopedics;  Laterality: Right;       PT Assessment (last 12 hours)     PT Evaluation and Treatment     Row Name 03/14/23 1305          Physical Therapy Time and Intention    Subjective Information no complaints  -     Document Type discharge treatment  -     Mode of Treatment physical therapy  -     Patient Effort good  -     Comment pt reports feeling better today, heart rate monitored at nursing station via telemetry box  -     Row Name 03/14/23 1305          General Information    Patient Observations alert;cooperative;agree to therapy  -     Patient/Family/Caregiver Comments/Observations pt supine, agrees to therapy  -     Existing Precautions/Restrictions fall  -     Row Name 03/14/23 1305          Pain    Pretreatment Pain Rating 0/10 - no pain  -     Posttreatment Pain Rating 0/10 - no pain  -     Row Name 03/14/23 1305          Cognition    Personal Safety Interventions gait belt;nonskid shoes/slippers when out of bed  -     Row Name 03/14/23 1305          Bed Mobility    Bed Mobility supine-sit;sit-supine  -     Supine-Sit [place] (Bed Mobility) modified independence  -     Sit-Supine [place] (Bed Mobility) modified independence  -     Assistive Device (Bed Mobility) head of bed elevated  -     Row Name 03/14/23 1305          Transfers    Transfers sit-stand transfer;stand-sit transfer  -     Comment, (Transfers) cues for hand placement  -     Row Name 03/14/23 1305          Sit-Stand Transfer    Sit-Stand [place] (Transfers) supervision  -      Assistive Device (Sit-Stand Transfers) walker, front-wheeled  -DELANEY     Row Name 03/14/23 1305          Stand-Sit Transfer    Stand-Sit [place] (Transfers) supervision  -     Assistive Device (Stand-Sit Transfers) walker, front-wheeled  -     Row Name 03/14/23 1305          Gait/Stairs (Locomotion)    [place] Level (Gait) supervision  -     Assistive Device (Gait) walker, front-wheeled  -DELANEY     Distance in Feet (Gait) 180  -     Deviations/Abnormal Patterns (Gait) base of support, narrow  -     Bilateral Gait Deviations forward flexed posture  -     Comment, (Gait/Stairs) pt able to avoid obstacles and manages direction changes without balance loss or difficulty.  -     Row Name 03/14/23 1305          Balance    Comment, Balance SBA for dynamic balance with walker  -[facility] Name 03/14/23 1305          Plan of Care Review    Outcome Evaluation PT: Patient performs supine to/from sit with modified independence.  Patient performs sit to stand with SBA and gait x180 feet with rolling walker, SBA.  Patient able to manage direction changes without balance loss.  Patient's HR monitored via telemetry at [facility] station.  -120 during activity.  Patient reports she has no concerns related to mobility and hopes to return home at discharge.  -     Row Name 03/14/23 1305          Positioning and Restraints    Pre-Treatment Position in bed  -     Post Treatment Position bed  -JW     In Bed supine;call light within reach;encouraged to call for assist;exit alarm on;with nsg  -     Row Name 03/14/23 1308          Progress Summary (PT)    Progress Toward Functional Goals (PT) progress toward functional goals is good;prepare for discharge  -     Row Name 03/14/23 1302          Therapy Plan Review/Discharge Plan (PT)    Patient/Family Concerns, Anticipated Discharge Disposition (PT) pt may benefit from increased supervision upon return home  -[facility] Name 03/14/23 1120          Bed Mobility Goal 1  (PT)    Activity/Assistive Device (Bed Mobility Goal 1, PT) supine to sit  -JW     [place] Level/Cues Needed (Bed Mobility Goal 1, PT) supervision required  -     Time Frame (Bed Mobility Goal 1, PT) 3 days  -JW     Progress/Outcomes (Bed Mobility Goal 1, PT) goal met  -     Row Name 03/14/23 1120          Transfer Goal 1 (PT)    Activity/Assistive Device (Transfer Goal 1, PT) transfers, all;walker, rolling  -JW     [place] Level/Cues Needed (Transfer Goal 1, PT) supervision required  -JW     Time Frame (Transfer Goal 1, PT) 3 days  -JW     Progress/Outcome (Transfer Goal 1, PT) goal met  -     Row Name 03/14/23 1120          Gait Training Goal 1 (PT)    Activity/Assistive Device (Gait Training Goal 1, PT) gait (walking locomotion);walker, rolling  -JW     [place] Level (Gait Training Goal 1, PT) supervision required  -     Time Frame (Gait Training Goal 1, PT) 3 days  -     Progress/Outcome (Gait Training Goal 1, PT) goal met  -           User Key  (r) = Recorded By, (t) = Taken By, (c) = Cosigned By    Initials Name Provider Type    Clementina Parham, PT Physical Therapist                  Physical Therapy Education     Title: PT OT SLP Therapies (In Progress)     Topic: Physical Therapy (Resolved)     Point: Mobility training (Resolved)     Learning Progress Summary           Patient Acceptance, E,TB, VU by  at 3/14/2023 1340    Acceptance, E,TB, VU by  at 3/13/2023 1249                   Point: Home exercise program (Resolved)     Learner Progress:  Not documented in this visit.                      User Key     Initials Effective Dates Name Provider Type Discipline     06/16/21 -  Scott Daniel, PT Physical Therapist PT    DELANEY 06/16/21 -  Clementina Ndiaye PT Physical Therapist PT                PT Recommendation and Plan  Anticipated Discharge Disposition (PT): home with home health  Progress Summary (PT)  Progress Toward Functional Goals (PT): progress toward functional  goals is good, prepare for discharge  Outcome Evaluation: PT: Patient performs supine to/from sit with modified independence.  Patient performs sit to stand with SBA and gait x180 feet with rolling walker, SBA.  Patient able to manage direction changes without balance loss.  Patient's HR monitored via telemetry at [facility] station.  -120 during activity.  Patient reports she has no concerns related to mobility and hopes to return home at discharge.     Outcome Measures     Row Name 03/14/23 1305             How much help from another person do you currently need...    Turning from your back to your side while in flat bed without using bedrails? 4  -JW      Moving from lying on back to sitting on the side of a flat bed without bedrails? 4  -JW      Moving to and from a bed to a chair (including a wheelchair)? 3  -JW      Standing up from a chair using your arms (e.g., wheelchair, bedside chair)? 3  -JW      Climbing 3-5 steps with a railing? 3  -JW      To walk in hospital room? 3  -JW      AM-PAC 6 Clicks Score (PT) 20  -JW         Functional Assessment    Outcome Measure Options AM-PAC 6 Clicks Basic Mobility (PT)  -JW            User Key  (r) = Recorded By, (t) = Taken By, (c) = Cosigned By    Initials Name Provider Type    Clementina Parham PT Physical Therapist                 Time Calculation:    PT Charges     Row Name 03/14/23 1343             Time Calculation    Start Time 1305  -      Stop Time 1325  -JW      Time Calculation (min) 20 min  -JW      PT Received On 03/14/23  -JW         Timed Charges    99900 - Gait Training Minutes  20  -JW         Total Minutes    Timed Charges Total Minutes 20  -JW       Total Minutes 20  -JW            User Key  (r) = Recorded By, (t) = Taken By, (c) = Cosigned By    Initials Name Provider Type    Clementina Parham PT Physical Therapist              Therapy Charges for Today     Code Description Service Date Service Provider Modifiers Qty    [id number] HC GAIT  TRAINING EA 15 MIN 3/14/2023 Clementina Ndiaye, PT GP 1          PT G-Codes  Outcome Measure Options: AM-PAC 6 Clicks Basic Mobility (PT)  AM-PAC 6 Clicks Score (PT): 20  AM-PAC 6 Clicks Score (OT): 21    PT Discharge Summary  Anticipated Discharge Disposition (PT): home with home health    Clementina Ndiaye, PT  3/14/2023

## 2023-03-14 NOTE — CONSULTS
Date of Office Visit: 2023  Encounter Provider: Mo Calero MD  Place of Service: [facility] CARDIOLOGY  Patient Name: Kaylin Ojeda  :1940    Chief Complaint   Patient presents with   • Shortness of Breath     For 2 weeks, has appt with primary car tomm, does not wear oxygen at home   :     REASON FOR CONSULT: CHF    HPI:    Kaylin Ojeda is a 83 y.o. female who is well known to me. I have reviewed prior notes and there are no changes except for any new updates described below. I have also reviewed any information entered into the medical record by the patient or by ancillary staff.     She is a chronically ill woman with previous history of refractory hypertension and severe peripheral vascular disease. In May 2015, she was found to have single-vessel coronary artery disease of the circumflex and had a stent placed. Over the next two years, I had to progressively cut back on her anti-hypertensives due to low blood pressure (presumably from weight loss).      In 2017 she developed anginal chest pressure and was found to have rapid atrial fibrillation.  She cardioverted on her own and her metoprolol dose was increased.  An echo showed normal LV systolic function, moderate TR, and mild AI/MR.  She ruled out for ACS.  She was placed on apixaban. Once she was on apixaban, she had to stop aspirin as it caused nosebleeds.    I saw her in May 2019 for palpitations and chest discomfort.  She was under a tremendous amount of stress and was in a very unfortunately living situation. I ordered an echo, which showed normal LVSF, EF 60%, mild-moderate AI, and severe MR/TR.  I did not feel she was a candidate for further evaluation due to her comorbidities.    In late , she requested to be evaluated by Dr Mai.  He ordered a cath; this showed a patent LCx stent, and moderate disease of the distal LAD.  He ordered a RICKY, which showed normal LVSF, moderate-severe AI,  moderate MR, and mild-moderate TR.  PFTs showed severely reduced DLCO; the spirometry was reported as normal, but the loops were poor quality.  A previous CT of the chest had reported severe emphysematous changes.  A CTA of the chest neck revealed left subclavian occlusion, severe disease of the ostial left vertebral artery, 53% stenosis of the right ICA, and 60% disease of the left ICA.      Ultimately, she decided against surgery, as she felt it would be too high risk.    She was last seen in October 2022. She was unsure of her medications and clearly hadn't been taking many of them.     She is on a very restricted income and has struggled with getting her medications. She has missed appointments in our office. She has had several months of worsening exertion. She presented and her O2 sat was 88% on RA. She's in rapid atrial fibrillation.     An echo shows:    •  Left ventricular systolic function is normal. Calculated left ventricular EF = 65.6% Normal left ventricular cavity size noted. Left ventricular wall thickness is consistent with mild concentric hypertrophy. All left ventricular wall segments contract normally. Left ventricular diastolic function was indeterminate.  •  The right ventricular cavity is mildly dilated. Normal right ventricular systolic function noted.  •  Left atrium not well visualized. The left atrial cavity is severely dilated.  •  Right atrium not well visualized. The right atrial cavity is dilated.  •  The aortic valve is abnormal in structure. There is moderate calcification of the aortic valve. The aortic valve was poorly visualized but appears trileaflet. Mild to moderate aortic valve regurgitation is present. No hemodynamically significant aortic valve stenosis is present.  •  Severe mitral annular calcification is present. There is severe calcification of the mitral valve. There is severe, bileaflet mitral valve thickening present. Moderate mitral valve regurgitation is present.  Mild mitral valve stenosis is present.  •  Moderate to severe tricuspid valve regurgitation is present. Estimated right ventricular systolic pressure from tricuspid regurgitation is mildly elevated (35-45 mmHg). Calculated right ventricular systolic pressure from tricuspid regurgitation is 36 mmHg. No evidence of significant tricuspid valve stenosis is present.  •  There is moderate, (grade 3) plaque in the descending aorta present.  •  The inferior vena cava is dilated. IVC inspiratory collapse is absent.     She received IV furosemide with good diuresis. This morning she's sitting in a chair and is breathing comfortably but her sat is 84% on room air; I put her back on 1.5L oxygen.  She denies chest pain.     Past Medical History:   Diagnosis Date   • Abdominal pain, epigastric     Chronic, unclear cause, improved   • Anorexia    • Anxiety    • Arthritis    • CAD (coronary artery disease)     Cath 5/2015: nonobstructive LAD/RCA disease, 90% mid LCx s/p [id number] Xience.  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[facility])    • Mononucleosis    • Occlusion and stenosis of carotid artery    • Onychomycosis    • Orbit fracture ([facility])    • PAF (paroxysmal atrial fibrillation) ([facility])    • Peripheral arterial disease ([facility])     Significant (carotid, subclavian, renal arteries, lower extremities), with claudication, medical therapy only   • Pernicious anemia    • Prediabetes    • Renal artery stenosis ([facility])     unilateral, with  renal atrophy (no intervention required)   • Renal calculi    • Sinus bradycardia     mild, asymptomatic   • TIA (transient ischemic attack)    • UTI (urinary tract infection)    • Valvular heart disease     2021: mod-severe AI, mod MR, mild-mod TR   • Vision problem    • Vitamin B 12 deficiency        Past Surgical History:   Procedure Laterality Date   • APPENDECTOMY     • BREAST BIOPSY Left     20+ yrs ago   • BREAST SURGERY     • CARDIAC CATHETERIZATION  2015    nonobs LAD/RCA disease, 90% mid LCx s/p [id number] Xience   • CARDIAC CATHETERIZATION N/A 10/28/2020    Procedure: Right and Left Heart Cath;  Surgeon: Opal Contreras MD;  Location:  [facility];  Service: Cardiovascular;  Laterality: N/A;  for cardiac testing prior to possible valve surgery per Dr. Mai   • CARDIAC CATHETERIZATION N/A 10/28/2020    Procedure: Coronary angiography;  Surgeon: Opal Contreras MD;  Location:  [facility];  Service: Cardiovascular;  Laterality: N/A;   • CERVICAL FUSION     • CHOLECYSTECTOMY     • CORONARY STENT PLACEMENT     • HYSTERECTOMY     • KNEE SURGERY Right    • KNEE SURGERY Left    • ORIF HUMERUS FRACTURE Right 12/10/2022    Procedure: ELBOW OPEN REDUCTION INTERNAL FIXATION;  Surgeon: Lito Reeder MD;  Location: [facility] OR;  Service: Orthopedics;  Laterality: Right;       Social History     Socioeconomic History   • Marital status:      Spouse name: Willie   • Number of children: 3   • Years of education: Some College   Tobacco Use   • Smoking status: Former     Packs/day: 0.20     Years: 50.00     Pack years: 10.00     Types: Cigarettes     Quit date: 2023     Years since quittin.0   • Smokeless tobacco: Never   • Tobacco comments:     trying to quit, pt states she has basically quit but will still have one when she stressed   Vaping Use   • Vaping Use: Never used   Substance and Sexual Activity   • Alcohol use: Not Currently     Comment:  "OCCASIONAL/ TWICE A YEAR. // daily caffiene   • Drug use: No   • Sexual activity: Defer       Family History   Problem Relation Age of Onset   • Asthma Mother    • Emphysema Mother    • Graves' disease Mother    • Heart disease Mother    • Hypertension Mother    • Emphysema Father    • Hypertension Father    • Heart disease Father    • Kidney disease Sister    • Lupus Daughter         Systemic erythematosus   • Arthritis Other    • Crohn's disease Other    • Breast cancer Niece    • Breast cancer Maternal Cousin    • Breast cancer Maternal Cousin        Review of Systems   Constitutional: Positive for malaise/fatigue.   Cardiovascular: Positive for claudication and dyspnea on exertion. Negative for leg swelling.   Respiratory: Negative for shortness of breath.    Musculoskeletal: Positive for arthritis and back pain.   Neurological: Positive for focal weakness. Negative for dizziness and light-headedness.   Psychiatric/Behavioral: Positive for depression. The patient is nervous/anxious.    All other systems reviewed and are negative.      Allergies   Allergen Reactions   • Aleve [Naproxen Sodium] Shortness Of Breath   • Codeine Unknown - Low Severity     hyperactivity   • Ibuprofen Unknown - Low Severity     unk   • Sulfa Antibiotics Unknown - Low Severity     \"I can't remember\"         Current Facility-Administered Medications:   •  acetaminophen (TYLENOL) tablet 650 mg, 650 mg, Oral, Q4H PRN, 650 mg at 03/13/23 1555 **OR** acetaminophen (TYLENOL) suppository 650 mg, 650 mg, Rectal, Q4H PRN, Meño Aguayo,   •  apixaban (ELIQUIS) tablet 2.5 mg, 2.5 mg, Oral, BID, Meño Aguayo, , 2.5 mg at 03/14/23 0954  •  Aquaphor Advanced Therapy ointment, , Topical, Q12H, Meño Aguayo DO, Given at 03/14/23 0956  •  atorvastatin (LIPITOR) tablet 10 mg, 10 mg, Oral, Nightly, Meño Aguayo, , 10 mg at 03/13/23 2021  •  sennosides-docusate (PERICOLACE) 8.6-50 MG per tablet 2 tablet, 2 tablet, Oral, BID, 2 tablet at 03/14/23 " 0954 **AND** polyethylene glycol (MIRALAX) packet 17 g, 17 g, Oral, Daily PRN **AND** bisacodyl (DULCOLAX) EC tablet 5 mg, 5 mg, Oral, Daily PRN **AND** bisacodyl (DULCOLAX) suppository 10 mg, 10 mg, Rectal, Daily PRN, Meño Aguayo, DO  •  budesonide (PULMICORT) nebulizer solution 0.5 mg, 0.5 mg, Nebulization, BID - RT, Meño Aguayo, DO, 0.5 mg at 03/14/23 0714  •  clonazePAM (KlonoPIN) tablet 0.5 mg, 0.5 mg, Oral, Nightly PRN, Meño Aguayo, DO, 0.5 mg at 03/14/23 0029  •  dilTIAZem CD (CARDIZEM CD) 24 hr capsule 360 mg, 360 mg, Oral, Q24H, Phillip Joaquin, DO, 360 mg at 03/14/23 0954  •  empagliflozin (JARDIANCE) tablet 10 mg, 10 mg, Oral, Daily, Phillip Joaquin, DO, 10 mg at 03/14/23 0955  •  famotidine (PEPCID) tablet 20 mg, 20 mg, Oral, BID AC, Meño Aguayo, DO, 20 mg at 03/14/23 0700  •  FLUoxetine (PROzac) capsule 20 mg, 20 mg, Oral, Nightly, Meño Aguayo, DO, 20 mg at 03/13/23 2021  •  furosemide (LASIX) tablet 20 mg, 20 mg, Oral, BID, Phillip Joaquin, DO, 20 mg at 03/14/23 0955  •  ipratropium-albuterol (DUO-NEB) nebulizer solution 3 mL, 3 mL, Nebulization, Q6H PRN, Meño Aguayo, DO  •  Magnesium Sulfate - Total Dose 4 grams - Magnesium 1 or Less, 4 g, Intravenous, PRN **OR** Magnesium Sulfate - Total Dose 3 grams - Magnesium 1.1 - 1.5, 1 g, Intravenous, PRN **OR** Magnesium Sulfate - Total Dose 2 grams - Magnesium 1.6 - 1.9, 2 g, Intravenous, PRN, Phillip Joaquin, DO  •  melatonin tablet 5 mg, 5 mg, Oral, Nightly PRN, Meño Aguayo, , 5 mg at 03/13/23 2024  •  metoprolol tartrate (LOPRESSOR) tablet 12.5 mg, 12.5 mg, Oral, Q12H, Phillip Joaquin, , 12.5 mg at 03/14/23 0954  •  ondansetron (ZOFRAN) tablet 4 mg, 4 mg, Oral, Q6H PRN **OR** ondansetron (ZOFRAN) injection 4 mg, 4 mg, Intravenous, Q6H PRN, Meño Aguayo, DO  •  oxyCODONE-acetaminophen (PERCOCET) 7.5-325 MG per tablet 1 tablet, 1 tablet, Oral, Q8H PRN, Meño Aguayo, , 1 tablet at 03/14/23 0955  •  potassium chloride  (K-DUR,KLOR-CON) CR tablet 40 mEq, 40 mEq, Oral, PRN **OR** potassium chloride (KLOR-CON) packet 40 mEq, 40 mEq, Oral, PRN, 40 mEq at 03/14/23 1020 **OR** potassium chloride 10 mEq in 100 mL IVPB, 10 mEq, Intravenous, Q1H PRN, Phillip Joaquin,   •  predniSONE (DELTASONE) tablet 5 mg, 5 mg, Oral, Daily With Breakfast, Phillip Joaquin, , 5 mg at 03/14/23 0800  •  [COMPLETED] Insert Peripheral IV, , , Once **AND** sodium chloride 0.9 % flush 10 mL, 10 mL, Intravenous, PRN, Meño Aguayo, DO  •  sodium chloride 0.9 % flush 10 mL, 10 mL, Intravenous, PRN, Meño Aguayo, DO  •  sodium chloride 0.9 % flush 10 mL, 10 mL, Intravenous, Q12H, Meño Aguayo, , 10 mL at 03/14/23 0956  •  sodium chloride 0.9 % infusion 40 mL, 40 mL, Intravenous, PRN, Meño Aguayo, DO     Objective:     Vitals:    03/14/23 0700 03/14/23 0718 03/14/23 0748 03/14/23 1116   BP:    166/90   BP Location:    Right arm   Patient Position:    Lying   Pulse: (!) 129 114  116   Resp: 20 20  20   Temp:    97.5 °F (36.4 °C)   TempSrc:    Oral   SpO2: 94% 91%  95%   Weight:   40.9 kg (90 lb 3.2 oz)    Height:         Body mass index is 17.62 kg/m².    Physical Exam  Vitals reviewed.   Constitutional:       Comments: Frail, chronically ill appearing   HENT:      Head: Normocephalic.      Nose: Nose normal.   Eyes:      Conjunctiva/sclera: Conjunctivae normal.   Neck:      Vascular: No JVD.   Cardiovascular:      Rate and Rhythm: Tachycardia present. Rhythm irregularly irregular.  No extrasystoles are present.     Pulses: Decreased pulses.           Carotid pulses are on the right side with bruit and on the left side with bruit.       Dorsalis pedis pulses are 1+ on the right side and 1+ on the left side.        Posterior tibial pulses are 1+ on the right side and 1+ on the left side.      Heart sounds: Murmur heard.    Systolic murmur is present with a grade of 2/6 radiating to the apex.   Diastolic murmur is present with a grade of 2/4 at the  upper left sternal border.  Pulmonary:      Effort: Pulmonary effort is normal.      Breath sounds: Decreased breath sounds present.   Abdominal:      Palpations: Abdomen is soft.      Tenderness: There is no abdominal tenderness.   Musculoskeletal:         General: No swelling. Normal range of motion.      Cervical back: Normal range of motion.   Skin:     General: Skin is warm and dry.      Findings: No rash.   Neurological:      Mental Status: She is alert. Mental status is at baseline.   Psychiatric:         Mood and Affect: Mood normal.               Assessment:      1. Chronic diastolic CHF  2. Valvular heart disaese (mild-moderate AI, moderate MR, mild MS, mod-severe TR)  3. Atrial fibrillation with RVR  4. CAD without angina  5. Severe diffuse PAD  6. COPD  7. Frailty  8. Inability to afford medications    Plan:       She appears euvolemic to me. Her K is very low. I would hold off on diuretics today, resume oxygen therapy, and replace potassium.     I do not think she will be able to take empagliflozin as an outpatient as she already has trouble affording her previously prescribed medications (which were diltiazem and apixaban). Is she a candidate for Medicaid?  CCP to see today.    Continue low dose apix, diltiazem, metoprolol. Assess response to IV digoxin (she received 0.25mg last night and again this AM).     I do not feel that she should be on aspirin on top of apixaban given her frailty.     We will follow.    Sincerely,       Mo Calero MD

## 2023-03-14 NOTE — PROGRESS NOTES
"SERVICE: [facility] HOSPITALIST    CONSULTANTS: Cardiology    CHIEF COMPLAINT: Shortness of breath    SUBJECTIVE: Patient seen and examined at bedside. Patient reports no shortness of breath currently.  She reports feeling overall improved.  She denies palpitations or chest pain.  We discussed need for cardiology evaluation to further improve heart rate to which she is agreeable.  She continues to report she will need additional help at home.  She expresses frustration about not getting the care she needs at home.  No other acute issues reported.     OBJECTIVE:    Physical exam is largely unchanged from previous exam, except where documented below, examination is accurate as of 3/14/2023    Physical Exam:  General: Patient is awake and alert.  Frail-appearing elderly female. No acute distress noted.   HENT: Head is atraumatic, normocephalic. Hearing is grossly intact. Nose is without obvious congestion and appears patent. Neck is supple and trachea is midline.   Eyes: Vision is grossly intact. Pupils appear equal and round.   Cardiovascular: Irregularly irregular rhythm with no murmurs, rubs or gallops noted.   Respiratory: Lungs are clear to ausculation without wheezes, rhonchi or rales.   Abdominal/GI: Soft, nontender, bowel sounds present. No rebound or guarding present.   Extremities: No peripheral edema noted.   Musculoskeletal: Spontaneous movement of bilateral upper and lower extremities against gravity noted. No signs of injury or deformity noted.   Skin: Warm and dry.   Psych: Mood and affect are appropriate. Cooperative with exam.   Neuro: No facial asymmetry noted. No focal deficits noted, hearing and vision are grossly intact.     /90 Comment: fidgeting and will not remain still long  Pulse 114   Temp 98 °F (36.7 °C) (Oral)   Resp 20   Ht 152.4 cm (60\")   Wt 40.8 kg (90 lb)   LMP  (LMP Unknown)   SpO2 91%   BMI 17.58 kg/m²     MEDS/LABS REVIEWED AND ORDERED    apixaban, " 2.5 mg, Oral, BID  Aquaphor Advanced Therapy, , Topical, Q12H  atorvastatin, 10 mg, Oral, Nightly  budesonide, 0.5 mg, Nebulization, BID - RT  digoxin, 250 mcg, Intravenous, Once  dilTIAZem CD, 360 mg, Oral, Q24H  empagliflozin, 10 mg, Oral, Daily  famotidine, 20 mg, Oral, BID AC  FLUoxetine, 20 mg, Oral, Nightly  furosemide, 40 mg, Oral, BID  methylPREDNISolone sodium succinate, 40 mg, Intravenous, Q12H  senna-docusate sodium, 2 tablet, Oral, BID  sodium chloride, 10 mL, Intravenous, Q12H          LAB/DIAGNOSTICS:    Lab Results (last 24 hours)     Procedure Component Value Units Date/Time    Basic Metabolic Panel [[id number]]  (Abnormal) Collected: 03/14/23 0407    Specimen: Blood Updated: 03/14/23 0542     Glucose 140 mg/dL      BUN 25 mg/dL      Creatinine 0.73 mg/dL      Sodium 133 mmol/L      Potassium 2.4 mmol/L      Chloride 91 mmol/L      CO2 28.8 mmol/L      Calcium 8.9 mg/dL      BUN/Creatinine Ratio 34.2     Anion Gap 13.2 mmol/L      eGFR 82.7 mL/min/1.73     Narrative:      GFR Normal >60  Chronic Kidney Disease <60  Kidney Failure <15    The GFR formula is only valid for adults with stable renal function between ages 18 and 70.    CBC & Differential [[id number]]  (Abnormal) Collected: 03/14/23 0407    Specimen: Blood Updated: 03/14/23 0510    Narrative:      The following orders were created for panel order CBC & Differential.  Procedure                               Abnormality         Status                     ---------                               -----------         ------                     CBC Auto Differential[[id number]]        Abnormal            Final result                 Please view results for these tests on the individual orders.    CBC Auto Differential [[id number]]  (Abnormal) Collected: 03/14/23 0407    Specimen: Blood Updated: 03/14/23 0510     WBC 12.44 10*3/mm3      RBC 3.25 10*6/mm3      Hemoglobin 9.0 g/dL      Hematocrit 27.5 %      MCV 84.6 fL      MCH 27.7 pg      MCHC 32.7  g/dL      RDW 16.0 %      RDW-SD 48.9 fl      MPV 10.5 fL      Platelets 311 10*3/mm3      Neutrophil % 89.7 %      Lymphocyte % 5.5 %      Monocyte % 3.8 %      Eosinophil % 0.0 %      Basophil % 0.1 %      Immature Grans % 0.9 %      Neutrophils, Absolute 11.17 10*3/mm3      Lymphocytes, Absolute 0.68 10*3/mm3      Monocytes, Absolute 0.47 10*3/mm3      Eosinophils, Absolute 0.00 10*3/mm3      Basophils, Absolute 0.01 10*3/mm3      Immature Grans, Absolute 0.11 10*3/mm3      nRBC 0.0 /100 WBC     Respiratory Panel PCR w/COVID-19(SARS-CoV-2) EDMOND/NISHANT/FLORECITA/PAD/COR/MAD/KENROY In-House, NP Swab in UTM/VTM, 3-4 HR TAT - Swab, Nasopharynx [[id number]]  (Normal) Collected: 03/12/23 1650    Specimen: Swab from Nasopharynx Updated: 03/13/23 1110     ADENOVIRUS, PCR Not Detected     Coronavirus 229E Not Detected     Coronavirus HKU1 Not Detected     Coronavirus NL63 Not Detected     Coronavirus OC43 Not Detected     COVID19 Not Detected     Human Metapneumovirus Not Detected     Human Rhinovirus/Enterovirus Not Detected     Influenza A PCR Not Detected     Influenza B PCR Not Detected     Parainfluenza Virus 1 Not Detected     Parainfluenza Virus 2 Not Detected     Parainfluenza Virus 3 Not Detected     Parainfluenza Virus 4 Not Detected     RSV, PCR Not Detected     Bordetella pertussis pcr Not Detected     Bordetella parapertussis PCR Not Detected     Chlamydophila pneumoniae PCR Not Detected     Mycoplasma pneumo by PCR Not Detected    Narrative:      In the setting of a positive respiratory panel with a viral infection PLUS a negative procalcitonin without other underlying concern for bacterial infection, consider observing off antibiotics or discontinuation of antibiotics and continue supportive care. If the respiratory panel is positive for atypical bacterial infection (Bordetella pertussis, Chlamydophila pneumoniae, or Mycoplasma pneumoniae), consider antibiotic de-escalation to target atypical bacterial infection.         ECG 12 Lead Dyspnea   Final Result   HEART RATE= 64  bpm   RR Interval= 936  ms   MO Interval=   ms   P Horizontal Axis=   deg   P Front Axis=   deg   QRSD Interval= 93  ms   QT Interval= 453  ms   QRS Axis= 80  deg   T Wave Axis= 71  deg   - ABNORMAL ECG -   Likely sinus rhythm but atrifact limits rhythm assessment   When compared with ECG of 11-Dec-2022 13:54:07,   Likely sinus rhythm has replaced atrial fibrillation significant artifact    limit rhyth intrepretation would repeat.   Electronically Signed By: Baljeet Iverson ([facility]) 12-Mar-2023 21:29:47   Date and Time of Study: 2023-03-12 11:47:08        Results for orders placed during the hospital encounter of 03/12/23    Adult Transthoracic Echo Complete W/ Cont if Necessary Per Protocol    Interpretation Summary  •  Left ventricular systolic function is normal. Calculated left ventricular EF = 65.6% Normal left ventricular cavity size noted. Left ventricular wall thickness is consistent with mild concentric hypertrophy. All left ventricular wall segments contract normally. Left ventricular diastolic function was indeterminate.  •  The right ventricular cavity is mildly dilated. Normal right ventricular systolic function noted.  •  Left atrium not well visualized. The left atrial cavity is severely dilated.  •  Right atrium not well visualized. The right atrial cavity is dilated.  •  The aortic valve is abnormal in structure. There is moderate calcification of the aortic valve. The aortic valve was poorly visualized but appears trileaflet. Mild to moderate aortic valve regurgitation is present. No hemodynamically significant aortic valve stenosis is present.  •  Severe mitral annular calcification is present. There is severe calcification of the mitral valve. There is severe, bileaflet mitral valve thickening present. Moderate mitral valve regurgitation is present. Mild mitral valve stenosis is present.  •  Moderate to severe tricuspid valve regurgitation is  present. Estimated right ventricular systolic pressure from tricuspid regurgitation is mildly elevated (35-45 mmHg). Calculated right ventricular systolic pressure from tricuspid regurgitation is 36 mmHg. No evidence of significant tricuspid valve stenosis is present.  •  There is moderate, (grade 3) plaque in the descending aorta present.  •  The inferior vena cava is dilated. IVC inspiratory collapse is absent.    CT Angiogram Chest    Result Date: 3/12/2023  1. No evidence of pulmonary embolism 2. Small bilateral pleural effusions, new since the previous exam in September. Bibasilar atelectasis is also new since the previous exam. 3. 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 Aneurysmal dilatation of the descending thoracic aorta unchanged. Emphysema unchanged. Signer Name: Benoit Mondragon MD  Signed: 3/12/2023 4:10 PM  Workstation Name: [id number]  [facility]    XR Chest 1 View    Result Date: 3/12/2023  1. Cardiomegaly with interstitial pulmonary edema. 2. Obstructive airways disease. 3. Small ill-defined basilar lung opacities. Possible subsegmental atelectasis or scarring. 4. Suspect small pleural effusions Signer Name: Phillip Hi MD  Signed: 3/12/2023 1:27 PM  Workstation Name: [id number]  [facility] Venous Doppler Lower Extremity Bilateral (duplex)    Result Date: 3/13/2023  Negative examination.  No evidence of lower extremity DVT on the right or left.  This report was finalized on 3/13/2023 10:32 AM by Dr. Tyler Shannon MD.          ASSESSMENT/PLAN:  Please note portions of this assessment/plan may have been copied and pasted, but I have personally seen this patient and reviewed each line of this assessment and plan for accuracy and made updates to reflect my necessary changes on 3/14/2023     acute exacerbation of congestive heart failure with preserved ejection fraction  -Previous echo 9/29/2022 showed normal EF,  severe aortic valve regurg, moderate tricuspid valve regurg, moderate mitral valve regurg, right atrial cavity dilated.   -Repeat echo similar to previous.   -BNP elevated, CT chest showed no evidence of PE with small bilateral pleural effusion  -Patient received IV Lasix in the ED, will place on heart failure pathway, add Jardiance and twice daily oral Lasix therapy. Diuresing well.   -Mild hyponatremia improved with diuretic therapy, continue to monitor.   -Monitor I's and O's Daily weights.   -liberated from oxygen     PAF with current RVR  -Increased Cardizem to 360 mg daily  -Patient responded well to IV digoxin yesterday, will repeat dosing today  -Consult cardiology for assistance.     Acute exacerbation of COPD  -continue duonebs, change to oral prednisone.   -suspect this is contributing to hypoxia less than heart failure, but still a factor.     Weakness and deconditioning secondary to above  -Consult PT and OT for assistance    Elevated D-dimer  -CTA chest shows no evidence of PE  -DVT ultrasound of bilateral lower extremities is negative.   -Continue home Eliquis, resumed after patient had run out of this medication    CAD/hyperlipidemia status post stent placement with hypertension PAD/vasculopathy   -Continue home statin therapy  -Continue Eliquis as per above    Chronic anemia  -Slight decrease in hemoglobin from previous, monitor carefully as patient has been resumed on Eliquis.   -No signs of bleeding currently.     GERD-continue home Pepcid    Tobacco abuse  -Counseled on need for cessation    Anxiety/depression-continue Prozac      PLAN FOR DISPOSITION: GARCÍA Joaquin DO  Hospitalist, [facility]  03/14/23  07:52 EDT    At [facility], we believe that sharing information builds trust and better relationships. You are receiving this note because you recently visited [facility]. It is possible you will see health information before a provider has talked with you about  "it. This kind of information can be easy to misunderstand. To help you fully understand what it means for your health, we urge you to discuss this note with your provider.    \"Dictated utilizing Dragon dictation\"    "

## 2023-03-14 NOTE — PLAN OF CARE
Goal Outcome Evaluation:               Daily Care Plan Summary: Heart Failure    Diuretic in use (IV or PO):   PO        Daily weight (up or down):    gain: 3.2 oz      Output > Intake (yes/no):Yes      O2 Requirements (current, any change?): room air      Symptoms noted with Activity (Respiratory Tolerance, functional state):     Tolerated well      Anticipated Discharge Plans:     TBD        Pt given 2 doses of Lopressor to help with rate control.

## 2023-03-15 NOTE — THERAPY TREATMENT NOTE
Acute Care - Occupational Therapy Treatment Note  CHERRIE Bennett     Patient Name: Kaylin Ojeda  : 1940  MRN: [id number]  Today's Date: 3/15/2023             Admit Date: 3/12/2023       ICD-10-CM ICD-9-CM   1. Pulmonary emphysema, unspecified emphysema type ([facility])  J43.9 492.8   2. Other congestive heart failure ([facility])  I50.9 428.0   3. Hypoxia  R09.02 799.02   4. 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)   • Obstruction of carotid artery   • Peripheral arterial occlusive disease ([facility])   • Impaired glucose tolerance   • Difficulty sleeping   • Tobacco dependence syndrome   • Iron deficiency anemia   • Vitamin B12 deficiency anemia due to intrinsic factor deficiency   • Acquired hypothyroidism   • Thoracoabdominal aortic aneurysm   • Prediabetes   • Sleep difficulties   • Chronic constipation   • PAF (paroxysmal atrial fibrillation) ([facility])   • Moderate single current episode of major depressive disorder ([facility])   • Chronic low back pain without sciatica   • Urge incontinence   • Underweight   • DDD (degenerative disc disease), lumbar   • Urinary tract infection without hematuria   • Malaise and fatigue   • Type I or II open fracture of right olecranon process   • Type I or II open fracture of olecranon process of right ulna, initial encounter   • Severe malnutrition ([facility])   • Pulmonary emphysema, unspecified emphysema type ([facility])   • Chronic diastolic (congestive) heart failure ([facility])     Past Medical History:   Diagnosis Date   • Abdominal pain, epigastric     Chronic, unclear cause, improved   • Anorexia    • Anxiety     • Arthritis    • CAD (coronary artery disease)     Cath 5/2015: nonobstructive LAD/RCA disease, 90% mid LCx s/p [id number] Xience.  Cath 1/2021: 50-60% prox LAD/70-80% distal LAD, patent Cx stent, 20% RCA   • Cellulitis of leg    • Cervical disc disease    • Chronic fatigue    • Colitis    • COPD (chronic obstructive pulmonary disease) (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)    • Mononucleosis    • Occlusion and stenosis of carotid artery    • Onychomycosis    • Orbit fracture ([facility])    • PAF (paroxysmal atrial fibrillation) ([facility])    • Peripheral arterial disease ([facility])     Significant (carotid, subclavian, renal arteries, lower extremities), with claudication, medical therapy only   • Pernicious anemia    • Prediabetes    • Renal artery stenosis ([facility])     unilateral, with renal atrophy (no intervention required)   • Renal calculi    • Sinus bradycardia     mild, asymptomatic   • TIA (transient ischemic attack)    • UTI (urinary tract infection)    • Valvular heart disease     1/2021: mod-severe AI, mod MR, mild-mod TR   • Vision problem    • Vitamin B 12 deficiency      Past Surgical History:   Procedure Laterality Date   • APPENDECTOMY     • BREAST BIOPSY Left     20+ yrs ago   • BREAST SURGERY     • CARDIAC CATHETERIZATION  05/2015    nonobs LAD/RCA disease, 90% mid LCx s/p [id number] Xience   • CARDIAC CATHETERIZATION N/A 10/28/2020    Procedure: Right and Left Heart Cath;  Surgeon: Opal Contreras MD;  Location: [facility] CATH INVASIVE LOCATION;  Service: Cardiovascular;  Laterality: N/A;  for cardiac testing prior to possible valve surgery per Dr. Mai   • CARDIAC CATHETERIZATION N/A 10/28/2020     Procedure: Coronary angiography;  Surgeon: Opal Contreras MD;  Location:  [facility] CATH INVASIVE LOCATION;  Service: Cardiovascular;  Laterality: N/A;   • CERVICAL FUSION  1997   • CHOLECYSTECTOMY     • CORONARY STENT PLACEMENT     • HYSTERECTOMY  1995   • KNEE SURGERY Right 2005   • KNEE SURGERY Left 1998   • ORIF HUMERUS FRACTURE Right 12/10/2022    Procedure: ELBOW OPEN REDUCTION INTERNAL FIXATION;  Surgeon: Lito Reeder MD;  Location:  [facility] OR;  Service: Orthopedics;  Laterality: Right;         OT ASSESSMENT FLOWSHEET (last 12 hours)     OT Evaluation and Treatment     Row Name 03/15/23 0824                   OT Time and Intention    Document Type therapy note (daily note)  -EN        Mode of Treatment occupational therapy  -EN        Patient Effort good  -EN        Comment Patient reports she had a rough night but feels better this morning.  -EN           General Information    Patient/Family/Caregiver Comments/Observations Patient reclined in bed, agreed to OT.  -EN        Risks Reviewed patient:;LOB  -EN        Benefits Reviewed patient:;improve function;increase independence;increase strength;increase balance  -EN        Barriers to Rehab previous functional deficit  -EN        Comment, General Information less impulsive today  -EN           Pain Assessment    Pre/Posttreatment Pain Comment no report of pain  -EN           Cognition    Personal Safety Interventions gait belt;nonskid shoes/slippers when out of bed  -EN           Activities of Daily Living    BADL Assessment/Intervention --  education on home safety  -EN           Bed Mobility    Supine-Sit [place] (Bed Mobility) modified independence  -EN        Assistive Device (Bed Mobility) head of bed elevated  -EN           Functional Mobility    Functional Mobility- Ind. Level standby assist  -EN        Functional Mobility- Device walker, front-wheeled  -EN        Functional Mobility-Distance (Feet) 5  -EN           Sit-Stand Transfer     Sit-Stand [place] (Transfers) standby assist  -EN        Assistive Device (Sit-Stand Transfers) walker, front-wheeled  -EN           Stand-Sit Transfer    Stand-Sit [place] (Transfers) standby assist  -EN        Assistive Device (Stand-Sit Transfers) walker, front-wheeled  -EN           Plan of Care Review    Plan of Care Reviewed With patient  -EN        Outcome Evaluation OT- Patient reports she had a rough night but feeling better this morning. Patient performed bed mobility with modifed independence. Patient stood from EOB with SBA and performed functional mobility with FWW and SBA. Educated on home safety. Patient plans to discharge home today with assist from family. WIll benefit from home health OT.  -EN           Positioning and Restraints    Pre-Treatment Position in bed  -EN        Post Treatment Position chair  -EN        In Chair reclined;call light within reach;encouraged to call for assist;exit alarm on;with other staff  respiratory therapist in room  -EN           Progress Summary (OT)    Progress Toward Functional Goals (OT) progress toward functional goals as expected  -EN              User Key  (r) = Recorded By, (t) = Taken By, (c) = Cosigned By    Initials Name Effective Dates    Harriet Olsen OTR 06/16/21 -                  Occupational Therapy Education     Title: PT OT SLP Therapies (In Progress)     Topic: Occupational Therapy (In Progress)     Point: ADL training (Done)     Description:   Instruct learner(s) on proper safety adaptation and remediation techniques during self care or transfers.   Instruct in proper use of assistive devices.              Learning Progress Summary           Patient Acceptance, E, VU by EN at 3/15/2023 0920    Comment: home safety    Acceptance, E, VU by EN at 3/14/2023 1350    Comment: tub transfer bench, ADL retraining, safety during functional transfers    Acceptance, E, VU by EN at 3/13/2023 1107    Comment: safety during functional  transfers/mobility                   Point: Home exercise program (Not Started)     Description:   Instruct learner(s) on appropriate technique for monitoring, assisting and/or progressing therapeutic exercises/activities.              Learner Progress:  Not documented in this visit.                      User Key     Initials Effective Dates Name Provider Type Discipline    EN 06/16/21 -  Harriet Triana OTR Occupational Therapist OT                  OT Recommendation and Plan  Planned Therapy Interventions (OT): activity tolerance training, adaptive equipment training, BADL retraining, functional balance retraining, transfer/mobility retraining, ROM/therapeutic exercise  Therapy Frequency (OT): 5 times/wk  Progress Toward Functional Goals (OT): progress toward functional goals as expected  Plan of Care Review  Plan of Care Reviewed With: patient  Progress: improving  Outcome Evaluation: OT- Patient reports she had a rough night but feeling better this morning. Patient performed bed mobility with modifed independence. Patient stood from EOB with SBA and performed functional mobility with FWW and SBA. Educated on home safety. Patient plans to discharge home today with assist from family. WIll benefit from home health OT.       Outcome Measures     Row Name 03/15/23 0900 03/14/23 1305 03/14/23 1300       How much help from another person do you currently need...    Turning from your back to your side while in flat bed without using bedrails? -- 4  -JW --    Moving from lying on back to sitting on the side of a flat bed without bedrails? -- 4  -JW --    Moving to and from a bed to a chair (including a wheelchair)? -- 3  -JW --    Standing up from a chair using your arms (e.g., wheelchair, bedside chair)? -- 3  -JW --    Climbing 3-5 steps with a railing? -- 3  -JW --    To walk in hospital room? -- 3  -JW --    AM-PAC 6 Clicks Score (PT) -- 20  -JW --       How much help from another is currently needed...     Putting on and taking off regular lower body clothing? 3  -EN -- 3  -EN    Bathing (including washing, rinsing, and drying) 3  -EN -- 3  -EN    Toileting (which includes using toilet bed pan or urinal) 3  -EN -- 3  -EN    Putting on and taking off regular upper body clothing 4  -EN -- 4  -EN    Taking care of personal grooming (such as brushing teeth) 4  -EN -- 4  -EN    Eating meals 4  -EN -- 4  -EN    AM-[facility] 6 Clicks Score (OT) 21  -EN -- 21  -EN       Functional Assessment    Outcome Measure Options -- AM-[facility] 6 Clicks Basic Mobility (PT)  - --          User Key  (r) = Recorded By, (t) = Taken By, (c) = Cosigned By    Initials Name Provider Type    EN Harriet Triana OTR Occupational Therapist    Clementina Parham, PT Physical Therapist                Time Calculation:    Time Calculation- OT     Row Name 03/15/23 0922             Time Calculation- OT    OT Start Time 0824  -EN      OT Stop Time 0833  -EN      OT Time Calculation (min) 9 min  -EN            User Key  (r) = Recorded By, (t) = Taken By, (c) = Cosigned By    Initials Name Provider Type    Harriet Olsen OTR Occupational Therapist              Therapy Charges for Today     Code Description Service Date Service Provider Modifiers Qty    [id number]  OT SELF CARE/MGMT/TRAIN EA 15 MIN 3/14/2023 Harriet Triana OTR GO 2    [id number]  OT THERAPEUTIC ACT EA 15 MIN 3/15/2023 Harriet Triana OTR GO 1               KIM García  3/15/2023

## 2023-03-15 NOTE — DISCHARGE INSTR - APPOINTMENTS
Patient has follow up appointment with Dr. Forte on 3/24/2023 at 10:00                                              [phone]    Patient has Cardiology 2 week follow up appointment with Dr. Calero on 4/4/2023 at 10:30              [phone]

## 2023-03-15 NOTE — CASE MANAGEMENT/SOCIAL WORK
Continued Stay Note  CHERRIE Bennett     Patient Name: Kaylin Ojeda  MRN: [id number]  Today's Date: 3/15/2023    Admit Date: 3/12/2023    Plan: Home with accepting home health   Discharge Plan     Row Name 03/15/23 0843       Plan    Plan Home with accepting home health    Patient/Family in Agreement with Plan yes    Plan Comments CM consult noted for home health services at discharge. CM called and spoke with Betty at [facility] and she states they do take patient's insurance and will check with the office to see if they can accept. CM will follow.               Discharge Codes    No documentation.               Expected Discharge Date and Time     Expected Discharge Date Expected Discharge Time    Mar 15, 2023             Jeanette Kelly RN

## 2023-03-15 NOTE — PLAN OF CARE
Goal Outcome Evaluation:  Plan of Care Reviewed With: patient        Progress: no change  Outcome Evaluation: Patient complaints of leg pain and shoulder pain. Shoulder pain relieved with bowel movement and passing flatus. Heart rate dropped to 40's, MD made aware. Rested on and off during the night. Patient noted to be anxious, PRN clonazepam given.     Daily Care Plan Summary: Heart Failure    Diuretic in use (IV or PO):   On hold at this time.        Daily weight (up or down):    90lbs last weight      Output > Intake (yes/no):Yes      O2 Requirements (current, any change?): room air      Symptoms noted with Activity (Respiratory Tolerance, functional state):    Tolerates ambulating short distances.       Anticipated Discharge Plans:    Patient hopes to go home soon

## 2023-03-15 NOTE — NURSING NOTE
Return call from Dr. Bisi MD stated patient does not appear to be having an infarct. MD stated to monitor her electrolytes and may need to hold patients beta blockers. Dr. Caldwell made aware of phone call results.

## 2023-03-15 NOTE — DISCHARGE PLACEMENT REQUEST
"Lillian Pizarro (83 y.o. Female)     Date of Birth   1940    Social Security Number       Address   [address] [place] [address]    Home Phone   [phone]    MRN   [id number]       [facility]    Marital Status                               Admission Date   3/12/23    Admission Type   Emergency    Admitting Provider   Meño Aguayo,     Attending Provider   Meño Aguayo DO    Department, Room/Bed   [facility] MED SURG, 1409/1       Discharge Date       Discharge Disposition   Home or Self Care    Discharge Destination                               Attending Provider: Meño Aguayo DO    Allergies: Aleve [Naproxen Sodium], Codeine, Ibuprofen, Sulfa Antibiotics    Isolation: None   Infection: None   Code Status: No CPR    Ht: 152.4 cm (60\")   Wt: 40.9 kg (90 lb 3.2 oz)    Admission Cmt: None   Principal Problem: Pulmonary emphysema, unspecified emphysema type (HCC) [J43.9]                 Active Insurance as of 3/12/2023     Primary Coverage     Payor Plan Insurance Group Employer/Plan Group    ANTHEM MEDICARE REPLACEMENT ANTHEM MEDICARE ADVANTAGE [id number]     Payor Plan Address Payor Plan Phone Number Payor Plan Fax Number Effective Dates    [address] [phone]  1/1/2022 - None Entered    [facility] [id number]       Subscriber Name Subscriber Birth Date Member ID       LILLIAN PIZARRO 1940 [id number]                 Emergency Contacts      (Rel.) Home Phone Work Phone Mobile Phone    Francine Rosenthal (Relative) [phone] -- --    Jona Rosenthal (Son) -- -- [phone]    Darlene Enriquez (Sister) -- -- [phone]    Augusto Munguia (Grandchild) -- -- [phone]              "

## 2023-03-15 NOTE — NURSING NOTE
Notified Dr. Caldwell to inform him that patient was complaining of right shoulder pain and nausea. Updated MD on patient vital signs and informed him EKG was ordered. New parameters added to Lopressor, see chart for details.

## 2023-03-15 NOTE — PROGRESS NOTES
LOS: 0 days   Patient Care Team:  Donna Forte MD as PCP - General (Internal Medicine & Pediatrics)  Donna Forte MD as PCP - Family Medicine  Donna Forte MD as Referring Physician (Internal Medicine & Pediatrics)    Chief Complaint:  Follow-up for A-fib, diastolic heart failure    Interval History:   She had atrial fibrillation with a slow response overnight.  Is noted on telemetry, patient was complaining of right-sided shoulder and right leg pain, which has been chronic.  Due to the right shoulder pain she underwent EKG which was concerning, however on my review is unchanged from prior.      Objective   Vital Signs  Temp:  [96.9 °F (36.1 °C)-97.5 °F (36.4 °C)] 96.9 °F (36.1 °C)  Heart Rate:  [] 74  Resp:  [18-20] 20  BP: (116-166)/(56-90) 134/71    Intake/Output Summary (Last 24 hours) at 3/15/2023 0758  Last data filed at 3/14/2023 1711  Gross per 24 hour   Intake 960 ml   Output 350 ml   Net 610 ml       Comfortable NAD, resting in bed  PERRL, conjunctivae clear  Neck supple, no JVD or thyromegaly appreciated  S1/S2 irregularly irregular, controlled rate, faint systolic murmur at apex  Lungs CTA B, normal effort.  No crackles  Abdomen S/NT/ND (+) BS, no HSM appreciated  Extremities warm, no clubbing, cyanosis, or edema  Normal gait  No visible or palpable skin lesions  A/Ox4, mood and affect appropriate    Results Review:      Results from last 7 days   Lab Units 03/15/23  0431 03/14/23  0407 03/13/23  0404   SODIUM mmol/L 134* 133* 135*   POTASSIUM mmol/L 5.2 2.4* 3.5   CHLORIDE mmol/L 98 91* 93*   CO2 mmol/L 14.7* 28.8 28.2   BUN mg/dL 39* 25* 15   CREATININE mg/dL 1.20* 0.73 0.82   GLUCOSE mg/dL 130* 140* 188*   CALCIUM mg/dL 9.2 8.9 8.9         Results from last 7 days   Lab Units 03/14/23  0407 03/13/23  0404 03/12/23  1138   WBC 10*3/mm3 12.44* 6.83 11.96*   HEMOGLOBIN g/dL 9.0* 9.5* 10.0*   HEMATOCRIT % 27.5* 29.1* 31.1*   PLATELETS 10*3/mm3 311 292 346              Results from last 7 days   Lab Units 03/14/23  0407   MAGNESIUM mg/dL 2.2           I reviewed the patient's new clinical results.  I personally viewed and interpreted the patient's EKG/Telemetry data        Medication Review:   apixaban, 2.5 mg, Oral, BID  Aquaphor Advanced Therapy, , Topical, Q12H  atorvastatin, 10 mg, Oral, Nightly  budesonide, 0.5 mg, Nebulization, BID - RT  dilTIAZem CD, 360 mg, Oral, Q24H  famotidine, 20 mg, Oral, BID AC  FLUoxetine, 20 mg, Oral, Nightly  metoprolol tartrate, 12.5 mg, Oral, Q12H  predniSONE, 5 mg, Oral, Daily With Breakfast  senna-docusate sodium, 2 tablet, Oral, BID  sodium chloride, 10 mL, Intravenous, Q12H             Assessment & Plan       Pulmonary emphysema, unspecified emphysema type (HCC)    CAD (coronary artery disease)    Valvular heart disease    Peripheral arterial occlusive disease (HCC)    Severe malnutrition (HCC)    Chronic diastolic (congestive) heart failure (HCC)    1. Chronic diastolic CHF  2. Valvular heart disaese (mild-moderate AI, moderate MR, mild MS, mod-severe TR)  3. Atrial fibrillation with RVR  4. CAD without angina  5. Severe diffuse PAD  6. COPD  7. Frailty  8. Inability to afford medications    She is euvolemic, possibly dehydrated today.  I do not anticipate need for diuretics at home.  It seems that most of her decompensated status may have been related to A-fib with RVR.    She was noted to have asymptomatic bradycardia overnight, her heart rate has been around 60 or less since yesterday midnight.  She received 2, 250 mcg doses of digoxin on the 13th and the 14th.    I would give her a dose of Cardizem this morning, I think the metoprolol 12.5 is reasonable.  I think the low rates were due to the compounding effects of digoxin, and the hypokalemia.  If she needs further rate control, would increase the metoprolol dose before adding back digoxin, as the bradycardia this morning was likely resulted from dig and the hypokalemia  yesterday.    I would monitor her electrolytes closely.  Repeat BMP today since the last sample had profound hypokalemia, and the one this morning was hemolyzed.    She had some shoulder pain what sounds to be gas pain last night.  It was relieved with a bowel movement and passing flatus.  I reviewed her EKGsWhich showed A-fib with a slow response.  There were no ischemic changes noted.  It appears grossly the same since presentation EKG.    For now, despite her diastolic heart failure, will hold off on Jardiance as her Medicaid approval seems like it still in question.  She has had difficulty affording medications previously.    Sven Mathews MD  03/15/23  07:58 EDT      Part of this note may be an electronic transcription/translation of spoken language to printed text using the Dragon Dictation System.

## 2023-03-15 NOTE — NURSING NOTE
Dr. Caldwell on floor and instructed this nurse to call cardiology regarding EKG. Cardiology paged, awaiting return call.

## 2023-03-15 NOTE — CASE MANAGEMENT/SOCIAL WORK
Continued Stay Note  CHERRIE Bennett     Patient Name: Kaylin Ojeda  MRN: [id number]  Today's Date: 3/15/2023    Admit Date: 3/12/2023    Plan: Home with [facility]   Discharge Plan     Row Name 03/15/23 0859       Plan    Plan Home with [facility]    Patient/Family in Agreement with Plan yes    Plan Comments CM received a return call from Garnett with [facility] and she states they can accept patient for nursing, PT and OT but will not be able to see patient until Monday. CM informed patient of such and she is agreeable for this service. CM will follow.    Row Name 03/15/23 0843       Plan    Plan Home with accepting home health    Patient/Family in Agreement with Plan yes    Plan Comments CM consult noted for home health services at discharge. CM called and spoke with Betty at [facility] and she states they do take patient's insurance and will check with the office to see if they can accept. CM will follow.               Discharge Codes    No documentation.               Expected Discharge Date and Time     Expected Discharge Date Expected Discharge Time    Mar 15, 2023             Jeanette Kelly, SANDRA     Family

## 2023-03-15 NOTE — PLAN OF CARE
Goal Outcome Evaluation:  Plan of Care Reviewed With: patient        Progress: improving  Outcome Evaluation: OT- Patient reports she had a rough night but feeling better this morning. Patient performed bed mobility with modifed independence. Patient stood from EOB with SBA and performed functional mobility with FWW and SBA. Educated on home safety. Patient plans to discharge home today with assist from family. WIll benefit from home health OT.

## 2023-03-15 NOTE — OUTREACH NOTE
Prep Survey    Flowsheet Row Responses   [facility] facility patient discharged from? [place]   Is LACE score < 7 ? No   Eligibility [facility] [place]   Date of Admission 03/12/23   Date of Discharge 03/15/23   Discharge Disposition Home or Self Care   Discharge diagnosis Pulmonary emphysema, unspecified emphysema type    Does the patient have one of the following disease processes/diagnoses(primary or secondary)? Other   Does the patient have Home health ordered? No   Is there a DME ordered? No   Prep survey completed? Yes          Xiomy COCHRAN - Registered Nurse

## 2023-03-15 NOTE — TELEPHONE ENCOUNTER
Betty from [facility] calls pt getting DC'd from hospital today w/ orders for PT, OT and SN. She needed ok to start care after DC from hospital. I gave verbal continue care order for HH to see pt. Thank you

## 2023-03-15 NOTE — CASE MANAGEMENT/SOCIAL WORK
Case Management Discharge Note      Final Note: Discharged home with [facility]    Provided Post Acute Provider List?: Yes  Post Acute Provider List: Home Health  Provided Post Acute Provider Quality & Resource List?: Yes  Post Acute Provider Quality and Resource List: Home Health  Delivered To: Patient  Method of Delivery: In person    Selected Continued Care - Discharged on 3/15/2023 Admission date: 3/12/2023 - Discharge disposition: Home or Self Care    Destination    No services have been selected for the patient.              Durable Medical Equipment    No services have been selected for the patient.              Dialysis/Infusion    No services have been selected for the patient.              Home Medical Care     Service Provider Selected Services Address Phone Fax Patient Preferred    [facility] - [facility] [address] [place] [id number] [phone] --          Therapy    No services have been selected for the patient.              Community Resources    No services have been selected for the patient.              Community & DME    No services have been selected for the patient.                Selected Continued Care - Prior Encounters Includes continued care and service providers with selected services from prior encounters from 12/12/2022 to 3/15/2023    Discharged on 12/16/2022 Admission date: 12/9/2022 - Discharge disposition: Rehab Facility or Unit (DC - External)    Destination     Service Provider Selected Services Address Phone Fax Patient Preferred    [facility] [address] [address] [phone] --                         Final Discharge Disposition Code: 06 - home with home health care

## 2023-03-15 NOTE — DISCHARGE SUMMARY
Kaylin Ojeda  1940  [id number]    Hospitalists Discharge Summary    Date of Admission: 3/12/2023  Date of Discharge:  3/15/2023    History of Present Illness from [facility] on admit: 84 yo chronically ill female came to ER for shortness of breath. Reports decline in breathing /functional status over several months but this episode has been worse over the last couple of days with GAUTAM, cough, some mild yellow sputum production. Reports the GAUTAM making it difficult to ambulate through her house. No reported orthopnea. Came to Er O2 sat noted 88 while there. Was placed on O2, CXR noted possible edema, given breathing tx and lasix and admission was requested. Denies chest pain or fever    Primary Discharge diagnoses: Acute exacerbation of congestive heart failure with preserved ejection fraction-liberated from oxygen and now euvolemic.      Secondary Discharge Diagnoses: Severe malnutrition-followed by nutrition as inpatient.  Paroxysmal atrial fibrillation with current RVR-heart rate now stabilized with addition of metoprolol and increase dose of daily Cardizem.  Acute exacerbation of COPD-has resolved with use of DuoNeb and prednisone while inpatient.  Weakness and deconditioning-PT recommending home health therapy which will be prescribed on discharge.  Elevated D-dimer-CTA chest shows no evidence of PE and DVT ultrasound was negative.  GERD-stable on home Pepcid.  Tobacco abuse-patient counseled extensively on need for cessation.  Anxiety/depression-continue home Prozac.     Hospital Course Summary: Patient was admitted for shortness of breath, initially presumed due to exacerbation of COPD, however patient had small bilateral pleural effusions with elevated BNP, echocardiogram showed extensive valvular pathology and heart failure with preserved ejection fraction, she responded well to IV diuresis.  Cardiology followed the patient for CHF as well as PAF with RVR.  Patient's heart rate improved with doses of  digoxin as well as maintenance regimen being increased with 360 mg of Cardizem and twice daily 12.5 mg of Lopressor, cardiology was agreeable to discharge on 3/15/2023 with close follow-up in their office, they advise no additional diuretics for the patient.  Patient also with generalized weakness due to her congestive heart failure and therefore was seen in consultation by PT and OT who recommended home health therapy which will be arranged prior to discharge.  Patient was resumed on home Eliquis dosing did have an elevated D-dimer therefore CTA was examined as well as DVT ultrasound which were negative. on 3/15/2023 patient's condition had improved. They were deemed stable for discharge. They were advised to take all medications as prescribed, follow up with PCP within 1 week. If there are any issues patient should contact PCP and/or return to the ED for follow up. Patient was agreeable to the plan and subsequently discharged at this time.     PCP  Patient Care Team:  Donna Forte MD as PCP - General (Internal Medicine & Pediatrics)  Donna Forte MD as PCP - Family Medicine  Donna Forte MD as Referring Physician (Internal Medicine & Pediatrics)    Consults:   Consults     Date and Time Order Name Status Description    3/14/2023  7:44 AM Inpatient Cardiology Consult Completed           Operations and Procedures Performed:       Adult Transthoracic Echo Complete W/ Cont if Necessary Per Protocol    Result Date: 3/13/2023  Narrative: •  Left ventricular systolic function is normal. Calculated left ventricular EF = 65.6% Normal left ventricular cavity size noted. Left ventricular wall thickness is consistent with mild concentric hypertrophy. All left ventricular wall segments contract normally. Left ventricular diastolic function was indeterminate. •  The right ventricular cavity is mildly dilated. Normal right ventricular systolic function noted. •  Left atrium not well  visualized. The left atrial cavity is severely dilated. •  Right atrium not well visualized. The right atrial cavity is dilated. •  The aortic valve is abnormal in structure. There is moderate calcification of the aortic valve. The aortic valve was poorly visualized but appears trileaflet. Mild to moderate aortic valve regurgitation is present. No hemodynamically significant aortic valve stenosis is present. •  Severe mitral annular calcification is present. There is severe calcification of the mitral valve. There is severe, bileaflet mitral valve thickening present. Moderate mitral valve regurgitation is present. Mild mitral valve stenosis is present. •  Moderate to severe tricuspid valve regurgitation is present. Estimated right ventricular systolic pressure from tricuspid regurgitation is mildly elevated (35-45 mmHg). Calculated right ventricular systolic pressure from tricuspid regurgitation is 36 mmHg. No evidence of significant tricuspid valve stenosis is present. •  There is moderate, (grade 3) plaque in the descending aorta present. •  The inferior vena cava is dilated. IVC inspiratory collapse is absent.     CT Angiogram Chest    Result Date: 3/12/2023  Narrative: CT ANGIOGRAM CHEST W WO CONTRAST INDICATION: Elevated d-dimer with emphysema. Worsening shortness of breath over the past 2 weeks. TECHNIQUE: CT angiogram of the chest with 100 cc of Omnipaque 300 IV contrast. 3-D reconstructions were obtained and reviewed.   Radiation dose reduction techniques included automated exposure control or exposure modulation based on body size. Count of known CT and cardiac nuc med studies performed in previous 12 months: 1. COMPARISON: 9/28/2022 FINDINGS: Chest images at mediastinal window show no pulmonary artery filling defects to suggest emboli. The CT angiographic reformatted images also show no evidence of emboli. The aorta is tortuous and generally dilated with aneurysmal dilatation across the oumar of the  diaphragm, unchanged. The diameter measures 4 cm. Of note is occlusion of the left subclavian artery at its origin with refilling of the left subclavian at the level of the left vertebral consistent with a left subclavian steal. There are small bilateral pleural effusions, new since the previous scan. Chest images at lung windows demonstrate emphysema. There is bibasilar atelectasis that has worsened since the previous exam. No other changes are seen in the lungs.     Impression: 1. No evidence of pulmonary embolism 2. Small bilateral pleural effusions, new since the previous exam in September. Bibasilar atelectasis is also new since the previous exam. 3. 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 Aneurysmal dilatation of the descending thoracic aorta unchanged. Emphysema unchanged. Signer Name: Benoit Mondragon MD  Signed: 3/12/2023 4:10 PM  Workstation Name: [id number]  [facility]    XR Chest 1 View    Result Date: 3/12/2023  Narrative: INDICATION: Short of air TECHNIQUE: 1 views of the chest COMPARISON: Chest radiograph 9/28/2022 FINDINGS: Lungs/Pleura: Increased lung markings. Kerley B-lines seen at the bases. Ill-defined basilar lung opacities. Blunting costophrenic angles. Increased lung volumes. Mediastinum: Enlarged cardiac silhouette. Other: None     Impression: 1. Cardiomegaly with interstitial pulmonary edema. 2. Obstructive airways disease. 3. Small ill-defined basilar lung opacities. Possible subsegmental atelectasis or scarring. 4. Suspect small pleural effusions Signer Name: Phillip Hi MD  Signed: 3/12/2023 1:27 PM  Workstation Name: [id number]  [facility] Venous Doppler Lower Extremity Bilateral (duplex)    Result Date: 3/13/2023  Narrative: VENOUS DOPPLER ULTRASOUND, BILATERAL LOWER EXTREMITIES, COMPLETE, 03/13/2023  HISTORY: 83-year-old female hospital inpatient with three month history right leg pain. Elevated  d-dimer.  TECHNIQUE: Venous Doppler ultrasound examination of both legs was performed using grey-scale, spectral Doppler, and color flow Doppler ultrasound imaging. Protocol includes popliteal and deep calf veins and the great saphenous vein.  FINDINGS: The examination is negative.  There is no evidence of deep venous thrombosis from the groin to the lower calf bilaterally. The greater saphenous veins are also patent.      Impression: Negative examination.  No evidence of lower extremity DVT on the right or left.  This report was finalized on 3/13/2023 10:32 AM by Dr. Tyler Shannon MD.        Allergies:  is allergic to aleve [naproxen sodium], codeine, ibuprofen, and sulfa antibiotics.    Raul  Reviewed    Discharge Medications:     Discharge Medications      New Medications      Instructions Start Date   metoprolol tartrate 25 MG tablet  Commonly known as: LOPRESSOR   12.5 mg, Oral, Every 12 Hours Scheduled         Changes to Medications      Instructions Start Date   dilTIAZem  MG 24 hr capsule  Commonly known as: CARDIZEM CD  What changed:   · medication strength  · how much to take   360 mg, Oral, Every 24 Hours Scheduled      fluticasone 50 MCG/ACT nasal spray  Commonly known as: FLONASE  What changed: See the new instructions.   SPRAY TWICE IN EACH NOSTRIL EVERY DAY FOR 30 DAYS.         Continue These Medications      Instructions Start Date   acetaminophen 325 MG tablet  Commonly known as: TYLENOL   650 mg, Oral, Every 4 Hours PRN      albuterol (2.5 MG/3ML) 0.083% nebulizer solution  Commonly known as: PROVENTIL   2.5 mg, Nebulization, Every 4 Hours PRN      apixaban 2.5 MG tablet tablet  Commonly known as: ELIQUIS   2.5 mg, Oral, 2 Times Daily      atorvastatin 10 MG tablet  Commonly known as: LIPITOR   10 mg, Oral, Daily      Bath/Shower Seat misc   1 each, Does not apply, As Needed      bisacodyl 5 MG EC tablet  Commonly known as: DULCOLAX   5 mg, Oral, Daily PRN      bisacodyl 10 MG  suppository  Commonly known as: DULCOLAX   10 mg, Rectal, Daily PRN      budesonide-formoterol 160-4.5 MCG/ACT inhaler  Commonly known as: Symbicort   2 puffs, Inhalation, 2 Times Daily - RT      clonazePAM 0.5 MG tablet  Commonly known as: KlonoPIN   0.5 mg, Oral, Nightly PRN      Ensure Nutrition Shake liquid   1 bottle, Oral, 2 Times Daily      famotidine 20 MG tablet  Commonly known as: PEPCID   20 mg, Oral, 2 Times Daily Before Meals      FLUoxetine 20 MG capsule  Commonly known as: PROzac   20 mg, Oral, Daily      hydrOXYzine 25 MG tablet  Commonly known as: ATARAX   25 mg, Oral, Every 6 Hours PRN      melatonin 5 MG tablet tablet   5 mg, Oral, Nightly PRN      ondansetron 4 MG tablet  Commonly known as: ZOFRAN   4 mg, Oral, Every 6 Hours PRN      oxyCODONE-acetaminophen 7.5-325 MG per tablet  Commonly known as: Percocet   1 tablet, Oral, Every 8 Hours PRN      polyethylene glycol 17 g packet  Commonly known as: MIRALAX   17 g, Oral, Daily PRN      vitamin D 1.25 MG (30687 UT) capsule capsule  Commonly known as: ERGOCALCIFEROL   50,000 Units, Oral, Weekly             Last Lab Results:   Lab Results (most recent)     Procedure Component Value Units Date/Time    Basic Metabolic Panel [[id number]]  (Abnormal) Collected: 03/15/23 0431    Specimen: Blood Updated: 03/15/23 0554     Glucose 130 mg/dL      BUN 39 mg/dL      Creatinine 1.20 mg/dL      Sodium 134 mmol/L      Potassium 5.2 mmol/L      Comment: Slight hemolysis detected by analyzer. Results may be affected.        Chloride 98 mmol/L      CO2 14.7 mmol/L      Calcium 9.2 mg/dL      BUN/Creatinine Ratio 32.5     Anion Gap 21.3 mmol/L      eGFR 45.6 mL/min/1.73     Narrative:      GFR Normal >60  Chronic Kidney Disease <60  Kidney Failure <15    The GFR formula is only valid for adults with stable renal function between ages 18 and 70.    Magnesium [[id number]]  (Normal) Collected: 03/14/23 0407    Specimen: Blood Updated: 03/14/23 0753     Magnesium 2.2 mg/dL      Basic Metabolic Panel [[id number]]  (Abnormal) Collected: 03/14/23 0407    Specimen: Blood Updated: 03/14/23 0542     Glucose 140 mg/dL      BUN 25 mg/dL      Creatinine 0.73 mg/dL      Sodium 133 mmol/L      Potassium 2.4 mmol/L      Chloride 91 mmol/L      CO2 28.8 mmol/L      Calcium 8.9 mg/dL      BUN/Creatinine Ratio 34.2     Anion Gap 13.2 mmol/L      eGFR 82.7 mL/min/1.73     Narrative:      GFR Normal >60  Chronic Kidney Disease <60  Kidney Failure <15    The GFR formula is only valid for adults with stable renal function between ages 18 and 70.    CBC & Differential [[id number]]  (Abnormal) Collected: 03/14/23 0407    Specimen: Blood Updated: 03/14/23 0510    Narrative:      The following orders were created for panel order CBC & Differential.  Procedure                               Abnormality         Status                     ---------                               -----------         ------                     CBC Auto Differential[[id number]]        Abnormal            Final result                 Please view results for these tests on the individual orders.    CBC Auto Differential [[id number]]  (Abnormal) Collected: 03/14/23 0407    Specimen: Blood Updated: 03/14/23 0510     WBC 12.44 10*3/mm3      RBC 3.25 10*6/mm3      Hemoglobin 9.0 g/dL      Hematocrit 27.5 %      MCV 84.6 fL      MCH 27.7 pg      MCHC 32.7 g/dL      RDW 16.0 %      RDW-SD 48.9 fl      MPV 10.5 fL      Platelets 311 10*3/mm3      Neutrophil % 89.7 %      Lymphocyte % 5.5 %      Monocyte % 3.8 %      Eosinophil % 0.0 %      Basophil % 0.1 %      Immature Grans % 0.9 %      Neutrophils, Absolute 11.17 10*3/mm3      Lymphocytes, Absolute 0.68 10*3/mm3      Monocytes, Absolute 0.47 10*3/mm3      Eosinophils, Absolute 0.00 10*3/mm3      Basophils, Absolute 0.01 10*3/mm3      Immature Grans, Absolute 0.11 10*3/mm3      nRBC 0.0 /100 WBC     Respiratory Panel PCR w/COVID-19(SARS-CoV-2) EDMOND/NISHANT/FLORECITA/PAD/COR/MAD/KENROY In-House, NP Swab in  UTM/VTM, 3-4 HR TAT - Swab, Nasopharynx [[id number]]  (Normal) Collected: 03/12/23 1650    Specimen: Swab from Nasopharynx Updated: 03/13/23 1110     ADENOVIRUS, PCR Not Detected     Coronavirus 229E Not Detected     Coronavirus HKU1 Not Detected     Coronavirus NL63 Not Detected     Coronavirus OC43 Not Detected     COVID19 Not Detected     Human Metapneumovirus Not Detected     Human Rhinovirus/Enterovirus Not Detected     Influenza A PCR Not Detected     Influenza B PCR Not Detected     Parainfluenza Virus 1 Not Detected     Parainfluenza Virus 2 Not Detected     Parainfluenza Virus 3 Not Detected     Parainfluenza Virus 4 Not Detected     RSV, PCR Not Detected     Bordetella pertussis pcr Not Detected     Bordetella parapertussis PCR Not Detected     Chlamydophila pneumoniae PCR Not Detected     Mycoplasma pneumo by PCR Not Detected    Narrative:      In the setting of a positive respiratory panel with a viral infection PLUS a negative procalcitonin without other underlying concern for bacterial infection, consider observing off antibiotics or discontinuation of antibiotics and continue supportive care. If the respiratory panel is positive for atypical bacterial infection (Bordetella pertussis, Chlamydophila pneumoniae, or Mycoplasma pneumoniae), consider antibiotic de-escalation to target atypical bacterial infection.    Procalcitonin [[id number]]  (Normal) Collected: 03/13/23 0404    Specimen: Blood Updated: 03/13/23 0550     Procalcitonin 0.07 ng/mL     Narrative:      As a Marker for Sepsis (Non-Neonates):    1. <0.5 ng/mL represents a low risk of severe sepsis and/or septic shock.  2. >2 ng/mL represents a high risk of severe sepsis and/or septic shock.    As a Marker for Lower Respiratory Tract Infections that require antibiotic therapy:    PCT on Admission    Antibiotic Therapy       6-12 Hrs later    >0.5                Strongly Recommended  >0.25 - <0.5        Recommended   0.1 - 0.25          Discouraged  "             Remeasure/reassess PCT  <0.1                Strongly Discouraged     Remeasure/reassess PCT    As 28 day mortality risk marker: \"Change in Procalcitonin Result\" (>80% or <=80%) if Day 0 (or Day 1) and Day 4 values are available. Refer to http://www.[email or web address]-pct-[email or web address]    Change in PCT <=80%  A decrease of PCT levels below or equal to 80% defines a positive change in PCT test result representing a higher risk for 28-day all-cause mortality of patients diagnosed with severe sepsis for septic shock.    Change in PCT >80%  A decrease of PCT levels of more than 80% defines a negative change in PCT result representing a lower risk for 28-day all-cause mortality of patients diagnosed with severe sepsis or septic shock.       Magnesium [[id number]]  (Normal) Collected: 03/13/23 0404    Specimen: Blood Updated: 03/13/23 0501     Magnesium 2.3 mg/dL     CBC (No Diff) [[id number]]  (Abnormal) Collected: 03/13/23 0404    Specimen: Blood Updated: 03/13/23 0449     WBC 6.83 10*3/mm3      RBC 3.42 10*6/mm3      Hemoglobin 9.5 g/dL      Hematocrit 29.1 %      MCV 85.1 fL      MCH 27.8 pg      MCHC 32.6 g/dL      RDW 16.0 %      RDW-SD 49.8 fl      MPV 10.1 fL      Platelets 292 10*3/mm3     Procalcitonin [[id number]]  (Normal) Collected: 03/12/23 1138    Specimen: Blood Updated: 03/12/23 1404     Procalcitonin 0.08 ng/mL     Narrative:      As a Marker for Sepsis (Non-Neonates):    1. <0.5 ng/mL represents a low risk of severe sepsis and/or septic shock.  2. >2 ng/mL represents a high risk of severe sepsis and/or septic shock.    As a Marker for Lower Respiratory Tract Infections that require antibiotic therapy:    PCT on Admission    Antibiotic Therapy       6-12 Hrs later    >0.5                Strongly Recommended  >0.25 - <0.5        Recommended   0.1 - 0.25          Discouraged              Remeasure/reassess PCT  <0.1                Strongly Discouraged     Remeasure/reassess PCT    As 28 day mortality risk " "marker: \"Change in Procalcitonin Result\" (>80% or <=80%) if Day 0 (or Day 1) and Day 4 values are available. Refer to http://www.[facility]-pct-calculator.com    Change in PCT <=80%  A decrease of PCT levels below or equal to 80% defines a positive change in PCT test result representing a higher risk for 28-day all-cause mortality of patients diagnosed with severe sepsis for septic shock.    Change in PCT >80%  A decrease of PCT levels of more than 80% defines a negative change in PCT result representing a lower risk for 28-day all-cause mortality of patients diagnosed with severe sepsis or septic shock.       COVID-19 and FLU A/B PCR - Swab, Nasopharynx [[id number]]  (Normal) Collected: 03/12/23 1219    Specimen: Swab from Nasopharynx Updated: 03/12/23 1246     COVID19 Not Detected     Influenza A PCR Not Detected     Influenza B PCR Not Detected    Narrative:      Fact sheet for providers: https://www.fda.gov/media/938796/download    Fact sheet for patients: https://www.fda.gov/media/048239/download    Test performed by PCR.    BNP [[id number]]  (Abnormal) Collected: 03/12/23 1138    Specimen: Blood Updated: 03/12/23 1238     proBNP 3,053.0 pg/mL     Narrative:      Among patients with dyspnea, NT-proBNP is highly sensitive for the detection of acute congestive heart failure. In addition NT-proBNP of <300 pg/ml effectively rules out acute congestive heart failure with 99% negative predictive value.    Results may be falsely decreased if patient taking Biotin.      D-dimer, Quantitative [[id number]]  (Abnormal) Collected: 03/12/23 1138    Specimen: Blood Updated: 03/12/23 1222     D-Dimer, Quantitative 3.14 MCGFEU/mL     Narrative:      According to the assay 's published package insert, a normal (<0.50 MCGFEU/mL) D-dimer result in conjunction with a non-high clinical probability assessment, excludes deep vein thrombosis (DVT) and pulmonary embolism (PE) with high sensitivity.    D-dimer values increase with " "age and this can make VTE exclusion of an older population difficult. To address this, the [employer], based on best available evidence and recent guidelines, recommends that clinicians use age-adjusted D-dimer thresholds in patients greater than 50 years of age with: a) a low probability of PE who do not meet all Pulmonary Embolism Rule Out Criteria, or b) in those with intermediate probability of PE.   The formula for an age-adjusted D-dimer cut-off is \"age/100\".  For example, a 60 year old patient would have an age-adjusted cut-off of 0.60 MCGFEU/mL and an 80 year old 0.80 MCGFEU/mL.    Comprehensive Metabolic Panel [[id number]]  (Abnormal) Collected: 03/12/23 1138    Specimen: Blood Updated: 03/12/23 1158     Glucose 141 mg/dL      BUN 18 mg/dL      Creatinine 0.83 mg/dL      Sodium 129 mmol/L      Potassium 4.3 mmol/L      Chloride 92 mmol/L      CO2 21.0 mmol/L      Calcium 8.9 mg/dL      Total Protein 6.9 g/dL      Albumin 3.6 g/dL      ALT (SGPT) 53 U/L      AST (SGOT) 54 U/L      Alkaline Phosphatase 172 U/L      Total Bilirubin 0.5 mg/dL      Globulin 3.3 gm/dL      A/G Ratio 1.1 g/dL      BUN/Creatinine Ratio 21.7     Anion Gap 16.0 mmol/L      eGFR 70.9 mL/min/1.73     Narrative:      GFR Normal >60  Chronic Kidney Disease <60  Kidney Failure <15    The GFR formula is only valid for adults with stable renal function between ages 18 and 70.    CBC & Differential [[id number]]  (Abnormal) Collected: 03/12/23 1138    Specimen: Blood Updated: 03/12/23 1142    Narrative:      The following orders were created for panel order CBC & Differential.  Procedure                               Abnormality         Status                     ---------                               -----------         ------                     CBC Auto Differential[[id number]]        Abnormal            Final result                 Please view results for these tests on the individual orders.    CBC Auto Differential " [[id number]]  (Abnormal) Collected: 03/12/23 1138    Specimen: Blood Updated: 03/12/23 1142     WBC 11.96 10*3/mm3      RBC 3.57 10*6/mm3      Hemoglobin 10.0 g/dL      Hematocrit 31.1 %      MCV 87.1 fL      MCH 28.0 pg      MCHC 32.2 g/dL      RDW 16.1 %      RDW-SD 51.2 fl      MPV 10.1 fL      Platelets 346 10*3/mm3      Neutrophil % 76.9 %      Lymphocyte % 13.9 %      Monocyte % 7.8 %      Eosinophil % 0.3 %      Basophil % 0.5 %      Immature Grans % 0.6 %      Neutrophils, Absolute 9.20 10*3/mm3      Lymphocytes, Absolute 1.66 10*3/mm3      Monocytes, Absolute 0.93 10*3/mm3      Eosinophils, Absolute 0.04 10*3/mm3      Basophils, Absolute 0.06 10*3/mm3      Immature Grans, Absolute 0.07 10*3/mm3      nRBC 0.0 /100 WBC         Imaging Results (Most Recent)     Procedure Component Value Units Date/Time    US Venous Doppler Lower Extremity Bilateral (duplex) [[id number]] Collected: 03/13/23 1032     Updated: 03/13/23 1035    Narrative:      VENOUS DOPPLER ULTRASOUND, BILATERAL LOWER EXTREMITIES, COMPLETE,  03/13/2023     HISTORY:   83-year-old female hospital inpatient with three month history right leg  pain. Elevated d-dimer.     TECHNIQUE:   Venous Doppler ultrasound examination of both legs was performed using  grey-scale, spectral Doppler, and color flow Doppler ultrasound imaging.  Protocol includes popliteal and deep calf veins and the great saphenous  vein.     FINDINGS:   The examination is negative.  There is no evidence of deep venous  thrombosis from the groin to the lower calf bilaterally. The greater  saphenous veins are also patent.       Impression:      Negative examination.  No evidence of lower extremity DVT on the right  or left.     This report was finalized on 3/13/2023 10:32 AM by Dr. Tyler Shannon MD.       CT Angiogram Chest [[id number]] Collected: 03/12/23 1610     Updated: 03/12/23 1612    Narrative:      CT ANGIOGRAM CHEST W WO CONTRAST    INDICATION:   Elevated d-dimer with  emphysema. Worsening shortness of breath over the past 2 weeks.    TECHNIQUE:   CT angiogram of the chest with 100 cc of Omnipaque 300 IV contrast. 3-D reconstructions were obtained and reviewed.   Radiation dose reduction techniques included automated exposure control or exposure modulation based on body size. Count of known CT and  cardiac nuc med studies performed in previous 12 months: 1.     COMPARISON:   9/28/2022    FINDINGS:   Chest images at mediastinal window show no pulmonary artery filling defects to suggest emboli. The CT angiographic reformatted images also show no evidence of emboli. The aorta is tortuous and generally dilated with aneurysmal dilatation across the oumar  of the diaphragm, unchanged. The diameter measures 4 cm. Of note is occlusion of the left subclavian artery at its origin with refilling of the left subclavian at the level of the left vertebral consistent with a left subclavian steal. There are small  bilateral pleural effusions, new since the previous scan.    Chest images at lung windows demonstrate emphysema. There is bibasilar atelectasis that has worsened since the previous exam. No other changes are seen in the lungs.      Impression:        1. No evidence of pulmonary embolism  2. Small bilateral pleural effusions, new since the previous exam in September. Bibasilar atelectasis is also new since the previous exam.  3. 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 Aneurysmal dilatation of the descending thoracic aorta unchanged. Emphysema unchanged.    Signer Name: Benoit Mondragon MD   Signed: 3/12/2023 4:10 PM   Workstation Name: [id number]    [facility]    XR Chest 1 View [[id number]] Collected: 03/12/23 1327     Updated: 03/12/23 1332    Narrative:        INDICATION:   Short of air    TECHNIQUE: 1 views of the chest    COMPARISON:   Chest radiograph 9/28/2022    FINDINGS:    Lungs/Pleura: Increased lung markings. Kerley B-lines seen at  the bases. Ill-defined basilar lung opacities. Blunting costophrenic angles. Increased lung volumes.    Mediastinum: Enlarged cardiac silhouette.    Other: None      Impression:        1. Cardiomegaly with interstitial pulmonary edema.  2. Obstructive airways disease.  3. Small ill-defined basilar lung opacities. Possible subsegmental atelectasis or scarring.  4. Suspect small pleural effusions    Signer Name: Phillip Hi MD   Signed: 3/12/2023 1:27 PM   Workstation Name: [id number]    [facility]          PROCEDURES      Condition on Discharge:  Stable, Improved.     Physical Exam at Discharge  Vital Signs  Temp:  [96.9 °F (36.1 °C)-97.5 °F (36.4 °C)] 96.9 °F (36.1 °C)  Heart Rate:  [] 74  Resp:  [18-20] 20  BP: (116-166)/(56-90) 134/71    Physical Exam  Physical Exam:  General: Patient is awake and alert.  Thin appearing elderly female. No acute distress noted.   HENT: Head is atraumatic, normocephalic. Hearing is grossly intact. Nose is without obvious congestion and appears patent. Neck is supple and trachea is midline.   Eyes: Vision is grossly intact. Pupils appear equal and round.   Cardiovascular: Heart has regular rate and rhythm with no murmurs, rubs or gallops noted.   Respiratory: Lungs are clear to ausculation without wheezes, rhonchi or rales.   Abdominal/GI: Soft, nontender, bowel sounds present. No rebound or guarding present.   Extremities: No peripheral edema noted.   Musculoskeletal: Spontaneous movement of bilateral upper and lower extremities against gravity noted. No signs of injury or deformity noted.   Skin: Warm and dry.   Psych: Mood and affect are appropriate. Cooperative with exam.   Neuro: No facial asymmetry noted. No focal deficits noted, hearing and vision are grossly intact.      Discharge Disposition  To home with home health therapy    Visiting Nurse:    Yes    Home PT/OT:  Yes    Home Safety Evaluation:  No    DME  No new  equipment    Discharge Diet:      Dietary Orders (From admission, onward)     Start     Ordered    03/14/23 1800  Dietary Nutrition Supplements Boost Breeze (Ensure Clear)  Daily With Breakfast & Dinner      Question:  Select Supplement:  Answer:  Boost Breeze (Ensure Clear)    03/14/23 1212    03/12/23 1456  Diet: Regular/House Diet, Cardiac Diets; Healthy Heart (2-3 Na+); Texture: Regular Texture (IDDSI 7); Fluid Consistency: Thin (IDDSI 0)  Diet Effective Now        References:    Diet Order [place]   Question Answer Comment   Diets: Regular/House Diet    Diets: Cardiac Diets    Cardiac Diet: Healthy Heart (2-3 Na+)    Texture: Regular Texture (IDDSI 7)    Fluid Consistency: Thin (IDDSI 0)        03/12/23 1455                Activity at Discharge:  As tolerated    Pre-discharge education  None       Follow-up Appointments  No future appointments.  Additional Instructions for the Follow-ups that You Need to Schedule     Discharge Follow-up with PCP   As directed       Currently Documented PCP:    Donna Forte MD    PCP Phone Number:    [phone]     Follow Up Details: within 1 week         Discharge Follow-up with Specialty: Cardiology; 2 Weeks   As directed      Specialty: Cardiology    Follow Up: 2 Weeks               Test Results Pending at Discharge      Discharge over 30 min (if over 30 minutes give explanation as to why it took greater than 30 minutes)  Secondary to:   Coordination of care/follow up  Medication reconciliation  D/W patient      At [facility], we believe that sharing information builds trust and better relationships. You are receiving this note because you recently visited [facility]. It is possible you will see health information before a provider has talked with you about it. This kind of information can be easy to misunderstand. To help you fully understand what it means for your health, we urge you to discuss this note with your provider.    Phillip Joaquin,  DO  03/15/23  08:24 EDT

## 2023-03-15 NOTE — PROGRESS NOTES
Called by staff because of bradycardia and right shoulder pain.  Last BP was 134/56.  HR range recorded yesterday was .  EKG reviewed and computer reading Acute Posterior MI.  I do not see any dynamic changes V1 and V2 or in the R waves of V5 and V6.  Does appear to be LVH by EKG and Echo from admission.  Her anatomy is disease in the LAD w/ non-obstructive disease in the RCA.  Her Cx is clean.  She is fully anticoagulated on Eliquis.  Will ask Cardiology to review the EKG since they are following.

## 2023-03-16 NOTE — OUTREACH NOTE
Call Center TCM Note    Flowsheet Row Responses   [facility] patient discharged from? [place]   Does the patient have one of the following disease processes/diagnoses(primary or secondary)? Other   TCM attempt successful? No   Unsuccessful attempts Attempt 1   Call Status Left message          Chioma Love MA    3/16/2023, 13:32 EDT

## 2023-03-16 NOTE — OUTREACH NOTE
Call Center TCM Note    Flowsheet Row Responses   [facility] facility patient discharged from? [place]   Does the patient have one of the following disease processes/diagnoses(primary or secondary)? Other   TCM attempt successful? No   Unsuccessful attempts Attempt 2          Chioma Love MA    3/16/2023, 15:51 EDT

## 2023-03-17 NOTE — OUTREACH NOTE
Call Center TCM Note    Flowsheet Row Responses   [facility] patient discharged from? [place]   Does the patient have one of the following disease processes/diagnoses(primary or secondary)? Other   TCM attempt successful? Yes   Call start time 0831   Call end time 0836   Discharge diagnosis Pulmonary emphysema, unspecified emphysema type    Meds reviewed with patient/caregiver? Yes   Is the patient having any side effects they believe may be caused by any medication additions or changes? No   Does the patient have all medications ordered at discharge? Yes   Is the patient taking all medications as directed (includes completed medication regime)? Yes   Medication comments Discussed with pt options re: picking up her Eliquis from her cards office    Comments Hosp dc fu apt on 3/24/23.  Cards apt on 4/4/23   Does the patient have an appointment with their PCP within 7 days of discharge? Yes   Has home health visited the patient within 72 hours of discharge? N/A   Psychosocial issues? No   Did the patient receive a copy of their discharge instructions? Yes   Nursing interventions Reviewed instructions with patient   What is the patient's perception of their health status since discharge? Improving  [feeling sick to her stomach ]   Is the patient/caregiver able to teach back signs and symptoms related to disease process for when to call PCP? Yes   Is the patient/caregiver able to teach back signs and symptoms related to disease process for when to call 911? Yes   Is the patient/caregiver able to teach back the hierarchy of who to call/visit for symptoms/problems? PCP, Specialist, Home health nurse, Urgent Care, ED, 911 Yes   If the patient is a current smoker, are they able to teach back resources for cessation? [id number]   TCM call completed? Yes   Call end time 0836          Betty Ramirez RN    3/17/2023, 08:40 EDT

## 2023-03-19 NOTE — PROGRESS NOTES
Enter Query Response Below      Query Response:     Patient with documented Oxygen Saturation of 87%, no previous oxygen requirements, and patient required 2lpm nasal cannula to keep saturation above 88%,  given Duo-Neb treatment, IV Solu-Medrol and IV Lasix in the ED and had rapid improvement thereafter. However, acute hypoxic respiratory failure was accurately depicted despite interventions quickly alleviating need for oxygen.               If applicable, please update the problem list.   Patient: Kaylin Ojeda        : 1940  Account: [id number]           Admit Date: 3/12/2023        How to Respond to this query:       a. Click New Note     b. Answer query within the yellow box.                c. Update the Problem List, if applicable.      If you have any questions about this query contact me at:  [email or web address]     Dr. Joaquin,     83 year female patient with PMHx that includes Diastolic Heart Failure, COPD, (not home oxygen) admitted on 23 with Acute on Chronic Diastolic Congestive Heart Failure, AECOPD, Acute Hypoxic Respiratory Failure.      Oxygen Saturation 95% on Room Air on arrival to Emergency Department, with one documented Oxygen Saturation of 87%, patient was placed on 2lpm nasal cannula, given Duo-Neb treatment, IV Solu-Medrol and IV Lasix in the ED.   Oxygen saturations 92-97% on 2lpm and noted to be titrated down to 1lpm by evening of  and returned to room air on morning of .     No Arterial Blood Gas on chart    After study, was Acute Hypoxic Respiratory Failure clinically supported during this admission?  > Yes, please include additional clinical indicators: ____________  > No  > Other- specify______________  > Unable to Determine    By submitting this query, we are merely seeking further clarification of documentation to accurately reflect all conditions that you are monitoring, evaluating, treating or that extend the hospitalization or utilize additional  resources of care. Please utilize your independent clinical judgment when addressing the question(s) above.     This query and your response, once completed, will be entered into the legal medical record.    Sincerely,  Terra Hughes RN,[facility]  Clinical Documentation Integrity Program   [email or web address]

## 2023-03-21 PROBLEM — M71.121 INFECTION OF RIGHT OLECRANON BURSA: Status: ACTIVE | Noted: 2023-01-01

## 2023-03-21 PROBLEM — M00.9 PYOGENIC ARTHRITIS OF RIGHT ELBOW (HCC): Status: ACTIVE | Noted: 2023-01-01

## 2023-03-21 PROBLEM — L03.113 CELLULITIS OF RIGHT ELBOW: Status: ACTIVE | Noted: 2023-01-01

## 2023-03-21 NOTE — ED NOTES
"Nursing report ED to floor  Kaylin Ojeda  83 y.o.  female    HPI :   Chief Complaint   Patient presents with    Wound Infection     Patient had right elbow surgery in December, stated she is now having green drainage from the wound along with swelling       Admitting doctor:   No admitting provider for patient encounter.    Admitting diagnosis:   The primary encounter diagnosis was Infection of right olecranon bursa. A diagnosis of Cellulitis of right elbow was also pertinent to this visit.    Code status:   Current Code Status       Date Active Code Status Order ID Comments User Context       Prior            Allergies:   Aleve [naproxen sodium], Codeine, Ibuprofen, and Sulfa antibiotics    Isolation:   No active isolations    Intake and Output  No intake or output data in the 24 hours ending 03/21/23 1519    Weight:       03/21/23  1256   Weight: 46.6 kg (102 lb 12.8 oz)       Most recent vitals:   Vitals:    03/21/23 1256 03/21/23 1415 03/21/23 1518   BP: 139/63 139/84 131/92   BP Location: Right arm     Patient Position: Lying     Pulse: (!) 44 (!) 44 (!) 45   Resp: 18 18 18   Temp: 97.9 °F (36.6 °C)     TempSrc: Oral     SpO2: 97% 98%    Weight: 46.6 kg (102 lb 12.8 oz)     Height: 152.4 cm (60\")         Active LDAs/IV Access:   Lines, Drains & Airways       Active LDAs       None                    Labs (abnormal labs have a star):   Labs Reviewed   COMPREHENSIVE METABOLIC PANEL - Abnormal; Notable for the following components:       Result Value    Glucose 168 (*)     BUN 25 (*)     Creatinine 1.03 (*)     Sodium 133 (*)     Chloride 95 (*)     CO2 18.6 (*)     Calcium 8.3 (*)     Albumin 3.3 (*)     ALT (SGPT) 883 (*)     AST (SGOT) 409 (*)     Alkaline Phosphatase 127 (*)     Anion Gap 19.4 (*)     eGFR 54.7 (*)     All other components within normal limits    Narrative:     GFR Normal >60  Chronic Kidney Disease <60  Kidney Failure <15    The GFR formula is only valid for adults with stable renal " function between ages 18 and 70.   CBC WITH AUTO DIFFERENTIAL - Abnormal; Notable for the following components:    WBC 10.89 (*)     RBC 3.27 (*)     Hemoglobin 8.9 (*)     Hematocrit 28.9 (*)     MCHC 30.8 (*)     RDW 16.7 (*)     RDW-SD 54.1 (*)     Lymphocyte % 18.8 (*)     Eosinophil % 0.1 (*)     Immature Grans % 3.4 (*)     Neutrophils, Absolute 7.11 (*)     Monocytes, Absolute 1.31 (*)     Immature Grans, Absolute 0.37 (*)     All other components within normal limits   BLOOD CULTURE   CBC AND DIFFERENTIAL    Narrative:     The following orders were created for panel order CBC & Differential.  Procedure                               Abnormality         Status                     ---------                               -----------         ------                     CBC Auto Differential[[id number]]        Abnormal            Final result                 Please view results for these tests on the individual orders.       EKG:   ECG 12 Lead Rhythm Change   Preliminary Result   HEART RATE= 46  bpm   RR Interval= 1308  ms   IA Interval=   ms   P Horizontal Axis=   deg   P Front Axis=   deg   QRSD Interval= 84  ms   QT Interval= 526  ms   QRS Axis= 65  deg   T Wave Axis= 103  deg   - ABNORMAL ECG -   Junctional rhythm   Nonspecific T abnormalities, lateral leads   Electronically Signed By:    Date and Time of Study: 2023-03-21 15:03:34          Meds given in ED:   Medications   ondansetron (ZOFRAN) injection 4 mg (has no administration in time range)       Imaging results:  XR Elbow 3+ View Right    Result Date: 3/21/2023  normal. IMPRESSION: Prior internal fixation of proximal ulna  Soft tissue swelling posterior to the elbow. I do not see any focal lucency in the bones but there are new irregular bone fragments proximal to the proximal ulna that were not present on the old studies. This could be due to ligamentous injury or possibly infection. CT imaging may be useful..  This report was finalized on 3/21/2023 2:15  PM by Dr. Keo Wilder MD.       Ambulatory status:   - assist    Social issues:   Social History     Socioeconomic History    Marital status:      Spouse name: Willie    Number of children: 3    Years of education: Some College   Tobacco Use    Smoking status: Some Days     Packs/day: 0.20     Years: 50.00     Pack years: 10.00     Types: Cigarettes    Smokeless tobacco: Never    Tobacco comments:     trying to quit, pt states she has basically quit but will still have one when she stressed   Vaping Use    Vaping Use: Never used   Substance and Sexual Activity    Alcohol use: Not Currently     Comment: OCCASIONAL/ TWICE A YEAR. // daily caffiene    Drug use: No    Sexual activity: Defer       NIH Stroke Scale:         Nichole Farmer RN  03/21/23 15:19 EDT

## 2023-03-21 NOTE — ANESTHESIA PREPROCEDURE EVALUATION
Anesthesia Evaluation     Patient summary reviewed and Nursing notes reviewed   no history of anesthetic complications:  NPO Solid Status: > 8 hours  NPO Liquid Status: > 4 hours           Airway   Mallampati: II  TM distance: >3 FB  No difficulty expected  Dental    (+) upper dentures    Pulmonary     breath sounds clear to auscultation  (+) a smoker (one pack every 3 days ) Current Smoked day of surgery, COPD (uses inhalers as needed) moderate, sleep apnea (no machine), decreased breath sounds,     ROS comment: Oxygen saturations drop with sedation.  Cardiovascular   Exercise tolerance: poor (<4 METS)    ECG reviewed  PT is on anticoagulation therapy  Patient on routine beta blocker and Beta blocker given within 24 hours of surgery  Rhythm: irregular  Rate: normal    (+) hypertension well controlled less than 2 medications, valvular problems/murmurs MR, CAD, cardiac stents more than 12 months ago dysrhythmias Atrial Fib, PVD, hyperlipidemia,  carotid artery disease carotid bilateral    ROS comment: Comparison: compared with previous ECG from 10/6/2022   Similar to previous ECG   Comments: Rate of 74, A. fib, normal axis, no acute ST elevation or   depression.   Interpreted by Ramin Silvestre MD on 12/9/2022  4:02 PM EST           09/28/2022 ECHO  Calculated left ventricular EF = 62% Estimated left ventricular EF was in agreement with the calculated left ventricular EF. Left ventricular systolic function is normal.      Neuro/Psych  (+) TIA, syncope (3 falls on day admitted), psychiatric history Anxiety and Depression,    GI/Hepatic/Renal/Endo    (+)   renal disease (only one functioning kidney), thyroid problem (no replacement) hypothyroidism  GERD: occ.    Musculoskeletal     (+) back pain, chronic pain (on pain meds for neck and back), neck pain, radiculopathy Right lower extremity  Abdominal  - normal exam   Substance History - negative use     OB/GYN negative ob/gyn ROS         Other   arthritis (hands and  knees),      ROS/Med Hx Other: Vascular stent    Narrative & Impression    HEART RATE= 46  bpm  RR Interval= 1308  ms  TX Interval=   ms  P Horizontal Axis=   deg  P Front Axis=   deg  QRSD Interval= 84  ms  QT Interval= 526  ms  QRS Axis= 65  deg  T Wave Axis= 103  deg  - ABNORMAL ECG -  Junctional rhythm  Nonspecific T abnormalities, lateral leads  Electronically Signed By:   Date and Time of Study: 2023-03-21 15:03:34    •  Left ventricular systolic function is normal. Calculated left ventricular EF = 65.6% Normal left ventricular cavity size noted. Left ventricular wall thickness is consistent with mild concentric hypertrophy. All left ventricular wall segments contract normally. Left ventricular diastolic function was indeterminate.  •  The right ventricular cavity is mildly dilated. Normal right ventricular systolic function noted.  •  Left atrium not well visualized. The left atrial cavity is severely dilated.  •  Right atrium not well visualized. The right atrial cavity is dilated.  •  The aortic valve is abnormal in structure. There is moderate calcification of the aortic valve. The aortic valve was poorly visualized but appears trileaflet. Mild to moderate aortic valve regurgitation is present. No hemodynamically significant aortic valve stenosis is present.  •  Severe mitral annular calcification is present. There is severe calcification of the mitral valve. There is severe, bileaflet mitral valve thickening present. Moderate mitral valve regurgitation is present. Mild mitral valve stenosis is present.  •  Moderate to severe tricuspid valve regurgitation is present. Estimated right ventricular systolic pressure from tricuspid regurgitation is mildly elevated (35-45 mmHg). Calculated right ventricular systolic pressure from tricuspid regurgitation is 36 mmHg. No evidence of significant tricuspid valve stenosis is present.  •  There is moderate, (grade 3) plaque in the descending aorta present.  •  The  inferior vena cava is dilated. IVC inspiratory collapse is absent.                      Anesthesia Plan    ASA 4 - emergent     general     (Risks discussed with patient, possible post-op ventilation and possible ICU admit post-op discussed with patient.)  intravenous induction     Anesthetic plan, risks, benefits, and alternatives have been provided, discussed and informed consent has been obtained with: patient.    Use of blood products discussed with patient  Consented to blood products.       CODE STATUS:

## 2023-03-21 NOTE — ANESTHESIA PROCEDURE NOTES
Airway  Urgency: emergent    Date/Time: 3/21/2023 4:44 PM  Airway not difficult    General Information and Staff    Patient location during procedure: OR  CRNA/CAA: Judy Hendrickson CRNA    Consent for Airway (if performed for an anesthetic, see related documentation for consents)  Patient identity confirmed: verbally with patient, arm band, provided demographic data and hospital-assigned identification number  Consent: No emergent situation. Verbal consent obtained. Written consent obtained.  Risks and benefits: risks, benefits and alternatives were discussed  Consent given by: patient      Indications and Patient Condition  Indications for airway management: airway protection    Preoxygenated: yes  MILS maintained throughout  Mask difficulty assessment: 0 - not attempted    Final Airway Details  Final airway type: supraglottic airway      Successful airway: unique  Size 3     Number of attempts at approach: 1  Assessment: lips, teeth, and gum same as pre-op and atraumatic intubation

## 2023-03-21 NOTE — ANESTHESIA POSTPROCEDURE EVALUATION
Patient: Kaylin Ojeda    Procedure Summary     Date: 03/21/23 Room / Location:  [facility] OR 1 /  [facility] OR    Anesthesia Start: 1628 Anesthesia Stop: 1752    Procedure: RIGHT ELBOW OLECRANNON SYNOVECTOMY, ARTHROTOMY JOINT INCISION AND DRAINAGE (Right: Arm Upper) Diagnosis:       Infection of right olecranon bursa      (Infection of right olecranon bursa [M71.121])    Surgeons: Lito Reeder MD Provider: Judy Hendrickson CRNA    Anesthesia Type: general ASA Status: 4 - Emergent          Anesthesia Type: general    Vitals  Vitals Value Taken Time   BP 96/79 03/21/23 1900   Temp 97.6 °F (36.4 °C) 03/21/23 1755   Pulse 47 03/21/23 1900   Resp 12 03/21/23 1855   SpO2 92 % 03/21/23 1900   Vitals shown include unvalidated device data.        Post Anesthesia Care and Evaluation    Patient location during evaluation: PACU  Patient participation: complete - patient participated  Level of consciousness: awake  Pain score: 1  Pain management: adequate    Airway patency: patent  Anesthetic complications: No anesthetic complications  PONV Status: none  Cardiovascular status: acceptable  Respiratory status: acceptable  Hydration status: acceptable

## 2023-03-21 NOTE — H&P
Orthopedic Consult      Patient: Kaylin Ojeda    Date of Admission: 3/21/2023 12:47 PM    YOB: 1940    Medical Record Number: [id number]    Attending Physician: Johny Pope, *  Consulting Physician:       Chief Complaints: Cellulitis of right elbow [L03.113]      History of Present Illness: 83 y.o. female who sustained a right open olecranon fracture on 12/7/2022.  The patient fell at home and unfortunately did not present to the emergency room until 12/9/2022.  At that point time she had already had an open fracture for 56 hours.  The patient was immediately started on cefazolin and admitted to the hospital.  The patient was taken to the operating room on the morning of 12/10/2022 for thorough irrigation and debridement and open reduction and internal fixation right olecranon fracture.  Unfortunately despite the best efforts and continued prolonged oral antibiotics following discharge the patient states that a few days ago she began to notice some redness and swelling in her right elbow.  She states that yesterday she noticed that her elbow appeared to be wet.  She thinks that she may have eroded through the skin by continually pushing herself up in bed with her right elbow.  The patient denies any fevers or chills or significant pain but is concerned about the swelling redness and now drainage.  After speaking with the daughter-in-law a much different story was told them with the patient reported.  The daughter-in-law states that the patient has fallen multiple times over the last few weeks each of which she is falling onto her right elbow.  At some point in time she fell onto her right elbow so violently that she split her incision open.  Unfortunately they never reached out to the office and she has not been reevaluated by myself since this injury a few weeks ago.  Daughter-in-law states that the patient is unable to care for herself and they think she needs to be placed in a  "nursing home following discharge.    Allergies   Allergen Reactions   • Aleve [Naproxen Sodium] Shortness Of Breath   • Codeine Unknown - Low Severity     hyperactivity   • Ibuprofen Unknown - Low Severity     unk   • Sulfa Antibiotics Unknown - Low Severity     \"I can't remember\"       Medications:   Home Medications:  (Not in a hospital admission)      Current Medications:  Scheduled Meds:cyanocobalamin, 1,000 mcg, Intramuscular, Q28 Days      Continuous Infusions:   PRN Meds:.       Past Medical History:   Diagnosis Date   • Abdominal pain, epigastric     Chronic, unclear cause, improved   • Anorexia    • Anxiety    • Arthritis    • Atrial fibrillation ([facility])    • CAD (coronary artery disease)     Cath 5/2015: nonobstructive LAD/RCA disease, 90% mid LCx s/p 2.27m33qm Xience.  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[facility])    • Mononucleosis    • Occlusion and stenosis of carotid artery    • Onychomycosis    • Orbit fracture ([facility])    • PAF (paroxysmal atrial fibrillation) ([facility])    • Peripheral arterial disease ([facility])     Significant (carotid, subclavian, renal arteries, lower extremities), with claudication, medical therapy only   • Pernicious anemia    • Prediabetes    • Renal artery stenosis ([facility])     unilateral, with renal atrophy (no intervention required)   • Renal calculi    • Sinus bradycardia     mild, asymptomatic   • TIA (transient ischemic attack)    • UTI (urinary tract infection)    • Valvular heart " disease     1/2021: mod-severe AI, mod MR, mild-mod TR   • Vision problem    • Vitamin B 12 deficiency         Past Surgical History:   Procedure Laterality Date   • APPENDECTOMY     • BREAST BIOPSY Left     20+ yrs ago   • BREAST SURGERY     • CARDIAC CATHETERIZATION  05/2015    nonobs LAD/RCA disease, 90% mid LCx s/p 2.57e56vy Xience   • CARDIAC CATHETERIZATION N/A 10/28/2020    Procedure: Right and Left Heart Cath;  Surgeon: Opal Contreras MD;  Location:  [facility];  Service: Cardiovascular;  Laterality: N/A;  for cardiac testing prior to possible valve surgery per Dr. Mai   • CARDIAC CATHETERIZATION N/A 10/28/2020    Procedure: Coronary angiography;  Surgeon: Opal Contreras MD;  Location:  [facility];  Service: Cardiovascular;  Laterality: N/A;   • CERVICAL FUSION  1997   • CHOLECYSTECTOMY     • CORONARY STENT PLACEMENT     • HYSTERECTOMY  1995   • KNEE SURGERY Right 2005   • KNEE SURGERY Left 1998   • ORIF HUMERUS FRACTURE Right 12/10/2022    Procedure: ELBOW OPEN REDUCTION INTERNAL FIXATION;  Surgeon: Lito Reeder MD;  Location:  [facility];  Service: Orthopedics;  Laterality: Right;        Social History     Occupational History     Employer: RETIRED   Tobacco Use   • Smoking status: Some Days     Packs/day: 0.20     Years: 50.00     Pack years: 10.00     Types: Cigarettes   • Smokeless tobacco: Never   • Tobacco comments:     trying to quit, pt states she has basically quit but will still have one when she stressed   Vaping Use   • Vaping Use: Never used   Substance and Sexual Activity   • Alcohol use: Not Currently     Comment: OCCASIONAL/ TWICE A YEAR. // daily caffiene   • Drug use: No   • Sexual activity: Defer      Social History     Social History Narrative    Pt drinks 2-3 cups of coffee daily and smokes 3 cigarettes a day.          Family History   Problem Relation Age of Onset   • Asthma Mother    • Emphysema Mother    • Graves' disease Mother    •  Heart disease Mother    • Hypertension Mother    • Emphysema Father    • Hypertension Father    • Heart disease Father    • Kidney disease Sister    • Lupus Daughter         Systemic erythematosus   • Arthritis Other    • Crohn's disease Other    • Breast cancer Niece    • Breast cancer Maternal Cousin    • Breast cancer Maternal Cousin          Review of Systems:   HEENT: Patient denies any headaches, vision changes, change in hearing, or tinnitus, Patient denies any rhinorrhea,epistaxis, sinus pain, mouth or dental problems, sore throat or hoarseness, or dysphagia  Pulmonary: Patient denies any cough, congestion, SOA, or wheezing  Cardiovascular: Patient denies any chest pain, dyspnea, palpitations, weakness, intolerance of exercise, varicosities, swelling of extremities, known murmur  Gastrointestinal:  Patient denies nausea, vomiting, diarrhea, constipation, loss  of appetite, change in appetite, dysphagia, gas, heartburn, melena, change in bowel habits, use of laxatives or other drugs to alter the function of the gastrointestinal tract.  Genital/Urinary: Patient denies dysuria, change in color of urine, change in frequency of urination, pain with urgency, incontinence, retention, or nocturia.  Musculoskeletal: Patient denies increased warmth; redness; or swelling of joints; limitation of function; deformity; crepitation: notes pain in right elbow as documented above in HPI.  Denies pain in low back or neck.    Neurological: Patient denies dizziness, tremor, ataxia, difficulty in speaking, change in speech, paresthesia, loss of sensation, seizures, syncope, changes in memory.  Endocrine system: Patient denies tremors, palpitations, intolerance of heat or cold, polyuria, polydipsia, polyphagia, diaphoresis, exophthalmos, or goiter.  Psychological: Patient denies thoughts/plans of harming self or other; depression, insomnia, night terrors, trini, memory loss, disorientation.  Skin: Patient denies any bruising,  "rashes, discoloration, pruritus, wounds, ulcers, decubiti, changes in the hair or nails  Hematopoietic: Patient denies history of spontaneous or excessive bleeding, epistaxis, hematuria, melena, fatigue, enlarged or tender lymph nodes, pallor, history of anemia.    Physical Exam: 83 y.o. female  Vitals:    03/21/23 1256 03/21/23 1415 03/21/23 1518   BP: 139/63 139/84 131/92   BP Location: Right arm     Patient Position: Lying     Pulse: (!) 44 (!) 44 (!) 45   Resp: 18 18 18   Temp: 97.9 °F (36.6 °C)     TempSrc: Oral     SpO2: 97% 98%    Weight: 46.6 kg (102 lb 12.8 oz)     Height: 152.4 cm (60\")       General: No acute distress  Pulmonary: non labored breathing  Cardiovascular: regular rate and rhythm  RUE  2+ radial pulse  Full forearm pronation supination compared to contralateral side  Elbow range of motion 2220  Sensation normal in right hand  Moderate swelling and erythema surrounding right elbow  Area of purulent drainage noted at sight or prior proximal ulna open fracture.    Diagnostic Tests:  Admission on 03/21/2023   Component Date Value Ref Range Status   • Glucose 03/21/2023 168 (H)  65 - 99 mg/dL Final   • BUN 03/21/2023 25 (H)  8 - 23 mg/dL Final   • Creatinine 03/21/2023 1.03 (H)  0.57 - 1.00 mg/dL Final   • Sodium 03/21/2023 133 (L)  136 - 145 mmol/L Final   • Potassium 03/21/2023 4.4  3.5 - 5.2 mmol/L Final   • Chloride 03/21/2023 95 (L)  98 - 107 mmol/L Final   • CO2 03/21/2023 18.6 (L)  22.0 - 29.0 mmol/L Final   • Calcium 03/21/2023 8.3 (L)  8.6 - 10.5 mg/dL Final   • Total Protein 03/21/2023 6.2  6.0 - 8.5 g/dL Final   • Albumin 03/21/2023 3.3 (L)  3.5 - 5.2 g/dL Final   • ALT (SGPT) 03/21/2023 883 (H)  1 - 33 U/L Final   • AST (SGOT) 03/21/2023 409 (H)  1 - 32 U/L Final   • Alkaline Phosphatase 03/21/2023 127 (H)  39 - 117 U/L Final   • Total Bilirubin 03/21/2023 0.7  0.0 - 1.2 mg/dL Final   • Globulin 03/21/2023 2.9  gm/dL Final   • A/G Ratio 03/21/2023 1.1  g/dL Final   • BUN/Creatinine " Ratio 03/21/2023 24.3  7.0 - 25.0 Final   • Anion Gap 03/21/2023 19.4 (H)  5.0 - 15.0 mmol/L Final   • eGFR 03/21/2023 54.7 (L)  >60.0 mL/min/1.73 Final   • WBC 03/21/2023 10.89 (H)  3.40 - 10.80 10*3/mm3 Final   • RBC 03/21/2023 3.27 (L)  3.77 - 5.28 10*6/mm3 Final   • Hemoglobin 03/21/2023 8.9 (L)  12.0 - 15.9 g/dL Final   • Hematocrit 03/21/2023 28.9 (L)  34.0 - 46.6 % Final   • MCV 03/21/2023 88.4  79.0 - 97.0 fL Final   • MCH 03/21/2023 27.2  26.6 - 33.0 pg Final   • MCHC 03/21/2023 30.8 (L)  31.5 - 35.7 g/dL Final   • RDW 03/21/2023 16.7 (H)  12.3 - 15.4 % Final   • RDW-SD 03/21/2023 54.1 (H)  37.0 - 54.0 fl Final   • MPV 03/21/2023 9.9  6.0 - 12.0 fL Final   • Platelets 03/21/2023 286  140 - 450 10*3/mm3 Final   • Neutrophil % 03/21/2023 65.3  42.7 - 76.0 % Final   • Lymphocyte % 03/21/2023 18.8 (L)  19.6 - 45.3 % Final   • Monocyte % 03/21/2023 12.0  5.0 - 12.0 % Final   • Eosinophil % 03/21/2023 0.1 (L)  0.3 - 6.2 % Final   • Basophil % 03/21/2023 0.4  0.0 - 1.5 % Final   • Immature Grans % 03/21/2023 3.4 (H)  0.0 - 0.5 % Final   • Neutrophils, Absolute 03/21/2023 7.11 (H)  1.70 - 7.00 10*3/mm3 Final   • Lymphocytes, Absolute 03/21/2023 2.05  0.70 - 3.10 10*3/mm3 Final   • Monocytes, Absolute 03/21/2023 1.31 (H)  0.10 - 0.90 10*3/mm3 Final   • Eosinophils, Absolute 03/21/2023 0.01  0.00 - 0.40 10*3/mm3 Final   • Basophils, Absolute 03/21/2023 0.04  0.00 - 0.20 10*3/mm3 Final   • Immature Grans, Absolute 03/21/2023 0.37 (H)  0.00 - 0.05 10*3/mm3 Final   • QT Interval 03/21/2023 526  ms Preliminary     XR Elbow 3+ View Right    Result Date: 3/21/2023  Impression: normal. IMPRESSION: Prior internal fixation of proximal ulna  Soft tissue swelling posterior to the elbow. I do not see any focal lucency in the bones but there are new irregular bone fragments proximal to the proximal ulna that were not present on the old studies. This could be due to ligamentous injury or possibly infection. CT imaging may be  useful..  This report was finalized on 3/21/2023 2:15 PM by Dr. Keo Wilder MD.      CT Angiogram Chest    Result Date: 3/12/2023  Impression: 1. No evidence of pulmonary embolism 2. Small bilateral pleural effusions, new since the previous exam in September. Bibasilar atelectasis is also new since the previous exam. 3. 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 Aneurysmal dilatation of the descending thoracic aorta unchanged. Emphysema unchanged. Signer Name: Benoit Mondragon MD  Signed: 3/12/2023 4:10 PM  Workstation Name: [id number]  [facility]    XR Chest 1 View    Result Date: 3/12/2023  Impression: 1. Cardiomegaly with interstitial pulmonary edema. 2. Obstructive airways disease. 3. Small ill-defined basilar lung opacities. Possible subsegmental atelectasis or scarring. 4. Suspect small pleural effusions Signer Name: Phillip Hi MD  Signed: 3/12/2023 1:27 PM  Workstation Name: [id number]  [facility]    US Venous Doppler Lower Extremity Bilateral (duplex)    Result Date: 3/13/2023  Impression: Negative examination.  No evidence of lower extremity DVT on the right or left.  This report was finalized on 3/13/2023 10:32 AM by Dr. Tyler Shannon MD.          Assessment:    Infection of right olecranon bursa    Cellulitis of right elbow           Plan:   I discussed with the patient that unfortunately despite best efforts including administration of IV antibiotics immediately upon arrival to the emergency room and irrigation and debridement of her open fracture within 12 hours of ER presentation her fracture was open for roughly 56 hours prior to coming to the hospital.  I had discussed with her along the way that my biggest concern for her was developing postoperative infection.  It appears as though she has begun to develop a draining sinus infection from her prior open fracture.  Additionally as reported by the daughter-in-law the  patient is fallen multiple times onto her left elbow over the last few weeks 1 of which resulted in an opening of the skin right over her surgical incision.  This was never relayed to us and I was never made aware.  I discussed with the patient and her daughter-in-law over the phone operative versus nonoperative treatment of her elbow infection.  Because the patient has begun to develop a draining open wound I would recommend open irrigation and debridement and synovectomy.  We will plan for implant retention at this point time since it is only been 3 months from her fracture and it does not appear completely healed.  I discussed with the patient that because of continued implant retention this does increase her risk for difficulty with infection eradication.  We will plan for cultures to be taken in the operating room today and IV antibiotic administration immediately following culture.  She will be admitted to the me and the hospitalist will be consulted for evaluation and work-up of metabolic derangements as well as management of IV antibiotics.  Patient will likely need a PICC line placed and prolonged IV antibiotic administration.  I discussed the risks of surgery with the patient and her daughter in law including but not limited to failure to eradicate the infection, bleeding, loss of fixation of her fracture, refracture, damage to arteries veins and nerves particularly the ulnar nerve which could lead to some hand weakness or numbness.  We will plan for left elbow irrigation debridement and synovectomy today.      Lito Reeder MD      Dictated utilizing Dragon dictation

## 2023-03-21 NOTE — OP NOTE
OPERATIVE REPORT     Date of Surgery:   03/21/23    Attending Surgeon:  Lito Reeder MD, MSE     INDICATIONS:   Right elbow pyogenic arthritis    PREOPERATIVE DIAGNOSIS:   Right elbow septic bursitis    POSTOPERATIVE DIAGNOSIS:   Right elbow septic bursitis  Right elbow pyogenic arthritis    PROCEDURE:   Right elbow arthrotomy with joint exploration, with synovectomy 33910      ASSIST:   Assistant: Perry Acuna CSA was responsible for performing the following activities: Retraction, Suction, Irrigation and Placing Dressing and their skilled assistance was necessary for the success of this case.      ASA CLASS:   Per anesthesia record    ANESTHESIA:    general    IV FLUIDS AND URINE:   See anesthesia.    ESTIMATED BLOOD LOSS:   100 mL.    IMPLANTS:   retained    SPECIMENS:   Right elbow fluid for culture  Right elbow tissue for culture  Right elbow swab for culture      DRAINS:   None.    COMPLICATIONS:   None.    DESCRIPTION OF PROCEDURE:   The patient was brought to the operating room and placed supine on the operating table.  The patient had been signed prior to the procedure and this was documented. The patient had the anesthesia placed by the anesthesiologist.  The prep verification and incision time-outs were performed to confirm that this was the correct patient, site, side and location. The patient did not receive antibiotics prior to the incision as we are waiting for cultures.  The patient was positioned lateral on a beanbag with an axillary roll and all bony prominences adequately padded.  The right upper extremity was draped over a paint roller that was well-padded.. The patient had the operative extremity prepped and draped in the standard surgical fashion.     The right upper extremity was then exsanguinated using gravity exsanguination and the tourniquet was inflated to 250 mmHg.  We began by opening the prior surgical incision with a 10 blade.  There was a small poke hole area of drainage  noted at the exact site of the prior open fracture.  This was incorporated into the surgical incision.  Immediately upon sharp dissection through the skin copious purulent drainage and necrotic tissue was noted.  Dissection was carried down to the triceps muscle bluntly using Metzenbaum scissors.  The triceps tendon was partially detached from the fracture fragment on the radial side.  This communicated directly with the elbow joint.  Gross purulence was noted coming from the elbow joint itself.  We then carried our sharp dissection distally over the plate elevating with a periosteal elevator medially and laterally.  No gross purulence was noted at the distal aspect of the incision.  The fiberwire holding the proximal fragment to the plate was cut and removed.  We then used a 15 blade to ellipse the draining sinus track by skin.  The subcutaneous bursal tissue appeared necrotic and tough.  The synovium was then excised using sharp dissection with a 15 blade.  We then began by copiously irrigating with 3 L of normal saline and cystoscopy tubing with gravity flow.  The cystoscopy tubing was placed through the triceps rent and into the elbow joint and the elbow was ranged and the forearm was rotated during the 3 L of irrigation.  We then irrigated with Irrisept and allowed it to sit for 3 minutes.  We then irrigated another 3 L of normal saline.  We then irrigated with dilute Betadine.  This was allowed to sit for 3 minutes.  We then irrigated again 3 L of normal saline.  Following completion of irrigation all dirty surgical instruments were passed off gloves were changed and a new drape was placed down underneath the elbow.  We then let down the tourniquet and obtained adequate hemostasis using electrocautery.  The triceps partial detachment was loosely reapproximated with Monocryl.  The skin was approximated with 2-0 Monocryl and then closed with 3-0 nylon sutures in an interrupted fashion.  The right upper extremity  was then washed and a sterile dressing was placed consisting of Xeroform, 4 x 4's, ABD, cast padding, cotton Curry, and two 4 inch Ace wrap's.  The patient was placed in a sling and rolled supine.  The LMA was removed and the patient was moved off the operating room table and taken to PACU in stable condition.  Following completion of the surgical procedure all counts were correct.    POSTOPERATIVE PLAN:   1. Weightbearing status:  NWB RUE  2. Mechanical DVT prophylaxis will be initiated immediately postoperatively.  3. Pharmacological DVT prophylaxis will be initiated within 24 hours based on other medications, activity level, and risk ratio of bleeding to thrombosis per the primary team.    Lito Reeder MD, MSE

## 2023-03-21 NOTE — ED PROVIDER NOTES
Subjective     Joint Swelling  Location:  Rt elbow pain, swell, red, draining clear liquid, several days, h/o surgery  Severity:  Mild  Onset quality:  Gradual  Timing:  Constant  Progression:  Worsening  Chronicity:  New  Context:  Spont onset, no injury  Relieved by:  Noth  Worsened by:  Noth  Ineffective treatments:  None  Associated symptoms: no fever        Review of Systems   Constitutional: Negative for fever.   Musculoskeletal: Positive for joint swelling.   All other systems reviewed and are negative.      Past Medical History:   Diagnosis Date   • Abdominal pain, epigastric     Chronic, unclear cause, improved   • Anorexia    • Anxiety    • Arthritis    • Atrial fibrillation ([facility])    • CAD (coronary artery disease)     Cath 5/2015: nonobstructive LAD/RCA disease, 90% mid LCx s/p [id number] Xience.  Cath 1/2021: 50-60% prox LAD/70-80% distal LAD, patent Cx stent, 20% RCA   • Cellulitis of leg    • Cervical disc disease    • Chronic fatigue    • Colitis    • COPD (chronic obstructive pulmonary disease) (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)    • Mononucleosis    • Occlusion and stenosis of carotid artery    • Onychomycosis    • Orbit fracture ([facility])    • PAF (paroxysmal atrial fibrillation) ([facility])    • Peripheral arterial disease ([facility])     Significant (carotid, subclavian, renal arteries, lower extremities), with claudication, medical therapy only   • Pernicious anemia    • Prediabetes    • Renal artery stenosis ([facility])     unilateral, with renal atrophy (no intervention required)   • Renal calculi    • Sinus bradycardia     mild, asymptomatic   • TIA (transient ischemic attack)    • UTI (urinary tract  "infection)    • Valvular heart disease     1/2021: mod-severe AI, mod MR, mild-mod TR   • Vision problem    • Vitamin B 12 deficiency        Allergies   Allergen Reactions   • Aleve [Naproxen Sodium] Shortness Of Breath   • Codeine Unknown - Low Severity     hyperactivity   • Ibuprofen Unknown - Low Severity     unk   • Sulfa Antibiotics Unknown - Low Severity     \"I can't remember\"       Past Surgical History:   Procedure Laterality Date   • APPENDECTOMY     • BREAST BIOPSY Left     20+ yrs ago   • BREAST SURGERY     • CARDIAC CATHETERIZATION  05/2015    nonobs LAD/RCA disease, 90% mid LCx s/p [id number] Xience   • CARDIAC CATHETERIZATION N/A 10/28/2020    Procedure: Right and Left Heart Cath;  Surgeon: Opal Contreras MD;  Location:  [facility];  Service: Cardiovascular;  Laterality: N/A;  for cardiac testing prior to possible valve surgery per Dr. Mai   • CARDIAC CATHETERIZATION N/A 10/28/2020    Procedure: Coronary angiography;  Surgeon: Opal Contreras MD;  Location:  [facility];  Service: Cardiovascular;  Laterality: N/A;   • CERVICAL FUSION  1997   • CHOLECYSTECTOMY     • CORONARY STENT PLACEMENT     • HYSTERECTOMY  1995   • KNEE SURGERY Right 2005   • KNEE SURGERY Left 1998   • ORIF HUMERUS FRACTURE Right 12/10/2022    Procedure: ELBOW OPEN REDUCTION INTERNAL FIXATION;  Surgeon: Lito Reeder MD;  Location: [facility] OR;  Service: Orthopedics;  Laterality: Right;       Family History   Problem Relation Age of Onset   • Asthma Mother    • Emphysema Mother    • Graves' disease Mother    • Heart disease Mother    • Hypertension Mother    • Emphysema Father    • Hypertension Father    • Heart disease Father    • Kidney disease Sister    • Lupus Daughter         Systemic erythematosus   • Arthritis Other    • Crohn's disease Other    • Breast cancer Niece    • Breast cancer Maternal Cousin    • Breast cancer Maternal Cousin        Social History     Socioeconomic History "   • Marital status:      Spouse name: Willie   • Number of children: 3   • Years of education: Some College   Tobacco Use   • Smoking status: Some Days     Packs/day: 0.20     Years: 50.00     Pack years: 10.00     Types: Cigarettes   • Smokeless tobacco: Never   • Tobacco comments:     trying to quit, pt states she has basically quit but will still have one when she stressed   Vaping Use   • Vaping Use: Never used   Substance and Sexual Activity   • Alcohol use: Not Currently     Comment: OCCASIONAL/ TWICE A YEAR. // daily caffiene   • Drug use: No   • Sexual activity: Defer           Objective   Physical Exam  Vitals and nursing note reviewed.   Constitutional:       Appearance: Normal appearance. She is not ill-appearing or diaphoretic.   Musculoskeletal:      Comments: Rt posterior elbow mild edema localized erythema pinhole with small amount serosanguinous drainage   Skin:     General: Skin is warm.      Findings: Erythema present.   Neurological:      General: No focal deficit present.      Mental Status: She is alert.      Sensory: No sensory deficit.      Motor: No weakness.   Psychiatric:         Mood and Affect: Mood normal.         Procedures           ED Course  ED Course as of 03/21/23 1506   Tue Mar 21, 2023   1445 Discussed with ortho  [BC]   1504 Dr chatterjee accepts here [BC]      ED Course User Index  [BC] Johny Pope MD           Discussed with ortho requesting to hold abx until culture done in OR washout                                MDM    Final diagnoses:   Cellulitis of right elbow       ED Disposition  ED Disposition     ED Disposition   Decision to Admit    Condition   --    Comment   Level of Care: Med/Surg [1]   Diagnosis: Cellulitis of right elbow [[id number]]               No follow-up provider specified.       Medication List      No changes were made to your prescriptions during this visit.          Johny Pope MD  03/21/23 1506       Johny Pope,  MD  03/21/23 1503

## 2023-03-22 PROBLEM — G45.8 SUBCLAVIAN STEAL SYNDROME OF LEFT SUBCLAVIAN ARTERY: Status: ACTIVE | Noted: 2023-01-01

## 2023-03-22 NOTE — NURSING NOTE
"0049 - PCA patient stated \"I hurt all over\" and \"I feel like I'm dying\". This nurse went to assess patient. Saw that most recent BP was 85/42 at 0046.    0051 - reassessed BP with Dynam[employer]. BP was 76/49    0055 - attempted manual BP but was unable to obtain as patient was unable to stay still, was unable to hear clearly.    0101 requested rapid response be called    0103 - Rapid response called overhead    0104 - BP 82/49    Please see Rapid Response documentation for more vital signs.    MD gave verbal order for 500cc fluid bolus and if BP not improved to give 2nd 500cc bolusand for patient to be transferred to ICU.              "

## 2023-03-22 NOTE — CONSULTS
"[facility] HOSPITALIST CONSULT NOTE    Mary Mayorga APRN    CONSULT REASON: Post-operative management of antibiotics    HISTORY OF PRESENT ILLNESS: Patient is an 83-year-old female who is very chronically ill.  She has severe ASCVD with history of multiple cardiac stents as well as significant known PAD.  In addition, she has heart failure with preserved EF and multiple cardiac valvular abnormalities.  She has paroxysmal atrial fibrillation and is on the reduced dose of Eliquis at 2.5 mg p.o. twice daily. See other comorbidities listed below.    The patient was previously treated back in early December for a right open olecranon fracture.  At that time, the patient did not present to the ED until 48 hours after her injury.  She had surgery and was given a prolonged course of antibiotics, however, earlier today she presented to the ED with symptoms including right elbow pain, swelling, and drainage. The ED contacted orthopedic surgery and she was taken for a right elbow arthrotomy with joint exploration and synovectomy this evening.    Patient interview/examination performed in hospital room after surgery.  There is no family at bedside.  Patient is alert and oriented, answering questions appropriately.  The patient states that it was her home health nurse who suggested that she come to the ED, and that he was right to do so.  She reports that she is currently \"hurting all over\".  She reports fatigue and some mild shortness of breath made worse with talking.  She denies any current chest pressure or dizziness.     Past Medical History:   Diagnosis Date   • Abdominal pain, epigastric     Chronic, unclear cause, improved   • Anorexia    • Anxiety    • Arthritis    • Atrial fibrillation (HCC)    • CAD (coronary artery disease)     Cath 5/2015: nonobstructive LAD/RCA disease, 90% mid LCx s/p [id number] Xience.  Cath 1/2021: 50-60% prox LAD/70-80% distal LAD, patent Cx stent, 20% RCA   • Cellulitis of " leg    • Cervical disc disease    • Chronic fatigue    • Colitis    • COPD (chronic obstructive pulmonary disease) ([facility])    • Depression    • Erosive gastritis    • Fibromyalgia    • Frequency of urination 06/09/2017   • Gastritis    • Hyperlipidemia    • Hypertension     History of refractory hypertension which improved significantly   • Insomnia    • Leukocytes in urine    • Lower back pain    • Mediastinal lymphadenopathy    • 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HCC)    • Mononucleosis    • Occlusion and stenosis of carotid artery    • Onychomycosis    • Orbit fracture ([facility])    • PAF (paroxysmal atrial fibrillation) ([facility])    • Peripheral arterial disease (HCC)     Significant (carotid, subclavian, renal arteries, lower extremities), with claudication, medical therapy only   • Pernicious anemia    • Prediabetes    • Renal artery stenosis ([facility])     unilateral, with renal atrophy (no intervention required)   • Renal calculi    • Sinus bradycardia     mild, asymptomatic   • TIA (transient ischemic attack)    • UTI (urinary tract infection)    • Valvular heart disease     1/2021: mod-severe AI, mod MR, mild-mod TR   • Vision problem    • Vitamin B 12 deficiency      Past Surgical History:   Procedure Laterality Date   • APPENDECTOMY     • BREAST BIOPSY Left     20+ yrs ago   • BREAST SURGERY     • CARDIAC CATHETERIZATION  05/2015    nonobs LAD/RCA disease, 90% mid LCx s/p [id number] Xience   • CARDIAC CATHETERIZATION N/A 10/28/2020    Procedure: Right and Left Heart Cath;  Surgeon: Opal Contreras MD;  Location:  [facility];  Service: Cardiovascular;  Laterality: N/A;  for cardiac testing prior to possible valve surgery per Dr. Mai   • CARDIAC CATHETERIZATION N/A 10/28/2020    Procedure: Coronary angiography;  Surgeon: Opal Contreras MD;  Location:  [facility];  Service: Cardiovascular;  Laterality: N/A;   • CERVICAL FUSION  1997   • CHOLECYSTECTOMY     • CORONARY STENT PLACEMENT     •  HYSTERECTOMY  1995   • KNEE SURGERY Right 2005   • KNEE SURGERY Left 1998   • ORIF HUMERUS FRACTURE Right 12/10/2022    Procedure: ELBOW OPEN REDUCTION INTERNAL FIXATION;  Surgeon: Lito Reeder MD;  Location: [facility];  Service: Orthopedics;  Laterality: Right;     Family History   Problem Relation Age of Onset   • Asthma Mother    • Emphysema Mother    • Graves' disease Mother    • Heart disease Mother    • Hypertension Mother    • Emphysema Father    • Hypertension Father    • Heart disease Father    • Kidney disease Sister    • Lupus Daughter         Systemic erythematosus   • Breast cancer Maternal Cousin    • Breast cancer Maternal Cousin    • Breast cancer Niece    • Arthritis Other    • Crohn's disease Other    • Malig Hyperthermia Neg Hx      Social History     Tobacco Use   • Smoking status: Some Days     Packs/day: 0.20     Years: 50.00     Pack years: 10.00     Types: Cigarettes   • Smokeless tobacco: Never   • Tobacco comments:     trying to quit, pt states she has basically quit but will still have one when she stressed. Smoked today   Vaping Use   • Vaping Use: Never used   Substance Use Topics   • Alcohol use: Not Currently     Comment: OCCASIONAL/ TWICE A YEAR. // daily caffiene   • Drug use: No     Facility-Administered Medications Prior to Admission   Medication Dose Route Frequency Provider Last Rate Last Admin   • cyanocobalamin injection 1,000 mcg  1,000 mcg Intramuscular Q28 Days JannDonna MD   1,000 mcg at 04/19/22 1419     Medications Prior to Admission   Medication Sig Dispense Refill Last Dose   • acetaminophen (TYLENOL) 325 MG tablet Take 2 tablets by mouth Every 4 (Four) Hours As Needed for Mild Pain.   3/20/2023   • albuterol (PROVENTIL) (2.5 MG/3ML) 0.083% nebulizer solution Take 2.5 mg by nebulization Every 4 (Four) Hours As Needed for Wheezing or Shortness of Air. 150 mL 2 3/21/2023   • apixaban (ELIQUIS) 2.5 MG tablet tablet Take 1 tablet by mouth 2 (Two) Times  a Day. 60 tablet 0 3/21/2023 at 0930   • atorvastatin (LIPITOR) 10 MG tablet TAKE 1 TABLET BY MOUTH DAILY. 90 tablet 0 3/21/2023   • bisacodyl (DULCOLAX) 10 MG suppository Insert 1 suppository into the rectum Daily As Needed for Constipation (Use if bisacodyl oral is ineffective).   3/20/2023   • clonazePAM (KlonoPIN) 0.5 MG tablet Take 1 tablet by mouth At Night As Needed for Anxiety. 30 tablet 0 3/20/2023   • dilTIAZem CD (CARDIZEM CD) 360 MG 24 hr capsule Take 1 capsule by mouth Daily. 30 capsule 0 3/21/2023 at 0930   • FLUoxetine (PROzac) 20 MG capsule TAKE 1 CAPSULE BY MOUTH DAILY. 30 capsule 0 3/21/2023   • hydrOXYzine (ATARAX) 25 MG tablet Take 1 tablet by mouth Every 6 (Six) Hours As Needed for Itching, Allergies or Anxiety.   3/20/2023   • melatonin 5 MG tablet tablet Take 1 tablet by mouth At Night As Needed (insomnia).   3/20/2023   • metoprolol tartrate (LOPRESSOR) 25 MG tablet Take 0.5 tablets by mouth Every 12 (Twelve) Hours. 60 tablet 0 3/21/2023 at 0930   • oxyCODONE-acetaminophen (Percocet) 7.5-325 MG per tablet Take 1 tablet by mouth Every 8 (Eight) Hours As Needed for Severe Pain. 90 tablet 0 3/21/2023 at 0930   • bisacodyl (DULCOLAX) 5 MG EC tablet Take 1 tablet by mouth Daily As Needed for Constipation (Use if polyethylene glycol is ineffective).   More than a month   • budesonide-formoterol (Symbicort) 160-4.5 MCG/ACT inhaler Inhale 2 puffs 2 (Two) Times a Day.  12 More than a month   • famotidine (PEPCID) 20 MG tablet Take 1 tablet by mouth 2 (Two) Times a Day Before Meals.   More than a month   • fluticasone (FLONASE) 50 MCG/ACT nasal spray SPRAY TWICE IN EACH NOSTRIL EVERY DAY FOR 30 DAYS. (Patient taking differently: As Needed. prn) 16 mL 6 More than a month   • Misc. Devices (Bath/Shower Seat) misc 1 each As Needed (for showering). 1 each 0    • Nutritional Supplements (Ensure Nutrition Shake) liquid Take 1 bottle by mouth 2 (Two) Times a Day. 18951 mL 2    • ondansetron (ZOFRAN) 4 MG  "tablet Take 1 tablet by mouth Every 6 (Six) Hours As Needed for Nausea or Vomiting.      • polyethylene glycol (MIRALAX) 17 g packet Take 17 g by mouth Daily As Needed (Use if senna-docusate is ineffective).   More than a month   • vitamin D (ERGOCALCIFEROL) 1.25 MG (96879 UT) capsule capsule Take 1 capsule by mouth 1 (One) Time Per Week. 13 capsule 3 More than a month     Allergies:  Aleve [naproxen sodium], Codeine, Ibuprofen, and Sulfa antibiotics    Immunization History   Administered Date(s) Administered   • COVID-19 (MODERNA) 1st, 2nd, 3rd Dose Only 02/03/2021, 03/04/2021   • COVID-19 (MODERNA) BOOSTER 01/21/2022   • Fluad Quad 65+ 09/02/2020   • Fluzone High Dose =>65 Years ([employer] ONLY) 10/15/2015, 10/30/2017, 08/29/2018, 09/10/2019   • Pneumococcal Conjugate 13-Valent (PCV13) 11/14/2018   • Pneumococcal Polysaccharide (PPSV23) 12/04/2019       REVIEW OF SYSTEMS:  Please see the above history of present illness for pertinent positives and negatives.  The remainder of the patient's systems have been reviewed and are negative.     Vital Signs  Temp:  [97.6 °F (36.4 °C)-97.9 °F (36.6 °C)] 97.6 °F (36.4 °C)  Heart Rate:  [44-49] 47  Resp:  [12-18] 14  BP: ()/(46-92) 103/58  Flowsheet Rows    Flowsheet Row First Filed Value   Admission Height 152.4 cm (60\") Documented at 03/21/2023 1256   Admission Weight 46.6 kg (102 lb 12.8 oz) Documented at 03/21/2023 1256           Physical Exam:  General: Patient is a chronically ill-appearing 83-year-old female, awake and alert. NAD on 2L O2 via NC  HENT: Head is atraumatic, normocephalic. Hearing is grossly intact. Nose is without obvious congestion and appears patent. Neck is supple and trachea is midline.   Eyes: Vision is grossly intact. Pupils appear equal and round.   Cardiovascular: Heart has regular rate and rhythm with no murmurs, rubs or gallops noted.   Respiratory: Globally reduced lung sounds. No wheezing, rales, or rhonchi noted. Does become somewhat " short-of-breath with talking but no accessory muscle use or respiratory distresss   Abdominal/GI: Soft, nontender, bowel sounds present. No rebound or guarding present.   Extremities: RUE wrapped in clean dressing  Skin: Warm and dry.   Psych: Mood and affect are appropriate. Cooperative with exam.   Neuro: No facial asymmetry noted. No focal deficits noted, hearing and vision are grossly intact.       Results Review:    I reviewed the patient's new clinical results.  Lab Results (most recent)     Procedure Component Value Units Date/Time    Sedimentation Rate [[id number]] Updated: 03/21/23 2040    Specimen: Blood     C-reactive Protein [[id number]] Collected: 03/21/23 1311    Specimen: Blood Updated: 03/21/23 2034    Wound Culture - Wound, Elbow, Right [[id number]] Collected: 03/21/23 1658    Specimen: Wound from Elbow, Right Updated: 03/21/23 1954     Gram Stain Moderate (3+) WBCs seen      Occasional Gram positive cocci in pairs and clusters    Wound Culture - Wound, Elbow, Right [[id number]] Collected: 03/21/23 1657    Specimen: Wound from Elbow, Right Updated: 03/21/23 1954     Gram Stain Moderate (3+) WBCs seen      Occasional Gram positive cocci in pairs and clusters    Comprehensive Metabolic Panel [[id number]]  (Abnormal) Collected: 03/21/23 1311    Specimen: Blood Updated: 03/21/23 1344     Glucose 168 mg/dL      BUN 25 mg/dL      Creatinine 1.03 mg/dL      Sodium 133 mmol/L      Potassium 4.4 mmol/L      Chloride 95 mmol/L      CO2 18.6 mmol/L      Calcium 8.3 mg/dL      Total Protein 6.2 g/dL      Albumin 3.3 g/dL      ALT (SGPT) 883 U/L      AST (SGOT) 409 U/L      Alkaline Phosphatase 127 U/L      Total Bilirubin 0.7 mg/dL      Globulin 2.9 gm/dL      A/G Ratio 1.1 g/dL      BUN/Creatinine Ratio 24.3     Anion Gap 19.4 mmol/L      eGFR 54.7 mL/min/1.73     Narrative:      GFR Normal >60  Chronic Kidney Disease <60  Kidney Failure <15    The GFR formula is only valid for adults with stable renal function  between ages 18 and 70.    CBC & Differential [[id number]]  (Abnormal) Collected: 03/21/23 1311    Specimen: Blood Updated: 03/21/23 1323    Narrative:      The following orders were created for panel order CBC & Differential.  Procedure                               Abnormality         Status                     ---------                               -----------         ------                     CBC Auto Differential[[id number]]        Abnormal            Final result                 Please view results for these tests on the individual orders.    CBC Auto Differential [[id number]]  (Abnormal) Collected: 03/21/23 1311    Specimen: Blood Updated: 03/21/23 1323     WBC 10.89 10*3/mm3      RBC 3.27 10*6/mm3      Hemoglobin 8.9 g/dL      Hematocrit 28.9 %      MCV 88.4 fL      MCH 27.2 pg      MCHC 30.8 g/dL      RDW 16.7 %      RDW-SD 54.1 fl      MPV 9.9 fL      Platelets 286 10*3/mm3      Neutrophil % 65.3 %      Lymphocyte % 18.8 %      Monocyte % 12.0 %      Eosinophil % 0.1 %      Basophil % 0.4 %      Immature Grans % 3.4 %      Neutrophils, Absolute 7.11 10*3/mm3      Lymphocytes, Absolute 2.05 10*3/mm3      Monocytes, Absolute 1.31 10*3/mm3      Eosinophils, Absolute 0.01 10*3/mm3      Basophils, Absolute 0.04 10*3/mm3      Immature Grans, Absolute 0.37 10*3/mm3           Imaging Results (Most Recent)     Procedure Component Value Units Date/Time    XR Elbow 3+ View Right [[id number]] Collected: 03/21/23 1413     Updated: 03/21/23 1418    Narrative:      XR ELBOW 3+ VW RIGHT-: 3/21/2023 2:04 PM     INDICATION:   Redness swelling with possible recent yesterday. Evaluate for infection.     COMPARISON:   12/10/2022.     FINDINGS:   3 views of the right elbow. There is a metal plate affixed to the  proximal ulna. On the lateral view there is posterior soft tissue  swelling at the elbow and there are small bone density fragments  proximal to the ulna. These measure up to 5 mm in diameter. It was not  visible on the  old studies from 12/10/2022. The distal humerus appears    Impression:      normal. IMPRESSION:  Prior internal fixation of proximal ulna     Soft tissue swelling posterior to the elbow. I do not see any focal  lucency in the bones but there are new irregular bone fragments proximal  to the proximal ulna that were not present on the old studies. This  could be due to ligamentous injury or possibly infection. CT imaging may  be useful..     This report was finalized on 3/21/2023 2:15 PM by Dr. Keo Wilder MD.           Reviewed    ECG/EMG Results (most recent)     Procedure Component Value Units Date/Time    ECG 12 Lead Rhythm Change [[id number]] Collected: 03/21/23 1503     Updated: 03/21/23 1745     QT Interval 526 ms     Narrative:      HEART RATE= 46  bpm  RR Interval= 1308  ms  ME Interval=   ms  P Horizontal Axis=   deg  P Front Axis=   deg  QRSD Interval= 84  ms  QT Interval= 526  ms  QRS Axis= 65  deg  T Wave Axis= 103  deg  - ABNORMAL ECG -  Junctional rhythm - new  Nonspecific T abnormalities, lateral leads  Motion  artifact  Electronically Signed By: Shakira Zamorano ([facility]) 21-Mar-2023 17:44:51  Date and Time of Study: 2023-03-21 15:03:34        EKG reviewed    Assessment & Plan     Septic arthritis-  Wound cultures obtained in OR.  Will cover empirically with vancomycin and cefepime and de-escalate therapy based on culture results as able. Patient does have history of Pseudomonas in urine, was pansensitive at that time. Further post-operative management per orthopedic surgery    PAF- patient on reduced dose of Eliquis at home.  This may be why she is not on aspirin for her CAD.  Will defer to orthopedic surgery on when to resume Eliquis and for DVT prevention in this patient he just had surgery.  Cardizem currently being held per primary, have placed holding parameters on Lopressor considering patient is bradycardic and her BP is on the low side of normal post-operatively     Subclavian steal syndrome/History  "of multiple falls- this patient has a history of multiple falls. Subclavian steal noted on the left, see CTA performed on 03/12/23. Considering there is no vascular surgery coverage at this facility, patient will need to follow-up as an outpatient to have this evaluated if not already previously completed, especially if this is found to be a large contributing reason to her history of multiple falls.  PT/OT consulted    ASCVD/HFpEF- have noted history.  Primary has continued Lipitor per home regimen.  Patient is on Eliquis and has history of multiple falls, this may be why she is not receiving aspirin. Would be judicious with IV fluids, not currently in CHF exacerbation    COPD/tobacco use disorder- Continue symbicort per home regimen. Not in acute exacerbation. Have added Duonebs PRN. NRT therapy with nicotine patches PRN for nicotine withdrawal.     Chronic pain- patient on oxycodone 7.5mg PO TID PRN at home. Management per primary    Anxiety- patient on clonazepam 0.5mg PO HS PRN. Management per primary    I discussed the patient's findings and my recommendations with patient and patient's nurse.     Marilee Love,   03/21/2023   21:20 EDT     At [facility], we believe that sharing information builds trust and better relationships. You are receiving this note because you recently visited [facility]. It is possible you will see health information before a provider has talked with you about it. This kind of information can be easy to misunderstand. To help you fully understand what it means for your health, we urge you to discuss this note with your provider.     \"Dictated utilizing Dragon dictation\"    "

## 2023-03-22 NOTE — PROGRESS NOTES
Have talked to son. He wishes to keep current medications going for now but agrees that he does not feel she would want any CPR/heroic measures and will be here soon.

## 2023-03-22 NOTE — NURSING NOTE
Pt was transferred to ICU after a rapid response was called on med/surg for hypotension, IV fluid bolus given per orders. Pt was started on Levophed drip which is currently maxed and pt remains hypotensive (50's/30's). Several of pt's family members are at bedside and wish for pt to remain on all current drips but to have pain medication and made comfortable. She is DNR/DNI.

## 2023-03-22 NOTE — DISCHARGE SUMMARY
"Kaylin Ojeda  1940  [id number]    Hospitalists Discharge/Death Summary    Date of Admission: 3/21/2023  Date of Discharge:  3/22/2023   Time of death: 1455 pm    History of Present Illness from [facility] on admit:   \"83 y.o. female who sustained a right open olecranon fracture on 12/7/2022.  The patient fell at home and unfortunately did not present to the emergency room until 12/9/2022.  At that point time she had already had an open fracture for 56 hours.  The patient was immediately started on cefazolin and admitted to the hospital.  The patient was taken to the operating room on the morning of 12/10/2022 for thorough irrigation and debridement and open reduction and internal fixation right olecranon fracture.  Unfortunately despite the best efforts and continued prolonged oral antibiotics following discharge the patient states that a few days ago she began to notice some redness and swelling in her right elbow.  She states that yesterday she noticed that her elbow appeared to be wet.  She thinks that she may have eroded through the skin by continually pushing herself up in bed with her right elbow.  The patient denies any fevers or chills or significant pain but is concerned about the swelling redness and now drainage.  After speaking with the daughter-in-law a much different story was told them with the patient reported.  The daughter-in-law states that the patient has fallen multiple times over the last few weeks each of which she is falling onto her right elbow.  At some point in time she fell onto her right elbow so violently that she split her incision open.  Unfortunately they never reached out to the office and she has not been reevaluated by myself since this injury a few weeks ago.  Daughter-in-law states that the patient is unable to care for herself and they think she needs to be placed in a nursing home following discharge.\"    Primary Discharge diagnoses:  Septic shock/acute transaminitis/shock " liver secondary to right olecranon septic arthritis, s/p right elbow arthrotomy with joint exploration and synovectomy, POD 1  AHRF   HAGMA  Hyperkalemia  Leukocytosis  FAITH   Elevated troponin  New bradycardia/junctional rhythm with history of PAF    Secondary Discharge Diagnoses:   CAD/HLD s/p stent  Hypertension  History of mild to moderate MR/TR, severe AR  History of 11th right rib fracture  PAD/vasculopathy  Chronic normocytic anemia  History of mesenteric artery stenosis/thoracoabdominal aortic aneurysm/coronary artery stenosis  Previously suspected moderate cognitive decline  COPD  GERD  Chronic severe malnutrition  History of tobacco abuse  History of anxiety/depression  Chronic pain with chronic narcotic dependence    Hospital Course Summary:   The patient was admitted by orthopedic surgery and taken immediately to surgery due to purulent drainage from right elbow area.  Shortly after returning from operating room she became unstable with heart rate 30s to 40s, junctional rhythm and patient's mentation acutely changed.  She was moved to ICU and treated for sepsis with IV antibiotics and Levophed.  She did not want aggressive measures and deemed herself a DNR previously.  Long discussion with family who felt patient would rather be made comfortable then to continue aggressive measures.  Patient was changed to palliative care and  within hours on comfort measures with all family members present at her bedside.  Time of death 1455 PM    PCP  Patient Care Team:  Donna Forte MD as PCP - General (Internal Medicine & Pediatrics)  Donna Forte MD as PCP - Family Medicine  Donna Forte MD as Referring Physician (Internal Medicine & Pediatrics)    Consults:   Consults     Date and Time Order Name Status Description    3/21/2023  7:54 PM Inpatient Hospitalist Consult Completed     3/14/2023  7:44 AM Inpatient Cardiology Consult Completed         Operations and Procedures  Performed:  Procedure(s):  RIGHT ELBOW OLECRANNON SYNOVECTOMY, ARTHROTOMY JOINT INCISION AND DRAINAGE     Adult Transthoracic Echo Complete W/ Cont if Necessary Per Protocol    Result Date: 3/13/2023  Narrative: •  Left ventricular systolic function is normal. Calculated left ventricular EF = 65.6% Normal left ventricular cavity size noted. Left ventricular wall thickness is consistent with mild concentric hypertrophy. All left ventricular wall segments contract normally. Left ventricular diastolic function was indeterminate. •  The right ventricular cavity is mildly dilated. Normal right ventricular systolic function noted. •  Left atrium not well visualized. The left atrial cavity is severely dilated. •  Right atrium not well visualized. The right atrial cavity is dilated. •  The aortic valve is abnormal in structure. There is moderate calcification of the aortic valve. The aortic valve was poorly visualized but appears trileaflet. Mild to moderate aortic valve regurgitation is present. No hemodynamically significant aortic valve stenosis is present. •  Severe mitral annular calcification is present. There is severe calcification of the mitral valve. There is severe, bileaflet mitral valve thickening present. Moderate mitral valve regurgitation is present. Mild mitral valve stenosis is present. •  Moderate to severe tricuspid valve regurgitation is present. Estimated right ventricular systolic pressure from tricuspid regurgitation is mildly elevated (35-45 mmHg). Calculated right ventricular systolic pressure from tricuspid regurgitation is 36 mmHg. No evidence of significant tricuspid valve stenosis is present. •  There is moderate, (grade 3) plaque in the descending aorta present. •  The inferior vena cava is dilated. IVC inspiratory collapse is absent.     XR Elbow 3+ View Right    Result Date: 3/21/2023  Narrative: XR ELBOW 3+ VW RIGHT-: 3/21/2023 2:04 PM  INDICATION: Redness swelling with possible recent  yesterday. Evaluate for infection.  COMPARISON: 12/10/2022.  FINDINGS: 3 views of the right elbow. There is a metal plate affixed to the proximal ulna. On the lateral view there is posterior soft tissue swelling at the elbow and there are small bone density fragments proximal to the ulna. These measure up to 5 mm in diameter. It was not visible on the old studies from 12/10/2022. The distal humerus appears    Impression: normal. IMPRESSION: Prior internal fixation of proximal ulna  Soft tissue swelling posterior to the elbow. I do not see any focal lucency in the bones but there are new irregular bone fragments proximal to the proximal ulna that were not present on the old studies. This could be due to ligamentous injury or possibly infection. CT imaging may be useful..  This report was finalized on 3/21/2023 2:15 PM by Dr. Keo Wilder MD.      CT Angiogram Chest    Result Date: 3/12/2023  Narrative: CT ANGIOGRAM CHEST W WO CONTRAST INDICATION: Elevated d-dimer with emphysema. Worsening shortness of breath over the past 2 weeks. TECHNIQUE: CT angiogram of the chest with 100 cc of Omnipaque 300 IV contrast. 3-D reconstructions were obtained and reviewed.   Radiation dose reduction techniques included automated exposure control or exposure modulation based on body size. Count of known CT and cardiac nuc med studies performed in previous 12 months: 1. COMPARISON: 9/28/2022 FINDINGS: Chest images at mediastinal window show no pulmonary artery filling defects to suggest emboli. The CT angiographic reformatted images also show no evidence of emboli. The aorta is tortuous and generally dilated with aneurysmal dilatation across the oumar of the diaphragm, unchanged. The diameter measures 4 cm. Of note is occlusion of the left subclavian artery at its origin with refilling of the left subclavian at the level of the left vertebral consistent with a left subclavian steal. There are small bilateral pleural effusions, new since  the previous scan. Chest images at lung windows demonstrate emphysema. There is bibasilar atelectasis that has worsened since the previous exam. No other changes are seen in the lungs.     Impression: 1. No evidence of pulmonary embolism 2. Small bilateral pleural effusions, new since the previous exam in September. Bibasilar atelectasis is also new since the previous exam. 3. 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 Aneurysmal dilatation of the descending thoracic aorta unchanged. Emphysema unchanged. Signer Name: Benoit Mondragon MD  Signed: 3/12/2023 4:10 PM  Workstation Name: [id number]  [facility]    XR Chest 1 View    Result Date: 3/22/2023  Narrative: CR Chest 1 Vw INDICATION: Dyspnea. COMPARISON:  3/12/2023 FINDINGS: Single portable AP view(s) of the chest. Heart remains enlarged. There is atherosclerotic disease in the aorta. COPD is present with interstitial prominence which may reflect mild edema or fibrosis. There is some persistent consolidation at the left lung base, also seen on chest CTA 3/12/2023. No pneumothorax.     Impression: Cardiomegaly with COPD and interstitial changes suggesting either edema or fibrosis. There is some mild consolidation at the left lung base that was also identified on the patient's recent prior chest CTA. Signer Name: Benoit Jhaveri MD  Signed: 3/22/2023 2:21 AM  Workstation Name: [id number]  [facility]    XR Chest 1 View    Result Date: 3/12/2023  Narrative: INDICATION: Short of air TECHNIQUE: 1 views of the chest COMPARISON: Chest radiograph 9/28/2022 FINDINGS: Lungs/Pleura: Increased lung markings. Kerley B-lines seen at the bases. Ill-defined basilar lung opacities. Blunting costophrenic angles. Increased lung volumes. Mediastinum: Enlarged cardiac silhouette. Other: None     Impression: 1. Cardiomegaly with interstitial pulmonary edema. 2. Obstructive airways disease. 3. Small ill-defined basilar  lung opacities. Possible subsegmental atelectasis or scarring. 4. Suspect small pleural effusions Signer Name: Phillip Hi MD  Signed: 3/12/2023 1:27 PM  Workstation Name: [id number]  [facility] Venous Doppler Lower Extremity Bilateral (duplex)    Result Date: 3/13/2023  Narrative: VENOUS DOPPLER ULTRASOUND, BILATERAL LOWER EXTREMITIES, COMPLETE, 03/13/2023  HISTORY: 83-year-old female hospital inpatient with three month history right leg pain. Elevated d-dimer.  TECHNIQUE: Venous Doppler ultrasound examination of both legs was performed using grey-scale, spectral Doppler, and color flow Doppler ultrasound imaging. Protocol includes popliteal and deep calf veins and the great saphenous vein.  FINDINGS: The examination is negative.  There is no evidence of deep venous thrombosis from the groin to the lower calf bilaterally. The greater saphenous veins are also patent.      Impression: Negative examination.  No evidence of lower extremity DVT on the right or left.  This report was finalized on 3/13/2023 10:32 AM by Dr. Tyler Shannon MD.      Allergies:  is allergic to aleve [naproxen sodium], codeine, ibuprofen, and sulfa antibiotics.    Raul  n/a    Discharge Medications: n/a    Last Lab Results:   Lab Results (most recent)     Procedure Component Value Units Date/Time    C-reactive Protein [[id number]]  (Abnormal) Collected: 03/22/23 0130    Specimen: Blood Updated: 03/22/23 1121     C-Reactive Protein 10.66 mg/dL     C-reactive Protein [[id number]]  (Abnormal) Collected: 03/21/23 1311    Specimen: Blood Updated: 03/22/23 1112     C-Reactive Protein 10.43 mg/dL     Procalcitonin [[id number]]  (Normal) Collected: 03/22/23 0131    Specimen: Blood Updated: 03/22/23 0309     Procalcitonin 0.10 ng/mL     Narrative:      As a Marker for Sepsis (Non-Neonates):    1. <0.5 ng/mL represents a low risk of severe sepsis and/or septic shock.  2. >2 ng/mL represents a high risk of severe  "sepsis and/or septic shock.    As a Marker for Lower Respiratory Tract Infections that require antibiotic therapy:    PCT on Admission    Antibiotic Therapy       6-12 Hrs later    >0.5                Strongly Recommended  >0.25 - <0.5        Recommended   0.1 - 0.25          Discouraged              Remeasure/reassess PCT  <0.1                Strongly Discouraged     Remeasure/reassess PCT    As 28 day mortality risk marker: \"Change in Procalcitonin Result\" (>80% or <=80%) if Day 0 (or Day 1) and Day 4 values are available. Refer to http://www.[facility]-pct-calculator.com    Change in PCT <=80%  A decrease of PCT levels below or equal to 80% defines a positive change in PCT test result representing a higher risk for 28-day all-cause mortality of patients diagnosed with severe sepsis for septic shock.    Change in PCT >80%  A decrease of PCT levels of more than 80% defines a negative change in PCT result representing a lower risk for 28-day all-cause mortality of patients diagnosed with severe sepsis or septic shock.       Comprehensive Metabolic Panel [[id number]]  (Abnormal) Collected: 03/22/23 0130    Specimen: Blood Updated: 03/22/23 0243     Glucose 127 mg/dL      BUN 28 mg/dL      Creatinine 1.34 mg/dL      Sodium 135 mmol/L      Potassium 5.7 mmol/L      Chloride 97 mmol/L      CO2 7.9 mmol/L      Calcium 8.0 mg/dL      Total Protein 5.9 g/dL      Albumin 3.1 g/dL      ALT (SGPT) 2,269 U/L      AST (SGOT) 2,341 U/L      Alkaline Phosphatase 136 U/L      Total Bilirubin 1.7 mg/dL      Globulin 2.8 gm/dL      A/G Ratio 1.1 g/dL      BUN/Creatinine Ratio 20.9     Anion Gap 30.1 mmol/L      eGFR 39.9 mL/min/1.73     Narrative:      GFR Normal >60  Chronic Kidney Disease <60  Kidney Failure <15    The GFR formula is only valid for adults with stable renal function between ages 18 and 70.    Magnesium [[id number]]  (Abnormal) Collected: 03/22/23 0130    Specimen: Blood Updated: 03/22/23 0213     Magnesium 2.6 mg/dL     " High Sensitivity Troponin T [[id number]]  (Abnormal) Collected: 03/22/23 0131    Specimen: Blood Updated: 03/22/23 0159     HS Troponin T 50 ng/L     Narrative:      High Sensitive Troponin T Reference Range:  <10.0 ng/L- Negative Female for AMI  <15.0 ng/L- Negative Male for AMI  >=10 - Abnormal Female indicating possible myocardial injury.  >=15 - Abnormal Male indicating possible myocardial injury.   Clinicians would have to utilize clinical acumen, EKG, Troponin, and serial changes to determine if it is an Acute Myocardial Infarction or myocardial injury due to an underlying chronic condition.         CBC & Differential [[id number]]  (Abnormal) Collected: 03/22/23 0130    Specimen: Blood Updated: 03/22/23 0155    Narrative:      The following orders were created for panel order CBC & Differential.  Procedure                               Abnormality         Status                     ---------                               -----------         ------                     CBC Auto Differential[[id number]]        Abnormal            Final result               Scan Slide[[id number]]                                                                    Please view results for these tests on the individual orders.    CBC Auto Differential [[id number]]  (Abnormal) Collected: 03/22/23 0130    Specimen: Blood Updated: 03/22/23 0155     WBC 13.76 10*3/mm3      RBC 3.37 10*6/mm3      Hemoglobin 9.2 g/dL      Hematocrit 31.0 %      MCV 92.0 fL      MCH 27.3 pg      MCHC 29.7 g/dL      RDW 17.0 %      RDW-SD 57.0 fl      MPV 10.9 fL      Platelets 268 10*3/mm3      Neutrophil % 71.4 %      Lymphocyte % 11.8 %      Monocyte % 6.4 %      Eosinophil % 0.0 %      Basophil % 0.3 %      Immature Grans % 10.1 %      Neutrophils, Absolute 9.82 10*3/mm3      Lymphocytes, Absolute 1.63 10*3/mm3      Monocytes, Absolute 0.88 10*3/mm3      Eosinophils, Absolute 0.00 10*3/mm3      Basophils, Absolute 0.04 10*3/mm3      Immature Grans, Absolute  1.39 10*3/mm3      nRBC 2.8 /100 WBC     Sedimentation Rate [[id number]]  (Normal) Collected: 03/22/23 0130    Specimen: Blood Updated: 03/22/23 0151     Sed Rate 27 mm/hr     POC Glucose Once [[id number]]  (Abnormal) Collected: 03/22/23 0106    Specimen: Blood Updated: 03/22/23 0112     Glucose 134 mg/dL      Comment: Meter: [id number] : [id number] Arjun Pardo RN       Sedimentation Rate [[id number]]  (Normal) Collected: 03/21/23 2146    Specimen: Blood Updated: 03/21/23 2153     Sed Rate 30 mm/hr     Blood Culture - Blood, Hand, Left [[id number]] Collected: 03/21/23 2147    Specimen: Blood from Hand, Left Updated: 03/21/23 2149    Blood Culture - Blood, Arm, Left [[id number]] Collected: 03/21/23 2147    Specimen: Blood from Arm, Left Updated: 03/21/23 2149    Wound Culture - Wound, Elbow, Right [[id number]] Collected: 03/21/23 1658    Specimen: Wound from Elbow, Right Updated: 03/21/23 1954     Gram Stain Moderate (3+) WBCs seen      Occasional Gram positive cocci in pairs and clusters    Wound Culture - Wound, Elbow, Right [[id number]] Collected: 03/21/23 1657    Specimen: Wound from Elbow, Right Updated: 03/21/23 1954     Gram Stain Moderate (3+) WBCs seen      Occasional Gram positive cocci in pairs and clusters    Comprehensive Metabolic Panel [[id number]]  (Abnormal) Collected: 03/21/23 1311    Specimen: Blood Updated: 03/21/23 1344     Glucose 168 mg/dL      BUN 25 mg/dL      Creatinine 1.03 mg/dL      Sodium 133 mmol/L      Potassium 4.4 mmol/L      Chloride 95 mmol/L      CO2 18.6 mmol/L      Calcium 8.3 mg/dL      Total Protein 6.2 g/dL      Albumin 3.3 g/dL      ALT (SGPT) 883 U/L      AST (SGOT) 409 U/L      Alkaline Phosphatase 127 U/L      Total Bilirubin 0.7 mg/dL      Globulin 2.9 gm/dL      A/G Ratio 1.1 g/dL      BUN/Creatinine Ratio 24.3     Anion Gap 19.4 mmol/L      eGFR 54.7 mL/min/1.73     Narrative:      GFR Normal >60  Chronic Kidney Disease <60  Kidney Failure <15    The GFR formula is only  valid for adults with stable renal function between ages 18 and 70.    CBC & Differential [[id number]]  (Abnormal) Collected: 03/21/23 1311    Specimen: Blood Updated: 03/21/23 1323    Narrative:      The following orders were created for panel order CBC & Differential.  Procedure                               Abnormality         Status                     ---------                               -----------         ------                     CBC Auto Differential[[id number]]        Abnormal            Final result                 Please view results for these tests on the individual orders.    CBC Auto Differential [[id number]]  (Abnormal) Collected: 03/21/23 1311    Specimen: Blood Updated: 03/21/23 1323     WBC 10.89 10*3/mm3      RBC 3.27 10*6/mm3      Hemoglobin 8.9 g/dL      Hematocrit 28.9 %      MCV 88.4 fL      MCH 27.2 pg      MCHC 30.8 g/dL      RDW 16.7 %      RDW-SD 54.1 fl      MPV 9.9 fL      Platelets 286 10*3/mm3      Neutrophil % 65.3 %      Lymphocyte % 18.8 %      Monocyte % 12.0 %      Eosinophil % 0.1 %      Basophil % 0.4 %      Immature Grans % 3.4 %      Neutrophils, Absolute 7.11 10*3/mm3      Lymphocytes, Absolute 2.05 10*3/mm3      Monocytes, Absolute 1.31 10*3/mm3      Eosinophils, Absolute 0.01 10*3/mm3      Basophils, Absolute 0.04 10*3/mm3      Immature Grans, Absolute 0.37 10*3/mm3         Imaging Results (Most Recent)     Procedure Component Value Units Date/Time    XR Chest 1 View [[id number]] Collected: 03/22/23 0221     Updated: 03/22/23 0224    Narrative:      CR Chest 1 Vw    INDICATION:   Dyspnea.     COMPARISON:    3/12/2023    FINDINGS:  Single portable AP view(s) of the chest.    Heart remains enlarged. There is atherosclerotic disease in the aorta. COPD is present with interstitial prominence which may reflect mild edema or fibrosis. There is some persistent consolidation at the left lung base, also seen on chest CTA 3/12/2023.  No pneumothorax.       Impression:       Cardiomegaly with COPD and interstitial changes suggesting either edema or fibrosis. There is some mild consolidation at the left lung base that was also identified on the patient's recent prior chest CTA.    Signer Name: Benoit Jhaveri MD   Signed: 3/22/2023 2:21 AM   Workstation Name: [id number]    [facility]    XR Elbow 3+ View Right [[id number]] Collected: 23     Updated: 23    Narrative:      XR ELBOW 3+ VW RIGHT-: 3/21/2023 2:04 PM     INDICATION:   Redness swelling with possible recent yesterday. Evaluate for infection.     COMPARISON:   12/10/2022.     FINDINGS:   3 views of the right elbow. There is a metal plate affixed to the  proximal ulna. On the lateral view there is posterior soft tissue  swelling at the elbow and there are small bone density fragments  proximal to the ulna. These measure up to 5 mm in diameter. It was not  visible on the old studies from 12/10/2022. The distal humerus appears    Impression:      normal. IMPRESSION:  Prior internal fixation of proximal ulna     Soft tissue swelling posterior to the elbow. I do not see any focal  lucency in the bones but there are new irregular bone fragments proximal  to the proximal ulna that were not present on the old studies. This  could be due to ligamentous injury or possibly infection. CT imaging may  be useful..     This report was finalized on 3/21/2023 2:15 PM by Dr. Keo Wilder MD.           PROCEDURES  Procedure(s):  RIGHT ELBOW OLECRANNON SYNOVECTOMY, ARTHROTOMY JOINT INCISION AND DRAINAGE    Condition on Discharge:      Physical Exam at Discharge  Vital Signs  Physical Exam  Pulse and respirations are absent    Discharge Disposition   home    Test Results Pending at Discharge  Pending Labs     Order Current Status    Blood Culture - Blood, Arm, Left In process    Blood Culture - Blood, Hand, Left In process    Wound Culture - Wound, Elbow, Right Preliminary result    Wound  "Culture - Wound, Elbow, Right Preliminary result           Becky Vincent, APRN  03/22/23  16:15 EDT    Time: Discharge over 30 min (if over 30 minutes give explanation as to why it took greater than 30 minutes)  Secondary to:   Coordination of care/follow up  Medication reconciliation  D/W family, case management, specialist, staff    \"Dictated utilizing Dragon dictation\"      "

## 2023-03-22 NOTE — OUTREACH NOTE
Medical Week 2 Survey    Flowsheet Row Responses   [facility] patient discharged from? [place]   Does the patient have one of the following disease processes/diagnoses(primary or secondary)? Other   Week 2 attempt successful? No   Unsuccessful attempts Attempt 1   Revoke Readmitted          Patricia RODRIGUEZ - Registered Nurse

## 2023-03-22 NOTE — CASE MANAGEMENT/SOCIAL WORK
Continued Stay Note  CHERRIE Bennett     Patient Name: Kaylin Ojeda  MRN: [id number]  Today's Date: 3/22/2023    Admit Date: 3/21/2023    Plan: [facility] scattered bed pending eval   Discharge Plan     Row Name 03/22/23 1503       Plan    Plan Comments Provider notified CM that patient passed today at 1455pm. CM called and spoke with Radha at [facility] and notified her of such. CM will follow.               Discharge Codes    No documentation.                     Jeanette Kelly RN

## 2023-03-22 NOTE — NURSING NOTE
Pt currently declining despite levo maxed, HR 40's RR mid 20's. Family at bedside states they just want to keep her comfortable. Will discuss comfort measures with Md.,

## 2023-03-22 NOTE — PROGRESS NOTES
Pharmacokinetic Consult - Vancomycin Dosing    Kaylin Ojeda is a 83 y.o. female who has been consulted for vancomycin dosing for complicated skin and soft tissue infection.    Relevant clinical data and objective history reviewed:  Lab Results   Component Value Date/Time    CREATININE 1.34 (H) 03/22/2023 01:30 AM    CREATININE 1.03 (H) 03/21/2023 01:11 PM    CREATININE 0.96 03/15/2023 08:46 AM    BUN 28 (H) 03/22/2023 01:30 AM    BUN 25 (H) 03/21/2023 01:11 PM    BUN 39 (H) 03/15/2023 08:46 AM     Estimated Creatinine Clearance: 24.2 mL/min (A) (by C-G formula based on SCr of 1.34 mg/dL (H)).  I/O last 3 completed shifts:  In: 100 [I.V.:100]  Out: 100 [Blood:100]  Lab Results   Component Value Date/Time    WBC 13.76 (H) 03/22/2023 01:30 AM    WBC 7.1 07/20/2022 10:33 AM    HGB 9.2 (L) 03/22/2023 01:30 AM    HCT 31.0 (L) 03/22/2023 01:30 AM    MCV 92.0 03/22/2023 01:30 AM     03/22/2023 01:30 AM     Temp Readings from Last 3 Encounters:   03/21/23 97.8 °F (36.6 °C) (Oral)   03/14/23 96.9 °F (36.1 °C) (Oral)   01/24/23 98.7 °F (37.1 °C)     Weight: 46.6 kg (102 lb 12.8 oz)      Assessment/Plan  The patient will be started on vancomycin utilizing bolus dose once followed by random levels based on actual body weight.  Baseline risks associated with therapy include: pre-existing renal impairment.  Will initiate dose at 1000 mg IV once and give intermittent doses based on patient's vancomycin blood levels.  Pharmacy will also follow closely for s/sx of nephrotoxicity.  Serum creatinine will be ordered per policy.  Due to infection severity, will target a trough of 10-15 ug/mL.  Pharmacy will continue to follow the patient’s culture results and clinical progress daily.    Chong Mireles, NikiD

## 2023-03-22 NOTE — PROGRESS NOTES
"DAILY PROGRESS NOTE  Psychiatric  1  ORTHOPEDIC SURGERY DAILY PROGRESS NOTE  Psychiatric  LENGTH OF STAY 1 DAYS      Patient Identification:  Name: Kaylin Ojeda  Age: 83 y.o.  Sex: female  :  1940  MRN: [id number]         Primary Care Physician: Donna Forte MD      Subjective:  Interval History: rapid response called overnight, patient upgraded to ICU. Family at bedside. Considering comfort measures.     Objective:    Scheduled Meds:budesonide-formoterol, 2 puff, Inhalation, BID - RT  Calcium Gluconate-NaCl, , ,   Cefepime-Dextrose, 2 g, Intravenous, Q24H  dextrose, , ,   EPINEPHrine, , ,   norepinephrine, , ,   sodium bicarbonate, , ,   sodium chloride, 1,000 mL, Intravenous, Once  sodium chloride, , ,       Continuous Infusions:lactated ringers, 9 mL/hr, Last Rate: 9 mL/hr (23 1628)  norepinephrine, 0.02-0.3 mcg/kg/min, Last Rate: 0.3 mcg/kg/min (23 0410)  Pharmacy to dose vancomycin,   sodium bicarbonate drip (greater than 75 mEq/bag), 150 mEq, Last Rate: 150 mEq (23 0810)        Vital signs in last 24 hours:  Temp:  [97.6 °F (36.4 °C)-97.9 °F (36.6 °C)] 97.8 °F (36.6 °C)  Heart Rate:  [36-50] 46  Resp:  [12-26] 26  BP: ()/(28-97) 117/97    Intake/Output:    Intake/Output Summary (Last 24 hours) at 3/22/2023 0954  Last data filed at 3/21/2023 1737  Gross per 24 hour   Intake 100 ml   Output 100 ml   Net 0 ml       Exam:  /97   Pulse (!) 46   Temp 97.8 °F (36.6 °C) (Oral)   Resp 26   Ht 152.4 cm (60\")   Wt 46.6 kg (102 lb 12.8 oz)   LMP  (LMP Unknown)   SpO2 96%   BMI 20.08 kg/m²   General: No acute distress  Pulmonary: Non-labored breathing  Cardiovascular: Regular rate and rhythm   RUE   2+ radial pulse   finger warm well perfused   unable to assess motor of sensory function   dressing CDI   compartments soft and compressible               Data Review:  Lab Results   Component Value Date    CALCIUM 8.0 (L) 2023    " PHOS 3.4 12/11/2022     Results from last 7 days   Lab Units 03/22/23  0130 03/21/23  1311   AST (SGOT) U/L 2,341* 409*   MAGNESIUM mg/dL 2.6*  --    SODIUM mmol/L 135* 133*   POTASSIUM mmol/L 5.7* 4.4   CHLORIDE mmol/L 97* 95*   CO2 mmol/L 7.9* 18.6*   BUN mg/dL 28* 25*   CREATININE mg/dL 1.34* 1.03*   GLUCOSE mg/dL 127* 168*   CALCIUM mg/dL 8.0* 8.3*   WBC 10*3/mm3 13.76* 10.89*   HEMOGLOBIN g/dL 9.2* 8.9*   PLATELETS 10*3/mm3 268 286   ALT (SGPT) U/L 2,269* 883*     Lab Results   Component Value Date    CKTOTAL 170 12/09/2022    CKMB 2.84 06/05/2017    TROPONINT 50 (H) 03/22/2023     Estimated Creatinine Clearance: 24.2 mL/min (A) (by C-G formula based on SCr of 1.34 mg/dL (H)).  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

## 2023-03-22 NOTE — SIGNIFICANT NOTE
03/22/23 0757   OTHER   Discipline physical therapist   Rehab Time/Intention   Session Not Performed unable to treat, medical status change  (PT: Patient transferred to ICU overnight. Hypotensive and not appropriate for therapy evaluation at this time. Please re consult when patient is medically stable.)

## 2023-03-22 NOTE — CASE MANAGEMENT/SOCIAL WORK
Continued Stay Note  CHERRIE Bennett     Patient Name: Kaylin Ojeda  MRN: [id number]  Today's Date: 3/22/2023    Admit Date: 3/21/2023    Plan: Hospital course to determine disposition.   Discharge Plan     Row Name 03/22/23 0834       Plan    Plan Hospital course to determine disposition.    Plan Comments Per chart patient was admitted for septic arthritis of her elbow and was taken to surgery. She was later transferred to ICU after rapid response for low BP and HR and remains in critical condition. CM will follow closely and discuss discharge plan with patient and family when appropriate. CM will follow.               Discharge Codes    No documentation.                     Jeanette Kelly RN

## 2023-03-22 NOTE — PROGRESS NOTES
Changed to palliative care after further d/w family, hospice evaluation requested although do not feel patient will survive the day, feel it is helpful for family.

## 2023-03-22 NOTE — PROGRESS NOTES
Pastoral Care visit and prayer with family.  Courtesy cart order from [facility].    Chaplain Sin

## 2023-03-22 NOTE — CASE MANAGEMENT/SOCIAL WORK
Continued Stay Note  CHERRIE Bennett     Patient Name: Kaylin Ojeda  MRN: [id number]  Today's Date: 3/22/2023    Admit Date: 3/21/2023    Plan: [facility] scattered bed pending eval   Discharge Plan     Row Name 03/22/23 1000       Plan    Plan [facility] scattered bed pending eval    Plan Comments CM received consult from provider LILI Watson regarding [facility]ospaurs consult ASAP. CM called and spoke with Radha and referral given. CM provided Radha with Jona Rosenthal patient's son phone number to contact. CM informed Radha to have scheduling call me back when they have a time they will come and meet with the patient. CM will follow.    Row Name 03/22/23 0834       Plan    Plan Hospital course to determine disposition.    Plan Comments Per chart rajni was admitted for spetic arthritis of her elbow and was taken to surgery. She was later transferred to ICU after rapid response for low BP and HR and remains in critical condision. CM will follow closely and discuss discharge plan with patient and family when appropritate. CM will follow.               Discharge Codes    No documentation.                     Jeanette Kelly, RN

## 2023-03-22 NOTE — CASE MANAGEMENT/SOCIAL WORK
Continued Stay Note  CHERRIE Bennett     Patient Name: Kaylin Ojeda  MRN: [id number]  Today's Date: 3/22/2023    Admit Date: 3/21/2023    Plan: [facility] scattered bed pending eval   Discharge Plan     Row Name 03/22/23 1126       Plan    Plan [facility] scattered bed pending eval    Plan Comments CM received a call from Francine FLORES at [facility] and she states the earliest appointment they have is tomorrow morning at 9:30am. She states she has spoken with patient's son Jona and informed him of such. Francine also states that she will put patient on the cancellation list and call the son if one come open. CM updated provider of such. CM will follow.    Row Name 03/22/23 1000       Plan    Plan [facility] scattered bed pending eval    Plan Comments CM received consult from provider LILI Watson regarding Hospa[facility] consult ASAP. CM called and spoke with Radha and referrral given. CM provided Radha with Jona Rosenthal patient's son phone number to contact. CM informed Radha to have scheduling call me back when they have a time they will come and meet with the rajni. CM will follow.    Row Name 03/22/23 0834       Plan    Plan Hospital course to determine disposition.    Plan Comments Per chart rajni was admitted for spetic arthritis of her elbow and was taken to surgery. She was later transferred to ICU after rapid response for low BP and HR and remains in critical condision. CM will follow closely and discuss discharge plan with patient and family when appropritate. CM will follow.               Discharge Codes    No documentation.                     Jeanette Kelly, SANDRA

## 2023-03-22 NOTE — PROGRESS NOTES
"Rapid called at 0103.  Patient somnolent but responsive on entrance to the room. Per nursing rapid was called because during a vital check patient started repeatedly stating \"please let me die\".  When her blood pressure was taken her SBP was in the 70s and her DBP was in the 40s.  Per nursing and CNA the patient was gray in color and somnolent.    Asked nursing to increase the fluid rate from 100 cc/hour to a full bolus rate and to go ahead and pull Levophed gtt in case this was needed.  Patient only had 1 IV in left antecubital fossa.  Asked nursing to get additional access and there was much difficulty with this, which is not surprising considering the patient's vascular medical history.  Asked nursing whether there was staff present who could perform an EJ and one was obtained on the right.    Patient does have a signed DNR order. During initial encounter earlier in evening patient had reported that she would want chest compressions but would never want to be on a ventilator in any situation, even temporarily. During the rapid she reported that she had changed her mind.    When asked about pain, patient still responding that she hurts \"all over\". No change in cardiac or pulmonary exam from earlier this evening when consulted other than a slight increase in respiratory rate.    Moved the patient to ICU for closer monitoring. Ordered stat rainbow draw including orders for CBC, CMP, troponin, magnesium. Ordered EKG and troponin.      Edited to add:  Unfortunately, received labs from rapid response and noted that the patient was in fulminant hepatic and renal failure with a rising potassium and a very severe metabolic acidosis. The patient's HR has been bradycardic since she arrived to the hospital prior to surgery but after being moved to ICU her BP started dropping further despite IV fluids and a Levophed gtt was initiated.    Ordered temporizing measures including a bicarb gtt with insulin and D50 and attempted to " contact the relatives listed on patient's living will. Called the numbers listed for patient's son, his wife, and grandson multiple times throughout the night to discuss next steps but there was no answer. The patient's heart rate started dropping down into the 20s and 30s and she was connected to transcutaneous pacer and given multiple pushes of bicarb and doses of calcium gluconate. Again attempted to contact family and was able to contact patient's sister who states that she does not feel that the patient would want chest compressions or any sort of heroic measures to keep the patient alive. I then asked her sister to verify if whether she would want us to continue pushing medications to increase the patient's HR and she stated that she did not feel as if the patient would want that either. Because of this in conjunction with the DNR order, if patient begins to decompensate again, will let her die peacefully as per her wishes.        Time: Between the patient's rapid response on the floor and actively trying to keep her from decompensating in the ICU prior to obtaining contact with sister, 120 minutes of critical care time were spent actively managing the patient with nursing at the bedside.

## 2023-03-24 LAB
BACTERIA SPEC AEROBE CULT: ABNORMAL
BACTERIA SPEC AEROBE CULT: ABNORMAL
GRAM STN SPEC: ABNORMAL

## 2023-03-26 LAB
BACTERIA SPEC AEROBE CULT: NORMAL
BACTERIA SPEC AEROBE CULT: NORMAL

## 2023-05-16 DIAGNOSIS — E78.00 HYPERCHOLESTEROLEMIA: ICD-10-CM

## 2023-05-16 DIAGNOSIS — I25.119 CORONARY ARTERY DISEASE INVOLVING NATIVE CORONARY ARTERY OF NATIVE HEART WITH ANGINA PECTORIS: ICD-10-CM

## 2023-05-16 RX ORDER — ATORVASTATIN CALCIUM 10 MG/1
10 TABLET, FILM COATED ORAL DAILY
Qty: 90 TABLET | Refills: 0 | OUTPATIENT
Start: 2023-05-16

## 2024-08-30 NOTE — PROGRESS NOTES
"SERVICE: [facility] HOSPITALIST    CONSULTANTS: Orthopedic surgery, cardiology    CHIEF COMPLAINT: Olecranon fracture    SUBJECTIVE: Patient reports that her pain is well controlled.  She denies any fever, chills, chest pain, shortness of breath.  She does report some confusion and short-term memory loss when asked.  She reports feeling anxious about her cat.    OBJECTIVE:    Physical exam is largely unchanged from previous exam, except where documented below, examination is accurate as of 12/11/2022    Physical Exam:  General: Patient is an 82-year-old female, awake and alert.  Almost her whole face is covered in bruising with a contusion on the right frontal aspect of her head  HENT: Head with contusion. Hearing is grossly intact. Nose is without obvious congestion and appears patent. Neck is supple and trachea is midline.   Eyes: Vision is grossly intact. Pupils appear equal and round.   Cardiovascular: Heart has irregularly irregular rhythm with no murmurs, rubs or gallops noted.   Respiratory: Lungs are clear to ausculation without wheezes, rhonchi or rales.   Abdominal/GI: Soft, nontender, bowel sounds present. No rebound or guarding present.   Extremities: Right upper extremity in knee elbow/olecranon area wrapped in extensive amount of bandaging  Musculoskeletal: Spontaneous movement of bilateral upper and lower extremities against gravity noted.   Skin: Warm and dry.   Psych: Mood and affect are appropriate. Cooperative with exam.   Neuro: No facial asymmetry noted. No focal deficits noted, hearing and vision are grossly intact.     BP (!) 129/113 (BP Location: Left arm, Patient Position: Lying)   Pulse (!) 137   Temp 97.9 °F (36.6 °C) (Oral)   Resp 16   Ht 152.4 cm (60\")   Wt 45.3 kg (99 lb 14.4 oz)   LMP  (LMP Unknown)   SpO2 97%   BMI 19.51 kg/m²     MEDS/LABS REVIEWED AND ORDERED    atorvastatin, 10 mg, Oral, Daily  budesonide-formoterol, 2 puff, Inhalation, BID - " RT  doxycycline, 100 mg, Oral, Q12H  famotidine, 20 mg, Oral, BID AC  FLUoxetine, 20 mg, Oral, Daily  metoprolol tartrate, 12.5 mg, Oral, Q12H  nicotine, 1 patch, Transdermal, Q24H  senna-docusate sodium, 2 tablet, Oral, BID  sodium chloride, 10 mL, Intravenous, Q12H          LAB/DIAGNOSTICS:    Lab Results (last 24 hours)     Procedure Component Value Units Date/Time    Phosphorus [[id number]]  (Normal) Collected: 12/11/22 1412    Specimen: Blood Updated: 12/11/22 1431     Phosphorus 3.4 mg/dL     Magnesium [[id number]]  (Normal) Collected: 12/11/22 1412    Specimen: Blood Updated: 12/11/22 1431     Magnesium 2.2 mg/dL     Hemoglobin & Hematocrit, Blood [[id number]]  (Abnormal) Collected: 12/11/22 1412    Specimen: Blood Updated: 12/11/22 1416     Hemoglobin 9.4 g/dL      Hematocrit 28.8 %     Basic Metabolic Panel [[id number]]  (Abnormal) Collected: 12/11/22 0425    Specimen: Blood Updated: 12/11/22 0540     Glucose 113 mg/dL      BUN 8 mg/dL      Creatinine 0.73 mg/dL      Sodium 135 mmol/L      Potassium 3.4 mmol/L      Chloride 97 mmol/L      CO2 27.8 mmol/L      Calcium 9.0 mg/dL      BUN/Creatinine Ratio 11.0     Anion Gap 10.2 mmol/L      eGFR 83.3 mL/min/1.73      Comment: National Kidney Foundation and American Society of Nephrology (ASN) Task Force recommended calculation based on the Chronic Kidney Disease Epidemiology Collaboration (CKD-EPI) equation refit without adjustment for race.       Narrative:      GFR Normal >60  Chronic Kidney Disease <60  Kidney Failure <15    The GFR formula is only valid for adults with stable renal function between ages 18 and 70.    CBC & Differential [[id number]]  (Abnormal) Collected: 12/11/22 0425    Specimen: Blood Updated: 12/11/22 0516    Narrative:      The following orders were created for panel order CBC & Differential.  Procedure                               Abnormality         Status                     ---------                               -----------          ------                     CBC Auto Differential[[id number]]        Abnormal            Final result                 Please view results for these tests on the individual orders.    CBC Auto Differential [[id number]]  (Abnormal) Collected: 12/11/22 0425    Specimen: Blood Updated: 12/11/22 0516     WBC 8.04 10*3/mm3      RBC 2.96 10*6/mm3      Hemoglobin 9.0 g/dL      Hematocrit 28.3 %      MCV 95.6 fL      MCH 30.4 pg      MCHC 31.8 g/dL      RDW 15.9 %      RDW-SD 55.2 fl      MPV 10.4 fL      Platelets 147 10*3/mm3      Neutrophil % 74.1 %      Lymphocyte % 17.0 %      Monocyte % 7.5 %      Eosinophil % 0.4 %      Basophil % 0.5 %      Immature Grans % 0.5 %      Neutrophils, Absolute 5.96 10*3/mm3      Lymphocytes, Absolute 1.37 10*3/mm3      Monocytes, Absolute 0.60 10*3/mm3      Eosinophils, Absolute 0.03 10*3/mm3      Basophils, Absolute 0.04 10*3/mm3      Immature Grans, Absolute 0.04 10*3/mm3      nRBC 0.0 /100 WBC         ECG 12 Lead Rhythm Change   Preliminary Result   HEART RATE= 132  bpm   RR Interval= 440  ms   UT Interval=   ms   P Horizontal Axis=   deg   P Front Axis=   deg   QRSD Interval= 80  ms   QT Interval= 332  ms   QRS Axis= 72  deg   T Wave Axis= 40  deg   - ABNORMAL ECG -   Afib/flut and V-paced complexes   Consider left ventricular hypertrophy   Prolonged QT interval   Electronically Signed By:    Date and Time of Study: 2022-12-11 13:54:07      ECG 12 Lead   ED Interpretation   Ramin Silvestre MD     12/9/2022  4:02 PM   ECG 12 Lead         Date/Time: 12/9/2022 3:32 PM   Performed by: Ramin Silvestre MD   Authorized by: Ramin Silvestre MD    Interpreted by physician   Comparison: compared with previous ECG from 10/6/2022   Similar to previous ECG   Comments: Rate of 74, A. fib, normal axis, no acute ST elevation or    depression.         ECG 12 Lead Pre-Op / Pre-Procedure   Final Result   HEART RATE= 74  bpm   RR Interval= 812  ms   UT Interval=   ms   P Horizontal Axis=   deg   P  Front Axis=   deg   QRSD Interval= 84  ms   QT Interval= 438  ms   QRS Axis= 78  deg   T Wave Axis= 34  deg   - BORDERLINE ECG -   Sinus rhythm   Consider left ventricular hypertrophy   c/w prior ECG, NSR has replaced SVT   Electronically Signed By: Sven Mathews ([facility]) 10-Dec-2022 21:45:23   Date and Time of Study: 2022-12-09 15:32:21        Results for orders placed during the hospital encounter of 09/28/22    Adult Transthoracic Echo Complete w/ Color, Spectral and Contrast if Necessary Per Protocol    Interpretation Summary  · Estimated right ventricular systolic pressure from tricuspid regurgitation is normal (<35 mmHg). Calculated right ventricular systolic pressure from tricuspid regurgitation is 29 mmHg.  · Left ventricular wall thickness is consistent with mild concentric hypertrophy.  · Calculated left ventricular EF = 62% Estimated left ventricular EF was in agreement with the calculated left ventricular EF. Left ventricular systolic function is normal.  · Moderate to severe aortic valve regurgitation is present.  · Mild to moderate tricuspid valve regurgitation is present.  · Mild to moderate mitral valve regurgitation is present.  · Left atrial volume is severely increased.  · The right atrial cavity is mildly dilated.  · Mild dilation of the ascending aorta is present.    XR Shoulder 2+ View Left    Result Date: 12/11/2022  Degenerative changes of the left glenohumeral joint. No acute fracture or dislocation. Signer Name: Cornell Acosta MD  Signed: 12/11/2022 11:02 PM  Workstation Name: [id number]  [facility]    XR Elbow 2 View Right    Result Date: 12/10/2022  Post open reduction internal fixation comminuted fracture proximal ulna with improvement in fracture fragment alignment. No dislocation. Signer Name: Ida [facility] MD  Signed: 12/10/2022 11:22 AM  Workstation Name: [id number]  [facility]    XR Elbow 2 View Right    Result Date: 12/10/2022  ORIF of right  olecranon fracture in normal anatomic alignment. Signer Name: TAMIKA Flores MD  Signed: 12/10/2022 10:20 AM  Workstation Name: [id number]  [facility]        ASSESSMENT/PLAN:  Please note portions of this assessment/plan may have been copied and pasted, but I have personally seen this patient and reviewed each line of this assessment and plan for accuracy and made updates to reflect my necessary changes on 12/11/2022    Acute comminuted, displaced intra-articular fracture of the proximal ulna secondary to fall:  Orthopedic surgery consulted, did an ORIF   PT/OT consulted  Control pain and nausea with IV and oral medications-the patient is on large doses of opiates as an outpatient   Fall precautions  Patient needs short-term rehab, discussing with case management and physical therapy/Occupational Therapy    Atrial fibrillation with RVR  Occurred this afternoon with no obvious trigger, patient asymptomatic  Gave patient a dose of IV Lopressor which did not seem to help much  Have ordered patient a dose of IV digoxin.  She is receiving IV fluids currently.  Considering she just had surgery we will check H&H  Her potassium was replaced this morning.  Magnesium unremarkable  We will continue to monitor on telemetry     Suspected dementia:  Patient has demonstrated significant amount of short-term memory loss since admission. In addition she lit up a cigarette in her hospital room  When asked about her memory at home she states she realizes that this is been a significant issue lately  Will consult speech for cognitive eval  Patient having significant amount of trouble simply feeding herself due to her right upper extremity.  She would greatly benefit from short-term rehab and would not be safe to go home considering she lives alone     Right lower rib pain: Patient with 11th rib fracture on the right, see rib x-ray     Bilateral knee pain/ecchymosis: Knee x-rays with no acute  abnormalities     PAF on Eliquis:  CAD/HLD, s/p stent:  Mod-severe AR/mild-mod MR, mild-mod TR:  HTN:   Continue home metoprolol, Lipitor  Holding Eliquis, will restart when orthopedic surgery amenable  Monitor on telemetry     PAD/vasculopathy:  H/O mesenteric artery stenosis, thoracoabdominal aortic aneurysm, carotid artery stenosis:  Continue statin, will restart Eliquis when able as noted above     COPD: Nothing acute, add home Symbicort     GERD: Add Pepcid     Severe malnutrition:  Body mass index is 18.89 kg/m².   Consult dietician  Add boost supplements     Tobacco abuse: Add nicotine patch and cessation education     Anxiety/depression: Add home Prozac     Chronic pain with chronic narcotic dependence:  Check UDS  Add Percocet 10 mg every 4 hours as needed for pain as patient is opiate tolerant  When n.p.o. add Dilaudid 0.5 mg IV  Already made ambulatory referral into pain management at discharge  Monitor     Performs over 4 mets daily, lives alone.   Odom Class II risk for vascular, 1 point, 6% perioperative cardiac risk      I discussed the patient's findings and my recommendations with patient and staff.     PLAN FOR DISPOSITION: TBORLIN Love DO  Hospitalist, [facility]  12/11/22  14:58 EST    As of April 2021, as required by the Federal 21st Century Cures Act, medical records (including provider notes and laboratory/imaging results) are to be made available to patients and/or their designees as soon as the documents are signed/resulted. While the intention is to ensure transparency and to engage patients in their healthcare, this immediate access may create unintended consequences because this document uses language intended for communication between medical providers for interpretation with the entirety of the patient's clinical picture in mind. It is recommended that patients and/or their designees review all available information with their primary or specialist  "providers for explanation and to avoid misinterpretation of this information.    \"Dictated utilizing Dragon dictation\"  " 36.8

## 2025-05-06 NOTE — TELEPHONE ENCOUNTER
Caller: Kaylin Ojeda    Relationship: Self      Medication requested (name and dosage):   OXYCODONE 7.5-325 MG   CLONZAEPAM 0.5 MG     Pharmacy where request should be sent:   [id number] [place]    Best call back number: [phone]    Does the patient have less than a 3 day supply:  [] Yes  [x] No    Tristen Richardson Rep   10/01/21 13:00 EDT       
Caller: Kaylin Ojeda    Relationship: Self    Medication requested (name and dosage): clonazePAM (KlonoPIN) 0.5 MG tablet  oxyCODONE-acetaminophen (Percocet) 7.5-325 MG per tablet    Pharm[facility] where [facility] be sent: [id number] - [place], [place] - [address] [facility] & [address] - [phone]  - [phone] FX      Additional details provided by patient: PATIENT IS OUT OF MEDICATION     Best call back number: [phone]    Does the patient have less than a 3 day supply:  [x] Yes  [] No    Tristen Núñez Rep   10/11/21 09:10 EDT       
PT IS CALLING TO REMIND OFFICE THAT SHE IS NEEDING REFILLED BY Saturday   
fluoroscopy/line adjusted to depth of insertion/line in appropriate postion

## (undated) DEVICE — CATH DIAG CARD PERFORM JR4.0 BT 4F100CM

## (undated) DEVICE — DRP C/ARM 41X74IN

## (undated) DEVICE — BIT DRL SLD SD CUT 2.7X80MM

## (undated) DEVICE — INTRO SHEATH ART/FEM ENGAGE .035 5F12CM

## (undated) DEVICE — TP SILK DURAPORE 2 IN

## (undated) DEVICE — SAFELINER SUCTION CANISTER 1000CC: Brand: DEROYAL

## (undated) DEVICE — SKIN PREP TRAY W/CHG: Brand: MEDLINE INDUSTRIES, INC.

## (undated) DEVICE — SOL IRR H2O BTL 1000ML STRL

## (undated) DEVICE — GLIDESHEATH BASIC HYDROPHILIC COATED INTRODUCER SHEATH: Brand: GLIDESHEATH

## (undated) DEVICE — TBG PENCL TELESCP MEGADYNE SMOKE EVAC 10FT

## (undated) DEVICE — ARM SLING II: Brand: DEROYAL

## (undated) DEVICE — GLV SURG SENSICARE PI LF PF 8 GRN STRL

## (undated) DEVICE — DRSNG PAD ABD 8X10IN STRL

## (undated) DEVICE — PREP SOL POVIDONE/IODINE BT 4OZ

## (undated) DEVICE — TP CLTH MEDIPORE SFT 4IN 10YD

## (undated) DEVICE — KWIRE STD/TP 2X152MM
Type: IMPLANTABLE DEVICE | Site: ARM | Status: NON-FUNCTIONAL
Removed: 2022-12-10

## (undated) DEVICE — TOWEL,OR,DSP,ST,BLUE,STD,4/PK,20PK/CS: Brand: MEDLINE

## (undated) DEVICE — SPNG LAP 18X18IN LF STRL PK/5

## (undated) DEVICE — 3M™ STERI-DRAPE™ U-DRAPE 1015: Brand: STERI-DRAPE™

## (undated) DEVICE — CYSTO/BLADDER IRRIGATION SET, REGULATING CLAMP

## (undated) DEVICE — SCRW GEMINUS PA NL 3.5X18MM
Type: IMPLANTABLE DEVICE | Site: ARM | Status: NON-FUNCTIONAL
Removed: 2022-12-10

## (undated) DEVICE — PATIENT RETURN ELECTRODE, SINGLE-USE, CONTACT QUALITY MONITORING, ADULT, WITH 9FT CORD, FOR PATIENTS WEIGING OVER 33LBS. (15KG): Brand: MEGADYNE

## (undated) DEVICE — Device

## (undated) DEVICE — INTRO SHEATH ART/FEM ENGAGE .035 4F12CM

## (undated) DEVICE — GW EMR FIX EXCHG J STD .035 3MM 260CM

## (undated) DEVICE — TRAP FLD MINIVAC MEGADYNE 100ML

## (undated) DEVICE — DRSNG GZ CURAD XEROFORM PETROLTM OVERWRP 1X8IN STRL

## (undated) DEVICE — BIT DRL SLD SD CUT 2.7X40MM

## (undated) DEVICE — DRVR QC UNIV T10

## (undated) DEVICE — SPNG GZ WOVN 4X4IN 12PLY 10/BX STRL

## (undated) DEVICE — IMPLANTABLE DEVICE
Type: IMPLANTABLE DEVICE | Site: ARM | Status: NON-FUNCTIONAL
Removed: 2022-12-10

## (undated) DEVICE — PK BASIC ORTHO 90

## (undated) DEVICE — INTENDED TO BE USED TO OCCLUDE, RETRACT AND IDENTIFY ARTERIES, VEINS, TENDONS AND NERVES IN SURGICAL PROCEDURES: Brand: STERION®  VESSEL LOOP

## (undated) DEVICE — DRSNG GZ CURAD XEROFORM NONADHS 5X9IN STRL

## (undated) DEVICE — INTENT TO BE USED WITH SUTURE MATERIAL FOR TISSUE CLOSURE: Brand: RICHARD-ALLAN® NEEDLE 1/2 CIRCLE TAPER

## (undated) DEVICE — 3M™ IOBAN™ 2 ANTIMICROBIAL INCISE DRAPE 6640EZ: Brand: IOBAN™ 2

## (undated) DEVICE — PK CATH CARD 40

## (undated) DEVICE — BOWL PLSTC LG 32OZ BLU STRL

## (undated) DEVICE — SUT ETHLN 3/0 PSL BLK MONO SA 30IN 1691H

## (undated) DEVICE — CATH DIAG CARD PERFORM 145 D PIG 4F110CM

## (undated) DEVICE — KT MANIFLD CARDIAC

## (undated) DEVICE — GLIDESHEATH SLENDER STAINLESS STEEL KIT: Brand: GLIDESHEATH SLENDER

## (undated) DEVICE — COTTON ROLL: Brand: DEROYAL

## (undated) DEVICE — FRAZIER SUCTION INSTRUMENT 10 FR W/CONTROL VENT & OBTURATOR: Brand: FRAZIER

## (undated) DEVICE — 450 ML BOTTLE OF 0.05% CHLORHEXIDINE GLUCONATE IN 99.95% STERILE WATER FOR IRRIGATION, USP AND APPLICATOR.: Brand: IRRISEPT ANTIMICROBIAL WOUND LAVAGE

## (undated) DEVICE — KWIRE ELBW STD/TP 1.5X127MM NS
Type: IMPLANTABLE DEVICE | Site: ARM | Status: NON-FUNCTIONAL
Removed: 2022-12-10

## (undated) DEVICE — HI-TORQUE BALANCE MIDDLEWEIGHT GUIDE WIRE .014 STRAIGHT TIP 3.0 CM X 190 CM: Brand: HI-TORQUE BALANCE MIDDLEWEIGHT

## (undated) DEVICE — RL COTN ABSORB/COMPR 1/2LB 6IN 13FT 1PU

## (undated) DEVICE — GLV SURG SENSICARE PI LF PF 7.5

## (undated) DEVICE — SUT MNCRYL 2/0 SH 27IN Y417H

## (undated) DEVICE — SOL ISO/ALC RUB 70PCT 4OZ

## (undated) DEVICE — STCKNT IMPERV 9X36IN STRL

## (undated) DEVICE — BALN PRESS WEDGE 5F 110CM

## (undated) DEVICE — DISPOSABLE TOURNIQUET CUFF SINGLE BLADDER, SINGLE PORT AND QUICK CONNECT CONNECTOR: Brand: COLOR CUFF

## (undated) DEVICE — CATH DIAG CARD PERFORMA JL4.0 BT 4F100CM